# Patient Record
Sex: FEMALE | Race: WHITE | NOT HISPANIC OR LATINO
[De-identification: names, ages, dates, MRNs, and addresses within clinical notes are randomized per-mention and may not be internally consistent; named-entity substitution may affect disease eponyms.]

---

## 2021-01-01 ENCOUNTER — APPOINTMENT (OUTPATIENT)
Dept: GERIATRICS | Facility: CLINIC | Age: 86
End: 2021-01-01

## 2021-01-01 ENCOUNTER — APPOINTMENT (OUTPATIENT)
Dept: CARDIOLOGY | Facility: CLINIC | Age: 86
End: 2021-01-01
Payer: MEDICARE

## 2021-01-01 ENCOUNTER — NON-APPOINTMENT (OUTPATIENT)
Age: 86
End: 2021-01-01

## 2021-01-01 ENCOUNTER — LABORATORY RESULT (OUTPATIENT)
Age: 86
End: 2021-01-01

## 2021-01-01 ENCOUNTER — APPOINTMENT (OUTPATIENT)
Dept: INTERNAL MEDICINE | Facility: CLINIC | Age: 86
End: 2021-01-01
Payer: MEDICARE

## 2021-01-01 ENCOUNTER — RESULT REVIEW (OUTPATIENT)
Age: 86
End: 2021-01-01

## 2021-01-01 ENCOUNTER — APPOINTMENT (OUTPATIENT)
Dept: CARDIOTHORACIC SURGERY | Facility: HOSPITAL | Age: 86
End: 2021-01-01

## 2021-01-01 ENCOUNTER — APPOINTMENT (OUTPATIENT)
Dept: ORTHOPEDIC SURGERY | Facility: CLINIC | Age: 86
End: 2021-01-01

## 2021-01-01 ENCOUNTER — APPOINTMENT (OUTPATIENT)
Dept: CARDIOLOGY | Facility: CLINIC | Age: 86
End: 2021-01-01

## 2021-01-01 ENCOUNTER — APPOINTMENT (OUTPATIENT)
Dept: ULTRASOUND IMAGING | Facility: HOSPITAL | Age: 86
End: 2021-01-01

## 2021-01-01 ENCOUNTER — APPOINTMENT (OUTPATIENT)
Dept: GERIATRICS | Facility: CLINIC | Age: 86
End: 2021-01-01
Payer: MEDICARE

## 2021-01-01 ENCOUNTER — OUTPATIENT (OUTPATIENT)
Dept: OUTPATIENT SERVICES | Facility: HOSPITAL | Age: 86
LOS: 1 days | End: 2021-01-01
Payer: MEDICARE

## 2021-01-01 ENCOUNTER — INPATIENT (INPATIENT)
Facility: HOSPITAL | Age: 86
LOS: 38 days | DRG: 266 | End: 2021-08-16
Attending: INTERNAL MEDICINE | Admitting: INTERNAL MEDICINE
Payer: MEDICARE

## 2021-01-01 ENCOUNTER — APPOINTMENT (OUTPATIENT)
Dept: CARDIOTHORACIC SURGERY | Facility: CLINIC | Age: 86
End: 2021-01-01
Payer: MEDICARE

## 2021-01-01 ENCOUNTER — FORM ENCOUNTER (OUTPATIENT)
Age: 86
End: 2021-01-01

## 2021-01-01 ENCOUNTER — TRANSCRIPTION ENCOUNTER (OUTPATIENT)
Age: 86
End: 2021-01-01

## 2021-01-01 VITALS
OXYGEN SATURATION: 97 % | HEART RATE: 65 BPM | WEIGHT: 160.4 LBS | RESPIRATION RATE: 15 BRPM | HEIGHT: 62 IN | DIASTOLIC BLOOD PRESSURE: 57 MMHG | TEMPERATURE: 98.5 F | SYSTOLIC BLOOD PRESSURE: 124 MMHG | BODY MASS INDEX: 29.52 KG/M2

## 2021-01-01 VITALS
SYSTOLIC BLOOD PRESSURE: 116 MMHG | HEIGHT: 62 IN | WEIGHT: 174.6 LBS | TEMPERATURE: 97.7 F | HEART RATE: 72 BPM | OXYGEN SATURATION: 96 % | DIASTOLIC BLOOD PRESSURE: 58 MMHG | BODY MASS INDEX: 32.13 KG/M2 | RESPIRATION RATE: 14 BRPM

## 2021-01-01 VITALS
BODY MASS INDEX: 31.65 KG/M2 | DIASTOLIC BLOOD PRESSURE: 54 MMHG | SYSTOLIC BLOOD PRESSURE: 99 MMHG | HEIGHT: 62 IN | RESPIRATION RATE: 26 BRPM | HEART RATE: 66 BPM | WEIGHT: 172 LBS

## 2021-01-01 VITALS
TEMPERATURE: 98 F | RESPIRATION RATE: 20 BRPM | WEIGHT: 164.02 LBS | HEIGHT: 60 IN | DIASTOLIC BLOOD PRESSURE: 67 MMHG | SYSTOLIC BLOOD PRESSURE: 112 MMHG | HEART RATE: 83 BPM

## 2021-01-01 VITALS
OXYGEN SATURATION: 99 % | HEART RATE: 87 BPM | TEMPERATURE: 98 F | SYSTOLIC BLOOD PRESSURE: 97 MMHG | DIASTOLIC BLOOD PRESSURE: 43 MMHG | RESPIRATION RATE: 20 BRPM

## 2021-01-01 VITALS
HEIGHT: 60 IN | HEART RATE: 75 BPM | BODY MASS INDEX: 36.42 KG/M2 | OXYGEN SATURATION: 94 % | DIASTOLIC BLOOD PRESSURE: 70 MMHG | SYSTOLIC BLOOD PRESSURE: 130 MMHG | WEIGHT: 185.5 LBS | TEMPERATURE: 94.8 F | RESPIRATION RATE: 14 BRPM

## 2021-01-01 VITALS
TEMPERATURE: 97 F | HEIGHT: 60 IN | DIASTOLIC BLOOD PRESSURE: 70 MMHG | SYSTOLIC BLOOD PRESSURE: 110 MMHG | OXYGEN SATURATION: 96 % | BODY MASS INDEX: 36.32 KG/M2 | WEIGHT: 185 LBS | HEART RATE: 66 BPM

## 2021-01-01 VITALS
SYSTOLIC BLOOD PRESSURE: 106 MMHG | WEIGHT: 164 LBS | HEART RATE: 68 BPM | TEMPERATURE: 97.8 F | OXYGEN SATURATION: 90 % | HEIGHT: 62 IN | DIASTOLIC BLOOD PRESSURE: 64 MMHG | BODY MASS INDEX: 30.18 KG/M2

## 2021-01-01 VITALS
OXYGEN SATURATION: 96 % | HEART RATE: 57 BPM | HEIGHT: 60 IN | BODY MASS INDEX: 36.52 KG/M2 | SYSTOLIC BLOOD PRESSURE: 123 MMHG | TEMPERATURE: 97.6 F | WEIGHT: 186 LBS | DIASTOLIC BLOOD PRESSURE: 66 MMHG | RESPIRATION RATE: 14 BRPM

## 2021-01-01 DIAGNOSIS — E87.2 ACIDOSIS: ICD-10-CM

## 2021-01-01 DIAGNOSIS — Z71.89 OTHER SPECIFIED COUNSELING: ICD-10-CM

## 2021-01-01 DIAGNOSIS — E55.9 VITAMIN D DEFICIENCY, UNSPECIFIED: ICD-10-CM

## 2021-01-01 DIAGNOSIS — G93.41 METABOLIC ENCEPHALOPATHY: ICD-10-CM

## 2021-01-01 DIAGNOSIS — Z82.49 FAMILY HISTORY OF ISCHEMIC HEART DISEASE AND OTHER DISEASES OF THE CIRCULATORY SYSTEM: ICD-10-CM

## 2021-01-01 DIAGNOSIS — J96.01 ACUTE RESPIRATORY FAILURE WITH HYPOXIA: ICD-10-CM

## 2021-01-01 DIAGNOSIS — E11.9 TYPE 2 DIABETES MELLITUS W/OUT COMPLICATIONS: ICD-10-CM

## 2021-01-01 DIAGNOSIS — E87.5 HYPERKALEMIA: ICD-10-CM

## 2021-01-01 DIAGNOSIS — E11.9 TYPE 2 DIABETES MELLITUS WITHOUT COMPLICATIONS: ICD-10-CM

## 2021-01-01 DIAGNOSIS — R06.00 DYSPNEA, UNSPECIFIED: ICD-10-CM

## 2021-01-01 DIAGNOSIS — I35.0 NONRHEUMATIC AORTIC (VALVE) STENOSIS: ICD-10-CM

## 2021-01-01 DIAGNOSIS — I35.9 NONRHEUMATIC AORTIC VALVE DISORDER, UNSPECIFIED: ICD-10-CM

## 2021-01-01 DIAGNOSIS — R91.8 OTHER NONSPECIFIC ABNORMAL FINDING OF LUNG FIELD: ICD-10-CM

## 2021-01-01 DIAGNOSIS — S81.809A UNSPECIFIED OPEN WOUND, UNSPECIFIED LOWER LEG, INITIAL ENCOUNTER: ICD-10-CM

## 2021-01-01 DIAGNOSIS — R60.0 LOCALIZED EDEMA: ICD-10-CM

## 2021-01-01 DIAGNOSIS — I25.10 ATHEROSCLEROTIC HEART DISEASE OF NATIVE CORONARY ARTERY W/OUT ANGINA PECTORIS: ICD-10-CM

## 2021-01-01 DIAGNOSIS — R52 PAIN, UNSPECIFIED: ICD-10-CM

## 2021-01-01 DIAGNOSIS — R53.2 FUNCTIONAL QUADRIPLEGIA: ICD-10-CM

## 2021-01-01 DIAGNOSIS — E87.3 ALKALOSIS: ICD-10-CM

## 2021-01-01 DIAGNOSIS — D64.9 ANEMIA, UNSPECIFIED: ICD-10-CM

## 2021-01-01 DIAGNOSIS — G47.00 INSOMNIA, UNSPECIFIED: ICD-10-CM

## 2021-01-01 DIAGNOSIS — N17.9 ACUTE KIDNEY FAILURE, UNSPECIFIED: ICD-10-CM

## 2021-01-01 DIAGNOSIS — E87.79 OTHER FLUID OVERLOAD: ICD-10-CM

## 2021-01-01 DIAGNOSIS — R93.1 ABNORMAL FINDINGS ON DIAGNOSTIC IMAGING OF HEART AND CORONARY CIRCULATION: ICD-10-CM

## 2021-01-01 DIAGNOSIS — Z13.29 ENCOUNTER FOR SCREENING FOR OTHER SUSPECTED ENDOCRINE DISORDER: ICD-10-CM

## 2021-01-01 DIAGNOSIS — E03.9 HYPOTHYROIDISM, UNSPECIFIED: ICD-10-CM

## 2021-01-01 DIAGNOSIS — I50.33 ACUTE ON CHRONIC DIASTOLIC (CONGESTIVE) HEART FAILURE: ICD-10-CM

## 2021-01-01 DIAGNOSIS — Z85.038 PERSONAL HISTORY OF OTHER MALIGNANT NEOPLASM OF LARGE INTESTINE: ICD-10-CM

## 2021-01-01 DIAGNOSIS — E87.70 FLUID OVERLOAD, UNSPECIFIED: ICD-10-CM

## 2021-01-01 DIAGNOSIS — Z51.5 ENCOUNTER FOR PALLIATIVE CARE: ICD-10-CM

## 2021-01-01 DIAGNOSIS — Z23 ENCOUNTER FOR IMMUNIZATION: ICD-10-CM

## 2021-01-01 DIAGNOSIS — E87.6 HYPOKALEMIA: ICD-10-CM

## 2021-01-01 DIAGNOSIS — F41.9 ANXIETY DISORDER, UNSPECIFIED: ICD-10-CM

## 2021-01-01 DIAGNOSIS — I48.21 PERMANENT ATRIAL FIBRILLATION: ICD-10-CM

## 2021-01-01 DIAGNOSIS — R26.81 UNSTEADINESS ON FEET: ICD-10-CM

## 2021-01-01 DIAGNOSIS — D50.0 IRON DEFICIENCY ANEMIA SECONDARY TO BLOOD LOSS (CHRONIC): ICD-10-CM

## 2021-01-01 DIAGNOSIS — Z29.9 ENCOUNTER FOR PROPHYLACTIC MEASURES, UNSPECIFIED: ICD-10-CM

## 2021-01-01 DIAGNOSIS — Z13.820 ENCOUNTER FOR SCREENING FOR OSTEOPOROSIS: ICD-10-CM

## 2021-01-01 DIAGNOSIS — I10 ESSENTIAL (PRIMARY) HYPERTENSION: ICD-10-CM

## 2021-01-01 DIAGNOSIS — I48.91 UNSPECIFIED ATRIAL FIBRILLATION: ICD-10-CM

## 2021-01-01 DIAGNOSIS — E87.1 HYPO-OSMOLALITY AND HYPONATREMIA: ICD-10-CM

## 2021-01-01 DIAGNOSIS — E83.39 OTHER DISORDERS OF PHOSPHORUS METABOLISM: ICD-10-CM

## 2021-01-01 DIAGNOSIS — R89.9 UNSPECIFIED ABNORMAL FINDING IN SPECIMENS FROM OTHER ORGANS, SYSTEMS AND TISSUES: ICD-10-CM

## 2021-01-01 DIAGNOSIS — Z79.01 LONG TERM (CURRENT) USE OF ANTICOAGULANTS: ICD-10-CM

## 2021-01-01 DIAGNOSIS — Z00.00 ENCOUNTER FOR GENERAL ADULT MEDICAL EXAMINATION WITHOUT ABNORMAL FINDINGS: ICD-10-CM

## 2021-01-01 DIAGNOSIS — E66.9 OBESITY, UNSPECIFIED: ICD-10-CM

## 2021-01-01 DIAGNOSIS — R91.1 SOLITARY PULMONARY NODULE: ICD-10-CM

## 2021-01-01 DIAGNOSIS — J98.4 OTHER DISORDERS OF LUNG: ICD-10-CM

## 2021-01-01 DIAGNOSIS — Z51.81 ENCOUNTER FOR THERAPEUTIC DRUG LEVEL MONITORING: ICD-10-CM

## 2021-01-01 DIAGNOSIS — Z95.2 PRESENCE OF PROSTHETIC HEART VALVE: ICD-10-CM

## 2021-01-01 DIAGNOSIS — Z60.2 PROBLEMS RELATED TO LIVING ALONE: ICD-10-CM

## 2021-01-01 LAB
-  AMIKACIN: SIGNIFICANT CHANGE UP
-  AMOXICILLIN/CLAVULANIC ACID: SIGNIFICANT CHANGE UP
-  AMPICILLIN/SULBACTAM: SIGNIFICANT CHANGE UP
-  AMPICILLIN/SULBACTAM: SIGNIFICANT CHANGE UP
-  AMPICILLIN: SIGNIFICANT CHANGE UP
-  AMPICILLIN: SIGNIFICANT CHANGE UP
-  AZTREONAM: SIGNIFICANT CHANGE UP
-  CEFAZOLIN: SIGNIFICANT CHANGE UP
-  CEFAZOLIN: SIGNIFICANT CHANGE UP
-  CEFEPIME: SIGNIFICANT CHANGE UP
-  CEFOXITIN: SIGNIFICANT CHANGE UP
-  CEFTRIAXONE: SIGNIFICANT CHANGE UP
-  CIPROFLOXACIN: SIGNIFICANT CHANGE UP
-  CIPROFLOXACIN: SIGNIFICANT CHANGE UP
-  CLINDAMYCIN: SIGNIFICANT CHANGE UP
-  ERTAPENEM: SIGNIFICANT CHANGE UP
-  ERYTHROMYCIN: SIGNIFICANT CHANGE UP
-  GENTAMICIN: SIGNIFICANT CHANGE UP
-  GENTAMICIN: SIGNIFICANT CHANGE UP
-  IMIPENEM: SIGNIFICANT CHANGE UP
-  LEVOFLOXACIN: SIGNIFICANT CHANGE UP
-  LEVOFLOXACIN: SIGNIFICANT CHANGE UP
-  MEROPENEM: SIGNIFICANT CHANGE UP
-  NITROFURANTOIN: SIGNIFICANT CHANGE UP
-  NITROFURANTOIN: SIGNIFICANT CHANGE UP
-  OXACILLIN: SIGNIFICANT CHANGE UP
-  PENICILLIN: SIGNIFICANT CHANGE UP
-  PIPERACILLIN/TAZOBACTAM: SIGNIFICANT CHANGE UP
-  RIFAMPIN: SIGNIFICANT CHANGE UP
-  STAPHYLOCOCCUS EPIDERMIDIS, METHICILLIN RESISTANT: SIGNIFICANT CHANGE UP
-  STAPHYLOCOCCUS EPIDERMIDIS: SIGNIFICANT CHANGE UP
-  TETRACYCLINE: SIGNIFICANT CHANGE UP
-  TETRACYCLINE: SIGNIFICANT CHANGE UP
-  TIGECYCLINE: SIGNIFICANT CHANGE UP
-  TOBRAMYCIN: SIGNIFICANT CHANGE UP
-  TRIMETHOPRIM/SULFAMETHOXAZOLE: SIGNIFICANT CHANGE UP
-  TRIMETHOPRIM/SULFAMETHOXAZOLE: SIGNIFICANT CHANGE UP
-  VANCOMYCIN: SIGNIFICANT CHANGE UP
-  VANCOMYCIN: SIGNIFICANT CHANGE UP
25(OH)D3 SERPL-MCNC: 48.1 NG/ML
A1C WITH ESTIMATED AVERAGE GLUCOSE RESULT: 6.7 % — HIGH (ref 4–5.6)
ALBUMIN SERPL ELPH-MCNC: 3.1 G/DL — LOW (ref 3.3–5)
ALBUMIN SERPL ELPH-MCNC: 3.1 G/DL — LOW (ref 3.3–5)
ALBUMIN SERPL ELPH-MCNC: 3.2 G/DL — LOW (ref 3.3–5)
ALBUMIN SERPL ELPH-MCNC: 3.6 G/DL
ALBUMIN SERPL ELPH-MCNC: 3.8 G/DL
ALBUMIN SERPL ELPH-MCNC: 3.8 G/DL
ALBUMIN SERPL ELPH-MCNC: 3.9 G/DL — SIGNIFICANT CHANGE UP (ref 3.3–5)
ALBUMIN SERPL ELPH-MCNC: 4.2 G/DL
ALP BLD-CCNC: 187 U/L
ALP BLD-CCNC: 203 U/L
ALP BLD-CCNC: 210 U/L
ALP BLD-CCNC: 285 U/L
ALP SERPL-CCNC: 205 U/L — HIGH (ref 40–120)
ALP SERPL-CCNC: 226 U/L — HIGH (ref 40–120)
ALP SERPL-CCNC: 271 U/L — HIGH (ref 40–120)
ALP SERPL-CCNC: 280 U/L — HIGH (ref 40–120)
ALT FLD-CCNC: 10 U/L — SIGNIFICANT CHANGE UP (ref 10–45)
ALT FLD-CCNC: 11 U/L — SIGNIFICANT CHANGE UP (ref 10–45)
ALT FLD-CCNC: 17 U/L — SIGNIFICANT CHANGE UP (ref 10–45)
ALT FLD-CCNC: 8 U/L — LOW (ref 10–45)
ALT SERPL-CCNC: 11 U/L
ALT SERPL-CCNC: 13 U/L
ALT SERPL-CCNC: 13 U/L
ALT SERPL-CCNC: 18 U/L
ANION GAP SERPL CALC-SCNC: 10 MMOL/L — SIGNIFICANT CHANGE UP (ref 5–17)
ANION GAP SERPL CALC-SCNC: 11 MMOL/L — SIGNIFICANT CHANGE UP (ref 5–17)
ANION GAP SERPL CALC-SCNC: 12 MMOL/L — SIGNIFICANT CHANGE UP (ref 5–17)
ANION GAP SERPL CALC-SCNC: 13 MMOL/L
ANION GAP SERPL CALC-SCNC: 13 MMOL/L — SIGNIFICANT CHANGE UP (ref 5–17)
ANION GAP SERPL CALC-SCNC: 14 MMOL/L — SIGNIFICANT CHANGE UP (ref 5–17)
ANION GAP SERPL CALC-SCNC: 15 MMOL/L — SIGNIFICANT CHANGE UP (ref 5–17)
ANION GAP SERPL CALC-SCNC: 16 MMOL/L
ANION GAP SERPL CALC-SCNC: 16 MMOL/L — SIGNIFICANT CHANGE UP (ref 5–17)
ANION GAP SERPL CALC-SCNC: 17 MMOL/L
ANION GAP SERPL CALC-SCNC: 17 MMOL/L — SIGNIFICANT CHANGE UP (ref 5–17)
ANION GAP SERPL CALC-SCNC: 19 MMOL/L — HIGH (ref 5–17)
ANION GAP SERPL CALC-SCNC: 20 MMOL/L — HIGH (ref 5–17)
ANION GAP SERPL CALC-SCNC: 22 MMOL/L — HIGH (ref 5–17)
ANION GAP SERPL CALC-SCNC: 24 MMOL/L
ANION GAP SERPL CALC-SCNC: 29 MMOL/L — HIGH (ref 5–17)
ANION GAP SERPL CALC-SCNC: 30 MMOL/L — HIGH (ref 5–17)
ANISOCYTOSIS BLD QL: SLIGHT — SIGNIFICANT CHANGE UP
APPEARANCE UR: ABNORMAL
APPEARANCE UR: ABNORMAL
APPEARANCE UR: CLEAR — SIGNIFICANT CHANGE UP
APTT BLD: 101.5 SEC — HIGH (ref 27.5–35.5)
APTT BLD: 136.9 SEC — CRITICAL HIGH (ref 27.5–35.5)
APTT BLD: 184.7 SEC — CRITICAL HIGH (ref 27.5–35.5)
APTT BLD: 193.1 SEC — CRITICAL HIGH (ref 27.5–35.5)
APTT BLD: 33.4 SEC — SIGNIFICANT CHANGE UP (ref 27.5–35.5)
APTT BLD: 33.7 SEC — SIGNIFICANT CHANGE UP (ref 27.5–35.5)
APTT BLD: 35.8 SEC — HIGH (ref 27.5–35.5)
APTT BLD: 36.1 SEC — HIGH (ref 27.5–35.5)
APTT BLD: 40 SEC — HIGH (ref 27.5–35.5)
APTT BLD: 58.2 SEC — HIGH (ref 27.5–35.5)
APTT BLD: 72.1 SEC — HIGH (ref 27.5–35.5)
AST SERPL-CCNC: 19 U/L
AST SERPL-CCNC: 20 U/L — SIGNIFICANT CHANGE UP (ref 10–40)
AST SERPL-CCNC: 21 U/L
AST SERPL-CCNC: 21 U/L — SIGNIFICANT CHANGE UP (ref 10–40)
AST SERPL-CCNC: 21 U/L — SIGNIFICANT CHANGE UP (ref 10–40)
AST SERPL-CCNC: 24 U/L
AST SERPL-CCNC: 25 U/L — SIGNIFICANT CHANGE UP (ref 10–40)
AST SERPL-CCNC: 27 U/L
BACTERIA # UR AUTO: ABNORMAL
BACTERIA # UR AUTO: NEGATIVE — SIGNIFICANT CHANGE UP
BACTERIA # UR AUTO: NEGATIVE — SIGNIFICANT CHANGE UP
BASE EXCESS BLDMV CALC-SCNC: 19.1 MMOL/L — HIGH (ref -3–3)
BASE EXCESS BLDMV CALC-SCNC: 9.4 MMOL/L — HIGH (ref -3–3)
BASE EXCESS BLDV CALC-SCNC: -11.2 MMOL/L — LOW (ref -2–2)
BASE EXCESS BLDV CALC-SCNC: -12.2 MMOL/L — LOW (ref -2–2)
BASE EXCESS BLDV CALC-SCNC: -14.2 MMOL/L — LOW (ref -2–2)
BASE EXCESS BLDV CALC-SCNC: 4.1 MMOL/L — HIGH (ref -2–2)
BASOPHILS # BLD AUTO: 0 K/UL — SIGNIFICANT CHANGE UP (ref 0–0.2)
BASOPHILS # BLD AUTO: 0.04 K/UL — SIGNIFICANT CHANGE UP (ref 0–0.2)
BASOPHILS # BLD AUTO: 0.05 K/UL
BASOPHILS # BLD AUTO: 0.05 K/UL — SIGNIFICANT CHANGE UP (ref 0–0.2)
BASOPHILS # BLD AUTO: 0.07 K/UL
BASOPHILS # BLD AUTO: 0.08 K/UL
BASOPHILS # BLD AUTO: 0.09 K/UL
BASOPHILS NFR BLD AUTO: 0 % — SIGNIFICANT CHANGE UP (ref 0–2)
BASOPHILS NFR BLD AUTO: 0.1 % — SIGNIFICANT CHANGE UP (ref 0–2)
BASOPHILS NFR BLD AUTO: 0.6 %
BASOPHILS NFR BLD AUTO: 0.7 %
BASOPHILS NFR BLD AUTO: 0.7 % — SIGNIFICANT CHANGE UP (ref 0–2)
BASOPHILS NFR BLD AUTO: 0.9 %
BASOPHILS NFR BLD AUTO: 1 %
BILIRUB SERPL-MCNC: 0.4 MG/DL
BILIRUB SERPL-MCNC: 0.4 MG/DL — SIGNIFICANT CHANGE UP (ref 0.2–1.2)
BILIRUB SERPL-MCNC: 0.5 MG/DL
BILIRUB SERPL-MCNC: 0.9 MG/DL — SIGNIFICANT CHANGE UP (ref 0.2–1.2)
BILIRUB SERPL-MCNC: 1 MG/DL — SIGNIFICANT CHANGE UP (ref 0.2–1.2)
BILIRUB SERPL-MCNC: 1 MG/DL — SIGNIFICANT CHANGE UP (ref 0.2–1.2)
BILIRUB UR-MCNC: ABNORMAL
BILIRUB UR-MCNC: NEGATIVE — SIGNIFICANT CHANGE UP
BILIRUB UR-MCNC: NEGATIVE — SIGNIFICANT CHANGE UP
BLD GP AB SCN SERPL QL: NEGATIVE — SIGNIFICANT CHANGE UP
BLD GP AB SCN SERPL QL: NEGATIVE — SIGNIFICANT CHANGE UP
BUN SERPL-MCNC: 104 MG/DL — HIGH (ref 7–23)
BUN SERPL-MCNC: 109 MG/DL — HIGH (ref 7–23)
BUN SERPL-MCNC: 110 MG/DL — HIGH (ref 7–23)
BUN SERPL-MCNC: 118 MG/DL — HIGH (ref 7–23)
BUN SERPL-MCNC: 150 MG/DL — HIGH (ref 7–23)
BUN SERPL-MCNC: 19 MG/DL — SIGNIFICANT CHANGE UP (ref 7–23)
BUN SERPL-MCNC: 21 MG/DL — SIGNIFICANT CHANGE UP (ref 7–23)
BUN SERPL-MCNC: 22 MG/DL — SIGNIFICANT CHANGE UP (ref 7–23)
BUN SERPL-MCNC: 24 MG/DL — HIGH (ref 7–23)
BUN SERPL-MCNC: 26 MG/DL — HIGH (ref 7–23)
BUN SERPL-MCNC: 27 MG/DL — HIGH (ref 7–23)
BUN SERPL-MCNC: 28 MG/DL
BUN SERPL-MCNC: 28 MG/DL — HIGH (ref 7–23)
BUN SERPL-MCNC: 29 MG/DL — HIGH (ref 7–23)
BUN SERPL-MCNC: 30 MG/DL — HIGH (ref 7–23)
BUN SERPL-MCNC: 32 MG/DL — HIGH (ref 7–23)
BUN SERPL-MCNC: 32 MG/DL — HIGH (ref 7–23)
BUN SERPL-MCNC: 33 MG/DL — HIGH (ref 7–23)
BUN SERPL-MCNC: 34 MG/DL — HIGH (ref 7–23)
BUN SERPL-MCNC: 34 MG/DL — HIGH (ref 7–23)
BUN SERPL-MCNC: 35 MG/DL — HIGH (ref 7–23)
BUN SERPL-MCNC: 35 MG/DL — HIGH (ref 7–23)
BUN SERPL-MCNC: 37 MG/DL — HIGH (ref 7–23)
BUN SERPL-MCNC: 41 MG/DL
BUN SERPL-MCNC: 46 MG/DL — HIGH (ref 7–23)
BUN SERPL-MCNC: 48 MG/DL — HIGH (ref 7–23)
BUN SERPL-MCNC: 51 MG/DL — HIGH (ref 7–23)
BUN SERPL-MCNC: 52 MG/DL — HIGH (ref 7–23)
BUN SERPL-MCNC: 53 MG/DL — HIGH (ref 7–23)
BUN SERPL-MCNC: 54 MG/DL — HIGH (ref 7–23)
BUN SERPL-MCNC: 55 MG/DL — HIGH (ref 7–23)
BUN SERPL-MCNC: 55 MG/DL — HIGH (ref 7–23)
BUN SERPL-MCNC: 65 MG/DL — HIGH (ref 7–23)
BUN SERPL-MCNC: 65 MG/DL — HIGH (ref 7–23)
BUN SERPL-MCNC: 67 MG/DL
BUN SERPL-MCNC: 70 MG/DL
BUN SERPL-MCNC: 70 MG/DL — HIGH (ref 7–23)
BUN SERPL-MCNC: 78 MG/DL — HIGH (ref 7–23)
BUN SERPL-MCNC: 79 MG/DL — HIGH (ref 7–23)
BUN SERPL-MCNC: 80 MG/DL — HIGH (ref 7–23)
BUN SERPL-MCNC: 98 MG/DL — HIGH (ref 7–23)
BUN SERPL-MCNC: 98 MG/DL — HIGH (ref 7–23)
CA-I SERPL-SCNC: 1.03 MMOL/L — LOW (ref 1.12–1.3)
CA-I SERPL-SCNC: 1.15 MMOL/L — SIGNIFICANT CHANGE UP (ref 1.12–1.3)
CA-I SERPL-SCNC: 1.18 MMOL/L — SIGNIFICANT CHANGE UP (ref 1.12–1.3)
CA-I SERPL-SCNC: 1.2 MMOL/L — SIGNIFICANT CHANGE UP (ref 1.12–1.3)
CALCIUM SERPL-MCNC: 10 MG/DL — SIGNIFICANT CHANGE UP (ref 8.4–10.5)
CALCIUM SERPL-MCNC: 10.1 MG/DL — SIGNIFICANT CHANGE UP (ref 8.4–10.5)
CALCIUM SERPL-MCNC: 10.2 MG/DL — SIGNIFICANT CHANGE UP (ref 8.4–10.5)
CALCIUM SERPL-MCNC: 10.3 MG/DL — SIGNIFICANT CHANGE UP (ref 8.4–10.5)
CALCIUM SERPL-MCNC: 10.4 MG/DL
CALCIUM SERPL-MCNC: 10.4 MG/DL — SIGNIFICANT CHANGE UP (ref 8.4–10.5)
CALCIUM SERPL-MCNC: 10.6 MG/DL — HIGH (ref 8.4–10.5)
CALCIUM SERPL-MCNC: 10.6 MG/DL — HIGH (ref 8.4–10.5)
CALCIUM SERPL-MCNC: 10.7 MG/DL — HIGH (ref 8.4–10.5)
CALCIUM SERPL-MCNC: 10.8 MG/DL — HIGH (ref 8.4–10.5)
CALCIUM SERPL-MCNC: 10.9 MG/DL — HIGH (ref 8.4–10.5)
CALCIUM SERPL-MCNC: 8.2 MG/DL — LOW (ref 8.4–10.5)
CALCIUM SERPL-MCNC: 8.4 MG/DL — SIGNIFICANT CHANGE UP (ref 8.4–10.5)
CALCIUM SERPL-MCNC: 8.5 MG/DL — SIGNIFICANT CHANGE UP (ref 8.4–10.5)
CALCIUM SERPL-MCNC: 8.5 MG/DL — SIGNIFICANT CHANGE UP (ref 8.4–10.5)
CALCIUM SERPL-MCNC: 8.7 MG/DL — SIGNIFICANT CHANGE UP (ref 8.4–10.5)
CALCIUM SERPL-MCNC: 8.7 MG/DL — SIGNIFICANT CHANGE UP (ref 8.4–10.5)
CALCIUM SERPL-MCNC: 8.8 MG/DL — SIGNIFICANT CHANGE UP (ref 8.4–10.5)
CALCIUM SERPL-MCNC: 8.9 MG/DL — SIGNIFICANT CHANGE UP (ref 8.4–10.5)
CALCIUM SERPL-MCNC: 8.9 MG/DL — SIGNIFICANT CHANGE UP (ref 8.4–10.5)
CALCIUM SERPL-MCNC: 9 MG/DL — SIGNIFICANT CHANGE UP (ref 8.4–10.5)
CALCIUM SERPL-MCNC: 9.2 MG/DL — SIGNIFICANT CHANGE UP (ref 8.4–10.5)
CALCIUM SERPL-MCNC: 9.3 MG/DL
CALCIUM SERPL-MCNC: 9.3 MG/DL — SIGNIFICANT CHANGE UP (ref 8.4–10.5)
CALCIUM SERPL-MCNC: 9.3 MG/DL — SIGNIFICANT CHANGE UP (ref 8.4–10.5)
CALCIUM SERPL-MCNC: 9.4 MG/DL — SIGNIFICANT CHANGE UP (ref 8.4–10.5)
CALCIUM SERPL-MCNC: 9.5 MG/DL — SIGNIFICANT CHANGE UP (ref 8.4–10.5)
CALCIUM SERPL-MCNC: 9.5 MG/DL — SIGNIFICANT CHANGE UP (ref 8.4–10.5)
CALCIUM SERPL-MCNC: 9.6 MG/DL
CALCIUM SERPL-MCNC: 9.6 MG/DL
CALCIUM SERPL-MCNC: 9.6 MG/DL — SIGNIFICANT CHANGE UP (ref 8.4–10.5)
CALCIUM SERPL-MCNC: 9.6 MG/DL — SIGNIFICANT CHANGE UP (ref 8.4–10.5)
CALCIUM SERPL-MCNC: 9.7 MG/DL — SIGNIFICANT CHANGE UP (ref 8.4–10.5)
CALCIUM SERPL-MCNC: 9.8 MG/DL — SIGNIFICANT CHANGE UP (ref 8.4–10.5)
CALCIUM SERPL-MCNC: 9.8 MG/DL — SIGNIFICANT CHANGE UP (ref 8.4–10.5)
CALCIUM SERPL-MCNC: 9.9 MG/DL — SIGNIFICANT CHANGE UP (ref 8.4–10.5)
CHLORIDE BLDV-SCNC: 90 MMOL/L — LOW (ref 96–108)
CHLORIDE BLDV-SCNC: 92 MMOL/L — LOW (ref 96–108)
CHLORIDE BLDV-SCNC: 93 MMOL/L — LOW (ref 96–108)
CHLORIDE BLDV-SCNC: 99 MMOL/L — SIGNIFICANT CHANGE UP (ref 96–108)
CHLORIDE SERPL-SCNC: 100 MMOL/L — SIGNIFICANT CHANGE UP (ref 96–108)
CHLORIDE SERPL-SCNC: 100 MMOL/L — SIGNIFICANT CHANGE UP (ref 96–108)
CHLORIDE SERPL-SCNC: 102 MMOL/L — SIGNIFICANT CHANGE UP (ref 96–108)
CHLORIDE SERPL-SCNC: 103 MMOL/L
CHLORIDE SERPL-SCNC: 82 MMOL/L — LOW (ref 96–108)
CHLORIDE SERPL-SCNC: 83 MMOL/L — LOW (ref 96–108)
CHLORIDE SERPL-SCNC: 84 MMOL/L — LOW (ref 96–108)
CHLORIDE SERPL-SCNC: 85 MMOL/L — LOW (ref 96–108)
CHLORIDE SERPL-SCNC: 85 MMOL/L — LOW (ref 96–108)
CHLORIDE SERPL-SCNC: 86 MMOL/L — LOW (ref 96–108)
CHLORIDE SERPL-SCNC: 87 MMOL/L — LOW (ref 96–108)
CHLORIDE SERPL-SCNC: 88 MMOL/L
CHLORIDE SERPL-SCNC: 88 MMOL/L — LOW (ref 96–108)
CHLORIDE SERPL-SCNC: 89 MMOL/L — LOW (ref 96–108)
CHLORIDE SERPL-SCNC: 90 MMOL/L — LOW (ref 96–108)
CHLORIDE SERPL-SCNC: 91 MMOL/L — LOW (ref 96–108)
CHLORIDE SERPL-SCNC: 92 MMOL/L — LOW (ref 96–108)
CHLORIDE SERPL-SCNC: 92 MMOL/L — LOW (ref 96–108)
CHLORIDE SERPL-SCNC: 93 MMOL/L — LOW (ref 96–108)
CHLORIDE SERPL-SCNC: 94 MMOL/L — LOW (ref 96–108)
CHLORIDE SERPL-SCNC: 95 MMOL/L
CHLORIDE SERPL-SCNC: 95 MMOL/L — LOW (ref 96–108)
CHLORIDE SERPL-SCNC: 97 MMOL/L — SIGNIFICANT CHANGE UP (ref 96–108)
CHLORIDE SERPL-SCNC: 98 MMOL/L — SIGNIFICANT CHANGE UP (ref 96–108)
CHLORIDE SERPL-SCNC: 99 MMOL/L
CHLORIDE SERPL-SCNC: 99 MMOL/L — SIGNIFICANT CHANGE UP (ref 96–108)
CO2 BLDMV-SCNC: 37 MMOL/L — HIGH (ref 21–29)
CO2 BLDMV-SCNC: 48 MMOL/L — HIGH (ref 21–29)
CO2 BLDV-SCNC: 14 MMOL/L — LOW (ref 22–30)
CO2 BLDV-SCNC: 15 MMOL/L — LOW (ref 22–30)
CO2 BLDV-SCNC: 16 MMOL/L — LOW (ref 22–30)
CO2 BLDV-SCNC: 30 MMOL/L — SIGNIFICANT CHANGE UP (ref 22–30)
CO2 SERPL-SCNC: 11 MMOL/L — LOW (ref 22–31)
CO2 SERPL-SCNC: 12 MMOL/L — LOW (ref 22–31)
CO2 SERPL-SCNC: 14 MMOL/L
CO2 SERPL-SCNC: 18 MMOL/L — LOW (ref 22–31)
CO2 SERPL-SCNC: 20 MMOL/L
CO2 SERPL-SCNC: 20 MMOL/L — LOW (ref 22–31)
CO2 SERPL-SCNC: 22 MMOL/L
CO2 SERPL-SCNC: 23 MMOL/L — SIGNIFICANT CHANGE UP (ref 22–31)
CO2 SERPL-SCNC: 24 MMOL/L — SIGNIFICANT CHANGE UP (ref 22–31)
CO2 SERPL-SCNC: 25 MMOL/L
CO2 SERPL-SCNC: 25 MMOL/L — SIGNIFICANT CHANGE UP (ref 22–31)
CO2 SERPL-SCNC: 25 MMOL/L — SIGNIFICANT CHANGE UP (ref 22–31)
CO2 SERPL-SCNC: 26 MMOL/L — SIGNIFICANT CHANGE UP (ref 22–31)
CO2 SERPL-SCNC: 27 MMOL/L — SIGNIFICANT CHANGE UP (ref 22–31)
CO2 SERPL-SCNC: 28 MMOL/L — SIGNIFICANT CHANGE UP (ref 22–31)
CO2 SERPL-SCNC: 29 MMOL/L — SIGNIFICANT CHANGE UP (ref 22–31)
CO2 SERPL-SCNC: 30 MMOL/L — SIGNIFICANT CHANGE UP (ref 22–31)
CO2 SERPL-SCNC: 31 MMOL/L — SIGNIFICANT CHANGE UP (ref 22–31)
CO2 SERPL-SCNC: 31 MMOL/L — SIGNIFICANT CHANGE UP (ref 22–31)
CO2 SERPL-SCNC: 32 MMOL/L — HIGH (ref 22–31)
CO2 SERPL-SCNC: 34 MMOL/L — HIGH (ref 22–31)
CO2 SERPL-SCNC: 35 MMOL/L — HIGH (ref 22–31)
CO2 SERPL-SCNC: 37 MMOL/L — HIGH (ref 22–31)
CO2 SERPL-SCNC: 38 MMOL/L — HIGH (ref 22–31)
CO2 SERPL-SCNC: 38 MMOL/L — HIGH (ref 22–31)
CO2 SERPL-SCNC: 41 MMOL/L — HIGH (ref 22–31)
COLOR SPEC: ABNORMAL
COLOR SPEC: YELLOW — SIGNIFICANT CHANGE UP
COLOR SPEC: YELLOW — SIGNIFICANT CHANGE UP
CREAT ?TM UR-MCNC: 46 MG/DL — SIGNIFICANT CHANGE UP
CREAT ?TM UR-MCNC: 50 MG/DL — SIGNIFICANT CHANGE UP
CREAT SERPL-MCNC: 0.81 MG/DL
CREAT SERPL-MCNC: 0.86 MG/DL — SIGNIFICANT CHANGE UP (ref 0.5–1.3)
CREAT SERPL-MCNC: 0.91 MG/DL — SIGNIFICANT CHANGE UP (ref 0.5–1.3)
CREAT SERPL-MCNC: 0.95 MG/DL — SIGNIFICANT CHANGE UP (ref 0.5–1.3)
CREAT SERPL-MCNC: 0.98 MG/DL — SIGNIFICANT CHANGE UP (ref 0.5–1.3)
CREAT SERPL-MCNC: 1 MG/DL — SIGNIFICANT CHANGE UP (ref 0.5–1.3)
CREAT SERPL-MCNC: 1.03 MG/DL — SIGNIFICANT CHANGE UP (ref 0.5–1.3)
CREAT SERPL-MCNC: 1.04 MG/DL — SIGNIFICANT CHANGE UP (ref 0.5–1.3)
CREAT SERPL-MCNC: 1.04 MG/DL — SIGNIFICANT CHANGE UP (ref 0.5–1.3)
CREAT SERPL-MCNC: 1.05 MG/DL — SIGNIFICANT CHANGE UP (ref 0.5–1.3)
CREAT SERPL-MCNC: 1.08 MG/DL — SIGNIFICANT CHANGE UP (ref 0.5–1.3)
CREAT SERPL-MCNC: 1.08 MG/DL — SIGNIFICANT CHANGE UP (ref 0.5–1.3)
CREAT SERPL-MCNC: 1.09 MG/DL — SIGNIFICANT CHANGE UP (ref 0.5–1.3)
CREAT SERPL-MCNC: 1.11 MG/DL — SIGNIFICANT CHANGE UP (ref 0.5–1.3)
CREAT SERPL-MCNC: 1.11 MG/DL — SIGNIFICANT CHANGE UP (ref 0.5–1.3)
CREAT SERPL-MCNC: 1.12 MG/DL — SIGNIFICANT CHANGE UP (ref 0.5–1.3)
CREAT SERPL-MCNC: 1.17 MG/DL — SIGNIFICANT CHANGE UP (ref 0.5–1.3)
CREAT SERPL-MCNC: 1.18 MG/DL — SIGNIFICANT CHANGE UP (ref 0.5–1.3)
CREAT SERPL-MCNC: 1.2 MG/DL — SIGNIFICANT CHANGE UP (ref 0.5–1.3)
CREAT SERPL-MCNC: 1.23 MG/DL — SIGNIFICANT CHANGE UP (ref 0.5–1.3)
CREAT SERPL-MCNC: 1.24 MG/DL — SIGNIFICANT CHANGE UP (ref 0.5–1.3)
CREAT SERPL-MCNC: 1.26 MG/DL
CREAT SERPL-MCNC: 1.26 MG/DL — SIGNIFICANT CHANGE UP (ref 0.5–1.3)
CREAT SERPL-MCNC: 1.27 MG/DL — SIGNIFICANT CHANGE UP (ref 0.5–1.3)
CREAT SERPL-MCNC: 1.28 MG/DL — SIGNIFICANT CHANGE UP (ref 0.5–1.3)
CREAT SERPL-MCNC: 1.29 MG/DL — SIGNIFICANT CHANGE UP (ref 0.5–1.3)
CREAT SERPL-MCNC: 1.32 MG/DL — HIGH (ref 0.5–1.3)
CREAT SERPL-MCNC: 1.33 MG/DL — HIGH (ref 0.5–1.3)
CREAT SERPL-MCNC: 1.34 MG/DL — HIGH (ref 0.5–1.3)
CREAT SERPL-MCNC: 1.42 MG/DL — HIGH (ref 0.5–1.3)
CREAT SERPL-MCNC: 1.43 MG/DL
CREAT SERPL-MCNC: 1.44 MG/DL — HIGH (ref 0.5–1.3)
CREAT SERPL-MCNC: 1.51 MG/DL — HIGH (ref 0.5–1.3)
CREAT SERPL-MCNC: 1.65 MG/DL — HIGH (ref 0.5–1.3)
CREAT SERPL-MCNC: 1.7 MG/DL — HIGH (ref 0.5–1.3)
CREAT SERPL-MCNC: 1.93 MG/DL — HIGH (ref 0.5–1.3)
CREAT SERPL-MCNC: 2.09 MG/DL — HIGH (ref 0.5–1.3)
CREAT SERPL-MCNC: 2.28 MG/DL — HIGH (ref 0.5–1.3)
CREAT SERPL-MCNC: 2.3 MG/DL — HIGH (ref 0.5–1.3)
CREAT SERPL-MCNC: 2.41 MG/DL — HIGH (ref 0.5–1.3)
CREAT SERPL-MCNC: 2.53 MG/DL — HIGH (ref 0.5–1.3)
CREAT SERPL-MCNC: 3.34 MG/DL — HIGH (ref 0.5–1.3)
CREAT SERPL-MCNC: 3.64 MG/DL — HIGH (ref 0.5–1.3)
CREAT SERPL-MCNC: 5.87 MG/DL
CREAT SERPL-MCNC: 6.7 MG/DL — HIGH (ref 0.5–1.3)
CREAT SERPL-MCNC: 6.76 MG/DL — HIGH (ref 0.5–1.3)
CREAT SPEC-SCNC: 105 MG/DL
CULTURE RESULTS: SIGNIFICANT CHANGE UP
DACRYOCYTES BLD QL SMEAR: SLIGHT — SIGNIFICANT CHANGE UP
DIFF PNL FLD: ABNORMAL
DIFF PNL FLD: ABNORMAL
DIFF PNL FLD: NEGATIVE — SIGNIFICANT CHANGE UP
ELLIPTOCYTES BLD QL SMEAR: SIGNIFICANT CHANGE UP
EOSINOPHIL # BLD AUTO: 0.01 K/UL — SIGNIFICANT CHANGE UP (ref 0–0.5)
EOSINOPHIL # BLD AUTO: 0.04 K/UL
EOSINOPHIL # BLD AUTO: 0.06 K/UL — SIGNIFICANT CHANGE UP (ref 0–0.5)
EOSINOPHIL # BLD AUTO: 0.08 K/UL
EOSINOPHIL # BLD AUTO: 0.13 K/UL
EOSINOPHIL # BLD AUTO: 0.16 K/UL
EOSINOPHIL # BLD AUTO: 0.24 K/UL — SIGNIFICANT CHANGE UP (ref 0–0.5)
EOSINOPHIL NFR BLD AUTO: 0.1 % — SIGNIFICANT CHANGE UP (ref 0–6)
EOSINOPHIL NFR BLD AUTO: 0.2 % — SIGNIFICANT CHANGE UP (ref 0–6)
EOSINOPHIL NFR BLD AUTO: 0.5 %
EOSINOPHIL NFR BLD AUTO: 0.8 %
EOSINOPHIL NFR BLD AUTO: 1.5 %
EOSINOPHIL NFR BLD AUTO: 1.8 % — SIGNIFICANT CHANGE UP (ref 0–6)
EOSINOPHIL NFR BLD AUTO: 1.9 %
EPI CELLS # UR: 0 /HPF — SIGNIFICANT CHANGE UP
EPI CELLS # UR: 0 /HPF — SIGNIFICANT CHANGE UP
EPI CELLS # UR: 1 /HPF — SIGNIFICANT CHANGE UP
ESTIMATED AVERAGE GLUCOSE: 146 MG/DL — HIGH (ref 68–114)
ESTIMATED AVERAGE GLUCOSE: 194 MG/DL
FERRITIN SERPL-MCNC: 51 NG/ML
FIBRINOGEN PPP-MCNC: 649 MG/DL — HIGH (ref 290–520)
FOLATE SERPL-MCNC: 7.8 NG/ML
GAS PNL BLDA: SIGNIFICANT CHANGE UP
GAS PNL BLDMV: SIGNIFICANT CHANGE UP
GAS PNL BLDMV: SIGNIFICANT CHANGE UP
GAS PNL BLDV: 123 MMOL/L — LOW (ref 135–145)
GAS PNL BLDV: 125 MMOL/L — LOW (ref 135–145)
GAS PNL BLDV: 125 MMOL/L — LOW (ref 135–145)
GAS PNL BLDV: 128 MMOL/L — LOW (ref 135–145)
GAS PNL BLDV: SIGNIFICANT CHANGE UP
GLUCOSE BLDC GLUCOMTR-MCNC: 108 MG/DL — HIGH (ref 70–99)
GLUCOSE BLDC GLUCOMTR-MCNC: 111 MG/DL — HIGH (ref 70–99)
GLUCOSE BLDC GLUCOMTR-MCNC: 111 MG/DL — HIGH (ref 70–99)
GLUCOSE BLDC GLUCOMTR-MCNC: 112 MG/DL — HIGH (ref 70–99)
GLUCOSE BLDC GLUCOMTR-MCNC: 112 MG/DL — HIGH (ref 70–99)
GLUCOSE BLDC GLUCOMTR-MCNC: 121 MG/DL — HIGH (ref 70–99)
GLUCOSE BLDC GLUCOMTR-MCNC: 122 MG/DL — HIGH (ref 70–99)
GLUCOSE BLDC GLUCOMTR-MCNC: 125 MG/DL — HIGH (ref 70–99)
GLUCOSE BLDC GLUCOMTR-MCNC: 126 MG/DL — HIGH (ref 70–99)
GLUCOSE BLDC GLUCOMTR-MCNC: 130 MG/DL — HIGH (ref 70–99)
GLUCOSE BLDC GLUCOMTR-MCNC: 133 MG/DL — HIGH (ref 70–99)
GLUCOSE BLDC GLUCOMTR-MCNC: 133 MG/DL — HIGH (ref 70–99)
GLUCOSE BLDC GLUCOMTR-MCNC: 134 MG/DL — HIGH (ref 70–99)
GLUCOSE BLDC GLUCOMTR-MCNC: 135 MG/DL — HIGH (ref 70–99)
GLUCOSE BLDC GLUCOMTR-MCNC: 135 MG/DL — HIGH (ref 70–99)
GLUCOSE BLDC GLUCOMTR-MCNC: 138 MG/DL — HIGH (ref 70–99)
GLUCOSE BLDC GLUCOMTR-MCNC: 138 MG/DL — HIGH (ref 70–99)
GLUCOSE BLDC GLUCOMTR-MCNC: 141 MG/DL — HIGH (ref 70–99)
GLUCOSE BLDC GLUCOMTR-MCNC: 144 MG/DL — HIGH (ref 70–99)
GLUCOSE BLDC GLUCOMTR-MCNC: 146 MG/DL — HIGH (ref 70–99)
GLUCOSE BLDC GLUCOMTR-MCNC: 151 MG/DL — HIGH (ref 70–99)
GLUCOSE BLDC GLUCOMTR-MCNC: 151 MG/DL — HIGH (ref 70–99)
GLUCOSE BLDC GLUCOMTR-MCNC: 152 MG/DL — HIGH (ref 70–99)
GLUCOSE BLDC GLUCOMTR-MCNC: 152 MG/DL — HIGH (ref 70–99)
GLUCOSE BLDC GLUCOMTR-MCNC: 156 MG/DL — HIGH (ref 70–99)
GLUCOSE BLDC GLUCOMTR-MCNC: 157 MG/DL — HIGH (ref 70–99)
GLUCOSE BLDC GLUCOMTR-MCNC: 161 MG/DL — HIGH (ref 70–99)
GLUCOSE BLDC GLUCOMTR-MCNC: 161 MG/DL — HIGH (ref 70–99)
GLUCOSE BLDC GLUCOMTR-MCNC: 163 MG/DL — HIGH (ref 70–99)
GLUCOSE BLDC GLUCOMTR-MCNC: 164 MG/DL — HIGH (ref 70–99)
GLUCOSE BLDC GLUCOMTR-MCNC: 165 MG/DL — HIGH (ref 70–99)
GLUCOSE BLDC GLUCOMTR-MCNC: 165 MG/DL — HIGH (ref 70–99)
GLUCOSE BLDC GLUCOMTR-MCNC: 166 MG/DL — HIGH (ref 70–99)
GLUCOSE BLDC GLUCOMTR-MCNC: 167 MG/DL — HIGH (ref 70–99)
GLUCOSE BLDC GLUCOMTR-MCNC: 168 MG/DL — HIGH (ref 70–99)
GLUCOSE BLDC GLUCOMTR-MCNC: 169 MG/DL — HIGH (ref 70–99)
GLUCOSE BLDC GLUCOMTR-MCNC: 170 MG/DL — HIGH (ref 70–99)
GLUCOSE BLDC GLUCOMTR-MCNC: 172 MG/DL — HIGH (ref 70–99)
GLUCOSE BLDC GLUCOMTR-MCNC: 173 MG/DL — HIGH (ref 70–99)
GLUCOSE BLDC GLUCOMTR-MCNC: 173 MG/DL — HIGH (ref 70–99)
GLUCOSE BLDC GLUCOMTR-MCNC: 175 MG/DL — HIGH (ref 70–99)
GLUCOSE BLDC GLUCOMTR-MCNC: 176 MG/DL — HIGH (ref 70–99)
GLUCOSE BLDC GLUCOMTR-MCNC: 176 MG/DL — HIGH (ref 70–99)
GLUCOSE BLDC GLUCOMTR-MCNC: 178 MG/DL — HIGH (ref 70–99)
GLUCOSE BLDC GLUCOMTR-MCNC: 179 MG/DL — HIGH (ref 70–99)
GLUCOSE BLDC GLUCOMTR-MCNC: 182 MG/DL — HIGH (ref 70–99)
GLUCOSE BLDC GLUCOMTR-MCNC: 183 MG/DL — HIGH (ref 70–99)
GLUCOSE BLDC GLUCOMTR-MCNC: 184 MG/DL — HIGH (ref 70–99)
GLUCOSE BLDC GLUCOMTR-MCNC: 185 MG/DL — HIGH (ref 70–99)
GLUCOSE BLDC GLUCOMTR-MCNC: 187 MG/DL — HIGH (ref 70–99)
GLUCOSE BLDC GLUCOMTR-MCNC: 188 MG/DL — HIGH (ref 70–99)
GLUCOSE BLDC GLUCOMTR-MCNC: 190 MG/DL — HIGH (ref 70–99)
GLUCOSE BLDC GLUCOMTR-MCNC: 191 MG/DL — HIGH (ref 70–99)
GLUCOSE BLDC GLUCOMTR-MCNC: 191 MG/DL — HIGH (ref 70–99)
GLUCOSE BLDC GLUCOMTR-MCNC: 192 MG/DL — HIGH (ref 70–99)
GLUCOSE BLDC GLUCOMTR-MCNC: 193 MG/DL — HIGH (ref 70–99)
GLUCOSE BLDC GLUCOMTR-MCNC: 194 MG/DL — HIGH (ref 70–99)
GLUCOSE BLDC GLUCOMTR-MCNC: 194 MG/DL — HIGH (ref 70–99)
GLUCOSE BLDC GLUCOMTR-MCNC: 195 MG/DL — HIGH (ref 70–99)
GLUCOSE BLDC GLUCOMTR-MCNC: 199 MG/DL — HIGH (ref 70–99)
GLUCOSE BLDC GLUCOMTR-MCNC: 200 MG/DL — HIGH (ref 70–99)
GLUCOSE BLDC GLUCOMTR-MCNC: 201 MG/DL — HIGH (ref 70–99)
GLUCOSE BLDC GLUCOMTR-MCNC: 202 MG/DL — HIGH (ref 70–99)
GLUCOSE BLDC GLUCOMTR-MCNC: 202 MG/DL — HIGH (ref 70–99)
GLUCOSE BLDC GLUCOMTR-MCNC: 204 MG/DL — HIGH (ref 70–99)
GLUCOSE BLDC GLUCOMTR-MCNC: 205 MG/DL — HIGH (ref 70–99)
GLUCOSE BLDC GLUCOMTR-MCNC: 205 MG/DL — HIGH (ref 70–99)
GLUCOSE BLDC GLUCOMTR-MCNC: 207 MG/DL — HIGH (ref 70–99)
GLUCOSE BLDC GLUCOMTR-MCNC: 207 MG/DL — HIGH (ref 70–99)
GLUCOSE BLDC GLUCOMTR-MCNC: 208 MG/DL — HIGH (ref 70–99)
GLUCOSE BLDC GLUCOMTR-MCNC: 210 MG/DL — HIGH (ref 70–99)
GLUCOSE BLDC GLUCOMTR-MCNC: 210 MG/DL — HIGH (ref 70–99)
GLUCOSE BLDC GLUCOMTR-MCNC: 211 MG/DL — HIGH (ref 70–99)
GLUCOSE BLDC GLUCOMTR-MCNC: 213 MG/DL — HIGH (ref 70–99)
GLUCOSE BLDC GLUCOMTR-MCNC: 213 MG/DL — HIGH (ref 70–99)
GLUCOSE BLDC GLUCOMTR-MCNC: 215 MG/DL — HIGH (ref 70–99)
GLUCOSE BLDC GLUCOMTR-MCNC: 216 MG/DL — HIGH (ref 70–99)
GLUCOSE BLDC GLUCOMTR-MCNC: 217 MG/DL — HIGH (ref 70–99)
GLUCOSE BLDC GLUCOMTR-MCNC: 217 MG/DL — HIGH (ref 70–99)
GLUCOSE BLDC GLUCOMTR-MCNC: 221 MG/DL — HIGH (ref 70–99)
GLUCOSE BLDC GLUCOMTR-MCNC: 223 MG/DL — HIGH (ref 70–99)
GLUCOSE BLDC GLUCOMTR-MCNC: 224 MG/DL — HIGH (ref 70–99)
GLUCOSE BLDC GLUCOMTR-MCNC: 225 MG/DL — HIGH (ref 70–99)
GLUCOSE BLDC GLUCOMTR-MCNC: 225 MG/DL — HIGH (ref 70–99)
GLUCOSE BLDC GLUCOMTR-MCNC: 228 MG/DL — HIGH (ref 70–99)
GLUCOSE BLDC GLUCOMTR-MCNC: 228 MG/DL — HIGH (ref 70–99)
GLUCOSE BLDC GLUCOMTR-MCNC: 230 MG/DL — HIGH (ref 70–99)
GLUCOSE BLDC GLUCOMTR-MCNC: 231 MG/DL — HIGH (ref 70–99)
GLUCOSE BLDC GLUCOMTR-MCNC: 232 MG/DL — HIGH (ref 70–99)
GLUCOSE BLDC GLUCOMTR-MCNC: 237 MG/DL — HIGH (ref 70–99)
GLUCOSE BLDC GLUCOMTR-MCNC: 239 MG/DL — HIGH (ref 70–99)
GLUCOSE BLDC GLUCOMTR-MCNC: 239 MG/DL — HIGH (ref 70–99)
GLUCOSE BLDC GLUCOMTR-MCNC: 240 MG/DL — HIGH (ref 70–99)
GLUCOSE BLDC GLUCOMTR-MCNC: 241 MG/DL — HIGH (ref 70–99)
GLUCOSE BLDC GLUCOMTR-MCNC: 241 MG/DL — HIGH (ref 70–99)
GLUCOSE BLDC GLUCOMTR-MCNC: 242 MG/DL — HIGH (ref 70–99)
GLUCOSE BLDC GLUCOMTR-MCNC: 242 MG/DL — HIGH (ref 70–99)
GLUCOSE BLDC GLUCOMTR-MCNC: 245 MG/DL — HIGH (ref 70–99)
GLUCOSE BLDC GLUCOMTR-MCNC: 246 MG/DL — HIGH (ref 70–99)
GLUCOSE BLDC GLUCOMTR-MCNC: 246 MG/DL — HIGH (ref 70–99)
GLUCOSE BLDC GLUCOMTR-MCNC: 249 MG/DL — HIGH (ref 70–99)
GLUCOSE BLDC GLUCOMTR-MCNC: 249 MG/DL — HIGH (ref 70–99)
GLUCOSE BLDC GLUCOMTR-MCNC: 250 MG/DL — HIGH (ref 70–99)
GLUCOSE BLDC GLUCOMTR-MCNC: 250 MG/DL — HIGH (ref 70–99)
GLUCOSE BLDC GLUCOMTR-MCNC: 257 MG/DL — HIGH (ref 70–99)
GLUCOSE BLDC GLUCOMTR-MCNC: 258 MG/DL — HIGH (ref 70–99)
GLUCOSE BLDC GLUCOMTR-MCNC: 269 MG/DL — HIGH (ref 70–99)
GLUCOSE BLDC GLUCOMTR-MCNC: 270 MG/DL — HIGH (ref 70–99)
GLUCOSE BLDC GLUCOMTR-MCNC: 272 MG/DL — HIGH (ref 70–99)
GLUCOSE BLDC GLUCOMTR-MCNC: 280 MG/DL — HIGH (ref 70–99)
GLUCOSE BLDC GLUCOMTR-MCNC: 291 MG/DL — HIGH (ref 70–99)
GLUCOSE BLDC GLUCOMTR-MCNC: 293 MG/DL — HIGH (ref 70–99)
GLUCOSE BLDC GLUCOMTR-MCNC: 307 MG/DL — HIGH (ref 70–99)
GLUCOSE BLDC GLUCOMTR-MCNC: 309 MG/DL — HIGH (ref 70–99)
GLUCOSE BLDC GLUCOMTR-MCNC: 320 MG/DL — HIGH (ref 70–99)
GLUCOSE BLDC GLUCOMTR-MCNC: 324 MG/DL — HIGH (ref 70–99)
GLUCOSE BLDC GLUCOMTR-MCNC: 326 MG/DL — HIGH (ref 70–99)
GLUCOSE BLDC GLUCOMTR-MCNC: 327 MG/DL — HIGH (ref 70–99)
GLUCOSE BLDC GLUCOMTR-MCNC: 343 MG/DL — HIGH (ref 70–99)
GLUCOSE BLDC GLUCOMTR-MCNC: 396 MG/DL — HIGH (ref 70–99)
GLUCOSE BLDC GLUCOMTR-MCNC: 74 MG/DL — SIGNIFICANT CHANGE UP (ref 70–99)
GLUCOSE BLDC GLUCOMTR-MCNC: 81 MG/DL — SIGNIFICANT CHANGE UP (ref 70–99)
GLUCOSE BLDC GLUCOMTR-MCNC: 82 MG/DL — SIGNIFICANT CHANGE UP (ref 70–99)
GLUCOSE BLDC GLUCOMTR-MCNC: 84 MG/DL — SIGNIFICANT CHANGE UP (ref 70–99)
GLUCOSE BLDC GLUCOMTR-MCNC: 85 MG/DL — SIGNIFICANT CHANGE UP (ref 70–99)
GLUCOSE BLDC GLUCOMTR-MCNC: 92 MG/DL — SIGNIFICANT CHANGE UP (ref 70–99)
GLUCOSE BLDC GLUCOMTR-MCNC: 94 MG/DL — SIGNIFICANT CHANGE UP (ref 70–99)
GLUCOSE BLDV-MCNC: 116 MG/DL — HIGH (ref 70–99)
GLUCOSE BLDV-MCNC: 118 MG/DL — HIGH (ref 70–99)
GLUCOSE BLDV-MCNC: 202 MG/DL — HIGH (ref 70–99)
GLUCOSE BLDV-MCNC: 29 MG/DL — CRITICAL LOW (ref 70–99)
GLUCOSE SERPL-MCNC: 107 MG/DL — HIGH (ref 70–99)
GLUCOSE SERPL-MCNC: 115 MG/DL — HIGH (ref 70–99)
GLUCOSE SERPL-MCNC: 119 MG/DL — HIGH (ref 70–99)
GLUCOSE SERPL-MCNC: 120 MG/DL — HIGH (ref 70–99)
GLUCOSE SERPL-MCNC: 124 MG/DL — HIGH (ref 70–99)
GLUCOSE SERPL-MCNC: 124 MG/DL — HIGH (ref 70–99)
GLUCOSE SERPL-MCNC: 126 MG/DL — HIGH (ref 70–99)
GLUCOSE SERPL-MCNC: 126 MG/DL — HIGH (ref 70–99)
GLUCOSE SERPL-MCNC: 127 MG/DL — HIGH (ref 70–99)
GLUCOSE SERPL-MCNC: 128 MG/DL — HIGH (ref 70–99)
GLUCOSE SERPL-MCNC: 131 MG/DL — HIGH (ref 70–99)
GLUCOSE SERPL-MCNC: 140 MG/DL — HIGH (ref 70–99)
GLUCOSE SERPL-MCNC: 141 MG/DL
GLUCOSE SERPL-MCNC: 142 MG/DL — HIGH (ref 70–99)
GLUCOSE SERPL-MCNC: 144 MG/DL — HIGH (ref 70–99)
GLUCOSE SERPL-MCNC: 144 MG/DL — HIGH (ref 70–99)
GLUCOSE SERPL-MCNC: 148 MG/DL — HIGH (ref 70–99)
GLUCOSE SERPL-MCNC: 149 MG/DL — HIGH (ref 70–99)
GLUCOSE SERPL-MCNC: 149 MG/DL — HIGH (ref 70–99)
GLUCOSE SERPL-MCNC: 150 MG/DL — HIGH (ref 70–99)
GLUCOSE SERPL-MCNC: 151 MG/DL — HIGH (ref 70–99)
GLUCOSE SERPL-MCNC: 152 MG/DL — HIGH (ref 70–99)
GLUCOSE SERPL-MCNC: 153 MG/DL — HIGH (ref 70–99)
GLUCOSE SERPL-MCNC: 155 MG/DL — HIGH (ref 70–99)
GLUCOSE SERPL-MCNC: 158 MG/DL — HIGH (ref 70–99)
GLUCOSE SERPL-MCNC: 163 MG/DL — HIGH (ref 70–99)
GLUCOSE SERPL-MCNC: 165 MG/DL — HIGH (ref 70–99)
GLUCOSE SERPL-MCNC: 167 MG/DL
GLUCOSE SERPL-MCNC: 167 MG/DL — HIGH (ref 70–99)
GLUCOSE SERPL-MCNC: 168 MG/DL — HIGH (ref 70–99)
GLUCOSE SERPL-MCNC: 171 MG/DL — HIGH (ref 70–99)
GLUCOSE SERPL-MCNC: 176 MG/DL
GLUCOSE SERPL-MCNC: 182 MG/DL — HIGH (ref 70–99)
GLUCOSE SERPL-MCNC: 183 MG/DL — HIGH (ref 70–99)
GLUCOSE SERPL-MCNC: 187 MG/DL — HIGH (ref 70–99)
GLUCOSE SERPL-MCNC: 193 MG/DL — HIGH (ref 70–99)
GLUCOSE SERPL-MCNC: 194 MG/DL — HIGH (ref 70–99)
GLUCOSE SERPL-MCNC: 198 MG/DL — HIGH (ref 70–99)
GLUCOSE SERPL-MCNC: 200 MG/DL — HIGH (ref 70–99)
GLUCOSE SERPL-MCNC: 209 MG/DL — HIGH (ref 70–99)
GLUCOSE SERPL-MCNC: 258 MG/DL — HIGH (ref 70–99)
GLUCOSE SERPL-MCNC: 32 MG/DL — CRITICAL LOW (ref 70–99)
GLUCOSE SERPL-MCNC: 38 MG/DL — CRITICAL LOW (ref 70–99)
GLUCOSE SERPL-MCNC: 399 MG/DL — HIGH (ref 70–99)
GLUCOSE SERPL-MCNC: 63 MG/DL
GLUCOSE SERPL-MCNC: 74 MG/DL — SIGNIFICANT CHANGE UP (ref 70–99)
GLUCOSE UR QL: ABNORMAL
GLUCOSE UR QL: ABNORMAL
GLUCOSE UR QL: NEGATIVE — SIGNIFICANT CHANGE UP
GRAM STN FLD: SIGNIFICANT CHANGE UP
HBA1C MFR BLD HPLC: 8.4 %
HBV CORE AB SER-ACNC: SIGNIFICANT CHANGE UP
HBV SURFACE AB SER-ACNC: <3 MIU/ML — LOW
HBV SURFACE AG SER-ACNC: SIGNIFICANT CHANGE UP
HCO3 BLDMV-SCNC: 35 MMOL/L — HIGH (ref 20–28)
HCO3 BLDMV-SCNC: 46 MMOL/L — HIGH (ref 20–28)
HCO3 BLDV-SCNC: 12 MMOL/L — LOW (ref 21–29)
HCO3 BLDV-SCNC: 14 MMOL/L — LOW (ref 21–29)
HCO3 BLDV-SCNC: 15 MMOL/L — LOW (ref 21–29)
HCO3 BLDV-SCNC: 29 MMOL/L — SIGNIFICANT CHANGE UP (ref 21–29)
HCT VFR BLD CALC: 22.5 % — LOW (ref 34.5–45)
HCT VFR BLD CALC: 23.7 % — LOW (ref 34.5–45)
HCT VFR BLD CALC: 24 % — LOW (ref 34.5–45)
HCT VFR BLD CALC: 24 % — LOW (ref 34.5–45)
HCT VFR BLD CALC: 24.4 % — LOW (ref 34.5–45)
HCT VFR BLD CALC: 24.4 % — LOW (ref 34.5–45)
HCT VFR BLD CALC: 24.5 % — LOW (ref 34.5–45)
HCT VFR BLD CALC: 24.6 % — LOW (ref 34.5–45)
HCT VFR BLD CALC: 24.6 % — LOW (ref 34.5–45)
HCT VFR BLD CALC: 24.7 % — LOW (ref 34.5–45)
HCT VFR BLD CALC: 24.9 % — LOW (ref 34.5–45)
HCT VFR BLD CALC: 25.7 % — LOW (ref 34.5–45)
HCT VFR BLD CALC: 25.7 % — LOW (ref 34.5–45)
HCT VFR BLD CALC: 25.8 % — LOW (ref 34.5–45)
HCT VFR BLD CALC: 25.8 % — LOW (ref 34.5–45)
HCT VFR BLD CALC: 26 %
HCT VFR BLD CALC: 26 % — LOW (ref 34.5–45)
HCT VFR BLD CALC: 26 % — LOW (ref 34.5–45)
HCT VFR BLD CALC: 26.1 % — LOW (ref 34.5–45)
HCT VFR BLD CALC: 26.1 % — LOW (ref 34.5–45)
HCT VFR BLD CALC: 26.2 % — LOW (ref 34.5–45)
HCT VFR BLD CALC: 26.2 % — LOW (ref 34.5–45)
HCT VFR BLD CALC: 26.3 %
HCT VFR BLD CALC: 26.3 % — LOW (ref 34.5–45)
HCT VFR BLD CALC: 26.7 % — LOW (ref 34.5–45)
HCT VFR BLD CALC: 27 % — LOW (ref 34.5–45)
HCT VFR BLD CALC: 27.3 % — LOW (ref 34.5–45)
HCT VFR BLD CALC: 27.4 % — LOW (ref 34.5–45)
HCT VFR BLD CALC: 27.6 % — LOW (ref 34.5–45)
HCT VFR BLD CALC: 27.6 % — LOW (ref 34.5–45)
HCT VFR BLD CALC: 28 % — LOW (ref 34.5–45)
HCT VFR BLD CALC: 28.2 % — LOW (ref 34.5–45)
HCT VFR BLD CALC: 28.3 % — LOW (ref 34.5–45)
HCT VFR BLD CALC: 28.4 %
HCT VFR BLD CALC: 29 % — LOW (ref 34.5–45)
HCT VFR BLD CALC: 32.8 %
HCT VFR BLDA CALC: 23 % — LOW (ref 39–50)
HCT VFR BLDA CALC: 24 % — LOW (ref 39–50)
HCT VFR BLDA CALC: 26 % — LOW (ref 39–50)
HCT VFR BLDA CALC: 26 % — LOW (ref 39–50)
HCV AB S/CO SERPL IA: 0.14 S/CO — SIGNIFICANT CHANGE UP (ref 0–0.99)
HCV AB SERPL-IMP: SIGNIFICANT CHANGE UP
HEMOCCULT STL QL IA: POSITIVE
HGB BLD CALC-MCNC: 7.3 G/DL — LOW (ref 11.5–15.5)
HGB BLD CALC-MCNC: 7.8 G/DL — LOW (ref 11.5–15.5)
HGB BLD CALC-MCNC: 8.2 G/DL — LOW (ref 11.5–15.5)
HGB BLD CALC-MCNC: 8.2 G/DL — LOW (ref 11.5–15.5)
HGB BLD-MCNC: 10.1 G/DL
HGB BLD-MCNC: 7 G/DL — CRITICAL LOW (ref 11.5–15.5)
HGB BLD-MCNC: 7.2 G/DL — LOW (ref 11.5–15.5)
HGB BLD-MCNC: 7.2 G/DL — LOW (ref 11.5–15.5)
HGB BLD-MCNC: 7.3 G/DL — LOW (ref 11.5–15.5)
HGB BLD-MCNC: 7.4 G/DL — LOW (ref 11.5–15.5)
HGB BLD-MCNC: 7.5 G/DL — LOW (ref 11.5–15.5)
HGB BLD-MCNC: 7.6 G/DL — LOW (ref 11.5–15.5)
HGB BLD-MCNC: 7.7 G/DL
HGB BLD-MCNC: 7.7 G/DL — LOW (ref 11.5–15.5)
HGB BLD-MCNC: 7.8 G/DL — LOW (ref 11.5–15.5)
HGB BLD-MCNC: 7.9 G/DL
HGB BLD-MCNC: 7.9 G/DL — LOW (ref 11.5–15.5)
HGB BLD-MCNC: 7.9 G/DL — LOW (ref 11.5–15.5)
HGB BLD-MCNC: 8 G/DL — LOW (ref 11.5–15.5)
HGB BLD-MCNC: 8.2 G/DL — LOW (ref 11.5–15.5)
HGB BLD-MCNC: 8.3 G/DL — LOW (ref 11.5–15.5)
HGB BLD-MCNC: 8.4 G/DL — LOW (ref 11.5–15.5)
HGB BLD-MCNC: 8.5 G/DL — LOW (ref 11.5–15.5)
HGB BLD-MCNC: 8.6 G/DL
HGB BLD-MCNC: 8.7 G/DL — LOW (ref 11.5–15.5)
HOROWITZ INDEX BLDMV+IHG-RTO: 40 — SIGNIFICANT CHANGE UP
HOROWITZ INDEX BLDMV+IHG-RTO: 50 — SIGNIFICANT CHANGE UP
HYALINE CASTS # UR AUTO: 0 /LPF — SIGNIFICANT CHANGE UP (ref 0–2)
HYALINE CASTS # UR AUTO: 0 /LPF — SIGNIFICANT CHANGE UP (ref 0–2)
HYALINE CASTS # UR AUTO: 1 /LPF — SIGNIFICANT CHANGE UP (ref 0–2)
IMM GRANULOCYTES NFR BLD AUTO: 0.1 %
IMM GRANULOCYTES NFR BLD AUTO: 0.2 %
IMM GRANULOCYTES NFR BLD AUTO: 0.3 %
IMM GRANULOCYTES NFR BLD AUTO: 0.6 %
IMM GRANULOCYTES NFR BLD AUTO: 0.7 % — SIGNIFICANT CHANGE UP (ref 0–1.5)
IMM GRANULOCYTES NFR BLD AUTO: 1.8 % — HIGH (ref 0–1.5)
INR BLD: 1.09 RATIO — SIGNIFICANT CHANGE UP (ref 0.88–1.16)
INR BLD: 1.23 RATIO — HIGH (ref 0.88–1.16)
INR BLD: 1.25 RATIO — HIGH (ref 0.88–1.16)
INR BLD: 1.36 RATIO — HIGH (ref 0.88–1.16)
INR BLD: 1.39 RATIO — HIGH (ref 0.88–1.16)
INR BLD: 1.44 RATIO — HIGH (ref 0.88–1.16)
IRON SATN MFR SERPL: 9 %
IRON SERPL-MCNC: 35 UG/DL
KETONES UR-MCNC: NEGATIVE — SIGNIFICANT CHANGE UP
LACTATE BLDV-MCNC: 1.6 MMOL/L — SIGNIFICANT CHANGE UP (ref 0.7–2)
LACTATE BLDV-MCNC: 7 MMOL/L — CRITICAL HIGH (ref 0.7–2)
LACTATE BLDV-MCNC: 8.5 MMOL/L — CRITICAL HIGH (ref 0.7–2)
LACTATE BLDV-MCNC: 9.8 MMOL/L — CRITICAL HIGH (ref 0.7–2)
LACTATE SERPL-SCNC: 1.2 MMOL/L — SIGNIFICANT CHANGE UP (ref 0.7–2)
LDLC SERPL DIRECT ASSAY-MCNC: 50 MG/DL
LEUKOCYTE ESTERASE UR-ACNC: ABNORMAL
LEUKOCYTE ESTERASE UR-ACNC: ABNORMAL
LEUKOCYTE ESTERASE UR-ACNC: NEGATIVE — SIGNIFICANT CHANGE UP
LYMPHOCYTES # BLD AUTO: 0.93 K/UL — LOW (ref 1–3.3)
LYMPHOCYTES # BLD AUTO: 1.39 K/UL — SIGNIFICANT CHANGE UP (ref 1–3.3)
LYMPHOCYTES # BLD AUTO: 1.83 K/UL — SIGNIFICANT CHANGE UP (ref 1–3.3)
LYMPHOCYTES # BLD AUTO: 19 % — SIGNIFICANT CHANGE UP (ref 13–44)
LYMPHOCYTES # BLD AUTO: 2.65 K/UL
LYMPHOCYTES # BLD AUTO: 2.68 K/UL
LYMPHOCYTES # BLD AUTO: 2.77 K/UL
LYMPHOCYTES # BLD AUTO: 2.87 K/UL
LYMPHOCYTES # BLD AUTO: 6.6 % — LOW (ref 13–44)
LYMPHOCYTES # BLD AUTO: 7.1 % — LOW (ref 13–44)
LYMPHOCYTES NFR BLD AUTO: 26.3 %
LYMPHOCYTES NFR BLD AUTO: 29.8 %
LYMPHOCYTES NFR BLD AUTO: 32.1 %
LYMPHOCYTES NFR BLD AUTO: 32.8 %
MAGNESIUM SERPL-MCNC: 1.4 MG/DL — LOW (ref 1.6–2.6)
MAGNESIUM SERPL-MCNC: 1.5 MG/DL — LOW (ref 1.6–2.6)
MAGNESIUM SERPL-MCNC: 1.5 MG/DL — LOW (ref 1.6–2.6)
MAGNESIUM SERPL-MCNC: 1.6 MG/DL — SIGNIFICANT CHANGE UP (ref 1.6–2.6)
MAGNESIUM SERPL-MCNC: 1.6 MG/DL — SIGNIFICANT CHANGE UP (ref 1.6–2.6)
MAGNESIUM SERPL-MCNC: 1.7 MG/DL — SIGNIFICANT CHANGE UP (ref 1.6–2.6)
MAGNESIUM SERPL-MCNC: 1.8 MG/DL — SIGNIFICANT CHANGE UP (ref 1.6–2.6)
MAGNESIUM SERPL-MCNC: 1.9 MG/DL — SIGNIFICANT CHANGE UP (ref 1.6–2.6)
MAGNESIUM SERPL-MCNC: 2 MG/DL — SIGNIFICANT CHANGE UP (ref 1.6–2.6)
MAGNESIUM SERPL-MCNC: 2.1 MG/DL — SIGNIFICANT CHANGE UP (ref 1.6–2.6)
MAGNESIUM SERPL-MCNC: 2.1 MG/DL — SIGNIFICANT CHANGE UP (ref 1.6–2.6)
MAGNESIUM SERPL-MCNC: 2.2 MG/DL — SIGNIFICANT CHANGE UP (ref 1.6–2.6)
MAGNESIUM SERPL-MCNC: 2.4 MG/DL — SIGNIFICANT CHANGE UP (ref 1.6–2.6)
MAGNESIUM SERPL-MCNC: 2.7 MG/DL — HIGH (ref 1.6–2.6)
MAGNESIUM SERPL-MCNC: 2.8 MG/DL — HIGH (ref 1.6–2.6)
MAGNESIUM SERPL-MCNC: 3.2 MG/DL — HIGH (ref 1.6–2.6)
MAN DIFF?: NORMAL
MANUAL SMEAR VERIFICATION: SIGNIFICANT CHANGE UP
MCHC RBC-ENTMCNC: 24.6 PG — LOW (ref 27–34)
MCHC RBC-ENTMCNC: 24.6 PG — LOW (ref 27–34)
MCHC RBC-ENTMCNC: 24.8 PG — LOW (ref 27–34)
MCHC RBC-ENTMCNC: 24.9 PG — LOW (ref 27–34)
MCHC RBC-ENTMCNC: 25 PG — LOW (ref 27–34)
MCHC RBC-ENTMCNC: 25.2 PG
MCHC RBC-ENTMCNC: 25.2 PG — LOW (ref 27–34)
MCHC RBC-ENTMCNC: 25.3 PG — LOW (ref 27–34)
MCHC RBC-ENTMCNC: 25.4 PG — LOW (ref 27–34)
MCHC RBC-ENTMCNC: 25.5 PG — LOW (ref 27–34)
MCHC RBC-ENTMCNC: 25.5 PG — LOW (ref 27–34)
MCHC RBC-ENTMCNC: 25.6 PG — LOW (ref 27–34)
MCHC RBC-ENTMCNC: 25.7 PG
MCHC RBC-ENTMCNC: 25.7 PG — LOW (ref 27–34)
MCHC RBC-ENTMCNC: 25.7 PG — LOW (ref 27–34)
MCHC RBC-ENTMCNC: 25.8 PG — LOW (ref 27–34)
MCHC RBC-ENTMCNC: 25.9 PG — LOW (ref 27–34)
MCHC RBC-ENTMCNC: 26 PG — LOW (ref 27–34)
MCHC RBC-ENTMCNC: 26 PG — LOW (ref 27–34)
MCHC RBC-ENTMCNC: 26.1 PG — LOW (ref 27–34)
MCHC RBC-ENTMCNC: 26.2 PG — LOW (ref 27–34)
MCHC RBC-ENTMCNC: 26.3 PG — LOW (ref 27–34)
MCHC RBC-ENTMCNC: 26.3 PG — LOW (ref 27–34)
MCHC RBC-ENTMCNC: 26.5 PG
MCHC RBC-ENTMCNC: 26.5 PG — LOW (ref 27–34)
MCHC RBC-ENTMCNC: 26.7 PG — LOW (ref 27–34)
MCHC RBC-ENTMCNC: 28 PG
MCHC RBC-ENTMCNC: 28.9 GM/DL — LOW (ref 32–36)
MCHC RBC-ENTMCNC: 29.3 GM/DL
MCHC RBC-ENTMCNC: 29.3 GM/DL — LOW (ref 32–36)
MCHC RBC-ENTMCNC: 29.8 GM/DL — LOW (ref 32–36)
MCHC RBC-ENTMCNC: 29.9 GM/DL — LOW (ref 32–36)
MCHC RBC-ENTMCNC: 30 GM/DL — LOW (ref 32–36)
MCHC RBC-ENTMCNC: 30.1 GM/DL — LOW (ref 32–36)
MCHC RBC-ENTMCNC: 30.1 GM/DL — LOW (ref 32–36)
MCHC RBC-ENTMCNC: 30.2 GM/DL — LOW (ref 32–36)
MCHC RBC-ENTMCNC: 30.3 GM/DL
MCHC RBC-ENTMCNC: 30.4 GM/DL
MCHC RBC-ENTMCNC: 30.4 GM/DL — LOW (ref 32–36)
MCHC RBC-ENTMCNC: 30.5 GM/DL — LOW (ref 32–36)
MCHC RBC-ENTMCNC: 30.5 GM/DL — LOW (ref 32–36)
MCHC RBC-ENTMCNC: 30.7 GM/DL — LOW (ref 32–36)
MCHC RBC-ENTMCNC: 30.7 GM/DL — LOW (ref 32–36)
MCHC RBC-ENTMCNC: 30.8 GM/DL
MCHC RBC-ENTMCNC: 30.8 GM/DL — LOW (ref 32–36)
MCHC RBC-ENTMCNC: 30.9 GM/DL — LOW (ref 32–36)
MCHC RBC-ENTMCNC: 30.9 GM/DL — LOW (ref 32–36)
MCHC RBC-ENTMCNC: 31 GM/DL — LOW (ref 32–36)
MCHC RBC-ENTMCNC: 31 GM/DL — LOW (ref 32–36)
MCHC RBC-ENTMCNC: 31.1 GM/DL — LOW (ref 32–36)
MCHC RBC-ENTMCNC: 31.4 GM/DL — LOW (ref 32–36)
MCHC RBC-ENTMCNC: 31.5 GM/DL — LOW (ref 32–36)
MCHC RBC-ENTMCNC: 31.6 GM/DL — LOW (ref 32–36)
MCHC RBC-ENTMCNC: 31.7 GM/DL — LOW (ref 32–36)
MCHC RBC-ENTMCNC: 32 GM/DL — SIGNIFICANT CHANGE UP (ref 32–36)
MCV RBC AUTO: 79.7 FL — LOW (ref 80–100)
MCV RBC AUTO: 80.5 FL — SIGNIFICANT CHANGE UP (ref 80–100)
MCV RBC AUTO: 81.4 FL — SIGNIFICANT CHANGE UP (ref 80–100)
MCV RBC AUTO: 81.9 FL — SIGNIFICANT CHANGE UP (ref 80–100)
MCV RBC AUTO: 81.9 FL — SIGNIFICANT CHANGE UP (ref 80–100)
MCV RBC AUTO: 82 FL — SIGNIFICANT CHANGE UP (ref 80–100)
MCV RBC AUTO: 82 FL — SIGNIFICANT CHANGE UP (ref 80–100)
MCV RBC AUTO: 82.3 FL — SIGNIFICANT CHANGE UP (ref 80–100)
MCV RBC AUTO: 82.3 FL — SIGNIFICANT CHANGE UP (ref 80–100)
MCV RBC AUTO: 82.4 FL — SIGNIFICANT CHANGE UP (ref 80–100)
MCV RBC AUTO: 82.6 FL — SIGNIFICANT CHANGE UP (ref 80–100)
MCV RBC AUTO: 82.6 FL — SIGNIFICANT CHANGE UP (ref 80–100)
MCV RBC AUTO: 82.7 FL — SIGNIFICANT CHANGE UP (ref 80–100)
MCV RBC AUTO: 82.8 FL
MCV RBC AUTO: 83.1 FL — SIGNIFICANT CHANGE UP (ref 80–100)
MCV RBC AUTO: 83.2 FL — SIGNIFICANT CHANGE UP (ref 80–100)
MCV RBC AUTO: 83.3 FL — SIGNIFICANT CHANGE UP (ref 80–100)
MCV RBC AUTO: 83.6 FL — SIGNIFICANT CHANGE UP (ref 80–100)
MCV RBC AUTO: 83.6 FL — SIGNIFICANT CHANGE UP (ref 80–100)
MCV RBC AUTO: 83.9 FL — SIGNIFICANT CHANGE UP (ref 80–100)
MCV RBC AUTO: 83.9 FL — SIGNIFICANT CHANGE UP (ref 80–100)
MCV RBC AUTO: 84 FL — SIGNIFICANT CHANGE UP (ref 80–100)
MCV RBC AUTO: 84.3 FL — SIGNIFICANT CHANGE UP (ref 80–100)
MCV RBC AUTO: 84.5 FL — SIGNIFICANT CHANGE UP (ref 80–100)
MCV RBC AUTO: 84.7 FL — SIGNIFICANT CHANGE UP (ref 80–100)
MCV RBC AUTO: 84.7 FL — SIGNIFICANT CHANGE UP (ref 80–100)
MCV RBC AUTO: 84.8 FL
MCV RBC AUTO: 84.8 FL — SIGNIFICANT CHANGE UP (ref 80–100)
MCV RBC AUTO: 85 FL — SIGNIFICANT CHANGE UP (ref 80–100)
MCV RBC AUTO: 85.1 FL — SIGNIFICANT CHANGE UP (ref 80–100)
MCV RBC AUTO: 85.6 FL — SIGNIFICANT CHANGE UP (ref 80–100)
MCV RBC AUTO: 85.7 FL — SIGNIFICANT CHANGE UP (ref 80–100)
MCV RBC AUTO: 85.9 FL — SIGNIFICANT CHANGE UP (ref 80–100)
MCV RBC AUTO: 86.7 FL — SIGNIFICANT CHANGE UP (ref 80–100)
MCV RBC AUTO: 90.4 FL
MCV RBC AUTO: 90.9 FL
METHOD TYPE: SIGNIFICANT CHANGE UP
MICROALBUMIN 24H UR DL<=1MG/L-MCNC: 15.4 MG/DL
MICROALBUMIN/CREAT 24H UR-RTO: 146 MG/G
MICROCYTES BLD QL: SLIGHT — SIGNIFICANT CHANGE UP
MONOCYTES # BLD AUTO: 0.67 K/UL — SIGNIFICANT CHANGE UP (ref 0–0.9)
MONOCYTES # BLD AUTO: 1.05 K/UL
MONOCYTES # BLD AUTO: 1.11 K/UL
MONOCYTES # BLD AUTO: 1.13 K/UL
MONOCYTES # BLD AUTO: 1.24 K/UL
MONOCYTES # BLD AUTO: 1.52 K/UL — HIGH (ref 0–0.9)
MONOCYTES # BLD AUTO: 3.35 K/UL — HIGH (ref 0–0.9)
MONOCYTES NFR BLD AUTO: 11.1 %
MONOCYTES NFR BLD AUTO: 11.6 % — SIGNIFICANT CHANGE UP (ref 2–14)
MONOCYTES NFR BLD AUTO: 12 %
MONOCYTES NFR BLD AUTO: 12.1 % — SIGNIFICANT CHANGE UP (ref 2–14)
MONOCYTES NFR BLD AUTO: 12.9 %
MONOCYTES NFR BLD AUTO: 14 %
MONOCYTES NFR BLD AUTO: 9.2 % — SIGNIFICANT CHANGE UP (ref 2–14)
MRSA PCR RESULT.: SIGNIFICANT CHANGE UP
MRSA PCR RESULT.: SIGNIFICANT CHANGE UP
NEUTROPHILS # BLD AUTO: 10.41 K/UL — HIGH (ref 1.8–7.4)
NEUTROPHILS # BLD AUTO: 21.94 K/UL — HIGH (ref 1.8–7.4)
NEUTROPHILS # BLD AUTO: 4.44 K/UL
NEUTROPHILS # BLD AUTO: 4.73 K/UL
NEUTROPHILS # BLD AUTO: 4.76 K/UL
NEUTROPHILS # BLD AUTO: 5.14 K/UL — SIGNIFICANT CHANGE UP (ref 1.8–7.4)
NEUTROPHILS # BLD AUTO: 6.19 K/UL
NEUTROPHILS NFR BLD AUTO: 51.5 %
NEUTROPHILS NFR BLD AUTO: 53.6 %
NEUTROPHILS NFR BLD AUTO: 54 %
NEUTROPHILS NFR BLD AUTO: 60.8 %
NEUTROPHILS NFR BLD AUTO: 70.3 % — SIGNIFICANT CHANGE UP (ref 43–77)
NEUTROPHILS NFR BLD AUTO: 79.2 % — HIGH (ref 43–77)
NEUTROPHILS NFR BLD AUTO: 79.5 % — HIGH (ref 43–77)
NITRITE UR-MCNC: NEGATIVE — SIGNIFICANT CHANGE UP
NRBC # BLD: 0 /100 WBCS — SIGNIFICANT CHANGE UP (ref 0–0)
NT-PROBNP SERPL-SCNC: 2398 PG/ML — HIGH (ref 0–300)
O2 CT VFR BLD CALC: 39 MMHG — SIGNIFICANT CHANGE UP (ref 30–65)
O2 CT VFR BLD CALC: 41 MMHG — SIGNIFICANT CHANGE UP (ref 30–65)
ORGANISM # SPEC MICROSCOPIC CNT: SIGNIFICANT CHANGE UP
OSMOLALITY UR: 393 MOS/KG — SIGNIFICANT CHANGE UP (ref 300–900)
OTHER CELLS CSF MANUAL: 8 ML/DL — LOW (ref 18–22)
OTHER CELLS CSF MANUAL: 8 ML/DL — LOW (ref 18–22)
PCO2 BLDMV: 58 MMHG — SIGNIFICANT CHANGE UP (ref 30–65)
PCO2 BLDMV: 71 MMHG — HIGH (ref 30–65)
PCO2 BLDV: 34 MMHG — LOW (ref 35–50)
PCO2 BLDV: 34 MMHG — LOW (ref 35–50)
PCO2 BLDV: 39 MMHG — SIGNIFICANT CHANGE UP (ref 35–50)
PCO2 BLDV: 46 MMHG — SIGNIFICANT CHANGE UP (ref 35–50)
PH BLDMV: 7.4 — SIGNIFICANT CHANGE UP (ref 7.32–7.45)
PH BLDMV: 7.43 — SIGNIFICANT CHANGE UP (ref 7.32–7.45)
PH BLDV: 7.19 — CRITICAL LOW (ref 7.35–7.45)
PH BLDV: 7.21 — LOW (ref 7.35–7.45)
PH BLDV: 7.24 — LOW (ref 7.35–7.45)
PH BLDV: 7.41 — SIGNIFICANT CHANGE UP (ref 7.35–7.45)
PH UR: 5.5 — SIGNIFICANT CHANGE UP (ref 5–8)
PHOSPHATE SERPL-MCNC: 1.9 MG/DL — LOW (ref 2.5–4.5)
PHOSPHATE SERPL-MCNC: 2 MG/DL — LOW (ref 2.5–4.5)
PHOSPHATE SERPL-MCNC: 2 MG/DL — LOW (ref 2.5–4.5)
PHOSPHATE SERPL-MCNC: 2.3 MG/DL — LOW (ref 2.5–4.5)
PHOSPHATE SERPL-MCNC: 2.6 MG/DL — SIGNIFICANT CHANGE UP (ref 2.5–4.5)
PHOSPHATE SERPL-MCNC: 2.6 MG/DL — SIGNIFICANT CHANGE UP (ref 2.5–4.5)
PHOSPHATE SERPL-MCNC: 2.8 MG/DL — SIGNIFICANT CHANGE UP (ref 2.5–4.5)
PHOSPHATE SERPL-MCNC: 3.4 MG/DL — SIGNIFICANT CHANGE UP (ref 2.5–4.5)
PHOSPHATE SERPL-MCNC: 3.6 MG/DL — SIGNIFICANT CHANGE UP (ref 2.5–4.5)
PHOSPHATE SERPL-MCNC: 3.9 MG/DL — SIGNIFICANT CHANGE UP (ref 2.5–4.5)
PHOSPHATE SERPL-MCNC: 4.3 MG/DL — SIGNIFICANT CHANGE UP (ref 2.5–4.5)
PHOSPHATE SERPL-MCNC: 5 MG/DL — HIGH (ref 2.5–4.5)
PHOSPHATE SERPL-MCNC: 6.4 MG/DL — HIGH (ref 2.5–4.5)
PLAT MORPH BLD: NORMAL — SIGNIFICANT CHANGE UP
PLATELET # BLD AUTO: 160 K/UL — SIGNIFICANT CHANGE UP (ref 150–400)
PLATELET # BLD AUTO: 166 K/UL
PLATELET # BLD AUTO: 166 K/UL — SIGNIFICANT CHANGE UP (ref 150–400)
PLATELET # BLD AUTO: 169 K/UL
PLATELET # BLD AUTO: 169 K/UL — SIGNIFICANT CHANGE UP (ref 150–400)
PLATELET # BLD AUTO: 175 K/UL — SIGNIFICANT CHANGE UP (ref 150–400)
PLATELET # BLD AUTO: 188 K/UL — SIGNIFICANT CHANGE UP (ref 150–400)
PLATELET # BLD AUTO: 190 K/UL — SIGNIFICANT CHANGE UP (ref 150–400)
PLATELET # BLD AUTO: 196 K/UL — SIGNIFICANT CHANGE UP (ref 150–400)
PLATELET # BLD AUTO: 199 K/UL — SIGNIFICANT CHANGE UP (ref 150–400)
PLATELET # BLD AUTO: 208 K/UL — SIGNIFICANT CHANGE UP (ref 150–400)
PLATELET # BLD AUTO: 214 K/UL — SIGNIFICANT CHANGE UP (ref 150–400)
PLATELET # BLD AUTO: 234 K/UL — SIGNIFICANT CHANGE UP (ref 150–400)
PLATELET # BLD AUTO: 240 K/UL — SIGNIFICANT CHANGE UP (ref 150–400)
PLATELET # BLD AUTO: 242 K/UL — SIGNIFICANT CHANGE UP (ref 150–400)
PLATELET # BLD AUTO: 244 K/UL
PLATELET # BLD AUTO: 244 K/UL — SIGNIFICANT CHANGE UP (ref 150–400)
PLATELET # BLD AUTO: 246 K/UL
PLATELET # BLD AUTO: 246 K/UL — SIGNIFICANT CHANGE UP (ref 150–400)
PLATELET # BLD AUTO: 246 K/UL — SIGNIFICANT CHANGE UP (ref 150–400)
PLATELET # BLD AUTO: 251 K/UL — SIGNIFICANT CHANGE UP (ref 150–400)
PLATELET # BLD AUTO: 252 K/UL — SIGNIFICANT CHANGE UP (ref 150–400)
PLATELET # BLD AUTO: 259 K/UL — SIGNIFICANT CHANGE UP (ref 150–400)
PLATELET # BLD AUTO: 269 K/UL — SIGNIFICANT CHANGE UP (ref 150–400)
PLATELET # BLD AUTO: 283 K/UL — SIGNIFICANT CHANGE UP (ref 150–400)
PLATELET # BLD AUTO: 287 K/UL — SIGNIFICANT CHANGE UP (ref 150–400)
PLATELET # BLD AUTO: 289 K/UL — SIGNIFICANT CHANGE UP (ref 150–400)
PLATELET # BLD AUTO: 306 K/UL — SIGNIFICANT CHANGE UP (ref 150–400)
PLATELET # BLD AUTO: 321 K/UL — SIGNIFICANT CHANGE UP (ref 150–400)
PLATELET # BLD AUTO: 324 K/UL — SIGNIFICANT CHANGE UP (ref 150–400)
PLATELET # BLD AUTO: 328 K/UL — SIGNIFICANT CHANGE UP (ref 150–400)
PLATELET # BLD AUTO: 328 K/UL — SIGNIFICANT CHANGE UP (ref 150–400)
PLATELET # BLD AUTO: 331 K/UL — SIGNIFICANT CHANGE UP (ref 150–400)
PLATELET # BLD AUTO: 342 K/UL — SIGNIFICANT CHANGE UP (ref 150–400)
PLATELET # BLD AUTO: 370 K/UL — SIGNIFICANT CHANGE UP (ref 150–400)
PLATELET # BLD AUTO: 372 K/UL — SIGNIFICANT CHANGE UP (ref 150–400)
PLATELET # BLD AUTO: 400 K/UL — SIGNIFICANT CHANGE UP (ref 150–400)
PLATELET # BLD AUTO: 402 K/UL — HIGH (ref 150–400)
PLATELET # BLD AUTO: 414 K/UL — HIGH (ref 150–400)
PLATELET # BLD AUTO: 431 K/UL — HIGH (ref 150–400)
PO2 BLDV: 41 MMHG — SIGNIFICANT CHANGE UP (ref 25–45)
PO2 BLDV: 42 MMHG — SIGNIFICANT CHANGE UP (ref 25–45)
PO2 BLDV: 44 MMHG — SIGNIFICANT CHANGE UP (ref 25–45)
PO2 BLDV: 49 MMHG — HIGH (ref 25–45)
POIKILOCYTOSIS BLD QL AUTO: SLIGHT — SIGNIFICANT CHANGE UP
POLYCHROMASIA BLD QL SMEAR: SIGNIFICANT CHANGE UP
POTASSIUM BLDV-SCNC: 4 MMOL/L — SIGNIFICANT CHANGE UP (ref 3.5–5.3)
POTASSIUM BLDV-SCNC: 6.1 MMOL/L — HIGH (ref 3.5–5.3)
POTASSIUM BLDV-SCNC: 6.3 MMOL/L — CRITICAL HIGH (ref 3.5–5.3)
POTASSIUM BLDV-SCNC: 6.4 MMOL/L — CRITICAL HIGH (ref 3.5–5.3)
POTASSIUM SERPL-MCNC: 2.7 MMOL/L — CRITICAL LOW (ref 3.5–5.3)
POTASSIUM SERPL-MCNC: 2.9 MMOL/L — CRITICAL LOW (ref 3.5–5.3)
POTASSIUM SERPL-MCNC: 3 MMOL/L — LOW (ref 3.5–5.3)
POTASSIUM SERPL-MCNC: 3.1 MMOL/L — LOW (ref 3.5–5.3)
POTASSIUM SERPL-MCNC: 3.2 MMOL/L — LOW (ref 3.5–5.3)
POTASSIUM SERPL-MCNC: 3.2 MMOL/L — LOW (ref 3.5–5.3)
POTASSIUM SERPL-MCNC: 3.3 MMOL/L — LOW (ref 3.5–5.3)
POTASSIUM SERPL-MCNC: 3.4 MMOL/L — LOW (ref 3.5–5.3)
POTASSIUM SERPL-MCNC: 3.5 MMOL/L — SIGNIFICANT CHANGE UP (ref 3.5–5.3)
POTASSIUM SERPL-MCNC: 3.6 MMOL/L — SIGNIFICANT CHANGE UP (ref 3.5–5.3)
POTASSIUM SERPL-MCNC: 3.7 MMOL/L — SIGNIFICANT CHANGE UP (ref 3.5–5.3)
POTASSIUM SERPL-MCNC: 3.7 MMOL/L — SIGNIFICANT CHANGE UP (ref 3.5–5.3)
POTASSIUM SERPL-MCNC: 3.8 MMOL/L — SIGNIFICANT CHANGE UP (ref 3.5–5.3)
POTASSIUM SERPL-MCNC: 3.8 MMOL/L — SIGNIFICANT CHANGE UP (ref 3.5–5.3)
POTASSIUM SERPL-MCNC: 3.9 MMOL/L — SIGNIFICANT CHANGE UP (ref 3.5–5.3)
POTASSIUM SERPL-MCNC: 4 MMOL/L — SIGNIFICANT CHANGE UP (ref 3.5–5.3)
POTASSIUM SERPL-MCNC: 4 MMOL/L — SIGNIFICANT CHANGE UP (ref 3.5–5.3)
POTASSIUM SERPL-MCNC: 4.1 MMOL/L — SIGNIFICANT CHANGE UP (ref 3.5–5.3)
POTASSIUM SERPL-MCNC: 4.2 MMOL/L — SIGNIFICANT CHANGE UP (ref 3.5–5.3)
POTASSIUM SERPL-MCNC: 4.3 MMOL/L — SIGNIFICANT CHANGE UP (ref 3.5–5.3)
POTASSIUM SERPL-MCNC: 4.3 MMOL/L — SIGNIFICANT CHANGE UP (ref 3.5–5.3)
POTASSIUM SERPL-MCNC: 4.4 MMOL/L — SIGNIFICANT CHANGE UP (ref 3.5–5.3)
POTASSIUM SERPL-MCNC: 4.6 MMOL/L — SIGNIFICANT CHANGE UP (ref 3.5–5.3)
POTASSIUM SERPL-MCNC: 4.9 MMOL/L — SIGNIFICANT CHANGE UP (ref 3.5–5.3)
POTASSIUM SERPL-MCNC: 5.4 MMOL/L — HIGH (ref 3.5–5.3)
POTASSIUM SERPL-MCNC: 5.4 MMOL/L — HIGH (ref 3.5–5.3)
POTASSIUM SERPL-MCNC: 5.6 MMOL/L — HIGH (ref 3.5–5.3)
POTASSIUM SERPL-MCNC: 6.1 MMOL/L — HIGH (ref 3.5–5.3)
POTASSIUM SERPL-MCNC: 6.2 MMOL/L — CRITICAL HIGH (ref 3.5–5.3)
POTASSIUM SERPL-MCNC: 6.5 MMOL/L — CRITICAL HIGH (ref 3.5–5.3)
POTASSIUM SERPL-MCNC: 6.6 MMOL/L — CRITICAL HIGH (ref 3.5–5.3)
POTASSIUM SERPL-SCNC: 2.7 MMOL/L — CRITICAL LOW (ref 3.5–5.3)
POTASSIUM SERPL-SCNC: 2.9 MMOL/L — CRITICAL LOW (ref 3.5–5.3)
POTASSIUM SERPL-SCNC: 3 MMOL/L — LOW (ref 3.5–5.3)
POTASSIUM SERPL-SCNC: 3.1 MMOL/L — LOW (ref 3.5–5.3)
POTASSIUM SERPL-SCNC: 3.2 MMOL/L — LOW (ref 3.5–5.3)
POTASSIUM SERPL-SCNC: 3.2 MMOL/L — LOW (ref 3.5–5.3)
POTASSIUM SERPL-SCNC: 3.3 MMOL/L — LOW (ref 3.5–5.3)
POTASSIUM SERPL-SCNC: 3.4 MMOL/L — LOW (ref 3.5–5.3)
POTASSIUM SERPL-SCNC: 3.5 MMOL/L — SIGNIFICANT CHANGE UP (ref 3.5–5.3)
POTASSIUM SERPL-SCNC: 3.6 MMOL/L — SIGNIFICANT CHANGE UP (ref 3.5–5.3)
POTASSIUM SERPL-SCNC: 3.7 MMOL/L — SIGNIFICANT CHANGE UP (ref 3.5–5.3)
POTASSIUM SERPL-SCNC: 3.7 MMOL/L — SIGNIFICANT CHANGE UP (ref 3.5–5.3)
POTASSIUM SERPL-SCNC: 3.8 MMOL/L — SIGNIFICANT CHANGE UP (ref 3.5–5.3)
POTASSIUM SERPL-SCNC: 3.8 MMOL/L — SIGNIFICANT CHANGE UP (ref 3.5–5.3)
POTASSIUM SERPL-SCNC: 3.9 MMOL/L — SIGNIFICANT CHANGE UP (ref 3.5–5.3)
POTASSIUM SERPL-SCNC: 4 MMOL/L — SIGNIFICANT CHANGE UP (ref 3.5–5.3)
POTASSIUM SERPL-SCNC: 4 MMOL/L — SIGNIFICANT CHANGE UP (ref 3.5–5.3)
POTASSIUM SERPL-SCNC: 4.1 MMOL/L — SIGNIFICANT CHANGE UP (ref 3.5–5.3)
POTASSIUM SERPL-SCNC: 4.2 MMOL/L — SIGNIFICANT CHANGE UP (ref 3.5–5.3)
POTASSIUM SERPL-SCNC: 4.3 MMOL/L
POTASSIUM SERPL-SCNC: 4.3 MMOL/L — SIGNIFICANT CHANGE UP (ref 3.5–5.3)
POTASSIUM SERPL-SCNC: 4.3 MMOL/L — SIGNIFICANT CHANGE UP (ref 3.5–5.3)
POTASSIUM SERPL-SCNC: 4.4 MMOL/L — SIGNIFICANT CHANGE UP (ref 3.5–5.3)
POTASSIUM SERPL-SCNC: 4.6 MMOL/L
POTASSIUM SERPL-SCNC: 4.6 MMOL/L — SIGNIFICANT CHANGE UP (ref 3.5–5.3)
POTASSIUM SERPL-SCNC: 4.7 MMOL/L
POTASSIUM SERPL-SCNC: 4.9 MMOL/L — SIGNIFICANT CHANGE UP (ref 3.5–5.3)
POTASSIUM SERPL-SCNC: 5.4 MMOL/L — HIGH (ref 3.5–5.3)
POTASSIUM SERPL-SCNC: 5.4 MMOL/L — HIGH (ref 3.5–5.3)
POTASSIUM SERPL-SCNC: 5.6 MMOL/L — HIGH (ref 3.5–5.3)
POTASSIUM SERPL-SCNC: 6.1 MMOL/L — HIGH (ref 3.5–5.3)
POTASSIUM SERPL-SCNC: 6.2 MMOL/L — CRITICAL HIGH (ref 3.5–5.3)
POTASSIUM SERPL-SCNC: 6.5 MMOL/L — CRITICAL HIGH (ref 3.5–5.3)
POTASSIUM SERPL-SCNC: 6.6 MMOL/L — CRITICAL HIGH (ref 3.5–5.3)
POTASSIUM SERPL-SCNC: 6.8 MMOL/L
PROT ?TM UR-MCNC: 34 MG/DL — HIGH (ref 0–12)
PROT ?TM UR-MCNC: 4 MG/DL — SIGNIFICANT CHANGE UP (ref 0–12)
PROT SERPL-MCNC: 6.6 G/DL
PROT SERPL-MCNC: 6.9 G/DL
PROT SERPL-MCNC: 7 G/DL
PROT SERPL-MCNC: 7.1 G/DL — SIGNIFICANT CHANGE UP (ref 6–8.3)
PROT SERPL-MCNC: 7.2 G/DL — SIGNIFICANT CHANGE UP (ref 6–8.3)
PROT SERPL-MCNC: 7.4 G/DL
PROT SERPL-MCNC: 7.5 G/DL — SIGNIFICANT CHANGE UP (ref 6–8.3)
PROT SERPL-MCNC: 7.8 G/DL — SIGNIFICANT CHANGE UP (ref 6–8.3)
PROT UR-MCNC: 100 — SIGNIFICANT CHANGE UP
PROT UR-MCNC: ABNORMAL
PROT UR-MCNC: NEGATIVE — SIGNIFICANT CHANGE UP
PROT/CREAT UR-RTO: 0.7 RATIO — HIGH (ref 0–0.2)
PROTHROM AB SERPL-ACNC: 13 SEC — SIGNIFICANT CHANGE UP (ref 10.6–13.6)
PROTHROM AB SERPL-ACNC: 14.6 SEC — HIGH (ref 10.6–13.6)
PROTHROM AB SERPL-ACNC: 14.8 SEC — HIGH (ref 10.6–13.6)
PROTHROM AB SERPL-ACNC: 16.1 SEC — HIGH (ref 10.6–13.6)
PROTHROM AB SERPL-ACNC: 16.4 SEC — HIGH (ref 10.6–13.6)
PROTHROM AB SERPL-ACNC: 17 SEC — HIGH (ref 10.6–13.6)
RAPID RVP RESULT: SIGNIFICANT CHANGE UP
RAPID RVP RESULT: SIGNIFICANT CHANGE UP
RBC # BLD: 2.67 M/UL — LOW (ref 3.8–5.2)
RBC # BLD: 2.86 M/UL — LOW (ref 3.8–5.2)
RBC # BLD: 2.89 M/UL — LOW (ref 3.8–5.2)
RBC # BLD: 2.91 M/UL
RBC # BLD: 2.92 M/UL — LOW (ref 3.8–5.2)
RBC # BLD: 2.93 M/UL — LOW (ref 3.8–5.2)
RBC # BLD: 2.93 M/UL — LOW (ref 3.8–5.2)
RBC # BLD: 2.94 M/UL — LOW (ref 3.8–5.2)
RBC # BLD: 2.96 M/UL — LOW (ref 3.8–5.2)
RBC # BLD: 2.98 M/UL — LOW (ref 3.8–5.2)
RBC # BLD: 2.99 M/UL — LOW (ref 3.8–5.2)
RBC # BLD: 3.01 M/UL — LOW (ref 3.8–5.2)
RBC # BLD: 3.01 M/UL — LOW (ref 3.8–5.2)
RBC # BLD: 3.06 M/UL — LOW (ref 3.8–5.2)
RBC # BLD: 3.07 M/UL — LOW (ref 3.8–5.2)
RBC # BLD: 3.07 M/UL — LOW (ref 3.8–5.2)
RBC # BLD: 3.08 M/UL — LOW (ref 3.8–5.2)
RBC # BLD: 3.09 M/UL — LOW (ref 3.8–5.2)
RBC # BLD: 3.1 M/UL — LOW (ref 3.8–5.2)
RBC # BLD: 3.11 M/UL — LOW (ref 3.8–5.2)
RBC # BLD: 3.14 M/UL
RBC # BLD: 3.15 M/UL — LOW (ref 3.8–5.2)
RBC # BLD: 3.17 M/UL — LOW (ref 3.8–5.2)
RBC # BLD: 3.23 M/UL — LOW (ref 3.8–5.2)
RBC # BLD: 3.23 M/UL — LOW (ref 3.8–5.2)
RBC # BLD: 3.24 M/UL — LOW (ref 3.8–5.2)
RBC # BLD: 3.28 M/UL — LOW (ref 3.8–5.2)
RBC # BLD: 3.28 M/UL — LOW (ref 3.8–5.2)
RBC # BLD: 3.29 M/UL — LOW (ref 3.8–5.2)
RBC # BLD: 3.33 M/UL — LOW (ref 3.8–5.2)
RBC # BLD: 3.34 M/UL — LOW (ref 3.8–5.2)
RBC # BLD: 3.35 M/UL
RBC # BLD: 3.36 M/UL — LOW (ref 3.8–5.2)
RBC # BLD: 3.37 M/UL — LOW (ref 3.8–5.2)
RBC # BLD: 3.42 M/UL — LOW (ref 3.8–5.2)
RBC # BLD: 3.61 M/UL
RBC # FLD: 16.7 % — HIGH (ref 10.3–14.5)
RBC # FLD: 16.7 % — HIGH (ref 10.3–14.5)
RBC # FLD: 16.8 %
RBC # FLD: 16.8 % — HIGH (ref 10.3–14.5)
RBC # FLD: 17 % — HIGH (ref 10.3–14.5)
RBC # FLD: 17.1 %
RBC # FLD: 17.1 % — HIGH (ref 10.3–14.5)
RBC # FLD: 17.2 %
RBC # FLD: 17.6 %
RBC # FLD: 18.2 % — HIGH (ref 10.3–14.5)
RBC # FLD: 18.2 % — HIGH (ref 10.3–14.5)
RBC # FLD: 18.8 % — HIGH (ref 10.3–14.5)
RBC # FLD: 19.2 % — HIGH (ref 10.3–14.5)
RBC # FLD: 19.4 % — HIGH (ref 10.3–14.5)
RBC # FLD: 19.8 % — HIGH (ref 10.3–14.5)
RBC # FLD: 19.8 % — HIGH (ref 10.3–14.5)
RBC # FLD: 19.9 % — HIGH (ref 10.3–14.5)
RBC # FLD: 20 % — HIGH (ref 10.3–14.5)
RBC # FLD: 20.1 % — HIGH (ref 10.3–14.5)
RBC # FLD: 20.1 % — HIGH (ref 10.3–14.5)
RBC # FLD: 20.2 % — HIGH (ref 10.3–14.5)
RBC # FLD: 20.3 % — HIGH (ref 10.3–14.5)
RBC # FLD: 20.3 % — HIGH (ref 10.3–14.5)
RBC # FLD: 20.4 % — HIGH (ref 10.3–14.5)
RBC # FLD: 20.4 % — HIGH (ref 10.3–14.5)
RBC # FLD: 20.5 % — HIGH (ref 10.3–14.5)
RBC # FLD: 20.6 % — HIGH (ref 10.3–14.5)
RBC # FLD: 20.7 % — HIGH (ref 10.3–14.5)
RBC # FLD: 20.9 % — HIGH (ref 10.3–14.5)
RBC BLD AUTO: ABNORMAL
RBC CASTS # UR COMP ASSIST: 2 /HPF — SIGNIFICANT CHANGE UP (ref 0–4)
RBC CASTS # UR COMP ASSIST: 3 /HPF — SIGNIFICANT CHANGE UP (ref 0–4)
RBC CASTS # UR COMP ASSIST: 3 /HPF — SIGNIFICANT CHANGE UP (ref 0–4)
RH IG SCN BLD-IMP: POSITIVE — SIGNIFICANT CHANGE UP
RH IG SCN BLD-IMP: POSITIVE — SIGNIFICANT CHANGE UP
S AUREUS DNA NOSE QL NAA+PROBE: SIGNIFICANT CHANGE UP
S AUREUS DNA NOSE QL NAA+PROBE: SIGNIFICANT CHANGE UP
SAO2 % BLDMV: 66 % — SIGNIFICANT CHANGE UP (ref 60–90)
SAO2 % BLDMV: 72 % — SIGNIFICANT CHANGE UP (ref 60–90)
SAO2 % BLDV: 54 % — LOW (ref 67–88)
SAO2 % BLDV: 64 % — LOW (ref 67–88)
SAO2 % BLDV: 71 % — SIGNIFICANT CHANGE UP (ref 67–88)
SAO2 % BLDV: 77 % — SIGNIFICANT CHANGE UP (ref 67–88)
SARS-COV-2 RNA SPEC QL NAA+PROBE: SIGNIFICANT CHANGE UP
SODIUM SERPL-SCNC: 123 MMOL/L — LOW (ref 135–145)
SODIUM SERPL-SCNC: 123 MMOL/L — LOW (ref 135–145)
SODIUM SERPL-SCNC: 124 MMOL/L — LOW (ref 135–145)
SODIUM SERPL-SCNC: 124 MMOL/L — LOW (ref 135–145)
SODIUM SERPL-SCNC: 125 MMOL/L
SODIUM SERPL-SCNC: 125 MMOL/L — LOW (ref 135–145)
SODIUM SERPL-SCNC: 126 MMOL/L — LOW (ref 135–145)
SODIUM SERPL-SCNC: 126 MMOL/L — LOW (ref 135–145)
SODIUM SERPL-SCNC: 127 MMOL/L — LOW (ref 135–145)
SODIUM SERPL-SCNC: 127 MMOL/L — LOW (ref 135–145)
SODIUM SERPL-SCNC: 128 MMOL/L — LOW (ref 135–145)
SODIUM SERPL-SCNC: 129 MMOL/L — LOW (ref 135–145)
SODIUM SERPL-SCNC: 130 MMOL/L — LOW (ref 135–145)
SODIUM SERPL-SCNC: 131 MMOL/L — LOW (ref 135–145)
SODIUM SERPL-SCNC: 132 MMOL/L — LOW (ref 135–145)
SODIUM SERPL-SCNC: 132 MMOL/L — LOW (ref 135–145)
SODIUM SERPL-SCNC: 133 MMOL/L — LOW (ref 135–145)
SODIUM SERPL-SCNC: 134 MMOL/L
SODIUM SERPL-SCNC: 134 MMOL/L — LOW (ref 135–145)
SODIUM SERPL-SCNC: 134 MMOL/L — LOW (ref 135–145)
SODIUM SERPL-SCNC: 135 MMOL/L — SIGNIFICANT CHANGE UP (ref 135–145)
SODIUM SERPL-SCNC: 136 MMOL/L — SIGNIFICANT CHANGE UP (ref 135–145)
SODIUM SERPL-SCNC: 136 MMOL/L — SIGNIFICANT CHANGE UP (ref 135–145)
SODIUM SERPL-SCNC: 137 MMOL/L
SODIUM SERPL-SCNC: 137 MMOL/L — SIGNIFICANT CHANGE UP (ref 135–145)
SODIUM SERPL-SCNC: 138 MMOL/L — SIGNIFICANT CHANGE UP (ref 135–145)
SODIUM SERPL-SCNC: 138 MMOL/L — SIGNIFICANT CHANGE UP (ref 135–145)
SODIUM SERPL-SCNC: 139 MMOL/L — SIGNIFICANT CHANGE UP (ref 135–145)
SODIUM SERPL-SCNC: 140 MMOL/L
SODIUM SERPL-SCNC: 140 MMOL/L — SIGNIFICANT CHANGE UP (ref 135–145)
SODIUM SERPL-SCNC: 141 MMOL/L — SIGNIFICANT CHANGE UP (ref 135–145)
SODIUM SERPL-SCNC: 142 MMOL/L — SIGNIFICANT CHANGE UP (ref 135–145)
SODIUM UR-SCNC: 54 MMOL/L — SIGNIFICANT CHANGE UP
SODIUM UR-SCNC: 9 MMOL/L — SIGNIFICANT CHANGE UP
SP GR SPEC: 1.01 — SIGNIFICANT CHANGE UP (ref 1.01–1.02)
SP GR SPEC: 1.02 — SIGNIFICANT CHANGE UP (ref 1.01–1.02)
SP GR SPEC: 1.03 — HIGH (ref 1.01–1.02)
SPECIMEN SOURCE: SIGNIFICANT CHANGE UP
T3 SERPL-MCNC: 78 NG/DL — LOW (ref 80–200)
T4 AB SER-ACNC: 7 UG/DL — SIGNIFICANT CHANGE UP (ref 4.6–12)
T4 FREE SERPL-MCNC: 0.8 NG/DL — LOW (ref 0.9–1.8)
TARGETS BLD QL SMEAR: SLIGHT — SIGNIFICANT CHANGE UP
TIBC SERPL-MCNC: 375 UG/DL
TSH SERPL-ACNC: 1.82 UIU/ML
TSH SERPL-MCNC: 3.53 UIU/ML — SIGNIFICANT CHANGE UP (ref 0.27–4.2)
TSH SERPL-MCNC: 6.37 UIU/ML — HIGH (ref 0.27–4.2)
UIBC SERPL-MCNC: 341 UG/DL
URATE UR-MCNC: 28.4 MG/DL — SIGNIFICANT CHANGE UP
UROBILINOGEN FLD QL: ABNORMAL
UROBILINOGEN FLD QL: NEGATIVE — SIGNIFICANT CHANGE UP
UROBILINOGEN FLD QL: NEGATIVE — SIGNIFICANT CHANGE UP
UUN UR-MCNC: 297 MG/DL — SIGNIFICANT CHANGE UP
UUN UR-MCNC: 794 MG/DL — SIGNIFICANT CHANGE UP
VANCOMYCIN TROUGH SERPL-MCNC: <4 UG/ML — LOW (ref 10–20)
VIT B12 SERPL-MCNC: 767 PG/ML
WBC # BLD: 10.03 K/UL — SIGNIFICANT CHANGE UP (ref 3.8–10.5)
WBC # BLD: 10.95 K/UL — HIGH (ref 3.8–10.5)
WBC # BLD: 11.01 K/UL — HIGH (ref 3.8–10.5)
WBC # BLD: 11.53 K/UL — HIGH (ref 3.8–10.5)
WBC # BLD: 11.57 K/UL — HIGH (ref 3.8–10.5)
WBC # BLD: 11.89 K/UL — HIGH (ref 3.8–10.5)
WBC # BLD: 12.12 K/UL — HIGH (ref 3.8–10.5)
WBC # BLD: 12.24 K/UL — HIGH (ref 3.8–10.5)
WBC # BLD: 12.97 K/UL — HIGH (ref 3.8–10.5)
WBC # BLD: 13.1 K/UL — HIGH (ref 3.8–10.5)
WBC # BLD: 13.28 K/UL — HIGH (ref 3.8–10.5)
WBC # BLD: 13.34 K/UL — HIGH (ref 3.8–10.5)
WBC # BLD: 13.59 K/UL — HIGH (ref 3.8–10.5)
WBC # BLD: 13.86 K/UL — HIGH (ref 3.8–10.5)
WBC # BLD: 14.45 K/UL — HIGH (ref 3.8–10.5)
WBC # BLD: 16.84 K/UL — HIGH (ref 3.8–10.5)
WBC # BLD: 17.29 K/UL — HIGH (ref 3.8–10.5)
WBC # BLD: 25.48 K/UL — HIGH (ref 3.8–10.5)
WBC # BLD: 27.71 K/UL — HIGH (ref 3.8–10.5)
WBC # BLD: 7.02 K/UL — SIGNIFICANT CHANGE UP (ref 3.8–10.5)
WBC # BLD: 7.25 K/UL — SIGNIFICANT CHANGE UP (ref 3.8–10.5)
WBC # BLD: 7.31 K/UL — SIGNIFICANT CHANGE UP (ref 3.8–10.5)
WBC # BLD: 7.8 K/UL — SIGNIFICANT CHANGE UP (ref 3.8–10.5)
WBC # BLD: 7.99 K/UL — SIGNIFICANT CHANGE UP (ref 3.8–10.5)
WBC # BLD: 7.99 K/UL — SIGNIFICANT CHANGE UP (ref 3.8–10.5)
WBC # BLD: 8 K/UL — SIGNIFICANT CHANGE UP (ref 3.8–10.5)
WBC # BLD: 8.14 K/UL — SIGNIFICANT CHANGE UP (ref 3.8–10.5)
WBC # BLD: 8.22 K/UL — SIGNIFICANT CHANGE UP (ref 3.8–10.5)
WBC # BLD: 8.82 K/UL — SIGNIFICANT CHANGE UP (ref 3.8–10.5)
WBC # BLD: 9.15 K/UL — SIGNIFICANT CHANGE UP (ref 3.8–10.5)
WBC # BLD: 9.16 K/UL — SIGNIFICANT CHANGE UP (ref 3.8–10.5)
WBC # BLD: 9.25 K/UL — SIGNIFICANT CHANGE UP (ref 3.8–10.5)
WBC # BLD: 9.35 K/UL — SIGNIFICANT CHANGE UP (ref 3.8–10.5)
WBC # BLD: 9.59 K/UL — SIGNIFICANT CHANGE UP (ref 3.8–10.5)
WBC # BLD: 9.73 K/UL — SIGNIFICANT CHANGE UP (ref 3.8–10.5)
WBC # BLD: 9.77 K/UL — SIGNIFICANT CHANGE UP (ref 3.8–10.5)
WBC # FLD AUTO: 10.03 K/UL — SIGNIFICANT CHANGE UP (ref 3.8–10.5)
WBC # FLD AUTO: 10.18 K/UL
WBC # FLD AUTO: 10.95 K/UL — HIGH (ref 3.8–10.5)
WBC # FLD AUTO: 11.01 K/UL — HIGH (ref 3.8–10.5)
WBC # FLD AUTO: 11.53 K/UL — HIGH (ref 3.8–10.5)
WBC # FLD AUTO: 11.57 K/UL — HIGH (ref 3.8–10.5)
WBC # FLD AUTO: 11.89 K/UL — HIGH (ref 3.8–10.5)
WBC # FLD AUTO: 12.12 K/UL — HIGH (ref 3.8–10.5)
WBC # FLD AUTO: 12.24 K/UL — HIGH (ref 3.8–10.5)
WBC # FLD AUTO: 12.97 K/UL — HIGH (ref 3.8–10.5)
WBC # FLD AUTO: 13.1 K/UL — HIGH (ref 3.8–10.5)
WBC # FLD AUTO: 13.28 K/UL — HIGH (ref 3.8–10.5)
WBC # FLD AUTO: 13.34 K/UL — HIGH (ref 3.8–10.5)
WBC # FLD AUTO: 13.59 K/UL — HIGH (ref 3.8–10.5)
WBC # FLD AUTO: 13.86 K/UL — HIGH (ref 3.8–10.5)
WBC # FLD AUTO: 14.45 K/UL — HIGH (ref 3.8–10.5)
WBC # FLD AUTO: 16.84 K/UL — HIGH (ref 3.8–10.5)
WBC # FLD AUTO: 17.29 K/UL — HIGH (ref 3.8–10.5)
WBC # FLD AUTO: 25.48 K/UL — HIGH (ref 3.8–10.5)
WBC # FLD AUTO: 27.71 K/UL — HIGH (ref 3.8–10.5)
WBC # FLD AUTO: 7.02 K/UL — SIGNIFICANT CHANGE UP (ref 3.8–10.5)
WBC # FLD AUTO: 7.25 K/UL — SIGNIFICANT CHANGE UP (ref 3.8–10.5)
WBC # FLD AUTO: 7.31 K/UL — SIGNIFICANT CHANGE UP (ref 3.8–10.5)
WBC # FLD AUTO: 7.8 K/UL — SIGNIFICANT CHANGE UP (ref 3.8–10.5)
WBC # FLD AUTO: 7.99 K/UL — SIGNIFICANT CHANGE UP (ref 3.8–10.5)
WBC # FLD AUTO: 7.99 K/UL — SIGNIFICANT CHANGE UP (ref 3.8–10.5)
WBC # FLD AUTO: 8 K/UL — SIGNIFICANT CHANGE UP (ref 3.8–10.5)
WBC # FLD AUTO: 8.14 K/UL — SIGNIFICANT CHANGE UP (ref 3.8–10.5)
WBC # FLD AUTO: 8.22 K/UL — SIGNIFICANT CHANGE UP (ref 3.8–10.5)
WBC # FLD AUTO: 8.62 K/UL
WBC # FLD AUTO: 8.75 K/UL
WBC # FLD AUTO: 8.82 K/UL — SIGNIFICANT CHANGE UP (ref 3.8–10.5)
WBC # FLD AUTO: 8.88 K/UL
WBC # FLD AUTO: 9.15 K/UL — SIGNIFICANT CHANGE UP (ref 3.8–10.5)
WBC # FLD AUTO: 9.16 K/UL — SIGNIFICANT CHANGE UP (ref 3.8–10.5)
WBC # FLD AUTO: 9.25 K/UL — SIGNIFICANT CHANGE UP (ref 3.8–10.5)
WBC # FLD AUTO: 9.35 K/UL — SIGNIFICANT CHANGE UP (ref 3.8–10.5)
WBC # FLD AUTO: 9.59 K/UL — SIGNIFICANT CHANGE UP (ref 3.8–10.5)
WBC # FLD AUTO: 9.73 K/UL — SIGNIFICANT CHANGE UP (ref 3.8–10.5)
WBC # FLD AUTO: 9.77 K/UL — SIGNIFICANT CHANGE UP (ref 3.8–10.5)
WBC UR QL: 0 /HPF — SIGNIFICANT CHANGE UP (ref 0–5)
WBC UR QL: 19 /HPF — HIGH (ref 0–5)
WBC UR QL: 7 /HPF — HIGH (ref 0–5)

## 2021-01-01 PROCEDURE — 99233 SBSQ HOSP IP/OBS HIGH 50: CPT

## 2021-01-01 PROCEDURE — 80048 BASIC METABOLIC PNL TOTAL CA: CPT

## 2021-01-01 PROCEDURE — 99232 SBSQ HOSP IP/OBS MODERATE 35: CPT | Mod: GC

## 2021-01-01 PROCEDURE — 99232 SBSQ HOSP IP/OBS MODERATE 35: CPT

## 2021-01-01 PROCEDURE — 93355 ECHO TRANSESOPHAGEAL (TEE): CPT

## 2021-01-01 PROCEDURE — 84560 ASSAY OF URINE/URIC ACID: CPT

## 2021-01-01 PROCEDURE — 93010 ELECTROCARDIOGRAM REPORT: CPT

## 2021-01-01 PROCEDURE — 84439 ASSAY OF FREE THYROXINE: CPT

## 2021-01-01 PROCEDURE — 84300 ASSAY OF URINE SODIUM: CPT

## 2021-01-01 PROCEDURE — 80053 COMPREHEN METABOLIC PANEL: CPT

## 2021-01-01 PROCEDURE — 93308 TTE F-UP OR LMTD: CPT | Mod: 26

## 2021-01-01 PROCEDURE — 99291 CRITICAL CARE FIRST HOUR: CPT

## 2021-01-01 PROCEDURE — 87641 MR-STAPH DNA AMP PROBE: CPT

## 2021-01-01 PROCEDURE — 99223 1ST HOSP IP/OBS HIGH 75: CPT | Mod: GC

## 2021-01-01 PROCEDURE — 76705 ECHO EXAM OF ABDOMEN: CPT | Mod: 26

## 2021-01-01 PROCEDURE — 99233 SBSQ HOSP IP/OBS HIGH 50: CPT | Mod: GC

## 2021-01-01 PROCEDURE — 99231 SBSQ HOSP IP/OBS SF/LOW 25: CPT

## 2021-01-01 PROCEDURE — 82435 ASSAY OF BLOOD CHLORIDE: CPT

## 2021-01-01 PROCEDURE — 71045 X-RAY EXAM CHEST 1 VIEW: CPT | Mod: 26

## 2021-01-01 PROCEDURE — 85730 THROMBOPLASTIN TIME PARTIAL: CPT

## 2021-01-01 PROCEDURE — 84480 ASSAY TRIIODOTHYRONINE (T3): CPT

## 2021-01-01 PROCEDURE — 93456 R HRT CORONARY ARTERY ANGIO: CPT

## 2021-01-01 PROCEDURE — 76604 US EXAM CHEST: CPT | Mod: 26

## 2021-01-01 PROCEDURE — 93306 TTE W/DOPPLER COMPLETE: CPT | Mod: 26

## 2021-01-01 PROCEDURE — 87186 SC STD MICRODIL/AGAR DIL: CPT

## 2021-01-01 PROCEDURE — 82330 ASSAY OF CALCIUM: CPT

## 2021-01-01 PROCEDURE — 87086 URINE CULTURE/COLONY COUNT: CPT

## 2021-01-01 PROCEDURE — 94010 BREATHING CAPACITY TEST: CPT | Mod: 26

## 2021-01-01 PROCEDURE — 99215 OFFICE O/P EST HI 40 MIN: CPT

## 2021-01-01 PROCEDURE — 85610 PROTHROMBIN TIME: CPT

## 2021-01-01 PROCEDURE — 93321 DOPPLER ECHO F-UP/LMTD STD: CPT | Mod: 26

## 2021-01-01 PROCEDURE — 93000 ELECTROCARDIOGRAM COMPLETE: CPT

## 2021-01-01 PROCEDURE — 71045 X-RAY EXAM CHEST 1 VIEW: CPT | Mod: 26,76

## 2021-01-01 PROCEDURE — 75572 CT HRT W/3D IMAGE: CPT | Mod: 26,MF

## 2021-01-01 PROCEDURE — 82962 GLUCOSE BLOOD TEST: CPT

## 2021-01-01 PROCEDURE — 93306 TTE W/DOPPLER COMPLETE: CPT

## 2021-01-01 PROCEDURE — 99152 MOD SED SAME PHYS/QHP 5/>YRS: CPT

## 2021-01-01 PROCEDURE — 86803 HEPATITIS C AB TEST: CPT

## 2021-01-01 PROCEDURE — 85027 COMPLETE CBC AUTOMATED: CPT

## 2021-01-01 PROCEDURE — 83605 ASSAY OF LACTIC ACID: CPT

## 2021-01-01 PROCEDURE — 74176 CT ABD & PELVIS W/O CONTRAST: CPT | Mod: 26,MA

## 2021-01-01 PROCEDURE — 84443 ASSAY THYROID STIM HORMONE: CPT

## 2021-01-01 PROCEDURE — 93005 ELECTROCARDIOGRAM TRACING: CPT

## 2021-01-01 PROCEDURE — 33361 REPLACE AORTIC VALVE PERQ: CPT | Mod: 62,Q0

## 2021-01-01 PROCEDURE — 81001 URINALYSIS AUTO W/SCOPE: CPT

## 2021-01-01 PROCEDURE — 99261: CPT

## 2021-01-01 PROCEDURE — 86901 BLOOD TYPING SEROLOGIC RH(D): CPT

## 2021-01-01 PROCEDURE — 80202 ASSAY OF VANCOMYCIN: CPT

## 2021-01-01 PROCEDURE — 87040 BLOOD CULTURE FOR BACTERIA: CPT

## 2021-01-01 PROCEDURE — 99291 CRITICAL CARE FIRST HOUR: CPT | Mod: 25

## 2021-01-01 PROCEDURE — 99223 1ST HOSP IP/OBS HIGH 75: CPT

## 2021-01-01 PROCEDURE — 86706 HEP B SURFACE ANTIBODY: CPT

## 2021-01-01 PROCEDURE — 94640 AIRWAY INHALATION TREATMENT: CPT

## 2021-01-01 PROCEDURE — 0225U NFCT DS DNA&RNA 21 SARSCOV2: CPT

## 2021-01-01 PROCEDURE — 76775 US EXAM ABDO BACK WALL LIM: CPT | Mod: 26

## 2021-01-01 PROCEDURE — 36620 INSERTION CATHETER ARTERY: CPT

## 2021-01-01 PROCEDURE — P9016: CPT

## 2021-01-01 PROCEDURE — 99451 NTRPROF PH1/NTRNET/EHR 5/>: CPT

## 2021-01-01 PROCEDURE — 97530 THERAPEUTIC ACTIVITIES: CPT

## 2021-01-01 PROCEDURE — 74176 CT ABD & PELVIS W/O CONTRAST: CPT

## 2021-01-01 PROCEDURE — 94010 BREATHING CAPACITY TEST: CPT

## 2021-01-01 PROCEDURE — 93880 EXTRACRANIAL BILAT STUDY: CPT

## 2021-01-01 PROCEDURE — 86900 BLOOD TYPING SEROLOGIC ABO: CPT

## 2021-01-01 PROCEDURE — 87640 STAPH A DNA AMP PROBE: CPT

## 2021-01-01 PROCEDURE — 87340 HEPATITIS B SURFACE AG IA: CPT

## 2021-01-01 PROCEDURE — 87150 DNA/RNA AMPLIFIED PROBE: CPT

## 2021-01-01 PROCEDURE — 84156 ASSAY OF PROTEIN URINE: CPT

## 2021-01-01 PROCEDURE — 99233 SBSQ HOSP IP/OBS HIGH 50: CPT | Mod: 25

## 2021-01-01 PROCEDURE — 84540 ASSAY OF URINE/UREA-N: CPT

## 2021-01-01 PROCEDURE — 96376 TX/PRO/DX INJ SAME DRUG ADON: CPT

## 2021-01-01 PROCEDURE — 82570 ASSAY OF URINE CREATININE: CPT

## 2021-01-01 PROCEDURE — C1887: CPT

## 2021-01-01 PROCEDURE — 96374 THER/PROPH/DIAG INJ IV PUSH: CPT

## 2021-01-01 PROCEDURE — 85014 HEMATOCRIT: CPT

## 2021-01-01 PROCEDURE — 36415 COLL VENOUS BLD VENIPUNCTURE: CPT

## 2021-01-01 PROCEDURE — 93456 R HRT CORONARY ARTERY ANGIO: CPT | Mod: 26

## 2021-01-01 PROCEDURE — 84436 ASSAY OF TOTAL THYROXINE: CPT

## 2021-01-01 PROCEDURE — 36430 TRANSFUSION BLD/BLD COMPNT: CPT

## 2021-01-01 PROCEDURE — 99204 OFFICE O/P NEW MOD 45 MIN: CPT

## 2021-01-01 PROCEDURE — 93970 EXTREMITY STUDY: CPT | Mod: 26

## 2021-01-01 PROCEDURE — 82565 ASSAY OF CREATININE: CPT

## 2021-01-01 PROCEDURE — 99205 OFFICE O/P NEW HI 60 MIN: CPT

## 2021-01-01 PROCEDURE — 99292 CRITICAL CARE ADDL 30 MIN: CPT

## 2021-01-01 PROCEDURE — 85384 FIBRINOGEN ACTIVITY: CPT

## 2021-01-01 PROCEDURE — C1889: CPT

## 2021-01-01 PROCEDURE — 84100 ASSAY OF PHOSPHORUS: CPT

## 2021-01-01 PROCEDURE — 83880 ASSAY OF NATRIURETIC PEPTIDE: CPT

## 2021-01-01 PROCEDURE — U0005: CPT

## 2021-01-01 PROCEDURE — 86850 RBC ANTIBODY SCREEN: CPT

## 2021-01-01 PROCEDURE — 36800 INSERTION OF CANNULA: CPT | Mod: GC

## 2021-01-01 PROCEDURE — 93321 DOPPLER ECHO F-UP/LMTD STD: CPT

## 2021-01-01 PROCEDURE — 83690 ASSAY OF LIPASE: CPT

## 2021-01-01 PROCEDURE — 83036 HEMOGLOBIN GLYCOSYLATED A1C: CPT

## 2021-01-01 PROCEDURE — G1004: CPT

## 2021-01-01 PROCEDURE — 71045 X-RAY EXAM CHEST 1 VIEW: CPT

## 2021-01-01 PROCEDURE — C1894: CPT

## 2021-01-01 PROCEDURE — 93451 RIGHT HEART CATH: CPT

## 2021-01-01 PROCEDURE — C1769: CPT

## 2021-01-01 PROCEDURE — 84295 ASSAY OF SERUM SODIUM: CPT

## 2021-01-01 PROCEDURE — 85025 COMPLETE CBC W/AUTO DIFF WBC: CPT

## 2021-01-01 PROCEDURE — 83735 ASSAY OF MAGNESIUM: CPT

## 2021-01-01 PROCEDURE — 86923 COMPATIBILITY TEST ELECTRIC: CPT

## 2021-01-01 PROCEDURE — 86704 HEP B CORE ANTIBODY TOTAL: CPT

## 2021-01-01 PROCEDURE — U0003: CPT

## 2021-01-01 PROCEDURE — 99221 1ST HOSP IP/OBS SF/LOW 40: CPT

## 2021-01-01 PROCEDURE — 36800 INSERTION OF CANNULA: CPT

## 2021-01-01 PROCEDURE — 76000 FLUOROSCOPY <1 HR PHYS/QHP: CPT

## 2021-01-01 PROCEDURE — 83935 ASSAY OF URINE OSMOLALITY: CPT

## 2021-01-01 PROCEDURE — 85018 HEMOGLOBIN: CPT

## 2021-01-01 PROCEDURE — 99292 CRITICAL CARE ADDL 30 MIN: CPT | Mod: 25

## 2021-01-01 PROCEDURE — 97110 THERAPEUTIC EXERCISES: CPT

## 2021-01-01 PROCEDURE — 76937 US GUIDE VASCULAR ACCESS: CPT

## 2021-01-01 PROCEDURE — 93880 EXTRACRANIAL BILAT STUDY: CPT | Mod: 26

## 2021-01-01 PROCEDURE — 93451 RIGHT HEART CATH: CPT | Mod: 26

## 2021-01-01 PROCEDURE — 76937 US GUIDE VASCULAR ACCESS: CPT | Mod: 26

## 2021-01-01 PROCEDURE — 87077 CULTURE AEROBIC IDENTIFY: CPT

## 2021-01-01 PROCEDURE — 84132 ASSAY OF SERUM POTASSIUM: CPT

## 2021-01-01 PROCEDURE — 93970 EXTREMITY STUDY: CPT

## 2021-01-01 PROCEDURE — 99291 CRITICAL CARE FIRST HOUR: CPT | Mod: GC

## 2021-01-01 PROCEDURE — 93308 TTE F-UP OR LMTD: CPT

## 2021-01-01 PROCEDURE — 82803 BLOOD GASES ANY COMBINATION: CPT

## 2021-01-01 PROCEDURE — 96375 TX/PRO/DX INJ NEW DRUG ADDON: CPT

## 2021-01-01 PROCEDURE — 82947 ASSAY GLUCOSE BLOOD QUANT: CPT

## 2021-01-01 PROCEDURE — 97161 PT EVAL LOW COMPLEX 20 MIN: CPT

## 2021-01-01 PROCEDURE — 71045 X-RAY EXAM CHEST 1 VIEW: CPT | Mod: 26,77

## 2021-01-01 PROCEDURE — 76775 US EXAM ABDO BACK WALL LIM: CPT

## 2021-01-01 PROCEDURE — 94660 CPAP INITIATION&MGMT: CPT

## 2021-01-01 PROCEDURE — 99204 OFFICE O/P NEW MOD 45 MIN: CPT | Mod: 25

## 2021-01-01 PROCEDURE — C1760: CPT

## 2021-01-01 PROCEDURE — 97116 GAIT TRAINING THERAPY: CPT

## 2021-01-01 PROCEDURE — 75572 CT HRT W/3D IMAGE: CPT

## 2021-01-01 PROCEDURE — 99214 OFFICE O/P EST MOD 30 MIN: CPT

## 2021-01-01 RX ORDER — VANCOMYCIN HCL 1 G
1000 VIAL (EA) INTRAVENOUS ONCE
Refills: 0 | Status: COMPLETED | OUTPATIENT
Start: 2021-01-01 | End: 2021-01-01

## 2021-01-01 RX ORDER — HEPARIN SODIUM 5000 [USP'U]/ML
5000 INJECTION INTRAVENOUS; SUBCUTANEOUS EVERY 8 HOURS
Refills: 0 | Status: DISCONTINUED | OUTPATIENT
Start: 2021-01-01 | End: 2021-01-01

## 2021-01-01 RX ORDER — POTASSIUM CHLORIDE 20 MEQ
20 PACKET (EA) ORAL ONCE
Refills: 0 | Status: COMPLETED | OUTPATIENT
Start: 2021-01-01 | End: 2021-01-01

## 2021-01-01 RX ORDER — MAGNESIUM SULFATE 500 MG/ML
2 VIAL (ML) INJECTION ONCE
Refills: 0 | Status: COMPLETED | OUTPATIENT
Start: 2021-01-01 | End: 2021-01-01

## 2021-01-01 RX ORDER — BUDESONIDE AND FORMOTEROL FUMARATE DIHYDRATE 160; 4.5 UG/1; UG/1
2 AEROSOL RESPIRATORY (INHALATION)
Refills: 0 | Status: DISCONTINUED | OUTPATIENT
Start: 2021-01-01 | End: 2021-01-01

## 2021-01-01 RX ORDER — ZOLPIDEM TARTRATE 10 MG/1
5 TABLET ORAL AT BEDTIME
Refills: 0 | Status: DISCONTINUED | OUTPATIENT
Start: 2021-01-01 | End: 2021-01-01

## 2021-01-01 RX ORDER — DEXTROSE 50 % IN WATER 50 %
50 SYRINGE (ML) INTRAVENOUS ONCE
Refills: 0 | Status: COMPLETED | OUTPATIENT
Start: 2021-01-01 | End: 2021-01-01

## 2021-01-01 RX ORDER — INSULIN GLARGINE 100 [IU]/ML
6 INJECTION, SOLUTION SUBCUTANEOUS AT BEDTIME
Refills: 0 | Status: DISCONTINUED | OUTPATIENT
Start: 2021-01-01 | End: 2021-01-01

## 2021-01-01 RX ORDER — BUMETANIDE 0.25 MG/ML
0.5 INJECTION INTRAMUSCULAR; INTRAVENOUS
Qty: 20 | Refills: 0 | Status: DISCONTINUED | OUTPATIENT
Start: 2021-01-01 | End: 2021-01-01

## 2021-01-01 RX ORDER — BUMETANIDE 0.25 MG/ML
2 INJECTION INTRAMUSCULAR; INTRAVENOUS EVERY 12 HOURS
Refills: 0 | Status: DISCONTINUED | OUTPATIENT
Start: 2021-01-01 | End: 2021-01-01

## 2021-01-01 RX ORDER — POTASSIUM CHLORIDE 20 MEQ
40 PACKET (EA) ORAL ONCE
Refills: 0 | Status: COMPLETED | OUTPATIENT
Start: 2021-01-01 | End: 2021-01-01

## 2021-01-01 RX ORDER — DEXTROSE 50 % IN WATER 50 %
25 SYRINGE (ML) INTRAVENOUS ONCE
Refills: 0 | Status: DISCONTINUED | OUTPATIENT
Start: 2021-01-01 | End: 2021-01-01

## 2021-01-01 RX ORDER — INSULIN LISPRO 100/ML
VIAL (ML) SUBCUTANEOUS
Refills: 0 | Status: DISCONTINUED | OUTPATIENT
Start: 2021-01-01 | End: 2021-01-01

## 2021-01-01 RX ORDER — UREA 15 G
30 POWDER IN PACKET (EA) ORAL EVERY 12 HOURS
Refills: 0 | Status: DISCONTINUED | OUTPATIENT
Start: 2021-01-01 | End: 2021-01-01

## 2021-01-01 RX ORDER — BUMETANIDE 0.25 MG/ML
4 INJECTION INTRAMUSCULAR; INTRAVENOUS ONCE
Refills: 0 | Status: COMPLETED | OUTPATIENT
Start: 2021-01-01 | End: 2021-01-01

## 2021-01-01 RX ORDER — INSULIN LISPRO 100/ML
6 VIAL (ML) SUBCUTANEOUS
Refills: 0 | Status: DISCONTINUED | OUTPATIENT
Start: 2021-01-01 | End: 2021-01-01

## 2021-01-01 RX ORDER — METFORMIN HYDROCHLORIDE 1000 MG/1
1000 TABLET, COATED ORAL
Qty: 180 | Refills: 2 | Status: ACTIVE | COMMUNITY
Start: 2021-01-01 | End: 1900-01-01

## 2021-01-01 RX ORDER — INSULIN LISPRO 100/ML
VIAL (ML) SUBCUTANEOUS EVERY 6 HOURS
Refills: 0 | Status: DISCONTINUED | OUTPATIENT
Start: 2021-01-01 | End: 2021-01-01

## 2021-01-01 RX ORDER — SODIUM ZIRCONIUM CYCLOSILICATE 10 G/10G
10 POWDER, FOR SUSPENSION ORAL ONCE
Refills: 0 | Status: COMPLETED | OUTPATIENT
Start: 2021-01-01 | End: 2021-01-01

## 2021-01-01 RX ORDER — POTASSIUM CHLORIDE 20 MEQ
20 PACKET (EA) ORAL ONCE
Refills: 0 | Status: DISCONTINUED | OUTPATIENT
Start: 2021-01-01 | End: 2021-01-01

## 2021-01-01 RX ORDER — POTASSIUM BICARBONATE 978 MG/1
25 TABLET, EFFERVESCENT ORAL
Refills: 0 | Status: COMPLETED | OUTPATIENT
Start: 2021-01-01 | End: 2021-01-01

## 2021-01-01 RX ORDER — POTASSIUM CHLORIDE 20 MEQ
20 PACKET (EA) ORAL
Refills: 0 | Status: COMPLETED | OUTPATIENT
Start: 2021-01-01 | End: 2021-01-01

## 2021-01-01 RX ORDER — METOPROLOL TARTRATE 50 MG
25 TABLET ORAL
Refills: 0 | Status: DISCONTINUED | OUTPATIENT
Start: 2021-01-01 | End: 2021-01-01

## 2021-01-01 RX ORDER — ASCORBIC ACID 60 MG
1 TABLET,CHEWABLE ORAL
Qty: 0 | Refills: 0 | DISCHARGE

## 2021-01-01 RX ORDER — INSULIN LISPRO 100/ML
8 VIAL (ML) SUBCUTANEOUS
Refills: 0 | Status: DISCONTINUED | OUTPATIENT
Start: 2021-01-01 | End: 2021-01-01

## 2021-01-01 RX ORDER — FUROSEMIDE 40 MG
80 TABLET ORAL DAILY
Refills: 0 | Status: DISCONTINUED | OUTPATIENT
Start: 2021-01-01 | End: 2021-01-01

## 2021-01-01 RX ORDER — FUROSEMIDE 80 MG/1
80 TABLET ORAL DAILY
Qty: 90 | Refills: 1 | Status: ACTIVE | COMMUNITY
Start: 2021-01-01 | End: 1900-01-01

## 2021-01-01 RX ORDER — INSULIN LISPRO 100/ML
7 VIAL (ML) SUBCUTANEOUS ONCE
Refills: 0 | Status: COMPLETED | OUTPATIENT
Start: 2021-01-01 | End: 2021-01-01

## 2021-01-01 RX ORDER — INSULIN GLARGINE 100 [IU]/ML
24 INJECTION, SOLUTION SUBCUTANEOUS AT BEDTIME
Refills: 0 | Status: DISCONTINUED | OUTPATIENT
Start: 2021-01-01 | End: 2021-01-01

## 2021-01-01 RX ORDER — CHLORHEXIDINE GLUCONATE 213 G/1000ML
1 SOLUTION TOPICAL ONCE
Refills: 0 | Status: COMPLETED | OUTPATIENT
Start: 2021-01-01 | End: 2021-01-01

## 2021-01-01 RX ORDER — SODIUM CHLORIDE 9 MG/ML
1000 INJECTION, SOLUTION INTRAVENOUS
Refills: 0 | Status: DISCONTINUED | OUTPATIENT
Start: 2021-01-01 | End: 2021-01-01

## 2021-01-01 RX ORDER — SENNA PLUS 8.6 MG/1
2 TABLET ORAL AT BEDTIME
Refills: 0 | Status: COMPLETED | OUTPATIENT
Start: 2021-01-01 | End: 2021-01-01

## 2021-01-01 RX ORDER — POTASSIUM CHLORIDE 20 MEQ
40 PACKET (EA) ORAL EVERY 4 HOURS
Refills: 0 | Status: COMPLETED | OUTPATIENT
Start: 2021-01-01 | End: 2021-01-01

## 2021-01-01 RX ORDER — GLUCAGON INJECTION, SOLUTION 0.5 MG/.1ML
1 INJECTION, SOLUTION SUBCUTANEOUS ONCE
Refills: 0 | Status: DISCONTINUED | OUTPATIENT
Start: 2021-01-01 | End: 2021-01-01

## 2021-01-01 RX ORDER — INSULIN LISPRO 100/ML
3 VIAL (ML) SUBCUTANEOUS
Refills: 0 | Status: DISCONTINUED | OUTPATIENT
Start: 2021-01-01 | End: 2021-01-01

## 2021-01-01 RX ORDER — VANCOMYCIN HCL 1 G
VIAL (EA) INTRAVENOUS
Refills: 0 | Status: DISCONTINUED | OUTPATIENT
Start: 2021-01-01 | End: 2021-01-01

## 2021-01-01 RX ORDER — POLYETHYLENE GLYCOL 3350 17 G/17G
17 POWDER, FOR SOLUTION ORAL DAILY
Refills: 0 | Status: DISCONTINUED | OUTPATIENT
Start: 2021-01-01 | End: 2021-01-01

## 2021-01-01 RX ORDER — INSULIN GLARGINE 100 [IU]/ML
20 INJECTION, SOLUTION SUBCUTANEOUS AT BEDTIME
Refills: 0 | Status: DISCONTINUED | OUTPATIENT
Start: 2021-01-01 | End: 2021-01-01

## 2021-01-01 RX ORDER — HEPARIN SODIUM 5000 [USP'U]/ML
1100 INJECTION INTRAVENOUS; SUBCUTANEOUS
Qty: 25000 | Refills: 0 | Status: DISCONTINUED | OUTPATIENT
Start: 2021-01-01 | End: 2021-01-01

## 2021-01-01 RX ORDER — INSULIN GLARGINE 100 [IU]/ML
15 INJECTION, SOLUTION SUBCUTANEOUS AT BEDTIME
Refills: 0 | Status: DISCONTINUED | OUTPATIENT
Start: 2021-01-01 | End: 2021-01-01

## 2021-01-01 RX ORDER — POTASSIUM CHLORIDE 20 MEQ
10 PACKET (EA) ORAL ONCE
Refills: 0 | Status: COMPLETED | OUTPATIENT
Start: 2021-01-01 | End: 2021-01-01

## 2021-01-01 RX ORDER — BUMETANIDE 0.25 MG/ML
4 INJECTION INTRAMUSCULAR; INTRAVENOUS DAILY
Refills: 0 | Status: DISCONTINUED | OUTPATIENT
Start: 2021-01-01 | End: 2021-01-01

## 2021-01-01 RX ORDER — ACETAZOLAMIDE 250 MG/1
250 TABLET ORAL ONCE
Refills: 0 | Status: COMPLETED | OUTPATIENT
Start: 2021-01-01 | End: 2021-01-01

## 2021-01-01 RX ORDER — FUROSEMIDE 40 MG
40 TABLET ORAL ONCE
Refills: 0 | Status: COMPLETED | OUTPATIENT
Start: 2021-01-01 | End: 2021-01-01

## 2021-01-01 RX ORDER — POTASSIUM PHOSPHATE, MONOBASIC POTASSIUM PHOSPHATE, DIBASIC 236; 224 MG/ML; MG/ML
15 INJECTION, SOLUTION INTRAVENOUS ONCE
Refills: 0 | Status: COMPLETED | OUTPATIENT
Start: 2021-01-01 | End: 2021-01-01

## 2021-01-01 RX ORDER — INSULIN LISPRO 100/ML
2 VIAL (ML) SUBCUTANEOUS ONCE
Refills: 0 | Status: COMPLETED | OUTPATIENT
Start: 2021-01-01 | End: 2021-01-01

## 2021-01-01 RX ORDER — CHOLECALCIFEROL (VITAMIN D3) 50 MCG
50 MCG TABLET ORAL
Qty: 90 | Refills: 1 | Status: ACTIVE | COMMUNITY
Start: 2021-01-01

## 2021-01-01 RX ORDER — BUMETANIDE 0.25 MG/ML
2 INJECTION INTRAMUSCULAR; INTRAVENOUS EVERY 8 HOURS
Refills: 0 | Status: DISCONTINUED | OUTPATIENT
Start: 2021-01-01 | End: 2021-01-01

## 2021-01-01 RX ORDER — ATORVASTATIN CALCIUM 80 MG/1
1 TABLET, FILM COATED ORAL
Qty: 0 | Refills: 0 | DISCHARGE

## 2021-01-01 RX ORDER — FERROUS SULFATE 325(65) MG
325 TABLET ORAL DAILY
Refills: 0 | Status: DISCONTINUED | OUTPATIENT
Start: 2021-01-01 | End: 2021-01-01

## 2021-01-01 RX ORDER — ATORVASTATIN CALCIUM 40 MG/1
40 TABLET, FILM COATED ORAL
Qty: 90 | Refills: 3 | Status: ACTIVE | COMMUNITY
Start: 2021-01-01 | End: 1900-01-01

## 2021-01-01 RX ORDER — CHLORHEXIDINE GLUCONATE 213 G/1000ML
1 SOLUTION TOPICAL
Refills: 0 | Status: DISCONTINUED | OUTPATIENT
Start: 2021-01-01 | End: 2021-01-01

## 2021-01-01 RX ORDER — FUROSEMIDE 40 MG/1
40 TABLET ORAL
Qty: 30 | Refills: 3 | Status: DISCONTINUED | COMMUNITY
Start: 2021-01-01 | End: 2021-01-01

## 2021-01-01 RX ORDER — ACETAZOLAMIDE 250 MG/1
250 TABLET ORAL ONCE
Refills: 0 | Status: DISCONTINUED | OUTPATIENT
Start: 2021-01-01 | End: 2021-01-01

## 2021-01-01 RX ORDER — METOPROLOL SUCCINATE 25 MG/1
25 TABLET, EXTENDED RELEASE ORAL DAILY
Qty: 90 | Refills: 1 | Status: ACTIVE | COMMUNITY
Start: 2021-01-01 | End: 1900-01-01

## 2021-01-01 RX ORDER — HEPARIN SODIUM 5000 [USP'U]/ML
2500 INJECTION INTRAVENOUS; SUBCUTANEOUS ONCE
Refills: 0 | Status: COMPLETED | OUTPATIENT
Start: 2021-01-01 | End: 2021-01-01

## 2021-01-01 RX ORDER — SODIUM BICARBONATE 1 MEQ/ML
50 SYRINGE (ML) INTRAVENOUS ONCE
Refills: 0 | Status: COMPLETED | OUTPATIENT
Start: 2021-01-01 | End: 2021-01-01

## 2021-01-01 RX ORDER — INSULIN GLARGINE 100 [IU]/ML
12 INJECTION, SOLUTION SUBCUTANEOUS AT BEDTIME
Refills: 0 | Status: DISCONTINUED | OUTPATIENT
Start: 2021-01-01 | End: 2021-01-01

## 2021-01-01 RX ORDER — METOPROLOL TARTRATE 50 MG
1 TABLET ORAL
Qty: 0 | Refills: 0 | DISCHARGE

## 2021-01-01 RX ORDER — UREA 15 G
15 POWDER IN PACKET (EA) ORAL EVERY 12 HOURS
Refills: 0 | Status: DISCONTINUED | OUTPATIENT
Start: 2021-01-01 | End: 2021-01-01

## 2021-01-01 RX ORDER — CEFUROXIME AXETIL 250 MG
1500 TABLET ORAL ONCE
Refills: 0 | Status: DISCONTINUED | OUTPATIENT
Start: 2021-01-01 | End: 2021-01-01

## 2021-01-01 RX ORDER — SPIRONOLACTONE 25 MG/1
25 TABLET, FILM COATED ORAL
Refills: 0 | Status: DISCONTINUED | OUTPATIENT
Start: 2021-01-01 | End: 2021-01-01

## 2021-01-01 RX ORDER — ZOLPIDEM TARTRATE 10 MG/1
5 TABLET ORAL ONCE
Refills: 0 | Status: DISCONTINUED | OUTPATIENT
Start: 2021-01-01 | End: 2021-01-01

## 2021-01-01 RX ORDER — NOREPINEPHRINE BITARTRATE/D5W 8 MG/250ML
0.05 PLASTIC BAG, INJECTION (ML) INTRAVENOUS
Qty: 8 | Refills: 0 | Status: DISCONTINUED | OUTPATIENT
Start: 2021-01-01 | End: 2021-01-01

## 2021-01-01 RX ORDER — METFORMIN HYDROCHLORIDE 850 MG/1
1 TABLET ORAL
Qty: 0 | Refills: 0 | DISCHARGE

## 2021-01-01 RX ORDER — TIOTROPIUM BROMIDE 18 UG/1
1 CAPSULE ORAL; RESPIRATORY (INHALATION) DAILY
Refills: 0 | Status: DISCONTINUED | OUTPATIENT
Start: 2021-01-01 | End: 2021-01-01

## 2021-01-01 RX ORDER — DEXTROSE 50 % IN WATER 50 %
15 SYRINGE (ML) INTRAVENOUS ONCE
Refills: 0 | Status: DISCONTINUED | OUTPATIENT
Start: 2021-01-01 | End: 2021-01-01

## 2021-01-01 RX ORDER — CIPROFLOXACIN LACTATE 400MG/40ML
250 VIAL (ML) INTRAVENOUS EVERY 12 HOURS
Refills: 0 | Status: DISCONTINUED | OUTPATIENT
Start: 2021-01-01 | End: 2021-01-01

## 2021-01-01 RX ORDER — GLIPIZIDE 10 MG/1
10 TABLET, EXTENDED RELEASE ORAL DAILY
Qty: 90 | Refills: 2 | Status: ACTIVE | COMMUNITY
Start: 2021-01-01 | End: 1900-01-01

## 2021-01-01 RX ORDER — ASPIRIN/CALCIUM CARB/MAGNESIUM 324 MG
81 TABLET ORAL DAILY
Refills: 0 | Status: DISCONTINUED | OUTPATIENT
Start: 2021-01-01 | End: 2021-01-01

## 2021-01-01 RX ORDER — SITAGLIPTIN 100 MG/1
100 TABLET, FILM COATED ORAL DAILY
Qty: 90 | Refills: 2 | Status: ACTIVE | COMMUNITY
Start: 2021-01-01 | End: 1900-01-01

## 2021-01-01 RX ORDER — ACETAZOLAMIDE 250 MG/1
500 TABLET ORAL
Refills: 0 | Status: DISCONTINUED | OUTPATIENT
Start: 2021-01-01 | End: 2021-01-01

## 2021-01-01 RX ORDER — ERGOCALCIFEROL 1.25 MG/1
0 CAPSULE ORAL
Qty: 0 | Refills: 0 | DISCHARGE

## 2021-01-01 RX ORDER — POTASSIUM CHLORIDE 20 MEQ
10 PACKET (EA) ORAL
Refills: 0 | Status: COMPLETED | OUTPATIENT
Start: 2021-01-01 | End: 2021-01-01

## 2021-01-01 RX ORDER — BUMETANIDE 0.25 MG/ML
4 INJECTION INTRAMUSCULAR; INTRAVENOUS
Qty: 20 | Refills: 0 | Status: DISCONTINUED | OUTPATIENT
Start: 2021-01-01 | End: 2021-01-01

## 2021-01-01 RX ORDER — ROBINUL 0.2 MG/ML
0.4 INJECTION INTRAMUSCULAR; INTRAVENOUS EVERY 4 HOURS
Refills: 0 | Status: DISCONTINUED | OUTPATIENT
Start: 2021-01-01 | End: 2021-01-01

## 2021-01-01 RX ORDER — CHLORHEXIDINE GLUCONATE 213 G/1000ML
30 SOLUTION TOPICAL ONCE
Refills: 0 | Status: COMPLETED | OUTPATIENT
Start: 2021-01-01 | End: 2021-01-01

## 2021-01-01 RX ORDER — CHLORHEXIDINE GLUCONATE 213 G/1000ML
1 SOLUTION TOPICAL DAILY
Refills: 0 | Status: DISCONTINUED | OUTPATIENT
Start: 2021-01-01 | End: 2021-01-01

## 2021-01-01 RX ORDER — ONDANSETRON 8 MG/1
4 TABLET, FILM COATED ORAL ONCE
Refills: 0 | Status: COMPLETED | OUTPATIENT
Start: 2021-01-01 | End: 2021-01-01

## 2021-01-01 RX ORDER — HYDROMORPHONE HYDROCHLORIDE 2 MG/ML
0.2 INJECTION INTRAMUSCULAR; INTRAVENOUS; SUBCUTANEOUS
Refills: 0 | Status: DISCONTINUED | OUTPATIENT
Start: 2021-01-01 | End: 2021-01-01

## 2021-01-01 RX ORDER — INSULIN LISPRO 100/ML
VIAL (ML) SUBCUTANEOUS AT BEDTIME
Refills: 0 | Status: DISCONTINUED | OUTPATIENT
Start: 2021-01-01 | End: 2021-01-01

## 2021-01-01 RX ORDER — FERROUS SULFATE 137(45) MG
142 (45 FE) TABLET, EXTENDED RELEASE ORAL
Qty: 90 | Refills: 0 | Status: ACTIVE | COMMUNITY
Start: 2021-01-01

## 2021-01-01 RX ORDER — ATORVASTATIN CALCIUM 80 MG/1
40 TABLET, FILM COATED ORAL AT BEDTIME
Refills: 0 | Status: DISCONTINUED | OUTPATIENT
Start: 2021-01-01 | End: 2021-01-01

## 2021-01-01 RX ORDER — VANCOMYCIN HCL 1 G
1000 VIAL (EA) INTRAVENOUS ONCE
Refills: 0 | Status: DISCONTINUED | OUTPATIENT
Start: 2021-01-01 | End: 2021-01-01

## 2021-01-01 RX ORDER — INSULIN LISPRO 100/ML
7 VIAL (ML) SUBCUTANEOUS
Refills: 0 | Status: DISCONTINUED | OUTPATIENT
Start: 2021-01-01 | End: 2021-01-01

## 2021-01-01 RX ORDER — MAGNESIUM OXIDE 400 MG ORAL TABLET 241.3 MG
800 TABLET ORAL ONCE
Refills: 0 | Status: COMPLETED | OUTPATIENT
Start: 2021-01-01 | End: 2021-01-01

## 2021-01-01 RX ORDER — ZOLPIDEM TARTRATE 5 MG/1
5 TABLET ORAL
Qty: 15 | Refills: 0 | Status: ACTIVE | COMMUNITY
Start: 2021-01-01

## 2021-01-01 RX ORDER — METOPROLOL TARTRATE 50 MG
25 TABLET ORAL DAILY
Refills: 0 | Status: DISCONTINUED | OUTPATIENT
Start: 2021-01-01 | End: 2021-01-01

## 2021-01-01 RX ORDER — MIDODRINE HYDROCHLORIDE 2.5 MG/1
10 TABLET ORAL EVERY 8 HOURS
Refills: 0 | Status: DISCONTINUED | OUTPATIENT
Start: 2021-01-01 | End: 2021-01-01

## 2021-01-01 RX ORDER — HEPARIN SODIUM 5000 [USP'U]/ML
1000 INJECTION INTRAVENOUS; SUBCUTANEOUS
Qty: 25000 | Refills: 0 | Status: DISCONTINUED | OUTPATIENT
Start: 2021-01-01 | End: 2021-01-01

## 2021-01-01 RX ORDER — SODIUM,POTASSIUM PHOSPHATES 278-250MG
1 POWDER IN PACKET (EA) ORAL ONCE
Refills: 0 | Status: COMPLETED | OUTPATIENT
Start: 2021-01-01 | End: 2021-01-01

## 2021-01-01 RX ORDER — SPIRONOLACTONE 25 MG/1
25 TABLET, FILM COATED ORAL DAILY
Refills: 0 | Status: DISCONTINUED | OUTPATIENT
Start: 2021-01-01 | End: 2021-01-01

## 2021-01-01 RX ORDER — FUROSEMIDE 40 MG
1 TABLET ORAL
Qty: 0 | Refills: 0 | DISCHARGE

## 2021-01-01 RX ORDER — ALBUTEROL 90 UG/1
2.5 AEROSOL, METERED ORAL
Refills: 0 | Status: COMPLETED | OUTPATIENT
Start: 2021-01-01 | End: 2021-01-01

## 2021-01-01 RX ORDER — DEXTROSE 50 % IN WATER 50 %
12.5 SYRINGE (ML) INTRAVENOUS ONCE
Refills: 0 | Status: DISCONTINUED | OUTPATIENT
Start: 2021-01-01 | End: 2021-01-01

## 2021-01-01 RX ORDER — IRON,CARBONYL 45 MG
45 TABLET ORAL
Qty: 0 | Refills: 0 | DISCHARGE

## 2021-01-01 RX ORDER — CALCIUM GLUCONATE 100 MG/ML
2 VIAL (ML) INTRAVENOUS ONCE
Refills: 0 | Status: COMPLETED | OUTPATIENT
Start: 2021-01-01 | End: 2021-01-01

## 2021-01-01 RX ORDER — TOLVAPTAN 15 MG/1
15 TABLET ORAL ONCE
Refills: 0 | Status: COMPLETED | OUTPATIENT
Start: 2021-01-01 | End: 2021-01-01

## 2021-01-01 RX ORDER — SACUBITRIL AND VALSARTAN 24; 26 MG/1; MG/1
24-26 TABLET, FILM COATED ORAL TWICE DAILY
Qty: 180 | Refills: 2 | Status: ACTIVE | COMMUNITY
Start: 2021-01-01 | End: 1900-01-01

## 2021-01-01 RX ORDER — BUMETANIDE 0.25 MG/ML
2 INJECTION INTRAMUSCULAR; INTRAVENOUS ONCE
Refills: 0 | Status: COMPLETED | OUTPATIENT
Start: 2021-01-01 | End: 2021-01-01

## 2021-01-01 RX ORDER — BUMETANIDE 0.25 MG/ML
4 INJECTION INTRAMUSCULAR; INTRAVENOUS ONCE
Refills: 0 | Status: DISCONTINUED | OUTPATIENT
Start: 2021-01-01 | End: 2021-01-01

## 2021-01-01 RX ORDER — BUMETANIDE 0.25 MG/ML
4 INJECTION INTRAMUSCULAR; INTRAVENOUS
Refills: 0 | Status: DISCONTINUED | OUTPATIENT
Start: 2021-01-01 | End: 2021-01-01

## 2021-01-01 RX ORDER — CEFEPIME 1 G/1
1000 INJECTION, POWDER, FOR SOLUTION INTRAMUSCULAR; INTRAVENOUS EVERY 12 HOURS
Refills: 0 | Status: DISCONTINUED | OUTPATIENT
Start: 2021-01-01 | End: 2021-01-01

## 2021-01-01 RX ORDER — SITAGLIPTIN 50 MG/1
1 TABLET, FILM COATED ORAL
Qty: 0 | Refills: 0 | DISCHARGE

## 2021-01-01 RX ORDER — ZOLPIDEM TARTRATE 10 MG/1
1 TABLET ORAL
Qty: 0 | Refills: 0 | DISCHARGE

## 2021-01-01 RX ORDER — SODIUM CHLORIDE 9 MG/ML
250 INJECTION INTRAMUSCULAR; INTRAVENOUS; SUBCUTANEOUS ONCE
Refills: 0 | Status: COMPLETED | OUTPATIENT
Start: 2021-01-01 | End: 2021-01-01

## 2021-01-01 RX ORDER — METOLAZONE 2.5 MG/1
2.5 TABLET ORAL DAILY
Qty: 90 | Refills: 1 | Status: ACTIVE | COMMUNITY
Start: 2021-01-01 | End: 1900-01-01

## 2021-01-01 RX ORDER — METOPROLOL TARTRATE 50 MG
12.5 TABLET ORAL DAILY
Refills: 0 | Status: DISCONTINUED | OUTPATIENT
Start: 2021-01-01 | End: 2021-01-01

## 2021-01-01 RX ORDER — VANCOMYCIN HCL 1 G
1000 VIAL (EA) INTRAVENOUS EVERY 24 HOURS
Refills: 0 | Status: DISCONTINUED | OUTPATIENT
Start: 2021-01-01 | End: 2021-01-01

## 2021-01-01 RX ORDER — LANOLIN ALCOHOL/MO/W.PET/CERES
2 CREAM (GRAM) TOPICAL
Qty: 0 | Refills: 0 | DISCHARGE

## 2021-01-01 RX ORDER — ACETAMINOPHEN 500 MG
650 TABLET ORAL EVERY 6 HOURS
Refills: 0 | Status: DISCONTINUED | OUTPATIENT
Start: 2021-01-01 | End: 2021-01-01

## 2021-01-01 RX ORDER — SPIRONOLACTONE 25 MG/1
12.5 TABLET, FILM COATED ORAL DAILY
Refills: 0 | Status: DISCONTINUED | OUTPATIENT
Start: 2021-01-01 | End: 2021-01-01

## 2021-01-01 RX ORDER — CIPROFLOXACIN LACTATE 400MG/40ML
250 VIAL (ML) INTRAVENOUS DAILY
Refills: 0 | Status: DISCONTINUED | OUTPATIENT
Start: 2021-01-01 | End: 2021-01-01

## 2021-01-01 RX ORDER — SENNA PLUS 8.6 MG/1
2 TABLET ORAL AT BEDTIME
Refills: 0 | Status: DISCONTINUED | OUTPATIENT
Start: 2021-01-01 | End: 2021-01-01

## 2021-01-01 RX ORDER — UREA 15 G
15 POWDER IN PACKET (EA) ORAL
Refills: 0 | Status: DISCONTINUED | OUTPATIENT
Start: 2021-01-01 | End: 2021-01-01

## 2021-01-01 RX ORDER — SACUBITRIL AND VALSARTAN 24; 26 MG/1; MG/1
1 TABLET, FILM COATED ORAL
Qty: 0 | Refills: 0 | DISCHARGE

## 2021-01-01 RX ORDER — RAMIPRIL 5 MG/1
5 CAPSULE ORAL
Qty: 90 | Refills: 3 | Status: DISCONTINUED | COMMUNITY
Start: 2021-01-01 | End: 2021-01-01

## 2021-01-01 RX ORDER — CHLORHEXIDINE GLUCONATE 4 %
500 LIQUID (ML) TOPICAL DAILY
Refills: 0 | Status: ACTIVE | COMMUNITY
Start: 2021-01-01

## 2021-01-01 RX ORDER — DABIGATRAN ETEXILATE MESYLATE 150 MG/1
150 CAPSULE ORAL TWICE DAILY
Refills: 0 | Status: DISCONTINUED | COMMUNITY
Start: 2021-01-01 | End: 2021-01-01

## 2021-01-01 RX ORDER — MAGNESIUM SULFATE 500 MG/ML
1 VIAL (ML) INJECTION ONCE
Refills: 0 | Status: COMPLETED | OUTPATIENT
Start: 2021-01-01 | End: 2021-01-01

## 2021-01-01 RX ORDER — CEFUROXIME AXETIL 250 MG
1500 TABLET ORAL EVERY 8 HOURS
Refills: 0 | Status: COMPLETED | OUTPATIENT
Start: 2021-01-01 | End: 2021-01-01

## 2021-01-01 RX ADMIN — HEPARIN SODIUM 5000 UNIT(S): 5000 INJECTION INTRAVENOUS; SUBCUTANEOUS at 13:05

## 2021-01-01 RX ADMIN — POLYETHYLENE GLYCOL 3350 17 GRAM(S): 17 POWDER, FOR SOLUTION ORAL at 12:20

## 2021-01-01 RX ADMIN — Medication 3 UNIT(S): at 18:02

## 2021-01-01 RX ADMIN — HEPARIN SODIUM 5000 UNIT(S): 5000 INJECTION INTRAVENOUS; SUBCUTANEOUS at 14:25

## 2021-01-01 RX ADMIN — Medication 6.93 MICROGRAM(S)/KG/MIN: at 16:45

## 2021-01-01 RX ADMIN — HEPARIN SODIUM 5000 UNIT(S): 5000 INJECTION INTRAVENOUS; SUBCUTANEOUS at 06:27

## 2021-01-01 RX ADMIN — ATORVASTATIN CALCIUM 40 MILLIGRAM(S): 80 TABLET, FILM COATED ORAL at 21:59

## 2021-01-01 RX ADMIN — Medication 5 MILLIGRAM(S): at 21:54

## 2021-01-01 RX ADMIN — ZOLPIDEM TARTRATE 5 MILLIGRAM(S): 10 TABLET ORAL at 22:12

## 2021-01-01 RX ADMIN — TOLVAPTAN 15 MILLIGRAM(S): 15 TABLET ORAL at 12:21

## 2021-01-01 RX ADMIN — BUMETANIDE 2 MILLIGRAM(S): 0.25 INJECTION INTRAMUSCULAR; INTRAVENOUS at 17:18

## 2021-01-01 RX ADMIN — SENNA PLUS 2 TABLET(S): 8.6 TABLET ORAL at 22:33

## 2021-01-01 RX ADMIN — Medication 250 MILLIGRAM(S): at 11:58

## 2021-01-01 RX ADMIN — HEPARIN SODIUM 5000 UNIT(S): 5000 INJECTION INTRAVENOUS; SUBCUTANEOUS at 13:07

## 2021-01-01 RX ADMIN — Medication 325 MILLIGRAM(S): at 11:49

## 2021-01-01 RX ADMIN — Medication 1: at 17:15

## 2021-01-01 RX ADMIN — BUMETANIDE 4 MILLIGRAM(S): 0.25 INJECTION INTRAMUSCULAR; INTRAVENOUS at 06:15

## 2021-01-01 RX ADMIN — BUMETANIDE 2 MILLIGRAM(S): 0.25 INJECTION INTRAMUSCULAR; INTRAVENOUS at 06:08

## 2021-01-01 RX ADMIN — BUMETANIDE 4 MILLIGRAM(S): 0.25 INJECTION INTRAMUSCULAR; INTRAVENOUS at 00:02

## 2021-01-01 RX ADMIN — Medication 6 UNIT(S): at 16:37

## 2021-01-01 RX ADMIN — Medication 325 MILLIGRAM(S): at 12:01

## 2021-01-01 RX ADMIN — Medication 1: at 08:16

## 2021-01-01 RX ADMIN — BUMETANIDE 2 MILLIGRAM(S): 0.25 INJECTION INTRAMUSCULAR; INTRAVENOUS at 22:32

## 2021-01-01 RX ADMIN — HEPARIN SODIUM 5000 UNIT(S): 5000 INJECTION INTRAVENOUS; SUBCUTANEOUS at 05:07

## 2021-01-01 RX ADMIN — Medication 1: at 08:58

## 2021-01-01 RX ADMIN — Medication 4: at 17:00

## 2021-01-01 RX ADMIN — Medication 20 MILLIEQUIVALENT(S): at 18:30

## 2021-01-01 RX ADMIN — SPIRONOLACTONE 25 MILLIGRAM(S): 25 TABLET, FILM COATED ORAL at 17:47

## 2021-01-01 RX ADMIN — HEPARIN SODIUM 5000 UNIT(S): 5000 INJECTION INTRAVENOUS; SUBCUTANEOUS at 05:35

## 2021-01-01 RX ADMIN — Medication 3 UNIT(S): at 11:25

## 2021-01-01 RX ADMIN — Medication 12.5 MILLIGRAM(S): at 05:19

## 2021-01-01 RX ADMIN — ZOLPIDEM TARTRATE 5 MILLIGRAM(S): 10 TABLET ORAL at 21:35

## 2021-01-01 RX ADMIN — ACETAZOLAMIDE 250 MILLIGRAM(S): 250 TABLET ORAL at 16:44

## 2021-01-01 RX ADMIN — Medication 250 MILLIGRAM(S): at 17:13

## 2021-01-01 RX ADMIN — Medication 20 MILLIGRAM(S): at 05:15

## 2021-01-01 RX ADMIN — TIOTROPIUM BROMIDE 1 CAPSULE(S): 18 CAPSULE ORAL; RESPIRATORY (INHALATION) at 11:59

## 2021-01-01 RX ADMIN — Medication 3 UNIT(S): at 11:57

## 2021-01-01 RX ADMIN — POTASSIUM BICARBONATE 25 MILLIEQUIVALENT(S): 978 TABLET, EFFERVESCENT ORAL at 16:48

## 2021-01-01 RX ADMIN — Medication 20 MILLIEQUIVALENT(S): at 14:59

## 2021-01-01 RX ADMIN — HEPARIN SODIUM 5000 UNIT(S): 5000 INJECTION INTRAVENOUS; SUBCUTANEOUS at 06:16

## 2021-01-01 RX ADMIN — Medication 12.5 MILLIGRAM(S): at 06:15

## 2021-01-01 RX ADMIN — BUMETANIDE 132 MILLIGRAM(S): 0.25 INJECTION INTRAMUSCULAR; INTRAVENOUS at 16:23

## 2021-01-01 RX ADMIN — Medication 1 DROP(S): at 22:14

## 2021-01-01 RX ADMIN — BUDESONIDE AND FORMOTEROL FUMARATE DIHYDRATE 2 PUFF(S): 160; 4.5 AEROSOL RESPIRATORY (INHALATION) at 17:31

## 2021-01-01 RX ADMIN — Medication 30 GRAM(S): at 05:08

## 2021-01-01 RX ADMIN — POLYETHYLENE GLYCOL 3350 17 GRAM(S): 17 POWDER, FOR SOLUTION ORAL at 11:24

## 2021-01-01 RX ADMIN — BUMETANIDE 2 MILLIGRAM(S): 0.25 INJECTION INTRAMUSCULAR; INTRAVENOUS at 20:34

## 2021-01-01 RX ADMIN — HEPARIN SODIUM 5000 UNIT(S): 5000 INJECTION INTRAVENOUS; SUBCUTANEOUS at 13:22

## 2021-01-01 RX ADMIN — Medication 62.5 MILLIMOLE(S): at 12:58

## 2021-01-01 RX ADMIN — Medication 81 MILLIGRAM(S): at 11:59

## 2021-01-01 RX ADMIN — INSULIN GLARGINE 6 UNIT(S): 100 INJECTION, SOLUTION SUBCUTANEOUS at 21:41

## 2021-01-01 RX ADMIN — BUMETANIDE 2 MILLIGRAM(S): 0.25 INJECTION INTRAMUSCULAR; INTRAVENOUS at 22:00

## 2021-01-01 RX ADMIN — Medication 325 MILLIGRAM(S): at 12:23

## 2021-01-01 RX ADMIN — INSULIN GLARGINE 6 UNIT(S): 100 INJECTION, SOLUTION SUBCUTANEOUS at 22:34

## 2021-01-01 RX ADMIN — Medication 2: at 12:40

## 2021-01-01 RX ADMIN — Medication 1: at 13:31

## 2021-01-01 RX ADMIN — HEPARIN SODIUM 5000 UNIT(S): 5000 INJECTION INTRAVENOUS; SUBCUTANEOUS at 06:07

## 2021-01-01 RX ADMIN — Medication 3 UNIT(S): at 12:10

## 2021-01-01 RX ADMIN — Medication 3 UNIT(S): at 12:19

## 2021-01-01 RX ADMIN — Medication 3 UNIT(S): at 07:56

## 2021-01-01 RX ADMIN — Medication 81 MILLIGRAM(S): at 20:11

## 2021-01-01 RX ADMIN — Medication 12.5 MILLIGRAM(S): at 05:55

## 2021-01-01 RX ADMIN — BUMETANIDE 2 MILLIGRAM(S): 0.25 INJECTION INTRAMUSCULAR; INTRAVENOUS at 05:52

## 2021-01-01 RX ADMIN — Medication 2: at 17:03

## 2021-01-01 RX ADMIN — ACETAZOLAMIDE 500 MILLIGRAM(S): 250 TABLET ORAL at 05:15

## 2021-01-01 RX ADMIN — HEPARIN SODIUM 5000 UNIT(S): 5000 INJECTION INTRAVENOUS; SUBCUTANEOUS at 23:30

## 2021-01-01 RX ADMIN — Medication 250 MILLIGRAM(S): at 17:23

## 2021-01-01 RX ADMIN — BUMETANIDE 10 MG/HR: 0.25 INJECTION INTRAMUSCULAR; INTRAVENOUS at 23:40

## 2021-01-01 RX ADMIN — Medication 2: at 17:30

## 2021-01-01 RX ADMIN — BUDESONIDE AND FORMOTEROL FUMARATE DIHYDRATE 2 PUFF(S): 160; 4.5 AEROSOL RESPIRATORY (INHALATION) at 05:39

## 2021-01-01 RX ADMIN — Medication 325 MILLIGRAM(S): at 11:03

## 2021-01-01 RX ADMIN — Medication 12.5 MILLIGRAM(S): at 05:08

## 2021-01-01 RX ADMIN — INSULIN GLARGINE 12 UNIT(S): 100 INJECTION, SOLUTION SUBCUTANEOUS at 21:52

## 2021-01-01 RX ADMIN — Medication 325 MILLIGRAM(S): at 12:59

## 2021-01-01 RX ADMIN — Medication 1: at 17:23

## 2021-01-01 RX ADMIN — Medication 12.5 MILLIGRAM(S): at 05:50

## 2021-01-01 RX ADMIN — Medication 40 MILLIGRAM(S): at 12:55

## 2021-01-01 RX ADMIN — HEPARIN SODIUM 5000 UNIT(S): 5000 INJECTION INTRAVENOUS; SUBCUTANEOUS at 05:58

## 2021-01-01 RX ADMIN — HEPARIN SODIUM 5000 UNIT(S): 5000 INJECTION INTRAVENOUS; SUBCUTANEOUS at 14:29

## 2021-01-01 RX ADMIN — Medication 250 MILLIGRAM(S): at 13:53

## 2021-01-01 RX ADMIN — SENNA PLUS 2 TABLET(S): 8.6 TABLET ORAL at 22:41

## 2021-01-01 RX ADMIN — Medication 20 MILLIEQUIVALENT(S): at 15:01

## 2021-01-01 RX ADMIN — Medication 81 MILLIGRAM(S): at 11:02

## 2021-01-01 RX ADMIN — ZOLPIDEM TARTRATE 5 MILLIGRAM(S): 10 TABLET ORAL at 00:37

## 2021-01-01 RX ADMIN — HEPARIN SODIUM 5000 UNIT(S): 5000 INJECTION INTRAVENOUS; SUBCUTANEOUS at 15:27

## 2021-01-01 RX ADMIN — ATORVASTATIN CALCIUM 40 MILLIGRAM(S): 80 TABLET, FILM COATED ORAL at 22:33

## 2021-01-01 RX ADMIN — Medication 325 MILLIGRAM(S): at 11:58

## 2021-01-01 RX ADMIN — Medication 1: at 09:07

## 2021-01-01 RX ADMIN — HEPARIN SODIUM 5000 UNIT(S): 5000 INJECTION INTRAVENOUS; SUBCUTANEOUS at 22:03

## 2021-01-01 RX ADMIN — SENNA PLUS 2 TABLET(S): 8.6 TABLET ORAL at 14:31

## 2021-01-01 RX ADMIN — HEPARIN SODIUM 10 UNIT(S)/HR: 5000 INJECTION INTRAVENOUS; SUBCUTANEOUS at 14:06

## 2021-01-01 RX ADMIN — Medication 3 UNIT(S): at 17:21

## 2021-01-01 RX ADMIN — HEPARIN SODIUM 5000 UNIT(S): 5000 INJECTION INTRAVENOUS; SUBCUTANEOUS at 14:17

## 2021-01-01 RX ADMIN — Medication 20 MILLIEQUIVALENT(S): at 08:00

## 2021-01-01 RX ADMIN — Medication 20 MILLIEQUIVALENT(S): at 12:08

## 2021-01-01 RX ADMIN — INSULIN GLARGINE 24 UNIT(S): 100 INJECTION, SOLUTION SUBCUTANEOUS at 21:28

## 2021-01-01 RX ADMIN — Medication 1 DROP(S): at 22:54

## 2021-01-01 RX ADMIN — Medication 25 MILLIGRAM(S): at 17:31

## 2021-01-01 RX ADMIN — Medication 30 GRAM(S): at 20:01

## 2021-01-01 RX ADMIN — Medication 250 MILLIGRAM(S): at 05:50

## 2021-01-01 RX ADMIN — Medication 1: at 17:27

## 2021-01-01 RX ADMIN — Medication 1: at 18:01

## 2021-01-01 RX ADMIN — Medication 4: at 13:07

## 2021-01-01 RX ADMIN — POLYETHYLENE GLYCOL 3350 17 GRAM(S): 17 POWDER, FOR SOLUTION ORAL at 12:01

## 2021-01-01 RX ADMIN — BUMETANIDE 2 MILLIGRAM(S): 0.25 INJECTION INTRAMUSCULAR; INTRAVENOUS at 15:26

## 2021-01-01 RX ADMIN — BUDESONIDE AND FORMOTEROL FUMARATE DIHYDRATE 2 PUFF(S): 160; 4.5 AEROSOL RESPIRATORY (INHALATION) at 05:14

## 2021-01-01 RX ADMIN — ZOLPIDEM TARTRATE 5 MILLIGRAM(S): 10 TABLET ORAL at 22:41

## 2021-01-01 RX ADMIN — HYDROMORPHONE HYDROCHLORIDE 0.2 MILLIGRAM(S): 2 INJECTION INTRAMUSCULAR; INTRAVENOUS; SUBCUTANEOUS at 14:01

## 2021-01-01 RX ADMIN — Medication 1: at 07:52

## 2021-01-01 RX ADMIN — INSULIN GLARGINE 6 UNIT(S): 100 INJECTION, SOLUTION SUBCUTANEOUS at 22:11

## 2021-01-01 RX ADMIN — HEPARIN SODIUM 5000 UNIT(S): 5000 INJECTION INTRAVENOUS; SUBCUTANEOUS at 21:29

## 2021-01-01 RX ADMIN — Medication 1 DROP(S): at 21:37

## 2021-01-01 RX ADMIN — Medication 3: at 16:48

## 2021-01-01 RX ADMIN — Medication 20 MILLIEQUIVALENT(S): at 05:47

## 2021-01-01 RX ADMIN — BUMETANIDE 2 MILLIGRAM(S): 0.25 INJECTION INTRAMUSCULAR; INTRAVENOUS at 20:41

## 2021-01-01 RX ADMIN — HEPARIN SODIUM 5000 UNIT(S): 5000 INJECTION INTRAVENOUS; SUBCUTANEOUS at 23:02

## 2021-01-01 RX ADMIN — Medication 5 MILLIGRAM(S): at 22:09

## 2021-01-01 RX ADMIN — ATORVASTATIN CALCIUM 40 MILLIGRAM(S): 80 TABLET, FILM COATED ORAL at 21:55

## 2021-01-01 RX ADMIN — Medication 6 UNIT(S): at 11:59

## 2021-01-01 RX ADMIN — HYDROMORPHONE HYDROCHLORIDE 0.2 MILLIGRAM(S): 2 INJECTION INTRAMUSCULAR; INTRAVENOUS; SUBCUTANEOUS at 14:45

## 2021-01-01 RX ADMIN — HEPARIN SODIUM 5000 UNIT(S): 5000 INJECTION INTRAVENOUS; SUBCUTANEOUS at 05:12

## 2021-01-01 RX ADMIN — Medication 81 MILLIGRAM(S): at 12:00

## 2021-01-01 RX ADMIN — CEFEPIME 100 MILLIGRAM(S): 1 INJECTION, POWDER, FOR SOLUTION INTRAMUSCULAR; INTRAVENOUS at 05:01

## 2021-01-01 RX ADMIN — HEPARIN SODIUM 11 UNIT(S)/HR: 5000 INJECTION INTRAVENOUS; SUBCUTANEOUS at 15:48

## 2021-01-01 RX ADMIN — HEPARIN SODIUM 5000 UNIT(S): 5000 INJECTION INTRAVENOUS; SUBCUTANEOUS at 15:19

## 2021-01-01 RX ADMIN — CHLORHEXIDINE GLUCONATE 1 APPLICATION(S): 213 SOLUTION TOPICAL at 08:45

## 2021-01-01 RX ADMIN — HEPARIN SODIUM 5000 UNIT(S): 5000 INJECTION INTRAVENOUS; SUBCUTANEOUS at 06:26

## 2021-01-01 RX ADMIN — INSULIN GLARGINE 6 UNIT(S): 100 INJECTION, SOLUTION SUBCUTANEOUS at 22:00

## 2021-01-01 RX ADMIN — Medication 250 MILLIGRAM(S): at 06:15

## 2021-01-01 RX ADMIN — HEPARIN SODIUM 5000 UNIT(S): 5000 INJECTION INTRAVENOUS; SUBCUTANEOUS at 05:21

## 2021-01-01 RX ADMIN — Medication 250 MILLIGRAM(S): at 12:20

## 2021-01-01 RX ADMIN — Medication 325 MILLIGRAM(S): at 12:25

## 2021-01-01 RX ADMIN — BUMETANIDE 2 MILLIGRAM(S): 0.25 INJECTION INTRAMUSCULAR; INTRAVENOUS at 21:41

## 2021-01-01 RX ADMIN — CHLORHEXIDINE GLUCONATE 1 APPLICATION(S): 213 SOLUTION TOPICAL at 14:04

## 2021-01-01 RX ADMIN — Medication 40 MILLIEQUIVALENT(S): at 14:32

## 2021-01-01 RX ADMIN — Medication 25 MILLIGRAM(S): at 20:41

## 2021-01-01 RX ADMIN — HEPARIN SODIUM 7 UNIT(S)/HR: 5000 INJECTION INTRAVENOUS; SUBCUTANEOUS at 14:01

## 2021-01-01 RX ADMIN — Medication 81 MILLIGRAM(S): at 11:58

## 2021-01-01 RX ADMIN — ZOLPIDEM TARTRATE 5 MILLIGRAM(S): 10 TABLET ORAL at 21:17

## 2021-01-01 RX ADMIN — Medication 1: at 18:56

## 2021-01-01 RX ADMIN — ATORVASTATIN CALCIUM 40 MILLIGRAM(S): 80 TABLET, FILM COATED ORAL at 22:08

## 2021-01-01 RX ADMIN — BUMETANIDE 2 MILLIGRAM(S): 0.25 INJECTION INTRAMUSCULAR; INTRAVENOUS at 05:28

## 2021-01-01 RX ADMIN — Medication 1: at 08:21

## 2021-01-01 RX ADMIN — Medication 50 GRAM(S): at 10:45

## 2021-01-01 RX ADMIN — Medication 25 MILLIGRAM(S): at 18:10

## 2021-01-01 RX ADMIN — ZOLPIDEM TARTRATE 5 MILLIGRAM(S): 10 TABLET ORAL at 22:14

## 2021-01-01 RX ADMIN — Medication 3 UNIT(S): at 07:43

## 2021-01-01 RX ADMIN — Medication 325 MILLIGRAM(S): at 12:33

## 2021-01-01 RX ADMIN — Medication 3 UNIT(S): at 12:08

## 2021-01-01 RX ADMIN — ZOLPIDEM TARTRATE 5 MILLIGRAM(S): 10 TABLET ORAL at 00:43

## 2021-01-01 RX ADMIN — INSULIN GLARGINE 6 UNIT(S): 100 INJECTION, SOLUTION SUBCUTANEOUS at 22:03

## 2021-01-01 RX ADMIN — HYDROMORPHONE HYDROCHLORIDE 0.2 MILLIGRAM(S): 2 INJECTION INTRAMUSCULAR; INTRAVENOUS; SUBCUTANEOUS at 01:53

## 2021-01-01 RX ADMIN — HEPARIN SODIUM 5000 UNIT(S): 5000 INJECTION INTRAVENOUS; SUBCUTANEOUS at 14:37

## 2021-01-01 RX ADMIN — CHLORHEXIDINE GLUCONATE 1 APPLICATION(S): 213 SOLUTION TOPICAL at 07:13

## 2021-01-01 RX ADMIN — Medication 2: at 11:48

## 2021-01-01 RX ADMIN — POLYETHYLENE GLYCOL 3350 17 GRAM(S): 17 POWDER, FOR SOLUTION ORAL at 11:49

## 2021-01-01 RX ADMIN — HYDROMORPHONE HYDROCHLORIDE 0.2 MILLIGRAM(S): 2 INJECTION INTRAMUSCULAR; INTRAVENOUS; SUBCUTANEOUS at 11:44

## 2021-01-01 RX ADMIN — HEPARIN SODIUM 5000 UNIT(S): 5000 INJECTION INTRAVENOUS; SUBCUTANEOUS at 15:47

## 2021-01-01 RX ADMIN — Medication 2: at 18:56

## 2021-01-01 RX ADMIN — CHLORHEXIDINE GLUCONATE 1 APPLICATION(S): 213 SOLUTION TOPICAL at 08:32

## 2021-01-01 RX ADMIN — TIOTROPIUM BROMIDE 1 CAPSULE(S): 18 CAPSULE ORAL; RESPIRATORY (INHALATION) at 12:21

## 2021-01-01 RX ADMIN — Medication 325 MILLIGRAM(S): at 11:24

## 2021-01-01 RX ADMIN — CHLORHEXIDINE GLUCONATE 1 APPLICATION(S): 213 SOLUTION TOPICAL at 14:37

## 2021-01-01 RX ADMIN — ATORVASTATIN CALCIUM 40 MILLIGRAM(S): 80 TABLET, FILM COATED ORAL at 23:00

## 2021-01-01 RX ADMIN — Medication 3 UNIT(S): at 08:04

## 2021-01-01 RX ADMIN — Medication 1: at 22:12

## 2021-01-01 RX ADMIN — BUMETANIDE 4 MILLIGRAM(S): 0.25 INJECTION INTRAMUSCULAR; INTRAVENOUS at 05:57

## 2021-01-01 RX ADMIN — Medication 100 MILLIEQUIVALENT(S): at 07:02

## 2021-01-01 RX ADMIN — HEPARIN SODIUM 5000 UNIT(S): 5000 INJECTION INTRAVENOUS; SUBCUTANEOUS at 14:11

## 2021-01-01 RX ADMIN — POLYETHYLENE GLYCOL 3350 17 GRAM(S): 17 POWDER, FOR SOLUTION ORAL at 13:16

## 2021-01-01 RX ADMIN — Medication 40 MILLIEQUIVALENT(S): at 11:01

## 2021-01-01 RX ADMIN — BUDESONIDE AND FORMOTEROL FUMARATE DIHYDRATE 2 PUFF(S): 160; 4.5 AEROSOL RESPIRATORY (INHALATION) at 18:10

## 2021-01-01 RX ADMIN — Medication 3 UNIT(S): at 08:02

## 2021-01-01 RX ADMIN — SPIRONOLACTONE 12.5 MILLIGRAM(S): 25 TABLET, FILM COATED ORAL at 11:56

## 2021-01-01 RX ADMIN — HEPARIN SODIUM 5000 UNIT(S): 5000 INJECTION INTRAVENOUS; SUBCUTANEOUS at 22:25

## 2021-01-01 RX ADMIN — ZOLPIDEM TARTRATE 5 MILLIGRAM(S): 10 TABLET ORAL at 22:28

## 2021-01-01 RX ADMIN — SODIUM CHLORIDE 250 MILLILITER(S): 9 INJECTION INTRAMUSCULAR; INTRAVENOUS; SUBCUTANEOUS at 09:00

## 2021-01-01 RX ADMIN — Medication 1: at 09:03

## 2021-01-01 RX ADMIN — ATORVASTATIN CALCIUM 40 MILLIGRAM(S): 80 TABLET, FILM COATED ORAL at 21:51

## 2021-01-01 RX ADMIN — HEPARIN SODIUM 5000 UNIT(S): 5000 INJECTION INTRAVENOUS; SUBCUTANEOUS at 22:32

## 2021-01-01 RX ADMIN — Medication 3: at 12:44

## 2021-01-01 RX ADMIN — HEPARIN SODIUM 5000 UNIT(S): 5000 INJECTION INTRAVENOUS; SUBCUTANEOUS at 22:34

## 2021-01-01 RX ADMIN — HEPARIN SODIUM 5000 UNIT(S): 5000 INJECTION INTRAVENOUS; SUBCUTANEOUS at 05:56

## 2021-01-01 RX ADMIN — Medication 200 GRAM(S): at 10:02

## 2021-01-01 RX ADMIN — ONDANSETRON 4 MILLIGRAM(S): 8 TABLET, FILM COATED ORAL at 10:15

## 2021-01-01 RX ADMIN — Medication 1: at 12:19

## 2021-01-01 RX ADMIN — BUMETANIDE 10 MG/HR: 0.25 INJECTION INTRAMUSCULAR; INTRAVENOUS at 12:20

## 2021-01-01 RX ADMIN — Medication 1: at 07:42

## 2021-01-01 RX ADMIN — Medication 325 MILLIGRAM(S): at 12:09

## 2021-01-01 RX ADMIN — BUMETANIDE 4 MILLIGRAM(S): 0.25 INJECTION INTRAMUSCULAR; INTRAVENOUS at 08:12

## 2021-01-01 RX ADMIN — Medication 20 MILLIEQUIVALENT(S): at 15:47

## 2021-01-01 RX ADMIN — Medication 1: at 16:49

## 2021-01-01 RX ADMIN — Medication 12.5 MILLIGRAM(S): at 05:44

## 2021-01-01 RX ADMIN — Medication 3 UNIT(S): at 12:00

## 2021-01-01 RX ADMIN — Medication 1: at 18:08

## 2021-01-01 RX ADMIN — Medication 3 UNIT(S): at 08:13

## 2021-01-01 RX ADMIN — Medication 1 PACKET(S): at 17:40

## 2021-01-01 RX ADMIN — HEPARIN SODIUM 5000 UNIT(S): 5000 INJECTION INTRAVENOUS; SUBCUTANEOUS at 17:30

## 2021-01-01 RX ADMIN — Medication 250 MILLIGRAM(S): at 05:14

## 2021-01-01 RX ADMIN — Medication 250 MILLIGRAM(S): at 05:55

## 2021-01-01 RX ADMIN — Medication 325 MILLIGRAM(S): at 12:38

## 2021-01-01 RX ADMIN — POTASSIUM PHOSPHATE, MONOBASIC POTASSIUM PHOSPHATE, DIBASIC 62.5 MILLIMOLE(S): 236; 224 INJECTION, SOLUTION INTRAVENOUS at 01:29

## 2021-01-01 RX ADMIN — BUMETANIDE 2 MILLIGRAM(S): 0.25 INJECTION INTRAMUSCULAR; INTRAVENOUS at 14:15

## 2021-01-01 RX ADMIN — Medication 2: at 22:09

## 2021-01-01 RX ADMIN — Medication 325 MILLIGRAM(S): at 13:18

## 2021-01-01 RX ADMIN — ZOLPIDEM TARTRATE 5 MILLIGRAM(S): 10 TABLET ORAL at 22:38

## 2021-01-01 RX ADMIN — SENNA PLUS 2 TABLET(S): 8.6 TABLET ORAL at 20:34

## 2021-01-01 RX ADMIN — Medication 20 MILLIEQUIVALENT(S): at 12:29

## 2021-01-01 RX ADMIN — SPIRONOLACTONE 12.5 MILLIGRAM(S): 25 TABLET, FILM COATED ORAL at 05:50

## 2021-01-01 RX ADMIN — Medication 3 UNIT(S): at 08:06

## 2021-01-01 RX ADMIN — Medication 40 MILLIEQUIVALENT(S): at 12:01

## 2021-01-01 RX ADMIN — ATORVASTATIN CALCIUM 40 MILLIGRAM(S): 80 TABLET, FILM COATED ORAL at 21:32

## 2021-01-01 RX ADMIN — Medication 250 MILLIGRAM(S): at 12:09

## 2021-01-01 RX ADMIN — POTASSIUM BICARBONATE 25 MILLIEQUIVALENT(S): 978 TABLET, EFFERVESCENT ORAL at 11:22

## 2021-01-01 RX ADMIN — HEPARIN SODIUM 5000 UNIT(S): 5000 INJECTION INTRAVENOUS; SUBCUTANEOUS at 21:32

## 2021-01-01 RX ADMIN — BUMETANIDE 2 MILLIGRAM(S): 0.25 INJECTION INTRAMUSCULAR; INTRAVENOUS at 14:24

## 2021-01-01 RX ADMIN — HEPARIN SODIUM 5000 UNIT(S): 5000 INJECTION INTRAVENOUS; SUBCUTANEOUS at 22:12

## 2021-01-01 RX ADMIN — INSULIN GLARGINE 6 UNIT(S): 100 INJECTION, SOLUTION SUBCUTANEOUS at 21:50

## 2021-01-01 RX ADMIN — Medication 20 MILLIEQUIVALENT(S): at 20:20

## 2021-01-01 RX ADMIN — CHLORHEXIDINE GLUCONATE 1 APPLICATION(S): 213 SOLUTION TOPICAL at 06:23

## 2021-01-01 RX ADMIN — Medication 3 UNIT(S): at 08:12

## 2021-01-01 RX ADMIN — HEPARIN SODIUM 5000 UNIT(S): 5000 INJECTION INTRAVENOUS; SUBCUTANEOUS at 22:45

## 2021-01-01 RX ADMIN — ALBUTEROL 2.5 MILLIGRAM(S): 90 AEROSOL, METERED ORAL at 10:02

## 2021-01-01 RX ADMIN — Medication 1: at 08:09

## 2021-01-01 RX ADMIN — BUDESONIDE AND FORMOTEROL FUMARATE DIHYDRATE 2 PUFF(S): 160; 4.5 AEROSOL RESPIRATORY (INHALATION) at 18:11

## 2021-01-01 RX ADMIN — POLYETHYLENE GLYCOL 3350 17 GRAM(S): 17 POWDER, FOR SOLUTION ORAL at 11:58

## 2021-01-01 RX ADMIN — BUMETANIDE 2 MILLIGRAM(S): 0.25 INJECTION INTRAMUSCULAR; INTRAVENOUS at 06:01

## 2021-01-01 RX ADMIN — BUMETANIDE 10 MG/HR: 0.25 INJECTION INTRAMUSCULAR; INTRAVENOUS at 12:01

## 2021-01-01 RX ADMIN — Medication 81 MILLIGRAM(S): at 14:32

## 2021-01-01 RX ADMIN — Medication 325 MILLIGRAM(S): at 12:00

## 2021-01-01 RX ADMIN — Medication 4: at 12:18

## 2021-01-01 RX ADMIN — HEPARIN SODIUM 5000 UNIT(S): 5000 INJECTION INTRAVENOUS; SUBCUTANEOUS at 06:12

## 2021-01-01 RX ADMIN — HEPARIN SODIUM 5000 UNIT(S): 5000 INJECTION INTRAVENOUS; SUBCUTANEOUS at 13:54

## 2021-01-01 RX ADMIN — POTASSIUM BICARBONATE 25 MILLIEQUIVALENT(S): 978 TABLET, EFFERVESCENT ORAL at 08:09

## 2021-01-01 RX ADMIN — ATORVASTATIN CALCIUM 40 MILLIGRAM(S): 80 TABLET, FILM COATED ORAL at 22:00

## 2021-01-01 RX ADMIN — HEPARIN SODIUM 5000 UNIT(S): 5000 INJECTION INTRAVENOUS; SUBCUTANEOUS at 05:08

## 2021-01-01 RX ADMIN — Medication 1 MILLIGRAM(S): at 01:58

## 2021-01-01 RX ADMIN — Medication 15 GRAM(S): at 05:55

## 2021-01-01 RX ADMIN — Medication 2: at 08:16

## 2021-01-01 RX ADMIN — HYDROMORPHONE HYDROCHLORIDE 0.2 MILLIGRAM(S): 2 INJECTION INTRAMUSCULAR; INTRAVENOUS; SUBCUTANEOUS at 14:33

## 2021-01-01 RX ADMIN — SPIRONOLACTONE 25 MILLIGRAM(S): 25 TABLET, FILM COATED ORAL at 05:16

## 2021-01-01 RX ADMIN — HEPARIN SODIUM 5000 UNIT(S): 5000 INJECTION INTRAVENOUS; SUBCUTANEOUS at 21:52

## 2021-01-01 RX ADMIN — ATORVASTATIN CALCIUM 40 MILLIGRAM(S): 80 TABLET, FILM COATED ORAL at 21:23

## 2021-01-01 RX ADMIN — Medication 2: at 12:10

## 2021-01-01 RX ADMIN — Medication 81 MILLIGRAM(S): at 12:08

## 2021-01-01 RX ADMIN — HYDROMORPHONE HYDROCHLORIDE 0.2 MILLIGRAM(S): 2 INJECTION INTRAMUSCULAR; INTRAVENOUS; SUBCUTANEOUS at 20:13

## 2021-01-01 RX ADMIN — Medication 1: at 20:40

## 2021-01-01 RX ADMIN — Medication 3 UNIT(S): at 16:52

## 2021-01-01 RX ADMIN — ZOLPIDEM TARTRATE 5 MILLIGRAM(S): 10 TABLET ORAL at 22:02

## 2021-01-01 RX ADMIN — Medication 325 MILLIGRAM(S): at 17:52

## 2021-01-01 RX ADMIN — CHLORHEXIDINE GLUCONATE 1 APPLICATION(S): 213 SOLUTION TOPICAL at 05:01

## 2021-01-01 RX ADMIN — Medication 1: at 11:56

## 2021-01-01 RX ADMIN — Medication 100 GRAM(S): at 21:57

## 2021-01-01 RX ADMIN — ZOLPIDEM TARTRATE 5 MILLIGRAM(S): 10 TABLET ORAL at 23:19

## 2021-01-01 RX ADMIN — Medication 325 MILLIGRAM(S): at 12:18

## 2021-01-01 RX ADMIN — HYDROMORPHONE HYDROCHLORIDE 0.2 MILLIGRAM(S): 2 INJECTION INTRAMUSCULAR; INTRAVENOUS; SUBCUTANEOUS at 11:56

## 2021-01-01 RX ADMIN — HEPARIN SODIUM 5000 UNIT(S): 5000 INJECTION INTRAVENOUS; SUBCUTANEOUS at 06:02

## 2021-01-01 RX ADMIN — Medication 81 MILLIGRAM(S): at 12:09

## 2021-01-01 RX ADMIN — HEPARIN SODIUM 5000 UNIT(S): 5000 INJECTION INTRAVENOUS; SUBCUTANEOUS at 14:57

## 2021-01-01 RX ADMIN — BUDESONIDE AND FORMOTEROL FUMARATE DIHYDRATE 2 PUFF(S): 160; 4.5 AEROSOL RESPIRATORY (INHALATION) at 06:12

## 2021-01-01 RX ADMIN — Medication 2: at 07:55

## 2021-01-01 RX ADMIN — HEPARIN SODIUM 5000 UNIT(S): 5000 INJECTION INTRAVENOUS; SUBCUTANEOUS at 06:23

## 2021-01-01 RX ADMIN — HEPARIN SODIUM 5000 UNIT(S): 5000 INJECTION INTRAVENOUS; SUBCUTANEOUS at 05:16

## 2021-01-01 RX ADMIN — HEPARIN SODIUM 5000 UNIT(S): 5000 INJECTION INTRAVENOUS; SUBCUTANEOUS at 13:49

## 2021-01-01 RX ADMIN — POTASSIUM BICARBONATE 25 MILLIEQUIVALENT(S): 978 TABLET, EFFERVESCENT ORAL at 07:18

## 2021-01-01 RX ADMIN — Medication 0.5 MILLIGRAM(S): at 09:05

## 2021-01-01 RX ADMIN — HEPARIN SODIUM 5000 UNIT(S): 5000 INJECTION INTRAVENOUS; SUBCUTANEOUS at 05:42

## 2021-01-01 RX ADMIN — BUDESONIDE AND FORMOTEROL FUMARATE DIHYDRATE 2 PUFF(S): 160; 4.5 AEROSOL RESPIRATORY (INHALATION) at 17:34

## 2021-01-01 RX ADMIN — Medication 2: at 16:39

## 2021-01-01 RX ADMIN — BUDESONIDE AND FORMOTEROL FUMARATE DIHYDRATE 2 PUFF(S): 160; 4.5 AEROSOL RESPIRATORY (INHALATION) at 05:09

## 2021-01-01 RX ADMIN — CHLORHEXIDINE GLUCONATE 1 APPLICATION(S): 213 SOLUTION TOPICAL at 05:58

## 2021-01-01 RX ADMIN — Medication 15 GRAM(S): at 23:30

## 2021-01-01 RX ADMIN — HEPARIN SODIUM 5000 UNIT(S): 5000 INJECTION INTRAVENOUS; SUBCUTANEOUS at 14:41

## 2021-01-01 RX ADMIN — Medication 20 MILLIEQUIVALENT(S): at 12:36

## 2021-01-01 RX ADMIN — Medication 20 MILLIEQUIVALENT(S): at 07:01

## 2021-01-01 RX ADMIN — HEPARIN SODIUM 5000 UNIT(S): 5000 INJECTION INTRAVENOUS; SUBCUTANEOUS at 21:35

## 2021-01-01 RX ADMIN — ATORVASTATIN CALCIUM 40 MILLIGRAM(S): 80 TABLET, FILM COATED ORAL at 22:03

## 2021-01-01 RX ADMIN — ACETAZOLAMIDE 500 MILLIGRAM(S): 250 TABLET ORAL at 13:25

## 2021-01-01 RX ADMIN — Medication 1: at 11:25

## 2021-01-01 RX ADMIN — Medication 1: at 21:35

## 2021-01-01 RX ADMIN — BUDESONIDE AND FORMOTEROL FUMARATE DIHYDRATE 2 PUFF(S): 160; 4.5 AEROSOL RESPIRATORY (INHALATION) at 05:56

## 2021-01-01 RX ADMIN — Medication 8 UNIT(S): at 17:38

## 2021-01-01 RX ADMIN — Medication 1: at 12:42

## 2021-01-01 RX ADMIN — Medication 1: at 08:00

## 2021-01-01 RX ADMIN — Medication 50 MILLIEQUIVALENT(S): at 10:01

## 2021-01-01 RX ADMIN — Medication 6.93 MICROGRAM(S)/KG/MIN: at 23:32

## 2021-01-01 RX ADMIN — Medication 10 MILLIGRAM(S): at 11:17

## 2021-01-01 RX ADMIN — BUMETANIDE 4 MILLIGRAM(S): 0.25 INJECTION INTRAMUSCULAR; INTRAVENOUS at 05:54

## 2021-01-01 RX ADMIN — Medication 1: at 18:10

## 2021-01-01 RX ADMIN — Medication 30 GRAM(S): at 05:57

## 2021-01-01 RX ADMIN — Medication 100 MILLIEQUIVALENT(S): at 11:08

## 2021-01-01 RX ADMIN — BUMETANIDE 2 MILLIGRAM(S): 0.25 INJECTION INTRAMUSCULAR; INTRAVENOUS at 22:12

## 2021-01-01 RX ADMIN — POTASSIUM BICARBONATE 25 MILLIEQUIVALENT(S): 978 TABLET, EFFERVESCENT ORAL at 13:08

## 2021-01-01 RX ADMIN — INSULIN GLARGINE 6 UNIT(S): 100 INJECTION, SOLUTION SUBCUTANEOUS at 21:31

## 2021-01-01 RX ADMIN — Medication 81 MILLIGRAM(S): at 11:57

## 2021-01-01 RX ADMIN — SPIRONOLACTONE 12.5 MILLIGRAM(S): 25 TABLET, FILM COATED ORAL at 06:52

## 2021-01-01 RX ADMIN — Medication 12.5 MILLIGRAM(S): at 05:13

## 2021-01-01 RX ADMIN — HEPARIN SODIUM 5000 UNIT(S): 5000 INJECTION INTRAVENOUS; SUBCUTANEOUS at 14:18

## 2021-01-01 RX ADMIN — HEPARIN SODIUM 5000 UNIT(S): 5000 INJECTION INTRAVENOUS; SUBCUTANEOUS at 06:36

## 2021-01-01 RX ADMIN — Medication 325 MILLIGRAM(S): at 12:20

## 2021-01-01 RX ADMIN — Medication 1: at 16:36

## 2021-01-01 RX ADMIN — Medication 81 MILLIGRAM(S): at 13:18

## 2021-01-01 RX ADMIN — Medication 20 MILLIEQUIVALENT(S): at 11:04

## 2021-01-01 RX ADMIN — Medication 81 MILLIGRAM(S): at 11:24

## 2021-01-01 RX ADMIN — Medication 3 UNIT(S): at 08:14

## 2021-01-01 RX ADMIN — CHLORHEXIDINE GLUCONATE 1 APPLICATION(S): 213 SOLUTION TOPICAL at 12:52

## 2021-01-01 RX ADMIN — HEPARIN SODIUM 5000 UNIT(S): 5000 INJECTION INTRAVENOUS; SUBCUTANEOUS at 06:09

## 2021-01-01 RX ADMIN — Medication 250 MILLIGRAM(S): at 18:04

## 2021-01-01 RX ADMIN — HEPARIN SODIUM 5000 UNIT(S): 5000 INJECTION INTRAVENOUS; SUBCUTANEOUS at 13:44

## 2021-01-01 RX ADMIN — Medication 2: at 18:08

## 2021-01-01 RX ADMIN — ZOLPIDEM TARTRATE 5 MILLIGRAM(S): 10 TABLET ORAL at 23:28

## 2021-01-01 RX ADMIN — Medication 1: at 08:11

## 2021-01-01 RX ADMIN — Medication 325 MILLIGRAM(S): at 11:04

## 2021-01-01 RX ADMIN — Medication 3 UNIT(S): at 17:23

## 2021-01-01 RX ADMIN — Medication 1: at 17:31

## 2021-01-01 RX ADMIN — Medication 5: at 17:38

## 2021-01-01 RX ADMIN — BUMETANIDE 2 MILLIGRAM(S): 0.25 INJECTION INTRAMUSCULAR; INTRAVENOUS at 13:47

## 2021-01-01 RX ADMIN — Medication 250 MILLIGRAM(S): at 12:31

## 2021-01-01 RX ADMIN — CHLORHEXIDINE GLUCONATE 1 APPLICATION(S): 213 SOLUTION TOPICAL at 12:27

## 2021-01-01 RX ADMIN — SPIRONOLACTONE 12.5 MILLIGRAM(S): 25 TABLET, FILM COATED ORAL at 05:55

## 2021-01-01 RX ADMIN — Medication 20 MILLIEQUIVALENT(S): at 10:45

## 2021-01-01 RX ADMIN — Medication 7 UNIT(S): at 17:02

## 2021-01-01 RX ADMIN — TIOTROPIUM BROMIDE 1 CAPSULE(S): 18 CAPSULE ORAL; RESPIRATORY (INHALATION) at 12:00

## 2021-01-01 RX ADMIN — BUMETANIDE 2 MILLIGRAM(S): 0.25 INJECTION INTRAMUSCULAR; INTRAVENOUS at 06:23

## 2021-01-01 RX ADMIN — BUMETANIDE 2 MILLIGRAM(S): 0.25 INJECTION INTRAMUSCULAR; INTRAVENOUS at 22:45

## 2021-01-01 RX ADMIN — HEPARIN SODIUM 5000 UNIT(S): 5000 INJECTION INTRAVENOUS; SUBCUTANEOUS at 15:58

## 2021-01-01 RX ADMIN — CHLORHEXIDINE GLUCONATE 1 APPLICATION(S): 213 SOLUTION TOPICAL at 13:00

## 2021-01-01 RX ADMIN — BUMETANIDE 2 MILLIGRAM(S): 0.25 INJECTION INTRAMUSCULAR; INTRAVENOUS at 14:28

## 2021-01-01 RX ADMIN — ATORVASTATIN CALCIUM 40 MILLIGRAM(S): 80 TABLET, FILM COATED ORAL at 21:19

## 2021-01-01 RX ADMIN — Medication 2: at 08:14

## 2021-01-01 RX ADMIN — Medication 3 UNIT(S): at 17:32

## 2021-01-01 RX ADMIN — ZOLPIDEM TARTRATE 5 MILLIGRAM(S): 10 TABLET ORAL at 00:02

## 2021-01-01 RX ADMIN — HYDROMORPHONE HYDROCHLORIDE 0.2 MILLIGRAM(S): 2 INJECTION INTRAMUSCULAR; INTRAVENOUS; SUBCUTANEOUS at 00:59

## 2021-01-01 RX ADMIN — HEPARIN SODIUM 5000 UNIT(S): 5000 INJECTION INTRAVENOUS; SUBCUTANEOUS at 22:11

## 2021-01-01 RX ADMIN — Medication 12.5 MILLIGRAM(S): at 06:13

## 2021-01-01 RX ADMIN — Medication 81 MILLIGRAM(S): at 12:31

## 2021-01-01 RX ADMIN — Medication 1: at 09:08

## 2021-01-01 RX ADMIN — POLYETHYLENE GLYCOL 3350 17 GRAM(S): 17 POWDER, FOR SOLUTION ORAL at 13:18

## 2021-01-01 RX ADMIN — Medication 2: at 20:00

## 2021-01-01 RX ADMIN — Medication 4: at 11:59

## 2021-01-01 RX ADMIN — BUMETANIDE 2.5 MG/HR: 0.25 INJECTION INTRAMUSCULAR; INTRAVENOUS at 14:06

## 2021-01-01 RX ADMIN — Medication 1 MILLIGRAM(S): at 22:01

## 2021-01-01 RX ADMIN — ATORVASTATIN CALCIUM 40 MILLIGRAM(S): 80 TABLET, FILM COATED ORAL at 23:30

## 2021-01-01 RX ADMIN — BUMETANIDE 4 MILLIGRAM(S): 0.25 INJECTION INTRAMUSCULAR; INTRAVENOUS at 16:15

## 2021-01-01 RX ADMIN — Medication 2: at 11:58

## 2021-01-01 RX ADMIN — Medication 2: at 12:33

## 2021-01-01 RX ADMIN — Medication 81 MILLIGRAM(S): at 12:18

## 2021-01-01 RX ADMIN — Medication 1: at 08:17

## 2021-01-01 RX ADMIN — HEPARIN SODIUM 9 UNIT(S)/HR: 5000 INJECTION INTRAVENOUS; SUBCUTANEOUS at 06:42

## 2021-01-01 RX ADMIN — Medication 15 GRAM(S): at 17:37

## 2021-01-01 RX ADMIN — Medication 250 MILLIGRAM(S): at 05:21

## 2021-01-01 RX ADMIN — BUMETANIDE 20 MG/HR: 0.25 INJECTION INTRAMUSCULAR; INTRAVENOUS at 08:58

## 2021-01-01 RX ADMIN — ZOLPIDEM TARTRATE 5 MILLIGRAM(S): 10 TABLET ORAL at 22:13

## 2021-01-01 RX ADMIN — HYDROMORPHONE HYDROCHLORIDE 0.2 MILLIGRAM(S): 2 INJECTION INTRAMUSCULAR; INTRAVENOUS; SUBCUTANEOUS at 06:14

## 2021-01-01 RX ADMIN — Medication 1: at 17:30

## 2021-01-01 RX ADMIN — Medication 1: at 11:58

## 2021-01-01 RX ADMIN — CHLORHEXIDINE GLUCONATE 1 APPLICATION(S): 213 SOLUTION TOPICAL at 12:37

## 2021-01-01 RX ADMIN — INSULIN GLARGINE 6 UNIT(S): 100 INJECTION, SOLUTION SUBCUTANEOUS at 22:12

## 2021-01-01 RX ADMIN — Medication 50 MILLILITER(S): at 09:22

## 2021-01-01 RX ADMIN — Medication 50 GRAM(S): at 09:26

## 2021-01-01 RX ADMIN — Medication 25 MILLIGRAM(S): at 11:47

## 2021-01-01 RX ADMIN — Medication 1: at 07:56

## 2021-01-01 RX ADMIN — Medication 325 MILLIGRAM(S): at 14:32

## 2021-01-01 RX ADMIN — Medication 2: at 08:10

## 2021-01-01 RX ADMIN — CHLORHEXIDINE GLUCONATE 1 APPLICATION(S): 213 SOLUTION TOPICAL at 12:03

## 2021-01-01 RX ADMIN — BUMETANIDE 2 MILLIGRAM(S): 0.25 INJECTION INTRAMUSCULAR; INTRAVENOUS at 14:29

## 2021-01-01 RX ADMIN — ATORVASTATIN CALCIUM 40 MILLIGRAM(S): 80 TABLET, FILM COATED ORAL at 22:35

## 2021-01-01 RX ADMIN — ZOLPIDEM TARTRATE 5 MILLIGRAM(S): 10 TABLET ORAL at 01:51

## 2021-01-01 RX ADMIN — INSULIN GLARGINE 6 UNIT(S): 100 INJECTION, SOLUTION SUBCUTANEOUS at 22:02

## 2021-01-01 RX ADMIN — POLYETHYLENE GLYCOL 3350 17 GRAM(S): 17 POWDER, FOR SOLUTION ORAL at 12:37

## 2021-01-01 RX ADMIN — Medication 1: at 17:21

## 2021-01-01 RX ADMIN — BUMETANIDE 2 MILLIGRAM(S): 0.25 INJECTION INTRAMUSCULAR; INTRAVENOUS at 22:11

## 2021-01-01 RX ADMIN — Medication 3 UNIT(S): at 11:20

## 2021-01-01 RX ADMIN — Medication 0.5 MILLIGRAM(S): at 14:01

## 2021-01-01 RX ADMIN — SPIRONOLACTONE 25 MILLIGRAM(S): 25 TABLET, FILM COATED ORAL at 05:08

## 2021-01-01 RX ADMIN — TIOTROPIUM BROMIDE 1 CAPSULE(S): 18 CAPSULE ORAL; RESPIRATORY (INHALATION) at 12:51

## 2021-01-01 RX ADMIN — Medication 2: at 18:09

## 2021-01-01 RX ADMIN — Medication 81 MILLIGRAM(S): at 12:20

## 2021-01-01 RX ADMIN — BUMETANIDE 2 MILLIGRAM(S): 0.25 INJECTION INTRAMUSCULAR; INTRAVENOUS at 06:03

## 2021-01-01 RX ADMIN — Medication 8 UNIT(S): at 12:08

## 2021-01-01 RX ADMIN — INSULIN GLARGINE 6 UNIT(S): 100 INJECTION, SOLUTION SUBCUTANEOUS at 23:35

## 2021-01-01 RX ADMIN — Medication 80 MILLIGRAM(S): at 05:41

## 2021-01-01 RX ADMIN — BUMETANIDE 10 MG/HR: 0.25 INJECTION INTRAMUSCULAR; INTRAVENOUS at 04:00

## 2021-01-01 RX ADMIN — BUDESONIDE AND FORMOTEROL FUMARATE DIHYDRATE 2 PUFF(S): 160; 4.5 AEROSOL RESPIRATORY (INHALATION) at 18:06

## 2021-01-01 RX ADMIN — HEPARIN SODIUM 5000 UNIT(S): 5000 INJECTION INTRAVENOUS; SUBCUTANEOUS at 22:08

## 2021-01-01 RX ADMIN — Medication 1: at 08:12

## 2021-01-01 RX ADMIN — Medication 40 MILLIEQUIVALENT(S): at 09:05

## 2021-01-01 RX ADMIN — BUMETANIDE 4 MILLIGRAM(S): 0.25 INJECTION INTRAMUSCULAR; INTRAVENOUS at 05:08

## 2021-01-01 RX ADMIN — BUDESONIDE AND FORMOTEROL FUMARATE DIHYDRATE 2 PUFF(S): 160; 4.5 AEROSOL RESPIRATORY (INHALATION) at 20:01

## 2021-01-01 RX ADMIN — HEPARIN SODIUM 5000 UNIT(S): 5000 INJECTION INTRAVENOUS; SUBCUTANEOUS at 13:32

## 2021-01-01 RX ADMIN — TIOTROPIUM BROMIDE 1 CAPSULE(S): 18 CAPSULE ORAL; RESPIRATORY (INHALATION) at 12:30

## 2021-01-01 RX ADMIN — Medication 250 MILLIGRAM(S): at 05:45

## 2021-01-01 RX ADMIN — HEPARIN SODIUM 5000 UNIT(S): 5000 INJECTION INTRAVENOUS; SUBCUTANEOUS at 05:49

## 2021-01-01 RX ADMIN — HEPARIN SODIUM 5000 UNIT(S): 5000 INJECTION INTRAVENOUS; SUBCUTANEOUS at 21:50

## 2021-01-01 RX ADMIN — Medication 3 UNIT(S): at 07:52

## 2021-01-01 RX ADMIN — BUMETANIDE 2 MILLIGRAM(S): 0.25 INJECTION INTRAMUSCULAR; INTRAVENOUS at 06:56

## 2021-01-01 RX ADMIN — Medication 40 MILLIEQUIVALENT(S): at 15:44

## 2021-01-01 RX ADMIN — SPIRONOLACTONE 25 MILLIGRAM(S): 25 TABLET, FILM COATED ORAL at 11:58

## 2021-01-01 RX ADMIN — Medication 1: at 12:37

## 2021-01-01 RX ADMIN — ZOLPIDEM TARTRATE 5 MILLIGRAM(S): 10 TABLET ORAL at 22:18

## 2021-01-01 RX ADMIN — HEPARIN SODIUM 5000 UNIT(S): 5000 INJECTION INTRAVENOUS; SUBCUTANEOUS at 06:56

## 2021-01-01 RX ADMIN — Medication 0.5 MILLIGRAM(S): at 00:06

## 2021-01-01 RX ADMIN — HEPARIN SODIUM 5000 UNIT(S): 5000 INJECTION INTRAVENOUS; SUBCUTANEOUS at 13:18

## 2021-01-01 RX ADMIN — ATORVASTATIN CALCIUM 40 MILLIGRAM(S): 80 TABLET, FILM COATED ORAL at 22:51

## 2021-01-01 RX ADMIN — Medication 81 MILLIGRAM(S): at 12:19

## 2021-01-01 RX ADMIN — Medication 250 MILLIGRAM(S): at 17:36

## 2021-01-01 RX ADMIN — Medication 3 UNIT(S): at 17:48

## 2021-01-01 RX ADMIN — Medication 1 DROP(S): at 20:49

## 2021-01-01 RX ADMIN — ACETAZOLAMIDE 500 MILLIGRAM(S): 250 TABLET ORAL at 06:12

## 2021-01-01 RX ADMIN — CHLORHEXIDINE GLUCONATE 1 APPLICATION(S): 213 SOLUTION TOPICAL at 16:26

## 2021-01-01 RX ADMIN — Medication 2: at 08:43

## 2021-01-01 RX ADMIN — BUMETANIDE 2.5 MG/HR: 0.25 INJECTION INTRAMUSCULAR; INTRAVENOUS at 15:59

## 2021-01-01 RX ADMIN — CHLORHEXIDINE GLUCONATE 30 MILLILITER(S): 213 SOLUTION TOPICAL at 10:49

## 2021-01-01 RX ADMIN — INSULIN GLARGINE 20 UNIT(S): 100 INJECTION, SOLUTION SUBCUTANEOUS at 23:12

## 2021-01-01 RX ADMIN — Medication 81 MILLIGRAM(S): at 11:48

## 2021-01-01 RX ADMIN — Medication 250 MILLIGRAM(S): at 12:01

## 2021-01-01 RX ADMIN — BUMETANIDE 10 MG/HR: 0.25 INJECTION INTRAMUSCULAR; INTRAVENOUS at 21:05

## 2021-01-01 RX ADMIN — Medication 3 UNIT(S): at 20:00

## 2021-01-01 RX ADMIN — Medication 3 UNIT(S): at 16:54

## 2021-01-01 RX ADMIN — HEPARIN SODIUM 2500 UNIT(S): 5000 INJECTION INTRAVENOUS; SUBCUTANEOUS at 13:20

## 2021-01-01 RX ADMIN — Medication 5.84 MICROGRAM(S)/KG/MIN: at 06:26

## 2021-01-01 RX ADMIN — Medication 20 MILLIEQUIVALENT(S): at 12:21

## 2021-01-01 RX ADMIN — ATORVASTATIN CALCIUM 40 MILLIGRAM(S): 80 TABLET, FILM COATED ORAL at 21:41

## 2021-01-01 RX ADMIN — Medication 40 MILLIEQUIVALENT(S): at 12:21

## 2021-01-01 RX ADMIN — Medication 325 MILLIGRAM(S): at 12:52

## 2021-01-01 RX ADMIN — Medication 25 MILLIGRAM(S): at 18:12

## 2021-01-01 RX ADMIN — Medication 2: at 12:08

## 2021-01-01 RX ADMIN — BUMETANIDE 2 MILLIGRAM(S): 0.25 INJECTION INTRAMUSCULAR; INTRAVENOUS at 06:26

## 2021-01-01 RX ADMIN — Medication 100 MILLIGRAM(S): at 03:45

## 2021-01-01 RX ADMIN — INSULIN GLARGINE 24 UNIT(S): 100 INJECTION, SOLUTION SUBCUTANEOUS at 21:29

## 2021-01-01 RX ADMIN — HYDROMORPHONE HYDROCHLORIDE 0.2 MILLIGRAM(S): 2 INJECTION INTRAMUSCULAR; INTRAVENOUS; SUBCUTANEOUS at 03:46

## 2021-01-01 RX ADMIN — Medication 325 MILLIGRAM(S): at 11:05

## 2021-01-01 RX ADMIN — CHLORHEXIDINE GLUCONATE 1 APPLICATION(S): 213 SOLUTION TOPICAL at 04:36

## 2021-01-01 RX ADMIN — Medication 1 DROP(S): at 22:00

## 2021-01-01 RX ADMIN — Medication 1 MILLIGRAM(S): at 00:59

## 2021-01-01 RX ADMIN — POTASSIUM BICARBONATE 25 MILLIEQUIVALENT(S): 978 TABLET, EFFERVESCENT ORAL at 12:21

## 2021-01-01 RX ADMIN — BUMETANIDE 2 MILLIGRAM(S): 0.25 INJECTION INTRAMUSCULAR; INTRAVENOUS at 22:10

## 2021-01-01 RX ADMIN — Medication 325 MILLIGRAM(S): at 11:57

## 2021-01-01 RX ADMIN — Medication 1: at 17:20

## 2021-01-01 RX ADMIN — Medication 2 UNIT(S): at 22:04

## 2021-01-01 RX ADMIN — Medication 20 MILLIEQUIVALENT(S): at 17:26

## 2021-01-01 RX ADMIN — Medication 81 MILLIGRAM(S): at 11:49

## 2021-01-01 RX ADMIN — HEPARIN SODIUM 10 UNIT(S)/HR: 5000 INJECTION INTRAVENOUS; SUBCUTANEOUS at 23:43

## 2021-01-01 RX ADMIN — Medication 15 GRAM(S): at 05:49

## 2021-01-01 RX ADMIN — Medication 3 UNIT(S): at 11:58

## 2021-01-01 RX ADMIN — HEPARIN SODIUM 5000 UNIT(S): 5000 INJECTION INTRAVENOUS; SUBCUTANEOUS at 23:00

## 2021-01-01 RX ADMIN — CHLORHEXIDINE GLUCONATE 1 APPLICATION(S): 213 SOLUTION TOPICAL at 07:10

## 2021-01-01 RX ADMIN — Medication 25 MILLIGRAM(S): at 11:22

## 2021-01-01 RX ADMIN — Medication 6.93 MICROGRAM(S)/KG/MIN: at 06:17

## 2021-01-01 RX ADMIN — HEPARIN SODIUM 5000 UNIT(S): 5000 INJECTION INTRAVENOUS; SUBCUTANEOUS at 21:23

## 2021-01-01 RX ADMIN — Medication 0.5 MILLIGRAM(S): at 15:45

## 2021-01-01 RX ADMIN — HEPARIN SODIUM 5000 UNIT(S): 5000 INJECTION INTRAVENOUS; SUBCUTANEOUS at 05:44

## 2021-01-01 RX ADMIN — BUMETANIDE 2 MILLIGRAM(S): 0.25 INJECTION INTRAMUSCULAR; INTRAVENOUS at 17:39

## 2021-01-01 RX ADMIN — Medication 6 UNIT(S): at 17:31

## 2021-01-01 RX ADMIN — SENNA PLUS 2 TABLET(S): 8.6 TABLET ORAL at 22:09

## 2021-01-01 RX ADMIN — BUMETANIDE 2 MILLIGRAM(S): 0.25 INJECTION INTRAMUSCULAR; INTRAVENOUS at 17:05

## 2021-01-01 RX ADMIN — Medication 325 MILLIGRAM(S): at 11:13

## 2021-01-01 RX ADMIN — HYDROMORPHONE HYDROCHLORIDE 0.2 MILLIGRAM(S): 2 INJECTION INTRAMUSCULAR; INTRAVENOUS; SUBCUTANEOUS at 03:31

## 2021-01-01 RX ADMIN — Medication 30 GRAM(S): at 18:03

## 2021-01-01 RX ADMIN — Medication 50 MILLILITER(S): at 01:04

## 2021-01-01 RX ADMIN — Medication 20 MILLIEQUIVALENT(S): at 01:57

## 2021-01-01 RX ADMIN — INSULIN GLARGINE 6 UNIT(S): 100 INJECTION, SOLUTION SUBCUTANEOUS at 22:08

## 2021-01-01 RX ADMIN — BUMETANIDE 20 MG/HR: 0.25 INJECTION INTRAMUSCULAR; INTRAVENOUS at 07:02

## 2021-01-01 RX ADMIN — Medication 3: at 11:20

## 2021-01-01 RX ADMIN — BUDESONIDE AND FORMOTEROL FUMARATE DIHYDRATE 2 PUFF(S): 160; 4.5 AEROSOL RESPIRATORY (INHALATION) at 17:37

## 2021-01-01 RX ADMIN — POLYETHYLENE GLYCOL 3350 17 GRAM(S): 17 POWDER, FOR SOLUTION ORAL at 11:22

## 2021-01-01 RX ADMIN — HEPARIN SODIUM 5000 UNIT(S): 5000 INJECTION INTRAVENOUS; SUBCUTANEOUS at 22:51

## 2021-01-01 RX ADMIN — HEPARIN SODIUM 5000 UNIT(S): 5000 INJECTION INTRAVENOUS; SUBCUTANEOUS at 05:45

## 2021-01-01 RX ADMIN — BUMETANIDE 2 MILLIGRAM(S): 0.25 INJECTION INTRAMUSCULAR; INTRAVENOUS at 14:37

## 2021-01-01 RX ADMIN — Medication 15 GRAM(S): at 17:32

## 2021-01-01 RX ADMIN — Medication 3 UNIT(S): at 08:16

## 2021-01-01 RX ADMIN — BUMETANIDE 2 MILLIGRAM(S): 0.25 INJECTION INTRAMUSCULAR; INTRAVENOUS at 06:36

## 2021-01-01 RX ADMIN — ZOLPIDEM TARTRATE 5 MILLIGRAM(S): 10 TABLET ORAL at 21:53

## 2021-01-01 RX ADMIN — Medication 2: at 11:59

## 2021-01-01 RX ADMIN — Medication 5 MILLIGRAM(S): at 22:33

## 2021-01-01 RX ADMIN — Medication 3: at 12:18

## 2021-01-01 RX ADMIN — Medication 325 MILLIGRAM(S): at 11:47

## 2021-01-01 RX ADMIN — Medication 1 DROP(S): at 20:01

## 2021-01-01 RX ADMIN — ATORVASTATIN CALCIUM 40 MILLIGRAM(S): 80 TABLET, FILM COATED ORAL at 21:53

## 2021-01-01 RX ADMIN — ACETAZOLAMIDE 500 MILLIGRAM(S): 250 TABLET ORAL at 17:47

## 2021-01-01 RX ADMIN — ALBUTEROL 2.5 MILLIGRAM(S): 90 AEROSOL, METERED ORAL at 11:40

## 2021-01-01 RX ADMIN — BUMETANIDE 2 MILLIGRAM(S): 0.25 INJECTION INTRAMUSCULAR; INTRAVENOUS at 05:44

## 2021-01-01 RX ADMIN — TIOTROPIUM BROMIDE 1 CAPSULE(S): 18 CAPSULE ORAL; RESPIRATORY (INHALATION) at 11:10

## 2021-01-01 RX ADMIN — INSULIN GLARGINE 6 UNIT(S): 100 INJECTION, SOLUTION SUBCUTANEOUS at 22:32

## 2021-01-01 RX ADMIN — Medication 1: at 08:06

## 2021-01-01 RX ADMIN — ATORVASTATIN CALCIUM 40 MILLIGRAM(S): 80 TABLET, FILM COATED ORAL at 21:36

## 2021-01-01 RX ADMIN — INSULIN GLARGINE 6 UNIT(S): 100 INJECTION, SOLUTION SUBCUTANEOUS at 21:42

## 2021-01-01 RX ADMIN — BUMETANIDE 10 MG/HR: 0.25 INJECTION INTRAMUSCULAR; INTRAVENOUS at 17:35

## 2021-01-01 RX ADMIN — Medication 325 MILLIGRAM(S): at 12:06

## 2021-01-01 RX ADMIN — Medication 5 MILLIGRAM(S): at 21:55

## 2021-01-01 RX ADMIN — HEPARIN SODIUM 5000 UNIT(S): 5000 INJECTION INTRAVENOUS; SUBCUTANEOUS at 14:12

## 2021-01-01 RX ADMIN — Medication 250 MILLIGRAM(S): at 11:24

## 2021-01-01 RX ADMIN — ACETAZOLAMIDE 500 MILLIGRAM(S): 250 TABLET ORAL at 21:51

## 2021-01-01 RX ADMIN — Medication 3 UNIT(S): at 08:25

## 2021-01-01 RX ADMIN — HEPARIN SODIUM 5000 UNIT(S): 5000 INJECTION INTRAVENOUS; SUBCUTANEOUS at 15:01

## 2021-01-01 RX ADMIN — BUDESONIDE AND FORMOTEROL FUMARATE DIHYDRATE 2 PUFF(S): 160; 4.5 AEROSOL RESPIRATORY (INHALATION) at 18:00

## 2021-01-01 RX ADMIN — Medication 3 UNIT(S): at 11:48

## 2021-01-01 RX ADMIN — Medication 1: at 08:01

## 2021-01-01 RX ADMIN — POLYETHYLENE GLYCOL 3350 17 GRAM(S): 17 POWDER, FOR SOLUTION ORAL at 12:29

## 2021-01-01 RX ADMIN — ZOLPIDEM TARTRATE 5 MILLIGRAM(S): 10 TABLET ORAL at 00:00

## 2021-01-01 RX ADMIN — POTASSIUM BICARBONATE 25 MILLIEQUIVALENT(S): 978 TABLET, EFFERVESCENT ORAL at 06:21

## 2021-01-01 RX ADMIN — Medication 40 MILLIEQUIVALENT(S): at 10:09

## 2021-01-01 RX ADMIN — SENNA PLUS 2 TABLET(S): 8.6 TABLET ORAL at 21:54

## 2021-01-01 RX ADMIN — Medication 7 UNIT(S): at 08:11

## 2021-01-01 RX ADMIN — HEPARIN SODIUM 5000 UNIT(S): 5000 INJECTION INTRAVENOUS; SUBCUTANEOUS at 05:53

## 2021-01-01 RX ADMIN — ACETAZOLAMIDE 500 MILLIGRAM(S): 250 TABLET ORAL at 18:11

## 2021-01-01 RX ADMIN — ATORVASTATIN CALCIUM 40 MILLIGRAM(S): 80 TABLET, FILM COATED ORAL at 22:25

## 2021-01-01 RX ADMIN — Medication 81 MILLIGRAM(S): at 12:01

## 2021-01-01 RX ADMIN — HEPARIN SODIUM 5000 UNIT(S): 5000 INJECTION INTRAVENOUS; SUBCUTANEOUS at 21:51

## 2021-01-01 RX ADMIN — Medication 325 MILLIGRAM(S): at 12:30

## 2021-01-01 RX ADMIN — BUMETANIDE 10 MG/HR: 0.25 INJECTION INTRAMUSCULAR; INTRAVENOUS at 14:03

## 2021-01-01 RX ADMIN — HEPARIN SODIUM 5000 UNIT(S): 5000 INJECTION INTRAVENOUS; SUBCUTANEOUS at 06:22

## 2021-01-01 RX ADMIN — Medication 2: at 13:56

## 2021-01-01 RX ADMIN — HEPARIN SODIUM 5000 UNIT(S): 5000 INJECTION INTRAVENOUS; SUBCUTANEOUS at 21:40

## 2021-01-01 RX ADMIN — Medication 5 MILLIGRAM(S): at 22:38

## 2021-01-01 RX ADMIN — Medication 3 UNIT(S): at 08:15

## 2021-01-01 RX ADMIN — Medication 1: at 16:53

## 2021-01-01 RX ADMIN — Medication 1: at 16:52

## 2021-01-01 RX ADMIN — Medication 2: at 11:40

## 2021-01-01 RX ADMIN — POTASSIUM BICARBONATE 25 MILLIEQUIVALENT(S): 978 TABLET, EFFERVESCENT ORAL at 16:23

## 2021-01-01 RX ADMIN — Medication 2: at 07:48

## 2021-01-01 RX ADMIN — Medication 20 MILLIEQUIVALENT(S): at 07:44

## 2021-01-01 RX ADMIN — POTASSIUM BICARBONATE 25 MILLIEQUIVALENT(S): 978 TABLET, EFFERVESCENT ORAL at 10:31

## 2021-01-01 RX ADMIN — Medication 25 MILLIGRAM(S): at 12:01

## 2021-01-01 RX ADMIN — INSULIN GLARGINE 15 UNIT(S): 100 INJECTION, SOLUTION SUBCUTANEOUS at 21:50

## 2021-01-01 RX ADMIN — ZOLPIDEM TARTRATE 5 MILLIGRAM(S): 10 TABLET ORAL at 22:32

## 2021-01-01 RX ADMIN — ZOLPIDEM TARTRATE 5 MILLIGRAM(S): 10 TABLET ORAL at 22:00

## 2021-01-01 RX ADMIN — Medication 3 UNIT(S): at 16:49

## 2021-01-01 RX ADMIN — HEPARIN SODIUM 5000 UNIT(S): 5000 INJECTION INTRAVENOUS; SUBCUTANEOUS at 05:48

## 2021-01-01 RX ADMIN — BUMETANIDE 2 MILLIGRAM(S): 0.25 INJECTION INTRAMUSCULAR; INTRAVENOUS at 06:06

## 2021-01-01 RX ADMIN — Medication 81 MILLIGRAM(S): at 11:22

## 2021-01-01 RX ADMIN — Medication 250 MILLIGRAM(S): at 17:00

## 2021-01-01 RX ADMIN — Medication 7 UNIT(S): at 12:19

## 2021-01-01 RX ADMIN — TIOTROPIUM BROMIDE 1 CAPSULE(S): 18 CAPSULE ORAL; RESPIRATORY (INHALATION) at 12:20

## 2021-01-01 RX ADMIN — POLYETHYLENE GLYCOL 3350 17 GRAM(S): 17 POWDER, FOR SOLUTION ORAL at 12:33

## 2021-01-01 RX ADMIN — HEPARIN SODIUM 5000 UNIT(S): 5000 INJECTION INTRAVENOUS; SUBCUTANEOUS at 14:04

## 2021-01-01 RX ADMIN — HEPARIN SODIUM 5000 UNIT(S): 5000 INJECTION INTRAVENOUS; SUBCUTANEOUS at 14:59

## 2021-01-01 RX ADMIN — INSULIN GLARGINE 6 UNIT(S): 100 INJECTION, SOLUTION SUBCUTANEOUS at 22:18

## 2021-01-01 RX ADMIN — Medication 100 MILLIGRAM(S): at 20:11

## 2021-01-01 RX ADMIN — Medication 1: at 08:25

## 2021-01-01 RX ADMIN — Medication 81 MILLIGRAM(S): at 11:03

## 2021-01-01 RX ADMIN — HEPARIN SODIUM 5000 UNIT(S): 5000 INJECTION INTRAVENOUS; SUBCUTANEOUS at 05:29

## 2021-01-01 RX ADMIN — Medication 3 UNIT(S): at 08:09

## 2021-01-01 RX ADMIN — SPIRONOLACTONE 25 MILLIGRAM(S): 25 TABLET, FILM COATED ORAL at 06:13

## 2021-01-01 RX ADMIN — Medication 5.84 MICROGRAM(S)/KG/MIN: at 04:59

## 2021-01-01 RX ADMIN — BUMETANIDE 2 MILLIGRAM(S): 0.25 INJECTION INTRAMUSCULAR; INTRAVENOUS at 06:28

## 2021-01-01 RX ADMIN — POLYETHYLENE GLYCOL 3350 17 GRAM(S): 17 POWDER, FOR SOLUTION ORAL at 12:17

## 2021-01-01 RX ADMIN — POLYETHYLENE GLYCOL 3350 17 GRAM(S): 17 POWDER, FOR SOLUTION ORAL at 11:05

## 2021-01-01 RX ADMIN — Medication 50 GRAM(S): at 09:14

## 2021-01-01 RX ADMIN — Medication 3 UNIT(S): at 17:22

## 2021-01-01 RX ADMIN — ZOLPIDEM TARTRATE 5 MILLIGRAM(S): 10 TABLET ORAL at 22:40

## 2021-01-01 RX ADMIN — Medication 3 UNIT(S): at 12:18

## 2021-01-01 RX ADMIN — Medication 20 MILLIGRAM(S): at 18:29

## 2021-01-01 RX ADMIN — Medication 81 MILLIGRAM(S): at 11:04

## 2021-01-01 RX ADMIN — BUMETANIDE 2 MILLIGRAM(S): 0.25 INJECTION INTRAMUSCULAR; INTRAVENOUS at 05:16

## 2021-01-01 RX ADMIN — POLYETHYLENE GLYCOL 3350 17 GRAM(S): 17 POWDER, FOR SOLUTION ORAL at 11:04

## 2021-01-01 RX ADMIN — CHLORHEXIDINE GLUCONATE 1 APPLICATION(S): 213 SOLUTION TOPICAL at 14:24

## 2021-01-01 RX ADMIN — Medication 1 DROP(S): at 08:33

## 2021-01-01 RX ADMIN — HEPARIN SODIUM 5000 UNIT(S): 5000 INJECTION INTRAVENOUS; SUBCUTANEOUS at 05:15

## 2021-01-01 RX ADMIN — ATORVASTATIN CALCIUM 40 MILLIGRAM(S): 80 TABLET, FILM COATED ORAL at 22:11

## 2021-01-01 RX ADMIN — CEFEPIME 100 MILLIGRAM(S): 1 INJECTION, POWDER, FOR SOLUTION INTRAMUSCULAR; INTRAVENOUS at 17:06

## 2021-01-01 RX ADMIN — BUMETANIDE 4 MILLIGRAM(S): 0.25 INJECTION INTRAMUSCULAR; INTRAVENOUS at 05:44

## 2021-01-01 RX ADMIN — HEPARIN SODIUM 5000 UNIT(S): 5000 INJECTION INTRAVENOUS; SUBCUTANEOUS at 14:31

## 2021-01-01 RX ADMIN — Medication 1: at 18:34

## 2021-01-01 RX ADMIN — Medication 15 GRAM(S): at 06:09

## 2021-01-01 RX ADMIN — Medication 20 MILLIEQUIVALENT(S): at 16:53

## 2021-01-01 RX ADMIN — Medication 3 UNIT(S): at 11:40

## 2021-01-01 RX ADMIN — Medication 20 MILLIEQUIVALENT(S): at 09:50

## 2021-01-01 RX ADMIN — Medication 100 MILLIEQUIVALENT(S): at 12:56

## 2021-01-01 RX ADMIN — ZOLPIDEM TARTRATE 5 MILLIGRAM(S): 10 TABLET ORAL at 22:51

## 2021-01-01 RX ADMIN — POTASSIUM BICARBONATE 25 MILLIEQUIVALENT(S): 978 TABLET, EFFERVESCENT ORAL at 09:09

## 2021-01-01 RX ADMIN — ZOLPIDEM TARTRATE 5 MILLIGRAM(S): 10 TABLET ORAL at 22:58

## 2021-01-01 RX ADMIN — SPIRONOLACTONE 12.5 MILLIGRAM(S): 25 TABLET, FILM COATED ORAL at 05:44

## 2021-01-01 RX ADMIN — ATORVASTATIN CALCIUM 40 MILLIGRAM(S): 80 TABLET, FILM COATED ORAL at 23:02

## 2021-01-01 RX ADMIN — BUMETANIDE 2 MILLIGRAM(S): 0.25 INJECTION INTRAMUSCULAR; INTRAVENOUS at 21:35

## 2021-01-01 RX ADMIN — INSULIN GLARGINE 6 UNIT(S): 100 INJECTION, SOLUTION SUBCUTANEOUS at 21:59

## 2021-01-01 RX ADMIN — Medication 1: at 16:54

## 2021-01-01 RX ADMIN — HEPARIN SODIUM 5000 UNIT(S): 5000 INJECTION INTRAVENOUS; SUBCUTANEOUS at 05:54

## 2021-01-01 RX ADMIN — CHLORHEXIDINE GLUCONATE 1 APPLICATION(S): 213 SOLUTION TOPICAL at 22:23

## 2021-01-01 RX ADMIN — POTASSIUM BICARBONATE 25 MILLIEQUIVALENT(S): 978 TABLET, EFFERVESCENT ORAL at 08:11

## 2021-01-01 RX ADMIN — Medication 1: at 22:33

## 2021-01-01 RX ADMIN — Medication 1: at 08:57

## 2021-01-01 RX ADMIN — BUMETANIDE 4 MILLIGRAM(S): 0.25 INJECTION INTRAMUSCULAR; INTRAVENOUS at 16:53

## 2021-01-01 RX ADMIN — Medication 50 GRAM(S): at 13:18

## 2021-01-01 RX ADMIN — INSULIN GLARGINE 6 UNIT(S): 100 INJECTION, SOLUTION SUBCUTANEOUS at 22:51

## 2021-01-01 RX ADMIN — Medication 325 MILLIGRAM(S): at 14:24

## 2021-01-01 RX ADMIN — HEPARIN SODIUM 5000 UNIT(S): 5000 INJECTION INTRAVENOUS; SUBCUTANEOUS at 22:39

## 2021-01-01 RX ADMIN — Medication 1: at 13:23

## 2021-01-01 RX ADMIN — MAGNESIUM OXIDE 400 MG ORAL TABLET 800 MILLIGRAM(S): 241.3 TABLET ORAL at 08:00

## 2021-01-01 RX ADMIN — HEPARIN SODIUM 5000 UNIT(S): 5000 INJECTION INTRAVENOUS; SUBCUTANEOUS at 05:23

## 2021-01-01 RX ADMIN — INSULIN GLARGINE 6 UNIT(S): 100 INJECTION, SOLUTION SUBCUTANEOUS at 21:06

## 2021-01-01 RX ADMIN — HEPARIN SODIUM 5000 UNIT(S): 5000 INJECTION INTRAVENOUS; SUBCUTANEOUS at 20:35

## 2021-01-01 RX ADMIN — Medication 4: at 11:48

## 2021-01-01 RX ADMIN — BUDESONIDE AND FORMOTEROL FUMARATE DIHYDRATE 2 PUFF(S): 160; 4.5 AEROSOL RESPIRATORY (INHALATION) at 05:57

## 2021-01-01 RX ADMIN — Medication 2: at 13:17

## 2021-01-01 RX ADMIN — Medication 3 UNIT(S): at 13:55

## 2021-01-01 RX ADMIN — Medication 1: at 08:03

## 2021-01-01 RX ADMIN — POLYETHYLENE GLYCOL 3350 17 GRAM(S): 17 POWDER, FOR SOLUTION ORAL at 12:25

## 2021-01-01 RX ADMIN — Medication 3 UNIT(S): at 11:49

## 2021-01-01 RX ADMIN — ATORVASTATIN CALCIUM 40 MILLIGRAM(S): 80 TABLET, FILM COATED ORAL at 22:41

## 2021-01-01 RX ADMIN — ATORVASTATIN CALCIUM 40 MILLIGRAM(S): 80 TABLET, FILM COATED ORAL at 22:13

## 2021-01-01 RX ADMIN — HEPARIN SODIUM 5000 UNIT(S): 5000 INJECTION INTRAVENOUS; SUBCUTANEOUS at 06:17

## 2021-01-01 RX ADMIN — ATORVASTATIN CALCIUM 40 MILLIGRAM(S): 80 TABLET, FILM COATED ORAL at 21:03

## 2021-01-01 RX ADMIN — ATORVASTATIN CALCIUM 40 MILLIGRAM(S): 80 TABLET, FILM COATED ORAL at 20:34

## 2021-01-01 RX ADMIN — Medication 325 MILLIGRAM(S): at 12:08

## 2021-01-01 RX ADMIN — BUMETANIDE 2 MILLIGRAM(S): 0.25 INJECTION INTRAMUSCULAR; INTRAVENOUS at 13:08

## 2021-01-01 RX ADMIN — ZOLPIDEM TARTRATE 5 MILLIGRAM(S): 10 TABLET ORAL at 23:30

## 2021-01-01 RX ADMIN — SODIUM ZIRCONIUM CYCLOSILICATE 10 GRAM(S): 10 POWDER, FOR SUSPENSION ORAL at 10:02

## 2021-01-01 RX ADMIN — Medication 6 UNIT(S): at 08:21

## 2021-01-01 RX ADMIN — BUDESONIDE AND FORMOTEROL FUMARATE DIHYDRATE 2 PUFF(S): 160; 4.5 AEROSOL RESPIRATORY (INHALATION) at 07:06

## 2021-01-01 RX ADMIN — HEPARIN SODIUM 5000 UNIT(S): 5000 INJECTION INTRAVENOUS; SUBCUTANEOUS at 21:55

## 2021-01-01 RX ADMIN — ATORVASTATIN CALCIUM 40 MILLIGRAM(S): 80 TABLET, FILM COATED ORAL at 22:39

## 2021-01-01 RX ADMIN — Medication 325 MILLIGRAM(S): at 11:22

## 2021-01-01 RX ADMIN — BUDESONIDE AND FORMOTEROL FUMARATE DIHYDRATE 2 PUFF(S): 160; 4.5 AEROSOL RESPIRATORY (INHALATION) at 05:16

## 2021-01-01 RX ADMIN — Medication 50 GRAM(S): at 21:50

## 2021-01-01 RX ADMIN — HEPARIN SODIUM 5000 UNIT(S): 5000 INJECTION INTRAVENOUS; SUBCUTANEOUS at 13:48

## 2021-01-01 RX ADMIN — POLYETHYLENE GLYCOL 3350 17 GRAM(S): 17 POWDER, FOR SOLUTION ORAL at 11:13

## 2021-01-01 RX ADMIN — Medication 3 UNIT(S): at 17:03

## 2021-01-01 RX ADMIN — HEPARIN SODIUM 5000 UNIT(S): 5000 INJECTION INTRAVENOUS; SUBCUTANEOUS at 22:00

## 2021-01-01 RX ADMIN — CHLORHEXIDINE GLUCONATE 1 APPLICATION(S): 213 SOLUTION TOPICAL at 11:13

## 2021-01-01 RX ADMIN — Medication 325 MILLIGRAM(S): at 12:46

## 2021-01-01 RX ADMIN — HEPARIN SODIUM 5000 UNIT(S): 5000 INJECTION INTRAVENOUS; SUBCUTANEOUS at 11:08

## 2021-01-01 RX ADMIN — HEPARIN SODIUM 5000 UNIT(S): 5000 INJECTION INTRAVENOUS; SUBCUTANEOUS at 05:01

## 2021-01-01 RX ADMIN — Medication 3 UNIT(S): at 07:49

## 2021-01-01 RX ADMIN — INSULIN GLARGINE 6 UNIT(S): 100 INJECTION, SOLUTION SUBCUTANEOUS at 22:05

## 2021-01-01 RX ADMIN — BUMETANIDE 4 MILLIGRAM(S): 0.25 INJECTION INTRAMUSCULAR; INTRAVENOUS at 05:50

## 2021-01-01 RX ADMIN — ZOLPIDEM TARTRATE 5 MILLIGRAM(S): 10 TABLET ORAL at 23:38

## 2021-01-01 RX ADMIN — Medication 1 DROP(S): at 22:13

## 2021-01-01 RX ADMIN — ALBUTEROL 2.5 MILLIGRAM(S): 90 AEROSOL, METERED ORAL at 10:30

## 2021-01-01 RX ADMIN — BUMETANIDE 2 MILLIGRAM(S): 0.25 INJECTION INTRAMUSCULAR; INTRAVENOUS at 06:22

## 2021-01-01 RX ADMIN — BUMETANIDE 10 MG/HR: 0.25 INJECTION INTRAMUSCULAR; INTRAVENOUS at 08:20

## 2021-01-01 RX ADMIN — BUMETANIDE 2 MILLIGRAM(S): 0.25 INJECTION INTRAMUSCULAR; INTRAVENOUS at 13:55

## 2021-01-01 RX ADMIN — TIOTROPIUM BROMIDE 1 CAPSULE(S): 18 CAPSULE ORAL; RESPIRATORY (INHALATION) at 11:24

## 2021-01-01 RX ADMIN — Medication 12.5 MILLIGRAM(S): at 05:57

## 2021-01-01 RX ADMIN — Medication 2: at 12:25

## 2021-01-01 RX ADMIN — POLYETHYLENE GLYCOL 3350 17 GRAM(S): 17 POWDER, FOR SOLUTION ORAL at 12:52

## 2021-01-01 RX ADMIN — ATORVASTATIN CALCIUM 40 MILLIGRAM(S): 80 TABLET, FILM COATED ORAL at 21:46

## 2021-01-01 RX ADMIN — Medication 1 MILLIGRAM(S): at 18:07

## 2021-01-01 RX ADMIN — HYDROMORPHONE HYDROCHLORIDE 0.2 MILLIGRAM(S): 2 INJECTION INTRAMUSCULAR; INTRAVENOUS; SUBCUTANEOUS at 20:59

## 2021-01-01 SDOH — SOCIAL STABILITY - SOCIAL INSECURITY: PROBLEMS RELATED TO LIVING ALONE: Z60.2

## 2021-04-14 PROBLEM — Z00.00 ENCOUNTER FOR PREVENTIVE HEALTH EXAMINATION: Status: ACTIVE | Noted: 2021-01-01

## 2021-04-21 PROBLEM — Z60.2 LIVING ALONE: Status: ACTIVE | Noted: 2021-01-01

## 2021-04-21 PROBLEM — E55.9 VITAMIN D DEFICIENCY: Status: ACTIVE | Noted: 2021-01-01

## 2021-04-21 PROBLEM — Z82.49 FAMILY HISTORY OF MYOCARDIAL INFARCTION: Status: ACTIVE | Noted: 2021-01-01

## 2021-04-21 PROBLEM — S81.809A OPEN LEG WOUND: Status: ACTIVE | Noted: 2021-01-01

## 2021-04-21 PROBLEM — D64.9 ANEMIA: Status: ACTIVE | Noted: 2021-01-01

## 2021-04-21 PROBLEM — R26.81 UNSTEADY GAIT: Status: ACTIVE | Noted: 2021-01-01

## 2021-04-21 PROBLEM — G47.00 INSOMNIA: Status: ACTIVE | Noted: 2021-01-01

## 2021-04-23 NOTE — REASON FOR VISIT
[Initial Evaluation] : an initial evaluation [FreeTextEntry1] : establish care, lower ext edema wounds

## 2021-04-23 NOTE — HISTORY OF PRESENT ILLNESS
[One fall no injury in past year] : Patient reported one fall in the past year without injury [Fully functional (bathing, dressing, toileting, transferring, walking, feeding)] : Fully functional (bathing, dressing, toileting, transferring, walking, feeding) [Independent] : managing finances [Full assistance needed] : using transportation [Walker] : walker [Smoke Detector] : smoke detector [Carbon Monoxide Detector] : carbon monoxide detector [Grab Bars] : grab bars [0] : 2) Feeling down, depressed, or hopeless: Not at all [Designated Healthcare Proxy] : Designated healthcare proxy [FreeTextEntry1] : 87yoF with pmhx of CAD, afib, Dm2, insomnia, presents with son Reji for initial visit.  has not seen physician in over 1 year due to pandemic.\par \par \par unsteady gait:\par uses walker\par raised seat toliet\par fell at home recently without injury\par \par hx of 2007 broken wrist \par hx of 1999 broken ankle \par no recent dexa\par \par \par DM2:\par Dr. Remberto Traylor [phone]\par takes glipizide 10mg \par januvia 100mg\par metformin 500mg twice daily\par checks sugars daily, log reviewed controlled\par \par \par occasional  b/l knee pain\par \par edema:\par legs swelling with leaking of lower legs. she is sedentary in front of computer at desk for most of day.\par taking lasix 40mg \par more short of breath\par denies chest pain, palpitations \par \par \par insomnia;\par takes ambien 5mg for long time\par states she isn't sleeping well even with the medication\par -further discuss at follow up\par \par CAD/AFib\par hx of Cardiac cath 4/2019 at [facility] check\par 2006 s/p PCI x 3\par Cardiologist Dr. Tai Chan [phone]\par takes Pradaxa without bleeding problems\par reports? cardioablation in past\par Echo 3/27/2019:\par severe LAD, nomral LV EF, MAUREEN\par severe Aortic stenosis\par mild mod mitral regurg\par mod to severe tricuspid regurg.\par mod pulm HTN\par \par hx of colon cancer in 1996\par s/p partial colectomy and chemo via port \par states has had hx of follow up coloscopies that were okay per patient, she cannot remember when her last colonoscopy was.\par Dr. Choe was the GI at [facility], but has since left.\par \par hx of normal mammogram\par hx of b/l cataract surgery 2016\par no hearing aides or complaints\par lost some teeth in 2020, hasn't been back to dentist\par \par continence:\par denies urinary or fecal incontinence\par \par memory:\par denies memory loss or memory complaints, son agreees\par \par \par CT chest 10/30/19: \par pulmonary nodule right middle lob 2.4x 1.6x 2.9\par platelike opacity in the left lower lobe, areas of masslike nodularity.- recommended PET/CT to assess for associated metabolic activity

## 2021-04-23 NOTE — PHYSICAL EXAM
[General Appearance - Alert] : alert [General Appearance - In No Acute Distress] : in no acute distress [PERRL With Normal Accommodation] : pupils were equal in size, round, and reactive to light [Extraocular Movements] : extraocular movements were intact [No Oral Pallor] : no oral pallor [Outer Ear] : the ears and nose were normal in appearance [Both Tympanic Membranes Were Examined] : both tympanic membranes were normal [Hearing Threshold Finger Rub Not [place]] : hearing was normal [Oropharynx] : The oropharynx was normal [Neck Appearance] : the appearance of the neck was normal [Neck Cervical Mass (___cm)] : no neck mass was observed [Jugular Venous Distention Increased] : there was no jugular-venous distention [Thyroid Diffuse Enlargement] : the thyroid was not enlarged [Thyroid Nodule] : there were no palpable thyroid nodules [] : no respiratory distress [Respiration, Rhythm And Depth] : normal respiratory rhythm and effort [Exaggerated Use Of Accessory Muscles For Inspiration] : no accessory muscle use [Auscultation Breath Sounds / Voice Sounds] : lungs were clear to auscultation bilaterally [Heart Rate And Rhythm] : heart rate was normal and rhythm regular [Heart Sounds] : normal S1 and S2 [Bowel Sounds] : normal bowel sounds [Abdomen Soft] : soft [Abdomen Tenderness] : non-tender [Abdomen Mass (___ Cm)] : no abdominal mass palpated [Cervical Lymph Nodes Enlarged Posterior Bilaterally] : posterior cervical [Cervical Lymph Nodes Enlarged Anterior Bilaterally] : anterior cervical [Supraclavicular Lymph Nodes Enlarged Bilaterally] : supraclavicular [No Spinal Tenderness] : no spinal tenderness [Nail Clubbing] : no clubbing  or cyanosis of the fingernails [Involuntary Movements] : no involuntary movements were seen [No Focal Deficits] : no focal deficits [Impaired Insight] : insight and judgment were intact [Affect] : the affect was normal [Mood] : the mood was normal [FreeTextEntry1] : 3 wounds on right lower ext approx 5mm to 1cm diameter leaking serous fluid, left leg with multiple small wound approx 1cm to 1.5cm in diameter leaking clear fluid.  some macerated skin of the periwound, some slough on the wound bed

## 2021-04-23 NOTE — REVIEW OF SYSTEMS
[Lower Ext Edema] : lower extremity edema [Shortness Of Breath] : shortness of breath [Joint Pain] : joint pain [As Noted in HPI] : as noted in HPI [Sleep Disturbances] : sleep disturbances [Negative] : Heme/Lymph [Fever] : no fever [Eyesight Problems] : no eyesight problems [Loss Of Hearing] : no hearing loss [Chest Pain] : no chest pain [Palpitations] : no palpitations [Wheezing] : no wheezing [Cough] : no cough

## 2021-04-23 NOTE — ASSESSMENT
[FreeTextEntry1] : life alert recommended\par recommend f/u with dentist\par home care referral \par PT referral\par podiatry referral\par compression stockings\par cardiology referral\par bloodwork\par dexa\par f/u in 3 months\par \par -next visit f/u CT chest from 2019 : what further work up performed

## 2021-05-10 NOTE — HISTORY OF PRESENT ILLNESS
[FreeTextEntry1] : Patient is an 87 year-old woman with known past medical history of hypertension, dyslipidemia, noninsulin dependent type II diabetes, coronary artery disease status post multiple PCI, and persistent atrial fibrillation on anticoagulation (Pradaxa), who presents today with worsening lower extremity edema and dyspnea on exertion for the past 6 months. \par She started to notice these symptoms during the pandemic when she was more isolated and less active. \par Now, wound nurse is helping with ace warps of the legs. \par \par Previously taken care of by Joe Chan MD.\par Known CAD with multiple PCI in 2006, multiple caths since, but no further PCI.\par Atrial fibrillation with DCCV x1 years ago.\par \par Uses a cane to ambulate in the home, but uses a rolling walker outside the home. Had to pause with walker three times while walking from parking lot to our office.\par \par Sleeps on her stomach, but has frequent nocturia.\par \par

## 2021-05-10 NOTE — REASON FOR VISIT
[Cardiac Failure] : cardiac failure [Other: _____] : [unfilled] [FreeTextEntry3] : Noelle Cochran MD  [FreeTextEntry1] : Patient recently established care with a new primary care physician, and she was noted to have dyspnea and lower extremity edema. Dr. Cochran increased her furosemide from 40 mg daily to 60 mg daily, but there was no noted change in her diuresis.

## 2021-05-10 NOTE — PHYSICAL EXAM
[No Acute Distress] : no acute distress [III] : a grade 3 [___ +] : bilateral [unfilled]U+ pitting edema to the knees [Normal] : clear lung fields, good air entry, no respiratory distress [No Clubbing] : no clubbing [Moves all extremities] : moves all extremities [Alert and Oriented] : alert and oriented [Normal memory] : normal memory [de-identified] : ambulates with rolling walker

## 2021-05-10 NOTE — DISCUSSION/SUMMARY
[FreeTextEntry1] : Patient is an 87 year-old woman with cardiovascular risk factors as above, chronic lower extremity edema, atrial fibrillation on anticoagulation, now presents with dyspnea on exertion.\par \par Echocardiogram to evaluate for structural heart disease, cardiomyopathy, and assess etiology of systolic murmur heard at base, at apex, and posteriorly.\par Pharmacologic nuclear stress test to assess for large area of ischemia.\par \par Increase furosemide from 40 mg daily to 80 mg BID (first dose early AM and second dose early afternoon). Call Friday to reassess diuresis and symptoms.\par Consider replacing ramipril with Entresto. \par Consider SGLT-2 inhibitors.

## 2021-05-26 PROBLEM — E11.9 DM2 (DIABETES MELLITUS, TYPE 2): Status: ACTIVE | Noted: 2021-01-01

## 2021-05-26 NOTE — PHYSICAL EXAM
[No Acute Distress] : no acute distress [III] : a grade 3 [___ +] : bilateral [unfilled]U+ pitting edema to the knees [Normal] : clear lung fields, good air entry, no respiratory distress [No Clubbing] : no clubbing [Moves all extremities] : moves all extremities [Alert and Oriented] : alert and oriented [Normal memory] : normal memory [de-identified] : ambulates with rolling walker

## 2021-05-26 NOTE — REASON FOR VISIT
[Cardiac Failure] : cardiac failure [Other: _____] : [unfilled] [FreeTextEntry3] : Noelle Cochran MD  [FreeTextEntry1] : Patient recently established care with a new primary care physician, and she was noted to have dyspnea and lower extremity edema. Dr. Cochran increased her furosemide from 40 mg daily to 60 mg daily, but there was no noted change in her diuresis. \par She tried to take furosemide 80 mg daily, but she found that she could not make it to the bathroom in time, so she has not been taking her diuretics.

## 2021-05-26 NOTE — DISCUSSION/SUMMARY
[FreeTextEntry1] : Patient is an 87 year-old woman with cardiovascular risk factors as above, chronic lower extremity edema, atrial fibrillation on anticoagulation, now presents with dyspnea on exertion.\par \par Echocardiogram to evaluate for structural heart disease, cardiomyopathy, and assess etiology of systolic murmur heard at base, at apex, and posteriorly.\par Pharmacologic nuclear stress test to assess for large area of ischemia.\par \par Increase furosemide from 40 mg daily to 80 mg BID (first dose early AM and second dose early afternoon). Call later this week to reassess diuresis and symptoms.\par Consider replacing ramipril with Entresto. \par Consider SGLT-2 inhibitors.

## 2021-06-07 PROBLEM — I48.91 ATRIAL FIBRILLATION: Status: ACTIVE | Noted: 2021-01-01

## 2021-06-07 PROBLEM — R60.0 BILATERAL LOWER EXTREMITY EDEMA: Status: ACTIVE | Noted: 2021-01-01

## 2021-06-07 PROBLEM — R06.00 DYSPNEA ON EXERTION: Status: ACTIVE | Noted: 2021-01-01

## 2021-06-08 NOTE — DISCUSSION/SUMMARY
[FreeTextEntry1] : Patient is an 87 year-old woman with cardiovascular risk factors as above, chronic lower extremity edema, atrial fibrillation on anticoagulation, now presents with dyspnea on exertion.\par \par Echocardiogram to evaluate for structural heart disease, cardiomyopathy, and assess etiology of systolic murmur heard at base, at apex, and posteriorly.\par Pharmacologic nuclear stress test to assess for large area of ischemia.\par \par Continue furosemide 80 mg QAM, but eliminate PM dose. Instead add metolazone 2.5 mg daily with AM dose of furosemide. \par Replace ramipril with Entresto. Hold ramipril tomorrow and start Entersto 24-26 mg BID Wednesday, 6/9/2021.\par Consider SGLT-2 inhibitors.

## 2021-06-08 NOTE — PHYSICAL EXAM
[No Acute Distress] : no acute distress [III] : a grade 3 [___ +] : bilateral [unfilled]U+ pitting edema to the knees [Normal] : clear lung fields, good air entry, no respiratory distress [No Clubbing] : no clubbing [Moves all extremities] : moves all extremities [Alert and Oriented] : alert and oriented [Normal memory] : normal memory [de-identified] : ambulates with rolling walker

## 2021-06-08 NOTE — HISTORY OF PRESENT ILLNESS
[FreeTextEntry1] : Patient is an 87 year-old woman with known past medical history of hypertension, dyslipidemia, noninsulin dependent type II diabetes, coronary artery disease status post multiple PCI, and persistent atrial fibrillation on anticoagulation (Pradaxa), who presents today with worsening lower extremity edema and dyspnea on exertion for the past 6 months. \par She started to notice these symptoms during the pandemic when she was more isolated and less active. \par Now, wound nurse is helping with ace warps of the legs. \par \par Previously taken care of by Joe Chan MD.\par Known CAD with multiple PCI in 2006, multiple caths since, but no further PCI.\par Atrial fibrillation with DCCV x1 years ago.\par \par Uses a cane to ambulate in the home, but uses a rolling walker outside the home. Had to pause with walker three times while walking from parking lot to our office.\par \par Sleeps on her stomach, but has frequent nocturia.\par \par Patient recently established care with a new primary care physician, and she was noted to have dyspnea and lower extremity edema. Dr. Cochran increased her furosemide from 40 mg daily to 60 mg daily, but there was no noted change in her diuresis. \par She tried to take furosemide 80 mg daily, but she found that she could not make it to the bathroom in time, so she has not been taking her diuretics.

## 2021-06-08 NOTE — REASON FOR VISIT
[Cardiac Failure] : cardiac failure [Other: _____] : [unfilled] [FreeTextEntry3] : Noelle Cochran MD  [FreeTextEntry1] : June 2021 - Patient returns today for follow-up. Her dyspnea has worsened. She is more dyspneic despite 12 lbs of weight loss (diuresis). This has been accomplished on furosemide 80 mg BID. Despite that second dose being early afternoon, she is still urinating into the night.

## 2021-07-01 PROBLEM — D50.0 ANEMIA DUE TO CHRONIC BLOOD LOSS: Status: ACTIVE | Noted: 2021-01-01

## 2021-07-01 PROBLEM — I35.9 AORTIC VALVE DISEASE: Status: ACTIVE | Noted: 2021-01-01

## 2021-07-01 NOTE — HISTORY OF PRESENT ILLNESS
[FreeTextEntry1] : 87 year old\par history of colon cancer 15 years ago\par Last colon 5- 10 year ago by Dr Cale tellez\par Claims previous history of anemia\par Critical Aortic stenosis with preserved LV function\par Several week decompensation with weakness, and shortness of breath and leg swelling\par Patient also had leg ulceration treated by wound care and diuresis\par In april hgb 10\par in june hgb 7.8 and 7.4\par stol sierra positive and on iron three times a week\par Schedule for cardiac tests tomorrow\par extremely difficult for venipuncture\par Leg edema much improved with metazoalone 2.5 and entresto\par Pradaxa had been used for afib but stopper recently\par

## 2021-07-01 NOTE — REVIEW OF SYSTEMS
[Fever] : no fever [Chest Pain] : no chest pain [Lower Ext Edema] : lower extremity edema [Shortness Of Breath] : shortness of breath [SOB on Exertion] : shortness of breath during exertion [Orthopnea] : orthopnea [Abdominal Pain] : no abdominal pain [Vomiting] : no vomiting [Negative] : ENT

## 2021-07-01 NOTE — PHYSICAL EXAM
[General Appearance - Alert] : alert [General Appearance - In No Acute Distress] : in no acute distress [Outer Ear] : the ears and nose were normal in appearance [Oropharynx] : the oropharynx was normal [Neck Appearance] : the appearance of the neck was normal [Neck Cervical Mass (___cm)] : no neck mass was observed [Jugular Venous Distention Increased] : there was no jugular-venous distention [Thyroid Diffuse Enlargement] : the thyroid was not enlarged [Thyroid Nodule] : there were no palpable thyroid nodules [] : no respiratory distress [Auscultation Breath Sounds / Voice Sounds] : lungs were clear to auscultation bilaterally [FreeTextEntry1] : 2 pluse edema [Bowel Sounds] : normal bowel sounds [Abdomen Soft] : soft

## 2021-07-01 NOTE — ASSESSMENT
[FreeTextEntry1] : Severe anemia in face of critical aortic stenosis\par Stat cbc if hgb less than 8 need urgent admission\par \par worried about electrolytes with use of metazalone and entresto\par stat lyte\par \par If requires admission will need transfusion very slowly with diuretic and close observation\par \par eventual colon and possible egd\par History of colon ca\par Also AVM of colon very common in face of critical aortic stenosis

## 2021-07-02 PROBLEM — I35.0 AORTIC STENOSIS, SEVERE: Status: ACTIVE | Noted: 2021-01-01

## 2021-07-02 PROBLEM — Z13.820 SCREENING FOR OSTEOPOROSIS: Status: RESOLVED | Noted: 2021-01-01 | Resolved: 2021-01-01

## 2021-07-02 PROBLEM — Z23 ENCOUNTER FOR IMMUNIZATION: Status: RESOLVED | Noted: 2021-01-01 | Resolved: 2021-01-01

## 2021-07-02 PROBLEM — Z85.038 HISTORY OF COLON CANCER: Status: RESOLVED | Noted: 2021-01-01 | Resolved: 2021-01-01

## 2021-07-02 PROBLEM — Z13.29 SCREENING FOR THYROID DISORDER: Status: RESOLVED | Noted: 2021-01-01 | Resolved: 2021-01-01

## 2021-07-02 NOTE — HISTORY OF PRESENT ILLNESS
[FreeTextEntry1] : The patient is an 88 y/o female, with a PMHx of AFib, CAD with PCI, Type 2 DM, Colon Ca, Open pedal wound and Aortic Stenosis. She was referred By Dr. Hodges for evaluation of her Aortic Stenosis, and possible TAVR procedure.  She presents with her son and reports worsening SOB and GARCIA. She will need to rest for about 5 minutes prior to feeling any relief when she exerts herself. She states this has rapidly gotten worse over the past few weeks. She denies any chest discomfort, or feeling dizzy (except when she notes she isn't drinking). She does have pedal edema, which she states has gotten better over the past 2 weeks. She states she had weeping edema of the lower extremities, which has gotten better since diuresing. She has no issues with waking up at night because of breathing, but she does get up often to urinate. She sleeps with one pillow on her stomach. \par \par COVID vaccination completed in March,.2021 \par  \par

## 2021-07-02 NOTE — REVIEW OF SYSTEMS
[Feeling Tired] : feeling tired [Lower Ext Edema] : lower extremity edema [SOB on Exertion] : shortness of breath during exertion [Joint Pain] : joint pain [Joint Swelling] : joint swelling [Negative] : Endocrine [Chest Pain] : no chest pain [Palpitations] : no palpitations [Dizziness] : no dizziness

## 2021-07-02 NOTE — ASSESSMENT
[FreeTextEntry1] : The patient is an 88 y/o female, with a PMHx of AFib, CAD with PCI, Type 2 DM, Colon Ca, Open pedal wound and Aortic Stenosis. She was referred By Dr. Hodges for evaluation of her Aortic Stenosis, and possible TAVR procedure.  She presents with her son and reports worsening SOB and GARCIA. She will need to rest for about 5 minutes prior to feeling any relief when she exerts herself. She states this has rapidly gotten worse over the past few weeks. She denies any chest discomfort, or feeling dizzy (except when she notes she isn't drinking). She does have pedal edema, which she states has gotten better over the past 2 weeks. She states she had weeping edema of the lower extremities, which has gotten better since diuresing. She has no issues with waking up at night because of breathing, but she does get up often to urinate. She sleeps with one pillow on her stomach. \par \par COVID vaccination completed in March 2021. \par \par I had the pleasure of evaluating your patient,Ms. ANITHA JHAVERI who is an 87 year F with heart failure symptoms of dyspnea on exertion, fatigue and pedal edema (NYHA class II).\par \par  Ms. ANITHA JHAVERI has severe AS with pG 74 mmHg and mG 49 mmHg, AoV 4.3 m/sec and an ALEXA of 0.5 cm²)   The risks and benefits of both SAVR and MARIAH have been explained and the patient would like to proceed with further workup and consideration for MARIAH by the structural heart team.  I explained the risks of vascular complication, pacemaker requirement and possible paravalvular leakage. She will require a catheterization before finalizing plans for the procedure. The patient was also made aware of the possibility of using conscious sedation or general anesthesia during the procedure.  Once completed, all imaging will be reviewed by the Heart Team and an optimal treatment strategy will be recommended.\par \par Plan:\par 1) Lab work scheduled for today CBC, CMP and Pro BNP\par 2) Cardiac Catherization to evaluate coronary anatomy \par \par  \par

## 2021-07-02 NOTE — PHYSICAL EXAM
[General Appearance - In No Acute Distress] : in no acute distress [General Appearance - Alert] : alert [Sclera] : the sclera and conjunctiva were normal [PERRL With Normal Accommodation] : pupils were equal in size, round, and reactive to light [Extraocular Movements] : extraocular movements were intact [Outer Ear] : the ears and nose were normal in appearance [Oropharynx] : the oropharynx was normal [Jugular Venous Distention Increased] : there was no jugular-venous distention [II] : a grade 2 [Examination Of The Chest] : the chest was normal in appearance [Bowel Sounds] : normal bowel sounds [Abdomen Soft] : soft [Skin Color & Pigmentation] : normal skin color and pigmentation [Oriented To Time, Place, And Person] : oriented to person, place, and time [] : no respiratory distress [No CVA Tenderness] : no ~M costovertebral angle tenderness [Right Carotid Bruit] : no bruit heard over the right carotid [FreeTextEntry1] : deferred

## 2021-07-07 PROBLEM — N17.9 AKI (ACUTE KIDNEY INJURY): Status: ACTIVE | Noted: 2021-01-01

## 2021-07-08 NOTE — CONSULT NOTE ADULT - ASSESSMENT
87F PMH CAD w/stents, DM2, Afib (not on AC), Colon Ca (about 25y ago) AS, HF on 80mg lasix qd, recently started on Entresto, presents to the ED with abnormal labs done on 7/7 showing JACQUE with hyperk     Nephrology consulted for JACQUE / hyperkalemia and acidosis.

## 2021-07-08 NOTE — ED ADULT NURSE REASSESSMENT NOTE - NS ED NURSE REASSESS COMMENT FT1
Pt repositioned on bed, family member at bedside. Pt resting conformably not complaints at this time.

## 2021-07-08 NOTE — ED CLERICAL - NS ED CLERK NOTE PRE-ARRIVAL INFORMATION; ADDITIONAL PRE-ARRIVAL INFORMATION
CC/Reason For referral: Acute kidney injury, Hyponatremia, Hupoglycemia  Preferred Consultant(if applicable):  Who admits for you (if needed):  Do you have documents you would like to fax over? no  Would you still like to speak to an ED attending? yes

## 2021-07-08 NOTE — H&P ADULT - ASSESSMENT
87F PMH CAD w/stents, DM2, Afib (not on AC), Colon Ca (about 25y ago) AS, HF on 80mg lasix qd, recently started on Entresto, presents to the ED with JACQUE, oliguria x2 days and hyperkalemia, admitted to MICU for urgent HD.     #Neuro  -A&Ox3   -No pain at this time    #Respiratory  -Satting well on RA  -CXR with lung neoplasm    #CV  -Hx CAD s/p stents, A-fib not on AC, hx CHF  -Cont home meds     #GI/Nutrition  -No active issues, CTAP unable to visualize pancreas well  -Hx colon cancer 20 years ago s/p chemo    #/Renal/Fluids  -JACQUE, hyperkalemia, Cr 6  -S/p bicarb, calcium, albuterol for hyperkalemia  -MICU for urgent HD, f/u nephro recs  -BMP q4  -HAGMA - GAP: 29, bicarb: 11 pH 7.2 - lactate 7 likely 2/2 metformin and JACQUE  -F/u UA, urine lytes, spot urine TP/CR  -Bladder scan to r/o obstruction  -May need morris cath if retaining urine  -IVF hydration for now  -Monitor labs and urine output. Avoid NSAIDs, ACEI/ARBS, RCA and nephrotoxins.   -Dose medications as per eGFR.    #ID  -Afebrile, WBC    #Endocrine  -Hypoglycemia 2/2 CYN  -Tox consulted, f/u recs  -Q1 fingerstick  -Octreotide if persistent hypoglycemia    #Heme  - DVT ppx: SQH    #Dispo: ICU 87F PMH CAD w/stents, DM2, Afib (not on AC), Colon Ca (about 25y ago) AS, HF on 80mg lasix qd, recently started on Entresto, presents to the ED with JACQUE, oliguria x2 days and hyperkalemia, admitted to MICU for urgent HD.     #Neuro  -A&Ox3   -No pain at this time    #Respiratory  -Satting well on RA  -CXR with lung neoplasm    #CV  -Hx CAD s/p stents, A-fib not on AC, hx CHF  -Pt on home metoprolol and entresto, held for now     #GI/Nutrition  -No active issues, CTAP unable to visualize pancreas well  -Hx colon cancer 20 years ago s/p chemo    #/Renal/Fluids  -JACQUE, hyperkalemia, Cr 6  -S/p bicarb, calcium, albuterol for hyperkalemia  -MICU for urgent HD, f/u nephro recs  -BMP q4  -HAGMA - GAP: 29, bicarb: 11 pH 7.2 - lactate 7 likely 2/2 metformin and JACQUE  -F/u UA, urine lytes, spot urine TP/CR  -Bladder scan to r/o obstruction  -May need morris cath if retaining urine  -IVF hydration for now  -Monitor labs and urine output. Avoid NSAIDs, ACEI/ARBS, RCA and nephrotoxins.   -Dose medications as per eGFR.    #ID  -Afebrile, WBC    #Endocrine  -Hypoglycemia 2/2 CYN  -Tox consulted, f/u recs  -Q1 fingerstick  -Octreotide if persistent hypoglycemia    #Heme  - DVT ppx: SQH    #Dispo: ICU

## 2021-07-08 NOTE — CONSULT NOTE ADULT - SUBJECTIVE AND OBJECTIVE BOX
[facility] DIVISION OF KIDNEY DISEASES AND HYPERTENSION -- [phone]  -- INITIAL CONSULT NOTE  --------------------------------------------------------------------------------  If any questions, please feel free to contact me  [facility] pager: [phone], [facility]: [phone]  Vicente Cortes M.D.  Nephrology Fellow    (After 5 pm or on weekends please page the on-call fellow)  --------------------------------------------------------------------------------      HPI:  87F PMH CAD w/stents, DM2, Afib (not on AC's, dc'd pradaxa 1mo ago as pt w/u for anemia), Colon Ca (about 25y ago),  AS, HF on 80mg lasix qd, recently started on Entresto, presents to the ED with abnormal labs done on 7/7 showing JACQUE with hyperk to >6 mod hemolyzed. Pt states that she hasn't urinated in 2 days, also states she's had nonbloody loose stools over the past week. Otherwise she states SOB is at baseline. Denies any fevers/chills, cough, chest pain, palpitations, lightheadedness, abd pain, n/v, leg swelling worse than baseline.    Home medications: Metformin, Lasix, Metolazone, Glipizide, Statin, Entresto, Metoprolol and Januvia     On presentation patient with BUN/Cr 79/6.7 mg/dl. K: 6.6 (non hemolyzed). Previous Cr was at 1.26 6/7/21. Also noted with bicarb: 11 pH 7.2 - lactate 7. patient on Metformin at home and reports taking all of her medications.     Nephrology consulted for JACQUE / hyperkalemia and acidosis.     PAST HISTORY  --------------------------------------------------------------------------------  PAST MEDICAL & SURGICAL HISTORY:    FAMILY HISTORY:    PAST SOCIAL HISTORY:    ALLERGIES & MEDICATIONS  --------------------------------------------------------------------------------  Allergies    No Known Allergies    Intolerances      Standing Inpatient Medications  ALBUTerol    0.083%.. 2.5 milliGRAM(s) Nebulizer every 20 minutes    PRN Inpatient Medications      REVIEW OF SYSTEMS  --------------------------------------------------------------------------------  Gen: +fatigue + weakness.  No fevers/chills  Skin: No rashes  Head/Eyes/Ears: Normal hearing,   Respiratory: No dyspnea, cough  CV: No chest pain  GI: No abdominal pain, diarrhea  : No dysuria, hematuria  MSK: No  edema    All other systems were reviewed and are negative, except as noted.    VITALS/PHYSICAL EXAM  --------------------------------------------------------------------------------  T(C): 36.4 (07-08-21 @ 07:36), Max: 36.4 (07-08-21 @ 07:36)  HR: 79 (07-08-21 @ 10:30) (71 - 83)  BP: 128/64 (07-08-21 @ 10:30) (112/67 - 128/64)  RR: 18 (07-08-21 @ 10:30) (18 - 20)  SpO2: 100% (07-08-21 @ 10:30) (95% - 100%)  Wt(kg): --  Height (cm): 152.4 (07-08-21 @ 07:36)  Weight (kg): 74.4 (07-08-21 @ 07:36)  BMI (kg/m2): 32 (07-08-21 @ 07:36)  BSA (m2): 1.72 (07-08-21 @ 07:36)      Physical Exam:  	Gen: NAD  	HEENT: MMM  	Pulm: CTA B/L  	CV: S1S2  	Abd: Soft, +BS   	Ext: No LE edema B/L  	Neuro: Awake  	Skin: Warm and dry             : no tenderness to palpation       	Vascular access:    LABS/STUDIES  --------------------------------------------------------------------------------              7.8    7.31  >-----------<  196      [07-08-21 @ 08:47]              25.8     124  |  84  |  79  ----------------------------<  32      [07-08-21 @ 08:48]  6.6   |  11  |  6.76        Ca     9.6     [07-08-21 @ 08:48]      Mg     2.0     [07-08-21 @ 08:48]      Phos  6.4     [07-08-21 @ 08:48]    TPro  7.2  /  Alb  3.9  /  TBili  0.4  /  DBili  x   /  AST  25  /  ALT  17  /  AlkPhos  205  [07-08-21 @ 08:48]          Creatinine Trend:  SCr 6.76 [07-08 @ 08:48]

## 2021-07-08 NOTE — CONSULT NOTE ADULT - ASSESSMENT
86yo F multiple medical problems including diabetes on metformin and sulfonylurea, in ED with evidence of volume overload ?CHF, JACQUE with hyperkalemia, hypoglycemia, and lactic acidosis.    Lactate of 7 in a patient on metformin is concerning for WARNER, in patients who are not profoundly acidotic the mainstay of treatment is correcting the underlying cause/inciting event (usually an JACQUE) and volume resuscitation, however this patient has evidence of CHF and fluid in lungs making volume resuscitation difficult. This, in the setting of concomitant hyperkalemia makes hemodialysis the preferential treatment.    Hypoglycemia in the setting of JACQUE in patient on a CYN warrants q1 hour finger sticks, if glucose drops below 70 more than once patient should be started on octreotide 50mcg subcutaneous every 6 hours for 4 doses, q1 finger sticks should continue for 24 hours after the last dose of octreotide to monitor for rebound hypoglycemia. PRN dextrose for hypoglycemia is preferred over dextrose infusion as this may exacerbate the mechanism of hypoglycemia in a patient on a CYN.      Continue supportive care.    Rest of work up per primary team.    Please page our service with any questions/concerns/updates [phone]     Thank you 86yo F multiple medical problems including diabetes on metformin and sulfonylurea, in ED with evidence of volume overload ?CHF, JACQUE with hyperkalemia, hypoglycemia, and lactic acidosis.    Lactate of 7 in a patient on metformin is concerning for WARNER, in patients who are not profoundly acidotic the mainstay of treatment is correcting the underlying cause/inciting event (usually an JACQUE) and volume resuscitation, however this patient has evidence of CHF and fluid in lungs making volume resuscitation difficult. This, in the setting of concomitant hyperkalemia makes hemodialysis the preferential treatment.    Please closely monitor acid/base status and lactate level, recommend serial VBGs w/ lactate.    Hypoglycemia in the setting of JACQUE in patient on a CYN warrants q1 hour finger sticks, if glucose drops below 70 more than once patient should be started on octreotide 50mcg subcutaneous every 6 hours for 4 doses, q1 finger sticks should continue for 24 hours after the last dose of octreotide to monitor for rebound hypoglycemia. PRN dextrose for hypoglycemia is preferred over dextrose infusion as this may exacerbate the mechanism of hypoglycemia in a patient on a CYN.      Continue supportive care.    Rest of work up per primary team.    Please page our service with any questions/concerns/updates [phone]     Thank you 88yo F multiple medical problems including diabetes on metformin and sulfonylurea, in ED with evidence of volume overload ?CHF, JACQUE with hyperkalemia, hypoglycemia, and lactic acidosis.    Lactate of 7 in a patient on metformin is concerning for WARNER, in patients who are not profoundly acidotic the mainstay of treatment is correcting the underlying cause/inciting event (usually an JACQUE) and volume resuscitation, however this patient has evidence of CHF and fluid in lungs making volume resuscitation difficult. This, in the setting of concomitant hyperkalemia makes hemodialysis the preferential treatment.    Please closely monitor acid/base status and lactate level, recommend serial VBGs w/ lactate.    Hypoglycemia in the setting of JACQUE in patient on a CYN warrants q1 hour finger sticks, if glucose drops below 70 more than once patient should be started on octreotide 50mcg subcutaneous every 6 hours for 4 doses, unless patient becomes profoundly hyperglycemia (>300) at which point octreotide can be discontinued. q1 hr finger sticks should continue for 24 hours after the last dose of octreotide to monitor for rebound hypoglycemia. PRN amps of dextrose for hypoglycemia is preferred over dextrose infusion as this may exacerbate the mechanism of hypoglycemia in a patient on a CYN.      Continue supportive care.    Rest of work up per primary team.    Please page our service with any questions/concerns/updates [phone]     Thank you

## 2021-07-08 NOTE — ED PROVIDER NOTE - PHYSICAL EXAMINATION
GENERAL: no acute distress, non-toxic appearing  HEENT: normal conjunctiva, oral mucosa moist  CARDIAC: regular rate in 80s in afib  PULM: some mild low lung crackles, 97% on RA, no incr wob  GI: abdomen nondistended, soft, nontender  : no suprapubic tenderness  NEURO: alert and oriented x 3, normal speech, moving all extremities without lateralization  MSK: no visible deformities, mild bilat lower ext swelling  SKIN: no visible rashes  PSYCH: appropriate mood and affect

## 2021-07-08 NOTE — H&P ADULT - HISTORY OF PRESENT ILLNESS
MICU H&P    CHIEF COMPLAINT: urgent HD, hyperkalemia    HPI / INTERVAL HISTORY:  87F PMH CAD w/stents, DM2, Afib (not on AC's, dc'd pradaxa 1mo ago as pt w/u for anemia), Colon Ca (about 25y ago),  AS, HF on 80mg lasix qd, recently started on Entresto, presents to the ED with abnormal labs done on 7/7 showing JACQUE with hyperk to >6 mod hemolyzed. Pt states that she hasn't urinated in 2 days, also states she's had nonbloody loose stools over the past week. Otherwise she states SOB is at baseline. Denies any fevers/chills, cough, chest pain, palpitations, lightheadedness, abd pain, n/v, leg swelling worse than baseline.    Home medications: Metformin, Lasix, Metolazone, Glipizide, Statin, Entresto, Metoprolol and Januvia     On presentation patient with BUN/Cr 79/6.7 mg/dl. K: 6.6 (non hemolyzed). Previous Cr was at 1.26 6/7/21. Also noted with bicarb: 11 pH 7.2 - lactate 7. patient on Metformin at home and reports taking all of her medications.     Nephrology consulted for JACQUE / hyperkalemia and acidosis. Temporized in ED with Bicarb and calcium. Per tox will need Q1 FSG for sulfonylurea and JACQUE. Will be admitted to MICU for HD.     PAST MEDICAL & SURGICAL HISTORY:    FAMILY HISTORY:  Denies pertinent fmhx     SOCIAL HISTORY:  Denies smoking drinking or illicit drug use    Allergies    No Known Allergies    Intolerances    REVIEW OF SYSTEMS:  +Fatigue, weakness, oliguria  Negative except per HPI    OBJECTIVE:  ICU Vital Signs Last 24 Hrs  T(C): 36.4 (08 Jul 2021 07:36), Max: 36.4 (08 Jul 2021 07:36)  T(F): 97.5 (08 Jul 2021 07:36), Max: 97.5 (08 Jul 2021 07:36)  HR: 79 (08 Jul 2021 10:30) (71 - 83)  BP: 128/64 (08 Jul 2021 10:30) (112/67 - 128/64)  BP(mean): 71 (08 Jul 2021 10:30) (71 - 77)    RR: 18 (08 Jul 2021 10:30) (18 - 20)  SpO2: 100% (08 Jul 2021 10:30) (95% - 100%)  CAPILLARY BLOOD GLUCOSE    POCT Blood Glucose.: 148 mg/dL (08 Jul 2021 11:19)    PHYSICAL EXAM:  General:   HEENT:   Neck:   Chest/Lungs:  Heart:  Abdomen:   Extremities:   Skin:   Neuro:   Psych:     LINES:     HOSPITAL MEDICATIONS:  MEDICATIONS  (STANDING):    MEDICATIONS  (PRN):      LABS:                        7.8    7.31  )-----------( 196      ( 08 Jul 2021 08:47 )             25.8     Hgb Trend: 7.8<--  07-08    124<L>  |  84<L>  |  79<H>  ----------------------------<  32<LL>  6.6<HH>   |  11<L>  |  6.76<H>    Ca    9.6      08 Jul 2021 08:48  Phos  6.4     07-08  Mg     2.0     07-08    TPro  7.2  /  Alb  3.9  /  TBili  0.4  /  DBili  x   /  AST  25  /  ALT  17  /  AlkPhos  205<H>  07-08    Creatinine Trend: 6.76<--    Venous Blood Gas:  07-08 @ 08:47  7.24/34/42/14/64  VBG Lactate: 7.0      MICROBIOLOGY:     RADIOLOGY & ADDITIONAL TESTS: MICU H&P    CHIEF COMPLAINT: urgent HD, hyperkalemia    HPI / INTERVAL HISTORY:  87F PMH CAD w/stents, DM2, Afib (not on AC's, dc'd pradaxa 1mo ago as pt w/u for anemia), Colon Ca (about 25y ago),  AS, HF on 80mg lasix qd, recently started on Entresto, presents to the ED with abnormal labs done on 7/7 showing JACQUE with hyperk to >6 mod hemolyzed. Pt states that she hasn't urinated in 2 days, also states she's had nonbloody loose stools over the past week. Otherwise she states SOB is at baseline. Denies any fevers/chills, cough, chest pain, palpitations, lightheadedness, abd pain, n/v, leg swelling worse than baseline.    Home medications: Metformin, Lasix, Metolazone, Glipizide, Statin, Entresto, Metoprolol and Januvia     On presentation patient with BUN/Cr 79/6.7 mg/dl. K: 6.6 (non hemolyzed). Previous Cr was at 1.26 6/7/21. Also noted with bicarb: 11 pH 7.2 - lactate 7. patient on Metformin at home and reports taking all of her medications.     Nephrology consulted for JACQUE / hyperkalemia and acidosis. Temporized in ED with Bicarb and calcium. Per tox will need Q1 FSG for sulfonylurea and JACQUE. Will be admitted to MICU for HD.     PAST MEDICAL & SURGICAL HISTORY:    FAMILY HISTORY:  Denies pertinent fmhx     SOCIAL HISTORY:  Denies smoking drinking or illicit drug use    Allergies    No Known Allergies    Intolerances    REVIEW OF SYSTEMS:  +Fatigue, weakness, oliguria  Negative except per HPI    OBJECTIVE:  ICU Vital Signs Last 24 Hrs  T(C): 36.4 (08 Jul 2021 07:36), Max: 36.4 (08 Jul 2021 07:36)  T(F): 97.5 (08 Jul 2021 07:36), Max: 97.5 (08 Jul 2021 07:36)  HR: 79 (08 Jul 2021 10:30) (71 - 83)  BP: 128/64 (08 Jul 2021 10:30) (112/67 - 128/64)  BP(mean): 71 (08 Jul 2021 10:30) (71 - 77)    RR: 18 (08 Jul 2021 10:30) (18 - 20)  SpO2: 100% (08 Jul 2021 10:30) (95% - 100%)  CAPILLARY BLOOD GLUCOSE    POCT Blood Glucose.: 148 mg/dL (08 Jul 2021 11:19)    PHYSICAL EXAM:  Gen: NAD  	HEENT: MMM  	Pulm: CTA B/L  	CV: S1S2  	Abd: Soft, +BS   	Ext: No LE edema B/L  	Neuro: Awake  	Skin: Warm and dry             : no tenderness to palpation     MEDICATIONS  (PRN):    LABS:                        7.8    7.31  )-----------( 196      ( 08 Jul 2021 08:47 )             25.8     Hgb Trend: 7.8<--  07-08    124<L>  |  84<L>  |  79<H>  ----------------------------<  32<LL>  6.6<HH>   |  11<L>  |  6.76<H>    Ca    9.6      08 Jul 2021 08:48  Phos  6.4     07-08  Mg     2.0     07-08    TPro  7.2  /  Alb  3.9  /  TBili  0.4  /  DBili  x   /  AST  25  /  ALT  17  /  AlkPhos  205<H>  07-08    Creatinine Trend: 6.76<--    Venous Blood Gas:  07-08 @ 08:47  7.24/34/42/14/64  VBG Lactate: 7.0 MICU H&P    CHIEF COMPLAINT: urgent HD, hyperkalemia    HPI / INTERVAL HISTORY:  87F PMH CAD w/stents, DM2, Afib (not on AC's, dc'd pradaxa 1mo ago as pt w/u for anemia), Colon Ca (about 25y ago),  AS, HF on 80mg lasix qd, recently started on Entresto, presents to the ED with abnormal labs done on 7/7 showing JACQUE with hyperk to >6 mod hemolyzed. Pt states that she hasn't urinated in 2 days, also states she's had nonbloody loose stools over the past week. Otherwise she states SOB is at baseline. Denies any fevers/chills, cough, chest pain, palpitations, lightheadedness, abd pain, n/v, leg swelling worse than baseline.    Home medications: Metformin, Lasix 80qd, Metolazone 25 mg qd, Glipizide, atorvastatin 40 mg qhs, Zolpidem 5 mg qhs, Entresto 24 mg, Metoprolol 25 mg and Januvia     On presentation patient with BUN/Cr 79/6.7 mg/dl. K: 6.6 (non hemolyzed). Previous Cr was at 1.26 6/7/21. Also noted with bicarb: 11 pH 7.2 - lactate 7. patient on Metformin at home and reports taking all of her medications.     Nephrology consulted for JACQUE / hyperkalemia and acidosis. Temporized in ED with Bicarb and calcium. Per tox will need Q1 FSG for sulfonylurea and JACQUE. Will be admitted to MICU for HD.     PAST MEDICAL & SURGICAL HISTORY:    FAMILY HISTORY:  Denies pertinent fmhx     SOCIAL HISTORY:  Denies smoking drinking or illicit drug use    Allergies    No Known Allergies    Intolerances    REVIEW OF SYSTEMS:  +Fatigue, weakness, oliguria  Negative except per HPI    OBJECTIVE:  ICU Vital Signs Last 24 Hrs  T(C): 36.4 (08 Jul 2021 07:36), Max: 36.4 (08 Jul 2021 07:36)  T(F): 97.5 (08 Jul 2021 07:36), Max: 97.5 (08 Jul 2021 07:36)  HR: 79 (08 Jul 2021 10:30) (71 - 83)  BP: 128/64 (08 Jul 2021 10:30) (112/67 - 128/64)  BP(mean): 71 (08 Jul 2021 10:30) (71 - 77)    RR: 18 (08 Jul 2021 10:30) (18 - 20)  SpO2: 100% (08 Jul 2021 10:30) (95% - 100%)  CAPILLARY BLOOD GLUCOSE    POCT Blood Glucose.: 148 mg/dL (08 Jul 2021 11:19)    PHYSICAL EXAM:  Gen: NAD  	HEENT: MMM  	Pulm: CTA B/L  	CV: S1S2  	Abd: Soft, +BS   	Ext: No LE edema B/L  	Neuro: Awake  	Skin: Warm and dry             : no tenderness to palpation     MEDICATIONS  (PRN):    LABS:                        7.8    7.31  )-----------( 196      ( 08 Jul 2021 08:47 )             25.8     Hgb Trend: 7.8<--  07-08    124<L>  |  84<L>  |  79<H>  ----------------------------<  32<LL>  6.6<HH>   |  11<L>  |  6.76<H>    Ca    9.6      08 Jul 2021 08:48  Phos  6.4     07-08  Mg     2.0     07-08    TPro  7.2  /  Alb  3.9  /  TBili  0.4  /  DBili  x   /  AST  25  /  ALT  17  /  AlkPhos  205<H>  07-08    Creatinine Trend: 6.76<--    Venous Blood Gas:  07-08 @ 08:47  7.24/34/42/14/64  VBG Lactate: 7.0 MICU H&P    CHIEF COMPLAINT: urgent HD, hyperkalemia    HPI / INTERVAL HISTORY:  87F PMH CAD w/stents, DM2, Afib (not on AC's, dc'd pradaxa 1mo ago as pt w/u for anemia), Colon Ca (about 25y ago),  AS, HF on 80mg lasix qd, recently started on Entresto, presents to the ED with abnormal labs done on 7/7 showing JACQUE with hyperk to >6 mod hemolyzed. Pt states that she hasn't urinated in 2 days, also states she's had nonbloody loose stools over the past week. Otherwise she states SOB is at baseline. Denies any fevers/chills, cough, chest pain, palpitations, lightheadedness, abd pain, n/v, leg swelling worse than baseline.    Home medications: Metformin, Lasix 80qd, Metolazone 25 mg qd, Glipizide, atorvastatin 40 mg qhs, Zolpidem 5 mg qhs, Entresto 24 mg, Metoprolol 25 mg and Januvia     On presentation patient with BUN/Cr 79/6.7 mg/dl. K: 6.6 (non hemolyzed). Previous Cr was at 1.26 6/7/21. Also noted with bicarb: 11 pH 7.2 - lactate 7. patient on Metformin at home and reports taking all of her medications.     Nephrology consulted for JACQUE / hyperkalemia and acidosis. Temporized in ED with Bicarb and calcium. Per tox will need Q1 FSG for sulfonylurea and JACQUE. Will be admitted to MICU for HD.     PAST MEDICAL & SURGICAL HISTORY:    FAMILY HISTORY:  No sig FH in first degree relatives     SOCIAL HISTORY:  Denies smoking drinking or illicit drug use    Allergies    No Known Allergies    Intolerances    REVIEW OF SYSTEMS:  +Fatigue, weakness, oliguria  Negative except per HPI    OBJECTIVE:  ICU Vital Signs Last 24 Hrs  T(C): 36.4 (08 Jul 2021 07:36), Max: 36.4 (08 Jul 2021 07:36)  T(F): 97.5 (08 Jul 2021 07:36), Max: 97.5 (08 Jul 2021 07:36)  HR: 79 (08 Jul 2021 10:30) (71 - 83)  BP: 128/64 (08 Jul 2021 10:30) (112/67 - 128/64)  BP(mean): 71 (08 Jul 2021 10:30) (71 - 77)    RR: 18 (08 Jul 2021 10:30) (18 - 20)  SpO2: 100% (08 Jul 2021 10:30) (95% - 100%)  CAPILLARY BLOOD GLUCOSE    POCT Blood Glucose.: 148 mg/dL (08 Jul 2021 11:19)    PHYSICAL EXAM:  Gen: NAD  	HEENT: MMM  	Pulm: CTA B/L  	CV: S1S2  	Abd: Soft, +BS   	Ext: No LE edema B/L  	Neuro: Awake  	Skin: Warm and dry             : no tenderness to palpation     MEDICATIONS  (PRN):    LABS:                        7.8    7.31  )-----------( 196      ( 08 Jul 2021 08:47 )             25.8     Hgb Trend: 7.8<--  07-08    124<L>  |  84<L>  |  79<H>  ----------------------------<  32<LL>  6.6<HH>   |  11<L>  |  6.76<H>    Ca    9.6      08 Jul 2021 08:48  Phos  6.4     07-08  Mg     2.0     07-08    TPro  7.2  /  Alb  3.9  /  TBili  0.4  /  DBili  x   /  AST  25  /  ALT  17  /  AlkPhos  205<H>  07-08    Creatinine Trend: 6.76<--    Venous Blood Gas:  07-08 @ 08:47  7.24/34/42/14/64  VBG Lactate: 7.0

## 2021-07-08 NOTE — ED PROVIDER NOTE - CARE PLAN
Principal Discharge DX:	JACQUE (acute kidney injury)  Secondary Diagnosis:	Hyperkalemia  Secondary Diagnosis:	Lactic acidosis  Secondary Diagnosis:	Metabolic acidosis  Secondary Diagnosis:	Hypoglycemia  Secondary Diagnosis:	Oliguria

## 2021-07-08 NOTE — CONSULT NOTE ADULT - PROBLEM SELECTOR RECOMMENDATION 9
Pt with JACQUE in the setting of severe diarrhea/decreased po intake +/- ?obstruction. Exact duration of JACQUE however unknown. Upon review of [facility] HIE/[employer] last sCr was 1.26 on (6/7/21). On admission  BUN/Cr 79/6.7 mg/dl. K: 6.6 (non hemolyzed). As per patient unable to urinate for the past 2 days.      At this time please send UA, urine electrolytes, spot urine TP/CR.   perform bladder scan to r/o Urinary obstruction  IF retaining urine would place Steward cath.   Agree with IVF hydration for now.    Will arrange for Urgent HD, Discussed with family and due to severe electrolyte derangement and severe acidosis.  patient and family at bedside (son) agrees with plan.  would consult ICU for urgent HD   Monitor labs and urine output. Avoid NSAIDs, ACEI/ARBS, RCA and nephrotoxins.   Dose medications as per eGFR. Pt with JACQUE in the setting of severe diarrhea/decreased po intake +/- ?obstruction. Exact duration of JACQUE however unknown. Upon review of [facility] HIE/[employer] last sCr was 1.26 on (6/7/21). On admission  BUN/Cr 79/6.7 mg/dl. K: 6.6 (non hemolyzed). As per patient unable to urinate for the past 2 days.      At this time please send UA, urine electrolytes, spot urine TP/CR.   perform bladder scan to r/o Urinary obstruction  IF retaining urine would place Steward cath.   Will arrange for Urgent HD, Discussed with family and due to severe electrolyte derangement and severe acidosis.  patient and family at bedside (son) agrees with plan.  would consult ICU for urgent HD   Monitor labs and urine output. Avoid NSAIDs, ACEI/ARBS, RCA and nephrotoxins.   Dose medications as per eGFR.

## 2021-07-08 NOTE — ED ADULT TRIAGE NOTE - BP NONINVASIVE SYSTOLIC (MM HG)
Referred by: Leona Cuellar MD; Medical Diagnosis (from order):    Diagnosis Information      Diagnosis    724.2, 338.19 (ICD-9-CM) - M54.5 (ICD-10-CM) - Acute bilateral low back pain without sciatica                Physical Therapy -  Daily Treatment Note    Visit:  2     SUBJECTIVE                                                                                                             No pain currently.  Right hip yesterday 5/10.    Pain / Symptoms:  Pain rating (out of 10): Current: 0     OBJECTIVE                                                                                                                     Range of Motion (ROM)   (degrees unless noted; active unless noted; norms in ( ); negative=lacking to 0, positive=beyond 0)   Lumbar:    - Extension (25):  75%       TREATMENT                                                                                                                  Therapeutic Exercise:  Prone press ups x 10  bilat hams, ktc, piriformis stretch, manual 15 sec each  bilat hams curls in hooklying with SB and PPT, 10 reps, 5 sec   Alt KTC with PPT, 20 reps  bilat clams with green tband, 15 reps, 5 sec  Pelvic tilts sitting at EOM x 15  bilat lat pulls blue tband, 10 reps, 5 sec  Retro arm pull back with blue tband and trunk rotation, 10 reps bilat   Alt lunges with SB reach, 20 reps  Squats with  Green tband, 10 reps, 5 sec      Manual Therapy:  Grade II, central PA, lumbar    Home Exercise Program:        ASSESSMENT                                                                                                             Progressing towards goals.   Decreased strength bilat glut med.  Notices that dressing is not a limited.  Will benefit from further PT.  Pain/symptoms after session: 3        PLAN                                                                                                                           Suggestions for next session as indicated: Progress per plan  of care          Procedures and total treatment time documented Time Entry flowsheet.   112

## 2021-07-08 NOTE — H&P ADULT - NSHPLABSRESULTS_GEN_ALL_CORE
LABS:  cret                        7.8    7.31  )-----------( 196      ( 08 Jul 2021 08:47 )             25.8     07-08    124<L>  |  84<L>  |  79<H>  ----------------------------<  32<LL>  6.6<HH>   |  11<L>  |  6.76<H>    Ca    9.6      08 Jul 2021 08:48  Phos  6.4     07-08  Mg     2.0     07-08    TPro  7.2  /  Alb  3.9  /  TBili  0.4  /  DBili  x   /  AST  25  /  ALT  17  /  AlkPhos  205<H>  07-08

## 2021-07-08 NOTE — ED PROVIDER NOTE - ATTENDING CONTRIBUTION TO CARE
Attending Statement (ARIE Larry MD):    HPI: 88y/o F with h/o CAD w/ stents, DM2 (on metformin and a sulfonylurea), Afib, AS, anemia (currently being worked up, unclear etiology), CHF presenting with abnormal labs as outpatient; patient reports feeling more tired than usual lately, and reports no urine output for past 2-3 days; + diarrhea (chronic?) and was to undergo colonoscopy for evaluation of anemia (evaluating for GI bleed) when she was found to freddy hypoglycemic, hyperkalemic and with new renal failure.  No fever, no vomiting, no abdominal pain, no flank pain. Decreased po intake lately (no clear cause identified by patient).    Review of Systems:  -General: no fever or chills  -ENT: no congestion, no difficulty swallowing  -Pulmonary: no cough, no shortness of breath  -Cardiac: no chest pain, no palpitations  -Gastrointestinal: no abdominal pain, no nausea, no vomiting, and no diarrhea.  -Genitourinary: no blood or pain with urination  -Musculoskeletal: no back or neck pain  -Skin: no rashes  -Endocrine: +h/o diabetes  -Neurologic: No focal weakness or numbness; + generalized weakness    All else negative unless otherwise specified elsewhere in this note.    PSH/PMH as noted above    On Physical Exam:  General: awake/alert, in NAD, speaking clearly in full sentences and without difficulty; cooperative with exam  HEENT: PERRL, MMM, airway patent  Neck: no neck tenderness, no nuchal rigidity  Cardiac: normal s1, s2; RRR; no MGR  Lungs: CTABL  Abdomen: soft nontender/nondistended  : no bladder tenderness or distension  Skin: intact, no rash  Extremities: 1+ pitting peripheral edema b/l, no gross deformities  Neuro: no gross neurologic deficits    MDM: 88y/o F with h/o CAD w/ stents, DM2 (on metformin and a sulfonylurea), Afib, AS, anemia (currently being worked up, unclear etiology), CHF presenting with abnormal labs as outpatient; poor po intake; oliguric by history; POCUS shows underdistended bladder and no hydronephrosis b/l; concern for oliguric renal failure; to obtain ECG, labs, consider CT AP to eval further; morris catheter for urine output monitoring.    Please see above progress notes above for updates to medical decision making and the patient's clinical course.

## 2021-07-08 NOTE — CHART NOTE - NSCHARTNOTEFT_GEN_A_CORE
Dialysis Consent   Thoroughly reviewed risks and benefits of RRT/HD with patient and Son who agree to dialytic therapy if needed. All questions answered.  (obtained by Dr. Cortes)    PAPER FORM CONSENT obtained and placed in patient's chart.

## 2021-07-08 NOTE — CONSULT NOTE ADULT - PROBLEM SELECTOR RECOMMENDATION 3
HAGMA - GAP: 29, bicarb: 11 pH 7.2 - lactate 7.   most likely secondary to metformin and kidney failure   will arrange for urgent HD.   avoid metformin.

## 2021-07-08 NOTE — H&P ADULT - ATTENDING COMMENTS
Pt seen and examined. 87 F with medical hx as noted above, now p/w JACQUE likely secondary to pre-renal ATN in the setting of diarrhea and poor PO intake, hyperkalemia, hyponatremia, severe high anion gap metabolic acidosis d/t lactic acidosis and hypoglycemia in the setting of ongoing metformin and sulfonylurea use. Received temporizing measures for hyperkalemia and metabolic derangements. Plan for emergent HD once vascular access placed. Hold off on volume resuscitation as she has b/l scattered B-lines with small pleural effusions and a proBNP of > 39 K. Initially also severely hypoglycemic, FS glucose now stable above 100. Cont Q1H FS monitoring, may require Octreotide if FS < 70. Stable hemodynamics at present, keep MAP >65. No leukocytosis or obvious evidence of infection, check Cxs and hold off on ABx coverage for now. CT showing b/l lung masses and nodules which are suspicious for malignancy which can be addressed once acute life threatening metabolic derangements resolve. Overall prognosis extremely guarded.

## 2021-07-08 NOTE — ED PROVIDER NOTE - OBJECTIVE STATEMENT
87F PMH DM2, Afib (not on AC's, dc'd pradaxa 1mo ago as pt w/u for anemia), AS, HF on 80mg lasix qd/rec started on entresto, presents to the ED with abnormal labs done on 7/7 showing JACQUE with hyperk to >6 mod hemolyzed. Pt states that she hasn't urinated in 2 days, also states she's had nonbloody loose stools over the past week. Otherwise she states he sob is at baseline. Denies any fevers/chills, uri sxs, cough, chest pain, palpitations, lightheadedness, abd pain, n/v, leg swelling worse than baseline. 87F PMH DM2, Afib (not on AC's, dc'd pradaxa 1mo ago as pt w/u for anemia), AS, HF on 80mg lasix qd/rec started on entresto, presents to the ED with abnormal labs done on 7/7 showing JACQUE with hyperk to >6 mod hemolyzed. Pt states that she hasn't urinated in 2 days, also states she's had nonbloody loose stools over the past week. Otherwise she states he sob is at baseline. Denies any fevers/chills, uri sxs, cough, chest pain, palpitations, lightheadedness, abd pain, n/v, leg swelling worse than baseline.    Meds: Metformin, furosemide, metolazone, glipizide, statin, metoprolol, entresto, januvia 87F PMH CAD w/stents, DM2, Afib (not on AC's, dc'd pradaxa 1mo ago as pt w/u for anemia), AS, HF on 80mg lasix qd/rec started on entresto, presents to the ED with abnormal labs done on 7/7 showing JACQUE with hyperk to >6 mod hemolyzed. Pt states that she hasn't urinated in 2 days, also states she's had nonbloody loose stools over the past week. Otherwise she states he sob is at baseline. Denies any fevers/chills, uri sxs, cough, chest pain, palpitations, lightheadedness, abd pain, n/v, leg swelling worse than baseline.    Meds: Metformin, furosemide, metolazone, glipizide, statin, metoprolol, entresto, januvia

## 2021-07-08 NOTE — ED ADULT NURSE NOTE - OBJECTIVE STATEMENT
88 y/o female  arrives to the ER c/o abnormal lab results. PMH of CAD, colon cancer( 2017). Pt is A&Ox3, speaking coherently. Pt reports feeling generalized weakness, decreased PO intake,  SOB, having loose stool, having not urinating for a few days, lack of sleep por a few days. Pt reports having blood drawn yesterday, MD called last night informing abnormal labs results, referring her to come to the ER.   Pt denies SOB, chest pain, dizziness, N/V/D, urinary symptoms, fevers, chills.    On assessment airway is patent, breathing spontaneously and unlabored. Skin is dry, warm and color appropriate to race.  Abdomen is soft, no distended, no tender. Full ROM in all extremities. Comfort measures provided. call bell within reach, bed locked in the lowest position. will continue to reassess . 88 y/o female  arrives to the ER c/o abnormal lab results. PMH of CAD,DM2, HF colon cancer (in remission ). Pt is A&Ox3, speaking coherently. Pt reports feeling generalized weakness, decreased PO intake,  SOB, having loose stool, having not urinating for a few days, lack of sleep por a few days. Pt reports having blood drawn yesterday, MD called last night informing abnormal labs results, referring her to come to the ER.   Pt denies SOB, chest pain, dizziness, N/V/D, urinary symptoms, fevers, chills.    On assessment airway is patent, breathing spontaneously and unlabored. Skin is dry, warm and color appropriate to race.  Abdomen is soft, no distended, no tender. Full ROM in all extremities. Comfort measures provided. call bell within reach, bed locked in the lowest position. will continue to reassess . 86 y/o female  arrives to the ER c/o abnormal lab results. PMH of CAD,DM2, HF colon cancer (in remission ). Pt is A&Ox3, speaking coherently. Pt reports feeling generalized weakness, decreased PO intake,  SOB, having loose stool, having not urinating for a few days, lack of sleep por a few days. Pt reports having blood drawn yesterday, MD called last night informing abnormal labs results, referring her to come to the ER. Pt denies chest pain, dizziness, N/V/D, fevers, chills. On assessment airway is patent, breathing spontaneously and unlabored. Skin is dry, warm and color appropriate to race.  Abdomen is soft, no distended, no tender. Full ROM in all extremities. Comfort measures provided. call bell within reach, bed locked in the lowest position. will continue to reassess .

## 2021-07-08 NOTE — CONSULT NOTE ADULT - SUBJECTIVE AND OBJECTIVE BOX
MEDICAL TOXICOLOGY CONSULT    HPI: 87F PMH CAD w/stents, DM2, Afib (not on AC's, dc'd pradaxa 1mo ago as pt w/u for anemia), AS, HF on 80mg lasix qd/rec started on entresto, presents to the ED with abnormal labs done on  showing JACQUE with hyperk to >6 mod hemolyzed. Pt states that she hasn't urinated in 2 days, also states she's had nonbloody loose stools over the past week. Otherwise she states he sob is at baseline. Denies any fevers/chills, uri sxs, cough, chest pain, palpitations, lightheadedness, abd pain, n/v, leg swelling worse than baseline.    Toxicology consulted for lab derangements including JACQUE with hypoglycemia on CYN, lactic acidosis on metformin.        MEDICATION HISTORY:  ALBUTerol    0.083%.. 2.5 milliGRAM(s) Nebulizer every 20 minutes      RECREATIONAL / ETHANOL / SUPPLEMENT USE:      REVIEW OF SYSTEMS:  +weakness.    PHYSICAL EXAM  Vital Signs Last 24 Hrs  T(C): 36.4 (2021 07:36), Max: 36.4 (2021 07:36)  T(F): 97.5 (2021 07:36), Max: 97.5 (2021 07:36)  HR: 79 (2021 10:30) (71 - 83)  BP: 128/64 (2021 10:30) (112/67 - 128/64)  BP(mean): 71 (2021 10:30) (71 - 77)  RR: 18 (2021 10:30) (18 - 20)  SpO2: 100% (2021 10:30) (95% - 100%)  General:    Head:  normocephalic & atraumatic  Eyes:  extra-occular movement is intact  Pupils:  3mm, symmetric, reactive to light  Neck:  supple  Respiratory: Diffuse crackles  Cardiac:  S1S2, RRR  Abdomen:  Soft, nondistended, nontender, +bowel sounds, no organomegaly, liver is _____  :  deferred  Skin:  Warm, dry, intact  Neurologic: AOx2, no focal deficits.    SIGNIFICANT LABORATORY STUDIES:                        7.8    7.31  )-----------( 196      ( 2021 08:47 )             25.8       07-08    124<L>  |  84<L>  |  79<H>  ----------------------------<  32<LL>  6.6<HH>   |  11<L>  |  6.76<H>    Ca    9.6      2021 08:48  Phos  6.4       Mg     2.0         TPro  7.2  /  Alb  3.9  /  TBili  0.4  /  DBili  x   /  AST  25  /  ALT  17  /  AlkPhos  205<H>                Anion Gap: 29<H>  @ 08:48  CK: --  @ 08:48  Troponin:  --   @ 08:48  Pro-BNP:  91251<H>   @ 08:48  VBG:  --   @ 08:48  Carboxyhemoglobin %:  --   @ 08:48  Methemoglobin %:  --   @ 08:48  Osmolality Serum:  --   @ 08:48  Aspirin Level: --   @ 08:48  Acetaminophen Level:  --   @ 08:48  Ethanol Level:  --   @ 08:48  Digoxin Level:  --   @ 08:48  Phenytoin Level:  --   @ 08:48  Carbamazepine level:  --   @ 08:48  Lamotrigine level:  --   @ 08:48  Anion Gap: --  @ 08:47  CK: --  @ 08:47  Troponin:  --   @ 08:47  Pro-BNP:  --   @ 08:47  VB<L>   08:47  Carboxyhemoglobin %:  --   @ 08:47  Methemoglobin %:  --   @ 08:47  Osmolality Serum:  --   @ 08:47  Aspirin Level: --   @ 08:47  Acetaminophen Level:  --   @ 08:47  Ethanol Level:  --   @ 08:47  Digoxin Level:  --   @ 08:47  Phenytoin Level:  --   @ 08:47  Carbamazepine level:  --   @ 08:47  Lamotrigine level:  --   @ 08:47

## 2021-07-08 NOTE — ED ADULT TRIAGE NOTE - NSTRIAGECARE_GEN_A_ER
Received call from Dr. Jacob regarding MRI results. Pt has torn meniscus and patella arthritis. Dr. Donald left VM for pt. Dr Jacob would like pt to come back and see her on Tuesday 1/15. Pt should be on crutches and should get vit D lab level. If able to get crutches and lab level before appt Tuesday that would be great otherwise should offload on that knee    Radha Baer RN     Face Mask

## 2021-07-08 NOTE — ED PROVIDER NOTE - PROGRESS NOTE DETAILS
Juan Jose, PGY3: pt with oliguric kidney failure, bladder collapsed on POCUS, lactic acidosis on metformin concerning for WARNER, hypoglycemia on sulfonylurea likely unable to clear insulin  Will give d50, CT abd/pelvis, f/u cmp for potassium level, nephro consult and likely MICU cs Juan Jose, PGY3: pt with oliguric kidney failure, bladder collapsed on POCUS, lactic acidosis on metformin concerning for WARNER, hypoglycemia on sulfonylurea likely unable to clear insulin  Will give d50, encourage PO, CT abd/pelvis, f/u cmp for potassium level, nephro consult and likely MICU cs Juan Jose, PGY3: pt hyperk to 6.6 not hemolyzed, EKG without hyperk changes, will hold off on insulin, check q1hr FS, 2 amps bicarb, calcium, albuterol, lokelma  Spoke with MICU as pt needs emergent dialysis, they will come see patient  Spoke with toxicology, recs for q1hr fs, hold Dextrose gtt, if drop glucose again will give octreotide   Nephro paged Juan Jose, PGY3: spoke with nephro for emergent hd, they will see pt  MICU bedside

## 2021-07-08 NOTE — ED PROCEDURE NOTE - ATTENDING CONTRIBUTION TO CARE
I have participated in and supervised all key portions of the above procedures and agree with the above documentation. ORLANDO Larry MD

## 2021-07-08 NOTE — ED PROVIDER NOTE - CLINICAL SUMMARY MEDICAL DECISION MAKING FREE TEXT BOX
Oliguric Kidney failure, poss hyperkalemia on outpt labs, vitals reassuring afib rate controlled, on RA, not fluid overloaded, will do ekg, bnp, labs, ultrasound kid/blad, consider CT for post renal causes. TBA

## 2021-07-08 NOTE — CHART NOTE - NSCHARTNOTEFT_GEN_A_CORE
: Ken Smith MD    INDICATION: SOB, lactic acidosis    PROCEDURE:  [x ] LIMITED ECHO  [x ] LIMITED CHEST  [ ] LIMITED RETROPERITONEAL  [x ] LIMITED ABDOMINAL  [ ] LIMITED DVT  [ ] NEEDLE GUIDANCE VASCULAR  [ ] NEEDLE GUIDANCE THORACENTESIS  [ ] NEEDLE GUIDANCE PARACENTESIS  [ ] NEEDLE GUIDANCE PERICARDIOCENTESIS  [ ] OTHER    FINDINGS:  B/L scattered B-lines anteriorly and in lateral lung fields, small b/l pleural effusions   Mild to moderate reduction in LV systolic function, no pericardial effusion, IVC indeterminate   No hydronephrosis     INTERPRETATION:  Lung ultrasound likely consistent with volume overload   Mild to moderate reduction in LV function  No hydronephrosis       Images uploaded on [employer] [facility]

## 2021-07-09 NOTE — PROGRESS NOTE ADULT - ASSESSMENT
87F PMH CAD w/stents, DM2, Afib (not on AC's, dc'd pradaxa 1mo ago as pt w/u for anemia), Colon Ca (about 25y ago),  AS, HF on 80mg lasix qd, recently started on Entresto, presents to the ED with abnormal labs done on 7/7 showing JACQUE with hyperk to >6 mod hemolyzed. Pt states that she hasn't urinated in 2 days, also states she's had nonbloody loose stools over the past week. Otherwise she states SOB is at baseline. Denies any fevers/chills, cough, chest pain, palpitations, lightheadedness, abd pain, n/v, leg swelling worse than baseline.    Home medications: Metformin, Lasix 80qd, Metolazone 25 mg qd, Glipizide, atorvastatin 40 mg qhs, Zolpidem 5 mg qhs, Entresto 24 mg, Metoprolol 25 mg and Januvia     On presentation patient with BUN/Cr 79/6.7 mg/dl. K: 6.6 (non hemolyzed). Previous Cr was at 1.26 6/7/21. Also noted with bicarb: 11 pH 7.2 - lactate 7. patient on Metformin at home and reports taking all of her medications.     Nephrology consulted for JACQUE / hyperkalemia and acidosis. Temporized in ED with Bicarb and calcium. Per tox will need Q1 FSG for sulfonylurea and JACQUE. Will be admitted to MICU for HD.     87F PMH CAD w/stents, DM2, Afib (not on AC), Colon Ca (about 25y ago) AS, HF on 80mg lasix qd, recently started on Entresto, presents to the ED with JACQUE, oliguria x2 days and hyperkalemia, admitted to MICU for urgent HD.     #Neuro  -A&Ox3   -No pain at this time    #Respiratory  -Satting well on RA  -CXR with lung neoplasm  - POCUS 7/8 B-lines concerning for volume overload    #CV  Shock  - requiring levo  - wean as tolerated  - Not on fluids i/s/o fluid overload- POCUS today    -Hx CAD s/p stents, A-fib not on AC, hx CHF  -Pt on home metoprolol and entresto, held for now     #GI/Nutrition  -No active issues, CTAP unable to visualize pancreas well  -Hx colon cancer 20 years ago s/p chemo  - NPO- advance diet tolerated?####    #/Renal/Fluids  JACQUE  - likely prerenal->ATN i/s/o decreased PO intake and diarrhea  - Cr improved s/p HD 6.7->3.34  - BMP q4, q6?  - Has been anuric- unable to send urine electrolytes    hyperkalemia i/s/o JACQUE  -S/p bicarb, calcium, albuterol for hyperkalemia  - Hyperkalemia resolved s/p HD now 4.2  -BMP q4 q6?    HAGMA  - likely 2/2 metformin use i/s/o JACQUE  - Lactic acidosis resolving Lactate 9.8->3.6, AG 30->20  -F/u UA, urine lytes, spot urine TP/CR ###  -Bladder scan to r/o obstruction, straight cath if retaining  -Monitor labs and urine output. Avoid NSAIDs, ACEI/ARBS, RCA and nephrotoxins.   -Dose medications as per eGFR.    #ID  HUBER    - F/u cultures to rule out infectious etiology to AGMA    #Endocrine  -Hypoglycemia 2/2 CYN  -Tox consulted, f/u recs  -Q1 fingerstick  -Octreotide if persistent hypoglycemia    #Heme  - DVT ppx: SQH    #Yanira Rosenthal    #Ethics  - full code    #Dispo: ICU     87F PMH CAD w/stents, DM2, Afib (not on AC's, dc'd pradaxa 1mo ago as pt w/u for anemia), Colon Ca (about 25y ago),  AS, HF on 80mg lasix qd, recently started on Entresto, p/w SOB found to have Lactic acidosis secondary to metformin use i/s/o of JACQUE.    #Neuro  -A&Ox3   -No pain at this time    #Respiratory  -Satting well on RA  -CXR with lung neoplasm  - POCUS 7/8 B-lines concerning for volume overload    #CV  Shock  - requiring levo, currently on 0.6  - wean as tolerated,    -Hx CAD s/p stents, A-fib not on AC, hx CHF  -Pt on home metoprolol and entresto, held for now     #GI/Nutrition  -No active issues  -Hx colon cancer 20 years ago s/p chemo  - Advance diet carb consistent, nephro    #/Renal/Fluids  JACQUE  - likely prerenal->ATN i/s/o decreased PO intake and diarrhea  - Cr improved s/p HD 6.7->3.34  - trend BMP  - Place morris today    hyperkalemia i/s/o JACQUE  -S/p bicarb, calcium, albuterol for hyperkalemia  - Hyperkalemia resolved s/p HD now 4.2  - Trend BMP    HAGMA  - likely 2/2 metformin use i/s/o JACQUE  - Lactic acidosis resolving Lactate 9.8->3.6, AG 30->20  -F/u UA, urine lytes, spot urine TP/CR, not collected due to anuria, will collect after morris placement  -Bladder scan to r/o obstruction, straight cath if retaining  -Monitor labs and urine output. Avoid NSAIDs, ACEI/ARBS, RCA and nephrotoxins.   -Dose medications as per eGFR.    #ID  - Low suspicion for infection  - No empiric Abx  - f/u cultures    - F/u cultures to rule out infectious etiology to AGMA    #Endocrine  -Hypoglycemia 2/2 CYN  -Tox consulted, f/u recs  -Q1 fingerstick  -Octreotide if persistent hypoglycemia <70    #Heme/Onc  - DVT ppx: SQH    - Pulmonology provider aware of lung mass    #Yanira Rosenthal    #Ethics  - full code    #Dispo: ICU     87F PMH CAD w/stents, DM2, Afib (not on AC's, dc'd pradaxa 1mo ago as pt w/u for anemia), Colon Ca (about 25y ago),  AS, HF on 80mg lasix qd, recently started on Entresto, p/w SOB found to have Lactic acidosis secondary to metformin use i/s/o of JACQUE.    #Neuro  -A&Ox3   -No pain at this time    #Respiratory  -Satting well on RA  -CXR with lung neoplasm  - POCUS 7/8 B-lines concerning for volume overload    #CV  Shock  - requiring levo, currently on 0.6  - MAP>65  - wean as tolerated,    -Hx CAD s/p stents, A-fib not on AC, hx CHF  -Pt on home metoprolol and entresto, held for now     #GI/Nutrition  -No active issues  -Hx colon cancer 20 years ago s/p chemo  - Advance diet carb consistent, nephro    #/Renal/Fluids  JACQUE  - likely prerenal->ATN i/s/o decreased PO intake and diarrhea  - Cr improved s/p HD 6.7->3.34  - trend BMP  - Place morris today    hyperkalemia i/s/o JACQUE  -S/p bicarb, calcium, albuterol for hyperkalemia  - Hyperkalemia resolved s/p HD now 4.2  - Trend BMP    HAGMA  - likely 2/2 metformin use i/s/o JACQUE  - Lactic acidosis resolving Lactate 9.8->3.6, AG 30->20  -F/u UA, urine lytes, spot urine TP/CR, not collected due to anuria, will collect after morris placement  -Bladder scan to r/o obstruction, straight cath if retaining  -Monitor labs and urine output. Avoid NSAIDs, ACEI/ARBS, RCA and nephrotoxins.   -Dose medications as per eGFR.    #ID  - Low suspicion for infection  - No empiric Abx  - f/u cultures    - F/u cultures to rule out infectious etiology to AGMA    #Endocrine  -Hypoglycemia 2/2 CYN  -Tox consulted, f/u recs  -Q1 fingerstick  -Octreotide if persistent hypoglycemia <70    #Heme/Onc  - DVT ppx: SQH    - Pulmonology provider aware of lung mass    #Yanira Rosenthal    #Ethics  - full code    #Dispo: ICU     87F PMH CAD w/stents, DM2, Afib (not on AC's, dc'd pradaxa 1mo ago as pt w/u for anemia), Colon Ca (about 25y ago),  AS, HF on 80mg lasix qd, recently started on Entresto, p/w SOB found to have Lactic acidosis secondary to metformin use i/s/o of JACQUE.    #Neuro  -A&Ox3   -No pain at this time    #Respiratory  -Satting well on RA  -CXR with lung neoplasm  - POCUS 7/8 B-lines concerning for volume overload    #CV  Shock  - Clinically unable to determine type of shock  - requiring levo, currently on 0.6  - MAP>65  - wean as tolerated,    -Hx CAD s/p stents, A-fib not on AC, hx CHF  -Pt on home metoprolol and entresto, held for now     #GI/Nutrition  -No active issues  -Hx colon cancer 20 years ago s/p chemo  - Advance diet carb consistent, nephro    #/Renal/Fluids  JACQUE  - likely prerenal->ATN i/s/o decreased PO intake and diarrhea  - Cr improved s/p HD 6.7->3.34  - trend BMP  - Place morris today    hyperkalemia i/s/o JACQUE  -S/p bicarb, calcium, albuterol for hyperkalemia  - Hyperkalemia resolved s/p HD now 4.2  - Trend BMP    HAGMA  - likely 2/2 metformin use i/s/o JACQUE  - Lactic acidosis resolving Lactate 9.8->3.6, AG 30->20  -F/u UA, urine lytes, spot urine TP/CR, not collected due to anuria, will collect after morris placement  -Bladder scan to r/o obstruction, straight cath if retaining  -Monitor labs and urine output. Avoid NSAIDs, ACEI/ARBS, RCA and nephrotoxins.   -Dose medications as per eGFR.    #ID  - Low suspicion for infection  - No empiric Abx  - f/u cultures    - F/u cultures to rule out infectious etiology to AGMA    #Endocrine  -Hypoglycemia 2/2 CYN  -Tox consulted, f/u recs  -Q1 fingerstick  -Octreotide if persistent hypoglycemia <70    #Heme/Onc  - DVT ppx: SQH    - Pulmonology provider aware of lung mass    #Yanira Rosenthal    #Ethics  - full code    #Dispo: ICU

## 2021-07-09 NOTE — CONSULT NOTE ADULT - ASSESSMENT
87 Female with Severe Aortic Stenosis, Acute Kidney Injury/Metabolic Acidosis, Chronic Diastolic Heart Failure

## 2021-07-09 NOTE — PROGRESS NOTE ADULT - PROBLEM SELECTOR PLAN 1
Pt with JACQUE in the setting of severe diarrhea/decreased po intake. Upon review of [facility] HIE/[employer] last sCr was 1.26 on (6/7/21). On admission  BUN/Cr 79/6.7 mg/dl. K: 6.6 (non hemolyzed). s/p Urgent HD on 7/8/21    At this time please send UA, urine electrolytes, spot urine TP/CR.   perform bladder scan to r/o Urinary obstruction  IF retaining urine would place Steward cath.   Will arrange for Urgent HD, Discussed with family and due to severe electrolyte derangement and severe acidosis.  patient and family at bedside (son) agrees with plan.  would consult ICU for urgent HD   Monitor labs and urine output. Avoid NSAIDs, ACEI/ARBS, RCA and nephrotoxins.   Dose medications as per eGFR. Pt with JACQUE in the setting of severe diarrhea/decreased po intake. Upon review of [facility] HIE/[employer] last sCr was 1.26 on (6/7/21). On admission  BUN/Cr 79/6.7 mg/dl. K: 6.6 (non hemolyzed). s/p Urgent HD on 7/8/21    Patient with improved lactic acidosis and now K wnl  please send UA, urine electrolytes, spot urine TP/CR.   perform serial bladder scan to r/o Urinary obstruction  Will schedule for another HD treatment today  Monitor labs and urine output. Avoid NSAIDs, ACEI/ARBS, RCA and nephrotoxins.   Dose medications as per HD Pt with JACQUE in the setting of severe diarrhea/decreased po intake. Upon review of [facility] HIE/[employer] last sCr was 1.26 on (6/7/21). On admission  BUN/Cr 79/6.7 mg/dl. K: 6.6 (non hemolyzed). s/p Urgent HD on 7/8/21    Patient with improved lactic acidosis and now K after dialysis   remains olig-anuric   please send UA, urine electrolytes, spot urine TP/CR.   perform serial bladder scan to r/o Urinary obstruction  Will schedule for another HD treatment today  Monitor labs and urine output. Avoid NSAIDs, ACEI/ARBS, RCA and nephrotoxins.   Dose medications as per HD

## 2021-07-09 NOTE — PROGRESS NOTE ADULT - PROBLEM SELECTOR PLAN 3
HAGMA - most likely secondary to metformin and kidney failure   GAP: 29, bicarb: 11 pH 7.2 - lactate 7.   s/p urgent HD- Now resolving   Avoid metformin.

## 2021-07-09 NOTE — CONSULT NOTE ADULT - PROBLEM SELECTOR RECOMMENDATION 9
The patient is undergoing workup for possible TAVR. She has critical Aortic Stenosis, and recently underwent her cardiac CT. She would still need a cardiac angiogram, which we will hold off on for now with her Renal issues as they currently are. We will continue to follow her over the weekend. We can evaluate the timing of Catheterization next week. Another potential option would be to perform her Cardiac Cath and dialyze immediately afterwards next week. If she hemodynamically decompensates, we may consider a BAV, however this is also with significant risk.

## 2021-07-09 NOTE — PROGRESS NOTE ADULT - ATTENDING COMMENTS
JACQUE, Hyperkalemia, severe high anion gap metabolic acidosis sec to lactic acidosis (metformin use+ jacque) in the background of diarrhea.   Hemodialysis #2 today. will assess for dialysis need tomorrow     josselyn fleming  nephrology attending   Cell# [phone]   Office- [phone]

## 2021-07-09 NOTE — PROGRESS NOTE ADULT - SUBJECTIVE AND OBJECTIVE BOX
[facility] DIVISION OF KIDNEY DISEASES AND HYPERTENSION -- FOLLOW UP NOTE  --------------------------------------------------------------------------------  If any questions, please feel free to contact me  NS pager: [phone], [facility]: [phone]  Vicente Cortes M.D.  Nephrology Fellow    (After 5 pm or on weekends please page the on-call fellow)  --------------------------------------------------------------------------------    Chief Complaint:  Patient is a 87y old  Female who presents with a chief complaint of urgent HD (09 Jul 2021 06:38)    24 hour events/subjective:  Patient seen and examined at bedside, in NAD, s/p urgent HD yesterday. K now improved and lactic acid trending down. Vitals/labs/imaging reviewed       PAST HISTORY  --------------------------------------------------------------------------------  No significant changes to PMH, PSH, FHx, SHx, unless otherwise noted    ALLERGIES & MEDICATIONS  --------------------------------------------------------------------------------  Allergies    No Known Allergies    Intolerances      Standing Inpatient Medications  atorvastatin 40 milliGRAM(s) Oral at bedtime  chlorhexidine 4% Liquid 1 Application(s) Topical <User Schedule>  dextrose 40% Gel 15 Gram(s) Oral once  dextrose 50% Injectable 25 Gram(s) IV Push once  dextrose 50% Injectable 12.5 Gram(s) IV Push once  dextrose 50% Injectable 25 Gram(s) IV Push once  ferrous    sulfate 325 milliGRAM(s) Oral daily  glucagon  Injectable 1 milliGRAM(s) IntraMuscular once  heparin   Injectable 5000 Unit(s) SubCutaneous every 8 hours  norepinephrine Infusion 0.05 MICROgram(s)/kG/Min IV Continuous <Continuous>    PRN Inpatient Medications  zolpidem 5 milliGRAM(s) Oral at bedtime PRN      REVIEW OF SYSTEMS  --------------------------------------------------------------------------------  Gen: No fevers/chills  Skin: No rashes  Head/Eyes/Ears: Normal hearing,   Respiratory: No dyspnea, cough  CV: No chest pain  GI: No abdominal pain, diarrhea  : No dysuria, hematuria  MSK: No  edema  All other systems were reviewed and are negative, except as noted.    VITALS/PHYSICAL EXAM  --------------------------------------------------------------------------------  T(C): 37.6 (07-09-21 @ 04:00), Max: 37.6 (07-09-21 @ 04:00)  HR: 89 (07-09-21 @ 09:30) (78 - 115)  BP: 96/54 (07-09-21 @ 09:30) (74/35 - 124/60)  RR: 18 (07-09-21 @ 09:30) (0 - 98)  SpO2: 95% (07-09-21 @ 09:30) (87% - 100%)  Wt(kg): --  Height (cm): 152.4 (07-08-21 @ 07:36)  Weight (kg): 73.9 (07-08-21 @ 13:37)  BMI (kg/m2): 31.8 (07-08-21 @ 13:37)  BSA (m2): 1.71 (07-08-21 @ 13:37)      07-08-21 @ 07:01  -  07-09-21 @ 07:00  --------------------------------------------------------  IN: 1174.4 mL / OUT: 800 mL / NET: 374.4 mL    07-09-21 @ 07:01  -  07-09-21 @ 11:05  --------------------------------------------------------  IN: 136.6 mL / OUT: 0 mL / NET: 136.6 mL        Physical Exam:  	Gen: NAD  	Pulm: CTA B/L  	CV: S1S2  	Abd: Soft, +BS   	Ext: No LE edema B/L  	Neuro: Awake  	Skin: Warm and dry  	Vascular access: right non tunneled cath.       LABS/STUDIES  --------------------------------------------------------------------------------              7.2    7.99  >-----------<  188      [07-09-21 @ 06:33]              24.0     132  |  92  |  34  ----------------------------<  74      [07-09-21 @ 06:34]  4.6   |  18  |  3.64        Ca     8.9     [07-09-21 @ 06:34]      Mg     1.9     [07-09-21 @ 06:34]      Phos  4.3     [07-09-21 @ 06:34]    TPro  7.2  /  Alb  3.9  /  TBili  0.4  /  DBili  x   /  AST  25  /  ALT  17  /  AlkPhos  205  [07-08-21 @ 08:48]    PT/INR: PT 14.8 , INR 1.25       [07-08-21 @ 13:31]  PTT: 40.0       [07-08-21 @ 13:31]      Creatinine Trend:  SCr 3.64 [07-09 @ 06:34]  SCr 3.34 [07-09 @ 00:17]  SCr 6.70 [07-08 @ 11:46]  SCr 6.76 [07-08 @ 08:48]

## 2021-07-09 NOTE — CONSULT NOTE ADULT - SUBJECTIVE AND OBJECTIVE BOX
Patient seen and evaluated @ 6:30pm in MICU  Chief Complaint: dyspnea on minimal exertion    HPI:  MICU H&P    CHIEF COMPLAINT: urgent HD, hyperkalemia    HPI / INTERVAL HISTORY:  87F PMH CAD w/stents, DM2, Afib (not on AC's, dc'd pradaxa 1mo ago as pt w/u for anemia), Colon Ca (about 25y ago),  AS, HF on 80mg lasix qd, recently started on Entresto, presents to the ED with abnormal labs done on  showing JACQUE with hyperk to >6 mod hemolyzed. Pt states that she hasn't urinated in 2 days, also states she's had nonbloody loose stools over the past week. Otherwise she states SOB is at baseline. Denies any fevers/chills, cough, chest pain, palpitations, lightheadedness, abd pain, n/v, leg swelling worse than baseline.    Home medications: Metformin, Lasix 80qd, Metolazone 25 mg qd, Glipizide, atorvastatin 40 mg qhs, Zolpidem 5 mg qhs, Entresto 24 mg, Metoprolol 25 mg and Januvia     On presentation patient with BUN/Cr 79/6.7 mg/dl. K: 6.6 (non hemolyzed). Previous Cr was at 1.26 21. Also noted with bicarb: 11 pH 7.2 - lactate 7. patient on Metformin at home and reports taking all of her medications.     Nephrology consulted for JACQUE / hyperkalemia and acidosis. Temporized in ED with Bicarb and calcium. Per tox will need Q1 FSG for sulfonylurea and JACQUE. Will be admitted to MICU for HD.     PAST MEDICAL & SURGICAL HISTORY:    FAMILY HISTORY:  No sig FH in first degree relatives     SOCIAL HISTORY:  Denies smoking drinking or illicit drug use    Allergies    No Known Allergies    Intolerances    REVIEW OF SYSTEMS:  +Fatigue, weakness, oliguria  Negative except per HPI    OBJECTIVE:  ICU Vital Signs Last 24 Hrs  T(C): 36.4 (2021 07:36), Max: 36.4 (2021 07:36)  T(F): 97.5 (2021 07:36), Max: 97.5 (2021 07:36)  HR: 79 (2021 10:30) (71 - 83)  BP: 128/64 (2021 10:30) (112/67 - 128/64)  BP(mean): 71 (2021 10:30) (71 - 77)    RR: 18 (2021 10:30) (18 - 20)  SpO2: 100% (2021 10:30) (95% - 100%)  CAPILLARY BLOOD GLUCOSE    POCT Blood Glucose.: 148 mg/dL (2021 11:19)    PHYSICAL EXAM:  Gen: NAD  	HEENT: MMM  	Pulm: CTA B/L  	CV: S1S2  	Abd: Soft, +BS   	Ext: No LE edema B/L  	Neuro: Awake  	Skin: Warm and dry             : no tenderness to palpation     MEDICATIONS  (PRN):    LABS:                        7.8    7.31  )-----------( 196      ( 2021 08:47 )             25.8     Hgb Trend: 7.8<--  07-08    124<L>  |  84<L>  |  79<H>  ----------------------------<  32<LL>  6.6<HH>   |  11<L>  |  6.76<H>    Ca    9.6      2021 08:48  Phos  6.4     07-08  Mg     2.0         TPro  7.2  /  Alb  3.9  /  TBili  0.4  /  DBili  x   /  AST  25  /  ALT  17  /  AlkPhos  205<H>  07-08    Creatinine Trend: 6.76<--    Venous Blood Gas:   @ 08:47  7.24/34/42/14/64  VBG Lactate: 7.0 (2021 11:43)    PMH:     PSH:     Medications:   atorvastatin 40 milliGRAM(s) Oral at bedtime  chlorhexidine 4% Liquid 1 Application(s) Topical <User Schedule>  dextrose 40% Gel 15 Gram(s) Oral once  dextrose 50% Injectable 25 Gram(s) IV Push once  dextrose 50% Injectable 12.5 Gram(s) IV Push once  dextrose 50% Injectable 25 Gram(s) IV Push once  ferrous    sulfate 325 milliGRAM(s) Oral daily  glucagon  Injectable 1 milliGRAM(s) IntraMuscular once  heparin   Injectable 5000 Unit(s) SubCutaneous every 8 hours  norepinephrine Infusion 0.05 MICROgram(s)/kG/Min IV Continuous <Continuous>  zolpidem 5 milliGRAM(s) Oral at bedtime PRN    Allergies:  No Known Allergies    FAMILY HISTORY:    Review of Systems:    Constitutional: No fever, chills, fatigue, or changes in weight  HEENT: No blurry vision, eye pain, headache, runny nose, or sore throat  Respiratory: Dyspnea on minimal exertion  Cardiovascular: No chest pain or palpitations  Gastrointestinal: No nausea, vomiting, diarrhea, or abdominal pain  Genitourinary: No dysuria or incontinence  Extremities: No lower extremity swelling  Neurologic: No focal findings  Lymphatic: No lymphadenopathy   Skin: No rash  Psychiatry: No anxiety or depression  10 point review of systems is otherwise negative except as mentioned above            Physical Exam:  T(C): 36.8 (21 @ 17:58), Max: 37.6 (21 @ 04:00)  HR: 76 (21 @ 18:30) (76 - 115)  BP: 93/46 (21 @ 18:30) (74/35 - 117/58)  RR: 19 (21 @ 18:30) (0 - 98)  SpO2: 96% (21 @ 18:30) (87% - 100%)  Wt(kg): --     @ 07:01  -   @ 07:00  --------------------------------------------------------  IN: 1174.4 mL / OUT: 800 mL / NET: 374.4 mL     @ 07:01  -   @ 18:48  --------------------------------------------------------  IN: 1154.7 mL / OUT: 370 mL / NET: 784.7 mL      Daily     Daily Weight in k.4 (2021 17:58)    Appearance: Normal, well groomed, NAD  Eyes: PERRLA, EOMI, pink conjunctiva, no scleral icterus   HENT: Normal oral mucosa  Cardiovascular: RRR, S1, S2, III/VI systolic murmur at base  Procedural Access Site: Clean, dry, intact, without hematoma  Respiratory: Clear to auscultation bilaterally  Gastrointestinal: Soft, non-tender, non-distended, BS+  Musculoskeletal: No clubbing or joint deformity   Neurologic: No focal weakness  Lymphatic: No lymphadenopathy  Psychiatry: AAOx3 with appropriate mood and affect  Skin: No rashes, ecchymoses, or cyanosis    Cardiovascular Diagnostic Testing:  ECG: rate controlled AFib    Echo: critical AS    Cath: PENDING    Imaging: < from: CT Heart without Coronaries w/ IV Cont (21 @ 08:51) >  FINDINGS:    Non-Coronary:    6 mm hypodense nodule within the right lobe of the thyroid gland. No enlarged axillary, mediastinal lymph nodes. No pleural effusions.    Evaluation of the lungs demonstrate left lower lobe masslike opacity on the part of which measures about 5 x 2.3 cm extending to the left lower lobe hilum with associated left lower lobe volume loss related to some superimposed atelectasis. Multiple left lung pulmonary nodule along the pleural surface measuring up to about 7 mm. Right middle lobe 3.2 x 2.6 cm mass associated with the minor fissure which contains a few small nodules measuring up to 7 mm. The right middle lobe mass has a cystic component along its inferior aspect.    Subcentimeter hypodensity within the liver is too small to characterize. The gallbladder, spleen, adrenal glands are unremarkable. Few pancreatic hypodensities are noted with the largest measuring about 1.5 cm on image 84 of series 19. Bilateral renal cysts.    Urinary bladder is normal. The uterus and adnexa are within normal limits. Status post rectal surgery with postoperative changes. No bowel obstruction or medial air. Appendix is normal.    Degenerative changes of the spine. Moderate to severe loss of height of the T5 and severe loss of height of the T6 vertebral bodies are of indeterminate age.    The heart is enlarged.  The left and right atria are severely enlarged.  There is mitral annular calcification and calcification of the mitral valve leaflets.   There is reduced contrast opacification of the left atrial appendage that resolves on delay imaging suggestive of mixing artifact.    The ascending aorta is mildly calcified. The aortic arch and the descending aorta are severely calcified.   The main pulmonary artery measures approximately 26.4 mm at the level of the ascending aorta.   The right and left pulmonary arteries appear enlarged and measure approximately 29.4 mm and 25.4 mm, respectively.    The aortic valve is calcified. The calcium score of the aortic valve is 1870.    Coronary:  Coronary arterial calcifications are noted; however, this studywas not optimized for evaluation of the coronary arteries.  Please refer to cardiac catheterization performed as part of pre-TAVR work-up.    Aortic Root Measurements    Major aortic annulus diameter (systole, mm): 25.2  Perpendicular minor aortic annulus diameter (systole, mm): 19.1  Aortic annulus perimeter (systole, mm): 76.6  Aortic annulus area (systole, mm2): 438  LVOT minimum diameter (systole, mm): 19.6  LVOT 90 cross (systole, mm): 26.4  LVOT calcification:  Severe  Distance from Aortic annulus to Left Main coronary artery (diastole, mm): 13.2  Distance from Aortic annulus to Right Coronary artery (diastole, mm): 16.7  Sinus of Valsalva diameter, Right coronary (diastole, mm): 29.4  Sinus of Valsalva diameter, Left coronary (diastole, mm): 31.0  Sinus of Valsalva diameter, Non coronary (diastole, mm): 28.3  Diameter at the sinotubular junction (diastole, mm): 26.5 x 26.5  Maximum ascending aorta diameter at 40 mm above annulus (diastole, mm): 32.3  Aortic root angulation (diastole, degrees): 48.4  Aortic root calcification: Moderate-to-severe    Abdominal Aorta:        Minimum lumen diameter (MLD) (mm): 12.8        Diameter perpendicular to MLD (mm): 13.2  Left Femoral:        Minimum Lumen Diameter (mm): 7.2        Diameter perpendicular to MLD (mm): 8.1        Tortuosity: Mild        Calcification: Mild  Right Femoral:        Minimum Lumen Diameter (mm): 6.5        Diameter perpendicular to MLD (mm): 7.1        Tortuosity: Mild        Calcification: Mild  Left External Iliac:        Minimum Lumen Diameter (mm): 6.9        Diameter perpendicular to MLD (mm): 7.5        Tortuosity: Moderate        Calcification: Mild  Left Common Iliac:         Minimum Lumen Diameter (mm): 4.5        Diameter perpendicular to MLD (mm): 8.0        Tortuosity: Severe        Calcification: Severe  Right External Iliac:        Minimum Lumen Diameter (mm): 5.0        Diameter perpendicular to MLD (mm): 7.4        Tortuosity: Moderate        Calcification: Mild  Right Common Iliac:    Minimum Lumen Diameter (mm): 7.8        Diameter perpendicular to MLD (mm): 8.4        Tortuosity: Moderate        Calcification: Moderate  L Subclavian/Axillary: Not well visualized due to motion artifact        Minimum Lumen Diameter (mm): 5.6      Diameter perpendicular to MLD (mm): 6.2        Tortuosity: Mild        Calcification: Mild  R Subclavian/Axillary:        Minimum Lumen Diameter (mm): 4.3        Diameter perpendicular to MLD (mm): 5.1        Tortuosity: Mild        Calcification: Mild    IMPRESSION:  1. Calcified aortic valve. The calcium score of the aortic valve is 1870.  2. Please see the body of the report for aortic and peripheral access vessel measurements.  3. Dominant large left lower lobe mass associated with multiple left lung pleural nodules worrisome for neoplasm with metastatic disease.  4. 3.2 x 2.6 cm right middle lobe mass as described above also is worrisome for neoplasm.  5. A few pancreatic hypodense nodules. Dedicated contrast enhanced pancreatic MRI is recommended for complete evaluation.  6. Compression fracture deformities of the T5 and T6 vertebral bodies as described above are of indeterminate age.      KHLOE HAINES MD; Attending Cardiologist  This document has been electronically signed.  MONICA HANEY MD; Attending Radiologist  This document has been electronically signed. 2021  9:12AM    < end of copied text >      Labs:                        7.2    7.99  )-----------( 188      ( 2021 06:33 )             24.0     -    132<L>  |  92<L>  |  34<H>  ----------------------------<  74  4.6   |  18<L>  |  3.64<H>    Ca    8.9      2021 06:34  Phos  4.3     -  Mg     1.9     -    TPro  7.2  /  Alb  3.9  /  TBili  0.4  /  DBili  x   /  AST  25  /  ALT  17  /  AlkPhos  205<H>  07-08    PT/INR - ( 2021 13:31 )   PT: 14.8 sec;   INR: 1.25 ratio         PTT - ( 2021 13:31 )  PTT:40.0 sec      Serum Pro-Brain Natriuretic Peptide: 64548 pg/mL ( @ 08:48)

## 2021-07-09 NOTE — PROGRESS NOTE ADULT - SUBJECTIVE AND OBJECTIVE BOX
Prakash Garcia MD  PGY-1, Department of Internal Medicine  Pager: [phone] ([facility])   Pager: [phone] ([facility])    Patient is a 87y old  Female who presents with a chief complaint of urgent HD (08 Jul 2021 11:43)      SUBJECTIVE / OVERNIGHT EVENTS: Pt seen and examined. No acute overnight events. Denies fevers, chills, CP, SOB, Abdominal pain, N/V, Constipation, Diarrhea        MEDICATIONS  (STANDING):  atorvastatin 40 milliGRAM(s) Oral at bedtime  chlorhexidine 4% Liquid 1 Application(s) Topical <User Schedule>  dextrose 40% Gel 15 Gram(s) Oral once  dextrose 50% Injectable 25 Gram(s) IV Push once  dextrose 50% Injectable 12.5 Gram(s) IV Push once  dextrose 50% Injectable 25 Gram(s) IV Push once  ferrous    sulfate 325 milliGRAM(s) Oral daily  glucagon  Injectable 1 milliGRAM(s) IntraMuscular once  heparin   Injectable 5000 Unit(s) SubCutaneous every 8 hours  norepinephrine Infusion 0.05 MICROgram(s)/kG/Min (6.93 mL/Hr) IV Continuous <Continuous>    MEDICATIONS  (PRN):  zolpidem 5 milliGRAM(s) Oral at bedtime PRN Insomnia      I&O's Summary    08 Jul 2021 07:01  -  09 Jul 2021 06:39  --------------------------------------------------------  IN: 1160.5 mL / OUT: 800 mL / NET: 360.5 mL        Vital Signs Last 24 Hrs  T(C): 37.6 (09 Jul 2021 04:00), Max: 37.6 (09 Jul 2021 04:00)  T(F): 99.6 (09 Jul 2021 04:00), Max: 99.6 (09 Jul 2021 04:00)  HR: 100 (09 Jul 2021 06:30) (71 - 115)  BP: 104/53 (09 Jul 2021 06:30) (74/35 - 128/64)  BP(mean): 75 (09 Jul 2021 06:30) (48 - 86)  RR: 17 (09 Jul 2021 06:30) (0 - 98)  SpO2: 96% (09 Jul 2021 06:30) (87% - 100%)    CAPILLARY BLOOD GLUCOSE      POCT Blood Glucose.: 85 mg/dL (09 Jul 2021 06:06)  POCT Blood Glucose.: 94 mg/dL (09 Jul 2021 05:06)  POCT Blood Glucose.: 111 mg/dL (09 Jul 2021 02:54)  POCT Blood Glucose.: 161 mg/dL (09 Jul 2021 01:18)  POCT Blood Glucose.: 138 mg/dL (08 Jul 2021 17:41)  POCT Blood Glucose.: 135 mg/dL (08 Jul 2021 14:16)  POCT Blood Glucose.: 134 mg/dL (08 Jul 2021 13:14)  POCT Blood Glucose.: 144 mg/dL (08 Jul 2021 12:13)  POCT Blood Glucose.: 148 mg/dL (08 Jul 2021 11:19)  POCT Blood Glucose.: 160 mg/dL (08 Jul 2021 10:26)  POCT Blood Glucose.: 47 mg/dL (08 Jul 2021 09:10)      PHYSICAL EXAM:  GENERAL: NAD,   HEAD:  Atraumatic, Normocephalic  EYES: EOMI, PERRL, conjunctiva and sclera clear  NECK: No JVD  CHEST/LUNG: Clear to auscultation bilaterally; No wheeze  HEART: Regular rate and rhythm; No murmurs, rubs, or gallops  ABDOMEN: Soft, Nontender, Nondistended; Bowel sounds present  EXTREMITIES:  2+ Peripheral Pulses, No clubbing, cyanosis, or edema  PSYCH: AAOx3  NEUROLOGY: non-focal  SKIN: No rashes or lesions       LABS:                        7.2    8.22  )-----------( 175      ( 09 Jul 2021 00:17 )             24.0     Auto Eosinophil # x     / Auto Eosinophil % x     / Auto Neutrophil # x     / Auto Neutrophil % x     / BANDS % x                            7.8    7.31  )-----------( 196      ( 08 Jul 2021 08:47 )             25.8     Auto Eosinophil # 0.01  / Auto Eosinophil % 0.1   / Auto Neutrophil # 5.14  / Auto Neutrophil % 70.3  / BANDS % x        07-09    133<L>  |  93<L>  |  33<H>  ----------------------------<  38<LL>  4.2   |  20<L>  |  3.34<H>  07-08    126<L>  |  84<L>  |  78<H>  ----------------------------<  120<H>  6.5<HH>   |  12<L>  |  6.70<H>  07-08    124<L>  |  84<L>  |  79<H>  ----------------------------<  32<LL>  6.6<HH>   |  11<L>  |  6.76<H>    Ca    8.9      09 Jul 2021 00:17  Mg     1.9     07-09  Phos  3.9     07-09  TPro  7.2  /  Alb  3.9  /  TBili  0.4  /  DBili  x   /  AST  25  /  ALT  17  /  AlkPhos  205<H>  07-08    PT/INR - ( 08 Jul 2021 13:31 )   PT: 14.8 sec;   INR: 1.25 ratio         PTT - ( 08 Jul 2021 13:31 )  PTT:40.0 sec              RADIOLOGY & ADDITIONAL TESTS:    Imaging Personally Reviewed:    Consultant(s) Notes Reviewed:      Care Discussed with Consultants/Other Providers:   Prakash Garcia MD  PGY-1, Department of Internal Medicine  Pager: [phone] ([facility])   Pager: [phone] ([facility])    Patient is a 87y old  Female who presents with a chief complaint of urgent HD (08 Jul 2021 11:43)      SUBJECTIVE / OVERNIGHT EVENTS: Pt seen and examined. Patient tolerated dialysis yesterday. Systolic blood pressure dipped to 76 after dialysis, patient was started on levo. Patient had a hypoglycemic event overnight was giving 1 amp of D50. Patient denies CP, N/V diarrhea, constipation.         MEDICATIONS  (STANDING):  atorvastatin 40 milliGRAM(s) Oral at bedtime  chlorhexidine 4% Liquid 1 Application(s) Topical <User Schedule>  dextrose 40% Gel 15 Gram(s) Oral once  dextrose 50% Injectable 25 Gram(s) IV Push once  dextrose 50% Injectable 12.5 Gram(s) IV Push once  dextrose 50% Injectable 25 Gram(s) IV Push once  ferrous    sulfate 325 milliGRAM(s) Oral daily  glucagon  Injectable 1 milliGRAM(s) IntraMuscular once  heparin   Injectable 5000 Unit(s) SubCutaneous every 8 hours  norepinephrine Infusion 0.05 MICROgram(s)/kG/Min (6.93 mL/Hr) IV Continuous <Continuous>    MEDICATIONS  (PRN):  zolpidem 5 milliGRAM(s) Oral at bedtime PRN Insomnia      I&O's Summary    08 Jul 2021 07:01  -  09 Jul 2021 06:39  --------------------------------------------------------  IN: 1160.5 mL / OUT: 800 mL / NET: 360.5 mL        Vital Signs Last 24 Hrs  T(C): 37.6 (09 Jul 2021 04:00), Max: 37.6 (09 Jul 2021 04:00)  T(F): 99.6 (09 Jul 2021 04:00), Max: 99.6 (09 Jul 2021 04:00)  HR: 100 (09 Jul 2021 06:30) (71 - 115)  BP: 104/53 (09 Jul 2021 06:30) (74/35 - 128/64)  BP(mean): 75 (09 Jul 2021 06:30) (48 - 86)  RR: 17 (09 Jul 2021 06:30) (0 - 98)  SpO2: 96% (09 Jul 2021 06:30) (87% - 100%)    CAPILLARY BLOOD GLUCOSE      POCT Blood Glucose.: 85 mg/dL (09 Jul 2021 06:06)  POCT Blood Glucose.: 94 mg/dL (09 Jul 2021 05:06)  POCT Blood Glucose.: 111 mg/dL (09 Jul 2021 02:54)  POCT Blood Glucose.: 161 mg/dL (09 Jul 2021 01:18)  POCT Blood Glucose.: 138 mg/dL (08 Jul 2021 17:41)  POCT Blood Glucose.: 135 mg/dL (08 Jul 2021 14:16)  POCT Blood Glucose.: 134 mg/dL (08 Jul 2021 13:14)  POCT Blood Glucose.: 144 mg/dL (08 Jul 2021 12:13)  POCT Blood Glucose.: 148 mg/dL (08 Jul 2021 11:19)  POCT Blood Glucose.: 160 mg/dL (08 Jul 2021 10:26)  POCT Blood Glucose.: 47 mg/dL (08 Jul 2021 09:10)      PHYSICAL EXAM:  GENERAL: NAD,   HEAD:  Atraumatic, Normocephalic  EYES: EOMI, PERRL, conjunctiva and sclera clear  NECK: No JVD  CHEST/LUNG: Clear to auscultation bilaterally; No wheeze  HEART: Regular rate and rhythm; No murmurs, rubs, or gallops  ABDOMEN: Soft, Nontender, Nondistended; Bowel sounds present  EXTREMITIES:  2+ Peripheral Pulses, No clubbing, cyanosis  PSYCH: AAOx3  NEUROLOGY: non-focal  SKIN: No rashes or lesions       LABS:                        7.2    8.22  )-----------( 175      ( 09 Jul 2021 00:17 )             24.0     Auto Eosinophil # x     / Auto Eosinophil % x     / Auto Neutrophil # x     / Auto Neutrophil % x     / BANDS % x                            7.8    7.31  )-----------( 196      ( 08 Jul 2021 08:47 )             25.8     Auto Eosinophil # 0.01  / Auto Eosinophil % 0.1   / Auto Neutrophil # 5.14  / Auto Neutrophil % 70.3  / BANDS % x        07-09    133<L>  |  93<L>  |  33<H>  ----------------------------<  38<LL>  4.2   |  20<L>  |  3.34<H>  07-08    126<L>  |  84<L>  |  78<H>  ----------------------------<  120<H>  6.5<HH>   |  12<L>  |  6.70<H>  07-08    124<L>  |  84<L>  |  79<H>  ----------------------------<  32<LL>  6.6<HH>   |  11<L>  |  6.76<H>    Ca    8.9      09 Jul 2021 00:17  Mg     1.9     07-09  Phos  3.9     07-09  TPro  7.2  /  Alb  3.9  /  TBili  0.4  /  DBili  x   /  AST  25  /  ALT  17  /  AlkPhos  205<H>  07-08    PT/INR - ( 08 Jul 2021 13:31 )   PT: 14.8 sec;   INR: 1.25 ratio         PTT - ( 08 Jul 2021 13:31 )  PTT:40.0 sec              RADIOLOGY & ADDITIONAL TESTS:    Imaging Personally Reviewed:    Consultant(s) Notes Reviewed:      Care Discussed with Consultants/Other Providers:

## 2021-07-09 NOTE — PROGRESS NOTE ADULT - ASSESSMENT
87F PMH CAD w/stents, DM2, Afib (not on AC), Colon Ca (about 25y ago) AS, HF on 80mg lasix qd, recently started on Entresto, presents to the ED with abnormal labs done on 7/7 showing JACQUE with hyperk. Nephrology consulted for JACQUE / hyperkalemia and acidosis.

## 2021-07-09 NOTE — PROGRESS NOTE ADULT - ATTENDING COMMENTS
Pt seen and examined. 87 F with medical hx as noted above, now p/w JACQUE likely secondary to a combination of pre-renal ATN in the setting of diarrhea and poor PO intake and contrast nephropathy from recent Ct coronaries, hyperkalemia, hyponatremia, severe lactic acidosis and hypoglycemia in the setting of ongoing metformin and sulfonylurea use. Received emergent hemodialysis overnight and lactate is down from 9.8 to 3.6. Still remains severely oliguric. Plan for additional HD today. Hypoglycemic to 38 overnight, FS currently stable in the 80-90 range. Cont Q1H FS monitoring and advance diet as tolerated. May require Octreotide if FS < 70. Titrate pressor support to keep MAP >65. No leukocytosis or obvious evidence of infection, FUP Cxs and hold off on ABx coverage for now. CT showing b/l lung masses and nodules which have been followed with serial CTs by her outpt pulmonologist for years. Overall prognosis extremely guarded.

## 2021-07-09 NOTE — CONSULT NOTE ADULT - PROBLEM SELECTOR RECOMMENDATION 3
Patient on Cardiac CT found to have Pulmonary Nodules B/L, suggestive of malignancy. She does have a remote history of Colon Ca. I spoke with her outpatient pulmonologist, Dr. Long, who has been following. She is aware of these findings, and stated that it has only minimally grown since the last CT she had, which was back in 2019. These are not likely the cause of her symptoms, and stated it would be ok to Proceed with TAVR if needed.

## 2021-07-09 NOTE — CONSULT NOTE ADULT - ASSESSMENT
87 year-old woman with known history of atrial fibrillation, previously on AC, but now off due to anemia of unknown etiology, severe aortic stenosis, being evaluated for TAVR, status post CT with contrast, now with JACQUE, hyperkalemia, admitted for urgent HD.    HD per nephrology. Currently requiring norepinephrine to maintain perfusion pressure, MAP > 65 mmHg. Wean as tolerated.  Avoid nephrotoxic agents, including Entresto. Hold diuretics while on HD.    Plan for LHC during this admission - possible HD afterwards.    Transfuse PRN - goal hemoglobin should be > 8 g/dL for patient with severe AS.  Plan for TAVR post left heart cath per Structural Heart Team. Discussed with Alejandro Johns MD.  GI eval for GI bleeding - colon ca vs. AVMs.  Pulmonary eval for lung lesions seen on CT scan. Reportedly stable per discussion with Dr. Johns.    I will be covered by Torin Stephens MD this weekend.

## 2021-07-09 NOTE — CONSULT NOTE ADULT - SUBJECTIVE AND OBJECTIVE BOX
Structural Heart Team    HPI:  87F PMH CAD w/stents, DM2, Afib (not on AC's, dc'd pradaxa 1mo ago as pt w/u for anemia), Colon Ca (about 25y ago),  AS, HF on 80mg lasix qd, recently started on Entresto, presents to the ED with abnormal labs done on 7/7 showing JACQUE with hyperk to >6 mod hemolyzed. Pt states that she hasn't urinated in 2 days, also states she's had nonbloody loose stools over the past week. Otherwise she states SOB is at baseline. Denies any fevers/chills, cough, chest pain, palpitations, lightheadedness, abd pain, n/v, leg swelling worse than baseline.    Home medications: Metformin, Lasix 80qd, Metolazone 25 mg qd, Glipizide, atorvastatin 40 mg qhs, Zolpidem 5 mg qhs, Entresto 24 mg, Metoprolol 25 mg and Januvia     On presentation patient with BUN/Cr 79/6.7 mg/dl. K: 6.6 (non hemolyzed). Previous Cr was at 1.26 6/7/21. Also noted with bicarb: 11 pH 7.2 - lactate 7. patient on Metformin at home and reports taking all of her medications.     Nephrology consulted for JACQUE / hyperkalemia and acidosis. Temporized in ED with Bicarb and calcium. Per tox will need Q1 FSG for sulfonylurea and JACQUE. Will be admitted to MICU for HD.       Today she was evaluated after getting a run of emergency HD, stating she feels a little better. She was sitting in a chair, still with some SOB. We had seen her as an out patient for workup of her Aortic Stenosis, and underwent a Cardiac CT on 7/2. She now presents with anuria, and Acute Renal Failure, likely from GWENDOLYN.  She is very symptomatic from her Aortic Stenosis.      07-08 @ 07:01  -  07-09 @ 07:00  --------------------------------------------------------  IN: 1174.4 mL / OUT: 800 mL / NET: 374.4 mL    07-09 @ 07:01  -  07-09 @ 10:57  --------------------------------------------------------  IN: 136.6 mL / OUT: 0 mL / NET: 136.6 mL        PAST MEDICAL & SURGICAL HISTORY:      FAMILY HISTORY:            No Known Allergies    atorvastatin 40 milliGRAM(s) Oral at bedtime  ferrous    sulfate 325 milliGRAM(s) Oral daily  glucagon  Injectable 1 milliGRAM(s) IntraMuscular once  heparin   Injectable 5000 Unit(s) SubCutaneous every 8 hours  norepinephrine Infusion 0.05 MICROgram(s)/kG/Min IV Continuous <Continuous>      T(C): 37.6 (07-09-21 @ 04:00), Max: 37.6 (07-09-21 @ 04:00)  HR: 89 (07-09-21 @ 09:30) (78 - 115)  BP: 96/54 (07-09-21 @ 09:30) (74/35 - 124/60)  RR: 18 (07-09-21 @ 09:30) (0 - 98)  SpO2: 95% (07-09-21 @ 09:30) (87% - 100%)  Wt(kg): --      Review of Symptoms:  General: Alert, Follows commands  Respiratory:  + SOB, + GARCIA, + Cough  Cardiac: Denies CP, Denies Palpitations  Gastrointestinal: Denies Pain, Denies N/V  Extremities: Denies Pedal Edema, No Joint pain  Vascular: Negative  Neurological: Negative  Endocrine: negative      Physical Exam:  Gen: A/Ox3, NAD  HEENT: No JVD, Neck Supple, Trachea midline, No masses  Pulmonary: Diminished at bases with some fine crackles, upper lung fields CTAB.  No accessory muscle use for respiration  Cardiac: S1S2, RRR, III/VI ZAYNAB,   No Gallops/Rubs  ECG: SR  Gastrointestinal: Soft, NT/ND, + Bowel Sounds  Extremities: 2+ Edema B/L,   No joint pain or swelling +PMSx4  Vascular: 1+ pulses B/L, No Bruits, Right IJ HD Catheter  Neurological: Non Focal, = motor and sensory      Laboratory:                        7.2    7.99  )-----------( 188      ( 09 Jul 2021 06:33 )             24.0    07-09    132<L>  |  92<L>  |  34<H>  ----------------------------<  74  4.6   |  18<L>  |  3.64<H>    Ca    8.9      09 Jul 2021 06:34  Phos  4.3     07-09  Mg     1.9     07-09    TPro  7.2  /  Alb  3.9  /  TBili  0.4  /  DBili  x   /  AST  25  /  ALT  17  /  AlkPhos  205<H>  07-08   PT/INR - ( 08 Jul 2021 13:31 )   PT: 14.8 sec;   INR: 1.25 ratio         PTT - ( 08 Jul 2021 13:31 )  PTT:40.0 sec    Pro-BNP:  Serum Pro-Brain Natriuretic Peptide (07.08.21 @ 08:48)    Serum Pro-Brain Natriuretic Peptide: 45997 pg/mL    Transthoracic Echo: Outpatient:    EF 55%  ALEXA 0.5   mGr 49 mmHg, Peak Gradient 73 mmHg, AoV 4.3 M/S, Minimal AI, Mild MR

## 2021-07-10 NOTE — PROGRESS NOTE ADULT - PROBLEM SELECTOR PLAN 2
patient with K 6.6 non  hemolyzed, s/ps/p urgent HD treatment on 7/8 & 7/9.  K now improved to 3.5. Hold off on HD today.

## 2021-07-10 NOTE — PROGRESS NOTE ADULT - ASSESSMENT
87F PMH CAD w/stents, DM2, Afib (not on AC), Colon Ca (about 25y ago) AS, HF on 80mg lasix qd, recently started on Entresto, presents to the ED with JACQUE, Metformin-associated lactic acidosis, hyperkalemia, admitted to MICU for urgent HD.     #Neuro  -A&Ox3   -No pain at this time    #Respiratory  -Satting well on RA  -CT with stable lung neoplasm- Dr. Johns aware  - POCUS 7/8 B-lines concerning for volume overload    #CV  Shock  - requiring levo  - wean as tolerated  - Not on fluids i/s/o fluid overload- POCUS today    -Hx CAD s/p stents, A-fib not on AC, hx CHF  - Atorvastatin 40mg qhs  -Pt on home metoprolol and entresto, held for now     #GI/Nutrition  -No active issues, CTAP unable to visualize pancreas well  -Hx colon cancer 20 years ago s/p chemo    - Diet- consistent carb, renal     #/Renal/Fluids  JACQUE  - likely prerenal->ATN i/s/o decreased PO intake and diarrhea  - Cr improved s/p HD 6.7->3.34  - BMP q4, q6?  - Has been anuric- unable to send urine electrolytes    hyperkalemia i/s/o JACQUE (resolved)  -S/p bicarb, calcium, albuterol for hyperkalemia  - K+ 3.5 7/10    Hypophosphatemia   - 4.3->1.9 s/p 2nd round of dialysis  - Replete today    HAGMA (resolved)  - likely 2/2 metformin use i/s/o JACQUE  - Lactate 1.6 7/10  -Monitor labs and urine output. Avoid NSAIDs, ACEI/ARBS, RCA and nephrotoxins.   -Dose medications as per eGFR.  - Follow nephro recommendations regarding HD    #ID  Cultures grew staph epidermidis, likely contaminant  - Afebrile, no leukocytosis    #Endocrine  -Hypoglycemia 2/2 CYN  -Q4 fingerstick??###########################  -Octreotide if persistent hypoglycemia    #Heme  - DVT ppx: SQH  - Transfuse <8.0 beceause of severe AS per cardio?? ################    #Lines  DANIELLA Rosenthal *** Pt likely will need dialysis after valvular procedure, do not remove ***    #Ethics  - full code    #Dispo: ICU   87F PMH CAD w/stents, DM2, Afib (not on AC), Colon Ca (about 25y ago) AS, HF on 80mg lasix qd, recently started on Entresto, presents to the ED with JACQUE, Metformin-associated lactic acidosis, hyperkalemia, admitted to MICU for urgent HD.     #Neuro  -A&Ox3   -No pain at this time    #Respiratory  -Satting well on RA  -CT with stable lung neoplasm- Dr. Johns aware  - POCUS 7/8 B-lines concerning for volume overload    #CV  Shock  - requiring levo  - wean as tolerated  - Not on fluids i/s/o fluid overload- POCUS today    -Hx CAD s/p stents, A-fib not on AC, hx CHF  - Atorvastatin 40mg qhs  -Pt on home metoprolol and entresto, held for now     #GI/Nutrition  -No active issues, CTAP unable to visualize pancreas well  -Hx colon cancer 20 years ago s/p chemo    - Diet- consistent carb, renal     #/Renal/Fluids  JACQUE  - likely prerenal->ATN i/s/o decreased PO intake and diarrhea  - Cr improved s/p HD 6.7->3.34->2.28  - BMP q12   - Steward in place  - Follow Is/Os    hyperkalemia i/s/o JACQUE (resolved)  -S/p bicarb, calcium, albuterol for hyperkalemia  - K+ 3.5 7/10    Hypophosphatemia   - 4.3->1.9 s/p 2nd round of dialysis  - Replete today    HAGMA (resolved)  - likely 2/2 metformin use i/s/o JACQUE  - Lactate 1.6 7/10  -Monitor labs and urine output. Avoid NSAIDs, ACEI/ARBS, RCA and nephrotoxins.   -Dose medications as per eGFR.  - Follow nephro recommendations regarding HD    #ID  Cultures grew staph epidermidis, likely contaminant  - Afebrile, no leukocytosis  - f/u repeat cultures    #Endocrine  -Hypoglycemia 2/2 CYN - resolving with PO intake  -SSI FSBG qac qhs  -Octreotide if persistent hypoglycemia    #Heme  - DVT ppx: SQH  - Transfuse <8.0 beceause of severe AS per cardio  - order 1 pRBC today, transfuse as necessary    #Yanira Rosenthal     #Ethics  - full code    #Dispo: ICU   87F PMH CAD w/stents, DM2, Afib (not on AC), Colon Ca (about 25y ago) AS, HF on 80mg lasix qd, recently started on Entresto, presents to the ED with JACQUE, Metformin-associated lactic acidosis, hyperkalemia, admitted to MICU for urgent HD.     #Neuro  -A&Ox3   -No pain at this time    #Respiratory  -Satting well on RA  -CT with stable lung neoplasm- Dr. Johns aware  - POCUS 7/8 B-lines concerning for volume overload    #CV  Shock   - Clinically unable to determine type of shock  - requiring levo  - wean as tolerated  - Not on fluids i/s/o fluid overload- POCUS today    -Hx CAD s/p stents, A-fib not on AC, hx CHF  - Atorvastatin 40mg qhs  -Pt on home metoprolol and entresto, held for now     #GI/Nutrition  -No active issues, CTAP unable to visualize pancreas well  -Hx colon cancer 20 years ago s/p chemo    - Diet- consistent carb, renal     #/Renal/Fluids  JACQUE  - likely prerenal->ATN i/s/o decreased PO intake and diarrhea  - Cr improved s/p HD 6.7->3.34->2.28  - BMP q12   - Steward in place  - Follow Is/Os    hyperkalemia i/s/o JACQUE (resolved)  -S/p bicarb, calcium, albuterol for hyperkalemia  - K+ 3.5 7/10    Hypophosphatemia   - 4.3->1.9 s/p 2nd round of dialysis  - Replete today    HAGMA (resolved)  - likely 2/2 metformin use i/s/o JACQUE  - Lactate 1.6 7/10  -Monitor labs and urine output. Avoid NSAIDs, ACEI/ARBS, RCA and nephrotoxins.   -Dose medications as per eGFR.  - Follow nephro recommendations regarding HD    #ID  Cultures grew staph epidermidis, likely contaminant  - Afebrile, no leukocytosis  - f/u repeat cultures    #Endocrine  -Hypoglycemia 2/2 CYN - resolving with PO intake  -SSI FSBG qac qhs  -Octreotide if persistent hypoglycemia    #Heme  - DVT ppx: SQH  - Transfuse <8.0 beceause of severe AS per cardio  - order 1 pRBC today, transfuse as necessary    #Yanira Rosenthal     #Ethics  - full code    #Dispo: ICU

## 2021-07-10 NOTE — PROGRESS NOTE ADULT - PROBLEM SELECTOR PLAN 5
Replaced phos  f/u BMP    If you have any questions, please feel free to contact me  Calista Chandra  Nephrology Fellow  Pager [place]: [phone]/ [facility]: [phone]    (After 5 pm or on weekends please page the on-call fellow, can check [email or web address] for schedule. Login is bismark garcia, schedule under [facility] medicine, psych, derm)

## 2021-07-10 NOTE — PROGRESS NOTE ADULT - ATTENDING COMMENTS
Pt seen and examined on 7/10/21. 87 F with medical hx as noted above, now p/w JACQUE likely secondary to a combination of pre-renal ATN in the setting of diarrhea and poor PO intake and contrast nephropathy from recent Ct coronaries, hyperkalemia, hyponatremia, severe lactic acidosis and hypoglycemia in the setting of ongoing metformin and sulfonylurea use. Received hemodialysis x 2 over the past 2 days. Lactic acidosis resolved, serum bicarb normalized. Pt is now making urine. Hold off on further Hd today and monitor electrolytes and UO. overnight and lactate is down from 9.8 to 3.6. Still remains severely oliguric. Plan for additional HD today. Hypoglycemia resolved, cont to advance diet as tolerated and monitor FS. Titrate pressor support to keep MAP >65. No leukocytosis or obvious evidence of infection, FUP Cxs and hold off on ABx coverage for now. CT showing b/l lung masses and nodules which have been followed with serial CTs by her outpt pulmonologist for years. Overall prognosis extremely guarded. Pt seen and examined on 7/10/21. 87 F with medical hx as noted above, now p/w JACQUE likely secondary to a combination of pre-renal ATN in the setting of diarrhea and poor PO intake and contrast nephropathy from recent Ct coronaries, hyperkalemia, hyponatremia, severe lactic acidosis and hypoglycemia in the setting of ongoing metformin and sulfonylurea use. Received hemodialysis x 2 over the past 2 days. Lactic acidosis resolved, serum bicarb normalized. Pt is now making urine. Hold off on further HD today and monitor electrolytes and UO. Hypoglycemia resolved, cont to advance diet as tolerated and monitor FS. Titrate pressor support to keep MAP >65, check cortisol level. No leukocytosis or obvious evidence of infection, FUP Cxs and hold off on ABx coverage for now. CT showing b/l lung masses and nodules which have been followed with serial CTs by her outpt pulmonologist for years. Overall prognosis extremely guarded.

## 2021-07-10 NOTE — PROGRESS NOTE ADULT - SUBJECTIVE AND OBJECTIVE BOX
Patient hemodynamically stable but still requiring small amounts of Levophed.  Patient is uncomfortable because ehs likes sleeping on her stomach and she can't because of the "tubes".  Otherwise no complaints.  Patient was dialyzed yesterday.  Putting out urine today and creatinine is improving (?from dialysis +/- spontaneously).    Vital Signs Last 24 Hrs  T(C): 37.2 (10 Jul 2021 04:00), Max: 37.6 (2021 12:00)  T(F): 98.9 (10 Jul 2021 04:00), Max: 99.7 (2021 12:00)  HR: 84 (10 Jul 2021 06:30) (76 - 96)  BP: 113/56 (10 Jul 2021 06:30) (76/38 - 132/61)  BP(mean): 80 (10 Jul 2021 06:30) (54 - 88)  RR: 36 (10 Jul 2021 06:30) (10 - 51)  SpO2: 98% (10 Jul 2021 06:30) (90% - 100%)  Daily     Daily Weight in k.4 (2021 17:58)  I&O's Summary    2021 07:01  -  10 Jul 2021 07:00  --------------------------------------------------------  IN: 2069.5 mL / OUT: 1515 mL / NET: 554.5 mL        Neck: no bruits, JVD, adenopathy  Chest: clear  Cor: [id number], RR, normal S1S2 with III/VI late peaking AS murmur no rght  Abd: soft, nontender, BS+ and no HSM  Ext: w/o CC trace edema  Pulses:2+->dp bilaterally    MEDICATIONS  (STANDING):  atorvastatin 40 milliGRAM(s) Oral at bedtime  chlorhexidine 4% Liquid 1 Application(s) Topical <User Schedule>  dextrose 40% Gel 15 Gram(s) Oral once  dextrose 50% Injectable 25 Gram(s) IV Push once  dextrose 50% Injectable 12.5 Gram(s) IV Push once  dextrose 50% Injectable 25 Gram(s) IV Push once  ferrous    sulfate 325 milliGRAM(s) Oral daily  glucagon  Injectable 1 milliGRAM(s) IntraMuscular once  heparin   Injectable 5000 Unit(s) SubCutaneous every 8 hours  norepinephrine Infusion 0.05 MICROgram(s)/kG/Min (6.93 mL/Hr) IV Continuous <Continuous>    MEDICATIONS  (PRN):      07-10    131<L>  |  94<L>  |  19  ----------------------------<  152<H>  3.5   |  25  |  2.28<H>    Ca    8.8      10 Jul 2021 00:38  Phos  1.9     07-10  Mg     1.7     07-10    TPro  7.2  /  Alb  3.9  /  TBili  0.4  /  DBili  x   /  AST  25  /  ALT  17  /  AlkPhos  205<H>                            7.3    9.59  )-----------( 166      ( 10 Jul 2021 00:38 )             23.7     PT/INR - ( 2021 13:31 )   PT: 14.8 sec;   INR: 1.25 ratio         PTT - ( 2021 13:31 )  PTT:40.0 sec  Urinalysis Basic - ( 2021 12:30 )    Color: Yellow / Appearance: Slightly Turbid / S.017 / pH: x  Gluc: x / Ketone: Negative  / Bili: Negative / Urobili: Negative   Blood: x / Protein: 100 / Nitrite: Negative   Leuk Esterase: Negative / RBC: 2 /hpf / WBC 7 /HPF   Sq Epi: x / Non Sq Epi: 1 /hpf / Bacteria: Negative      HEALTH ISSUES - PROBLEM Dx:  JACQUE (acute kidney injury)-  seems to be improving.  Patient putting out urine.  Plan today as per renal (-consider holding off on HD and monitor for continued improvement)  Hyperkalemia-resolved  Metabolic acidosis-resolved  Severe aortic stenosis-hemodynamically stable; still on Levo 0.06; try to taper off if possible

## 2021-07-10 NOTE — PROGRESS NOTE ADULT - SUBJECTIVE AND OBJECTIVE BOX
Prakash Garcia MD  PGY-1, Department of Internal Medicine  Pager: [phone] ([facility])   Pager: [phone] (J)    Patient is a 87y old  Female who presents with a chief complaint of urgent HD (2021 18:47)      SUBJECTIVE / OVERNIGHT EVENTS: Pt seen and examined. No acute overnight events. Denies fevers, chills, CP, SOB, Abdominal pain, N/V, Constipation, Diarrhea        MEDICATIONS  (STANDING):  atorvastatin 40 milliGRAM(s) Oral at bedtime  chlorhexidine 4% Liquid 1 Application(s) Topical <User Schedule>  dextrose 40% Gel 15 Gram(s) Oral once  dextrose 50% Injectable 25 Gram(s) IV Push once  dextrose 50% Injectable 12.5 Gram(s) IV Push once  dextrose 50% Injectable 25 Gram(s) IV Push once  ferrous    sulfate 325 milliGRAM(s) Oral daily  glucagon  Injectable 1 milliGRAM(s) IntraMuscular once  heparin   Injectable 5000 Unit(s) SubCutaneous every 8 hours  norepinephrine Infusion 0.05 MICROgram(s)/kG/Min (6.93 mL/Hr) IV Continuous <Continuous>    MEDICATIONS  (PRN):  zolpidem 5 milliGRAM(s) Oral at bedtime PRN Insomnia      I&O's Summary    2021 07:01  -  2021 07:00  --------------------------------------------------------  IN: 1174.4 mL / OUT: 800 mL / NET: 374.4 mL    2021 07:01  -  10 Jul 2021 06:38  --------------------------------------------------------  IN: 2069.5 mL / OUT: 1515 mL / NET: 554.5 mL        Vital Signs Last 24 Hrs  T(C): 37.2 (10 Jul 2021 04:00), Max: 37.6 (2021 12:00)  T(F): 98.9 (10 Jul 2021 04:00), Max: 99.7 (2021 12:00)  HR: 85 (10 Jul 2021 06:15) (76 - 97)  BP: 132/61 (10 Jul 2021 06:15) (76/38 - 132/61)  BP(mean): 88 (10 Jul 2021 06:15) (54 - 88)  RR: 25 (10 Jul 2021 06:15) (10 - 51)  SpO2: 100% (10 Jul 2021 06:15) (90% - 100%)    CAPILLARY BLOOD GLUCOSE      POCT Blood Glucose.: 224 mg/dL (2021 15:27)  POCT Blood Glucose.: 112 mg/dL (2021 11:39)  POCT Blood Glucose.: 74 mg/dL (2021 10:06)  POCT Blood Glucose.: 81 mg/dL (2021 09:24)  POCT Blood Glucose.: 84 mg/dL (2021 08:01)  POCT Blood Glucose.: 82 mg/dL (2021 06:59)      PHYSICAL EXAM:  GENERAL: NAD,   HEAD:  Atraumatic, Normocephalic  EYES: EOMI, PERRL, conjunctiva and sclera clear  NECK: No JVD  CHEST/LUNG: Clear to auscultation bilaterally; No wheeze  HEART: Regular rate and rhythm; No murmurs, rubs, or gallops  ABDOMEN: Soft, Nontender, Nondistended; Bowel sounds present  EXTREMITIES:  2+ Peripheral Pulses, No clubbing, cyanosis, or edema  PSYCH: AAOx3  NEUROLOGY: non-focal  SKIN: No rashes or lesions       LABS:                        7.3    9.59  )-----------( 166      ( 10 Jul 2021 00:38 )             23.7     Auto Eosinophil # x     / Auto Eosinophil % x     / Auto Neutrophil # x     / Auto Neutrophil % x     / BANDS % x                            7.2    7.99  )-----------( 188      ( 2021 06:33 )             24.0     Auto Eosinophil # x     / Auto Eosinophil % x     / Auto Neutrophil # x     / Auto Neutrophil % x     / BANDS % x                            7.2    8.22  )-----------( 175      ( 2021 00:17 )             24.0     Auto Eosinophil # x     / Auto Eosinophil % x     / Auto Neutrophil # x     / Auto Neutrophil % x     / BANDS % x        07-10    131<L>  |  94<L>  |  19  ----------------------------<  152<H>  3.5   |  25  |  2.28<H>      132<L>  |  92<L>  |  34<H>  ----------------------------<  74  4.6   |  18<L>  |  3.64<H>  07    133<L>  |  93<L>  |  33<H>  ----------------------------<  38<LL>  4.2   |  20<L>  |  3.34<H>    Ca    8.8      10 Jul 2021 00:38  Mg     1.7     07-10  Phos  1.9     07-10  TPro  7.2  /  Alb  3.9  /  TBili  0.4  /  DBili  x   /  AST  25  /  ALT  17  /  AlkPhos  205<H>  0708    PT/INR - ( 2021 13:31 )   PT: 14.8 sec;   INR: 1.25 ratio         PTT - ( 2021 13:31 )  PTT:40.0 sec      Urinalysis Basic - ( 2021 12:30 )    Color: Yellow / Appearance: Slightly Turbid / S.017 / pH: x  Gluc: x / Ketone: Negative  / Bili: Negative / Urobili: Negative   Blood: x / Protein: 100 / Nitrite: Negative   Leuk Esterase: Negative / RBC: 2 /hpf / WBC 7 /HPF   Sq Epi: x / Non Sq Epi: 1 /hpf / Bacteria: Negative            RADIOLOGY & ADDITIONAL TESTS:    Imaging Personally Reviewed:    Consultant(s) Notes Reviewed:      Care Discussed with Consultants/Other Providers:   Prakash Garcia MD  PGY-1, Department of Internal Medicine  Pager: [phone] ([facility])   Pager: [phone] (J)    Patient is a 87y old  Female who presents with a chief complaint of urgent HD (2021 18:47)      SUBJECTIVE / OVERNIGHT EVENTS: Pt seen and examined. No acute overnight events. Denies fevers, chills, CP, SOB, Abdominal pain, N/V, Constipation, Diarrhea        MEDICATIONS  (STANDING):  atorvastatin 40 milliGRAM(s) Oral at bedtime  chlorhexidine 4% Liquid 1 Application(s) Topical <User Schedule>  dextrose 40% Gel 15 Gram(s) Oral once  dextrose 50% Injectable 25 Gram(s) IV Push once  dextrose 50% Injectable 12.5 Gram(s) IV Push once  dextrose 50% Injectable 25 Gram(s) IV Push once  ferrous    sulfate 325 milliGRAM(s) Oral daily  glucagon  Injectable 1 milliGRAM(s) IntraMuscular once  heparin   Injectable 5000 Unit(s) SubCutaneous every 8 hours  norepinephrine Infusion 0.05 MICROgram(s)/kG/Min (6.93 mL/Hr) IV Continuous <Continuous>    MEDICATIONS  (PRN):  zolpidem 5 milliGRAM(s) Oral at bedtime PRN Insomnia      I&O's Summary    2021 07:01  -  2021 07:00  --------------------------------------------------------  IN: 1174.4 mL / OUT: 800 mL / NET: 374.4 mL    2021 07:01  -  10 Jul 2021 06:38  --------------------------------------------------------  IN: 2069.5 mL / OUT: 1515 mL / NET: 554.5 mL        Vital Signs Last 24 Hrs  T(C): 37.2 (10 Jul 2021 04:00), Max: 37.6 (2021 12:00)  T(F): 98.9 (10 Jul 2021 04:00), Max: 99.7 (2021 12:00)  HR: 85 (10 Jul 2021 06:15) (76 - 97)  BP: 132/61 (10 Jul 2021 06:15) (76/38 - 132/61)  BP(mean): 88 (10 Jul 2021 06:15) (54 - 88)  RR: 25 (10 Jul 2021 06:15) (10 - 51)  SpO2: 100% (10 Jul 2021 06:15) (90% - 100%)    CAPILLARY BLOOD GLUCOSE      POCT Blood Glucose.: 224 mg/dL (2021 15:27)  POCT Blood Glucose.: 112 mg/dL (2021 11:39)  POCT Blood Glucose.: 74 mg/dL (2021 10:06)  POCT Blood Glucose.: 81 mg/dL (2021 09:24)  POCT Blood Glucose.: 84 mg/dL (2021 08:01)  POCT Blood Glucose.: 82 mg/dL (2021 06:59)      PHYSICAL EXAM:  GENERAL: NAD, 2LNC  HEAD:  Atraumatic, Normocephalic  EYES: EOMI, PERRL, conjunctiva and sclera clear  NECK: No JVD  CHEST/LUNG: Bibasilar crackles  HEART: Regular rate and rhythm; No murmurs, rubs, or gallops  ABDOMEN: Soft, Nontender, Nondistended; Bowel sounds present  EXTREMITIES:  2+ Peripheral Pulses, No clubbing, cyanosis, or edema  PSYCH: AAOx3  NEUROLOGY: non-focal  SKIN: No rashes or lesions       LABS:                        7.3    9.59  )-----------( 166      ( 10 Jul 2021 00:38 )             23.7     Auto Eosinophil # x     / Auto Eosinophil % x     / Auto Neutrophil # x     / Auto Neutrophil % x     / BANDS % x                            7.2    7.99  )-----------( 188      ( 2021 06:33 )             24.0     Auto Eosinophil # x     / Auto Eosinophil % x     / Auto Neutrophil # x     / Auto Neutrophil % x     / BANDS % x                            7.2    8.22  )-----------( 175      ( 2021 00:17 )             24.0     Auto Eosinophil # x     / Auto Eosinophil % x     / Auto Neutrophil # x     / Auto Neutrophil % x     / BANDS % x        07-10    131<L>  |  94<L>  |  19  ----------------------------<  152<H>  3.5   |  25  |  2.28<H>      132<L>  |  92<L>  |  34<H>  ----------------------------<  74  4.6   |  18<L>  |  3.64<H>      133<L>  |  93<L>  |  33<H>  ----------------------------<  38<LL>  4.2   |  20<L>  |  3.34<H>    Ca    8.8      10 Jul 2021 00:38  Mg     1.7     07-10  Phos  1.9     07-10  TPro  7.2  /  Alb  3.9  /  TBili  0.4  /  DBili  x   /  AST  25  /  ALT  17  /  AlkPhos  205<H>      PT/INR - ( 2021 13:31 )   PT: 14.8 sec;   INR: 1.25 ratio         PTT - ( 2021 13:31 )  PTT:40.0 sec      Urinalysis Basic - ( 2021 12:30 )    Color: Yellow / Appearance: Slightly Turbid / S.017 / pH: x  Gluc: x / Ketone: Negative  / Bili: Negative / Urobili: Negative   Blood: x / Protein: 100 / Nitrite: Negative   Leuk Esterase: Negative / RBC: 2 /hpf / WBC 7 /HPF   Sq Epi: x / Non Sq Epi: 1 /hpf / Bacteria: Negative            RADIOLOGY & ADDITIONAL TESTS:    Imaging Personally Reviewed:    Consultant(s) Notes Reviewed:      Care Discussed with Consultants/Other Providers:   Prakash Garcia MD  PGY-1, Department of Internal Medicine  Pager: [phone] ([facility])   Pager: [phone] ([facility])    Patient is a 87y old  Female who presents with a chief complaint of urgent HD (2021 18:47)      SUBJECTIVE / OVERNIGHT EVENTS: Pt seen and examined. No acute overnight events. Denies fevers, chills, CP, Abdominal pain, N/V, Constipation, Diarrhea        MEDICATIONS  (STANDING):  atorvastatin 40 milliGRAM(s) Oral at bedtime  chlorhexidine 4% Liquid 1 Application(s) Topical <User Schedule>  dextrose 40% Gel 15 Gram(s) Oral once  dextrose 50% Injectable 25 Gram(s) IV Push once  dextrose 50% Injectable 12.5 Gram(s) IV Push once  dextrose 50% Injectable 25 Gram(s) IV Push once  ferrous    sulfate 325 milliGRAM(s) Oral daily  glucagon  Injectable 1 milliGRAM(s) IntraMuscular once  heparin   Injectable 5000 Unit(s) SubCutaneous every 8 hours  norepinephrine Infusion 0.05 MICROgram(s)/kG/Min (6.93 mL/Hr) IV Continuous <Continuous>    MEDICATIONS  (PRN):  zolpidem 5 milliGRAM(s) Oral at bedtime PRN Insomnia      I&O's Summary    2021 07:01  -  2021 07:00  --------------------------------------------------------  IN: 1174.4 mL / OUT: 800 mL / NET: 374.4 mL    2021 07:01  -  10 Jul 2021 06:38  --------------------------------------------------------  IN: 2069.5 mL / OUT: 1515 mL / NET: 554.5 mL        Vital Signs Last 24 Hrs  T(C): 37.2 (10 Jul 2021 04:00), Max: 37.6 (2021 12:00)  T(F): 98.9 (10 Jul 2021 04:00), Max: 99.7 (2021 12:00)  HR: 85 (10 Jul 2021 06:15) (76 - 97)  BP: 132/61 (10 Jul 2021 06:15) (76/38 - 132/61)  BP(mean): 88 (10 Jul 2021 06:15) (54 - 88)  RR: 25 (10 Jul 2021 06:15) (10 - 51)  SpO2: 100% (10 Jul 2021 06:15) (90% - 100%)    CAPILLARY BLOOD GLUCOSE      POCT Blood Glucose.: 224 mg/dL (2021 15:27)  POCT Blood Glucose.: 112 mg/dL (2021 11:39)  POCT Blood Glucose.: 74 mg/dL (2021 10:06)  POCT Blood Glucose.: 81 mg/dL (2021 09:24)  POCT Blood Glucose.: 84 mg/dL (2021 08:01)  POCT Blood Glucose.: 82 mg/dL (2021 06:59)      PHYSICAL EXAM:  GENERAL: NAD, 2LNC  HEAD:  Atraumatic, Normocephalic  EYES: EOMI, PERRL, conjunctiva and sclera clear  NECK: No JVD  CHEST/LUNG: Bibasilar crackles  HEART: Regular rate and rhythm; 3/6 ZAYNAB heard best over 2nd intercostal space  ABDOMEN: Soft, Nontender, Nondistended; Bowel sounds present  EXTREMITIES:  2+ Peripheral Pulses, No clubbing, cyanosis, or edema  PSYCH: AAOx3  NEUROLOGY: non-focal  SKIN: No rashes or lesions       LABS:                        7.3    9.59  )-----------( 166      ( 10 Jul 2021 00:38 )             23.7     Auto Eosinophil # x     / Auto Eosinophil % x     / Auto Neutrophil # x     / Auto Neutrophil % x     / BANDS % x                            7.2    7.99  )-----------( 188      ( 2021 06:33 )             24.0     Auto Eosinophil # x     / Auto Eosinophil % x     / Auto Neutrophil # x     / Auto Neutrophil % x     / BANDS % x                            7.2    8.22  )-----------( 175      ( 2021 00:17 )             24.0     Auto Eosinophil # x     / Auto Eosinophil % x     / Auto Neutrophil # x     / Auto Neutrophil % x     / BANDS % x        07-10    131<L>  |  94<L>  |  19  ----------------------------<  152<H>  3.5   |  25  |  2.28<H>      132<L>  |  92<L>  |  34<H>  ----------------------------<  74  4.6   |  18<L>  |  3.64<H>      133<L>  |  93<L>  |  33<H>  ----------------------------<  38<LL>  4.2   |  20<L>  |  3.34<H>    Ca    8.8      10 Jul 2021 00:38  Mg     1.7     07-10  Phos  1.9     07-10  TPro  7.2  /  Alb  3.9  /  TBili  0.4  /  DBili  x   /  AST  25  /  ALT  17  /  AlkPhos  205<H>      PT/INR - ( 2021 13:31 )   PT: 14.8 sec;   INR: 1.25 ratio         PTT - ( 2021 13:31 )  PTT:40.0 sec      Urinalysis Basic - ( 2021 12:30 )    Color: Yellow / Appearance: Slightly Turbid / S.017 / pH: x  Gluc: x / Ketone: Negative  / Bili: Negative / Urobili: Negative   Blood: x / Protein: 100 / Nitrite: Negative   Leuk Esterase: Negative / RBC: 2 /hpf / WBC 7 /HPF   Sq Epi: x / Non Sq Epi: 1 /hpf / Bacteria: Negative            RADIOLOGY & ADDITIONAL TESTS:    Imaging Personally Reviewed:    Consultant(s) Notes Reviewed:      Care Discussed with Consultants/Other Providers:

## 2021-07-10 NOTE — PROGRESS NOTE ADULT - SUBJECTIVE AND OBJECTIVE BOX
[facility] Division of Kidney Diseases & Hypertension  FOLLOW UP NOTE  [phone]--------------------------------------------------------------------------------  Chief Complaint:Acute renal failure        24 hour events/subjective: Pt seen and examined. No acute overnight events. Denies fevers, chills, CP, Abdominal pain, N/V, Constipation, Diarrhea. Had HD on 7/8 & 7/9. Remains on levophed             PAST HISTORY  --------------------------------------------------------------------------------  No significant changes to PMH, PSH, FHx, SHx, unless otherwise noted    ALLERGIES & MEDICATIONS  --------------------------------------------------------------------------------  Allergies    No Known Allergies    Intolerances      Standing Inpatient Medications  atorvastatin 40 milliGRAM(s) Oral at bedtime  chlorhexidine 4% Liquid 1 Application(s) Topical <User Schedule>  dextrose 40% Gel 15 Gram(s) Oral once  dextrose 50% Injectable 25 Gram(s) IV Push once  dextrose 50% Injectable 12.5 Gram(s) IV Push once  dextrose 50% Injectable 25 Gram(s) IV Push once  ferrous    sulfate 325 milliGRAM(s) Oral daily  glucagon  Injectable 1 milliGRAM(s) IntraMuscular once  heparin   Injectable 5000 Unit(s) SubCutaneous every 8 hours  norepinephrine Infusion 0.05 MICROgram(s)/kG/Min IV Continuous <Continuous>    PRN Inpatient Medications      REVIEW OF SYSTEMS  --------------------------------------------------------------------------------  Gen: No  fevers/chills  Respiratory: No dyspnea, cough  CV: No chest pain  GI: No abdominal pain, diarrhea,  nausea, vomiting  : No increased frequency, dysuria, hematuria  MSK:  no edema  Neuro: No dizziness/lightheadedness      All other systems were reviewed and are negative, except as noted.    VITALS/PHYSICAL EXAM  --------------------------------------------------------------------------------  T(C): 36.8 (07-10-21 @ 08:00), Max: 37.6 (07-09-21 @ 12:00)  HR: 81 (07-10-21 @ 08:30) (76 - 96)  BP: 107/54 (07-10-21 @ 08:15) (76/38 - 132/61)  RR: 15 (07-10-21 @ 08:30) (10 - 51)  SpO2: 96% (07-10-21 @ 08:30) (90% - 100%)  Wt(kg): --    Weight (kg): 73.9 (07-08-21 @ 13:37)      07-09-21 @ 07:01  -  07-10-21 @ 07:00  --------------------------------------------------------  IN: 2077.8 mL / OUT: 1515 mL / NET: 562.8 mL    07-10-21 @ 07:01  -  07-10-21 @ 09:33  --------------------------------------------------------  IN: 16.6 mL / OUT: 100 mL / NET: -83.4 mL      Physical Exam:  	Gen: NAD, well-appearing, +2L NC  	HEENT: PERRL, supple neck  	Pulm: bibasilar crackles  	CV: RRR, S1S2, +ZAYNAB   	Back: No spinal or CVA tenderness  	Abd: +BS, soft, nontender/nondistended  	: No suprapubic tenderness          Extremities: no bilateral LE edema, no asterixis          Neuro: Awake, alert, No focal deficits  	Skin: Warm, without rashes  	Vascular access: RIJ non tunneled cath     LABS/STUDIES  --------------------------------------------------------------------------------              7.3    9.59  >-----------<  166      [07-10-21 @ 00:38]              23.7     131  |  94  |  19  ----------------------------<  152      [07-10-21 @ 00:38]  3.5   |  25  |  2.28        Ca     8.8     [07-10-21 @ 00:38]      Mg     1.7     [07-10-21 @ 00:38]      Phos  1.9     [07-10-21 @ 00:38]      PT/INR: PT 14.8 , INR 1.25       [07-08-21 @ 13:31]  PTT: 40.0       [07-08-21 @ 13:31]      Creatinine Trend:  SCr 2.28 [07-10 @ 00:38]  SCr 3.64 [07-09 @ 06:34]  SCr 3.34 [07-09 @ 00:17]  SCr 6.70 [07-08 @ 11:46]  SCr 6.76 [07-08 @ 08:48]    Urinalysis - [07-09-21 @ 12:30]      Color Yellow / Appearance Slightly Turbid / SG 1.017 / pH 5.5      Gluc Negative / Ketone Negative  / Bili Negative / Urobili Negative       Blood Small / Protein 100 / Leuk Est Negative / Nitrite Negative      RBC 2 / WBC 7 / Hyaline 1 / Gran  / Sq Epi  / Non Sq Epi 1 / Bacteria Negative        HBsAb <3.0      [07-09-21 @ 03:47]  HBsAg Nonreact      [07-09-21 @ 03:47]  HBcAb Nonreact      [07-09-21 @ 03:47]  HCV 0.14, Nonreact      [07-09-21 @ 03:47]

## 2021-07-10 NOTE — PROGRESS NOTE ADULT - PROBLEM SELECTOR PLAN 1
Pt with JACQUE in the setting of severe diarrhea/decreased po intake. [facility] 1.26 on (6/7/21). On admission  BUN/Cr 79/6.7 mg/dl. K: 6.6 (non hemolyzed). s/p Urgent HD on 7/8/21    Patient with improved lactic acidosis and now K after dialysis. Lactate & K wnl  Remains oliguric. UOP 700cc x 24 hrs   mg/dl protein, pyuria. Please send urine electrolytes, spot urine TP/CR.   Bladder scan with 194cc. Has a [facility]  s/p urgent HD treatment on 7/8 & 7/9. Now with resolution of lactic acidosis & hyperkalemia. Pt with bibasilar crackles on 2L NC. SBP in 90's, remains on levophed. Making a decent UOP. Will monitor off HD today.   Monitor labs and urine output. Avoid NSAIDs, ACEI/ARBS, RCA and nephrotoxins.   Dose medications as per eGFR

## 2021-07-10 NOTE — PROGRESS NOTE ADULT - ATTENDING COMMENTS
JACQUE with acidosis:    S/p HD last 2 days ( 7/8 and 7/9)  K is up but hemolyzed  Bicarb /Ph stable. Lactate pending  On low dose pressors  Urine output improving  Await repeat K and lactate, if stable, will hold HD today   Rest Per Dr Prateek Tovar MD  O:   C: [phone]

## 2021-07-11 NOTE — PROGRESS NOTE ADULT - PROBLEM SELECTOR PLAN 1
suspected prerenal/ ATN i/s/o decreased PO intake and diarrhea: s/p HD x 2 sessions , currenlty improving w/o HD , morris recently removed , making good urine output   - renal dose medications  - monitor ins and outs  - monitor creatinine   - avoid nephrotoxins  - renal consult appreciated- further recommendations to be f/u by day team   - HD if needed per renal , currently has DANIELLA mistry  - serial bladder scan   - monitor k and phos, replete as needed   - lasix IV if SOB

## 2021-07-11 NOTE — PROGRESS NOTE ADULT - ASSESSMENT
ASSESSMENT AND PLAN:  Mr./Mrs./Ms. is a ___    #Neuro    #Cardiovascular    #Respiratory    #GI/Nutrition    #/Renal    #Skin    #ID    #Endocrine    #Hematologic/DVT ppx    #Ethics      Anuj Morales MD PGY1  Medical ICU  Dept of Internal Medicine ASSESSMENT & PLAN:     ANITHA JHAVERI is an 87F with PMHx of CAD w/stents, DM2, Afib (not on AC), Colon Ca (about 25y ago) AS, HF on 80mg lasix qd, recently started on Entresto, admitted for JACQUE and Metformin-associated lactic acidosis, admitted to MICU for urgent HD, and now appropriate for management on the floor wards.     #Neuro  -A&Ox3   -No pain at this time    #Respiratory  -Saturating well on RA  -CT with stable lung neoplasm- Dr. Johns aware    #CV  Shock  - off pressors  - HD stable     Hx CAD s/p stents, A-fib not on AC, hx CHF  -Atorvastatin 40mg qhs  -Previously on home metoprolol and entresto, held for now     #GI/Nutrition  -No active issues, CTAP unable to visualize pancreas well  -Hx colon cancer 20 years ago s/p chemo  -Diet: consistent carb, renal     #/Renal/Fluids  JACQUE  - likely prerenal/ ATN i/s/o decreased PO intake and diarrhea  - Cr improving   - Follow Is/Os  - Nephrology follows. Recommends diuresis at this time (vs dialysis)    #ID  Cultures grew staph epidermidis, likely contaminant  - Afebrile, no leukocytosis  - f/u repeat cultures    #Endocrine  - Hypoglycemia 2/2 CYN - resolving with PO intake  - SSI FSBG qac qhs  - Octreotide if persistent hypoglycemia  - Endo eval     #Heme  - DVT ppx: SQH  - Transfuse <8.0 because of severe AS per cardio    #Lines  - DANIELLA Rosenthal     #Ethics  - Full code    #Dispo  -Transfer to floor, Dr. Kee accepting. MAR notified.        Anuj Morales MD PGY1  Medical ICU  Dept of Internal Medicine ASSESSMENT & PLAN:     ANITHA JHAVERI is an 87F with PMHx of CAD w/stents, DM2, Afib (not on AC), Colon Ca (about 25y ago) AS, HF on 80mg lasix qd, recently started on Entresto, admitted for JACQUE and Metformin-associated lactic acidosis, admitted to MICU for urgent HD, and now appropriate for management on the floor wards.     #Neuro  -A&Ox3   -No pain at this time    #Respiratory  -Saturating well on RA  -CT with stable lung neoplasm- Dr. Johns aware    #CV  Shock  - off pressors  - HD stable     Hx CAD s/p stents, A-fib not on AC, hx CHF  -Atorvastatin 40mg qhs  -Previously on home metoprolol and entresto, held for now     #GI/Nutrition  -No active issues, CTAP unable to visualize pancreas well  -Hx colon cancer 20 years ago s/p chemo  -Diet: consistent carb, renal     #/Renal/Fluids  JACQUE  - likely prerenal/ ATN i/s/o decreased PO intake and diarrhea  - Cr improving   - Follow Is/Os  - Nephrology follows. Recommends diuresis at this time (vs dialysis)    #ID  Cultures grew staph epidermidis, likely contaminant  - Afebrile, no leukocytosis  - f/u repeat cultures    #Endocrine  - Hypoglycemia 2/2 CYN - resolving with PO intake  - SSI FSBG qac qhs  - Octreotide if persistent hypoglycemia  - Endo eval     #Heme  - DVT ppx: SQH  - Transfuse if <8.0 because of severe AS per cardio    #Lines  - DANIELLA Rosenthal     #Ethics  - Full code    #Dispo  -Transfer to floor, Dr. Kee accepting. MAR notified.        Anuj Morales MD PGY1  Medical ICU  Dept of Internal Medicine

## 2021-07-11 NOTE — PROGRESS NOTE ADULT - ATTENDING COMMENTS
JACQUE with acidosis:    S/p HD on 7/8 and 7/9   Acidosis has resolved   Her serum creatinine is a little up, which is expected   Low K and phos noted: needs repletion   Bin guajardo earlier today. Would watch for retention and re insert if no U/O  Na trending down: likely hypervolemic. Would check a cxr   Can give lasix IV if SOB persists and e/o volume overload on imaging     Rest Per Dr Prateek Tovar MD  O: [phone]  C: [phone]

## 2021-07-11 NOTE — PROGRESS NOTE ADULT - ASSESSMENT
87F PMH CAD w/stents, DM2, Afib (not on AC), Colon Ca (about 25y ago) AS, HF , recently started on Entresto, admitted for JACQUE and Metformin-associated lactic acidosis, admitted to MICU for urgent HD , now t/f to medical floor

## 2021-07-11 NOTE — CHART NOTE - NSCHARTNOTEFT_GEN_A_CORE
: [facility]    INDICATION: Respiratory Failure    PROCEDURE:  [ ] LIMITED ECHO  [x ] LIMITED CHEST  [ ] LIMITED RETROPERITONEAL  [ ] LIMITED ABDOMINAL  [ ] LIMITED DVT  [ ] NEEDLE GUIDANCE VASCULAR  [ ] NEEDLE GUIDANCE THORACENTESIS  [ ] NEEDLE GUIDANCE PARACENTESIS  [ ] NEEDLE GUIDANCE PERICARDIOCENTESIS  [ ] OTHER    FINDINGS:  Bilateral A lines with lung sliding  No pleural effusion or consolidation on right side  Small/trace pleural effusion with small consolidation on left side    INTERPRETATION:  No evidence of volume overload  Left consolidation likely atelectasis > PNA

## 2021-07-11 NOTE — PROGRESS NOTE ADULT - SUBJECTIVE AND OBJECTIVE BOX
Patient is a 87y old  Female who presents with a chief complaint of urgent HD (2021 07:05)Patinet feeling better today but still hypoxic (88% intermittently) on room air (99% on 2L N/C).  Labs today still abnomral (no HD yesterday).      Vital Signs Last 24 Hrs  T(C): 36.8 (2021 08:00), Max: 37.5 (2021 00:00)  T(F): 98.3 (2021 08:00), Max: 99.5 (2021 00:00)  HR: 87 (2021 07:00) (69 - 92)  BP: 116/58 (2021 07:00) (75/42 - 121/56)  BP(mean): 83 (2021 07:00) (54 - 83)  RR: 27 (2021 07:00) (10 - 36)  SpO2: 97% (2021 07:00) (88% - 100%)  Daily     Daily   I&O's Summary    10 Jul 2021 07:01  -  2021 07:00  --------------------------------------------------------  IN: 1764 mL / OUT: 915 mL / NET: 849 mL        Neck: no bruits, JVD, adenopathy  Chest: clear  Cor: [id number], RR, normal S1S2 with III/VI late peaking AS murmur but no  rght  Abd: soft, nontender, BS+ and no HSM  Ext: w/o CCE  Pulses:2+->dp bilaterally    MEDICATIONS  (STANDING):  atorvastatin 40 milliGRAM(s) Oral at bedtime  chlorhexidine 4% Liquid 1 Application(s) Topical <User Schedule>  dextrose 40% Gel 15 Gram(s) Oral once  dextrose 40% Gel 15 Gram(s) Oral once  dextrose 5%. 1000 milliLiter(s) (50 mL/Hr) IV Continuous <Continuous>  dextrose 5%. 1000 milliLiter(s) (100 mL/Hr) IV Continuous <Continuous>  dextrose 50% Injectable 25 Gram(s) IV Push once  dextrose 50% Injectable 12.5 Gram(s) IV Push once  dextrose 50% Injectable 25 Gram(s) IV Push once  dextrose 50% Injectable 25 Gram(s) IV Push once  dextrose 50% Injectable 12.5 Gram(s) IV Push once  dextrose 50% Injectable 25 Gram(s) IV Push once  ferrous    sulfate 325 milliGRAM(s) Oral daily  glucagon  Injectable 1 milliGRAM(s) IntraMuscular once  glucagon  Injectable 1 milliGRAM(s) IntraMuscular once  heparin   Injectable 5000 Unit(s) SubCutaneous every 8 hours  insulin lispro (ADMELOG) corrective regimen sliding scale   SubCutaneous three times a day before meals  insulin lispro (ADMELOG) corrective regimen sliding scale   SubCutaneous at bedtime  sodium phosphate IVPB 15 milliMole(s) IV Intermittent once    MEDICATIONS  (PRN):  artificial  tears Solution 1 Drop(s) Both EYES every 12 hours PRN Dry Eyes          128<L>  |  94<L>  |  24<H>  ----------------------------<  153<H>  3.2<L>   |  23  |  2.53<H>    Ca    8.2<L>      2021 00:21  Phos  2.0     07-11  Mg     1.6                                 8.3    9.16  )-----------( 169      ( 2021 00:21 )             26.7       Urinalysis Basic - ( 2021 12:30 )    Color: Yellow / Appearance: Slightly Turbid / S.017 / pH: x  Gluc: x / Ketone: Negative  / Bili: Negative / Urobili: Negative   Blood: x / Protein: 100 / Nitrite: Negative   Leuk Esterase: Negative / RBC: 2 /hpf / WBC 7 /HPF   Sq Epi: x / Non Sq Epi: 1 /hpf / Bacteria: Negative      HEALTH ISSUES - PROBLEM Dx:  JACQUE (acute kidney injury)-  with persistnet eletrolyte abnormalities and still persistent hypoxia, would consider HD today (-renal consulting)  Severe aortic stenosis-no change

## 2021-07-11 NOTE — PROGRESS NOTE ADULT - SUBJECTIVE AND OBJECTIVE BOX
OVERNIGHT / INTERVAL HISTORY:    SUBJETIVE:    PAST MEDICAL & SURGICAL HISTORY:      FAMILY HISTORY:      SOCIAL HISTORY:  Smoking:   Substance Use:   EtOH Use:   Marital Status:   Sexual History:   Occupation:  Recent Travel:  Country of Birth:   Advance Directives:     HOME MEDICATIONS:      Allergies    No Known Allergies    Intolerances          REVIEW OF SYSTEMS:  Constitutional: No fevers, chills, weight loss, weight gain  HEENT: No vision problems, eye pain, nasal congestion, rhinorrhea, sore throat, dysphagia  CV: No chest pain, orthopnea, palpitations  Resp: No cough, dyspnea, wheezing, hemoptysis  GI: No nausea, vomiting, diarrhea, constipation, abdominal pain  : [ ] dysuria [ ] nocturia [ ] hematuria [ ] increased urinary frequency  Musculoskeletal: [ ] back pain [ ] myalgias [ ] arthralgias [ ] fracture  Skin: [ ] rash [ ] itch  Neurological: [ ] headache [ ] dizziness [ ] syncope [ ] weakness [ ] numbness  Psychiatric: [ ] anxiety [ ] depression  Endocrine: [ ] diabetes [ ] thyroid problem  Hematologic/Lymphatic: [ ] anemia [ ] bleeding problem  Allergic/Immunologic: [ ] itchy eyes [ ] nasal discharge [ ] hives [ ] angioedema  [ ] All other systems negative  [ ] Unable to assess ROS because ________    OBJECTIVE:  ICU Vital Signs Last 24 Hrs  T(C): 37.4 (2021 05:00), Max: 37.5 (2021 00:00)  T(F): 99.4 (2021 05:00), Max: 99.5 (2021 00:00)  HR: 88 (2021 06:00) (69 - 92)  BP: 121/56 (2021 06:00) (75/42 - 121/56)  BP(mean): 81 (2021 06:00) (54 - 81)  ABP: --  ABP(mean): --  RR: 14 (2021 06:00) (10 - 36)  SpO2: 97% (2021 06:00) (88% - 100%)        0710 @ 07:01  -  -11 @ 07:00  --------------------------------------------------------  IN: 1764 mL / OUT: 915 mL / NET: 849 mL      CAPILLARY BLOOD GLUCOSE      POCT Blood Glucose.: 152 mg/dL (2021 05:45)      PHYSICAL EXAM:  General:   HEENT:   Neck:   Chest/Lungs:  Heart:  Abdomen:   Extremities:   Skin:   Neuro:   Psych:     LINES:     HOSPITAL MEDICATIONS:  MEDICATIONS  (STANDING):  atorvastatin 40 milliGRAM(s) Oral at bedtime  chlorhexidine 4% Liquid 1 Application(s) Topical <User Schedule>  dextrose 40% Gel 15 Gram(s) Oral once  dextrose 40% Gel 15 Gram(s) Oral once  dextrose 5%. 1000 milliLiter(s) (50 mL/Hr) IV Continuous <Continuous>  dextrose 5%. 1000 milliLiter(s) (100 mL/Hr) IV Continuous <Continuous>  dextrose 50% Injectable 25 Gram(s) IV Push once  dextrose 50% Injectable 12.5 Gram(s) IV Push once  dextrose 50% Injectable 25 Gram(s) IV Push once  dextrose 50% Injectable 25 Gram(s) IV Push once  dextrose 50% Injectable 12.5 Gram(s) IV Push once  dextrose 50% Injectable 25 Gram(s) IV Push once  ferrous    sulfate 325 milliGRAM(s) Oral daily  glucagon  Injectable 1 milliGRAM(s) IntraMuscular once  glucagon  Injectable 1 milliGRAM(s) IntraMuscular once  heparin   Injectable 5000 Unit(s) SubCutaneous every 8 hours  insulin lispro (ADMELOG) corrective regimen sliding scale   SubCutaneous three times a day before meals  insulin lispro (ADMELOG) corrective regimen sliding scale   SubCutaneous at bedtime  potassium chloride    Tablet ER 20 milliEquivalent(s) Oral every 2 hours    MEDICATIONS  (PRN):  artificial  tears Solution 1 Drop(s) Both EYES every 12 hours PRN Dry Eyes      LABS:                        8.3    9.16  )-----------( 169      ( 2021 00:21 )             26.7     Hgb Trend: 8.3<--, 7.3<--, 7.2<--, 7.2<--, 7.8<--  07-11    128<L>  |  94<L>  |  24<H>  ----------------------------<  153<H>  3.2<L>   |  23  |  2.53<H>    Ca    8.2<L>      2021 00:21  Phos  2.0       Mg     1.6           Creatinine Trend: 2.53<--, 2.41<--, 2.30<--, 2.28<--, 3.64<--, 3.34<--    Urinalysis Basic - ( 2021 12:30 )    Color: Yellow / Appearance: Slightly Turbid / S.017 / pH: x  Gluc: x / Ketone: Negative  / Bili: Negative / Urobili: Negative   Blood: x / Protein: 100 / Nitrite: Negative   Leuk Esterase: Negative / RBC: 2 /hpf / WBC 7 /HPF   Sq Epi: x / Non Sq Epi: 1 /hpf / Bacteria: Negative        Venous Blood Gas:  07-10 @ 10:54  7.41/46/44/29/77  VBG Lactate: 1.6  Venous Blood Gas:  07-10 @ 00:29  7.44/45/49/30/83  VBG Lactate: 1.4      MICROBIOLOGY:     RADIOLOGY & ADDITIONAL TESTS:       OVERNIGHT / INTERVAL HISTORY:  Levo weaned off at 2200 on 7/10/2021. Steward with good UOP and D/Ivan overnight. Blood cultures repeat cultures clear (previously grew Staph epi, considered contaminant).     SUBJECTIVE:  She notes sleeping well in ICU. "I was dreaming!" Today, she has no chest pain, shortness of breath on RA, or any pain. She prefers to rest in her chair over her bed.     OBJECTIVE:  ICU Vital Signs Last 24 Hrs  T(C): 37.4 (2021 05:00), Max: 37.5 (2021 00:00)  T(F): 99.4 (2021 05:00), Max: 99.5 (2021 00:00)  HR: 88 (2021 06:00) (69 - 92)  BP: 121/56 (2021 06:00) (75/42 - 121/56)  BP(mean): 81 (2021 06:00) (54 - 81)  ABP: --  ABP(mean): --  RR: 14 (2021 06:00) (10 - 36)  SpO2: 97% (2021 06:00) (88% - 100%)        07-10 @ 07:01  -  07-11 @ 07:00  --------------------------------------------------------  IN: 1764 mL / OUT: 915 mL / NET: 849 mL    CAPILLARY BLOOD GLUCOSE  POCT Blood Glucose.: 152 mg/dL (2021 05:45)      PHYSICAL EXAM:  General: elderly woman sitting up in bed, pleasantly conversational  Neck: no JVD   Chest/Lungs: fine crackles bilaterally, R>L   Heart: harsh holosystolic murmur loudest upon RUSB, normal rate, regular rhythm  Abdomen: nontender, nondistended, bowel sounds intact  Extremities: mild edema to bilateral shins  Skin: no rashes or lesions noted.   Neuro/Psych: alert & oriented x3, appropriate interactions, good insight  LINES: ALTAGRACIA Landis       LABS:                        8.3    9.16  )-----------( 169      ( 2021 00:21 )             26.7     Hgb Trend: 8.3<--, 7.3<--, 7.2<--, 7.2<--, 7.8<--  0711    128<L>  |  94<L>  |  24<H>  ----------------------------<  153<H>  3.2<L>   |  23  |  2.53<H>    Ca    8.2<L>      2021 00:21  Phos  2.0       Mg     1.6         Creatinine Trend: 2.53<--, 2.41<--, 2.30<--, 2.28<--, 3.64<--, 3.34<--    Urinalysis Basic - ( 2021 12:30 )    Color: Yellow / Appearance: Slightly Turbid / S.017 / pH: x  Gluc: x / Ketone: Negative  / Bili: Negative / Urobili: Negative   Blood: x / Protein: 100 / Nitrite: Negative   Leuk Esterase: Negative / RBC: 2 /hpf / WBC 7 /HPF   Sq Epi: x / Non Sq Epi: 1 /hpf / Bacteria: Negative      Venous Blood Gas:  07-10 @ 10:54  7.41/46/44/29/77  VBG Lactate: 1.6  Venous Blood Gas:  07-10 @ 00:29  7.44/45/49/30/83  VBG Lactate: 1.4      MICROBIOLOGY:     RADIOLOGY & ADDITIONAL TESTS:       OVERNIGHT / INTERVAL HISTORY:  Levo weaned off at 2200 on 7/10/2021. Steward with good UOP and D/Ivan overnight. Repeat blood cultures clear (previously grew Staph epi, considered contaminant).     SUBJECTIVE:  She notes sleeping well in ICU ("I was dreaming!") Today, she has no chest pain, abdominal pain, shortness of breath on RA, or any pain.     OBJECTIVE:  ICU Vital Signs Last 24 Hrs  T(C): 37.4 (2021 05:00), Max: 37.5 (2021 00:00)  T(F): 99.4 (2021 05:00), Max: 99.5 (2021 00:00)  HR: 88 (2021 06:00) (69 - 92)  BP: 121/56 (2021 06:00) (75/42 - 121/56)  BP(mean): 81 (2021 06:00) (54 - 81)  ABP: --  ABP(mean): --  RR: 14 (2021 06:00) (10 - 36)  SpO2: 97% (2021 06:00) (88% - 100%)        07-10 @ 07:01  -  07-11 @ 07:00  --------------------------------------------------------  IN: 1764 mL / OUT: 915 mL / NET: 849 mL    CAPILLARY BLOOD GLUCOSE  POCT Blood Glucose.: 152 mg/dL (2021 05:45)      PHYSICAL EXAM:  General: elderly woman sitting up in bed, pleasantly conversational  Neck: no JVD   Chest/Lungs: fine crackles bilaterally, R>L   Heart: harsh holosystolic murmur loudest upon RUSB, normal rate, regular rhythm  Abdomen: nontender, nondistended, bowel sounds intact  Extremities: mild edema to bilateral shins  Skin: no rashes or lesions noted.   Neuro/Psych: alert & oriented x3, appropriate interactions, good insight  LINES: ALTAGRACIA Landis       LABS:                        8.3    9.16  )-----------( 169      ( 2021 00:21 )             26.7     Hgb Trend: 8.3<--, 7.3<--, 7.2<--, 7.2<--, 7.8<--  0711    128<L>  |  94<L>  |  24<H>  ----------------------------<  153<H>  3.2<L>   |  23  |  2.53<H>    Ca    8.2<L>      2021 00:21  Phos  2.0       Mg     1.6         Creatinine Trend: 2.53<--, 2.41<--, 2.30<--, 2.28<--, 3.64<--, 3.34<--    Urinalysis Basic - ( 2021 12:30 )    Color: Yellow / Appearance: Slightly Turbid / S.017 / pH: x  Gluc: x / Ketone: Negative  / Bili: Negative / Urobili: Negative   Blood: x / Protein: 100 / Nitrite: Negative   Leuk Esterase: Negative / RBC: 2 /hpf / WBC 7 /HPF   Sq Epi: x / Non Sq Epi: 1 /hpf / Bacteria: Negative      Venous Blood Gas:  07-10 @ 10:54  7.41/46/44/29/77  VBG Lactate: 1.6  Venous Blood Gas:  07-10 @ 00:29  7.44/45/49/30/83  VBG Lactate: 1.4      MICROBIOLOGY:     RADIOLOGY & ADDITIONAL TESTS:

## 2021-07-11 NOTE — PROGRESS NOTE ADULT - PROBLEM SELECTOR PLAN 2
Newly diagnosed , not previously worked-up , family leaning towards nonaggressive work-up and care, however needs to discuss amongst selves prior to making decision   - Pulmonary consult -to be arranged by day team   - CT also w/ pancreatic lesion for which A nonemergent abdominal MRI may be considered for further evaluation if family wishes to pursue diagnosis

## 2021-07-11 NOTE — PROGRESS NOTE ADULT - PROBLEM SELECTOR PLAN 1
Pt with JACQUE in the setting of severe diarrhea/decreased po intake. Upon review of [facility] HIE/[employer] last sCr was 1.26 on (6/7/21). On admission  BUN/Cr 79/6.7 mg/dl. K: 6.6 (non hemolyzed). s/p Urgent HD on 7/8/21    Patient with improved lactic acidosis and now K after dialysis. Lactate & K wnl.  mg/dl protein, pyuria. Please send urine electrolytes, spot urine TP/CR. s/p urgent HD treatment on 7/8 & 7/9. Now with resolution of lactic acidosis & hyperkalemia. Has been off HD for 2 days. SCr plateaued at 2.53 with improving urine output from 700 to 900 cc since yesterday. Steward d/kvng. Maintain strict I & O's. Will monitor off HD today. Monitor for renal recovery. Monitor labs and urine output. Avoid NSAIDs, ACEI/ARBS, RCA and nephrotoxins. Dose medications as per eGFR Pt with JACQUE in the setting of severe diarrhea/decreased po intake. [facility] 1.26 on (6/7/21). On admission  BUN/Cr 79/6.7 mg/dl. K: 6.6 (non hemolyzed). s/p Urgent HD on 7/8/21    Patient with improved lactic acidosis and now K after dialysis. Lactate & K wnl.  mg/dl protein, pyuria. Please send urine electrolytes, spot urine TP/CR. s/p urgent HD treatment on 7/8 & 7/9. Now with resolution of lactic acidosis & hyperkalemia. Has been off HD for 2 days. SCr has plateaued at 2.53 with improving urine output from 700 to 900 cc since yesterday. Desaturates to 87% on RA with B lines on POCUS. Can challenge pt with diuretics. If fails to respond to diuretics then will consider HD today. Steward d/kvng. Maintain strict I & O's.  Monitor for renal recovery. Monitor labs and urine output. Avoid NSAIDs, ACEI/ARBS, RCA and nephrotoxins. Dose medications as per eGFR

## 2021-07-11 NOTE — PROGRESS NOTE ADULT - PROBLEM SELECTOR PLAN 2
patient with K 6.6 non  hemolyzed, s/ps/p urgent HD treatment on 7/8 & 7/9.  K now hypokalemia. Please replace K. No HD today.

## 2021-07-11 NOTE — PROGRESS NOTE ADULT - SUBJECTIVE AND OBJECTIVE BOX
[facility] Division of Kidney Diseases & Hypertension  FOLLOW UP NOTE  [phone]--------------------------------------------------------------------------------  Chief Complaint:Acute renal failure        24 hour events/subjective: Patient seen & examined. Labs & vitals reviewed. Now with resolution of lactic acidosis & hyperkalemia. Remains off pressors and HD stable for transfer to floors. Complains of decreased appetite. Desaturates to 87% on RA. Steward d/kvng today. Denies chest pain, dysuria, hematuria, pus in urine, frothy urine, SOB, leg edema, N/V, pruritis. UO in the last 24 hours 915cc        PAST HISTORY  --------------------------------------------------------------------------------  No significant changes to PMH, PSH, FHx, SHx, unless otherwise noted    ALLERGIES & MEDICATIONS  --------------------------------------------------------------------------------  Allergies    No Known Allergies    Intolerances      Standing Inpatient Medications  atorvastatin 40 milliGRAM(s) Oral at bedtime  chlorhexidine 4% Liquid 1 Application(s) Topical <User Schedule>  dextrose 40% Gel 15 Gram(s) Oral once  dextrose 40% Gel 15 Gram(s) Oral once  dextrose 5%. 1000 milliLiter(s) IV Continuous <Continuous>  dextrose 5%. 1000 milliLiter(s) IV Continuous <Continuous>  dextrose 50% Injectable 25 Gram(s) IV Push once  dextrose 50% Injectable 12.5 Gram(s) IV Push once  dextrose 50% Injectable 25 Gram(s) IV Push once  dextrose 50% Injectable 25 Gram(s) IV Push once  dextrose 50% Injectable 12.5 Gram(s) IV Push once  dextrose 50% Injectable 25 Gram(s) IV Push once  ferrous    sulfate 325 milliGRAM(s) Oral daily  glucagon  Injectable 1 milliGRAM(s) IntraMuscular once  glucagon  Injectable 1 milliGRAM(s) IntraMuscular once  heparin   Injectable 5000 Unit(s) SubCutaneous every 8 hours  insulin lispro (ADMELOG) corrective regimen sliding scale   SubCutaneous at bedtime  insulin lispro (ADMELOG) corrective regimen sliding scale   SubCutaneous three times a day before meals  sodium phosphate IVPB 15 milliMole(s) IV Intermittent once    PRN Inpatient Medications  artificial  tears Solution 1 Drop(s) Both EYES every 12 hours PRN      REVIEW OF SYSTEMS  --------------------------------------------------------------------------------  Gen: No  fevers/chills  Respiratory: No dyspnea, cough  CV: No chest pain  GI: No abdominal pain, diarrhea,  nausea, vomiting, +anorexia  : No increased frequency, dysuria, hematuria  MSK:  no edema  Neuro: No dizziness/lightheadedness      All other systems were reviewed and are negative, except as noted.    VITALS/PHYSICAL EXAM  --------------------------------------------------------------------------------  T(C): 36.8 (07-11-21 @ 08:00), Max: 37.5 (07-11-21 @ 00:00)  HR: 93 (07-11-21 @ 10:00) (69 - 96)  BP: 125/58 (07-11-21 @ 10:00) (75/42 - 134/57)  RR: 23 (07-11-21 @ 10:00) (10 - 37)  SpO2: 97% (07-11-21 @ 10:00) (88% - 100%)  Wt(kg): --        07-10-21 @ 07:01  -  07-11-21 @ 07:00  --------------------------------------------------------  IN: 1764 mL / OUT: 915 mL / NET: 849 mL    07-11-21 @ 07:01  -  07-11-21 @ 10:33  --------------------------------------------------------  IN: 360 mL / OUT: 100 mL / NET: 260 mL      Physical Exam:  	Gen: NAD, well-appearing, +2L NC  	HEENT: PERRL, supple neck  	Pulm: bibasilar crackles  	CV: RRR, S1S2, +ZAYNAB   	Back: No spinal or CVA tenderness  	Abd: +BS, soft, nontender/nondistended  	: No suprapubic tenderness          Extremities: no bilateral LE edema, no asterixis          Neuro: Awake, alert, No focal deficits  	Skin: Warm, without rashes  	Vascular access: [facility] non tunneled cath       LABS/STUDIES  --------------------------------------------------------------------------------              8.3    9.16  >-----------<  169      [07-11-21 @ 00:21]              26.7     128  |  94  |  24  ----------------------------<  153      [07-11-21 @ 00:21]  3.2   |  23  |  2.53        Ca     8.2     [07-11-21 @ 00:21]      Mg     1.6     [07-11-21 @ 00:21]      Phos  2.0     [07-11-21 @ 00:21]            Creatinine Trend:  SCr 2.53 [07-11 @ 00:21]  SCr 2.41 [07-10 @ 12:32]  SCr 2.30 [07-10 @ 10:58]  SCr 2.28 [07-10 @ 00:38]  SCr 3.64 [07-09 @ 06:34]    Urinalysis - [07-09-21 @ 12:30]      Color Yellow / Appearance Slightly Turbid / SG 1.017 / pH 5.5      Gluc Negative / Ketone Negative  / Bili Negative / Urobili Negative       Blood Small / Protein 100 / Leuk Est Negative / Nitrite Negative      RBC 2 / WBC 7 / Hyaline 1 / Gran  / Sq Epi  / Non Sq Epi 1 / Bacteria Negative        HBsAb <3.0      [07-09-21 @ 03:47]  HBsAg Nonreact      [07-09-21 @ 03:47]  HBcAb Nonreact      [07-09-21 @ 03:47]  HCV 0.14, Nonreact      [07-09-21 @ 03:47]

## 2021-07-11 NOTE — CHART NOTE - NSCHARTNOTEFT_GEN_A_CORE
MAR Accept Note  Transfer to:  MED  Accepting Attending Physician:  Dr. Jamal Kee  Assigned Room:  611W    Patient seen and examined.   Labs and data reviewed.   No findings precluding transfer of service.       HPI/MICU COURSE:   Please refer to MICU transfer note for full details. Briefly, this is a 87F PMH CAD w/stents, DM2, Afib (not on AC's, dc'd pradaxa 1mo ago as pt w/u for anemia), Colon Ca (about 25y ago),  AS, HF (on Lasix), recently started on Entresto, sent in to the ED for outpt labs showing JACQUE c/b hyperkalemia to >6, admitted to MICU for urgent HD. JACQUE thought to be likely secondary to a combination of pre-renal ATN in the setting of diarrhea and poor PO intake, contrast nephropathy from recent CT coronaries, and severe lactic acidosis iso metformin and sulfonylurea use. Received hemodialysis 7/8 and 7/9.  Now with resolution of lactic acidosis & hyperkalemia. Briefly on pressors in the MICU for low pressure iso dialysis + decreased PO intake. BCx grew staph epidermidis in single set of BCx, believed to be contaminant. Repeat BCx negative. Pt remains slightly fluid overloaded. Now off pressors and HD stable for transfer to floors for further optimization.      FOR FOLLOW-UP:  [ ] monitor FS and f/u Endo eval for hypoglycemia (suspect 2/2 sulfonylurea)  [ ] morris dc'ed earlier today, monitor for urinary retention  [ ] continue to assess fluid status, and give lasix PRN (last received Lasix 40mg IV x1 earlier today)  [ ] c/w HD via [facility], scheduled per Renal  [ ] f/u Cardiology and Nephrology recs    Jesus Davis, PGY-3  MAR [id number]

## 2021-07-11 NOTE — CHART NOTE - NSCHARTNOTEFT_GEN_A_CORE
MICU Transfer Note    Transfer from: MICU    Transfer to: (  ) Medicine    ( * ) Telemetry     (   ) RCU        (    ) Palliative         (   ) Stroke Unit          (   ) __________________    Accepting physician:      MICU COURSE:    . 87 F with medical hx as noted above, now p/w JACQUE likely secondary to a combination of pre-renal ATN in the setting of diarrhea and poor PO intake and contrast nephropathy from recent Ct coronaries, hyperkalemia, hyponatremia, severe lactic acidosis and hypoglycemia in the setting of ongoing metformin and sulfonylurea use. Received emergent hemodialysis overnight and lactate is down from 9.8 to 3.6. Still remains severely oliguric. Plan for additional HD today. Hypoglycemic to 38 overnight, FS currently stable in the 80-90 range. Cont Q1H FS monitoring and advance diet as tolerated. May require Octreotide if FS < 70. Titrate pressor support to keep MAP >65. No leukocytosis or obvious evidence of infection, FUP Cxs and hold off on ABx coverage for now. CT showing b/l lung masses and nodules which have been followed with serial CTs by her outpt pulmonologist for years            Vital Signs Last 24 Hrs  T(C): 36.8 (11 Jul 2021 08:00), Max: 37.5 (11 Jul 2021 00:00)  T(F): 98.3 (11 Jul 2021 08:00), Max: 99.5 (11 Jul 2021 00:00)  HR: 96 (11 Jul 2021 08:00) (69 - 96)  BP: 134/57 (11 Jul 2021 08:00) (75/42 - 134/57)  BP(mean): 82 (11 Jul 2021 08:00) (54 - 83)  RR: 37 (11 Jul 2021 08:00) (10 - 37)  SpO2: 93% (11 Jul 2021 08:00) (88% - 100%)  I&O's Summary    10 Jul 2021 07:01  -  11 Jul 2021 07:00  --------------------------------------------------------  IN: 1764 mL / OUT: 915 mL / NET: 849 mL        MEDICATIONS  (STANDING):  atorvastatin 40 milliGRAM(s) Oral at bedtime  chlorhexidine 4% Liquid 1 Application(s) Topical <User Schedule>  dextrose 40% Gel 15 Gram(s) Oral once  dextrose 40% Gel 15 Gram(s) Oral once  dextrose 5%. 1000 milliLiter(s) (50 mL/Hr) IV Continuous <Continuous>  dextrose 5%. 1000 milliLiter(s) (100 mL/Hr) IV Continuous <Continuous>  dextrose 50% Injectable 25 Gram(s) IV Push once  dextrose 50% Injectable 12.5 Gram(s) IV Push once  dextrose 50% Injectable 25 Gram(s) IV Push once  dextrose 50% Injectable 25 Gram(s) IV Push once  dextrose 50% Injectable 12.5 Gram(s) IV Push once  dextrose 50% Injectable 25 Gram(s) IV Push once  ferrous    sulfate 325 milliGRAM(s) Oral daily  glucagon  Injectable 1 milliGRAM(s) IntraMuscular once  glucagon  Injectable 1 milliGRAM(s) IntraMuscular once  heparin   Injectable 5000 Unit(s) SubCutaneous every 8 hours  insulin lispro (ADMELOG) corrective regimen sliding scale   SubCutaneous at bedtime  insulin lispro (ADMELOG) corrective regimen sliding scale   SubCutaneous three times a day before meals  sodium phosphate IVPB 15 milliMole(s) IV Intermittent once    MEDICATIONS  (PRN):  artificial  tears Solution 1 Drop(s) Both EYES every 12 hours PRN Dry Eyes        LABS                                            8.3                   Neurophils% (auto):   x      (07-11 @ 00:21):    9.16 )-----------(169          Lymphocytes% (auto):  x                                             26.7                   Eosinphils% (auto):   x        Manual%: Neutrophils x    ; Lymphocytes x    ; Eosinophils x    ; Bands%: x    ; Blasts x                                    128    |  94     |  24                  Calcium: 8.2   / iCa: x      (07-11 @ 00:21)    ----------------------------<  153       Magnesium: 1.6                              3.2     |  23     |  2.53             Phosphorous: 2.0              ASSESSMENT & PLAN:             For Followup: MICU Transfer Note    Transfer from: MICU    Transfer to: (  ) Medicine    ( * ) Telemetry     (   ) RCU        (    ) Palliative         (   ) Stroke Unit          (   ) __________________    Accepting physician:      MICU COURSE:    . 87 F 87F PMH CAD w/stents, DM2, Afib (not on AC's, dc'd pradaxa 1mo ago as pt w/u for anemia), Colon Ca (about 25y ago),  AS, HF on 80mg lasix qd, recently started on Entresto, presents to the ED with abnormal labs done on 7/7 showing JACQUE with hyperk to >6 with medical hx as noted above, now p/w JACQUE likely secondary to a combination of pre-renal ATN in the setting of diarrhea and poor PO intake and contrast nephropathy from recent Ct coronaries, hyperkalemia, hyponatremia, severe lactic acidosis and hypoglycemia in the setting of ongoing metformin and sulfonylurea use. Received emergent hemodialysis overnight and lactate is down from 9.8 to 3.6. Still remains severely oliguric. Plan for additional HD today. Hypoglycemic to 38 overnight, FS currently stable in the 80-90 range. Cont Q1H FS monitoring and advance diet as tolerated. May require Octreotide if FS < 70. Titrate pressor support to keep MAP >65. No leukocytosis or obvious evidence of infection, FUP Cxs and hold off on ABx coverage for now. CT showing b/l lung masses and nodules which have been followed with serial CTs by her outpt pulmonologist for years      s/p urgent HD treatment on 7/8 & 7/9. Now with resolution of lactic acidosis & hyperkalemia.      Vital Signs Last 24 Hrs  T(C): 36.8 (11 Jul 2021 08:00), Max: 37.5 (11 Jul 2021 00:00)  T(F): 98.3 (11 Jul 2021 08:00), Max: 99.5 (11 Jul 2021 00:00)  HR: 96 (11 Jul 2021 08:00) (69 - 96)  BP: 134/57 (11 Jul 2021 08:00) (75/42 - 134/57)  BP(mean): 82 (11 Jul 2021 08:00) (54 - 83)  RR: 37 (11 Jul 2021 08:00) (10 - 37)  SpO2: 93% (11 Jul 2021 08:00) (88% - 100%)  I&O's Summary    10 Jul 2021 07:01  -  11 Jul 2021 07:00  --------------------------------------------------------  IN: 1764 mL / OUT: 915 mL / NET: 849 mL        MEDICATIONS  (STANDING):  atorvastatin 40 milliGRAM(s) Oral at bedtime  chlorhexidine 4% Liquid 1 Application(s) Topical <User Schedule>  dextrose 40% Gel 15 Gram(s) Oral once  dextrose 40% Gel 15 Gram(s) Oral once  dextrose 5%. 1000 milliLiter(s) (50 mL/Hr) IV Continuous <Continuous>  dextrose 5%. 1000 milliLiter(s) (100 mL/Hr) IV Continuous <Continuous>  dextrose 50% Injectable 25 Gram(s) IV Push once  dextrose 50% Injectable 12.5 Gram(s) IV Push once  dextrose 50% Injectable 25 Gram(s) IV Push once  dextrose 50% Injectable 25 Gram(s) IV Push once  dextrose 50% Injectable 12.5 Gram(s) IV Push once  dextrose 50% Injectable 25 Gram(s) IV Push once  ferrous    sulfate 325 milliGRAM(s) Oral daily  glucagon  Injectable 1 milliGRAM(s) IntraMuscular once  glucagon  Injectable 1 milliGRAM(s) IntraMuscular once  heparin   Injectable 5000 Unit(s) SubCutaneous every 8 hours  insulin lispro (ADMELOG) corrective regimen sliding scale   SubCutaneous at bedtime  insulin lispro (ADMELOG) corrective regimen sliding scale   SubCutaneous three times a day before meals  sodium phosphate IVPB 15 milliMole(s) IV Intermittent once    MEDICATIONS  (PRN):  artificial  tears Solution 1 Drop(s) Both EYES every 12 hours PRN Dry Eyes        LABS                                            8.3                   Neurophils% (auto):   x      (07-11 @ 00:21):    9.16 )-----------(169          Lymphocytes% (auto):  x                                             26.7                   Eosinphils% (auto):   x        Manual%: Neutrophils x    ; Lymphocytes x    ; Eosinophils x    ; Bands%: x    ; Blasts x                                    128    |  94     |  24                  Calcium: 8.2   / iCa: x      (07-11 @ 00:21)    ----------------------------<  153       Magnesium: 1.6                              3.2     |  23     |  2.53             Phosphorous: 2.0              ASSESSMENT & PLAN:             For Followup: MICU Transfer Note    Transfer from: MICU    Transfer to: (  ) Medicine    ( * ) Telemetry     (   ) RCU        (    ) Palliative         (   ) Stroke Unit          (   ) __________________    Accepting physician:      MICU COURSE:    . 87 F 87F PMH CAD w/stents, DM2, Afib (not on AC's, dc'd pradaxa 1mo ago as pt w/u for anemia), Colon Ca (about 25y ago),  AS, HF on 80mg lasix qd, recently started on Entresto, sent in to the ED for abnormal labs done on 7/7 showing JACQUE with hyperk to >6 ,  JACQUE thought to be likely secondary to a combination of pre-renal ATN in the setting of diarrhea and poor PO intake, contrast nephropathy from recent Ct coronaries, and severe lactic acidosis iso metformin and sulfonylurea use. Received  hemodialysis 7/8 and 7/9.  Now with resolution of lactic acidosis & hyperkalemia. Briefly on pressors in the MICU for low pressure iso dialysis + decreased PO intake. Now off pressors and HD stable for transfer to floors.         s/p urgent HD treatment on 7/8 & 7/9.     Vital Signs Last 24 Hrs  T(C): 36.8 (11 Jul 2021 08:00), Max: 37.5 (11 Jul 2021 00:00)  T(F): 98.3 (11 Jul 2021 08:00), Max: 99.5 (11 Jul 2021 00:00)  HR: 96 (11 Jul 2021 08:00) (69 - 96)  BP: 134/57 (11 Jul 2021 08:00) (75/42 - 134/57)  BP(mean): 82 (11 Jul 2021 08:00) (54 - 83)  RR: 37 (11 Jul 2021 08:00) (10 - 37)  SpO2: 93% (11 Jul 2021 08:00) (88% - 100%)  I&O's Summary    10 Jul 2021 07:01  -  11 Jul 2021 07:00  --------------------------------------------------------  IN: 1764 mL / OUT: 915 mL / NET: 849 mL        MEDICATIONS  (STANDING):  atorvastatin 40 milliGRAM(s) Oral at bedtime  chlorhexidine 4% Liquid 1 Application(s) Topical <User Schedule>  dextrose 40% Gel 15 Gram(s) Oral once  dextrose 40% Gel 15 Gram(s) Oral once  dextrose 5%. 1000 milliLiter(s) (50 mL/Hr) IV Continuous <Continuous>  dextrose 5%. 1000 milliLiter(s) (100 mL/Hr) IV Continuous <Continuous>  dextrose 50% Injectable 25 Gram(s) IV Push once  dextrose 50% Injectable 12.5 Gram(s) IV Push once  dextrose 50% Injectable 25 Gram(s) IV Push once  dextrose 50% Injectable 25 Gram(s) IV Push once  dextrose 50% Injectable 12.5 Gram(s) IV Push once  dextrose 50% Injectable 25 Gram(s) IV Push once  ferrous    sulfate 325 milliGRAM(s) Oral daily  glucagon  Injectable 1 milliGRAM(s) IntraMuscular once  glucagon  Injectable 1 milliGRAM(s) IntraMuscular once  heparin   Injectable 5000 Unit(s) SubCutaneous every 8 hours  insulin lispro (ADMELOG) corrective regimen sliding scale   SubCutaneous at bedtime  insulin lispro (ADMELOG) corrective regimen sliding scale   SubCutaneous three times a day before meals  sodium phosphate IVPB 15 milliMole(s) IV Intermittent once    MEDICATIONS  (PRN):  artificial  tears Solution 1 Drop(s) Both EYES every 12 hours PRN Dry Eyes        LABS                                            8.3                   Neurophils% (auto):   x      (07-11 @ 00:21):    9.16 )-----------(169          Lymphocytes% (auto):  x                                             26.7                   Eosinphils% (auto):   x        Manual%: Neutrophils x    ; Lymphocytes x    ; Eosinophils x    ; Bands%: x    ; Blasts x                                    128    |  94     |  24                  Calcium: 8.2   / iCa: x      (07-11 @ 00:21)    ----------------------------<  153       Magnesium: 1.6                              3.2     |  23     |  2.53             Phosphorous: 2.0              ASSESSMENT & PLAN:       87F PMH CAD w/stents, DM2, Afib (not on AC), Colon Ca (about 25y ago) AS, HF on 80mg lasix qd, recently started on Entresto, admitted for JACQUE and Metformin-associated lactic acidosis, admitted to MICU for urgent HD.     #Neuro  -A&Ox3   -No pain at this time    #Respiratory  -Satting well on RA  -CT with stable lung neoplasm- Dr. Johns aware      #CV  Shock  - off pressors  - HD stable     -Hx CAD s/p stents, A-fib not on AC, hx CHF  - Atorvastatin 40mg qhs  -Pt on home metoprolol and entresto, held for now     #GI/Nutrition  -No active issues, CTAP unable to visualize pancreas well  -Hx colon cancer 20 years ago s/p chemo    - Diet- consistent carb, renal     #/Renal/Fluids  JACQUE  - likely prerenal/ ATN i/s/o decreased PO intake and diarrhea  - Cr improving   - Follow Is/Os        #ID  Cultures grew staph epidermidis, likely contaminant  - Afebrile, no leukocytosis  - f/u repeat cultures    #Endocrine  -Hypoglycemia 2/2 CYN - resolving with PO intake  -SSI FSBG qac qhs  -Octreotide if persistent hypoglycemia  -Endo eval     #Heme  - DVT ppx: SQH  - Transfuse <8.0 because of severe AS per cardio      #Lines  DANIELLA Rosenthal     #Ethics  - full code    #Dispo: ICU            For Followup:  -Monitor fingersticks  -F/u renal recs  -Endo eval MICU Transfer Note    Transfer from: MICU    Transfer to: (  ) Medicine    ( * ) Telemetry     (   ) RCU        (    ) Palliative         (   ) Stroke Unit          (   ) __________________    Accepting physician:      MICU COURSE:    . 87 F 87F PMH CAD w/stents, DM2, Afib (not on AC's, dc'd pradaxa 1mo ago as pt w/u for anemia), Colon Ca (about 25y ago),  AS, HF on 80mg lasix qd, recently started on Entresto, sent in to the ED for abnormal labs done on 7/7 showing JACQUE with hyperk to >6 ,  JACQUE thought to be likely secondary to a combination of pre-renal ATN in the setting of diarrhea and poor PO intake, contrast nephropathy from recent Ct coronaries, and severe lactic acidosis iso metformin and sulfonylurea use. Received  hemodialysis 7/8 and 7/9.  Now with resolution of lactic acidosis & hyperkalemia. Briefly on pressors in the MICU for low pressure iso dialysis + decreased PO intake. Now off pressors and HD stable for transfer to floors.         Vital Signs Last 24 Hrs  T(C): 36.8 (11 Jul 2021 08:00), Max: 37.5 (11 Jul 2021 00:00)  T(F): 98.3 (11 Jul 2021 08:00), Max: 99.5 (11 Jul 2021 00:00)  HR: 96 (11 Jul 2021 08:00) (69 - 96)  BP: 134/57 (11 Jul 2021 08:00) (75/42 - 134/57)  BP(mean): 82 (11 Jul 2021 08:00) (54 - 83)  RR: 37 (11 Jul 2021 08:00) (10 - 37)  SpO2: 93% (11 Jul 2021 08:00) (88% - 100%)  I&O's Summary    10 Jul 2021 07:01  -  11 Jul 2021 07:00  --------------------------------------------------------  IN: 1764 mL / OUT: 915 mL / NET: 849 mL        MEDICATIONS  (STANDING):  atorvastatin 40 milliGRAM(s) Oral at bedtime  chlorhexidine 4% Liquid 1 Application(s) Topical <User Schedule>  dextrose 40% Gel 15 Gram(s) Oral once  dextrose 40% Gel 15 Gram(s) Oral once  dextrose 5%. 1000 milliLiter(s) (50 mL/Hr) IV Continuous <Continuous>  dextrose 5%. 1000 milliLiter(s) (100 mL/Hr) IV Continuous <Continuous>  dextrose 50% Injectable 25 Gram(s) IV Push once  dextrose 50% Injectable 12.5 Gram(s) IV Push once  dextrose 50% Injectable 25 Gram(s) IV Push once  dextrose 50% Injectable 25 Gram(s) IV Push once  dextrose 50% Injectable 12.5 Gram(s) IV Push once  dextrose 50% Injectable 25 Gram(s) IV Push once  ferrous    sulfate 325 milliGRAM(s) Oral daily  glucagon  Injectable 1 milliGRAM(s) IntraMuscular once  glucagon  Injectable 1 milliGRAM(s) IntraMuscular once  heparin   Injectable 5000 Unit(s) SubCutaneous every 8 hours  insulin lispro (ADMELOG) corrective regimen sliding scale   SubCutaneous at bedtime  insulin lispro (ADMELOG) corrective regimen sliding scale   SubCutaneous three times a day before meals  sodium phosphate IVPB 15 milliMole(s) IV Intermittent once    MEDICATIONS  (PRN):  artificial  tears Solution 1 Drop(s) Both EYES every 12 hours PRN Dry Eyes        LABS                                            8.3                   Neurophils% (auto):   x      (07-11 @ 00:21):    9.16 )-----------(169          Lymphocytes% (auto):  x                                             26.7                   Eosinphils% (auto):   x        Manual%: Neutrophils x    ; Lymphocytes x    ; Eosinophils x    ; Bands%: x    ; Blasts x                                    128    |  94     |  24                  Calcium: 8.2   / iCa: x      (07-11 @ 00:21)    ----------------------------<  153       Magnesium: 1.6                              3.2     |  23     |  2.53             Phosphorous: 2.0              ASSESSMENT & PLAN:       87F PMH CAD w/stents, DM2, Afib (not on AC), Colon Ca (about 25y ago) AS, HF on 80mg lasix qd, recently started on Entresto, admitted for JACQUE and Metformin-associated lactic acidosis, admitted to MICU for urgent HD.     #Neuro  -A&Ox3   -No pain at this time    #Respiratory  -Satting well on RA  -CT with stable lung neoplasm- Dr. Johns aware      #CV  Shock  - off pressors  - HD stable     -Hx CAD s/p stents, A-fib not on AC, hx CHF  - Atorvastatin 40mg qhs  -Pt on home metoprolol and entresto, held for now     #GI/Nutrition  -No active issues, CTAP unable to visualize pancreas well  -Hx colon cancer 20 years ago s/p chemo    - Diet- consistent carb, renal     #/Renal/Fluids  JACQUE  - likely prerenal/ ATN i/s/o decreased PO intake and diarrhea  - Cr improving   - Follow Is/Os        #ID  Cultures grew staph epidermidis, likely contaminant  - Afebrile, no leukocytosis  - f/u repeat cultures    #Endocrine  -Hypoglycemia 2/2 CYN - resolving with PO intake  -SSI FSBG qac qhs  -Octreotide if persistent hypoglycemia  -Endo eval     #Heme  - DVT ppx: SQH  - Transfuse <8.0 because of severe AS per cardio      #Yanira Rosenthal     #Ethics  - full code    #Dispo: ICU            For Followup:  -Monitor fingersticks  -F/u renal recs  -Endo eval MICU Transfer Note    Transfer from: MICU    Transfer to: (*  ) Medicine    (  ) Telemetry     (   ) RCU        (    ) Palliative         (   ) Stroke Unit          (   ) __________________    Accepting physician:      MICU COURSE:    . 87 F 87F PMH CAD w/stents, DM2, Afib (not on AC's, dc'd pradaxa 1mo ago as pt w/u for anemia), Colon Ca (about 25y ago),  AS, HF on 80mg lasix qd, recently started on Entresto, sent in to the ED for abnormal labs done on 7/7 showing JACQUE with hyperk to >6 ,  JACQUE thought to be likely secondary to a combination of pre-renal ATN in the setting of diarrhea and poor PO intake, contrast nephropathy from recent Ct coronaries, and severe lactic acidosis iso metformin and sulfonylurea use. Received  hemodialysis 7/8 and 7/9.  Now with resolution of lactic acidosis & hyperkalemia. Briefly on pressors in the MICU for low pressure iso dialysis + decreased PO intake. Now off pressors and HD stable for transfer to floors.         Vital Signs Last 24 Hrs  T(C): 36.8 (11 Jul 2021 08:00), Max: 37.5 (11 Jul 2021 00:00)  T(F): 98.3 (11 Jul 2021 08:00), Max: 99.5 (11 Jul 2021 00:00)  HR: 96 (11 Jul 2021 08:00) (69 - 96)  BP: 134/57 (11 Jul 2021 08:00) (75/42 - 134/57)  BP(mean): 82 (11 Jul 2021 08:00) (54 - 83)  RR: 37 (11 Jul 2021 08:00) (10 - 37)  SpO2: 93% (11 Jul 2021 08:00) (88% - 100%)  I&O's Summary    10 Jul 2021 07:01  -  11 Jul 2021 07:00  --------------------------------------------------------  IN: 1764 mL / OUT: 915 mL / NET: 849 mL        MEDICATIONS  (STANDING):  atorvastatin 40 milliGRAM(s) Oral at bedtime  chlorhexidine 4% Liquid 1 Application(s) Topical <User Schedule>  dextrose 40% Gel 15 Gram(s) Oral once  dextrose 40% Gel 15 Gram(s) Oral once  dextrose 5%. 1000 milliLiter(s) (50 mL/Hr) IV Continuous <Continuous>  dextrose 5%. 1000 milliLiter(s) (100 mL/Hr) IV Continuous <Continuous>  dextrose 50% Injectable 25 Gram(s) IV Push once  dextrose 50% Injectable 12.5 Gram(s) IV Push once  dextrose 50% Injectable 25 Gram(s) IV Push once  dextrose 50% Injectable 25 Gram(s) IV Push once  dextrose 50% Injectable 12.5 Gram(s) IV Push once  dextrose 50% Injectable 25 Gram(s) IV Push once  ferrous    sulfate 325 milliGRAM(s) Oral daily  glucagon  Injectable 1 milliGRAM(s) IntraMuscular once  glucagon  Injectable 1 milliGRAM(s) IntraMuscular once  heparin   Injectable 5000 Unit(s) SubCutaneous every 8 hours  insulin lispro (ADMELOG) corrective regimen sliding scale   SubCutaneous at bedtime  insulin lispro (ADMELOG) corrective regimen sliding scale   SubCutaneous three times a day before meals  sodium phosphate IVPB 15 milliMole(s) IV Intermittent once    MEDICATIONS  (PRN):  artificial  tears Solution 1 Drop(s) Both EYES every 12 hours PRN Dry Eyes        LABS                                            8.3                   Neurophils% (auto):   x      (07-11 @ 00:21):    9.16 )-----------(169          Lymphocytes% (auto):  x                                             26.7                   Eosinphils% (auto):   x        Manual%: Neutrophils x    ; Lymphocytes x    ; Eosinophils x    ; Bands%: x    ; Blasts x                                    128    |  94     |  24                  Calcium: 8.2   / iCa: x      (07-11 @ 00:21)    ----------------------------<  153       Magnesium: 1.6                              3.2     |  23     |  2.53             Phosphorous: 2.0              ASSESSMENT & PLAN:       87F PMH CAD w/stents, DM2, Afib (not on AC), Colon Ca (about 25y ago) AS, HF on 80mg lasix qd, recently started on Entresto, admitted for JACQUE and Metformin-associated lactic acidosis, admitted to MICU for urgent HD.     #Neuro  -A&Ox3   -No pain at this time    #Respiratory  -Satting well on RA  -CT with stable lung neoplasm- Dr. Johns aware      #CV  Shock  - off pressors  - HD stable     -Hx CAD s/p stents, A-fib not on AC, hx CHF  - Atorvastatin 40mg qhs  -Pt on home metoprolol and entresto, held for now     #GI/Nutrition  -No active issues, CTAP unable to visualize pancreas well  -Hx colon cancer 20 years ago s/p chemo    - Diet- consistent carb, renal     #/Renal/Fluids  JACQUE  - likely prerenal/ ATN i/s/o decreased PO intake and diarrhea  - Cr improving   - Follow Is/Os        #ID  Cultures grew staph epidermidis, likely contaminant  - Afebrile, no leukocytosis  - f/u repeat cultures    #Endocrine  -Hypoglycemia 2/2 CYN - resolving with PO intake  -SSI FSBG qac qhs  -Octreotide if persistent hypoglycemia  -Endo eval     #Heme  - DVT ppx: SQH  - Transfuse <8.0 because of severe AS per cardio      #Yanira Rosenthal     #Ethics  - full code    #Dispo: ICU            For Followup:  -Monitor fingersticks  -F/u renal recs  -Endo eval

## 2021-07-11 NOTE — PROGRESS NOTE ADULT - PROBLEM SELECTOR PLAN 5
Replace phos  f/u BMP    If you have any questions, please feel free to contact me  Calista Chandra  Nephrology Fellow  Pager [place]: [phone]/ [facility]: [phone]    (After 5 pm or on weekends please page the on-call fellow, can check [email or web address] for schedule. Login is bismark garcia, schedule under [facility] medicine, psych, derm)

## 2021-07-11 NOTE — PROGRESS NOTE ADULT - SUBJECTIVE AND OBJECTIVE BOX
Medicine transfer Accept note     Patient is an 87 F 87F PMH CAD w/stents, DM2, Afib (not on AC's, dc'd pradaxa 1mo ago as pt w/u for anemia), Colon Ca (about 25y ago),  AS, HF on 80mg lasix qd, recently started on Entresto, sent in to the ED for abnormal labs done on 7/7 showing JACQUE with hyperk to >6 ,  JACQUE thought to be likely secondary to a combination of pre-renal ATN in the setting of diarrhea and poor PO intake, contrast nephropathy from recent Ct coronaries, and severe lactic acidosis iso metformin and sulfonylurea use. Received  hemodialysis 7/8 and 7/9.  Now with resolution of lactic acidosis & hyperkalemia. Briefly on pressors in the MICU for low pressure iso dialysis + decreased PO intake. Now off pressors and HD stable for transfer to floors  Patient currently reports       No Known Allergies  Intolerances    MEDICATIONS  (STANDING):  atorvastatin 40 milliGRAM(s) Oral at bedtime  bisacodyl 5 milliGRAM(s) Oral at bedtime  dextrose 40% Gel 15 Gram(s) Oral once  dextrose 40% Gel 15 Gram(s) Oral once  dextrose 5%. 1000 milliLiter(s) (50 mL/Hr) IV Continuous <Continuous>  dextrose 5%. 1000 milliLiter(s) (100 mL/Hr) IV Continuous <Continuous>  dextrose 50% Injectable 25 Gram(s) IV Push once  dextrose 50% Injectable 12.5 Gram(s) IV Push once  dextrose 50% Injectable 25 Gram(s) IV Push once  dextrose 50% Injectable 25 Gram(s) IV Push once  dextrose 50% Injectable 12.5 Gram(s) IV Push once  dextrose 50% Injectable 25 Gram(s) IV Push once  ferrous    sulfate 325 milliGRAM(s) Oral daily  glucagon  Injectable 1 milliGRAM(s) IntraMuscular once  glucagon  Injectable 1 milliGRAM(s) IntraMuscular once  heparin   Injectable 5000 Unit(s) SubCutaneous every 8 hours  insulin lispro (ADMELOG) corrective regimen sliding scale   SubCutaneous three times a day before meals  insulin lispro (ADMELOG) corrective regimen sliding scale   SubCutaneous at bedtime  polyethylene glycol 3350 17 Gram(s) Oral daily        MEDICATIONS  (PRN):  artificial  tears Solution 1 Drop(s) Both EYES every 12 hours PRN Dry Eyes    Home Medications:  atorvastatin 40 mg oral tablet: 1 tab(s) orally once a day at bedtime (08 Jul 2021 17:00)  Elemental Iron: 45 milligram(s) orally (08 Jul 2021 17:00)  Entresto 24 mg-26 mg oral tablet: 1 tab(s) orally 2 times a day (08 Jul 2021 17:00)  furosemide 80 mg oral tablet: 1 tab(s) orally once a day (08 Jul 2021 17:00)  glipiZIDE 10 mg oral tablet: 1 tab(s) orally once a day (08 Jul 2021 17:00)  Januvia 100 mg oral tablet: 1 tab(s) orally once a day (08 Jul 2021 17:00)  lysine 500 mg oral tablet: 2 tab(s) orally once a day (08 Jul 2021 17:00)  metFORMIN 1000 mg oral tablet: 1 tab(s) orally 2 times a day (08 Jul 2021 17:00)  metOLazone: 25 milligram(s) orally once a day (08 Jul 2021 17:00)  metoprolol succinate 25 mg oral tablet, extended release: 1 tab(s) orally once a day   Monday, Wednesday, Friday with dinner (08 Jul 2021 17:00)  Vitamin C 500 mg oral tablet: 1 tab(s) orally once a day (08 Jul 2021 17:00)  Vitamin D2 2000 intl units (50 mcg) oral capsule:  (08 Jul 2021 17:00)  zolpidem 5 mg oral tablet: 1 tab(s) orally once a day (at bedtime) (08 Jul 2021 17:00)    PAST MEDICAL & SURGICAL HISTORY:  Social history:       EXAM:  Vital Signs Last 24 Hrs  T(C): 36.7 (11 Jul 2021 12:00), Max: 37.5 (11 Jul 2021 00:00)  T(F): 98.1 (11 Jul 2021 12:00), Max: 99.5 (11 Jul 2021 00:00)  HR: 87 (11 Jul 2021 15:00) (73 - 96)  BP: 113/55 (11 Jul 2021 15:00) (75/42 - 134/57)  BP(mean): 79 (11 Jul 2021 15:00) (54 - 83)  RR: 26 (11 Jul 2021 15:00) (13 - 37)  SpO2: 98% (11 Jul 2021 15:00) (93% - 100%)      Labs personally reviewed:                          8.3    9.16  )-----------( 169      ( 11 Jul 2021 00:21 )             26.7     07-11    130<L>  |  92<L>  |  26<H>  ----------------------------<  183<H>  3.6   |  24  |  2.09<H>    Ca    8.8      11 Jul 2021 12:10  Phos  2.8     07-11  Mg     1.4     07-11                CAPILLARY BLOOD GLUCOSE      POCT Blood Glucose.: 170 mg/dL (11 Jul 2021 18:05)  POCT Blood Glucose.: 199 mg/dL (11 Jul 2021 12:09)  POCT Blood Glucose.: 152 mg/dL (11 Jul 2021 05:45)  POCT Blood Glucose.: 210 mg/dL (10 Jul 2021 22:28)      Imaging:      EKG personally reviewed: afib at 74 bpm   CT abdomen:   Bilateral lung masses and pulmonary nodules as described above, suspicious for neoplasm, unchanged since 7/2/2021 exam.  Persistent nephrograms bilaterally, suspicious for ATN.  Pancreatic body and tail cystic structures, indeterminate. A nonemergent abdominal MRI may be considered for further evaluation.    US renal 7/8: No hydronephrosis.  Bilateral renal parenchymal disease.  Distended bladder with volume of 176 cc, suggest correlation with postvoid residual if feasible to exclude bladder outlet obstruction.               Medicine transfer Accept note     Patient is an 87 F 87F PMH CAD w/stents, DM2, Afib (not on AC's, dc'd pradaxa 1mo ago as pt w/u for anemia), Colon Ca (about 25y ago),  AS, HF on 80mg lasix qd, recently started on Entresto, sent in to the ED for abnormal labs done on 7/7 showing JACQUE with hyperk to >6 ,  JACQUE thought to be likely secondary to a combination of pre-renal ATN in the setting of diarrhea and poor PO intake, contrast nephropathy from recent Ct coronaries, and severe lactic acidosis iso metformin and sulfonylurea use. Received  hemodialysis 7/8 and 7/9.  Now with resolution of lactic acidosis & hyperkalemia. Briefly on pressors in the MICU for low pressure iso dialysis + decreased PO intake. Now off pressors and HD stable for transfer to floors  Patient currently reports       No Known Allergies  Intolerances    MEDICATIONS  (STANDING):  atorvastatin 40 milliGRAM(s) Oral at bedtime  bisacodyl 5 milliGRAM(s) Oral at bedtime  dextrose 40% Gel 15 Gram(s) Oral once  dextrose 40% Gel 15 Gram(s) Oral once  dextrose 5%. 1000 milliLiter(s) (50 mL/Hr) IV Continuous <Continuous>  dextrose 5%. 1000 milliLiter(s) (100 mL/Hr) IV Continuous <Continuous>  dextrose 50% Injectable 25 Gram(s) IV Push once  dextrose 50% Injectable 12.5 Gram(s) IV Push once  dextrose 50% Injectable 25 Gram(s) IV Push once  dextrose 50% Injectable 25 Gram(s) IV Push once  dextrose 50% Injectable 12.5 Gram(s) IV Push once  dextrose 50% Injectable 25 Gram(s) IV Push once  ferrous    sulfate 325 milliGRAM(s) Oral daily  glucagon  Injectable 1 milliGRAM(s) IntraMuscular once  glucagon  Injectable 1 milliGRAM(s) IntraMuscular once  heparin   Injectable 5000 Unit(s) SubCutaneous every 8 hours  insulin lispro (ADMELOG) corrective regimen sliding scale   SubCutaneous three times a day before meals  insulin lispro (ADMELOG) corrective regimen sliding scale   SubCutaneous at bedtime  polyethylene glycol 3350 17 Gram(s) Oral daily        MEDICATIONS  (PRN):  artificial  tears Solution 1 Drop(s) Both EYES every 12 hours PRN Dry Eyes    Home Medications:  atorvastatin 40 mg oral tablet: 1 tab(s) orally once a day at bedtime (08 Jul 2021 17:00)  Elemental Iron: 45 milligram(s) orally (08 Jul 2021 17:00)  Entresto 24 mg-26 mg oral tablet: 1 tab(s) orally 2 times a day (08 Jul 2021 17:00)  furosemide 80 mg oral tablet: 1 tab(s) orally once a day (08 Jul 2021 17:00)  glipiZIDE 10 mg oral tablet: 1 tab(s) orally once a day (08 Jul 2021 17:00)  Januvia 100 mg oral tablet: 1 tab(s) orally once a day (08 Jul 2021 17:00)  lysine 500 mg oral tablet: 2 tab(s) orally once a day (08 Jul 2021 17:00)  metFORMIN 1000 mg oral tablet: 1 tab(s) orally 2 times a day (08 Jul 2021 17:00)  metOLazone: 25 milligram(s) orally once a day (08 Jul 2021 17:00)  metoprolol succinate 25 mg oral tablet, extended release: 1 tab(s) orally once a day   Monday, Wednesday, Friday with dinner (08 Jul 2021 17:00)  Vitamin C 500 mg oral tablet: 1 tab(s) orally once a day (08 Jul 2021 17:00)  Vitamin D2 2000 intl units (50 mcg) oral capsule:  (08 Jul 2021 17:00)  zolpidem 5 mg oral tablet: 1 tab(s) orally once a day (at bedtime) (08 Jul 2021 17:00)    Past Medical History  History of colon cancer (V10.05) (Z85.038)  History of Screening for osteoporosis (V82.81) (Z13.820)  History of Screening for thyroid disorder (V77.0) (Z13.29)  AFIB   CAD  HF   AS     Surgical History  History of Cataract Surgery  History of Treatment Of Ankle Fracture  Partial colectomy     Family History  Family history of myocardial infarction (V17.3) (Z82.49) : Father    Social History  Living alone (V60.3) (Z60.2)  Never a smoker  No alcohol use       EXAM:  Vital Signs Last 24 Hrs  T(C): 36.7 (11 Jul 2021 12:00), Max: 37.5 (11 Jul 2021 00:00)  T(F): 98.1 (11 Jul 2021 12:00), Max: 99.5 (11 Jul 2021 00:00)  HR: 87 (11 Jul 2021 15:00) (73 - 96)  BP: 113/55 (11 Jul 2021 15:00) (75/42 - 134/57)  BP(mean): 79 (11 Jul 2021 15:00) (54 - 83)  RR: 26 (11 Jul 2021 15:00) (13 - 37)  SpO2: 98% (11 Jul 2021 15:00) (93% - 100%)      Labs personally reviewed:                          8.3    9.16  )-----------( 169      ( 11 Jul 2021 00:21 )             26.7     07-11    130<L>  |  92<L>  |  26<H>  ----------------------------<  183<H>  3.6   |  24  |  2.09<H>    Ca    8.8      11 Jul 2021 12:10  Phos  2.8     07-11  Mg     1.4     07-11                CAPILLARY BLOOD GLUCOSE      POCT Blood Glucose.: 170 mg/dL (11 Jul 2021 18:05)  POCT Blood Glucose.: 199 mg/dL (11 Jul 2021 12:09)  POCT Blood Glucose.: 152 mg/dL (11 Jul 2021 05:45)  POCT Blood Glucose.: 210 mg/dL (10 Jul 2021 22:28)      Imaging:      EKG personally reviewed: afib at 74 bpm   CT abdomen:   Bilateral lung masses and pulmonary nodules as described above, suspicious for neoplasm, unchanged since 7/2/2021 exam.  Persistent nephrograms bilaterally, suspicious for ATN.  Pancreatic body and tail cystic structures, indeterminate. A nonemergent abdominal MRI may be considered for further evaluation.    US renal 7/8: No hydronephrosis.  Bilateral renal parenchymal disease.  Distended bladder with volume of 176 cc, suggest correlation with postvoid residual if feasible to exclude bladder outlet obstruction.               Medicine transfer Accept note     Patient is an 87 F 87F PMH CAD w/stents, DM2, Afib (not on AC's, dc'd pradaxa 1mo ago as pt w/u for anemia), Colon Ca (about 25y ago),  AS, HF on 80mg lasix qd, recently started on Entresto, sent in to the ED for abnormal labs done on 7/7 showing JACQUE with hyperk to >6 ,  JACQUE thought to be likely secondary to a combination of pre-renal ATN in the setting of diarrhea and poor PO intake, contrast nephropathy from recent Ct coronaries, and severe lactic acidosis iso metformin and sulfonylurea use. Received  hemodialysis 7/8 and 7/9.  Now with resolution of lactic acidosis & hyperkalemia. Briefly on pressors in the MICU for low pressure iso dialysis + decreased PO intake. Now off pressors and HD stable for transfer to floors  Patient currently reports no shortness of breath , she has persistent lower extremity edema , she reports having good urine output , but has not had a bowel movement in days , she has no abdominal pain no nausea or vomiting .     CONSTITUTIONAL: No weakness, fevers or chills  EYES/ENT: No visual changes;  No dysphagia  NECK: No pain or stiffness  RESPIRATORY: No cough, wheezing, hemoptysis; No shortness of breath  CARDIOVASCULAR: No chest pain or palpitations; + lower extremity edema  EXTREMITIES: no le edema, cyanosis, clubbing  GASTROINTESTINAL: No abdominal or epigastric pain. No nausea, vomiting, or hematemesis; No diarrhea + constipation. No melena or hematochezia.  BACK: No back pain  GENITOURINARY: No dysuria, frequency or hematuria  NEUROLOGICAL: No numbness or weakness  MSK: no joint swelling or pain  SKIN: No itching, burning, rashes, or lesions   PSYCH: no agitation  All other review of systems is negative unless indicated above.      No Known Allergies  Intolerances    MEDICATIONS  (STANDING):  atorvastatin 40 milliGRAM(s) Oral at bedtime  bisacodyl 5 milliGRAM(s) Oral at bedtime  dextrose 40% Gel 15 Gram(s) Oral once  dextrose 40% Gel 15 Gram(s) Oral once  dextrose 5%. 1000 milliLiter(s) (50 mL/Hr) IV Continuous <Continuous>  dextrose 5%. 1000 milliLiter(s) (100 mL/Hr) IV Continuous <Continuous>  dextrose 50% Injectable 25 Gram(s) IV Push once  dextrose 50% Injectable 12.5 Gram(s) IV Push once  dextrose 50% Injectable 25 Gram(s) IV Push once  dextrose 50% Injectable 25 Gram(s) IV Push once  dextrose 50% Injectable 12.5 Gram(s) IV Push once  dextrose 50% Injectable 25 Gram(s) IV Push once  ferrous    sulfate 325 milliGRAM(s) Oral daily  glucagon  Injectable 1 milliGRAM(s) IntraMuscular once  glucagon  Injectable 1 milliGRAM(s) IntraMuscular once  heparin   Injectable 5000 Unit(s) SubCutaneous every 8 hours  insulin lispro (ADMELOG) corrective regimen sliding scale   SubCutaneous three times a day before meals  insulin lispro (ADMELOG) corrective regimen sliding scale   SubCutaneous at bedtime  polyethylene glycol 3350 17 Gram(s) Oral daily        MEDICATIONS  (PRN):  artificial  tears Solution 1 Drop(s) Both EYES every 12 hours PRN Dry Eyes    Home Medications:  atorvastatin 40 mg oral tablet: 1 tab(s) orally once a day at bedtime (08 Jul 2021 17:00)  Elemental Iron: 45 milligram(s) orally (08 Jul 2021 17:00)  Entresto 24 mg-26 mg oral tablet: 1 tab(s) orally 2 times a day (08 Jul 2021 17:00)  furosemide 80 mg oral tablet: 1 tab(s) orally once a day (08 Jul 2021 17:00)  glipiZIDE 10 mg oral tablet: 1 tab(s) orally once a day (08 Jul 2021 17:00)  Januvia 100 mg oral tablet: 1 tab(s) orally once a day (08 Jul 2021 17:00)  lysine 500 mg oral tablet: 2 tab(s) orally once a day (08 Jul 2021 17:00)  metFORMIN 1000 mg oral tablet: 1 tab(s) orally 2 times a day (08 Jul 2021 17:00)  metOLazone: 25 milligram(s) orally once a day (08 Jul 2021 17:00)  metoprolol succinate 25 mg oral tablet, extended release: 1 tab(s) orally once a day   Monday, Wednesday, Friday with dinner (08 Jul 2021 17:00)  Vitamin C 500 mg oral tablet: 1 tab(s) orally once a day (08 Jul 2021 17:00)  Vitamin D2 2000 intl units (50 mcg) oral capsule:  (08 Jul 2021 17:00)  zolpidem 5 mg oral tablet: 1 tab(s) orally once a day (at bedtime) (08 Jul 2021 17:00)    Past Medical History  History of colon cancer (V10.05) (Z85.038)  History of Screening for osteoporosis (V82.81) (Z13.820)  History of Screening for thyroid disorder (V77.0) (Z13.29)  AFIB   CAD  HF   AS     Surgical History  History of Cataract Surgery  History of Treatment Of Ankle Fracture  Partial colectomy     Family History  Family history of myocardial infarction (V17.3) (Z82.49) : Father    Social History  Living alone (V60.3) (Z60.2)  Never a smoker  No alcohol use       EXAM:  Vital Signs Last 24 Hrs  T(C): 36.7 (11 Jul 2021 12:00), Max: 37.5 (11 Jul 2021 00:00)  T(F): 98.1 (11 Jul 2021 12:00), Max: 99.5 (11 Jul 2021 00:00)  HR: 87 (11 Jul 2021 15:00) (73 - 96)  BP: 113/55 (11 Jul 2021 15:00) (75/42 - 134/57)  BP(mean): 79 (11 Jul 2021 15:00) (54 - 83)  RR: 26 (11 Jul 2021 15:00) (13 - 37)  SpO2: 98% (11 Jul 2021 15:00) (93% - 100%)      GENERAL:mild  respiratory distress, breathing regular non labored on nasal canula , short of breath when speaking full sentences,  well-developed obese  HEAD:  Atraumatic, Normocephalic  ENT: EOMI, PERRLA, conjunctiva and sclera clear,  moist mucosa no pharyngeal erythema or exudates   NECK: supple , no JVD   CHEST/LUNG: Clear to auscultation bilaterally mild basilar crackles b/l ; No wheeze, equal breath sounds bilaterally   BACK: No spinal tenderness,  No CVA tenderness   HEART: irregular rate and rhythm; No murmurs, rubs, or gallops  ABDOMEN: Soft, Nontender, Nondistended; Bowel sounds present  EXTREMITIES:  No clubbing, cyanosis, 2+ pitting edema b/l LE   MSK: No joint swelling or effusions, ROM intact   PSYCH: Normal behavior/affect  NEUROLOGY: AAOx3, non-focal, cranial nerves intact  SKIN: scattered ecchymotic lesions on extensor surfaces , otherwise Normal color, No rashes or lesions    Labs personally reviewed:                          8.3    9.16  )-----------( 169      ( 11 Jul 2021 00:21 )             26.7     07-11    130<L>  |  92<L>  |  26<H>  ----------------------------<  183<H>  3.6   |  24  |  2.09<H>    Ca    8.8      11 Jul 2021 12:10  Phos  2.8     07-11  Mg     1.4     07-11                CAPILLARY BLOOD GLUCOSE      POCT Blood Glucose.: 170 mg/dL (11 Jul 2021 18:05)  POCT Blood Glucose.: 199 mg/dL (11 Jul 2021 12:09)  POCT Blood Glucose.: 152 mg/dL (11 Jul 2021 05:45)  POCT Blood Glucose.: 210 mg/dL (10 Jul 2021 22:28)      Imaging:      EKG personally reviewed: afib at 74 bpm   CT abdomen:   Bilateral lung masses and pulmonary nodules as described above, suspicious for neoplasm, unchanged since 7/2/2021 exam.  Persistent nephrograms bilaterally, suspicious for ATN.  Pancreatic body and tail cystic structures, indeterminate. A nonemergent abdominal MRI may be considered for further evaluation.    US renal 7/8: No hydronephrosis.  Bilateral renal parenchymal disease.  Distended bladder with volume of 176 cc, suggest correlation with postvoid residual if feasible to exclude bladder outlet obstruction.

## 2021-07-11 NOTE — PROGRESS NOTE ADULT - PROBLEM SELECTOR PLAN 3
HAGMA - most likely secondary to metformin and kidney failure   s/p urgent HD- Now resolving   Avoid metformin.

## 2021-07-11 NOTE — PROGRESS NOTE ADULT - ATTENDING COMMENTS
Pt seen and examined on 7/10/21. 87 F with medical hx as noted above, now p/w JACQUE likely secondary to a combination of pre-renal ATN in the setting of diarrhea and poor PO intake and contrast nephropathy from recent Ct coronaries, hyperkalemia, hyponatremia, severe lactic acidosis and hypoglycemia in the setting of ongoing metformin and sulfonylurea use. Received hemodialysis x 2 with resolution of lactic acidosis, now being monitored off HD. Steward removed overnight, check bladder scan Q6h and follow UO. Hyponatremia is likely 2/2 hypervolemia. POCUS showing scattered B-lines b/l lung fields. May require diuretics. Now hemodynamically stable off pressors, keep MAP >65. Cxs remain NGTD, cont to observe off ABx. FS glucose now stable, cont to monitor FS with insulin S/s coverage. CT showing b/l lung masses and nodules which have been followed with serial CTs by her outpt pulmonologist for years. Overall prognosis extremely guarded. Pt seen and examined on 7/10/21. 87 F with medical hx as noted above, now p/w JACQUE likely secondary to a combination of pre-renal ATN in the setting of diarrhea and poor PO intake and contrast nephropathy from recent Ct coronaries, hyperkalemia, hyponatremia, severe lactic acidosis and hypoglycemia in the setting of ongoing metformin and sulfonylurea use. Received hemodialysis x 2 with resolution of lactic acidosis, now being monitored off HD. Steward removed overnight, check bladder scan Q6h and follow UO. Hyponatremia is likely 2/2 hypervolemia. POCUS showing scattered B-lines b/l lung fields. May require diuretics. Will discuss the need for HD catheter with nephrology team. Now hemodynamically stable off pressors, keep MAP >65. Cxs remain NGTD, cont to observe off ABx. FS glucose now stable, cont to monitor FS with insulin S/s coverage. CT showing b/l lung masses and nodules which have been followed with serial CTs by her outpt pulmonologist for years. Overall prognosis extremely guarded.

## 2021-07-12 NOTE — PROGRESS NOTE ADULT - SUBJECTIVE AND OBJECTIVE BOX
Structural Heart Team    denies chest pain/pressure, sob and dizziness       PAST MEDICAL & SURGICAL HISTORY:    FAMILY HISTORY:  No sig FH in first degree relatives     SOCIAL HISTORY:  Denies smoking drinking or illicit drug use    Allergies    No Known Allergies    Intolerances    REVIEW OF SYSTEMS:  +Fatigue, weakness, oliguria  Negative except per HPI    OBJECTIVE:  ICU Vital Signs Last 24 Hrs  T(C): 36.4 (08 Jul 2021 07:36), Max: 36.4 (08 Jul 2021 07:36)  T(F): 97.5 (08 Jul 2021 07:36), Max: 97.5 (08 Jul 2021 07:36)  HR: 79 (08 Jul 2021 10:30) (71 - 83)  BP: 128/64 (08 Jul 2021 10:30) (112/67 - 128/64)  BP(mean): 71 (08 Jul 2021 10:30) (71 - 77)    RR: 18 (08 Jul 2021 10:30) (18 - 20)  SpO2: 100% (08 Jul 2021 10:30) (95% - 100%)  CAPILLARY BLOOD GLUCOSE    POCT Blood Glucose.: 148 mg/dL (08 Jul 2021 11:19)    PHYSICAL EXAM:  Gen: NAD  	HEENT: MMM  	Pulm: CTA B/L  	CV: S1S2  	Abd: Soft, +BS   	Ext: No LE edema B/L  	Neuro: Awake  	Skin: Warm and dry             : no tenderness to palpation     MEDICATIONS  (PRN):    LABS:                        7.8    7.31  )-----------( 196      ( 08 Jul 2021 08:47 )             25.8     Hgb Trend: 7.8<--  07-08    124<L>  |  84<L>  |  79<H>  ----------------------------<  32<LL>  6.6<HH>   |  11<L>  |  6.76<H>    Ca    9.6      08 Jul 2021 08:48  Phos  6.4     07-08  Mg     2.0     07-08    TPro  7.2  /  Alb  3.9  /  TBili  0.4  /  DBili  x   /  AST  25  /  ALT  17  /  AlkPhos  205<H>  07-08    Creatinine Trend: 6.76<--    Venous Blood Gas:  07-08 @ 08:47  7.24/34/42/14/64  VBG Lactate: 7.0 (08 Jul 2021 11:43)      REVIEW OF SYSTEMS:    CONSTITUTIONAL: No weakness, fevers or chills  EYES/ENT: No visual changes;  No vertigo or throat pain   NECK: No pain or stiffness  RESPIRATORY: No cough, wheezing, hemoptysis; No shortness of breath  CARDIOVASCULAR: No chest pain or palpitations  GASTROINTESTINAL: No abdominal or epigastric pain. No nausea, vomiting, or hematemesis; No diarrhea or constipation. No melena or hematochezia.  GENITOURINARY: No dysuria, frequency or hematuria  NEUROLOGICAL: No numbness or weakness  SKIN: No itching, rashes      Allergies    No Known Allergies    Intolerances      Vital Signs Last 24 Hrs  T(C): 36.7 (12 Jul 2021 12:51), Max: 36.8 (11 Jul 2021 21:04)  T(F): 98.1 (12 Jul 2021 12:51), Max: 98.3 (11 Jul 2021 21:04)  HR: 87 (12 Jul 2021 12:51) (70 - 87)  BP: 109/66 (12 Jul 2021 12:51) (103/62 - 113/55)  BP(mean): 79 (11 Jul 2021 15:00) (79 - 79)  RR: 18 (12 Jul 2021 12:51) (18 - 26)  SpO2: 97% (12 Jul 2021 12:51) (97% - 99%)    MEDICATIONS  (STANDING):  atorvastatin 40 milliGRAM(s) Oral at bedtime  bisacodyl 5 milliGRAM(s) Oral at bedtime  buMETAnide Injectable 2 milliGRAM(s) IV Push every 12 hours  chlorhexidine 2% Cloths 1 Application(s) Topical daily  dextrose 40% Gel 15 Gram(s) Oral once  dextrose 40% Gel 15 Gram(s) Oral once  dextrose 5%. 1000 milliLiter(s) (50 mL/Hr) IV Continuous <Continuous>  dextrose 5%. 1000 milliLiter(s) (100 mL/Hr) IV Continuous <Continuous>  dextrose 50% Injectable 25 Gram(s) IV Push once  dextrose 50% Injectable 12.5 Gram(s) IV Push once  dextrose 50% Injectable 25 Gram(s) IV Push once  dextrose 50% Injectable 25 Gram(s) IV Push once  dextrose 50% Injectable 12.5 Gram(s) IV Push once  dextrose 50% Injectable 25 Gram(s) IV Push once  ferrous    sulfate 325 milliGRAM(s) Oral daily  glucagon  Injectable 1 milliGRAM(s) IntraMuscular once  glucagon  Injectable 1 milliGRAM(s) IntraMuscular once  heparin   Injectable 5000 Unit(s) SubCutaneous every 8 hours  insulin lispro (ADMELOG) corrective regimen sliding scale   SubCutaneous three times a day before meals  insulin lispro (ADMELOG) corrective regimen sliding scale   SubCutaneous at bedtime  metoprolol succinate ER 25 milliGRAM(s) Oral <User Schedule>  polyethylene glycol 3350 17 Gram(s) Oral daily  potassium chloride    Tablet ER 40 milliEquivalent(s) Oral every 4 hours      Exam-  General: NAD, WDWN, appropriate affect  Cor: s1s2, RR,   EKG/Tele:   Pulm: Clear, no wheezes, rales, or rhonchi, no use of accessory muscles  Gastrointestinal: soft, nontender, nondistended, +bowel sounds  Extremities:   Neuro: A&Ox3, nonfocal                          7.8    7.99  )-----------( 190      ( 12 Jul 2021 06:19 )             24.4   07-12    129<L>  |  91<L>  |  27<H>  ----------------------------<  115<H>  3.0<L>   |  24  |  1.93<H>    Ca    8.5      12 Jul 2021 06:19  Phos  3.6     07-12  Mg     1.8     07-12      I&O's Summary    11 Jul 2021 07:01  -  12 Jul 2021 07:00  --------------------------------------------------------  IN: 1037.5 mL / OUT: 1900 mL / NET: -862.5 mL    12 Jul 2021 07:01  -  12 Jul 2021 14:28  --------------------------------------------------------  IN: 480 mL / OUT: 600 mL / NET: -120 mL              Assessment/Plan:        QUITA Alexander  [phone]     Structural Heart Team    Ms Knight was seen earlier this morning.  She was sitting on the edge of the bed, eating breakfast, comfortable on 2L O2 via NC.  She had no active complaints and there were no acute events overnight.       PAST MEDICAL & SURGICAL HISTORY:    FAMILY HISTORY:  No sig FH in first degree relatives     SOCIAL HISTORY:  Denies smoking drinking or illicit drug use    Allergies    No Known Allergies    Intolerances    REVIEW OF SYSTEMS:  +Fatigue, weakness, oliguria  Negative except per HPI    OBJECTIVE:  ICU Vital Signs Last 24 Hrs  T(C): 36.4 (08 Jul 2021 07:36), Max: 36.4 (08 Jul 2021 07:36)  T(F): 97.5 (08 Jul 2021 07:36), Max: 97.5 (08 Jul 2021 07:36)  HR: 79 (08 Jul 2021 10:30) (71 - 83)  BP: 128/64 (08 Jul 2021 10:30) (112/67 - 128/64)  BP(mean): 71 (08 Jul 2021 10:30) (71 - 77)    RR: 18 (08 Jul 2021 10:30) (18 - 20)  SpO2: 100% (08 Jul 2021 10:30) (95% - 100%)  CAPILLARY BLOOD GLUCOSE    POCT Blood Glucose.: 148 mg/dL (08 Jul 2021 11:19)    PHYSICAL EXAM:  Gen: NAD  	HEENT: MMM  	Pulm: CTA B/L  	CV: S1S2  	Abd: Soft, +BS   	Ext: No LE edema B/L  	Neuro: Awake  	Skin: Warm and dry             : no tenderness to palpation     MEDICATIONS  (PRN):    LABS:                        7.8    7.31  )-----------( 196      ( 08 Jul 2021 08:47 )             25.8     Hgb Trend: 7.8<--  07-08    124<L>  |  84<L>  |  79<H>  ----------------------------<  32<LL>  6.6<HH>   |  11<L>  |  6.76<H>    Ca    9.6      08 Jul 2021 08:48  Phos  6.4     07-08  Mg     2.0     07-08    TPro  7.2  /  Alb  3.9  /  TBili  0.4  /  DBili  x   /  AST  25  /  ALT  17  /  AlkPhos  205<H>  07-08    Creatinine Trend: 6.76<--    Venous Blood Gas:  07-08 @ 08:47  7.24/34/42/14/64  VBG Lactate: 7.0 (08 Jul 2021 11:43)      REVIEW OF SYSTEMS:    CONSTITUTIONAL: No weakness, fevers or chills  EYES/ENT: No visual changes;  No vertigo or throat pain   NECK: No pain or stiffness  RESPIRATORY: No cough, wheezing, hemoptysis; No shortness of breath  CARDIOVASCULAR: No chest pain or palpitations  GASTROINTESTINAL: No abdominal or epigastric pain. No nausea, vomiting, or hematemesis; No diarrhea or constipation. No melena or hematochezia.  GENITOURINARY: No dysuria, frequency or hematuria  NEUROLOGICAL: No numbness or weakness  SKIN: No itching, rashes      Allergies    No Known Allergies    Intolerances      Vital Signs Last 24 Hrs  T(C): 36.7 (12 Jul 2021 12:51), Max: 36.8 (11 Jul 2021 21:04)  T(F): 98.1 (12 Jul 2021 12:51), Max: 98.3 (11 Jul 2021 21:04)  HR: 87 (12 Jul 2021 12:51) (70 - 87)  BP: 109/66 (12 Jul 2021 12:51) (103/62 - 113/55)  BP(mean): 79 (11 Jul 2021 15:00) (79 - 79)  RR: 18 (12 Jul 2021 12:51) (18 - 26)  SpO2: 97% (12 Jul 2021 12:51) (97% - 99%)    MEDICATIONS  (STANDING):  atorvastatin 40 milliGRAM(s) Oral at bedtime  bisacodyl 5 milliGRAM(s) Oral at bedtime  buMETAnide Injectable 2 milliGRAM(s) IV Push every 12 hours  chlorhexidine 2% Cloths 1 Application(s) Topical daily  dextrose 40% Gel 15 Gram(s) Oral once  dextrose 40% Gel 15 Gram(s) Oral once  dextrose 5%. 1000 milliLiter(s) (50 mL/Hr) IV Continuous <Continuous>  dextrose 5%. 1000 milliLiter(s) (100 mL/Hr) IV Continuous <Continuous>  dextrose 50% Injectable 25 Gram(s) IV Push once  dextrose 50% Injectable 12.5 Gram(s) IV Push once  dextrose 50% Injectable 25 Gram(s) IV Push once  dextrose 50% Injectable 25 Gram(s) IV Push once  dextrose 50% Injectable 12.5 Gram(s) IV Push once  dextrose 50% Injectable 25 Gram(s) IV Push once  ferrous    sulfate 325 milliGRAM(s) Oral daily  glucagon  Injectable 1 milliGRAM(s) IntraMuscular once  glucagon  Injectable 1 milliGRAM(s) IntraMuscular once  heparin   Injectable 5000 Unit(s) SubCutaneous every 8 hours  insulin lispro (ADMELOG) corrective regimen sliding scale   SubCutaneous three times a day before meals  insulin lispro (ADMELOG) corrective regimen sliding scale   SubCutaneous at bedtime  metoprolol succinate ER 25 milliGRAM(s) Oral <User Schedule>  polyethylene glycol 3350 17 Gram(s) Oral daily  potassium chloride    Tablet ER 40 milliEquivalent(s) Oral every 4 hours      Exam-  General: NAD, WDWN, appropriate affect  Cardiac: S1S2, RRR, III/VI ZAYNAB,   No Gallops/Rubs   Pulm: Clear, no wheezes, rales, or rhonchi, no use of accessory muscles  Gastrointestinal: soft, nontender, nondistended, +bowel sounds  Extremities: 2+ Edema B/L,   No joint pain or swelling +PMSx4  Vascular: 1+ pulses B/L, No Bruits, Right IJ HD Catheter  Neuro: A&Ox3, nonfocal                            7.8    7.99  )-----------( 190      ( 12 Jul 2021 06:19 )             24.4   07-12    129<L>  |  91<L>  |  27<H>  ----------------------------<  115<H>  3.0<L>   |  24  |  1.93<H>    Ca    8.5      12 Jul 2021 06:19  Phos  3.6     07-12  Mg     1.8     07-12      I&O's Summary    11 Jul 2021 07:01  -  12 Jul 2021 07:00  --------------------------------------------------------  IN: 1037.5 mL / OUT: 1900 mL / NET: -862.5 mL    12 Jul 2021 07:01  -  12 Jul 2021 14:28  --------------------------------------------------------  IN: 480 mL / OUT: 600 mL / NET: -120 mL              Assessment/Plan:  Ms Thomson is an 86y/o female with Severe Aortic Stenosis, Acute Kidney Injury/Metabolic Acidosis, Chronic Diastolic Heart Failure  - when creatinine improves, she should have a LHC  -- may be best to keep RIJ catheter until then so she can receive dialysis if necessary afterward  - we will re-evaluate after LHC as to timing of TAVR    QUITA Alexander  [phone]

## 2021-07-12 NOTE — PROGRESS NOTE ADULT - PROBLEM SELECTOR PLAN 4
- Hold oral hypoglycemics, DC sulfonylureas and metformin  - insulin correction scale  - check fsg qac/qhs  - check a1c   - consistent carb diet

## 2021-07-12 NOTE — PROGRESS NOTE ADULT - SUBJECTIVE AND OBJECTIVE BOX
HPI:  Patient seen and examined at bedside on 6 Shahram.  No events overnight.  Renal function improving, although patient remains hyponatremic and hypokalemic.    Review Of Systems:           Respiratory: No shortness of breath, cough, or wheezing  Cardiovascular: No chest pain or palpitations  10 point review of systems is otherwise negative except as mentioned above        Medications:  artificial  tears Solution 1 Drop(s) Both EYES every 12 hours PRN  atorvastatin 40 milliGRAM(s) Oral at bedtime  bisacodyl 5 milliGRAM(s) Oral at bedtime  buMETAnide Injectable 2 milliGRAM(s) IV Push every 12 hours  chlorhexidine 2% Cloths 1 Application(s) Topical daily  dextrose 40% Gel 15 Gram(s) Oral once  dextrose 40% Gel 15 Gram(s) Oral once  dextrose 5%. 1000 milliLiter(s) IV Continuous <Continuous>  dextrose 5%. 1000 milliLiter(s) IV Continuous <Continuous>  dextrose 50% Injectable 25 Gram(s) IV Push once  dextrose 50% Injectable 12.5 Gram(s) IV Push once  dextrose 50% Injectable 25 Gram(s) IV Push once  dextrose 50% Injectable 25 Gram(s) IV Push once  dextrose 50% Injectable 12.5 Gram(s) IV Push once  dextrose 50% Injectable 25 Gram(s) IV Push once  ferrous    sulfate 325 milliGRAM(s) Oral daily  glucagon  Injectable 1 milliGRAM(s) IntraMuscular once  glucagon  Injectable 1 milliGRAM(s) IntraMuscular once  heparin   Injectable 5000 Unit(s) SubCutaneous every 8 hours  insulin lispro (ADMELOG) corrective regimen sliding scale   SubCutaneous three times a day before meals  insulin lispro (ADMELOG) corrective regimen sliding scale   SubCutaneous at bedtime  metoprolol succinate ER 25 milliGRAM(s) Oral <User Schedule>  polyethylene glycol 3350 17 Gram(s) Oral daily  zolpidem 5 milliGRAM(s) Oral at bedtime PRN    PAST MEDICAL & SURGICAL HISTORY:    Vitals:  T(C): 36.8 (21 @ 20:58), Max: 36.9 (21 @ 17:00)  HR: 88 (21 @ 20:58) (70 - 88)  BP: 102/64 (21 @ 20:58) (102/59 - 109/66)  BP(mean): --  RR: 18 (21 @ 20:58) (18 - 18)  SpO2: 98% (21 @ 20:58) (97% - 98%)  Wt(kg): --  Daily     Daily Weight in k.9 (2021 05:47)  I&O's Summary    2021 07:01  -  2021 07:00  --------------------------------------------------------  IN: 1037.5 mL / OUT: 1900 mL / NET: -862.5 mL    2021 07:01  -  2021 21:34  --------------------------------------------------------  IN: 900 mL / OUT: 2400 mL / NET: -1500 mL        Physical Exam:  Appearance: Normal, well groomed, NAD  Eyes: PERRLA, EOMI, pink conjunctiva, no scleral icterus   HENT: Normal oral mucosa  Cardiovascular: RRR, S1, S2, III/VI systolic murmur at base  Procedural Access Site: Clean, dry, intact, without hematoma  Respiratory: Clear to auscultation bilaterally  Gastrointestinal: Soft, non-tender, non-distended, BS+  Musculoskeletal: No clubbing or joint deformity   Neurologic: No focal weakness  Lymphatic: No lymphadenopathy  Psychiatry: AAOx3 with appropriate mood and affect  Skin: No rashes, ecchymoses, or cyanosis                          7.8    7.99  )-----------( 190      ( 2021 06:19 )             24.4     07-12    129<L>  |  91<L>  |  27<H>  ----------------------------<  115<H>  3.0<L>   |  24  |  1.93<H>    Ca    8.5      2021 06:19  Phos  3.6     07-12  Mg     1.8     07-12            Serum Pro-Brain Natriuretic Peptide: 32318 pg/mL ( @ 08:48)        Cardiovascular Diagnostic Testing:  ECG: rate controlled AFib    Echo: critical AS    Cath: PENDING    Imaging: < from: CT Heart without Coronaries w/ IV Cont (21 @ 08:51) >  FINDINGS:    Non-Coronary:    6 mm hypodense nodule within the right lobe of the thyroid gland. No enlarged axillary, mediastinal lymph nodes. No pleural effusions.    Evaluation of the lungs demonstrate left lower lobe masslike opacity on the part of which measures about 5 x 2.3 cm extending to the left lower lobe hilum with associated left lower lobe volume loss related to some superimposed atelectasis. Multiple left lung pulmonary nodule along the pleural surface measuring up to about 7 mm. Right middle lobe 3.2 x 2.6 cm mass associated with the minor fissure which contains a few small nodules measuring up to 7 mm. The right middle lobe mass has a cystic component along its inferior aspect.    Subcentimeter hypodensity within the liver is too small to characterize. The gallbladder, spleen, adrenal glands are unremarkable. Few pancreatic hypodensities are noted with the largest measuring about 1.5 cm on image 84 of series 19. Bilateral renal cysts.    Urinary bladder is normal. The uterus and adnexa are within normal limits. Status post rectal surgery with postoperative changes. No bowel obstruction or medial air. Appendix is normal.    Degenerative changes of the spine. Moderate to severe loss of height of the T5 and severe loss of height of the T6 vertebral bodies are of indeterminate age.    The heart is enlarged.  The left and right atria are severely enlarged.  There is mitral annular calcification and calcification of the mitral valve leaflets.   There is reduced contrast opacification of the left atrial appendage that resolves on delay imaging suggestive of mixing artifact.    The ascending aorta is mildly calcified. The aortic arch and the descending aorta are severely calcified.   The main pulmonary artery measures approximately 26.4 mm at the level of the ascending aorta.   The right and left pulmonary arteries appear enlarged and measure approximately 29.4 mm and 25.4 mm, respectively.    The aortic valve is calcified. The calcium score of the aortic valve is 1870.    Coronary:  Coronary arterial calcifications are noted; however, this studywas not optimized for evaluation of the coronary arteries.  Please refer to cardiac catheterization performed as part of pre-TAVR work-up.    Aortic Root Measurements    Major aortic annulus diameter (systole, mm): 25.2  Perpendicular minor aortic annulus diameter (systole, mm): 19.1  Aortic annulus perimeter (systole, mm): 76.6  Aortic annulus area (systole, mm2): 438  LVOT minimum diameter (systole, mm): 19.6  LVOT 90 cross (systole, mm): 26.4  LVOT calcification:  Severe  Distance from Aortic annulus to Left Main coronary artery (diastole, mm): 13.2  Distance from Aortic annulus to Right Coronary artery (diastole, mm): 16.7  Sinus of Valsalva diameter, Right coronary (diastole, mm): 29.4  Sinus of Valsalva diameter, Left coronary (diastole, mm): 31.0  Sinus of Valsalva diameter, Non coronary (diastole, mm): 28.3  Diameter at the sinotubular junction (diastole, mm): 26.5 x 26.5  Maximum ascending aorta diameter at 40 mm above annulus (diastole, mm): 32.3  Aortic root angulation (diastole, degrees): 48.4  Aortic root calcification: Moderate-to-severe    Abdominal Aorta:        Minimum lumen diameter (MLD) (mm): 12.8        Diameter perpendicular to MLD (mm): 13.2  Left Femoral:        Minimum Lumen Diameter (mm): 7.2        Diameter perpendicular to MLD (mm): 8.1        Tortuosity: Mild        Calcification: Mild  Right Femoral:        Minimum Lumen Diameter (mm): 6.5        Diameter perpendicular to MLD (mm): 7.1        Tortuosity: Mild        Calcification: Mild  Left External Iliac:        Minimum Lumen Diameter (mm): 6.9        Diameter perpendicular to MLD (mm): 7.5        Tortuosity: Moderate        Calcification: Mild  Left Common Iliac:         Minimum Lumen Diameter (mm): 4.5        Diameter perpendicular to MLD (mm): 8.0        Tortuosity: Severe        Calcification: Severe  Right External Iliac:        Minimum Lumen Diameter (mm): 5.0        Diameter perpendicular to MLD (mm): 7.4        Tortuosity: Moderate        Calcification: Mild  Right Common Iliac:    Minimum Lumen Diameter (mm): 7.8        Diameter perpendicular to MLD (mm): 8.4        Tortuosity: Moderate        Calcification: Moderate  L Subclavian/Axillary: Not well visualized due to motion artifact        Minimum Lumen Diameter (mm): 5.6      Diameter perpendicular to MLD (mm): 6.2        Tortuosity: Mild        Calcification: Mild  R Subclavian/Axillary:        Minimum Lumen Diameter (mm): 4.3        Diameter perpendicular to MLD (mm): 5.1        Tortuosity: Mild        Calcification: Mild    IMPRESSION:  1. Calcified aortic valve. The calcium score of the aortic valve is 1870.  2. Please see the body of the report for aortic and peripheral access vessel measurements.  3. Dominant large left lower lobe mass associated with multiple left lung pleural nodules worrisome for neoplasm with metastatic disease.  4. 3.2 x 2.6 cm right middle lobe mass as described above also is worrisome for neoplasm.  5. A few pancreatic hypodense nodules. Dedicated contrast enhanced pancreatic MRI is recommended for complete evaluation.  6. Compression fracture deformities of the T5 and T6 vertebral bodies as described above are of indeterminate age.      KHLOE HAINES MD; Attending Cardiologist  This document has been electronically signed.  MONICA HANEY MD; Attending Radiologist  This document has been electronically signed. 2021  9:12AM    < end of copied text >

## 2021-07-12 NOTE — PROGRESS NOTE ADULT - SUBJECTIVE AND OBJECTIVE BOX
[facility] DIVISION OF KIDNEY DISEASES AND HYPERTENSION -- FOLLOW UP NOTE  --------------------------------------------------------------------------------  If any questions, please feel free to contact me  NS pager: [phone], [facility]: [phone]  Vicente Cortes M.D.  Nephrology Fellow    (After 5 pm or on weekends please page the on-call fellow)  --------------------------------------------------------------------------------    Chief Complaint:  Patient is a 87y old  Female who presents with a chief complaint of urgent HD (11 Jul 2021 19:51)    24 hour events/subjective:  Patient seen and examined at bedside, in NAD, no acute events overnight. sCr seems stable. Now with resolution of lactic acidosis & hyperkalemia. Noted with persistent LE edema. Vitals/labs/imaging reviewed       PAST HISTORY  --------------------------------------------------------------------------------  No significant changes to PMH, PSH, FHx, SHx, unless otherwise noted    ALLERGIES & MEDICATIONS  --------------------------------------------------------------------------------  Allergies    No Known Allergies    Intolerances      Standing Inpatient Medications  atorvastatin 40 milliGRAM(s) Oral at bedtime  bisacodyl 5 milliGRAM(s) Oral at bedtime  buMETAnide Injectable 2 milliGRAM(s) IV Push every 12 hours  chlorhexidine 2% Cloths 1 Application(s) Topical daily  dextrose 40% Gel 15 Gram(s) Oral once  dextrose 40% Gel 15 Gram(s) Oral once  dextrose 5%. 1000 milliLiter(s) IV Continuous <Continuous>  dextrose 5%. 1000 milliLiter(s) IV Continuous <Continuous>  dextrose 50% Injectable 25 Gram(s) IV Push once  dextrose 50% Injectable 12.5 Gram(s) IV Push once  dextrose 50% Injectable 25 Gram(s) IV Push once  dextrose 50% Injectable 25 Gram(s) IV Push once  dextrose 50% Injectable 12.5 Gram(s) IV Push once  dextrose 50% Injectable 25 Gram(s) IV Push once  ferrous    sulfate 325 milliGRAM(s) Oral daily  glucagon  Injectable 1 milliGRAM(s) IntraMuscular once  glucagon  Injectable 1 milliGRAM(s) IntraMuscular once  heparin   Injectable 5000 Unit(s) SubCutaneous every 8 hours  insulin lispro (ADMELOG) corrective regimen sliding scale   SubCutaneous three times a day before meals  insulin lispro (ADMELOG) corrective regimen sliding scale   SubCutaneous at bedtime  metoprolol succinate ER 25 milliGRAM(s) Oral <User Schedule>  polyethylene glycol 3350 17 Gram(s) Oral daily  potassium chloride    Tablet ER 40 milliEquivalent(s) Oral every 4 hours    PRN Inpatient Medications  artificial  tears Solution 1 Drop(s) Both EYES every 12 hours PRN      REVIEW OF SYSTEMS  --------------------------------------------------------------------------------  Gen: No fevers/chills  Skin: No rashes  Head/Eyes/Ears: Normal hearing,   Respiratory: No dyspnea, cough  CV: No chest pain  GI: No abdominal pain, diarrhea  : No dysuria, hematuria  MSK: + edema    All other systems were reviewed and are negative, except as noted.    VITALS/PHYSICAL EXAM  --------------------------------------------------------------------------------  T(C): 36.8 (07-12-21 @ 05:00), Max: 36.8 (07-11-21 @ 21:04)  HR: 70 (07-12-21 @ 05:00) (70 - 90)  BP: 104/55 (07-12-21 @ 05:00) (103/51 - 116/59)  RR: 18 (07-12-21 @ 05:00) (16 - 26)  SpO2: 97% (07-12-21 @ 05:00) (94% - 99%)  Wt(kg): --        07-11-21 @ 07:01  -  07-12-21 @ 07:00  --------------------------------------------------------  IN: 1037.5 mL / OUT: 1900 mL / NET: -862.5 mL    07-12-21 @ 07:01  -  07-12-21 @ 11:12  --------------------------------------------------------  IN: 240 mL / OUT: 600 mL / NET: -360 mL        Physical Exam:  	Gen: NAD  	Pulm: CTA B/L  	CV: S1S2  	Abd: Soft, +BS   	Ext: No LE edema B/L  	Neuro: Awake  	Skin: Warm and dry  	Vascular access: right non tunneled cath.     LABS/STUDIES  --------------------------------------------------------------------------------              7.8    7.99  >-----------<  190      [07-12-21 @ 06:19]              24.4     129  |  91  |  27  ----------------------------<  115      [07-12-21 @ 06:19]  3.0   |  24  |  1.93        Ca     8.5     [07-12-21 @ 06:19]      Mg     1.8     [07-12-21 @ 06:19]      Phos  3.6     [07-12-21 @ 06:19]            Creatinine Trend:  SCr 1.93 [07-12 @ 06:19]  SCr 2.09 [07-11 @ 12:10]  SCr 2.53 [07-11 @ 00:21]  SCr 2.41 [07-10 @ 12:32]  SCr 2.30 [07-10 @ 10:58]    Urinalysis - [07-09-21 @ 12:30]      Color Yellow / Appearance Slightly Turbid / SG 1.017 / pH 5.5      Gluc Negative / Ketone Negative  / Bili Negative / Urobili Negative       Blood Small / Protein 100 / Leuk Est Negative / Nitrite Negative      RBC 2 / WBC 7 / Hyaline 1 / Gran  / Sq Epi  / Non Sq Epi 1 / Bacteria Negative    Urine Creatinine 50      [07-11-21 @ 15:56]  Urine Protein 34      [07-11-21 @ 15:56]  Urine Sodium 54      [07-11-21 @ 15:56]  Urine Urea Nitrogen 297      [07-11-21 @ 20:13]      HBsAb <3.0      [07-09-21 @ 03:47]  HBsAg Nonreact      [07-09-21 @ 03:47]  HBcAb Nonreact      [07-09-21 @ 03:47]  HCV 0.14, Nonreact      [07-09-21 @ 03:47]

## 2021-07-12 NOTE — PROGRESS NOTE ADULT - PROBLEM SELECTOR PLAN 1
Pt with JACQUE in the setting of severe diarrhea/decreased po intake. Upon review of [facility] HIE/[employer] last sCr was 1.26 on (6/7/21). On admission  BUN/Cr 79/6.7 mg/dl. K: 6.6 (non hemolyzed). s/p Urgent HD on 7/8/21    Patient with improved lactic acidosis and K after dialysis.   SCr improving now down to 1.9mg/dl.   has remained non oliguric to 1.9L in the past 24hrs.   started on diuresis over the weekend, but still seems overload.   please discontinue lasix and start Bumex 2mg IV bid   no urgent indication for HD today.   Monitor labs and urine output.   Avoid NSAIDs, ACEI/ARBS, RCA and nephrotoxins.   Dose medications as per eGFR

## 2021-07-12 NOTE — PROGRESS NOTE ADULT - NSPROGADDITIONALINFOA_GEN_ALL_CORE
Plan discussed with LAURO Meadows.    Heather Lebron DO  Pager #: [phone]  Available on Teams crystal

## 2021-07-12 NOTE — PROGRESS NOTE ADULT - ATTENDING COMMENTS
acute kidney injury- ATN likely from prolonged pre-renal state from diarrhea/ hypotension/ critical AS/ Increasing Entresto  was dialyzed 7/7 and 7/8   recovering. no indication to dialyze today   looks volume overloaded- start Bumex 2 mg iv BID.     Hyponatremia- hypervolemic. as above     Hypokalemia- KCl 40 meq Q2hrs times 3 doses. while on diuretic. please keep on standing KCl 40 meq BID     If no dialysis needed by 7/14, can remove vascath.     josselyn fleming  nephrology attending   Cell# [phone]   Office- [phone]

## 2021-07-12 NOTE — PROGRESS NOTE ADULT - PROBLEM SELECTOR PLAN 2
Newly diagnosed , not previously worked-up , family leaning towards nonaggressive work-up and care, however needs to discuss amongst selves prior to making decision   - CT also w/ pancreatic lesion for which a nonemergent abdominal MRI may be considered for further evaluation as outpatient if family wishes to pursue diagnosis Newly diagnosed , not previously worked-up , family leaning towards nonaggressive work-up and care, however needs to discuss amongst selves prior to making decision   - CT also w/ pancreatic lesion for which a nonemergent contrast enhanced abdominal MRI may be considered for further evaluation as outpatient if family wishes to pursue diagnosis

## 2021-07-12 NOTE — PROGRESS NOTE ADULT - ATTENDING COMMENTS
No acute events reported overnight.  The patient is currently sitting up in her hospital bed eating breakfast/hard-boiled egg while placing salt on it.  She reports that she is clinically doing well.  Denies any chest pain/tightness/discomfort, shortness of breath at rest, light sensation, dizziness or palpitations.  Does feel dyspneic when ambulating short distances.  Lactic acidosis/hyperkalemia resolved following dialysis sessions.    The patient's baseline creatinine is between 1.2-1.5.  She has remained nonoliguric with creatinine today of 1.9.  On examination she is volume overloaded.  Would recommend that the patient be started on Bumex twice daily with goals ins and outs -1 L daily.  Dialysis as per nephrology team.  At this time it is recommended that all nephrotoxic agents including ACE/ARB/Entresto/NSAIDs be held.    Once the patient is clinically doing better and able to lay flat and kidney function has returned to baseline it is recommended that she undergo right/left heart cardiac catheterization.  Indications and details for the procedure reviewed with the patient.  The patient is very concerned that further contrast administration will lead to further complications/renal insufficiency.  Would recommend that Metformin be held.  Aim for potassium to greater than 4 magnesium to be greater than 2.    Patient has known bronchoalveolar carcinoma.  Followed as an outpatient by pulmonary who felt that she has greater than 1 year prognosis from her underlying lung malignancy.  It has been recommended that she proceed with TAVR for her critical aortic valve stenosis.    Continue pharmacologic DVT prophylaxis.    All questions and concerns of the patient were addressed.    Findings and plan were discussed with cardiology/Dr. Hodges. No acute events reported overnight.  The patient is currently sitting up in her hospital bed eating breakfast/hard-boiled egg while placing salt on it.  She reports that she is clinically doing well.  Denies any chest pain/tightness/discomfort, shortness of breath at rest, light sensation, dizziness or palpitations.  Does feel dyspneic when ambulating short distances.  Lactic acidosis/hyperkalemia resolved following dialysis sessions.    The patient's baseline creatinine is between 1.2-1.5.  She has remained nonoliguric with creatinine today of 1.9.  On examination she is volume overloaded.  Would recommend that the patient be started on Bumex twice daily with goals ins and outs -1 L daily.  Dialysis as per nephrology team.  At this time it is recommended that all nephrotoxic agents including ACE/ARB/Entresto/NSAIDs be held.    Once the patient is clinically doing better and able to lay flat and kidney function has returned to baseline it is recommended that she undergo right/left heart cardiac catheterization.  Indications and details for the procedure reviewed with the patient.  The patient is very concerned that further contrast administration will lead to further complications/renal insufficiency.  Would recommend that Metformin be held.  Aim for potassium to greater than 4 magnesium to be greater than 2.    Patient has known bronchoalveolar carcinoma.  Followed as an outpatient by pulmonary who felt that she has greater than 1 year prognosis from her underlying lung malignancy.  It has been recommended that she proceed with TAVR for her critical aortic valve stenosis.    Timing of TAVR based upon how patient does during her hospitalization.    Continue pharmacologic DVT prophylaxis.    All questions and concerns of the patient were addressed.    Findings and plan were discussed with cardiology/Dr. Hodges.

## 2021-07-12 NOTE — PROGRESS NOTE ADULT - ASSESSMENT
87 year-old woman with known history of atrial fibrillation, previously on AC, but now off due to anemia of unknown etiology, severe aortic stenosis, being evaluated for TAVR, status post CT with contrast, now with JACQUE, hyperkalemia, admitted for urgent HD.    HD per nephrology. Avoid nephrotoxic agents, including Entresto. Hold diuretics while on HD.  Plan for LHC during this admission - possible HD afterwards.    Transfuse PRN - goal hemoglobin should be > 8 g/dL for patient with severe AS.    Plan for TAVR post left heart cath per Structural Heart Team. Discussed with Alejandro Johns MD.  GI eval for GI bleeding - colon ca vs. AVMs.  Pulmonary eval for lung lesions seen on CT scan. Reportedly stable per discussion with Dr. Johns.

## 2021-07-12 NOTE — PROGRESS NOTE ADULT - SUBJECTIVE AND OBJECTIVE BOX
[facility] Division of Hospital Medicine  Heather LebronDO  Pager (M-F, 2Z-6S): [phone]  Other Times:  [phone]    Patient is a 87y old  Female who presents with a chief complaint of urgent HD (12 Jul 2021 11:12)      SUBJECTIVE / OVERNIGHT EVENTS: transferred out of MICU. Patient has no complaints, has been making phone calls to her family members. Has been making urine without issue.  ADDITIONAL REVIEW OF SYSTEMS: negative    MEDICATIONS  (STANDING):  atorvastatin 40 milliGRAM(s) Oral at bedtime  bisacodyl 5 milliGRAM(s) Oral at bedtime  buMETAnide Injectable 2 milliGRAM(s) IV Push every 12 hours  chlorhexidine 2% Cloths 1 Application(s) Topical daily  dextrose 40% Gel 15 Gram(s) Oral once  dextrose 40% Gel 15 Gram(s) Oral once  dextrose 5%. 1000 milliLiter(s) (50 mL/Hr) IV Continuous <Continuous>  dextrose 5%. 1000 milliLiter(s) (100 mL/Hr) IV Continuous <Continuous>  dextrose 50% Injectable 25 Gram(s) IV Push once  dextrose 50% Injectable 12.5 Gram(s) IV Push once  dextrose 50% Injectable 25 Gram(s) IV Push once  dextrose 50% Injectable 25 Gram(s) IV Push once  dextrose 50% Injectable 12.5 Gram(s) IV Push once  dextrose 50% Injectable 25 Gram(s) IV Push once  ferrous    sulfate 325 milliGRAM(s) Oral daily  glucagon  Injectable 1 milliGRAM(s) IntraMuscular once  glucagon  Injectable 1 milliGRAM(s) IntraMuscular once  heparin   Injectable 5000 Unit(s) SubCutaneous every 8 hours  insulin lispro (ADMELOG) corrective regimen sliding scale   SubCutaneous three times a day before meals  insulin lispro (ADMELOG) corrective regimen sliding scale   SubCutaneous at bedtime  metoprolol succinate ER 25 milliGRAM(s) Oral <User Schedule>  polyethylene glycol 3350 17 Gram(s) Oral daily  potassium chloride    Tablet ER 40 milliEquivalent(s) Oral every 4 hours    MEDICATIONS  (PRN):  artificial  tears Solution 1 Drop(s) Both EYES every 12 hours PRN Dry Eyes      CAPILLARY BLOOD GLUCOSE      POCT Blood Glucose.: 133 mg/dL (12 Jul 2021 08:21)  POCT Blood Glucose.: 184 mg/dL (11 Jul 2021 21:31)  POCT Blood Glucose.: 170 mg/dL (11 Jul 2021 18:05)    I&O's Summary    11 Jul 2021 07:01  -  12 Jul 2021 07:00  --------------------------------------------------------  IN: 1037.5 mL / OUT: 1900 mL / NET: -862.5 mL    12 Jul 2021 07:01  -  12 Jul 2021 12:18  --------------------------------------------------------  IN: 240 mL / OUT: 600 mL / NET: -360 mL        PHYSICAL EXAM:  Vital Signs Last 24 Hrs  T(C): 36.8 (12 Jul 2021 05:00), Max: 36.8 (11 Jul 2021 21:04)  T(F): 98.2 (12 Jul 2021 05:00), Max: 98.3 (11 Jul 2021 21:04)  HR: 70 (12 Jul 2021 05:00) (70 - 90)  BP: 104/55 (12 Jul 2021 05:00) (103/51 - 115/56)  BP(mean): 79 (11 Jul 2021 15:00) (73 - 81)  RR: 18 (12 Jul 2021 05:00) (18 - 26)  SpO2: 97% (12 Jul 2021 05:00) (97% - 99%)    CONSTITUTIONAL: NAD, obese  EYES: PERRLA; conjunctiva and sclera clear  ENMT: Moist oral mucosa, no pharyngeal injection or exudates  NECK: Supple, no palpable masses; no thyromegaly  RESPIRATORY: Normal respiratory effort; lungs are clear to auscultation bilaterally anteriorly  CARDIOVASCULAR: Regular rate and rhythm, normal S1 and S2, no murmur/rub/gallop; 3+ lower extremity edema; Peripheral pulses are 2+ bilaterally  ABDOMEN: Nontender to palpation, normoactive bowel sounds, no rebound/guarding; No hepatosplenomegaly  MUSCULOSKELETAL: no clubbing or cyanosis of digits; no joint swelling or tenderness to palpation  PSYCH: A+O to person, place, and time; affect appropriate  NEUROLOGY: CN 2-12 are intact and symmetric; no gross sensory deficits   SKIN: No rashes; no palpable lesions    LABS:                        7.8    7.99  )-----------( 190      ( 12 Jul 2021 06:19 )             24.4     07-12    129<L>  |  91<L>  |  27<H>  ----------------------------<  115<H>  3.0<L>   |  24  |  1.93<H>    Ca    8.5      12 Jul 2021 06:19  Phos  3.6     07-12  Mg     1.8     07-12                Culture - Blood (collected 09 Jul 2021 22:15)  Source: .Blood Blood-Venous  Preliminary Report (10 Jul 2021 23:01):    No growth to date.    Culture - Blood (collected 09 Jul 2021 22:15)  Source: .Blood Blood-Peripheral  Preliminary Report (10 Jul 2021 23:01):    No growth to date.     [facility] Division of Hospital Medicine  Heather LebronDO  Pager (M-F, 7R-9H): [phone]  Other Times:  [phone]    Patient is a 87y old  Female who presents with a chief complaint of urgent HD (12 Jul 2021 11:12)      SUBJECTIVE / OVERNIGHT EVENTS: transferred out of MICU. Patient has no complaints, has been making phone calls to her family members. Has been making urine without issue.  ADDITIONAL REVIEW OF SYSTEMS: negative    MEDICATIONS  (STANDING):  atorvastatin 40 milliGRAM(s) Oral at bedtime  bisacodyl 5 milliGRAM(s) Oral at bedtime  buMETAnide Injectable 2 milliGRAM(s) IV Push every 12 hours  chlorhexidine 2% Cloths 1 Application(s) Topical daily  dextrose 40% Gel 15 Gram(s) Oral once  dextrose 40% Gel 15 Gram(s) Oral once  dextrose 5%. 1000 milliLiter(s) (50 mL/Hr) IV Continuous <Continuous>  dextrose 5%. 1000 milliLiter(s) (100 mL/Hr) IV Continuous <Continuous>  dextrose 50% Injectable 25 Gram(s) IV Push once  dextrose 50% Injectable 12.5 Gram(s) IV Push once  dextrose 50% Injectable 25 Gram(s) IV Push once  dextrose 50% Injectable 25 Gram(s) IV Push once  dextrose 50% Injectable 12.5 Gram(s) IV Push once  dextrose 50% Injectable 25 Gram(s) IV Push once  ferrous    sulfate 325 milliGRAM(s) Oral daily  glucagon  Injectable 1 milliGRAM(s) IntraMuscular once  glucagon  Injectable 1 milliGRAM(s) IntraMuscular once  heparin   Injectable 5000 Unit(s) SubCutaneous every 8 hours  insulin lispro (ADMELOG) corrective regimen sliding scale   SubCutaneous three times a day before meals  insulin lispro (ADMELOG) corrective regimen sliding scale   SubCutaneous at bedtime  metoprolol succinate ER 25 milliGRAM(s) Oral <User Schedule>  polyethylene glycol 3350 17 Gram(s) Oral daily  potassium chloride    Tablet ER 40 milliEquivalent(s) Oral every 4 hours    MEDICATIONS  (PRN):  artificial  tears Solution 1 Drop(s) Both EYES every 12 hours PRN Dry Eyes      CAPILLARY BLOOD GLUCOSE      POCT Blood Glucose.: 133 mg/dL (12 Jul 2021 08:21)  POCT Blood Glucose.: 184 mg/dL (11 Jul 2021 21:31)  POCT Blood Glucose.: 170 mg/dL (11 Jul 2021 18:05)    I&O's Summary    11 Jul 2021 07:01  -  12 Jul 2021 07:00  --------------------------------------------------------  IN: 1037.5 mL / OUT: 1900 mL / NET: -862.5 mL    12 Jul 2021 07:01  -  12 Jul 2021 12:18  --------------------------------------------------------  IN: 240 mL / OUT: 600 mL / NET: -360 mL        PHYSICAL EXAM:  Vital Signs Last 24 Hrs  T(C): 36.8 (12 Jul 2021 05:00), Max: 36.8 (11 Jul 2021 21:04)  T(F): 98.2 (12 Jul 2021 05:00), Max: 98.3 (11 Jul 2021 21:04)  HR: 70 (12 Jul 2021 05:00) (70 - 90)  BP: 104/55 (12 Jul 2021 05:00) (103/51 - 115/56)  BP(mean): 79 (11 Jul 2021 15:00) (73 - 81)  RR: 18 (12 Jul 2021 05:00) (18 - 26)  SpO2: 97% (12 Jul 2021 05:00) (97% - 99%)    CONSTITUTIONAL: NAD, obese  EYES: PERRLA; conjunctiva and sclera clear  ENMT: Moist oral mucosa, no pharyngeal injection or exudates  NECK: Supple, no palpable masses; no thyromegaly  RESPIRATORY: Normal respiratory effort; lungs are clear to auscultation bilaterally anteriorly  CARDIOVASCULAR: Regular rate and rhythm, normal S1 and S2, systolic murmur heard throughout; 3+ lower extremity edema; Peripheral pulses are 2+ bilaterally  ABDOMEN: Nontender to palpation, normoactive bowel sounds, no rebound/guarding; No hepatosplenomegaly  MUSCULOSKELETAL: no clubbing or cyanosis of digits; no joint swelling or tenderness to palpation  PSYCH: A+O to person, place, and time; affect appropriate  NEUROLOGY: CN 2-12 are intact and symmetric; no gross sensory deficits   SKIN: No rashes; no palpable lesions    LABS:                        7.8    7.99  )-----------( 190      ( 12 Jul 2021 06:19 )             24.4     07-12    129<L>  |  91<L>  |  27<H>  ----------------------------<  115<H>  3.0<L>   |  24  |  1.93<H>    Ca    8.5      12 Jul 2021 06:19  Phos  3.6     07-12  Mg     1.8     07-12                Culture - Blood (collected 09 Jul 2021 22:15)  Source: .Blood Blood-Venous  Preliminary Report (10 Jul 2021 23:01):    No growth to date.    Culture - Blood (collected 09 Jul 2021 22:15)  Source: .Blood Blood-Peripheral  Preliminary Report (10 Jul 2021 23:01):    No growth to date.

## 2021-07-12 NOTE — PROGRESS NOTE ADULT - PROBLEM SELECTOR PLAN 1
suspected prerenal/ATN i/s/o decreased PO intake and diarrhea: s/p HD x 2 sessions , currently improving w/o HD, morris recently removed, having good urine output   - renal dose medications  - monitor ins and outs  - monitor creatinine   - avoid nephrotoxins  - renal consult appreciated  - HD if needed per renal, currently has DANIELLA mistry - can removed if not needing HD by 7/14  - monitor k and phos, replete as needed   - per nephro - start bumex 2mg IV bid

## 2021-07-13 NOTE — PROGRESS NOTE ADULT - ATTENDING COMMENTS
atn- improving. d/c vascular catheter   hypokalemia- replete K with 40 meq q 2hrs times 2   continue bumex 2 mg iv bid     josselyn fleming  nephrology attending   Cell# [phone]   [facility] [phone]

## 2021-07-13 NOTE — PHYSICAL THERAPY INITIAL EVALUATION ADULT - ADDITIONAL COMMENTS
Pt reports living in alone in an apartment. There are no steps to enter, access to an elevator once inside. PTA, pt was mostly independent with ADL's and functional mobility, utilizes a rolling walker for ambulation.

## 2021-07-13 NOTE — PHYSICAL THERAPY INITIAL EVALUATION ADULT - PHYSICAL ASSIST/NONPHYSICAL ASSIST: GAIT, REHAB EVAL
TRIAGE NOTE: \"I have a bulging disc on the lower right side that is aggravated. I also have a fractured vertebrae in my lower lumbar area but that it healing. \" Patient hasn't taken anything for pain.  Symptoms worsened yesterday verbal cues/nonverbal cues (demo/gestures)/1 person assist

## 2021-07-13 NOTE — PROGRESS NOTE ADULT - PROBLEM SELECTOR PLAN 3
- structural heart team following, rec LHC/RHC for TAVR eval during this hospitalization when renal function is back to baseline and volume status optimized

## 2021-07-13 NOTE — PHYSICAL THERAPY INITIAL EVALUATION ADULT - PERTINENT HX OF CURRENT PROBLEM, REHAB EVAL
Pt is an 86 y/o F with PMH of CAD w/ stents, DM2, Afib, Colon CA (about 25y ago), AS, & HF who presented to the ED with abnormal labs done on 7/7 showing JACQUE with hyperkalemia to >6 mod hemolyzed. Pt stated she hasn't urinated in 2 days, also stated she's had nonbloody loose stools over the past week.

## 2021-07-13 NOTE — PROGRESS NOTE ADULT - ATTENDING COMMENTS
No acute events reported overnight.  The patient is currently resting comfortably on nasal cannula sitting at a 30 degree angle.  Denies any chest pain/tightness, no fevers, chills, sweats, his sensation, dizzy or palpitation.  The patient kidney function is gradually getting better.  At this time plan is to hold off on cardiac catheterization until her kidney function normalizes to her baseline.  This was reviewed with the patient.  Based upon her cardiac catheterization clinical status will determine timing of the TAVR procedure.    Findings and plan were discussed with cardiology/Dr. Hodges.

## 2021-07-13 NOTE — PROGRESS NOTE ADULT - SUBJECTIVE AND OBJECTIVE BOX
[facility] DIVISION OF KIDNEY DISEASES AND HYPERTENSION -- FOLLOW UP NOTE  --------------------------------------------------------------------------------  If any questions, please feel free to contact me  NS pager: [phone], [facility]: [phone]  Vicente Cortes M.D.  Nephrology Fellow    (After 5 pm or on weekends please page the on-call fellow)  --------------------------------------------------------------------------------    Chief Complaint:  Patient is a 87y old  Female who presents with a chief complaint of urgent HD (13 Jul 2021 12:50)    24 hour events/subjective:  Patient seen and examined at bedside, in NAD, reports feeling better. No acute events overnight. sCr at 1.9mg/dl today. UOP: 2.9L in the past 24hrs.  Vitals/labs/imaging reviewed       PAST HISTORY  --------------------------------------------------------------------------------  No significant changes to PMH, PSH, FHx, SHx, unless otherwise noted    ALLERGIES & MEDICATIONS  --------------------------------------------------------------------------------  Allergies    No Known Allergies    Intolerances      Standing Inpatient Medications  atorvastatin 40 milliGRAM(s) Oral at bedtime  bisacodyl 5 milliGRAM(s) Oral at bedtime  buMETAnide Injectable 2 milliGRAM(s) IV Push every 12 hours  chlorhexidine 2% Cloths 1 Application(s) Topical daily  dextrose 40% Gel 15 Gram(s) Oral once  dextrose 40% Gel 15 Gram(s) Oral once  dextrose 5%. 1000 milliLiter(s) IV Continuous <Continuous>  dextrose 5%. 1000 milliLiter(s) IV Continuous <Continuous>  dextrose 50% Injectable 25 Gram(s) IV Push once  dextrose 50% Injectable 12.5 Gram(s) IV Push once  dextrose 50% Injectable 25 Gram(s) IV Push once  dextrose 50% Injectable 25 Gram(s) IV Push once  dextrose 50% Injectable 12.5 Gram(s) IV Push once  dextrose 50% Injectable 25 Gram(s) IV Push once  ferrous    sulfate 325 milliGRAM(s) Oral daily  glucagon  Injectable 1 milliGRAM(s) IntraMuscular once  glucagon  Injectable 1 milliGRAM(s) IntraMuscular once  heparin   Injectable 5000 Unit(s) SubCutaneous every 8 hours  insulin lispro (ADMELOG) corrective regimen sliding scale   SubCutaneous at bedtime  insulin lispro (ADMELOG) corrective regimen sliding scale   SubCutaneous three times a day before meals  metoprolol succinate ER 25 milliGRAM(s) Oral <User Schedule>  polyethylene glycol 3350 17 Gram(s) Oral daily  potassium chloride    Tablet ER 40 milliEquivalent(s) Oral every 4 hours  senna 2 Tablet(s) Oral at bedtime    PRN Inpatient Medications  artificial  tears Solution 1 Drop(s) Both EYES every 12 hours PRN  zolpidem 5 milliGRAM(s) Oral at bedtime PRN      REVIEW OF SYSTEMS  --------------------------------------------------------------------------------  Gen: No fevers/chills  Skin: No rashes  Head/Eyes/Ears: Normal hearing,   Respiratory: No dyspnea, cough  CV: No chest pain  GI: No abdominal pain, diarrhea  : No dysuria, hematuria  MSK: +  edema    All other systems were reviewed and are negative, except as noted.    VITALS/PHYSICAL EXAM  --------------------------------------------------------------------------------  T(C): 36.3 (07-13-21 @ 05:56), Max: 36.9 (07-12-21 @ 17:00)  HR: 89 (07-13-21 @ 10:10) (77 - 89)  BP: 111/63 (07-13-21 @ 10:10) (100/59 - 111/63)  RR: 18 (07-13-21 @ 05:56) (18 - 18)  SpO2: 97% (07-13-21 @ 10:10) (97% - 98%)  Wt(kg): --        07-12-21 @ 07:01  -  07-13-21 @ 07:00  --------------------------------------------------------  IN: 900 mL / OUT: 2900 mL / NET: -2000 mL    07-13-21 @ 07:01  -  07-13-21 @ 13:41  --------------------------------------------------------  IN: 0 mL / OUT: 850 mL / NET: -850 mL        Physical Exam:  	Gen: NAD  	Pulm: CTA B/L  	CV: S1S2  	Abd: Soft, +BS   	Ext: No LE edema B/L  	Neuro: Awake  	Skin: Warm and dry  	Vascular access: right non tunneled cath.     LABS/STUDIES  --------------------------------------------------------------------------------              7.8    7.80  >-----------<  214      [07-13-21 @ 06:47]              24.7     131  |  91  |  29  ----------------------------<  119      [07-13-21 @ 06:44]  3.3   |  26  |  1.70        Ca     8.4     [07-13-21 @ 06:44]      Mg     1.5     [07-13-21 @ 06:44]      Phos  3.4     [07-13-21 @ 06:44]            Creatinine Trend:  SCr 1.70 [07-13 @ 06:44]  SCr 1.93 [07-12 @ 06:19]  SCr 2.09 [07-11 @ 12:10]  SCr 2.53 [07-11 @ 00:21]  SCr 2.41 [07-10 @ 12:32]    Urinalysis - [07-09-21 @ 12:30]      Color Yellow / Appearance Slightly Turbid / SG 1.017 / pH 5.5      Gluc Negative / Ketone Negative  / Bili Negative / Urobili Negative       Blood Small / Protein 100 / Leuk Est Negative / Nitrite Negative      RBC 2 / WBC 7 / Hyaline 1 / Gran  / Sq Epi  / Non Sq Epi 1 / Bacteria Negative    Urine Creatinine 50      [07-11-21 @ 15:56]  Urine Protein 34      [07-11-21 @ 15:56]  Urine Sodium 54      [07-11-21 @ 15:56]  Urine Urea Nitrogen 297      [07-11-21 @ 20:13]      HBsAb <3.0      [07-09-21 @ 03:47]  HBsAg Nonreact      [07-09-21 @ 03:47]  HBcAb Nonreact      [07-09-21 @ 03:47]  HCV 0.14, Nonreact      [07-09-21 @ 03:47]

## 2021-07-13 NOTE — PROGRESS NOTE ADULT - PROBLEM SELECTOR PLAN 5
-currently off pradaxa for anemia. However, no overt signs of bleeding and hgb has been stable. Can restart when renal function returns to baseline  -restart metoprolol      Insomnia   - resume ambien prn (home med) -currently off pradaxa for anemia. Was told to stop taking it prior to hospitalization for possible GI bleed.  -restart metoprolol      Insomnia   - resume ambien prn (home med)

## 2021-07-13 NOTE — PROGRESS NOTE ADULT - ASSESSMENT
87 year-old woman with known history of atrial fibrillation, previously on AC, but now off due to anemia of unknown etiology, severe aortic stenosis, being evaluated for TAVR, status post CT with contrast, now with JACQUE, hyperkalemia, admitted for urgent HD.    HD per nephrology (none needed now). Avoid nephrotoxic agents, including Entresto.  Plan for LHC during this admission - possible HD afterwards.    Transfuse PRN - goal hemoglobin should be > 8 g/dL for patient with severe AS.    Plan for TAVR post left heart cath per Structural Heart Team. Discussed with Alejandro Johns MD.  GI eval for GI bleeding - colon ca vs. AVMs.  Pulmonary eval for lung lesions seen on CT scan. Reportedly stable per discussion with Dr. Johns.

## 2021-07-13 NOTE — PROGRESS NOTE ADULT - PROBLEM SELECTOR PLAN 1
suspected prerenal/ATN i/s/o decreased PO intake and diarrhea: s/p HD x 2 sessions, currently improving w/o HD, morris recently removed, having good urine output   - renal dose medications  - monitor ins and outs  - renal consult appreciated  - HD if needed per renal, currently has DANIELLA mistry - can removed if not needing HD by 7/14  - monitor k and phos, replete as needed   - per nephro - start bumex 2mg IV bid

## 2021-07-13 NOTE — PROGRESS NOTE ADULT - SUBJECTIVE AND OBJECTIVE BOX
[facility] Division of Hospital Medicine  Heather LebronDO  Pager (M-F, 7X-5C): [phone]  Other Times:  [phone]    Patient is a 87y old  Female who presents with a chief complaint of urgent HD (12 Jul 2021 14:27)      SUBJECTIVE / OVERNIGHT EVENTS: none. Patient feels constipated, hasn't had BM in a few days. In good spirits.  ADDITIONAL REVIEW OF SYSTEMS: negative    MEDICATIONS  (STANDING):  atorvastatin 40 milliGRAM(s) Oral at bedtime  bisacodyl 5 milliGRAM(s) Oral at bedtime  buMETAnide Injectable 2 milliGRAM(s) IV Push every 12 hours  chlorhexidine 2% Cloths 1 Application(s) Topical daily  dextrose 40% Gel 15 Gram(s) Oral once  dextrose 40% Gel 15 Gram(s) Oral once  dextrose 5%. 1000 milliLiter(s) (100 mL/Hr) IV Continuous <Continuous>  dextrose 5%. 1000 milliLiter(s) (50 mL/Hr) IV Continuous <Continuous>  dextrose 50% Injectable 25 Gram(s) IV Push once  dextrose 50% Injectable 12.5 Gram(s) IV Push once  dextrose 50% Injectable 25 Gram(s) IV Push once  dextrose 50% Injectable 25 Gram(s) IV Push once  dextrose 50% Injectable 12.5 Gram(s) IV Push once  dextrose 50% Injectable 25 Gram(s) IV Push once  ferrous    sulfate 325 milliGRAM(s) Oral daily  glucagon  Injectable 1 milliGRAM(s) IntraMuscular once  glucagon  Injectable 1 milliGRAM(s) IntraMuscular once  heparin   Injectable 5000 Unit(s) SubCutaneous every 8 hours  insulin lispro (ADMELOG) corrective regimen sliding scale   SubCutaneous at bedtime  insulin lispro (ADMELOG) corrective regimen sliding scale   SubCutaneous three times a day before meals  metoprolol succinate ER 25 milliGRAM(s) Oral <User Schedule>  polyethylene glycol 3350 17 Gram(s) Oral daily  potassium chloride    Tablet ER 40 milliEquivalent(s) Oral every 4 hours  senna 2 Tablet(s) Oral at bedtime    MEDICATIONS  (PRN):  artificial  tears Solution 1 Drop(s) Both EYES every 12 hours PRN Dry Eyes  zolpidem 5 milliGRAM(s) Oral at bedtime PRN Insomnia      CAPILLARY BLOOD GLUCOSE      POCT Blood Glucose.: 192 mg/dL (13 Jul 2021 12:29)  POCT Blood Glucose.: 164 mg/dL (13 Jul 2021 08:38)  POCT Blood Glucose.: 173 mg/dL (12 Jul 2021 21:21)  POCT Blood Glucose.: 92 mg/dL (12 Jul 2021 17:28)    I&O's Summary    12 Jul 2021 07:01  -  13 Jul 2021 07:00  --------------------------------------------------------  IN: 900 mL / OUT: 2900 mL / NET: -2000 mL    13 Jul 2021 07:01  -  13 Jul 2021 12:53  --------------------------------------------------------  IN: 0 mL / OUT: 850 mL / NET: -850 mL        PHYSICAL EXAM:  Vital Signs Last 24 Hrs  T(C): 36.3 (13 Jul 2021 05:56), Max: 36.9 (12 Jul 2021 17:00)  T(F): 97.4 (13 Jul 2021 05:56), Max: 98.4 (12 Jul 2021 17:00)  HR: 89 (13 Jul 2021 10:10) (77 - 89)  BP: 111/63 (13 Jul 2021 10:10) (100/59 - 111/63)  BP(mean): --  RR: 18 (13 Jul 2021 05:56) (18 - 18)  SpO2: 97% (13 Jul 2021 10:10) (97% - 98%)    CONSTITUTIONAL: NAD, well-developed, well-groomed, obese  EYES: PERRLA; conjunctiva and sclera clear  ENMT: Moist oral mucosa, no pharyngeal injection or exudates  NECK: Supple, no palpable masses; no thyromegaly  RESPIRATORY: Normal respiratory effort; lungs are clear to auscultation bilaterally  CARDIOVASCULAR: Regular rate and rhythm, normal S1 and S2, +systolic murmur throughout; 1+ lower extremity edema; Peripheral pulses are 2+ bilaterally  ABDOMEN: Nontender to palpation, normoactive bowel sounds, no rebound/guarding; No hepatosplenomegaly  MUSCULOSKELETAL: no clubbing or cyanosis of digits; no joint swelling or tenderness to palpation  PSYCH: A+O to person, place, and time; affect appropriate  NEUROLOGY: CN 2-12 are intact and symmetric; no gross sensory deficits   SKIN: No rashes; no palpable lesions    LABS:                        7.8    7.80  )-----------( 214      ( 13 Jul 2021 06:47 )             24.7     07-13    131<L>  |  91<L>  |  29<H>  ----------------------------<  119<H>  3.3<L>   |  26  |  1.70<H>    Ca    8.4      13 Jul 2021 06:44  Phos  3.4     07-13  Mg     1.5     07-13

## 2021-07-13 NOTE — PROGRESS NOTE ADULT - SUBJECTIVE AND OBJECTIVE BOX
HPI:  Patient seen and examined at bedside on 6 Shahram.  Creatinine continues to improve.    Review Of Systems:           Respiratory: No shortness of breath, cough, or wheezing  Cardiovascular: No chest pain or palpitations  10 point review of systems is otherwise negative except as mentioned above        Medications:  artificial  tears Solution 1 Drop(s) Both EYES every 12 hours PRN  atorvastatin 40 milliGRAM(s) Oral at bedtime  bisacodyl 5 milliGRAM(s) Oral at bedtime  buMETAnide Injectable 2 milliGRAM(s) IV Push every 12 hours  chlorhexidine 2% Cloths 1 Application(s) Topical daily  dextrose 40% Gel 15 Gram(s) Oral once  dextrose 40% Gel 15 Gram(s) Oral once  dextrose 5%. 1000 milliLiter(s) IV Continuous <Continuous>  dextrose 5%. 1000 milliLiter(s) IV Continuous <Continuous>  dextrose 50% Injectable 25 Gram(s) IV Push once  dextrose 50% Injectable 12.5 Gram(s) IV Push once  dextrose 50% Injectable 25 Gram(s) IV Push once  dextrose 50% Injectable 25 Gram(s) IV Push once  dextrose 50% Injectable 12.5 Gram(s) IV Push once  dextrose 50% Injectable 25 Gram(s) IV Push once  ferrous    sulfate 325 milliGRAM(s) Oral daily  glucagon  Injectable 1 milliGRAM(s) IntraMuscular once  glucagon  Injectable 1 milliGRAM(s) IntraMuscular once  heparin   Injectable 5000 Unit(s) SubCutaneous every 8 hours  insulin lispro (ADMELOG) corrective regimen sliding scale   SubCutaneous three times a day before meals  insulin lispro (ADMELOG) corrective regimen sliding scale   SubCutaneous at bedtime  metoprolol succinate ER 25 milliGRAM(s) Oral <User Schedule>  polyethylene glycol 3350 17 Gram(s) Oral daily  senna 2 Tablet(s) Oral at bedtime  zolpidem 5 milliGRAM(s) Oral at bedtime PRN    PAST MEDICAL & SURGICAL HISTORY:    Vitals:  T(C): 36.6 (21 @ 14:00), Max: 36.6 (21 @ 13:00)  HR: 82 (21 @ 14:00) (80 - 85)  BP: 116/74 (21 @ 14:00) (116/74 - 136/70)  BP(mean): --  RR: 18 (21 @ 13:00) (18 - 18)  SpO2: 97% (-21 @ 13:00) (91% - 97%)  Wt(kg): --  Daily     Daily Weight in k.7 (2021 07:13)  I&O's Summary    2021 07:01  -  2021 07:00  --------------------------------------------------------  IN: 0 mL / OUT: 1800 mL / NET: -1800 mL    2021 07:01  -  2021 17:13  --------------------------------------------------------  IN: 635 mL / OUT: 450 mL / NET: 185 mL        Physical Exam:  Appearance: Normal, well groomed, NAD  Eyes: PERRLA, EOMI, pink conjunctiva, no scleral icterus   HENT: Normal oral mucosa  Cardiovascular: RRR, S1, S2, III/VI systolic murmur at base  Procedural Access Site: Clean, dry, intact, without hematoma  Respiratory: Clear to auscultation bilaterally  Gastrointestinal: Soft, non-tender, non-distended, BS+  Musculoskeletal: No clubbing or joint deformity   Neurologic: No focal weakness  Lymphatic: No lymphadenopathy  Psychiatry: AAOx3 with appropriate mood and affect  Skin: No rashes, ecchymoses, or cyanosis                          8.0    9.35  )-----------( 242      ( 2021 06:13 )             26.0     07-14    134<L>  |  93<L>  |  27<H>  ----------------------------<  126<H>  3.7   |  27  |  1.32<H>    Ca    8.7      2021 06:13  Phos  3.4     07-13  Mg     1.7     07-14            Serum Pro-Brain Natriuretic Peptide: 47458 pg/mL (07-08 @ 08:48)        Cardiovascular Diagnostic Testing:  ECG: rate controlled AFib    Echo: critical AS    Cath: PENDING    Imaging: < from: CT Heart without Coronaries w/ IV Cont (21 @ 08:51) >  FINDINGS:    Non-Coronary:    6 mm hypodense nodule within the right lobe of the thyroid gland. No enlarged axillary, mediastinal lymph nodes. No pleural effusions.    Evaluation of the lungs demonstrate left lower lobe masslike opacity on the part of which measures about 5 x 2.3 cm extending to the left lower lobe hilum with associated left lower lobe volume loss related to some superimposed atelectasis. Multiple left lung pulmonary nodule along the pleural surface measuring up to about 7 mm. Right middle lobe 3.2 x 2.6 cm mass associated with the minor fissure which contains a few small nodules measuring up to 7 mm. The right middle lobe mass has a cystic component along its inferior aspect.    Subcentimeter hypodensity within the liver is too small to characterize. The gallbladder, spleen, adrenal glands are unremarkable. Few pancreatic hypodensities are noted with the largest measuring about 1.5 cm on image 84 of series 19. Bilateral renal cysts.    Urinary bladder is normal. The uterus and adnexa are within normal limits. Status post rectal surgery with postoperative changes. No bowel obstruction or medial air. Appendix is normal.    Degenerative changes of the spine. Moderate to severe loss of height of the T5 and severe loss of height of the T6 vertebral bodies are of indeterminate age.    The heart is enlarged.  The left and right atria are severely enlarged.  There is mitral annular calcification and calcification of the mitral valve leaflets.   There is reduced contrast opacification of the left atrial appendage that resolves on delay imaging suggestive of mixing artifact.    The ascending aorta is mildly calcified. The aortic arch and the descending aorta are severely calcified.   The main pulmonary artery measures approximately 26.4 mm at the level of the ascending aorta.   The right and left pulmonary arteries appear enlarged and measure approximately 29.4 mm and 25.4 mm, respectively.    The aortic valve is calcified. The calcium score of the aortic valve is 1870.    Coronary:  Coronary arterial calcifications are noted; however, this studywas not optimized for evaluation of the coronary arteries.  Please refer to cardiac catheterization performed as part of pre-TAVR work-up.    Aortic Root Measurements    Major aortic annulus diameter (systole, mm): 25.2  Perpendicular minor aortic annulus diameter (systole, mm): 19.1  Aortic annulus perimeter (systole, mm): 76.6  Aortic annulus area (systole, mm2): 438  LVOT minimum diameter (systole, mm): 19.6  LVOT 90 cross (systole, mm): 26.4  LVOT calcification:  Severe  Distance from Aortic annulus to Left Main coronary artery (diastole, mm): 13.2  Distance from Aortic annulus to Right Coronary artery (diastole, mm): 16.7  Sinus of Valsalva diameter, Right coronary (diastole, mm): 29.4  Sinus of Valsalva diameter, Left coronary (diastole, mm): 31.0  Sinus of Valsalva diameter, Non coronary (diastole, mm): 28.3  Diameter at the sinotubular junction (diastole, mm): 26.5 x 26.5  Maximum ascending aorta diameter at 40 mm above annulus (diastole, mm): 32.3  Aortic root angulation (diastole, degrees): 48.4  Aortic root calcification: Moderate-to-severe    Abdominal Aorta:        Minimum lumen diameter (MLD) (mm): 12.8        Diameter perpendicular to MLD (mm): 13.2  Left Femoral:        Minimum Lumen Diameter (mm): 7.2        Diameter perpendicular to MLD (mm): 8.1        Tortuosity: Mild        Calcification: Mild  Right Femoral:        Minimum Lumen Diameter (mm): 6.5        Diameter perpendicular to MLD (mm): 7.1        Tortuosity: Mild        Calcification: Mild  Left External Iliac:        Minimum Lumen Diameter (mm): 6.9        Diameter perpendicular to MLD (mm): 7.5        Tortuosity: Moderate        Calcification: Mild  Left Common Iliac:         Minimum Lumen Diameter (mm): 4.5        Diameter perpendicular to MLD (mm): 8.0        Tortuosity: Severe        Calcification: Severe  Right External Iliac:        Minimum Lumen Diameter (mm): 5.0        Diameter perpendicular to MLD (mm): 7.4        Tortuosity: Moderate        Calcification: Mild  Right Common Iliac:    Minimum Lumen Diameter (mm): 7.8        Diameter perpendicular to MLD (mm): 8.4        Tortuosity: Moderate        Calcification: Moderate  L Subclavian/Axillary: Not well visualized due to motion artifact        Minimum Lumen Diameter (mm): 5.6      Diameter perpendicular to MLD (mm): 6.2        Tortuosity: Mild        Calcification: Mild  R Subclavian/Axillary:        Minimum Lumen Diameter (mm): 4.3        Diameter perpendicular to MLD (mm): 5.1        Tortuosity: Mild        Calcification: Mild    IMPRESSION:  1. Calcified aortic valve. The calcium score of the aortic valve is 1870.  2. Please see the body of the report for aortic and peripheral access vessel measurements.  3. Dominant large left lower lobe mass associated with multiple left lung pleural nodules worrisome for neoplasm with metastatic disease.  4. 3.2 x 2.6 cm right middle lobe mass as described above also is worrisome for neoplasm.  5. A few pancreatic hypodense nodules. Dedicated contrast enhanced pancreatic MRI is recommended for complete evaluation.  6. Compression fracture deformities of the T5 and T6 vertebral bodies as described above are of indeterminate age.      KHLOE HAINES MD; Attending Cardiologist  This document has been electronically signed.  MONICA HANEY MD; Attending Radiologist  This document has been electronically signed. 2021  9:12AM    < end of copied text >

## 2021-07-13 NOTE — PROGRESS NOTE ADULT - PROBLEM SELECTOR PLAN 2
- Discussed incidental CT scan findings of pulmonary nodules and possibility of malignancy. His and patient's preference is to not undergo any sort of invasive work-up and to not seek further treatment. Will hold off on calling pulmonary for now.  - CT also w/ pancreatic lesion for which a nonemergent contrast enhanced abdominal MRI may be considered for further evaluation as outpatient if family wishes to pursue diagnosis - Discussed incidental CT scan findings of pulmonary nodules and possibility of malignancy. His and patient's preference is to not undergo any sort of invasive work-up and to not seek further treatment. Will hold off on calling pulmonary for now.  - per outpatient notes, had this:  CT chest 10/30/19:   pulmonary nodule right middle lob 2.4x 1.6x 2.9  platelike opacity in the left lower lobe, areas of masslike nodularity.- recommended PET/CT to assess for associated metabolic activity   - CT also w/ pancreatic lesion for which a nonemergent contrast enhanced abdominal MRI may be considered for further evaluation as outpatient if family wishes to pursue diagnosis

## 2021-07-13 NOTE — PROGRESS NOTE ADULT - SUBJECTIVE AND OBJECTIVE BOX
Structural Heart Team    Ms Knight was seen earlier this morning.  She was lying completely flat on the bed without a pillow and comfortable with O2 nasal cannula in place.  There were no acute events overnight and she has no complaints today.       PAST MEDICAL & SURGICAL HISTORY:    FAMILY HISTORY:  No sig FH in first degree relatives     SOCIAL HISTORY:  Denies smoking drinking or illicit drug use    Allergies    No Known Allergies    Intolerances    REVIEW OF SYSTEMS:  +Fatigue, weakness, oliguria  Negative except per HPI    OBJECTIVE:  ICU Vital Signs Last 24 Hrs  T(C): 36.4 (08 Jul 2021 07:36), Max: 36.4 (08 Jul 2021 07:36)  T(F): 97.5 (08 Jul 2021 07:36), Max: 97.5 (08 Jul 2021 07:36)  HR: 79 (08 Jul 2021 10:30) (71 - 83)  BP: 128/64 (08 Jul 2021 10:30) (112/67 - 128/64)  BP(mean): 71 (08 Jul 2021 10:30) (71 - 77)    RR: 18 (08 Jul 2021 10:30) (18 - 20)  SpO2: 100% (08 Jul 2021 10:30) (95% - 100%)  CAPILLARY BLOOD GLUCOSE    POCT Blood Glucose.: 148 mg/dL (08 Jul 2021 11:19)    PHYSICAL EXAM:  Gen: NAD  	HEENT: MMM  	Pulm: CTA B/L  	CV: S1S2  	Abd: Soft, +BS   	Ext: No LE edema B/L  	Neuro: Awake  	Skin: Warm and dry             : no tenderness to palpation     MEDICATIONS  (PRN):    LABS:                        7.8    7.31  )-----------( 196      ( 08 Jul 2021 08:47 )             25.8     Hgb Trend: 7.8<--  07-08    124<L>  |  84<L>  |  79<H>  ----------------------------<  32<LL>  6.6<HH>   |  11<L>  |  6.76<H>    Ca    9.6      08 Jul 2021 08:48  Phos  6.4     07-08  Mg     2.0     07-08    TPro  7.2  /  Alb  3.9  /  TBili  0.4  /  DBili  x   /  AST  25  /  ALT  17  /  AlkPhos  205<H>  07-08    Creatinine Trend: 6.76<--    Venous Blood Gas:  07-08 @ 08:47  7.24/34/42/14/64  VBG Lactate: 7.0 (08 Jul 2021 11:43)      REVIEW OF SYSTEMS:    CONSTITUTIONAL: No weakness, fevers or chills  EYES/ENT: No visual changes;  No vertigo or throat pain   NECK: No pain or stiffness  RESPIRATORY: No cough, wheezing, hemoptysis; No shortness of breath  CARDIOVASCULAR: No chest pain or palpitations  GASTROINTESTINAL: No abdominal or epigastric pain. No nausea, vomiting, or hematemesis; No diarrhea or constipation. No melena or hematochezia.  GENITOURINARY: No dysuria, frequency or hematuria  NEUROLOGICAL: No numbness or weakness  SKIN: No itching, rashes      Allergies    No Known Allergies    Intolerances      Vital Signs Last 24 Hrs  T(C): 36.3 (13 Jul 2021 05:56), Max: 36.9 (12 Jul 2021 17:00)  T(F): 97.4 (13 Jul 2021 05:56), Max: 98.4 (12 Jul 2021 17:00)  HR: 85 (13 Jul 2021 14:28) (77 - 89)  BP: 108/65 (13 Jul 2021 14:28) (100/59 - 111/63)  BP(mean): --  RR: 18 (13 Jul 2021 14:28) (18 - 18)  SpO2: 98% (13 Jul 2021 14:28) (97% - 98%)    MEDICATIONS  (STANDING):  atorvastatin 40 milliGRAM(s) Oral at bedtime  bisacodyl 5 milliGRAM(s) Oral at bedtime  buMETAnide Injectable 2 milliGRAM(s) IV Push every 12 hours  chlorhexidine 2% Cloths 1 Application(s) Topical daily  dextrose 40% Gel 15 Gram(s) Oral once  dextrose 40% Gel 15 Gram(s) Oral once  dextrose 5%. 1000 milliLiter(s) (100 mL/Hr) IV Continuous <Continuous>  dextrose 5%. 1000 milliLiter(s) (50 mL/Hr) IV Continuous <Continuous>  dextrose 50% Injectable 25 Gram(s) IV Push once  dextrose 50% Injectable 25 Gram(s) IV Push once  dextrose 50% Injectable 12.5 Gram(s) IV Push once  dextrose 50% Injectable 25 Gram(s) IV Push once  dextrose 50% Injectable 12.5 Gram(s) IV Push once  dextrose 50% Injectable 25 Gram(s) IV Push once  ferrous    sulfate 325 milliGRAM(s) Oral daily  glucagon  Injectable 1 milliGRAM(s) IntraMuscular once  glucagon  Injectable 1 milliGRAM(s) IntraMuscular once  heparin   Injectable 5000 Unit(s) SubCutaneous every 8 hours  insulin lispro (ADMELOG) corrective regimen sliding scale   SubCutaneous three times a day before meals  insulin lispro (ADMELOG) corrective regimen sliding scale   SubCutaneous at bedtime  metoprolol succinate ER 25 milliGRAM(s) Oral <User Schedule>  polyethylene glycol 3350 17 Gram(s) Oral daily      Exam-  General: NAD, WDWN, appropriate affect  Cardiac: S1S2, RRR, III/VI ZAYNAB,   No Gallops/Rubs   Pulm: Clear, no wheezes, rales, or rhonchi, no use of accessory muscles  Gastrointestinal: soft, nontender, nondistended, +bowel sounds  Extremities: 2+ Edema B/L,   No joint pain or swelling +PMSx4  Vascular: 1+ pulses B/L, No Bruits, Right IJ HD Catheter  Neuro: A&Ox3, nonfocal                          7.8    7.80  )-----------( 214      ( 13 Jul 2021 06:47 )             24.7   07-13    131<L>  |  91<L>  |  29<H>  ----------------------------<  119<H>  3.3<L>   |  26  |  1.70<H>    Ca    8.4      13 Jul 2021 06:44  Phos  3.4     07-13  Mg     1.5     07-13      I&O's Summary    12 Jul 2021 07:01  -  13 Jul 2021 07:00  --------------------------------------------------------  IN: 900 mL / OUT: 2900 mL / NET: -2000 mL    13 Jul 2021 07:01  -  13 Jul 2021 15:51  --------------------------------------------------------  IN: 0 mL / OUT: 850 mL / NET: -850 mL              Assessment/Plan:  Ms Thomson is an 88y/o female with Severe Aortic Stenosis, Acute Kidney Injury/Metabolic Acidosis, Chronic Diastolic Heart Failure  - creatinine improving  -- will discuss timing of LHC (possibly tomorrow)  - please leave the RIJ in for now pending contrast used in angiogram  - discussed plan with pt and her son Reji Ji Shira, PA  [phone]

## 2021-07-13 NOTE — PROGRESS NOTE ADULT - PROBLEM SELECTOR PLAN 1
Pt with JACQUE in the setting of severe diarrhea/decreased po intake. Upon review of [facility] HIE/Allscripts last sCr was 1.26 on (6/7/21). On admission  BUN/Cr 79/6.7 mg/dl. K: 6.6 (non hemolyzed). s/p Urgent HD on 7/8/21    Patient with improved lactic acidosis and K after dialysis.   SCr improving now down to 1.9mg/dl >> 1.7mg/dl   has remained non oliguric to 2.9L in the past 24hrs.   c/w  Bumex 2mg IV bid   No indication for further dialysis   please remove Non tunneled cath.   Monitor labs and urine output.   Avoid NSAIDs, ACEI/ARBS, RCA and nephrotoxins.   Dose medications as per eGFR

## 2021-07-13 NOTE — PROGRESS NOTE ADULT - NSPROGADDITIONALINFOA_GEN_ALL_CORE
Plan discussed with     Heather Lebron DO  Pager #: [phone]  Available on Teams crystal Plan discussed with NP Abdiel Lebron DO  Pager #: [phone]  Available on Teams crystal

## 2021-07-14 NOTE — PROGRESS NOTE ADULT - PROBLEM SELECTOR PLAN 1
suspected prerenal/ATN i/s/o decreased PO intake and diarrhea: s/p HD x 2 sessions, currently improving w/o HD, morris recently removed, having good urine output   - renal dose medications  - monitor ins and outs  - renal consult appreciated  - per renal, OK to [facility] mistry. Discussed with structural heart team. Will keep in temporarily until C is completed in case patient experiences contrast-induced JACQUE.   - monitor k and phos, replete as needed   - per nephro - start bumex 2mg IV bid

## 2021-07-14 NOTE — PROGRESS NOTE ADULT - ATTENDING COMMENTS
No acute events reported overnight.  The patient is clinically doing well.  Denies any chest pain/tightness discomfort, fevers, chills, sweats, his incision, dizzy or palpitations.  Physical examination agree with what is noted above.    Plan is for the patient to undergo a right/left heart cardiac catheterization later today.  Indications and details of procedure reviewed with the patient.  Benefits and risk were gone over.  Risk include but not limited to infection, bleeding, arrhythmia, TIA/stroke, hemodynamic instability, vascular injury, need for urgent surgery and death.    All questions and concerns of the patient were addressed.  She is agreeable to proceed.    Findings and plan were discussed with cardiology/Dr. Hodges.

## 2021-07-14 NOTE — PROGRESS NOTE ADULT - ASSESSMENT
Ms Thomson is an 86y/o female with Severe Aortic Stenosis, Acute Kidney Injury/Metabolic Acidosis, Chronic Diastolic Heart Failure  - creatinine improving  -- will discuss timing of LHC   - please leave the RIJ in for now pending contrast used in angiogram

## 2021-07-14 NOTE — PROGRESS NOTE ADULT - PROBLEM SELECTOR PLAN 5
-currently off pradaxa for anemia. Was told to stop taking it prior to hospitalization for possible GI bleed.  -restart metoprolol      Insomnia   - resume ambien prn (home med)

## 2021-07-14 NOTE — PROGRESS NOTE ADULT - SUBJECTIVE AND OBJECTIVE BOX
[facility] Division of Hospital Medicine  Heather DO Bernardino  Pager (M-F, 5V-3F): [phone]  Other Times:  [phone]    Patient is a 87y old  Female who presents with a chief complaint of urgent HD (13 Jul 2021 15:51)      SUBJECTIVE / OVERNIGHT EVENTS: none. Patient still feeling constipated. Will give dulcolax suppository.  ADDITIONAL REVIEW OF SYSTEMS: negative    MEDICATIONS  (STANDING):  atorvastatin 40 milliGRAM(s) Oral at bedtime  bisacodyl 5 milliGRAM(s) Oral at bedtime  buMETAnide Injectable 2 milliGRAM(s) IV Push every 12 hours  chlorhexidine 2% Cloths 1 Application(s) Topical daily  dextrose 40% Gel 15 Gram(s) Oral once  dextrose 40% Gel 15 Gram(s) Oral once  dextrose 5%. 1000 milliLiter(s) (50 mL/Hr) IV Continuous <Continuous>  dextrose 5%. 1000 milliLiter(s) (100 mL/Hr) IV Continuous <Continuous>  dextrose 50% Injectable 25 Gram(s) IV Push once  dextrose 50% Injectable 12.5 Gram(s) IV Push once  dextrose 50% Injectable 25 Gram(s) IV Push once  dextrose 50% Injectable 25 Gram(s) IV Push once  dextrose 50% Injectable 12.5 Gram(s) IV Push once  dextrose 50% Injectable 25 Gram(s) IV Push once  ferrous    sulfate 325 milliGRAM(s) Oral daily  glucagon  Injectable 1 milliGRAM(s) IntraMuscular once  glucagon  Injectable 1 milliGRAM(s) IntraMuscular once  heparin   Injectable 5000 Unit(s) SubCutaneous every 8 hours  insulin lispro (ADMELOG) corrective regimen sliding scale   SubCutaneous three times a day before meals  insulin lispro (ADMELOG) corrective regimen sliding scale   SubCutaneous at bedtime  metoprolol succinate ER 25 milliGRAM(s) Oral <User Schedule>  polyethylene glycol 3350 17 Gram(s) Oral daily  senna 2 Tablet(s) Oral at bedtime    MEDICATIONS  (PRN):  artificial  tears Solution 1 Drop(s) Both EYES every 12 hours PRN Dry Eyes  zolpidem 5 milliGRAM(s) Oral at bedtime PRN Insomnia      CAPILLARY BLOOD GLUCOSE      POCT Blood Glucose.: 167 mg/dL (14 Jul 2021 09:05)  POCT Blood Glucose.: 215 mg/dL (13 Jul 2021 22:39)  POCT Blood Glucose.: 183 mg/dL (13 Jul 2021 16:45)    I&O's Summary    13 Jul 2021 07:01  -  14 Jul 2021 07:00  --------------------------------------------------------  IN: 0 mL / OUT: 1800 mL / NET: -1800 mL    14 Jul 2021 07:01  -  14 Jul 2021 12:52  --------------------------------------------------------  IN: 240 mL / OUT: 450 mL / NET: -210 mL        PHYSICAL EXAM:  Vital Signs Last 24 Hrs  T(C): 36.6 (14 Jul 2021 13:00), Max: 36.6 (14 Jul 2021 13:00)  T(F): 97.9 (14 Jul 2021 13:00), Max: 97.9 (14 Jul 2021 13:00)  HR: 80 (14 Jul 2021 13:00) (79 - 85)  BP: 129/69 (14 Jul 2021 13:00) (103/65 - 136/70)  BP(mean): --  RR: 18 (14 Jul 2021 13:00) (18 - 18)  SpO2: 97% (14 Jul 2021 13:00) (91% - 98%)    CONSTITUTIONAL: NAD, well-developed, well-groomed, obese  EYES: PERRLA; conjunctiva and sclera clear  ENMT: Moist oral mucosa, no pharyngeal injection or exudates  NECK: Supple, no palpable masses; no thyromegaly  RESPIRATORY: Normal respiratory effort; lungs are clear to auscultation bilaterally  CARDIOVASCULAR: Regular rate and rhythm, normal S1 and S2, +systolic murmur throughout; 1+ lower extremity edema; Peripheral pulses are 2+ bilaterally  ABDOMEN: Nontender to palpation, normoactive bowel sounds, no rebound/guarding; No hepatosplenomegaly  MUSCULOSKELETAL: no clubbing or cyanosis of digits; no joint swelling or tenderness to palpation  PSYCH: A+O to person, place, and time; affect appropriate  NEUROLOGY: CN 2-12 are intact and symmetric; no gross sensory deficits   SKIN: No rashes; no palpable lesions    LABS:                        8.0    9.35  )-----------( 242      ( 14 Jul 2021 06:13 )             26.0     07-14    134<L>  |  93<L>  |  27<H>  ----------------------------<  126<H>  3.7   |  27  |  1.32<H>    Ca    8.7      14 Jul 2021 06:13  Phos  3.4     07-13  Mg     1.7     07-14

## 2021-07-14 NOTE — PROGRESS NOTE ADULT - ATTENDING COMMENTS
josselyn fleming  nephrology attending   Cell# [phone]   [facility] [phone]

## 2021-07-14 NOTE — PROGRESS NOTE ADULT - PROBLEM SELECTOR PLAN 1
Pt with JACQUE in the setting of severe diarrhea/decreased po intake. Upon review of [facility] HIE/Allscripts last sCr was 1.26 on (6/7/21). On admission  BUN/Cr 79/6.7 mg/dl. K: 6.6 (non hemolyzed). s/p Urgent HD on 7/8/21    SCr improved down to 1.9mg/dl >> 1.7mg/dl >> 1.3mg/dl today  has remained non oliguric to 1.8L in the past 24hrs.   c/w Bumex 2mg IV bid   No indication for further dialysis   please remove Non tunneled cath.   Monitor labs and urine output.   Avoid NSAIDs, ACEI/ARBS, RCA and nephrotoxins.   Dose medications as per eGFR

## 2021-07-14 NOTE — PROGRESS NOTE ADULT - ASSESSMENT
87 year-old woman with known history of atrial fibrillation, previously on AC, but now off due to anemia of unknown etiology, severe aortic stenosis, being evaluated for TAVR, status post CT with contrast, now with JACQUE, hyperkalemia, admitted for urgent HD.    HD per nephrology (none needed now). Avoid nephrotoxic agents, including Entresto.  Plan for LHC today - possible HD afterwards.    Transfuse PRN - goal hemoglobin should be > 8 g/dL for patient with severe AS.    Plan for TAVR post left heart cath per Structural Heart Team. Discussed with Alejandro Johns MD.  GI eval for GI bleeding - colon ca vs. AVMs.  Pulmonary eval for lung lesions seen on CT scan. Reportedly stable per discussion with Dr. Johns.

## 2021-07-14 NOTE — PROGRESS NOTE ADULT - PROBLEM SELECTOR PLAN 6
sub q heparin for dvt ppx    Constipation: dulcolax suppository today. If no BM by this afternoon will give enema.

## 2021-07-14 NOTE — PROGRESS NOTE ADULT - SUBJECTIVE AND OBJECTIVE BOX
HPI:  Patient seen and examined at bedside on 6 Shahram (in a new room).  No events overnight.  Creatinine continues to improve.  Plan for [facility] today.    Review Of Systems:           Respiratory: No shortness of breath, cough, or wheezing  Cardiovascular: No chest pain or palpitations  10 point review of systems is otherwise negative except as mentioned above        Medications:  artificial  tears Solution 1 Drop(s) Both EYES every 12 hours PRN  atorvastatin 40 milliGRAM(s) Oral at bedtime  bisacodyl 5 milliGRAM(s) Oral at bedtime  buMETAnide Injectable 2 milliGRAM(s) IV Push every 12 hours  chlorhexidine 2% Cloths 1 Application(s) Topical daily  dextrose 40% Gel 15 Gram(s) Oral once  dextrose 40% Gel 15 Gram(s) Oral once  dextrose 5%. 1000 milliLiter(s) IV Continuous <Continuous>  dextrose 5%. 1000 milliLiter(s) IV Continuous <Continuous>  dextrose 50% Injectable 25 Gram(s) IV Push once  dextrose 50% Injectable 12.5 Gram(s) IV Push once  dextrose 50% Injectable 25 Gram(s) IV Push once  dextrose 50% Injectable 25 Gram(s) IV Push once  dextrose 50% Injectable 12.5 Gram(s) IV Push once  dextrose 50% Injectable 25 Gram(s) IV Push once  ferrous    sulfate 325 milliGRAM(s) Oral daily  glucagon  Injectable 1 milliGRAM(s) IntraMuscular once  glucagon  Injectable 1 milliGRAM(s) IntraMuscular once  heparin   Injectable 5000 Unit(s) SubCutaneous every 8 hours  insulin lispro (ADMELOG) corrective regimen sliding scale   SubCutaneous three times a day before meals  insulin lispro (ADMELOG) corrective regimen sliding scale   SubCutaneous at bedtime  metoprolol succinate ER 25 milliGRAM(s) Oral <User Schedule>  polyethylene glycol 3350 17 Gram(s) Oral daily  senna 2 Tablet(s) Oral at bedtime  zolpidem 5 milliGRAM(s) Oral at bedtime PRN    PAST MEDICAL & SURGICAL HISTORY:    Vitals:  T(C): 36.6 (21 @ 14:00), Max: 36.6 (21 @ 13:00)  HR: 82 (21 @ 14:00) (80 - 85)  BP: 116/74 (21 @ 14:00) (116/74 - 136/70)  BP(mean): --  RR: 18 (21 @ 13:00) (18 - 18)  SpO2: 97% (21 @ 13:00) (91% - 97%)  Wt(kg): --  Daily     Daily Weight in k.7 (2021 07:13)  I&O's Summary    2021 07:01  -  2021 07:00  --------------------------------------------------------  IN: 0 mL / OUT: 1800 mL / NET: -1800 mL    2021 07:01  -  2021 17:17  --------------------------------------------------------  IN: 635 mL / OUT: 450 mL / NET: 185 mL        Physical Exam:  Appearance: Normal, well groomed, NAD  Eyes: PERRLA, EOMI, pink conjunctiva, no scleral icterus   HENT: Normal oral mucosa  Cardiovascular: RRR, S1, S2, III/VI systolic murmur at base  Procedural Access Site: Clean, dry, intact, without hematoma  Respiratory: Clear to auscultation bilaterally  Gastrointestinal: Soft, non-tender, non-distended, BS+  Musculoskeletal: No clubbing or joint deformity   Neurologic: No focal weakness  Lymphatic: No lymphadenopathy  Psychiatry: AAOx3 with appropriate mood and affect  Skin: No rashes, ecchymoses, or cyanosis                          8.0    9.35  )-----------( 242      ( 2021 06:13 )             26.0     07-14    134<L>  |  93<L>  |  27<H>  ----------------------------<  126<H>  3.7   |  27  |  1.32<H>    Ca    8.7      2021 06:13  Phos  3.4     07-13  Mg     1.7     07-14            Serum Pro-Brain Natriuretic Peptide: 28911 pg/mL ( @ 08:48)      Cardiovascular Diagnostic Testing:  ECG: rate controlled AFib    Echo: critical AS    Cath: PENDING    Imaging: < from: CT Heart without Coronaries w/ IV Cont (21 @ 08:51) >  FINDINGS:    Non-Coronary:    6 mm hypodense nodule within the right lobe of the thyroid gland. No enlarged axillary, mediastinal lymph nodes. No pleural effusions.    Evaluation of the lungs demonstrate left lower lobe masslike opacity on the part of which measures about 5 x 2.3 cm extending to the left lower lobe hilum with associated left lower lobe volume loss related to some superimposed atelectasis. Multiple left lung pulmonary nodule along the pleural surface measuring up to about 7 mm. Right middle lobe 3.2 x 2.6 cm mass associated with the minor fissure which contains a few small nodules measuring up to 7 mm. The right middle lobe mass has a cystic component along its inferior aspect.    Subcentimeter hypodensity within the liver is too small to characterize. The gallbladder, spleen, adrenal glands are unremarkable. Few pancreatic hypodensities are noted with the largest measuring about 1.5 cm on image 84 of series 19. Bilateral renal cysts.    Urinary bladder is normal. The uterus and adnexa are within normal limits. Status post rectal surgery with postoperative changes. No bowel obstruction or medial air. Appendix is normal.    Degenerative changes of the spine. Moderate to severe loss of height of the T5 and severe loss of height of the T6 vertebral bodies are of indeterminate age.    The heart is enlarged.  The left and right atria are severely enlarged.  There is mitral annular calcification and calcification of the mitral valve leaflets.   There is reduced contrast opacification of the left atrial appendage that resolves on delay imaging suggestive of mixing artifact.    The ascending aorta is mildly calcified. The aortic arch and the descending aorta are severely calcified.   The main pulmonary artery measures approximately 26.4 mm at the level of the ascending aorta.   The right and left pulmonary arteries appear enlarged and measure approximately 29.4 mm and 25.4 mm, respectively.    The aortic valve is calcified. The calcium score of the aortic valve is 1870.    Coronary:  Coronary arterial calcifications are noted; however, this studywas not optimized for evaluation of the coronary arteries.  Please refer to cardiac catheterization performed as part of pre-TAVR work-up.    Aortic Root Measurements    Major aortic annulus diameter (systole, mm): 25.2  Perpendicular minor aortic annulus diameter (systole, mm): 19.1  Aortic annulus perimeter (systole, mm): 76.6  Aortic annulus area (systole, mm2): 438  LVOT minimum diameter (systole, mm): 19.6  LVOT 90 cross (systole, mm): 26.4  LVOT calcification:  Severe  Distance from Aortic annulus to Left Main coronary artery (diastole, mm): 13.2  Distance from Aortic annulus to Right Coronary artery (diastole, mm): 16.7  Sinus of Valsalva diameter, Right coronary (diastole, mm): 29.4  Sinus of Valsalva diameter, Left coronary (diastole, mm): 31.0  Sinus of Valsalva diameter, Non coronary (diastole, mm): 28.3  Diameter at the sinotubular junction (diastole, mm): 26.5 x 26.5  Maximum ascending aorta diameter at 40 mm above annulus (diastole, mm): 32.3  Aortic root angulation (diastole, degrees): 48.4  Aortic root calcification: Moderate-to-severe    Abdominal Aorta:        Minimum lumen diameter (MLD) (mm): 12.8        Diameter perpendicular to MLD (mm): 13.2  Left Femoral:        Minimum Lumen Diameter (mm): 7.2        Diameter perpendicular to MLD (mm): 8.1        Tortuosity: Mild        Calcification: Mild  Right Femoral:        Minimum Lumen Diameter (mm): 6.5        Diameter perpendicular to MLD (mm): 7.1        Tortuosity: Mild        Calcification: Mild  Left External Iliac:        Minimum Lumen Diameter (mm): 6.9        Diameter perpendicular to MLD (mm): 7.5        Tortuosity: Moderate        Calcification: Mild  Left Common Iliac:         Minimum Lumen Diameter (mm): 4.5        Diameter perpendicular to MLD (mm): 8.0        Tortuosity: Severe        Calcification: Severe  Right External Iliac:        Minimum Lumen Diameter (mm): 5.0        Diameter perpendicular to MLD (mm): 7.4        Tortuosity: Moderate        Calcification: Mild  Right Common Iliac:    Minimum Lumen Diameter (mm): 7.8        Diameter perpendicular to MLD (mm): 8.4        Tortuosity: Moderate        Calcification: Moderate  L Subclavian/Axillary: Not well visualized due to motion artifact        Minimum Lumen Diameter (mm): 5.6      Diameter perpendicular to MLD (mm): 6.2        Tortuosity: Mild        Calcification: Mild  R Subclavian/Axillary:        Minimum Lumen Diameter (mm): 4.3        Diameter perpendicular to MLD (mm): 5.1        Tortuosity: Mild        Calcification: Mild    IMPRESSION:  1. Calcified aortic valve. The calcium score of the aortic valve is 1870.  2. Please see the body of the report for aortic and peripheral access vessel measurements.  3. Dominant large left lower lobe mass associated with multiple left lung pleural nodules worrisome for neoplasm with metastatic disease.  4. 3.2 x 2.6 cm right middle lobe mass as described above also is worrisome for neoplasm.  5. A few pancreatic hypodense nodules. Dedicated contrast enhanced pancreatic MRI is recommended for complete evaluation.  6. Compression fracture deformities of the T5 and T6 vertebral bodies as described above are of indeterminate age.      KHLOE HAINES MD; Attending Cardiologist  This document has been electronically signed.  MONICA HANEY MD; Attending Radiologist  This document has been electronically signed. 2021  9:12AM    < end of copied text >

## 2021-07-14 NOTE — PROGRESS NOTE ADULT - SUBJECTIVE AND OBJECTIVE BOX
[facility] DIVISION OF KIDNEY DISEASES AND HYPERTENSION -- FOLLOW UP NOTE  --------------------------------------------------------------------------------  If any questions, please feel free to contact me  NS pager: [phone], [facility]: [phone]  Vicente Cortes M.D.  Nephrology Fellow    (After 5 pm or on weekends please page the on-call fellow)  --------------------------------------------------------------------------------    Chief Complaint:  Patient is a 87y old  Female who presents with a chief complaint of urgent HD (14 Jul 2021 13:08)    24 hour events/subjective:  PAtient seen and examined at bedside earlier this am, she had no acute complaints. Cr has markedly improved. Vitals/labs/imaging reviewed       PAST HISTORY  --------------------------------------------------------------------------------  No significant changes to PMH, PSH, FHx, SHx, unless otherwise noted    ALLERGIES & MEDICATIONS  --------------------------------------------------------------------------------  Allergies    No Known Allergies    Intolerances      Standing Inpatient Medications  atorvastatin 40 milliGRAM(s) Oral at bedtime  bisacodyl 5 milliGRAM(s) Oral at bedtime  buMETAnide Injectable 2 milliGRAM(s) IV Push every 12 hours  chlorhexidine 2% Cloths 1 Application(s) Topical daily  dextrose 40% Gel 15 Gram(s) Oral once  dextrose 40% Gel 15 Gram(s) Oral once  dextrose 5%. 1000 milliLiter(s) IV Continuous <Continuous>  dextrose 5%. 1000 milliLiter(s) IV Continuous <Continuous>  dextrose 50% Injectable 25 Gram(s) IV Push once  dextrose 50% Injectable 12.5 Gram(s) IV Push once  dextrose 50% Injectable 25 Gram(s) IV Push once  dextrose 50% Injectable 25 Gram(s) IV Push once  dextrose 50% Injectable 12.5 Gram(s) IV Push once  dextrose 50% Injectable 25 Gram(s) IV Push once  ferrous    sulfate 325 milliGRAM(s) Oral daily  glucagon  Injectable 1 milliGRAM(s) IntraMuscular once  glucagon  Injectable 1 milliGRAM(s) IntraMuscular once  heparin   Injectable 5000 Unit(s) SubCutaneous every 8 hours  insulin lispro (ADMELOG) corrective regimen sliding scale   SubCutaneous three times a day before meals  insulin lispro (ADMELOG) corrective regimen sliding scale   SubCutaneous at bedtime  metoprolol succinate ER 25 milliGRAM(s) Oral <User Schedule>  polyethylene glycol 3350 17 Gram(s) Oral daily  senna 2 Tablet(s) Oral at bedtime    PRN Inpatient Medications  artificial  tears Solution 1 Drop(s) Both EYES every 12 hours PRN  zolpidem 5 milliGRAM(s) Oral at bedtime PRN      REVIEW OF SYSTEMS  --------------------------------------------------------------------------------  Gen: No fevers/chills  Skin: No rashes  Head/Eyes/Ears: Normal hearing,   Respiratory: No dyspnea, cough  CV: No chest pain  GI: No abdominal pain, diarrhea  : No dysuria, hematuria  MSK: No  edema  All other systems were reviewed and are negative, except as noted.    VITALS/PHYSICAL EXAM  --------------------------------------------------------------------------------  T(C): 36.6 (07-14-21 @ 13:00), Max: 36.6 (07-14-21 @ 13:00)  HR: 80 (07-14-21 @ 13:00) (79 - 85)  BP: 129/69 (07-14-21 @ 13:00) (103/65 - 136/70)  RR: 18 (07-14-21 @ 13:00) (18 - 18)  SpO2: 97% (07-14-21 @ 13:00) (91% - 97%)  Wt(kg): --        07-13-21 @ 07:01  -  07-14-21 @ 07:00  --------------------------------------------------------  IN: 0 mL / OUT: 1800 mL / NET: -1800 mL    07-14-21 @ 07:01  -  07-14-21 @ 15:12  --------------------------------------------------------  IN: 635 mL / OUT: 450 mL / NET: 185 mL        Physical Exam:  	Gen: NAD  	Pulm: CTA B/L  	CV: S1S2  	Abd: Soft, +BS   	Ext: No LE edema B/L  	Neuro: Awake  	Skin: Warm and dry  	Vascular access: right non tunneled cath.       LABS/STUDIES  --------------------------------------------------------------------------------              8.0    9.35  >-----------<  242      [07-14-21 @ 06:13]              26.0     134  |  93  |  27  ----------------------------<  126      [07-14-21 @ 06:13]  3.7   |  27  |  1.32        Ca     8.7     [07-14-21 @ 06:13]      Mg     1.7     [07-14-21 @ 06:13]      Phos  3.4     [07-13-21 @ 06:44]            Creatinine Trend:  SCr 1.32 [07-14 @ 06:13]  SCr 1.70 [07-13 @ 06:44]  SCr 1.93 [07-12 @ 06:19]  SCr 2.09 [07-11 @ 12:10]  SCr 2.53 [07-11 @ 00:21]    Urinalysis - [07-09-21 @ 12:30]      Color Yellow / Appearance Slightly Turbid / SG 1.017 / pH 5.5      Gluc Negative / Ketone Negative  / Bili Negative / Urobili Negative       Blood Small / Protein 100 / Leuk Est Negative / Nitrite Negative      RBC 2 / WBC 7 / Hyaline 1 / Gran  / Sq Epi  / Non Sq Epi 1 / Bacteria Negative    Urine Creatinine 50      [07-11-21 @ 15:56]  Urine Protein 34      [07-11-21 @ 15:56]  Urine Sodium 54      [07-11-21 @ 15:56]  Urine Urea Nitrogen 297      [07-11-21 @ 20:13]      HBsAb <3.0      [07-09-21 @ 03:47]  HBsAg Nonreact      [07-09-21 @ 03:47]  HBcAb Nonreact      [07-09-21 @ 03:47]  HCV 0.14, Nonreact      [07-09-21 @ 03:47]

## 2021-07-14 NOTE — PROGRESS NOTE ADULT - SUBJECTIVE AND OBJECTIVE BOX
87F with PMHx Severe Aortic Stenosis, Acute Kidney Injury/Metabolic Acidosis, Chronic Diastolic Heart Failure   was seen earlier this morning.  She was lying completely flat on the bed without a pillow and comfortable with O2 nasal cannula in place.  There were no acute events overnight and she has no complaints today.       T(C): 36.6 (07-14-21 @ 13:00), Max: 36.6 (07-14-21 @ 13:00)  HR: 80 (07-14-21 @ 13:00) (79 - 85)  BP: 129/69 (07-14-21 @ 13:00) (103/65 - 136/70)  RR: 18 (07-14-21 @ 13:00) (18 - 18)  SpO2: 97% (07-14-21 @ 13:00) (91% - 98%)    07-13-21 @ 07:01  -  07-14-21 @ 07:00  --------------------------------------------------------  IN: 0 mL / OUT: 1800 mL / NET: -1800 mL    07-14-21 @ 07:01  -  07-14-21 @ 13:08  --------------------------------------------------------  IN: 240 mL / OUT: 450 mL / NET: -210 mL        artificial  tears Solution 1 Drop(s) Both EYES every 12 hours PRN  atorvastatin 40 milliGRAM(s) Oral at bedtime  bisacodyl 5 milliGRAM(s) Oral at bedtime  buMETAnide Injectable 2 milliGRAM(s) IV Push every 12 hours  chlorhexidine 2% Cloths 1 Application(s) Topical daily  dextrose 40% Gel 15 Gram(s) Oral once  dextrose 40% Gel 15 Gram(s) Oral once  dextrose 5%. 1000 milliLiter(s) IV Continuous <Continuous>  dextrose 5%. 1000 milliLiter(s) IV Continuous <Continuous>  dextrose 50% Injectable 25 Gram(s) IV Push once  dextrose 50% Injectable 12.5 Gram(s) IV Push once  dextrose 50% Injectable 25 Gram(s) IV Push once  dextrose 50% Injectable 25 Gram(s) IV Push once  dextrose 50% Injectable 12.5 Gram(s) IV Push once  dextrose 50% Injectable 25 Gram(s) IV Push once  ferrous    sulfate 325 milliGRAM(s) Oral daily  glucagon  Injectable 1 milliGRAM(s) IntraMuscular once  glucagon  Injectable 1 milliGRAM(s) IntraMuscular once  heparin   Injectable 5000 Unit(s) SubCutaneous every 8 hours  insulin lispro (ADMELOG) corrective regimen sliding scale   SubCutaneous three times a day before meals  insulin lispro (ADMELOG) corrective regimen sliding scale   SubCutaneous at bedtime  metoprolol succinate ER 25 milliGRAM(s) Oral <User Schedule>  polyethylene glycol 3350 17 Gram(s) Oral daily  senna 2 Tablet(s) Oral at bedtime  zolpidem 5 milliGRAM(s) Oral at bedtime PRN          PHYSICAL EXAM:    General: NAD, WDWN, appropriate affect  Cardiac: S1S2, RRR, systolic murmur,   No Gallops/Rubs   Pulm: Clear, no wheezes, rales, or rhonchi, no use of accessory muscles  Gastrointestinal: soft, nontender, nondistended, +bowel sounds  Extremities: 2+ Edema B/L,   No joint pain or swelling +PMSx4  Vascular: 1+ pulses B/L, No Bruits, Right IJ HD Catheter  Neuro: A&Ox3, nonfocal

## 2021-07-14 NOTE — PROGRESS NOTE ADULT - PROBLEM SELECTOR PLAN 3
- structural heart team following, rec C/RHC for TAVR eval during this hospitalization when renal function is back to baseline and volume status optimized. TAVR to be done after acute hospitalization

## 2021-07-14 NOTE — PROGRESS NOTE ADULT - PROBLEM SELECTOR PLAN 2
- Discussed incidental CT scan findings of pulmonary nodules and possibility of malignancy with patient's son Reji. His and patient's preference is to not undergo any sort of invasive work-up and to not seek further treatment. Will hold off on calling pulmonary. She does not have a pulmonologist that she is active following with, nor does she remember the person she saw a few years ago.  - per outpatient notes, had this:  CT chest 10/30/19:   pulmonary nodule right middle lob 2.4x 1.6x 2.9  platelike opacity in the left lower lobe, areas of masslike nodularity.- recommended PET/CT to assess for associated metabolic activity   - CT also w/ pancreatic lesion for which a nonemergent contrast enhanced abdominal MRI may be considered for further evaluation as outpatient if family wishes to pursue diagnosis

## 2021-07-15 NOTE — PROGRESS NOTE ADULT - ASSESSMENT
Ms Thomson is an 86y/o female with Severe Aortic Stenosis, Acute Kidney Injury/Metabolic Acidosis, Chronic Diastolic Heart Failure  - creatinine improving  -cath performed 7/14/2021  -inpatient TAVR to be scheduled next week; timing TBD

## 2021-07-15 NOTE — PROGRESS NOTE ADULT - PROBLEM SELECTOR PLAN 2
- structural heart team following  - s/p diagnostic [facility] 7/14  - appreciate Dr. Johns for further planning on TAVR

## 2021-07-15 NOTE — PROGRESS NOTE ADULT - SUBJECTIVE AND OBJECTIVE BOX
87F with PMHx Severe Aortic Stenosis, Acute Kidney Injury/Metabolic Acidosis, Chronic Diastolic Heart Failurewas seen earlier this morning.  She was lying omfortably with O2 nasal cannula in place.  There were no acute events overnight and she has no complaints today.       T(C): 36.7 (07-15-21 @ 05:09), Max: 36.7 (07-15-21 @ 05:09)  HR: 83 (07-15-21 @ 09:58) (80 - 87)  BP: 116/64 (07-15-21 @ 09:58) (113/68 - 140/60)  RR: 18 (07-15-21 @ 05:09) (18 - 18)  SpO2: 97% (07-15-21 @ 09:58) (95% - 97%)    21 @ 07:01  -  07-15-21 @ 07:00  --------------------------------------------------------  IN: 1355 mL / OUT: 1350 mL / NET: 5 mL        artificial  tears Solution 1 Drop(s) Both EYES every 12 hours PRN  atorvastatin 40 milliGRAM(s) Oral at bedtime  bisacodyl 5 milliGRAM(s) Oral at bedtime  buMETAnide Injectable 2 milliGRAM(s) IV Push every 12 hours  chlorhexidine 2% Cloths 1 Application(s) Topical daily  dextrose 40% Gel 15 Gram(s) Oral once  dextrose 40% Gel 15 Gram(s) Oral once  dextrose 5%. 1000 milliLiter(s) IV Continuous <Continuous>  dextrose 5%. 1000 milliLiter(s) IV Continuous <Continuous>  dextrose 50% Injectable 25 Gram(s) IV Push once  dextrose 50% Injectable 12.5 Gram(s) IV Push once  dextrose 50% Injectable 25 Gram(s) IV Push once  dextrose 50% Injectable 25 Gram(s) IV Push once  dextrose 50% Injectable 12.5 Gram(s) IV Push once  dextrose 50% Injectable 25 Gram(s) IV Push once  ferrous    sulfate 325 milliGRAM(s) Oral daily  glucagon  Injectable 1 milliGRAM(s) IntraMuscular once  glucagon  Injectable 1 milliGRAM(s) IntraMuscular once  heparin   Injectable 5000 Unit(s) SubCutaneous every 8 hours  insulin lispro (ADMELOG) corrective regimen sliding scale   SubCutaneous three times a day before meals  insulin lispro (ADMELOG) corrective regimen sliding scale   SubCutaneous at bedtime  magnesium sulfate  IVPB 2 Gram(s) IV Intermittent once  metoprolol succinate ER 25 milliGRAM(s) Oral <User Schedule>  polyethylene glycol 3350 17 Gram(s) Oral daily  senna 2 Tablet(s) Oral at bedtime  zolpidem 5 milliGRAM(s) Oral at bedtime PRN          PHYSICAL EXAM:    General: NAD, WDWN, appropriate affect  Cardiac: S1S2, RRR, systolic murmur,   No Gallops/Rubs   Pulm: Clear, no wheezes, rales, or rhonchi, no use of accessory muscles  Gastrointestinal: soft, nontender, nondistended, +bowel sounds  Extremities: 2+ Edema B/L,   No joint pain or swelling +PMSx4  Vascular: 1+ pulses B/L, No Bruits, Right IJ HD Catheter  Neuro: A&Ox3, nonfocal        Cardiac Cath:  2021  PROCEDURE:  --  Right heart catheterization.  --  Left coronary angiography.  --  Right coronary angiography.  --  Sonosite - Diagnostic.  TECHNIQUE: The risks and alternatives of the procedures and conscious  sedation were explained to the patient and informed consent was obtained.  Cardiac catheterization performed urgently.  Local anesthetic given. Right femoral artery access. Right femoral vein  access. Right heart catheterization. The procedure was performed utilizing  a catheter. Left coronary artery angiography. The vessel was injected  utilizing a catheter. Right coronary artery angiography. The vessel was  injected utilizing a catheter. Sonosite - Diagnostic. RADIATION EXPOSURE:  4.8 min.  CONTRAST GIVEN: Omnipaque 48 ml. An IABP was not used.  CORONARY VESSELS: The coronary circulation is right dominant.  LM:   --  LM: Normal.  LAD:   --  Proximal LAD: There was a discrete 40 % stenosis.  --  Mid LAD: There was a tubular 25 % stenosis.  --  Distal LAD: Normal.  CX:   --  Proximal circumflex: There was a tubular 20 % stenosis.  --  Mid circumflex: There was a diffuse 50 % stenosis.  RCA:   --  Proximal RCA: There was a diffuse 30 % stenosis.  --  Mid RCA: There was a diffuse 20 % in-stent restenosis through the prior  stent.  --  Distal RCA: Normal.  --  RPDA: Normal.  --  RPLS: Normal.  COMPLICATIONS: There were no complications.  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  Normal left main coronary artery  Right dominant system  Elevated right atrial pressure (mRA 16mmHg with a V wave of 23mmHg)  Moderate post-capillary pulmonary hypertension (sPAP 58mmHg, dPAP 23mmHg,  mPAP 37mmHg)  PCWP = 28mmHg with a V wave of 48mmHg  PAsat = 67.1% // AOsat = 99.2% (room air)  [place] CO // CI = 6.07l/min // 3.56l/min/m2  DIAGNOSTIC RECOMMENDATIONS: Keep right leg straigh for 4 hours following  removal of sheaths  Continue aggressive medical management of coronary artery disease and  associated risk factors  Closely monitor the patients kidney function  Patient being evaluated by the [facility] valve team for TAVR  Findings discussed with Dr. 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  Normal left main coronary artery  Right dominant system  Elevated right atrial pressure (mRA 16mmHg with a V wave of 23mmHg)  Moderate post-capillary pulmonary hypertension (sPAP 58mmHg, dPAP 23mmHg,  mPAP 37mmHg)  PCWP = 28mmHg with a V wave of 48mmHg  PAsat = 67.1% // AOsat = 99.2% (room air)  [place] CO // CI = 6.07l/min // 3.56l/min/m2  INTERVENTIONAL RECOMMENDATIONS: Keep right leg straigh for 4 hours  following removal of sheaths  Continue aggressive medical management of coronary artery disease and  associated risk factors  Closely monitor the patients kidney function  Patient being evaluated by the [facility] valve team for TAVR  Findings discussed with Dr. Hodges  Prepared and signed by  Alejandro Johns MD  Signed 2021 18:37:05  HEMODYNAMIC TABLES  Pressures:  Baseline  Pressures:  - HR: 77  Pressures:  - Rhythm:  Pressures:  -- Aortic Pressure (S/D/M): 121/45/77  Pressures:  -- Pulmonary Artery (S/D/M): 58/23/37  Pressures:  -- Pulmonary Capillary Wedge: 40/50/28  Pressures:  -- Right Atrium (a/v/M): 18/23/16  Pressures:  -- Right Ventricle (s/edp): 58/14/--  O2 Sats:  Baseline  O2 Sats:  - HR: 77  O2 Sats:  - Rhythm:  O2 Sats:  -- AO: 8.6/99.2/11.6  O2 Sats:  -- PA: 8.6/67.1/7.85  Outputs:  Baseline  Outputs:  -- CALCULATIONS[age]  Outputs:  -- CALCULATIONS: Body Surface Area: 1.70  Outputs:  -- CALCULATIONS: Height in cm: 152.00  Outputs:  -- CALCULATIONS: Sex: Female  Outputs:  -- CALCULATIONS: Weight in k.50  Outputs:  -- OUTPUTS: Blood Oxygen Difference: 3.75  Outputs:  -- OUTPUTS: CO by [place]: 6.07  Outputs:  -- OUTPUTS: [place] cardiac index: 3.56  Outputs:  -- OUTPUTS: O2 consumption: 228.00  Outputs:  -- OUTPUTS: Vo2 Indexed: 133.83  Outputs:  -- RESISTANCES: Left ventricular stroke work: 57.81  Outputs:  -- RESISTANCES: Left Ventricular Stroke Work index: 33.94  Outputs:  -- RESISTANCES: Pulmonary vascular index (dsc): 201.93  Outputs:  -- RESISTANCES: Pulmonary vascular index (Wood Units): 2.52  Outputs:  -- RESISTANCES: Pulmonary vascular resistance (dsc): 118.53  Outputs:  -- RESISTANCES: Pulmonary vascular resistance (Wood Units): 1.48  Outputs:  -- RESISTANCES: PVR_SVR Ratio: 0.15  Outputs:  -- RESISTANCES: Right ventricular stroke work: 24.78  Outputs:  -- RESISTANCES: Right ventricular stroke work index: 14.54  Outputs:  -- RESISTANCES: Systemic vascular index (dsc): 1368.65  Outputs:  -- RESISTANCES: Systemic vascular index (Wood Units): 17.11  Outputs:  -- RESISTANCES: Systemic vascular resistance (dsc): 803.38  Outputs:  -- RESISTANCES: Systemic vascular resistance (Wood Units): 10.04  Outputs:  -- RESISTANCES: Total pulmonary index (dsc): 830.17  Outputs:  -- RESISTANCES: Total pulmonary index (Wood Units): 10.38  Outputs:  -- RESISTANCES: Total pulmonary resistance (dsc): 487.29  Outputs:  -- RESISTANCES: Total pulmonary resistance (Wood Units): 6.09  Outputs:  -- RESISTANCES: Total vascular index (Wood Units): 21.60  Outputs:  -- RESISTANCES: Total vascular resistance (dsc): 1014.10  Outputs:  -- RESISTANCES: Total vascular resistance (Wood Units): 12.68  Outputs:  -- RESISTANCES: Total vascular resistance index (dsc): 1727.65  Outputs:  -- RESISTANCES: TPR_TVR Ratio: 0.48  Outputs:  -- SHUNTS: Qs Indexed: 3.56  Outputs:  -- SHUNTS: Systemic flow: 6.07

## 2021-07-15 NOTE — PROGRESS NOTE ADULT - SUBJECTIVE AND OBJECTIVE BOX
[facility] DIVISION OF KIDNEY DISEASES AND HYPERTENSION -- FOLLOW UP NOTE  --------------------------------------------------------------------------------  If any questions, please feel free to contact me  NS pager: [phone], [facility]: [phone]  Vicente Cortes M.D.  Nephrology Fellow    (After 5 pm or on weekends please page the on-call fellow)  --------------------------------------------------------------------------------    Chief Complaint:  Patient is a 87y old  Female who presents with a chief complaint of urgent HD (15 Jul 2021 11:38)    24 hour events/subjective:  Patient seen and examined at bedside in NAD  now back to [facility], s/p Cath yesterday. Vitals/labs/imaging reviewed       PAST HISTORY  --------------------------------------------------------------------------------  No significant changes to PMH, PSH, FHx, SHx, unless otherwise noted    ALLERGIES & MEDICATIONS  --------------------------------------------------------------------------------  Allergies    No Known Allergies    Intolerances      Standing Inpatient Medications  atorvastatin 40 milliGRAM(s) Oral at bedtime  bisacodyl 5 milliGRAM(s) Oral at bedtime  buMETAnide Injectable 2 milliGRAM(s) IV Push every 12 hours  chlorhexidine 2% Cloths 1 Application(s) Topical daily  dextrose 40% Gel 15 Gram(s) Oral once  dextrose 40% Gel 15 Gram(s) Oral once  dextrose 5%. 1000 milliLiter(s) IV Continuous <Continuous>  dextrose 5%. 1000 milliLiter(s) IV Continuous <Continuous>  dextrose 50% Injectable 25 Gram(s) IV Push once  dextrose 50% Injectable 12.5 Gram(s) IV Push once  dextrose 50% Injectable 25 Gram(s) IV Push once  dextrose 50% Injectable 25 Gram(s) IV Push once  dextrose 50% Injectable 12.5 Gram(s) IV Push once  dextrose 50% Injectable 25 Gram(s) IV Push once  ferrous    sulfate 325 milliGRAM(s) Oral daily  glucagon  Injectable 1 milliGRAM(s) IntraMuscular once  glucagon  Injectable 1 milliGRAM(s) IntraMuscular once  heparin   Injectable 5000 Unit(s) SubCutaneous every 8 hours  insulin lispro (ADMELOG) corrective regimen sliding scale   SubCutaneous three times a day before meals  insulin lispro (ADMELOG) corrective regimen sliding scale   SubCutaneous at bedtime  metoprolol succinate ER 25 milliGRAM(s) Oral <User Schedule>  polyethylene glycol 3350 17 Gram(s) Oral daily  senna 2 Tablet(s) Oral at bedtime    PRN Inpatient Medications  artificial  tears Solution 1 Drop(s) Both EYES every 12 hours PRN  zolpidem 5 milliGRAM(s) Oral at bedtime PRN      REVIEW OF SYSTEMS  --------------------------------------------------------------------------------  Gen: No fevers/chills  Skin: No rashes  Head/Eyes/Ears: Normal hearing,   Respiratory: No dyspnea, cough  CV: No chest pain  GI: No abdominal pain, diarrhea  : No dysuria, hematuria  MSK: No  edema    All other systems were reviewed and are negative, except as noted.    VITALS/PHYSICAL EXAM  --------------------------------------------------------------------------------  T(C): 36.5 (07-15-21 @ 12:32), Max: 36.7 (07-15-21 @ 05:09)  HR: 76 (07-15-21 @ 12:32) (76 - 87)  BP: 112/57 (07-15-21 @ 12:32) (112/57 - 140/60)  RR: 18 (07-15-21 @ 12:32) (18 - 18)  SpO2: 98% (07-15-21 @ 12:32) (95% - 98%)  Wt(kg): --        07-14-21 @ 07:01  -  07-15-21 @ 07:00  --------------------------------------------------------  IN: 1355 mL / OUT: 1350 mL / NET: 5 mL    07-15-21 @ 07:01  -  07-15-21 @ 13:50  --------------------------------------------------------  IN: 0 mL / OUT: 300 mL / NET: -300 mL        Physical Exam:  	Gen: NAD  	Pulm: CTA B/L  	CV: S1S2  	Abd: Soft, +BS   	Ext: No LE edema B/L  	Neuro: Awake  	Skin: Warm and dry  	Vascular access: right non tunneled cath.   	      LABS/STUDIES  --------------------------------------------------------------------------------              8.0    9.35  >-----------<  242      [07-14-21 @ 06:13]              26.0     132  |  91  |  26  ----------------------------<  126      [07-15-21 @ 06:38]  4.0   |  27  |  1.29        Ca     9.5     [07-15-21 @ 06:38]      Mg     1.5     [07-15-21 @ 06:38]            Creatinine Trend:  SCr 1.29 [07-15 @ 06:38]  SCr 1.32 [07-14 @ 06:13]  SCr 1.70 [07-13 @ 06:44]  SCr 1.93 [07-12 @ 06:19]  SCr 2.09 [07-11 @ 12:10]    Urinalysis - [07-09-21 @ 12:30]      Color Yellow / Appearance Slightly Turbid / SG 1.017 / pH 5.5      Gluc Negative / Ketone Negative  / Bili Negative / Urobili Negative       Blood Small / Protein 100 / Leuk Est Negative / Nitrite Negative      RBC 2 / WBC 7 / Hyaline 1 / Gran  / Sq Epi  / Non Sq Epi 1 / Bacteria Negative    Urine Creatinine 50      [07-11-21 @ 15:56]  Urine Protein 34      [07-11-21 @ 15:56]  Urine Sodium 54      [07-11-21 @ 15:56]  Urine Urea Nitrogen 297      [07-11-21 @ 20:13]      HBsAb <3.0      [07-09-21 @ 03:47]  HBsAg Nonreact      [07-09-21 @ 03:47]  HBcAb Nonreact      [07-09-21 @ 03:47]  HCV 0.14, Nonreact      [07-09-21 @ 03:47]

## 2021-07-15 NOTE — PROGRESS NOTE ADULT - ATTENDING COMMENTS
The patient is clinically doing well.  She is sitting up in a chair with her son at her bedside.  Overall she is very happy with her progress since she is arrived to the hospital.  Continues to feel fatigued/weak.  Not having any chest pain/tightness or discomfort, fevers, chills, sweats, lightheaded sensation, dizzy or palpitation.  Physical examination as noted above.    Reviewed findings of the cardiac catheterization with the patient and her son.  The patient and her son would both like her to undergo the TAVR procedure as an inpatient.  Indications and details of the TAVR procedure reviewed.  Benefits and risk were gone over.  Risk include but not limited to infection, bleeding, arrhythmia, TIA/stroke, hemodynamic instability, vascular injury, need for urgent surgery and death.  The patient and her family aware that due to her multiple comorbidities she is at increased risk for complication.  Every step will be taken to try to minimize her risk.    They would like to proceed with the procedure in the near future.    Shiley can be removed at discretion of medicine/nephrology team.    Avoid all nephrotoxic agents.    All questions concerns were addressed.    Findings and plan were discussed with cardiology/Dr. Hodges and hospitalist.

## 2021-07-15 NOTE — CHART NOTE - NSCHARTNOTEFT_GEN_A_CORE
Patient no longer w/ indication for RIJ shiley. As per attending, okay to remove shiley. The procedure and process of removal was explained to patient, and patient agreed to proceed with the removal.  Patient was placed in supine position.  Shiley was removed without difficulties  or complications. Tip of catheter intact.  Firm pressure was held at site for 20 minutes with hemostasis achieved. Clean dressing applied, site remians clean, dry and intact.  No evidence of active bleeding or hematoma. RN notified to monitor site closely. Will continue to monitor pt closely.     Corrie Cid, ANATOLY-C  [id number]

## 2021-07-15 NOTE — PROGRESS NOTE ADULT - SUBJECTIVE AND OBJECTIVE BOX
Patient is a 87y old  Female who presents with a chief complaint of urgent HD (14 Jul 2021 15:12)      SUBJECTIVE / OVERNIGHT EVENTS:  no acute events overnight, vss, afebrile  pt feels well, wants to move her BM  pt is glad to hear that her creatinine is getting and no further need for HD    ROS:  14 point ROS negative in detail except stated as above    MEDICATIONS  (STANDING):  atorvastatin 40 milliGRAM(s) Oral at bedtime  bisacodyl 5 milliGRAM(s) Oral at bedtime  buMETAnide Injectable 2 milliGRAM(s) IV Push every 12 hours  chlorhexidine 2% Cloths 1 Application(s) Topical daily  dextrose 40% Gel 15 Gram(s) Oral once  dextrose 40% Gel 15 Gram(s) Oral once  dextrose 5%. 1000 milliLiter(s) (50 mL/Hr) IV Continuous <Continuous>  dextrose 5%. 1000 milliLiter(s) (100 mL/Hr) IV Continuous <Continuous>  dextrose 50% Injectable 25 Gram(s) IV Push once  dextrose 50% Injectable 12.5 Gram(s) IV Push once  dextrose 50% Injectable 25 Gram(s) IV Push once  dextrose 50% Injectable 25 Gram(s) IV Push once  dextrose 50% Injectable 12.5 Gram(s) IV Push once  dextrose 50% Injectable 25 Gram(s) IV Push once  ferrous    sulfate 325 milliGRAM(s) Oral daily  glucagon  Injectable 1 milliGRAM(s) IntraMuscular once  glucagon  Injectable 1 milliGRAM(s) IntraMuscular once  heparin   Injectable 5000 Unit(s) SubCutaneous every 8 hours  insulin lispro (ADMELOG) corrective regimen sliding scale   SubCutaneous three times a day before meals  insulin lispro (ADMELOG) corrective regimen sliding scale   SubCutaneous at bedtime  magnesium sulfate  IVPB 2 Gram(s) IV Intermittent once  metoprolol succinate ER 25 milliGRAM(s) Oral <User Schedule>  polyethylene glycol 3350 17 Gram(s) Oral daily  senna 2 Tablet(s) Oral at bedtime    MEDICATIONS  (PRN):  artificial  tears Solution 1 Drop(s) Both EYES every 12 hours PRN Dry Eyes  zolpidem 5 milliGRAM(s) Oral at bedtime PRN Insomnia      CAPILLARY BLOOD GLUCOSE      POCT Blood Glucose.: 165 mg/dL (15 Jul 2021 08:18)  POCT Blood Glucose.: 211 mg/dL (14 Jul 2021 21:35)  POCT Blood Glucose.: 175 mg/dL (14 Jul 2021 19:44)    I&O's Summary    14 Jul 2021 07:01  -  15 Jul 2021 07:00  --------------------------------------------------------  IN: 1355 mL / OUT: 1350 mL / NET: 5 mL        PHYSICAL EXAM:  Vital Signs Last 24 Hrs  T(C): 36.7 (15 Jul 2021 05:09), Max: 36.7 (15 Jul 2021 05:09)  T(F): 98 (15 Jul 2021 05:09), Max: 98 (15 Jul 2021 05:09)  HR: 83 (15 Jul 2021 09:58) (80 - 87)  BP: 116/64 (15 Jul 2021 09:58) (113/68 - 140/60)  BP(mean): --  RR: 18 (15 Jul 2021 05:09) (18 - 18)  SpO2: 97% (15 Jul 2021 09:58) (95% - 97%)    GENERAL: NAD, well-developed  HEAD:  Atraumatic, Normocephalic  EYES: EOMI, PERRLA, conjunctiva and sclera clear  NECK: (+) RIJ  CHEST/LUNG: Clear to auscultation bilaterally; No wheeze  HEART: Regular rate and rhythm; No murmurs, rubs, or gallops  ABDOMEN: Soft, Nontender, Nondistended; Bowel sounds present  EXTREMITIES:  2+ Peripheral Pulses, No clubbing, cyanosis, or edema  NEUROLOGY: AAOx3; non-focal  SKIN: No rashes or lesions    LABS:  personally reviewed                        8.0    9.35  )-----------( 242      ( 14 Jul 2021 06:13 )             26.0     07-15    132<L>  |  91<L>  |  26<H>  ----------------------------<  126<H>  4.0   |  27  |  1.29    Ca    9.5      15 Jul 2021 06:38  Mg     1.5     07-15                RADIOLOGY & ADDITIONAL TESTS:    Imaging Personally Reviewed:    Consultant(s) Notes Reviewed:  nephrology, cards, structural heart    Care Discussed with Consultants/Other Providers: Dr. Hodges (cards), Dr. Johns (Structural)

## 2021-07-15 NOTE — PROGRESS NOTE ADULT - SUBJECTIVE AND OBJECTIVE BOX
HPI:  Patient seen and examined at bedside on 6 [address].  Elevated filling pressures on RHC. Nonobstructive disease on LHC.  Creatinine remains ~1.3 mg/dL.    Review Of Systems:           Respiratory: No shortness of breath, cough, or wheezing  Cardiovascular: No chest pain or palpitations  10 point review of systems is otherwise negative except as mentioned above        Medications:  artificial  tears Solution 1 Drop(s) Both EYES every 12 hours PRN  atorvastatin 40 milliGRAM(s) Oral at bedtime  bisacodyl 5 milliGRAM(s) Oral at bedtime  buMETAnide Injectable 2 milliGRAM(s) IV Push every 8 hours  chlorhexidine 2% Cloths 1 Application(s) Topical daily  dextrose 40% Gel 15 Gram(s) Oral once  dextrose 40% Gel 15 Gram(s) Oral once  dextrose 5%. 1000 milliLiter(s) IV Continuous <Continuous>  dextrose 5%. 1000 milliLiter(s) IV Continuous <Continuous>  dextrose 50% Injectable 25 Gram(s) IV Push once  dextrose 50% Injectable 12.5 Gram(s) IV Push once  dextrose 50% Injectable 25 Gram(s) IV Push once  dextrose 50% Injectable 25 Gram(s) IV Push once  dextrose 50% Injectable 12.5 Gram(s) IV Push once  dextrose 50% Injectable 25 Gram(s) IV Push once  ferrous    sulfate 325 milliGRAM(s) Oral daily  glucagon  Injectable 1 milliGRAM(s) IntraMuscular once  glucagon  Injectable 1 milliGRAM(s) IntraMuscular once  heparin   Injectable 5000 Unit(s) SubCutaneous every 8 hours  insulin lispro (ADMELOG) corrective regimen sliding scale   SubCutaneous three times a day before meals  insulin lispro (ADMELOG) corrective regimen sliding scale   SubCutaneous at bedtime  metoprolol succinate ER 25 milliGRAM(s) Oral <User Schedule>  polyethylene glycol 3350 17 Gram(s) Oral daily  senna 2 Tablet(s) Oral at bedtime  zolpidem 5 milliGRAM(s) Oral at bedtime PRN    PAST MEDICAL & SURGICAL HISTORY:    Vitals:  T(C): 36.9 (07-15-21 @ 21:19), Max: 36.9 (07-15-21 @ 21:19)  HR: 78 (07-15-21 @ 21:19) (76 - 86)  BP: 115/64 (07-15-21 @ 21:19) (112/57 - 116/64)  BP(mean): --  RR: 18 (07-15-21 @ 21:19) (18 - 18)  SpO2: 98% (07-15-21 @ 21:19) (95% - 98%)  Wt(kg): --  Daily     Daily Weight in k.9 (15 Jul 2021 06:00)  I&O's Summary    2021 07:01  -  15 Jul 2021 07:00  --------------------------------------------------------  IN: 1355 mL / OUT: 1350 mL / NET: 5 mL    15 Jul 2021 07:01  -  15 Jul 2021 22:25  --------------------------------------------------------  IN: 530 mL / OUT: 300 mL / NET: 230 mL        Physical Exam:  Appearance: Normal, well groomed, NAD  Eyes: PERRLA, EOMI, pink conjunctiva, no scleral icterus   HENT: Normal oral mucosa  Cardiovascular: RRR, S1, S2, III/VI systolic murmur at base  Procedural Access Site: Clean, dry, intact, without hematoma  Respiratory: Clear to auscultation bilaterally  Gastrointestinal: Soft, non-tender, non-distended, BS+  Musculoskeletal: No clubbing or joint deformity   Neurologic: No focal weakness  Lymphatic: No lymphadenopathy  Psychiatry: AAOx3 with appropriate mood and affect  Skin: No rashes, ecchymoses, or cyanosis                          8.0    9.35  )-----------( 242      ( 2021 06:13 )             26.0     15    132<L>  |  91<L>  |  26<H>  ----------------------------<  126<H>  4.0   |  27  |  1.29    Ca    9.5      15 Jul 2021 06:38  Mg     1.5     07-15      Cardiovascular Diagnostic Testing:  ECG: rate controlled AFib    Echo: critical AS    Cath: PENDING    Imaging: < from: CT Heart without Coronaries w/ IV Cont (21 @ 08:51) >  FINDINGS:    Non-Coronary:    6 mm hypodense nodule within the right lobe of the thyroid gland. No enlarged axillary, mediastinal lymph nodes. No pleural effusions.    Evaluation of the lungs demonstrate left lower lobe masslike opacity on the part of which measures about 5 x 2.3 cm extending to the left lower lobe hilum with associated left lower lobe volume loss related to some superimposed atelectasis. Multiple left lung pulmonary nodule along the pleural surface measuring up to about 7 mm. Right middle lobe 3.2 x 2.6 cm mass associated with the minor fissure which contains a few small nodules measuring up to 7 mm. The right middle lobe mass has a cystic component along its inferior aspect.    Subcentimeter hypodensity within the liver is too small to characterize. The gallbladder, spleen, adrenal glands are unremarkable. Few pancreatic hypodensities are noted with the largest measuring about 1.5 cm on image 84 of series 19. Bilateral renal cysts.    Urinary bladder is normal. The uterus and adnexa are within normal limits. Status post rectal surgery with postoperative changes. No bowel obstruction or medial air. Appendix is normal.    Degenerative changes of the spine. Moderate to severe loss of height of the T5 and severe loss of height of the T6 vertebral bodies are of indeterminate age.    The heart is enlarged.  The left and right atria are severely enlarged.  There is mitral annular calcification and calcification of the mitral valve leaflets.   There is reduced contrast opacification of the left atrial appendage that resolves on delay imaging suggestive of mixing artifact.    The ascending aorta is mildly calcified. The aortic arch and the descending aorta are severely calcified.   The main pulmonary artery measures approximately 26.4 mm at the level of the ascending aorta.   The right and left pulmonary arteries appear enlarged and measure approximately 29.4 mm and 25.4 mm, respectively.    The aortic valve is calcified. The calcium score of the aortic valve is 1870.    Coronary:  Coronary arterial calcifications are noted; however, this studywas not optimized for evaluation of the coronary arteries.  Please refer to cardiac catheterization performed as part of pre-TAVR work-up.    Aortic Root Measurements    Major aortic annulus diameter (systole, mm): 25.2  Perpendicular minor aortic annulus diameter (systole, mm): 19.1  Aortic annulus perimeter (systole, mm): 76.6  Aortic annulus area (systole, mm2): 438  LVOT minimum diameter (systole, mm): 19.6  LVOT 90 cross (systole, mm): 26.4  LVOT calcification:  Severe  Distance from Aortic annulus to Left Main coronary artery (diastole, mm): 13.2  Distance from Aortic annulus to Right Coronary artery (diastole, mm): 16.7  Sinus of Valsalva diameter, Right coronary (diastole, mm): 29.4  Sinus of Valsalva diameter, Left coronary (diastole, mm): 31.0  Sinus of Valsalva diameter, Non coronary (diastole, mm): 28.3  Diameter at the sinotubular junction (diastole, mm): 26.5 x 26.5  Maximum ascending aorta diameter at 40 mm above annulus (diastole, mm): 32.3  Aortic root angulation (diastole, degrees): 48.4  Aortic root calcification: Moderate-to-severe    Abdominal Aorta:        Minimum lumen diameter (MLD) (mm): 12.8        Diameter perpendicular to MLD (mm): 13.2  Left Femoral:        Minimum Lumen Diameter (mm): 7.2        Diameter perpendicular to MLD (mm): 8.1        Tortuosity: Mild        Calcification: Mild  Right Femoral:        Minimum Lumen Diameter (mm): 6.5        Diameter perpendicular to MLD (mm): 7.1        Tortuosity: Mild        Calcification: Mild  Left External Iliac:        Minimum Lumen Diameter (mm): 6.9        Diameter perpendicular to MLD (mm): 7.5        Tortuosity: Moderate        Calcification: Mild  Left Common Iliac:         Minimum Lumen Diameter (mm): 4.5        Diameter perpendicular to MLD (mm): 8.0        Tortuosity: Severe        Calcification: Severe  Right External Iliac:        Minimum Lumen Diameter (mm): 5.0        Diameter perpendicular to MLD (mm): 7.4        Tortuosity: Moderate        Calcification: Mild  Right Common Iliac:    Minimum Lumen Diameter (mm): 7.8        Diameter perpendicular to MLD (mm): 8.4        Tortuosity: Moderate        Calcification: Moderate  L Subclavian/Axillary: Not well visualized due to motion artifact        Minimum Lumen Diameter (mm): 5.6      Diameter perpendicular to MLD (mm): 6.2        Tortuosity: Mild        Calcification: Mild  R Subclavian/Axillary:        Minimum Lumen Diameter (mm): 4.3        Diameter perpendicular to MLD (mm): 5.1        Tortuosity: Mild        Calcification: Mild    IMPRESSION:  1. Calcified aortic valve. The calcium score of the aortic valve is 1870.  2. Please see the body of the report for aortic and peripheral access vessel measurements.  3. Dominant large left lower lobe mass associated with multiple left lung pleural nodules worrisome for neoplasm with metastatic disease.  4. 3.2 x 2.6 cm right middle lobe mass as described above also is worrisome for neoplasm.  5. A few pancreatic hypodense nodules. Dedicated contrast enhanced pancreatic MRI is recommended for complete evaluation.  6. Compression fracture deformities of the T5 and T6 vertebral bodies as described above are of indeterminate age.      KHLOE HAINES MD; Attending Cardiologist  This document has been electronically signed.  MONICA HANEY MD; Attending Radiologist  This document has been electronically signed. 2021  9:12AM    < end of copied text >    CATH:  < from: Cardiac Cath Lab - Adult (21 @ 17:21) >  CORONARY VESSELS: The coronary circulation is right dominant.  LM:   --  LM: Normal.  LAD:   --  Proximal LAD: There was a discrete 40 % stenosis.  --  Mid LAD: There was a tubular 25 % stenosis.  --  Distal LAD: Normal.  CX:   --  Proximal circumflex: There was a tubular 20 % stenosis.  --  Mid circumflex: There was a diffuse 50 % stenosis.  RCA:   --  Proximal RCA: There was a diffuse 30 % stenosis.  --  Mid RCA: There was a diffuse 20 % in-stent restenosis through the prior  stent.  --  Distal RCA: Normal.  --  RPDA: Normal.  --  RPLS: Normal.  COMPLICATIONS: There were no complications.  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  Normal left main coronary artery  Right dominant system  Elevated right atrial pressure (mRA 16mmHg with a V wave of 23mmHg)  Moderate post-capillary pulmonary hypertension (sPAP 58mmHg, dPAP 23mmHg,  mPAP 37mmHg)  PCWP = 28mmHg with a V wave of 48mmHg  PAsat = 67.1% // AOsat = 99.2% (room air)  [place] // CI = 6.07l/min // 3.56l/min/m2  DIAGNOSTIC RECOMMENDATIONS: Keep right leg straigh for 4 hours following  removal of sheaths  Continue aggressive medical management of coronary artery disease and  associated risk factors  Closely monitor the patients kidney function  Patient being evaluated by the [facility] valve team for TAVR  Findings discussed with Dr. 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  Normal left main coronary artery  Right dominant system  Elevated right atrial pressure (mRA 16mmHg with a V wave of 23mmHg)  Moderate post-capillary pulmonary hypertension (sPAP 58mmHg, dPAP 23mmHg,  mPAP 37mmHg)  PCWP = 28mmHg with a V wave of 48mmHg  PAsat = 67.1% // AOsat = 99.2% (room air)  [place] // CI = 6.07l/min // 3.56l/min/m2  INTERVENTIONAL RECOMMENDATIONS: Keep right leg straigh for 4 hours  following removal of sheaths  Continue aggressive medical management of coronary artery disease and  associated risk factors  Closely monitor the patients kidney function  Patient being evaluated by the [facility] valve team for TAVR  Findings discussed with Dr. Hodges  Prepared and signed by  Alejandro Johns MD  Signed 2021 18:37:05    < end of copied text >

## 2021-07-15 NOTE — PROGRESS NOTE ADULT - PROBLEM SELECTOR PLAN 1
Pt with JACQUE in the setting of severe diarrhea/decreased po intake. Upon review of [facility] HIE/[employer] last sCr was 1.26 on (6/7/21). On admission  BUN/Cr 79/6.7 mg/dl. K: 6.6 (non hemolyzed). s/p Urgent HD on 7/8/21    SCr improved down to 1.7mg/dl >> 1.3mg/dl >> 1.29mg/dl today  has remained non oliguric to 1.3L in the past 24hrs.   Increase Bumex 2mg IV tid   No indication for further dialysis   please remove Non tunneled cath.   Monitor labs and urine output.   Avoid NSAIDs, ACEI/ARBS, RCA and nephrotoxins.   Dose medications as per eGFR

## 2021-07-15 NOTE — PROGRESS NOTE ADULT - ASSESSMENT
87F PMH CAD w/stents, DM2, Afib (not on AC), Colon Ca (about 25y ago), severe AS, HF , recently started on Entresto, admitted for JACQUE and Metformin-associated lactic acidosis, admitted to MICU s/p urgent HD session but now JACQUE resolving and now undergoing TAVR workup

## 2021-07-15 NOTE — PROGRESS NOTE ADULT - ASSESSMENT
87 year-old woman with known history of atrial fibrillation, previously on AC, but now off due to anemia of unknown etiology, severe aortic stenosis, being evaluated for TAVR, status post CT with contrast, now with JACQUE, hyperkalemia, admitted for urgent HD.    No need for further HD (discussed with nephrology). Okay to remove HD catheter.  Avoid nephrotoxic agents, including Entresto.    Plan for TAVR post left heart cath per Structural Heart Team. Discussed with Alejandro Johns MD.

## 2021-07-15 NOTE — PROGRESS NOTE ADULT - PROBLEM SELECTOR PLAN 1
suspected prerenal/ATN i/s/o decreased PO intake and diarrhea:   - s/p HD x 2 sessions, currently improving w/o HD, morris recently removed, having good urine output   - remove shiley as no further HD planned  - renal dose medications  - renal consult appreciated  - monitor k and phos, replete as needed   - continue bumex 2mg IV bid  - monitor ins and outs

## 2021-07-15 NOTE — PROGRESS NOTE ADULT - PROBLEM SELECTOR PLAN 6
DVT ppx: HSQ  Dispo: [facility]    Above plans discussed with NP Corrie Salas MD  Division of Hospital Medicine  Cell: [phone]  Office: [phone] DVT ppx: [id number]  Dispo: [facility]    Above plans discussed with LAURO Denton  updated son Reji over the phone    Faiza Salas MD  Division of Hospital Medicine  Cell: [phone]  Office: [phone]

## 2021-07-16 NOTE — PROGRESS NOTE ADULT - PROBLEM SELECTOR PLAN 2
- structural heart team following  - s/p diagnostic [facility] 7/14  - appreciate Dr. Johns for further planning on TAVR

## 2021-07-16 NOTE — DIETITIAN INITIAL EVALUATION ADULT. - PERTINENT LABORATORY DATA
7/16L: POCT Gluc: 192  7/16: Na: 131 L, BUN: 32 H, Cr: 1.33 H, Gluc: 168 H, eGFR: 36 L  7/13: HbA1c: 6.7%

## 2021-07-16 NOTE — PROGRESS NOTE ADULT - PROBLEM SELECTOR PLAN 1
suspected prerenal/ATN i/s/o decreased PO intake and diarrhea:   - s/p HD x 2 sessions, currently improving w/o HD, morris recently removed, having good urine output   - s/p shiley removed 7/15  - SCr mildly trending up  - output not great last 48 hrs  - increased bumx to 2mg TID; will do bladder scan and if not retaining and no output by this afternoon, will increase bumex to 3mg TID  - renal dose medications  - renal consult appreciated  - monitor k and phos, replete as needed   - monitor ins and outs

## 2021-07-16 NOTE — PROGRESS NOTE ADULT - SUBJECTIVE AND OBJECTIVE BOX
Patient is a 87y old  Female who presents with a chief complaint of urgent HD (15 Jul 2021 13:49)      SUBJECTIVE / OVERNIGHT EVENTS:  no acute events overnight, vss, afebrile  pt still complains of fullness, hasn't moved much bowel, asking for enema  she feels tired after this morning but okay    ROS:  14 point ROS negative in detail except stated as above    MEDICATIONS  (STANDING):  atorvastatin 40 milliGRAM(s) Oral at bedtime  bisacodyl 5 milliGRAM(s) Oral at bedtime  buMETAnide Injectable 2 milliGRAM(s) IV Push every 8 hours  chlorhexidine 2% Cloths 1 Application(s) Topical daily  dextrose 40% Gel 15 Gram(s) Oral once  dextrose 40% Gel 15 Gram(s) Oral once  dextrose 5%. 1000 milliLiter(s) (50 mL/Hr) IV Continuous <Continuous>  dextrose 5%. 1000 milliLiter(s) (100 mL/Hr) IV Continuous <Continuous>  dextrose 50% Injectable 25 Gram(s) IV Push once  dextrose 50% Injectable 12.5 Gram(s) IV Push once  dextrose 50% Injectable 25 Gram(s) IV Push once  dextrose 50% Injectable 12.5 Gram(s) IV Push once  dextrose 50% Injectable 25 Gram(s) IV Push once  dextrose 50% Injectable 25 Gram(s) IV Push once  ferrous    sulfate 325 milliGRAM(s) Oral daily  glucagon  Injectable 1 milliGRAM(s) IntraMuscular once  glucagon  Injectable 1 milliGRAM(s) IntraMuscular once  heparin   Injectable 5000 Unit(s) SubCutaneous every 8 hours  insulin lispro (ADMELOG) corrective regimen sliding scale   SubCutaneous three times a day before meals  insulin lispro (ADMELOG) corrective regimen sliding scale   SubCutaneous at bedtime  metoprolol succinate ER 25 milliGRAM(s) Oral <User Schedule>  polyethylene glycol 3350 17 Gram(s) Oral daily  senna 2 Tablet(s) Oral at bedtime    MEDICATIONS  (PRN):  artificial  tears Solution 1 Drop(s) Both EYES every 12 hours PRN Dry Eyes  zolpidem 5 milliGRAM(s) Oral at bedtime PRN Insomnia      CAPILLARY BLOOD GLUCOSE      POCT Blood Glucose.: 192 mg/dL (16 Jul 2021 08:06)  POCT Blood Glucose.: 230 mg/dL (15 Jul 2021 21:19)  POCT Blood Glucose.: 182 mg/dL (15 Jul 2021 18:02)  POCT Blood Glucose.: 257 mg/dL (15 Jul 2021 12:31)    I&O's Summary    15 Jul 2021 07:01  -  16 Jul 2021 07:00  --------------------------------------------------------  IN: 1010 mL / OUT: 700 mL / NET: 310 mL        PHYSICAL EXAM:  Vital Signs Last 24 Hrs  T(C): 36.7 (16 Jul 2021 09:10), Max: 36.9 (15 Jul 2021 21:19)  T(F): 98 (16 Jul 2021 09:10), Max: 98.5 (15 Jul 2021 21:19)  HR: 80 (16 Jul 2021 09:10) (68 - 80)  BP: 114/52 (16 Jul 2021 09:10) (105/56 - 115/64)  BP(mean): --  RR: 18 (16 Jul 2021 09:10) (18 - 18)  SpO2: 99% (16 Jul 2021 09:10) (94% - 99%)    GENERAL: NAD, well-developed  HEAD:  Atraumatic, Normocephalic  EYES: EOMI, PERRLA, conjunctiva and sclera clear  NECK: Supple, No JVD  CHEST/LUNG: Clear to auscultation bilaterally; No wheeze  HEART: Regular rate and rhythm; No murmurs, rubs, or gallops  ABDOMEN: Soft, Nontender, Nondistended; Bowel sounds present  EXTREMITIES:  2+ Peripheral Pulses, No clubbing, cyanosis, or edema  NEUROLOGY: AAOx3; non-focal  SKIN: No rashes or lesions    LABS:  personally reviewed                        8.2    8.00  )-----------( 252      ( 16 Jul 2021 06:43 )             26.7     07-16    131<L>  |  90<L>  |  32<H>  ----------------------------<  168<H>  3.6   |  28  |  1.33<H>    Ca    9.4      16 Jul 2021 06:42  Mg     1.9     07-16                RADIOLOGY & ADDITIONAL TESTS:    Imaging Personally Reviewed:    Consultant(s) Notes Reviewed:  cards, structural heart, nephro    Care Discussed with Consultants/Other Providers: Dr. Cortes (nephro)

## 2021-07-16 NOTE — PROGRESS NOTE ADULT - PROBLEM SELECTOR PLAN 5
-currently off pradaxa for anemia. Was told to stop taking it prior to hospitalization for possible GI bleed.  -restart metoprolol      Insomnia   - resume ambien prn (home med) -currently off pradaxa for anemia. Was told to stop taking it prior to hospitalization for possible GI bleed.  -restarted metoprolol

## 2021-07-16 NOTE — DIETITIAN INITIAL EVALUATION ADULT. - CHIEF COMPLAINT
"86 y/o female with PMH CAD w/stents, DM2, Afib (not on AC), Colon Ca (about 25y ago), severe AS, HF, recently started on Entresto, admitted for JACQUE and Metformin-associated lactic acidosis, admitted to MICU s/p urgent HD session but now JACQUE resolving and now undergoing TAVR workup" "88 y/o female with PMH CAD w/stents, DM2, Afib (not on AC), Colon Ca (about 25y ago), severe AS, HF, recently started on Entresto, admitted for JACQUE and Metformin-associated lactic acidosis, admitted to MICU s/p urgent HD session but now JACQUE resolving and now undergoing TAVR workup." Noted pt with pulmonary nodules found on CT scan and possibility of malignancy, pt and son preference to not do further invasive workup at this time.

## 2021-07-16 NOTE — DIETITIAN INITIAL EVALUATION ADULT. - SIGNS/SYMPTOMS
12% weight loss x 2 months (fluid vs. lean body mass?), previously meeting<75% EER>7 days 12% weight loss x 2 months(fluid vs. lean body mass?), previously meeting<75% EER>7 days, mild edema

## 2021-07-16 NOTE — DIETITIAN INITIAL EVALUATION ADULT. - ORAL INTAKE PTA/DIET HISTORY
Pt reports decreased appetite PTA. Pt does not report following a specific diet at home; reports living at home and preparing own meals or having meals delivered that are already pre-prepared ([employer]).  Confirms no known food allergies. Denies Hx of chewing or swallowing issues. Denies micronutrient or nutrient supplement use.

## 2021-07-16 NOTE — DIETITIAN INITIAL EVALUATION ADULT. - PERTINENT MEDS FT
MEDICATIONS  (STANDING):  atorvastatin 40 milliGRAM(s) Oral at bedtime  bisacodyl 5 milliGRAM(s) Oral at bedtime  buMETAnide Injectable 2 milliGRAM(s) IV Push every 8 hours  chlorhexidine 2% Cloths 1 Application(s) Topical daily  dextrose 40% Gel 15 Gram(s) Oral once  dextrose 40% Gel 15 Gram(s) Oral once  dextrose 5%. 1000 milliLiter(s) (50 mL/Hr) IV Continuous <Continuous>  dextrose 5%. 1000 milliLiter(s) (100 mL/Hr) IV Continuous <Continuous>  dextrose 50% Injectable 25 Gram(s) IV Push once  dextrose 50% Injectable 12.5 Gram(s) IV Push once  dextrose 50% Injectable 25 Gram(s) IV Push once  dextrose 50% Injectable 12.5 Gram(s) IV Push once  dextrose 50% Injectable 25 Gram(s) IV Push once  dextrose 50% Injectable 25 Gram(s) IV Push once  ferrous    sulfate 325 milliGRAM(s) Oral daily  glucagon  Injectable 1 milliGRAM(s) IntraMuscular once  glucagon  Injectable 1 milliGRAM(s) IntraMuscular once  heparin   Injectable 5000 Unit(s) SubCutaneous every 8 hours  insulin lispro (ADMELOG) corrective regimen sliding scale   SubCutaneous three times a day before meals  insulin lispro (ADMELOG) corrective regimen sliding scale   SubCutaneous at bedtime  metoprolol succinate ER 25 milliGRAM(s) Oral <User Schedule>  polyethylene glycol 3350 17 Gram(s) Oral daily  senna 2 Tablet(s) Oral at bedtime    MEDICATIONS  (PRN):  artificial  tears Solution 1 Drop(s) Both EYES every 12 hours PRN Dry Eyes  zolpidem 5 milliGRAM(s) Oral at bedtime PRN Insomnia

## 2021-07-16 NOTE — PROGRESS NOTE ADULT - PROBLEM SELECTOR PLAN 1
Pt with JACQUE in the setting of severe diarrhea/decreased po intake. Upon [facility] s[facility] was 1.26 on (6/7/21). On admission  BUN/Cr 79/6.7 mg/dl. K: 6.6 (non hemolyzed). s/p Urgent HD on 7/8/21    SCr improved down to 1.7mg/dl >> 1.3mg/dl >> 1.29>1.33   Permcath removed   continue Bumex 2 mg iv tid > titration per HF team   she was retaining urine today > place morris  non-oliguric.   Diuresis per HF team

## 2021-07-16 NOTE — PROGRESS NOTE ADULT - SUBJECTIVE AND OBJECTIVE BOX
Structural Heart Team    Ms Knight is lying flat in bed and comfortable.  She has no complaints other than constipation and denies chest pain/pressure, sob and dizziness.  There were no acute events overnight.           REVIEW OF SYSTEMS:    CONSTITUTIONAL: No weakness, fevers or chills  EYES/ENT: No visual changes;  No vertigo or throat pain   NECK: No pain or stiffness  RESPIRATORY: No cough, wheezing, hemoptysis; No shortness of breath  CARDIOVASCULAR: No chest pain or palpitations  GASTROINTESTINAL: No abdominal or epigastric pain. No nausea, vomiting, or hematemesis; No diarrhea or constipation. No melena or hematochezia.  GENITOURINARY: No dysuria, frequency or hematuria  NEUROLOGICAL: No numbness or weakness  SKIN: No itching, rashes      Allergies    No Known Allergies    Intolerances      Vital Signs Last 24 Hrs  T(C): 36.7 (16 Jul 2021 09:10), Max: 36.9 (15 Jul 2021 21:19)  T(F): 98 (16 Jul 2021 09:10), Max: 98.5 (15 Jul 2021 21:19)  HR: 80 (16 Jul 2021 09:10) (68 - 80)  BP: 114/52 (16 Jul 2021 09:10) (105/56 - 115/64)  BP(mean): --  RR: 18 (16 Jul 2021 09:10) (18 - 18)  SpO2: 99% (16 Jul 2021 09:10) (94% - 99%)    MEDICATIONS  (STANDING):  atorvastatin 40 milliGRAM(s) Oral at bedtime  bisacodyl 5 milliGRAM(s) Oral at bedtime  buMETAnide Injectable 2 milliGRAM(s) IV Push every 8 hours  chlorhexidine 2% Cloths 1 Application(s) Topical daily  dextrose 40% Gel 15 Gram(s) Oral once  dextrose 40% Gel 15 Gram(s) Oral once  dextrose 5%. 1000 milliLiter(s) (50 mL/Hr) IV Continuous <Continuous>  dextrose 5%. 1000 milliLiter(s) (100 mL/Hr) IV Continuous <Continuous>  dextrose 50% Injectable 25 Gram(s) IV Push once  dextrose 50% Injectable 12.5 Gram(s) IV Push once  dextrose 50% Injectable 25 Gram(s) IV Push once  dextrose 50% Injectable 12.5 Gram(s) IV Push once  dextrose 50% Injectable 25 Gram(s) IV Push once  dextrose 50% Injectable 25 Gram(s) IV Push once  ferrous    sulfate 325 milliGRAM(s) Oral daily  glucagon  Injectable 1 milliGRAM(s) IntraMuscular once  glucagon  Injectable 1 milliGRAM(s) IntraMuscular once  heparin   Injectable 5000 Unit(s) SubCutaneous every 8 hours  insulin lispro (ADMELOG) corrective regimen sliding scale   SubCutaneous three times a day before meals  insulin lispro (ADMELOG) corrective regimen sliding scale   SubCutaneous at bedtime  metoprolol succinate ER 25 milliGRAM(s) Oral <User Schedule>  polyethylene glycol 3350 17 Gram(s) Oral daily  senna 2 Tablet(s) Oral at bedtime      Exam-  General: NAD, WDWN, appropriate affect  Cardiac: S1S2, RRR, III/VI ZAYNAB,   No Gallops/Rubs   Pulm: Clear, no wheezes, rales, or rhonchi, no use of accessory muscles  Gastrointestinal: soft, nontender, nondistended, +bowel sounds  Extremities: 2+ Edema B/L,   No joint pain or swelling +PMSx4  Neuro: A&Ox3, nonfocal                        8.2    8.00  )-----------( 252      ( 16 Jul 2021 06:43 )             26.7   07-16    131<L>  |  90<L>  |  32<H>  ----------------------------<  168<H>  3.6   |  28  |  1.33<H>    Ca    9.4      16 Jul 2021 06:42  Mg     1.9     07-16      I&O's Summary    15 Jul 2021 07:01  -  16 Jul 2021 07:00  --------------------------------------------------------  IN: 1010 mL / OUT: 700 mL / NET: 310 mL    16 Jul 2021 07:01  -  16 Jul 2021 12:03  --------------------------------------------------------  IN: 0 mL / OUT: 450 mL / NET: -450 mL      < from: Cardiac Cath Lab - Adult (07.14.21 @ 17:21) >  CORONARY VESSELS: The coronary circulation is right dominant.  LM:   --  LM: Normal.  LAD:   --  Proximal LAD: There was a discrete 40 % stenosis.  --  Mid LAD: There was a tubular 25 % stenosis.  --  Distal LAD: Normal.  CX:   --  Proximal circumflex: There was a tubular 20 % stenosis.  --  Mid circumflex: There was a diffuse 50 % stenosis.  RCA:   --  Proximal RCA: There was a diffuse 30 % stenosis.  --  Mid RCA: There was a diffuse 20 % in-stent restenosis through the prior  stent.  --  Distal RCA: Normal.  --  RPDA: Normal.  --  RPLS: Normal.  COMPLICATIONS: There were no complications.  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  Normal left main coronary artery    < end of copied text >          Assessment/Plan:  Ms Thomson is an 88y/o female with Severe Aortic Stenosis, Acute Kidney Injury/Metabolic Acidosis, Chronic Diastolic Heart Failure  - TAVR testing completed  - monitor creatinine  - consider enema for constipation  - plan is for TAVR next week with Dr. Johns and Dr. Pruitt likely Thursday or Friday  - discussed plan with pt    QUITA Alexander  [phone]

## 2021-07-16 NOTE — PROGRESS NOTE ADULT - ASSESSMENT
87 year-old woman with known history of atrial fibrillation, previously on AC, but now off due to anemia of unknown etiology, severe aortic stenosis, being evaluated for TAVR, status post CT with contrast, now with JACQUE, hyperkalemia, admitted for urgent HD.    No need for further HD (discussed with nephrology).  Avoid nephrotoxic agents, including Entresto.    Plan for TAVR post left heart cath per Structural Heart Team.    I will be covered by Leslie Vail, DO this weekend. Please call her with any acute cardiac concerns.

## 2021-07-16 NOTE — PROGRESS NOTE ADULT - PROBLEM SELECTOR PLAN 4
- Hold oral hypoglycemics, DC sulfonylureas and metformin  - insulin correction scale  - check fsg qac/qhs  - consistent carb diet

## 2021-07-16 NOTE — PROGRESS NOTE ADULT - SUBJECTIVE AND OBJECTIVE BOX
[facility] Division of Kidney Diseases & Hypertension  FOLLOW UP NOTE  --------------------------------------------------------------------------------  Chief Complaint:    24 hour events/subjective:    post void bladder scan showed 550cc        PAST HISTORY  --------------------------------------------------------------------------------  No significant changes to PMH, PSH, FHx, SHx, unless otherwise noted    ALLERGIES & MEDICATIONS  --------------------------------------------------------------------------------  Allergies    No Known Allergies    Intolerances      Standing Inpatient Medications  atorvastatin 40 milliGRAM(s) Oral at bedtime  bisacodyl 5 milliGRAM(s) Oral at bedtime  buMETAnide Injectable 2 milliGRAM(s) IV Push every 8 hours  chlorhexidine 2% Cloths 1 Application(s) Topical daily  dextrose 40% Gel 15 Gram(s) Oral once  dextrose 40% Gel 15 Gram(s) Oral once  dextrose 5%. 1000 milliLiter(s) IV Continuous <Continuous>  dextrose 5%. 1000 milliLiter(s) IV Continuous <Continuous>  dextrose 50% Injectable 25 Gram(s) IV Push once  dextrose 50% Injectable 12.5 Gram(s) IV Push once  dextrose 50% Injectable 25 Gram(s) IV Push once  dextrose 50% Injectable 25 Gram(s) IV Push once  dextrose 50% Injectable 12.5 Gram(s) IV Push once  dextrose 50% Injectable 25 Gram(s) IV Push once  ferrous    sulfate 325 milliGRAM(s) Oral daily  glucagon  Injectable 1 milliGRAM(s) IntraMuscular once  glucagon  Injectable 1 milliGRAM(s) IntraMuscular once  heparin   Injectable 5000 Unit(s) SubCutaneous every 8 hours  insulin lispro (ADMELOG) corrective regimen sliding scale   SubCutaneous three times a day before meals  insulin lispro (ADMELOG) corrective regimen sliding scale   SubCutaneous at bedtime  metoprolol succinate ER 25 milliGRAM(s) Oral <User Schedule>  polyethylene glycol 3350 17 Gram(s) Oral daily  senna 2 Tablet(s) Oral at bedtime    PRN Inpatient Medications  artificial  tears Solution 1 Drop(s) Both EYES every 12 hours PRN  zolpidem 5 milliGRAM(s) Oral at bedtime PRN    VITALS/PHYSICAL EXAM  --------------------------------------------------------------------------------  T(C): 37.6 (07-16-21 @ 12:52), Max: 37.6 (07-16-21 @ 12:52)  HR: 69 (07-16-21 @ 12:52) (68 - 80)  BP: 108/62 (07-16-21 @ 12:52) (105/56 - 115/64)  RR: 20 (07-16-21 @ 12:52) (18 - 20)  SpO2: 99% (07-16-21 @ 12:52) (94% - 99%)  Wt(kg): --        07-15-21 @ 07:01  -  07-16-21 @ 07:00  --------------------------------------------------------  IN: 1010 mL / OUT: 700 mL / NET: 310 mL    07-16-21 @ 07:01  -  07-16-21 @ 14:12  --------------------------------------------------------  IN: 0 mL / OUT: 450 mL / NET: -450 mL      Physical Exam:  	Gen: NAD, well-appearing  	HEENT: supple neck, clear oropharynx  	Pulm: CTA B/L  	CV: RRR, S1S2; no rub  	Abd: +BS, soft, nontender/nondistended  	: No suprapubic tenderness  	LE: Warm, FROM, no clubbing, intact strength; 2+ edema  	Neuro: No focal deficits, intact gait  	  LABS/STUDIES  --------------------------------------------------------------------------------              8.2    8.00  >-----------<  252      [07-16-21 @ 06:43]              26.7     131  |  90  |  32  ----------------------------<  168      [07-16-21 @ 06:42]  3.6   |  28  |  1.33        Ca     9.4     [07-16-21 @ 06:42]      Mg     1.9     [07-16-21 @ 06:42]            Creatinine Trend:  SCr 1.33 [07-16 @ 06:42]  SCr 1.29 [07-15 @ 06:38]  SCr 1.32 [07-14 @ 06:13]  SCr 1.70 [07-13 @ 06:44]  SCr 1.93 [07-12 @ 06:19]      Urine Creatinine 50      [07-11-21 @ 15:56]  Urine Protein 34      [07-11-21 @ 15:56]  Urine Sodium 54      [07-11-21 @ 15:56]  Urine Urea Nitrogen 297      [07-11-21 @ 20:13]

## 2021-07-16 NOTE — PROGRESS NOTE ADULT - PROBLEM SELECTOR PLAN 6
DVT ppx: HSQ  Dispo: [facility]    Above plans discussed with QUITA Mcknight  updated son Reji over the phone    Faiza Salas MD  Division of Hospital Medicine  Cell: [phone]  Office: [phone]

## 2021-07-16 NOTE — DIETITIAN INITIAL EVALUATION ADULT. - FEEDING ASSISTANCE
1. Recommend nursing to continue to encourage adequate intake and provide feeding assistance at meals as needed

## 2021-07-16 NOTE — DIETITIAN INITIAL EVALUATION ADULT. - OTHER INFO
Upon RD visit, observed pt consumed 100% of breakfast meal today. Pt denies complaints of nausea, vomiting, constipation or diarrhea at this time. Last bowel movement 7/15 per flow sheets. Pt reports she has gotten a few meals here that she has really enjoyed.    Noted pt status post emergent HD x2 but not no longer need HD per Nephrology.    Pt does not statye Upon RD visit, observed pt consumed 100% of breakfast meal today. Pt denies complaints of nausea, vomiting, or diarrhea at this time. Pt endorses some constipation, noted pt ordered for bowel regimen. Last bowel movement 7/15 per flow sheets. Pt reports she has gotten a few meals here that she has really enjoyed.    Noted pt status post emergent HD x2 but not no longer need HD per Nephrology. Pt status post shiley removal on 7/15.    Pt does not state UBW or if she had changes in weight PTA. Noted dosing weight of 163 pounds (7/8/21). Per Hetal RICE, weights as follows: 177 pounds (4/4/19), 185 lbs (5/10/21), 174 pounds (6/7/21), 162 pounds (7/8/21)- dosing weight. Noted pt with a 12% weight loss x 2 months (clinically significant). Per flow sheets, weights range from 162-191 pounds from 7/12-7/15. Weight changes in-house likely secondary to fluid shifts from previous HD and pt still ordered for bumex.    Pt declines nutrition education at this time; pt picked up phone call during RD interview and end of interview was cut short.

## 2021-07-16 NOTE — PROGRESS NOTE ADULT - SUBJECTIVE AND OBJECTIVE BOX
HPI:  Patient seen and examined at bedside on 6 [address].  Creatinine is stable.    Review Of Systems:           Respiratory: No shortness of breath, cough, or wheezing  Cardiovascular: No chest pain or palpitations  10 point review of systems is otherwise negative except as mentioned above        Medications:  artificial  tears Solution 1 Drop(s) Both EYES every 12 hours PRN  atorvastatin 40 milliGRAM(s) Oral at bedtime  bisacodyl 5 milliGRAM(s) Oral at bedtime  buMETAnide Injectable 2 milliGRAM(s) IV Push every 8 hours  chlorhexidine 2% Cloths 1 Application(s) Topical daily  dextrose 40% Gel 15 Gram(s) Oral once  dextrose 40% Gel 15 Gram(s) Oral once  dextrose 5%. 1000 milliLiter(s) IV Continuous <Continuous>  dextrose 5%. 1000 milliLiter(s) IV Continuous <Continuous>  dextrose 50% Injectable 25 Gram(s) IV Push once  dextrose 50% Injectable 12.5 Gram(s) IV Push once  dextrose 50% Injectable 25 Gram(s) IV Push once  dextrose 50% Injectable 25 Gram(s) IV Push once  dextrose 50% Injectable 12.5 Gram(s) IV Push once  dextrose 50% Injectable 25 Gram(s) IV Push once  ferrous    sulfate 325 milliGRAM(s) Oral daily  glucagon  Injectable 1 milliGRAM(s) IntraMuscular once  glucagon  Injectable 1 milliGRAM(s) IntraMuscular once  heparin   Injectable 5000 Unit(s) SubCutaneous every 8 hours  insulin lispro (ADMELOG) corrective regimen sliding scale   SubCutaneous three times a day before meals  insulin lispro (ADMELOG) corrective regimen sliding scale   SubCutaneous at bedtime  metoprolol succinate ER 25 milliGRAM(s) Oral <User Schedule>  polyethylene glycol 3350 17 Gram(s) Oral daily  senna 2 Tablet(s) Oral at bedtime  zolpidem 5 milliGRAM(s) Oral at bedtime PRN    PAST MEDICAL & SURGICAL HISTORY:    Vitals:  T(C): 36.5 (07-16-21 @ 21:17), Max: 37.6 (07-16-21 @ 12:52)  HR: 80 (07-16-21 @ 21:17) (68 - 80)  BP: 111/56 (07-16-21 @ 21:17) (99/51 - 114/52)  BP(mean): --  RR: 20 (07-16-21 @ 21:17) (18 - 20)  SpO2: 97% (07-16-21 @ 21:17) (94% - 99%)  Wt(kg): --  Daily     Daily   I&O's Summary    15 Jul 2021 07:01  -  16 Jul 2021 07:00  --------------------------------------------------------  IN: 1010 mL / OUT: 700 mL / NET: 310 mL    16 Jul 2021 07:01  -  16 Jul 2021 22:49  --------------------------------------------------------  IN: 0 mL / OUT: 1270 mL / NET: -1270 mL        Physical Exam:  Appearance: Normal, well groomed, NAD  Eyes: PERRLA, EOMI, pink conjunctiva, no scleral icterus   HENT: Normal oral mucosa  Cardiovascular: RRR, S1, S2, III/VI systolic murmur at base  Procedural Access Site: Clean, dry, intact, without hematoma  Respiratory: Clear to auscultation bilaterally  Gastrointestinal: Soft, non-tender, non-distended, BS+  Musculoskeletal: No clubbing or joint deformity   Neurologic: No focal weakness  Lymphatic: No lymphadenopathy  Psychiatry: AAOx3 with appropriate mood and affect  Skin: No rashes, ecchymoses, or cyanosis                          8.2    8.00  )-----------( 252      ( 16 Jul 2021 06:43 )             26.7     07-16    131<L>  |  90<L>  |  32<H>  ----------------------------<  168<H>  3.6   |  28  |  1.33<H>    Ca    9.4      16 Jul 2021 06:42  Mg     1.9     07-16      Cardiovascular Diagnostic Testing:  ECG: rate controlled AFib    Echo: critical AS    Cath: PENDING    Imaging: < from: CT Heart without Coronaries w/ IV Cont (07.02.21 @ 08:51) >  FINDINGS:    Non-Coronary:    6 mm hypodense nodule within the right lobe of the thyroid gland. No enlarged axillary, mediastinal lymph nodes. No pleural effusions.    Evaluation of the lungs demonstrate left lower lobe masslike opacity on the part of which measures about 5 x 2.3 cm extending to the left lower lobe hilum with associated left lower lobe volume loss related to some superimposed atelectasis. Multiple left lung pulmonary nodule along the pleural surface measuring up to about 7 mm. Right middle lobe 3.2 x 2.6 cm mass associated with the minor fissure which contains a few small nodules measuring up to 7 mm. The right middle lobe mass has a cystic component along its inferior aspect.    Subcentimeter hypodensity within the liver is too small to characterize. The gallbladder, spleen, adrenal glands are unremarkable. Few pancreatic hypodensities are noted with the largest measuring about 1.5 cm on image 84 of series 19. Bilateral renal cysts.    Urinary bladder is normal. The uterus and adnexa are within normal limits. Status post rectal surgery with postoperative changes. No bowel obstruction or medial air. Appendix is normal.    Degenerative changes of the spine. Moderate to severe loss of height of the T5 and severe loss of height of the T6 vertebral bodies are of indeterminate age.    The heart is enlarged.  The left and right atria are severely enlarged.  There is mitral annular calcification and calcification of the mitral valve leaflets.   There is reduced contrast opacification of the left atrial appendage that resolves on delay imaging suggestive of mixing artifact.    The ascending aorta is mildly calcified. The aortic arch and the descending aorta are severely calcified.   The main pulmonary artery measures approximately 26.4 mm at the level of the ascending aorta.   The right and left pulmonary arteries appear enlarged and measure approximately 29.4 mm and 25.4 mm, respectively.    The aortic valve is calcified. The calcium score of the aortic valve is 1870.    Coronary:  Coronary arterial calcifications are noted; however, this studywas not optimized for evaluation of the coronary arteries.  Please refer to cardiac catheterization performed as part of pre-TAVR work-up.    Aortic Root Measurements    Major aortic annulus diameter (systole, mm): 25.2  Perpendicular minor aortic annulus diameter (systole, mm): 19.1  Aortic annulus perimeter (systole, mm): 76.6  Aortic annulus area (systole, mm2): 438  LVOT minimum diameter (systole, mm): 19.6  LVOT 90 cross (systole, mm): 26.4  LVOT calcification:  Severe  Distance from Aortic annulus to Left Main coronary artery (diastole, mm): 13.2  Distance from Aortic annulus to Right Coronary artery (diastole, mm): 16.7  Sinus of Valsalva diameter, Right coronary (diastole, mm): 29.4  Sinus of Valsalva diameter, Left coronary (diastole, mm): 31.0  Sinus of Valsalva diameter, Non coronary (diastole, mm): 28.3  Diameter at the sinotubular junction (diastole, mm): 26.5 x 26.5  Maximum ascending aorta diameter at 40 mm above annulus (diastole, mm): 32.3  Aortic root angulation (diastole, degrees): 48.4  Aortic root calcification: Moderate-to-severe    Abdominal Aorta:        Minimum lumen diameter (MLD) (mm): 12.8        Diameter perpendicular to MLD (mm): 13.2  Left Femoral:        Minimum Lumen Diameter (mm): 7.2        Diameter perpendicular to MLD (mm): 8.1        Tortuosity: Mild        Calcification: Mild  Right Femoral:        Minimum Lumen Diameter (mm): 6.5        Diameter perpendicular to MLD (mm): 7.1        Tortuosity: Mild        Calcification: Mild  Left External Iliac:        Minimum Lumen Diameter (mm): 6.9        Diameter perpendicular to MLD (mm): 7.5        Tortuosity: Moderate        Calcification: Mild  Left Common Iliac:         Minimum Lumen Diameter (mm): 4.5        Diameter perpendicular to MLD (mm): 8.0        Tortuosity: Severe        Calcification: Severe  Right External Iliac:        Minimum Lumen Diameter (mm): 5.0        Diameter perpendicular to MLD (mm): 7.4        Tortuosity: Moderate        Calcification: Mild  Right Common Iliac:    Minimum Lumen Diameter (mm): 7.8        Diameter perpendicular to MLD (mm): 8.4        Tortuosity: Moderate        Calcification: Moderate  L Subclavian/Axillary: Not well visualized due to motion artifact        Minimum Lumen Diameter (mm): 5.6      Diameter perpendicular to MLD (mm): 6.2        Tortuosity: Mild        Calcification: Mild  R Subclavian/Axillary:        Minimum Lumen Diameter (mm): 4.3        Diameter perpendicular to MLD (mm): 5.1        Tortuosity: Mild        Calcification: Mild    IMPRESSION:  1. Calcified aortic valve. The calcium score of the aortic valve is 1870.  2. Please see the body of the report for aortic and peripheral access vessel measurements.  3. Dominant large left lower lobe mass associated with multiple left lung pleural nodules worrisome for neoplasm with metastatic disease.  4. 3.2 x 2.6 cm right middle lobe mass as described above also is worrisome for neoplasm.  5. A few pancreatic hypodense nodules. Dedicated contrast enhanced pancreatic MRI is recommended for complete evaluation.  6. Compression fracture deformities of the T5 and T6 vertebral bodies as described above are of indeterminate age.      KHLOE HAINES MD; Attending Cardiologist  This document has been electronically signed.  MONICA HANEY MD; Attending Radiologist  This document has been electronically signed. Jul 6 2021  9:12AM    < end of copied text >    CATH:  < from: Cardiac Cath Lab - Adult (07.14.21 @ 17:21) >  CORONARY VESSELS: The coronary circulation is right dominant.  LM:   --  LM: Normal.  LAD:   --  Proximal LAD: There was a discrete 40 % stenosis.  --  Mid LAD: There was a tubular 25 % stenosis.  --  Distal LAD: Normal.  CX:   --  Proximal circumflex: There was a tubular 20 % stenosis.  --  Mid circumflex: There was a diffuse 50 % stenosis.  RCA:   --  Proximal RCA: There was a diffuse 30 % stenosis.  --  Mid RCA: There was a diffuse 20 % in-stent restenosis through the prior  stent.  --  Distal RCA: Normal.  --  RPDA: Normal.  --  RPLS: Normal.  COMPLICATIONS: There were no complications.  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  Normal left main coronary artery  Right dominant system  Elevated right atrial pressure (mRA 16mmHg with a V wave of 23mmHg)  Moderate post-capillary pulmonary hypertension (sPAP 58mmHg, dPAP 23mmHg,  mPAP 37mmHg)  PCWP = 28mmHg with a V wave of 48mmHg  PAsat = 67.1% // AOsat = 99.2% (room air)  [place] // CI = 6.07l/min // 3.56l/min/m2  DIAGNOSTIC RECOMMENDATIONS: Keep right leg straigh for 4 hours following  removal of sheaths  Continue aggressive medical management of coronary artery disease and  associated risk factors  Closely monitor the patients kidney function  Patient being evaluated by the [facility] valve team for TAVR  Findings discussed with Dr. 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  Normal left main coronary artery  Right dominant system  Elevated right atrial pressure (mRA 16mmHg with a V wave of 23mmHg)  Moderate post-capillary pulmonary hypertension (sPAP 58mmHg, dPAP 23mmHg,  mPAP 37mmHg)  PCWP = 28mmHg with a V wave of 48mmHg  PAsat = 67.1% // AOsat = 99.2% (room air)  [place] // CI = 6.07l/min // 3.56l/min/m2  INTERVENTIONAL RECOMMENDATIONS: Keep right leg straigh for 4 hours  following removal of sheaths  Continue aggressive medical management of coronary artery disease and  associated risk factors  Closely monitor the patients kidney function  Patient being evaluated by the [facility] valve team for TAVR  Findings discussed with Dr. Hodges  Prepared and signed by  Alejandro Johns MD  Signed 07/14/2021 18:37:05    < end of copied text >

## 2021-07-16 NOTE — DIETITIAN INITIAL EVALUATION ADULT. - ADD RECOMMEND
2. Monitor diet, PO intake, GI status, weight, labs, skin integrity 3. Continue current diet as tolerated 4. Honor pt food preferences to promote adequate oral intake while in-house 5. Re-assess need for oral nutrition supplement upon follow up 6. Malnutrition notification placed in chart

## 2021-07-17 NOTE — PROGRESS NOTE ADULT - PROBLEM SELECTOR PLAN 5
-currently off pradaxa for anemia. Was told to stop taking it prior to hospitalization for possible GI bleed.  -restarted metoprolol

## 2021-07-17 NOTE — PROGRESS NOTE ADULT - PROBLEM SELECTOR PLAN 2
- structural heart team following  - s/p diagnostic [facility] 7/14  - appreciate Dr. Johns for further planning on TAVR - structural heart team following  - s/p diagnostic [facility] 7/14  - appreciate Dr. Johns for further planning on TAVR next week

## 2021-07-17 NOTE — PROGRESS NOTE ADULT - PROBLEM SELECTOR PLAN 6
DVT ppx: HSQ  Dispo: [facility]    Above plans discussed with QUITA Mcknight  updated son Reji over the phone    Faiza Salas MD  Division of Hospital Medicine  Cell: [phone]  Office: [phone] DVT ppx: HSQ  Dispo: [facility]

## 2021-07-17 NOTE — PROGRESS NOTE ADULT - NUTRITIONAL ASSESSMENT
This patient has been assessed with a concern for Malnutrition and has been determined to have a diagnosis/diagnoses of Moderate protein-calorie malnutrition.    This patient is being managed with:   Diet Consistent Carbohydrate w/Evening Snack-  Entered: Jul 17 2021  1:17PM

## 2021-07-17 NOTE — PROGRESS NOTE ADULT - PROBLEM SELECTOR PLAN 1
suspected prerenal/ATN i/s/o decreased PO intake and diarrhea:   - s/p HD x 2 sessions, currently improving w/o HD, morris recently removed, having good urine output   - s/p shiley removed 7/15  - SCr mildly trending up  - output not great last 48 hrs  - increased bumx to 2mg TID; will do bladder scan and if not retaining and no output by this afternoon, will increase bumex to 3mg TID  - renal dose medications  - renal consult appreciated  - monitor k and phos, replete as needed   - monitor ins and outs suspected prerenal/ATN i/s/o decreased PO intake and diarrhea:   - resolviing  - s/p HD x 2 sessions, currently improving w/o HD, morris recently removed, having good urine output   - s/p fidelia removed 7/15  - SCr mildly trending up  - c/w bumex to 2mg TID  - renal dose medications  - renal consult appreciated  - monitor k and phos, replete as needed   - monitor ins and outs. -2390  -Creatinine Trend: 1.12<--, 1.33<--, 1.29<--, 1.32<--, 1.70<--, 1.93<--

## 2021-07-17 NOTE — PROGRESS NOTE ADULT - PROBLEM SELECTOR PLAN 4
- Hold oral hypoglycemics, DC sulfonylureas and metformin  - insulin correction scale  - check fsg qac/qhs  - consistent carb diet - Hold oral hypoglycemics, DC sulfonylureas and metformin  - insulin correction scale  - check fsg qac/qhs  - consistent carb diet  - FS above goal  - start lantus 6units QHS

## 2021-07-17 NOTE — PROGRESS NOTE ADULT - SUBJECTIVE AND OBJECTIVE BOX
Andre Reyes, M.D.  Pager: [phone]  Office: [phone]    Patient is a 87y old  Female who presents with a chief complaint of urgent HD (16 Jul 2021 14:11)          SUBJECTIVE / OVERNIGHT EVENTS:    No acute overnight events.    ROS: (  ) Fever, (  )Chills,  (  )Nausea/Vomiting, (  ) Cough, (  )Shortness of breath, (  )Chest Pain    MEDICATIONS  (STANDING):  atorvastatin 40 milliGRAM(s) Oral at bedtime  bisacodyl 5 milliGRAM(s) Oral at bedtime  buMETAnide Injectable 2 milliGRAM(s) IV Push every 8 hours  chlorhexidine 2% Cloths 1 Application(s) Topical daily  dextrose 40% Gel 15 Gram(s) Oral once  dextrose 40% Gel 15 Gram(s) Oral once  dextrose 5%. 1000 milliLiter(s) (50 mL/Hr) IV Continuous <Continuous>  dextrose 5%. 1000 milliLiter(s) (100 mL/Hr) IV Continuous <Continuous>  dextrose 50% Injectable 25 Gram(s) IV Push once  dextrose 50% Injectable 12.5 Gram(s) IV Push once  dextrose 50% Injectable 25 Gram(s) IV Push once  dextrose 50% Injectable 25 Gram(s) IV Push once  dextrose 50% Injectable 12.5 Gram(s) IV Push once  dextrose 50% Injectable 25 Gram(s) IV Push once  ferrous    sulfate 325 milliGRAM(s) Oral daily  glucagon  Injectable 1 milliGRAM(s) IntraMuscular once  glucagon  Injectable 1 milliGRAM(s) IntraMuscular once  heparin   Injectable 5000 Unit(s) SubCutaneous every 8 hours  insulin glargine Injectable (LANTUS) 6 Unit(s) SubCutaneous at bedtime  insulin lispro (ADMELOG) corrective regimen sliding scale   SubCutaneous three times a day before meals  insulin lispro (ADMELOG) corrective regimen sliding scale   SubCutaneous at bedtime  metoprolol succinate ER 25 milliGRAM(s) Oral <User Schedule>  polyethylene glycol 3350 17 Gram(s) Oral daily  senna 2 Tablet(s) Oral at bedtime    MEDICATIONS  (PRN):  artificial  tears Solution 1 Drop(s) Both EYES every 12 hours PRN Dry Eyes  zolpidem 5 milliGRAM(s) Oral at bedtime PRN Insomnia          T(C): 36.6 (07-17 @ 13:00), Max: 36.7 (07-17 @ 01:27)   HR: 69   BP: 126/64   RR: 18   SpO2: 97%    PHYSICAL EXAM:    CONSTITUTIONAL: NAD, well-developed, well-groomed  EYES: PERRLA; conjunctiva and sclera clear  ENMT: Moist oral mucosa, no pharyngeal injection or exudates; normal dentition  NECK: Supple, no palpable masses; no thyromegaly  RESPIRATORY: Normal respiratory effort; lungs are clear to auscultation bilaterally  CARDIOVASCULAR: Regular rate and rhythm, normal S1 and S2, no murmur/rub/gallop; No lower extremity edema; Peripheral pulses are 2+ bilaterally  ABDOMEN: Nontender to palpation, normoactive bowel sounds, no rebound/guarding; No hepatosplenomegaly  MUSCULOSKELETAL:  Normal gait; no clubbing or cyanosis of digits; no joint swelling or tenderness to palpation  PSYCH: A+O to person, place, and time; affect appropriate  NEUROLOGY: CN 2-12 are intact and symmetric; no gross sensory deficits   SKIN: No rashes; no palpable lesions      LABS:                        8.3    7.02  )-----------( 259      ( 17 Jul 2021 06:23 )             27.6      07-17    133<L>  |  90<L>  |  28<H>  ----------------------------<  158<H>  3.4<L>   |  29  |  1.12    Ca    9.2      17 Jul 2021 06:23  Mg     1.9     07-16         CAPILLARY BLOOD GLUCOSE      POCT Blood Glucose.: 190 mg/dL (17 Jul 2021 17:19)  POCT Blood Glucose.: 343 mg/dL (17 Jul 2021 12:49)  POCT Blood Glucose.: 200 mg/dL (17 Jul 2021 08:56)  POCT Blood Glucose.: 272 mg/dL (16 Jul 2021 21:14)      RADIOLOGY & ADDITIONAL TESTS:    Imaging Personally Reviewed:  Consultant(s) Notes Reviewed:    Care Discussed with Consultants/Other Providers:   Andre Reyes, M.D.  Pager: [phone]  Office: [phone]    Patient is a 87y old  Female who presents with a chief complaint of urgent HD (16 Jul 2021 14:11)          SUBJECTIVE / OVERNIGHT EVENTS:    No acute overnight events.  no new complaint  ROS: ( - ) Fever, (-  )Chills,  (  -)Nausea/Vomiting, ( - ) Cough, ( - )Shortness of breath, ( - )Chest Pain    MEDICATIONS  (STANDING):  atorvastatin 40 milliGRAM(s) Oral at bedtime  bisacodyl 5 milliGRAM(s) Oral at bedtime  buMETAnide Injectable 2 milliGRAM(s) IV Push every 8 hours  chlorhexidine 2% Cloths 1 Application(s) Topical daily  dextrose 40% Gel 15 Gram(s) Oral once  dextrose 40% Gel 15 Gram(s) Oral once  dextrose 5%. 1000 milliLiter(s) (50 mL/Hr) IV Continuous <Continuous>  dextrose 5%. 1000 milliLiter(s) (100 mL/Hr) IV Continuous <Continuous>  dextrose 50% Injectable 25 Gram(s) IV Push once  dextrose 50% Injectable 12.5 Gram(s) IV Push once  dextrose 50% Injectable 25 Gram(s) IV Push once  dextrose 50% Injectable 25 Gram(s) IV Push once  dextrose 50% Injectable 12.5 Gram(s) IV Push once  dextrose 50% Injectable 25 Gram(s) IV Push once  ferrous    sulfate 325 milliGRAM(s) Oral daily  glucagon  Injectable 1 milliGRAM(s) IntraMuscular once  glucagon  Injectable 1 milliGRAM(s) IntraMuscular once  heparin   Injectable 5000 Unit(s) SubCutaneous every 8 hours  insulin glargine Injectable (LANTUS) 6 Unit(s) SubCutaneous at bedtime  insulin lispro (ADMELOG) corrective regimen sliding scale   SubCutaneous three times a day before meals  insulin lispro (ADMELOG) corrective regimen sliding scale   SubCutaneous at bedtime  metoprolol succinate ER 25 milliGRAM(s) Oral <User Schedule>  polyethylene glycol 3350 17 Gram(s) Oral daily  senna 2 Tablet(s) Oral at bedtime    MEDICATIONS  (PRN):  artificial  tears Solution 1 Drop(s) Both EYES every 12 hours PRN Dry Eyes  zolpidem 5 milliGRAM(s) Oral at bedtime PRN Insomnia          T(C): 36.6 (07-17 @ 13:00), Max: 36.7 (07-17 @ 01:27)   HR: 69   BP: 126/64   RR: 18   SpO2: 97%    PHYSICAL EXAM:    CONSTITUTIONAL: NAD, well-developed, well-groomed  EYES: PERRLA; conjunctiva and sclera clear  ENMT: Moist oral mucosa, no pharyngeal injection or exudates; normal dentition  NECK: Supple, no palpable masses; no thyromegaly  RESPIRATORY: Normal respiratory effort; lungs are clear to auscultation bilaterally  CARDIOVASCULAR: Regular rate and rhythm, normal S1 and S2, + murmur; No lower extremity edema; Peripheral pulses are 2+ bilaterally  ABDOMEN: Nontender to palpation, normoactive bowel sounds, no rebound/guarding; No hepatosplenomegaly  MUSCULOSKELETAL:  Normal gait; no clubbing or cyanosis of digits; no joint swelling or tenderness to palpation  PSYCH: A+O to person, place, and time; affect appropriate  NEUROLOGY: CN 2-12 are intact and symmetric; no gross sensory deficits   SKIN: No rashes; no palpable lesions      LABS:                        8.3    7.02  )-----------( 259      ( 17 Jul 2021 06:23 )             27.6      07-17    133<L>  |  90<L>  |  28<H>  ----------------------------<  158<H>  3.4<L>   |  29  |  1.12    Ca    9.2      17 Jul 2021 06:23  Mg     1.9     07-16         CAPILLARY BLOOD GLUCOSE      POCT Blood Glucose.: 190 mg/dL (17 Jul 2021 17:19)  POCT Blood Glucose.: 343 mg/dL (17 Jul 2021 12:49)  POCT Blood Glucose.: 200 mg/dL (17 Jul 2021 08:56)  POCT Blood Glucose.: 272 mg/dL (16 Jul 2021 21:14)      RADIOLOGY & ADDITIONAL TESTS:    Imaging Personally Reviewed:  Consultant(s) Notes Reviewed:    Care Discussed with Consultants/Other Providers:

## 2021-07-18 NOTE — PROGRESS NOTE ADULT - PROBLEM SELECTOR PLAN 5
-currently off pradaxa for anemia. Was told to stop taking it prior to hospitalization for possible GI bleed.  -restarted metoprolol - Discussed incidental CT scan findings of pulmonary nodules and possibility of malignancy with patient's son Reji. His and patient's preference is to not undergo any sort of invasive work-up and to not seek further treatment. Will hold off on calling pulmonary. She does not have a pulmonologist that she is active following with, nor does she remember the person she saw a few years ago.  - per outpatient notes, had this:  CT chest 10/30/19:   pulmonary nodule right middle lob 2.4x 1.6x 2.9  platelike opacity in the left lower lobe, areas of masslike nodularity.- recommended PET/CT to assess for associated metabolic activity   - CT also w/ pancreatic lesion for which a nonemergent contrast enhanced abdominal MRI may be considered for further evaluation as outpatient if family wishes to pursue diagnosis

## 2021-07-18 NOTE — PROGRESS NOTE ADULT - PROBLEM SELECTOR PLAN 3
- Discussed incidental CT scan findings of pulmonary nodules and possibility of malignancy with patient's son Reji. His and patient's preference is to not undergo any sort of invasive work-up and to not seek further treatment. Will hold off on calling pulmonary. She does not have a pulmonologist that she is active following with, nor does she remember the person she saw a few years ago.  - per outpatient notes, had this:  CT chest 10/30/19:   pulmonary nodule right middle lob 2.4x 1.6x 2.9  platelike opacity in the left lower lobe, areas of masslike nodularity.- recommended PET/CT to assess for associated metabolic activity   - CT also w/ pancreatic lesion for which a nonemergent contrast enhanced abdominal MRI may be considered for further evaluation as outpatient if family wishes to pursue diagnosis - Hold oral hypoglycemics, DC sulfonylureas and metformin  - insulin correction scale  - check fsg qac/qhs  - consistent carb diet  - FS acceptable  - c/w lantus 6units QHS

## 2021-07-18 NOTE — PROGRESS NOTE ADULT - PROBLEM SELECTOR PLAN 1
suspected prerenal/ATN i/s/o decreased PO intake and diarrhea:   - resolviing  - s/p HD x 2 sessions, currently improving w/o HD, morris recently removed, having good urine output   - s/p fidelia removed 7/15  - SCr mildly trending up  - c/w bumex to 2mg TID  - renal dose medications  - renal consult appreciated  - monitor k and phos, replete as needed   - monitor ins and outs. -[id number]  -Creatinine Trend: 1.12<--, 1.33<--, 1.29<--, 1.32<--, 1.70<--, 1.93<-- - structural heart team following  - s/p diagnostic [facility] 7/14  - appreciate Dr. Johns for further planning on TAVR next week

## 2021-07-18 NOTE — PROGRESS NOTE ADULT - PROBLEM SELECTOR PLAN 4
- Hold oral hypoglycemics, DC sulfonylureas and metformin  - insulin correction scale  - check fsg qac/qhs  - consistent carb diet  - FS above goal  - start lantus 6units QHS -currently off pradaxa for anemia. Was told to stop taking it prior to hospitalization for possible GI bleed.  -restarted metoprolol

## 2021-07-18 NOTE — PROGRESS NOTE ADULT - SUBJECTIVE AND OBJECTIVE BOX
Andre Reyes, M.D.  Pager: [phone]  Office: [phone]    Patient is a 87y old  Female who presents with a chief complaint of urgent HD (17 Jul 2021 16:08)          SUBJECTIVE / OVERNIGHT EVENTS:    No acute overnight events.    ROS: (  ) Fever, (  )Chills,  (  )Nausea/Vomiting, (  ) Cough, (  )Shortness of breath, (  )Chest Pain    MEDICATIONS  (STANDING):  atorvastatin 40 milliGRAM(s) Oral at bedtime  bisacodyl 5 milliGRAM(s) Oral at bedtime  buMETAnide Injectable 2 milliGRAM(s) IV Push every 8 hours  chlorhexidine 2% Cloths 1 Application(s) Topical daily  dextrose 40% Gel 15 Gram(s) Oral once  dextrose 40% Gel 15 Gram(s) Oral once  dextrose 5%. 1000 milliLiter(s) (50 mL/Hr) IV Continuous <Continuous>  dextrose 5%. 1000 milliLiter(s) (100 mL/Hr) IV Continuous <Continuous>  dextrose 50% Injectable 25 Gram(s) IV Push once  dextrose 50% Injectable 25 Gram(s) IV Push once  dextrose 50% Injectable 12.5 Gram(s) IV Push once  dextrose 50% Injectable 25 Gram(s) IV Push once  dextrose 50% Injectable 12.5 Gram(s) IV Push once  dextrose 50% Injectable 25 Gram(s) IV Push once  ferrous    sulfate 325 milliGRAM(s) Oral daily  glucagon  Injectable 1 milliGRAM(s) IntraMuscular once  glucagon  Injectable 1 milliGRAM(s) IntraMuscular once  heparin   Injectable 5000 Unit(s) SubCutaneous every 8 hours  insulin glargine Injectable (LANTUS) 6 Unit(s) SubCutaneous at bedtime  insulin lispro (ADMELOG) corrective regimen sliding scale   SubCutaneous three times a day before meals  insulin lispro (ADMELOG) corrective regimen sliding scale   SubCutaneous at bedtime  metoprolol succinate ER 25 milliGRAM(s) Oral <User Schedule>  polyethylene glycol 3350 17 Gram(s) Oral daily  senna 2 Tablet(s) Oral at bedtime    MEDICATIONS  (PRN):  artificial  tears Solution 1 Drop(s) Both EYES every 12 hours PRN Dry Eyes  zolpidem 5 milliGRAM(s) Oral at bedtime PRN Insomnia          T(C): 36.2 (07-18 @ 12:41), Max: 36.6 (07-18 @ 05:00)   HR: 70   BP: 146/81   RR: 20   SpO2: 100%    PHYSICAL EXAM:    CONSTITUTIONAL: NAD, well-developed, well-groomed  EYES: PERRLA; conjunctiva and sclera clear  ENMT: Moist oral mucosa, no pharyngeal injection or exudates; normal dentition  NECK: Supple, no palpable masses; no thyromegaly  RESPIRATORY: Normal respiratory effort; lungs are clear to auscultation bilaterally  CARDIOVASCULAR: Regular rate and rhythm, normal S1 and S2, no murmur/rub/gallop; No lower extremity edema; Peripheral pulses are 2+ bilaterally  ABDOMEN: Nontender to palpation, normoactive bowel sounds, no rebound/guarding; No hepatosplenomegaly  MUSCULOSKELETAL:  Normal gait; no clubbing or cyanosis of digits; no joint swelling or tenderness to palpation  PSYCH: A+O to person, place, and time; affect appropriate  NEUROLOGY: CN 2-12 are intact and symmetric; no gross sensory deficits   SKIN: No rashes; no palpable lesions      LABS:                        8.3    7.02  )-----------( 259      ( 17 Jul 2021 06:23 )             27.6      07-18    131<L>  |  91<L>  |  26<H>  ----------------------------<  148<H>  4.1   |  29  |  0.91    Ca    9.9      18 Jul 2021 07:32  Phos  2.6     07-18  Mg     1.7     07-18         CAPILLARY BLOOD GLUCOSE      POCT Blood Glucose.: 242 mg/dL (18 Jul 2021 12:23)  POCT Blood Glucose.: 164 mg/dL (18 Jul 2021 08:55)  POCT Blood Glucose.: 326 mg/dL (17 Jul 2021 21:10)  POCT Blood Glucose.: 190 mg/dL (17 Jul 2021 17:19)      RADIOLOGY & ADDITIONAL TESTS:    Imaging Personally Reviewed:  Consultant(s) Notes Reviewed:    Care Discussed with Consultants/Other Providers:   Andre Reyes, M.D.  Pager: [phone]  Office: [phone]    Patient is a 87y old  Female who presents with a chief complaint of urgent HD (17 Jul 2021 16:08)          SUBJECTIVE / OVERNIGHT EVENTS:    No acute overnight events.  no new complaints  ROS: ( - ) Fever, ( - )Chills,  (  -)Nausea/Vomiting, ( - ) Cough, ( - )Shortness of breath, ( - )Chest Pain    MEDICATIONS  (STANDING):  atorvastatin 40 milliGRAM(s) Oral at bedtime  bisacodyl 5 milliGRAM(s) Oral at bedtime  buMETAnide Injectable 2 milliGRAM(s) IV Push every 8 hours  chlorhexidine 2% Cloths 1 Application(s) Topical daily  dextrose 40% Gel 15 Gram(s) Oral once  dextrose 40% Gel 15 Gram(s) Oral once  dextrose 5%. 1000 milliLiter(s) (50 mL/Hr) IV Continuous <Continuous>  dextrose 5%. 1000 milliLiter(s) (100 mL/Hr) IV Continuous <Continuous>  dextrose 50% Injectable 25 Gram(s) IV Push once  dextrose 50% Injectable 25 Gram(s) IV Push once  dextrose 50% Injectable 12.5 Gram(s) IV Push once  dextrose 50% Injectable 25 Gram(s) IV Push once  dextrose 50% Injectable 12.5 Gram(s) IV Push once  dextrose 50% Injectable 25 Gram(s) IV Push once  ferrous    sulfate 325 milliGRAM(s) Oral daily  glucagon  Injectable 1 milliGRAM(s) IntraMuscular once  glucagon  Injectable 1 milliGRAM(s) IntraMuscular once  heparin   Injectable 5000 Unit(s) SubCutaneous every 8 hours  insulin glargine Injectable (LANTUS) 6 Unit(s) SubCutaneous at bedtime  insulin lispro (ADMELOG) corrective regimen sliding scale   SubCutaneous three times a day before meals  insulin lispro (ADMELOG) corrective regimen sliding scale   SubCutaneous at bedtime  metoprolol succinate ER 25 milliGRAM(s) Oral <User Schedule>  polyethylene glycol 3350 17 Gram(s) Oral daily  senna 2 Tablet(s) Oral at bedtime    MEDICATIONS  (PRN):  artificial  tears Solution 1 Drop(s) Both EYES every 12 hours PRN Dry Eyes  zolpidem 5 milliGRAM(s) Oral at bedtime PRN Insomnia          T(C): 36.2 (07-18 @ 12:41), Max: 36.6 (07-18 @ 05:00)   HR: 70   BP: 146/81   RR: 20   SpO2: 100%    PHYSICAL EXAM:    CONSTITUTIONAL: NAD, well-developed, well-groomed  EYES: PERRLA; conjunctiva and sclera clear  ENMT: Moist oral mucosa, no pharyngeal injection or exudates; normal dentition  NECK: Supple, no palpable masses; no thyromegaly  RESPIRATORY: Normal respiratory effort; lungs are clear to auscultation bilaterally  CARDIOVASCULAR: Regular rate and rhythm, normal S1 and S2, + murmur; No lower extremity edema; Peripheral pulses are 2+ bilaterally  ABDOMEN: Nontender to palpation, normoactive bowel sounds, no rebound/guarding; No hepatosplenomegaly  MUSCULOSKELETAL:  Normal gait; no clubbing or cyanosis of digits; no joint swelling or tenderness to palpation  PSYCH: A+O to person, place, and time; affect appropriate  NEUROLOGY: CN 2-12 are intact and symmetric; no gross sensory deficits   SKIN: No rashes; no palpable lesions      LABS:                        8.3    7.02  )-----------( 259      ( 17 Jul 2021 06:23 )             27.6      07-18    131<L>  |  91<L>  |  26<H>  ----------------------------<  148<H>  4.1   |  29  |  0.91    Ca    9.9      18 Jul 2021 07:32  Phos  2.6     07-18  Mg     1.7     07-18         CAPILLARY BLOOD GLUCOSE      POCT Blood Glucose.: 242 mg/dL (18 Jul 2021 12:23)  POCT Blood Glucose.: 164 mg/dL (18 Jul 2021 08:55)  POCT Blood Glucose.: 326 mg/dL (17 Jul 2021 21:10)  POCT Blood Glucose.: 190 mg/dL (17 Jul 2021 17:19)      RADIOLOGY & ADDITIONAL TESTS:    Imaging Personally Reviewed:  Consultant(s) Notes Reviewed:    Care Discussed with Consultants/Other Providers:

## 2021-07-18 NOTE — PROGRESS NOTE ADULT - PROBLEM SELECTOR PLAN 2
- structural heart team following  - s/p diagnostic [facility] 7/14  - appreciate Dr. Johns for further planning on TAVR next week suspected prerenal/ATN i/s/o decreased PO intake and diarrhea:   - resolved  - s/p HD x 2 sessions, currently improving w/o HD, morris recently removed, having good urine output   - s/p fidelia removed 7/15  - c/w bumex to 2mg TID  - renal dose medications  - renal consult appreciated  - monitor k and phos, replete as needed   - monitor ins and outs. --1180  - Creatinine Trend: 0.91<--, 1.12<--, 1.33<--, 1.29<--, 1.32<--, 1.70<--

## 2021-07-19 NOTE — PROGRESS NOTE ADULT - ASSESSMENT
87 year-old woman with known history of atrial fibrillation, previously on AC, but now off due to anemia of unknown etiology, severe aortic stenosis, being evaluated for TAVR, status post CT with contrast, now with JACQUE, hyperkalemia, admitted for urgent HD.    No need for further HD (discussed with nephrology). Continue IV loop diuretics. Keep O > I, K > 4.0, Mag > 2.0.    Avoid nephrotoxic agents, including Entresto.    Plan for TAVR post left heart cath per Structural Heart Team.

## 2021-07-19 NOTE — PROGRESS NOTE ADULT - PROBLEM SELECTOR PLAN 1
Pt with JACQUE in the setting of severe diarrhea/decreased po intake. Upon review of [facility] HIE/[employer] last sCr was 1.26 on (6/7/21). On admission  BUN/Cr 79/6.7 mg/dl. K: 6.6 (non hemolyzed). s/p Urgent HD on 7/8/21.     SCr improved now at baseline - 0.9mg/dl  has remained non oliguric to 1.5L in the past 24hrs.   c/w bumex 2mg tid  No indication for further dialysis   Monitor labs and urine output.   Avoid NSAIDs, ACEI/ARBS, RCA and nephrotoxins.   Dose medications as per eGFR Pt with JACQUE in the setting of severe diarrhea/decreased po intake. Upon review of [facility] HIE/[employer] last sCr was 1.26 on (6/7/21). On admission  BUN/Cr 79/6.7 mg/dl. K: 6.6 (non hemolyzed). s/p Urgent HD on 7/8/21.     SCr improved now at baseline - 0.9mg/dl  has remained non oliguric to 1.5L in the past 24hrs.   c/w bumex 2mg tid  No indication for further dialysis   Monitor labs and urine output.   Avoid NSAIDs, ACEI/ARBS, RCA and nephrotoxins.   Dose medications as per eGFR    Hyponatremia: Likely hypervolemic  Fluid restriction   Diuresis

## 2021-07-19 NOTE — PROGRESS NOTE ADULT - PROBLEM SELECTOR PLAN 4
-currently off Pradaxa for anemia. Was told to stop taking it prior to hospitalization for possible GI bleed.  -Continue toprolXL

## 2021-07-19 NOTE — CHART NOTE - NSCHARTNOTEFT_GEN_A_CORE
Nutrition Follow Up Note  Patient seen for malnutrition follow up     As per chart: "Pt 87F PMH CAD w/stents, DM2, Afib (not on AC), Colon Ca (about 25y ago), severe AS, HF , recently started on Entresto, admitted for JACQUE and Metformin-associated lactic acidosis, admitted to MICU S/P urgent HD session but now JACQUE resolving, no indication for further dialysis, and now undergoing TAVR workup."    Source: [x] Patient       [x] EMR        [] RN        [] Family at bedside       [] Other:    -If unable to interview patient: [] Trach/Vent/BiPAP  [] Disoriented/confused/inappropriate to interview as per chart     Pt reports fair appetite and PO intake. Noted ~50% PO intake as per breakfast tray at bedside. Offered nutritional supplement - pt refused. Denies difficulty chewing/swallowing. Pt denies nausea, vomiting, diarrhea, or constipation, reports last BM today (). Provided recommendations to optimize PO and protein intake. Pt denies having further questions/concerns about diet and nutrition - made aware RD remains available.     Diet Order:   Diet, Consistent Carbohydrate w/Evening Snack (-)    Weights:   Daily Weight in k.4 (), Weight in k (), Weight in k.9 (07-15), Weight in k.7 (), Weight in k () -?accuracy of weight fluctuations as pt with edema.     Nutritionally Pertinent MEDICATIONS  (STANDING):  atorvastatin  bisacodyl  buMETAnide Injectable  dextrose 40% Gel  dextrose 40% Gel  dextrose 5%.  dextrose 5%.  dextrose 50% Injectable  dextrose 50% Injectable  dextrose 50% Injectable  dextrose 50% Injectable  dextrose 50% Injectable  dextrose 50% Injectable  ferrous    sulfate  glucagon  Injectable  glucagon  Injectable  insulin glargine Injectable (LANTUS)  insulin lispro (ADMELOG) corrective regimen sliding scale  insulin lispro (ADMELOG) corrective regimen sliding scale  metoprolol succinate ER  polyethylene glycol 3350  senna    Pertinent Labs: () Na 130<L>, BUN 30<H>, Cr 0.95, <H>, K+ 3.9    A1C with Estimated Average Glucose Result: 6.7 % ()    Finger Sticks:  POCT Blood Glucose.: 141 mg/dL ( @ 08:25)  POCT Blood Glucose.: 291 mg/dL ( @ 22:01)  POCT Blood Glucose.: 239 mg/dL ( @ 18:07)  POCT Blood Glucose.: 242 mg/dL ( @ 12:23)    Skin: no noted pressure injuries as per documentation.    Edema as per flow sheets: +1 generalized and +2 chino. foot, ankle, and leg (previously +1 dependent edema).     Estimated Needs:   [x] no change since previous assessment  [] recalculated:     Previous Nutrition Diagnosis: Malnutrition; moderate     Nutrition Diagnosis is: [x] ongoing - being addressed with PO intake encouragement.     New Nutrition Diagnosis: [x] Not applicable    Nutrition Care Plan:  [x] In Progress    Nutrition Interventions:     Education Provided:       [x] Yes  [] No    Recommendations:      1. Continue Consistent Carbohydrate with snack diet. Will continue to monitor as able and adjust as needed.   2. Encourage PO intake and provide food preferences (obtained at this time).   3. Consider Multivitamin if medically feasible to optimize nutrient intake.   4. Provided recommendations to optimize PO and protein intake.   5. Continue to obtain weights as able to identify changes if any.     Monitoring and Evaluation:   Continue to monitor nutritional intake, tolerance to diet prescription, weights, labs, skin integrity    RD remains available upon request and will follow up per protocol  Ro Steiner MS, RDN CDN #[phone]

## 2021-07-19 NOTE — PROGRESS NOTE ADULT - PROBLEM SELECTOR PLAN 1
- structural heart team following  - s/p diagnostic [facility] 7/14  - appreciate Dr. Johns for further planning on TAVR this week - F/U

## 2021-07-19 NOTE — PROGRESS NOTE ADULT - SUBJECTIVE AND OBJECTIVE BOX
HPI:  Patient seen and examined at bedside on 6 [address].  No events overnight.    Review Of Systems:           Respiratory: No shortness of breath, cough, or wheezing  Cardiovascular: No chest pain or palpitations  10 point review of systems is otherwise negative except as mentioned above        Medications:  artificial  tears Solution 1 Drop(s) Both EYES every 12 hours PRN  atorvastatin 40 milliGRAM(s) Oral at bedtime  bisacodyl 5 milliGRAM(s) Oral at bedtime  buMETAnide Injectable 2 milliGRAM(s) IV Push every 8 hours  chlorhexidine 2% Cloths 1 Application(s) Topical daily  dextrose 40% Gel 15 Gram(s) Oral once  dextrose 40% Gel 15 Gram(s) Oral once  dextrose 5%. 1000 milliLiter(s) IV Continuous <Continuous>  dextrose 5%. 1000 milliLiter(s) IV Continuous <Continuous>  dextrose 50% Injectable 25 Gram(s) IV Push once  dextrose 50% Injectable 12.5 Gram(s) IV Push once  dextrose 50% Injectable 25 Gram(s) IV Push once  dextrose 50% Injectable 25 Gram(s) IV Push once  dextrose 50% Injectable 12.5 Gram(s) IV Push once  dextrose 50% Injectable 25 Gram(s) IV Push once  ferrous    sulfate 325 milliGRAM(s) Oral daily  glucagon  Injectable 1 milliGRAM(s) IntraMuscular once  glucagon  Injectable 1 milliGRAM(s) IntraMuscular once  heparin   Injectable 5000 Unit(s) SubCutaneous every 8 hours  insulin glargine Injectable (LANTUS) 6 Unit(s) SubCutaneous at bedtime  insulin lispro (ADMELOG) corrective regimen sliding scale   SubCutaneous three times a day before meals  insulin lispro (ADMELOG) corrective regimen sliding scale   SubCutaneous at bedtime  metoprolol succinate ER 25 milliGRAM(s) Oral <User Schedule>  polyethylene glycol 3350 17 Gram(s) Oral daily  senna 2 Tablet(s) Oral at bedtime  zolpidem 5 milliGRAM(s) Oral at bedtime PRN    PAST MEDICAL & SURGICAL HISTORY:    Vitals:  T(C): 36.8 (21 @ 18:02), Max: 36.8 (21 @ 17:00)  HR: 86 (21 @ 18:02) (66 - 86)  BP: 119/66 (21 @ 18:02) (110/50 - 143/64)  BP(mean): --  RR: 18 (21 @ 18:02) (18 - 20)  SpO2: 93% (21 @ 18:02) (93% - 100%)  Wt(kg): --  Daily     Daily Weight in k.4 (2021 06:00)  I&O's Summary    2021 07:01  -  2021 07:00  --------------------------------------------------------  IN: 760 mL / OUT: 1500 mL / NET: -740 mL    2021 07:01  -  2021 18:29  --------------------------------------------------------  IN: 0 mL / OUT: 800 mL / NET: -800 mL        Physical Exam:  Appearance: Normal, well groomed, NAD  Eyes: PERRLA, EOMI, pink conjunctiva, no scleral icterus   HENT: Normal oral mucosa  Cardiovascular: RRR, S1, S2, III/VI systolic murmur at base  Procedural Access Site: Clean, dry, intact, without hematoma  Respiratory: Clear to auscultation bilaterally  Gastrointestinal: Soft, non-tender, non-distended, BS+  Musculoskeletal: No clubbing or joint deformity   Neurologic: No focal weakness  Lymphatic: No lymphadenopathy  Psychiatry: AAOx3 with appropriate mood and affect  Skin: No rashes, ecchymoses, or cyanosis                          8.7    9.77  )-----------( 287      ( 2021 07:18 )             28.0     07-19    130<L>  |  89<L>  |  30<H>  ----------------------------<  131<H>  3.9   |  28  |  0.95    Ca    10.2      2021 07:01  Phos  2.6     0718  Mg     1.7     07-18          Cardiovascular Diagnostic Testing:  ECG: rate controlled AFib    Echo: critical AS    Cath: PENDING    Imaging: < from: CT Heart without Coronaries w/ IV Cont (21 @ 08:51) >  FINDINGS:    Non-Coronary:    6 mm hypodense nodule within the right lobe of the thyroid gland. No enlarged axillary, mediastinal lymph nodes. No pleural effusions.    Evaluation of the lungs demonstrate left lower lobe masslike opacity on the part of which measures about 5 x 2.3 cm extending to the left lower lobe hilum with associated left lower lobe volume loss related to some superimposed atelectasis. Multiple left lung pulmonary nodule along the pleural surface measuring up to about 7 mm. Right middle lobe 3.2 x 2.6 cm mass associated with the minor fissure which contains a few small nodules measuring up to 7 mm. The right middle lobe mass has a cystic component along its inferior aspect.    Subcentimeter hypodensity within the liver is too small to characterize. The gallbladder, spleen, adrenal glands are unremarkable. Few pancreatic hypodensities are noted with the largest measuring about 1.5 cm on image 84 of series 19. Bilateral renal cysts.    Urinary bladder is normal. The uterus and adnexa are within normal limits. Status post rectal surgery with postoperative changes. No bowel obstruction or medial air. Appendix is normal.    Degenerative changes of the spine. Moderate to severe loss of height of the T5 and severe loss of height of the T6 vertebral bodies are of indeterminate age.    The heart is enlarged.  The left and right atria are severely enlarged.  There is mitral annular calcification and calcification of the mitral valve leaflets.   There is reduced contrast opacification of the left atrial appendage that resolves on delay imaging suggestive of mixing artifact.    The ascending aorta is mildly calcified. The aortic arch and the descending aorta are severely calcified.   The main pulmonary artery measures approximately 26.4 mm at the level of the ascending aorta.   The right and left pulmonary arteries appear enlarged and measure approximately 29.4 mm and 25.4 mm, respectively.    The aortic valve is calcified. The calcium score of the aortic valve is 1870.    Coronary:  Coronary arterial calcifications are noted; however, this studywas not optimized for evaluation of the coronary arteries.  Please refer to cardiac catheterization performed as part of pre-TAVR work-up.    Aortic Root Measurements    Major aortic annulus diameter (systole, mm): 25.2  Perpendicular minor aortic annulus diameter (systole, mm): 19.1  Aortic annulus perimeter (systole, mm): 76.6  Aortic annulus area (systole, mm2): 438  LVOT minimum diameter (systole, mm): 19.6  LVOT 90 cross (systole, mm): 26.4  LVOT calcification:  Severe  Distance from Aortic annulus to Left Main coronary artery (diastole, mm): 13.2  Distance from Aortic annulus to Right Coronary artery (diastole, mm): 16.7  Sinus of Valsalva diameter, Right coronary (diastole, mm): 29.4  Sinus of Valsalva diameter, Left coronary (diastole, mm): 31.0  Sinus of Valsalva diameter, Non coronary (diastole, mm): 28.3  Diameter at the sinotubular junction (diastole, mm): 26.5 x 26.5  Maximum ascending aorta diameter at 40 mm above annulus (diastole, mm): 32.3  Aortic root angulation (diastole, degrees): 48.4  Aortic root calcification: Moderate-to-severe    Abdominal Aorta:        Minimum lumen diameter (MLD) (mm): 12.8        Diameter perpendicular to MLD (mm): 13.2  Left Femoral:        Minimum Lumen Diameter (mm): 7.2        Diameter perpendicular to MLD (mm): 8.1        Tortuosity: Mild        Calcification: Mild  Right Femoral:        Minimum Lumen Diameter (mm): 6.5        Diameter perpendicular to MLD (mm): 7.1        Tortuosity: Mild        Calcification: Mild  Left External Iliac:        Minimum Lumen Diameter (mm): 6.9        Diameter perpendicular to MLD (mm): 7.5        Tortuosity: Moderate        Calcification: Mild  Left Common Iliac:         Minimum Lumen Diameter (mm): 4.5        Diameter perpendicular to MLD (mm): 8.0        Tortuosity: Severe        Calcification: Severe  Right External Iliac:        Minimum Lumen Diameter (mm): 5.0        Diameter perpendicular to MLD (mm): 7.4        Tortuosity: Moderate        Calcification: Mild  Right Common Iliac:    Minimum Lumen Diameter (mm): 7.8        Diameter perpendicular to MLD (mm): 8.4        Tortuosity: Moderate        Calcification: Moderate  L Subclavian/Axillary: Not well visualized due to motion artifact        Minimum Lumen Diameter (mm): 5.6      Diameter perpendicular to MLD (mm): 6.2        Tortuosity: Mild        Calcification: Mild  R Subclavian/Axillary:        Minimum Lumen Diameter (mm): 4.3        Diameter perpendicular to MLD (mm): 5.1        Tortuosity: Mild        Calcification: Mild    IMPRESSION:  1. Calcified aortic valve. The calcium score of the aortic valve is 1870.  2. Please see the body of the report for aortic and peripheral access vessel measurements.  3. Dominant large left lower lobe mass associated with multiple left lung pleural nodules worrisome for neoplasm with metastatic disease.  4. 3.2 x 2.6 cm right middle lobe mass as described above also is worrisome for neoplasm.  5. A few pancreatic hypodense nodules. Dedicated contrast enhanced pancreatic MRI is recommended for complete evaluation.  6. Compression fracture deformities of the T5 and T6 vertebral bodies as described above are of indeterminate age.      KHLOE HAINES MD; Attending Cardiologist  This document has been electronically signed.  MONICA HANEY MD; Attending Radiologist  This document has been electronically signed. 2021  9:12AM    < end of copied text >    CATH:  < from: Cardiac Cath Lab - Adult (21 @ 17:21) >  CORONARY VESSELS: The coronary circulation is right dominant.  LM:   --  LM: Normal.  LAD:   --  Proximal LAD: There was a discrete 40 % stenosis.  --  Mid LAD: There was a tubular 25 % stenosis.  --  Distal LAD: Normal.  CX:   --  Proximal circumflex: There was a tubular 20 % stenosis.  --  Mid circumflex: There was a diffuse 50 % stenosis.  RCA:   --  Proximal RCA: There was a diffuse 30 % stenosis.  --  Mid RCA: There was a diffuse 20 % in-stent restenosis through the prior  stent.  --  Distal RCA: Normal.  --  RPDA: Normal.  --  RPLS: Normal.  COMPLICATIONS: There were no complications.  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  Normal left main coronary artery  Right dominant system  Elevated right atrial pressure (mRA 16mmHg with a V wave of 23mmHg)  Moderate post-capillary pulmonary hypertension (sPAP 58mmHg, dPAP 23mmHg,  mPAP 37mmHg)  PCWP = 28mmHg with a V wave of 48mmHg  PAsat = 67.1% // AOsat = 99.2% (room air)  [place] // CI = 6.07l/min // 3.56l/min/m2  DIAGNOSTIC RECOMMENDATIONS: Keep right leg straigh for 4 hours following  removal of sheaths  Continue aggressive medical management of coronary artery disease and  associated risk factors  Closely monitor the patients kidney function  Patient being evaluated by the [facility] valve team for TAVR  Findings discussed with Dr. 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  Normal left main coronary artery  Right dominant system  Elevated right atrial pressure (mRA 16mmHg with a V wave of 23mmHg)  Moderate post-capillary pulmonary hypertension (sPAP 58mmHg, dPAP 23mmHg,  mPAP 37mmHg)  PCWP = 28mmHg with a V wave of 48mmHg  PAsat = 67.1% // AOsat = 99.2% (room air)  [place] // CI = 6.07l/min // 3.56l/min/m2  INTERVENTIONAL RECOMMENDATIONS: Keep right leg straigh for 4 hours  following removal of sheaths  Continue aggressive medical management of coronary artery disease and  associated risk factors  Closely monitor the patients kidney function  Patient being evaluated by the [facility] valve team for TAVR  Findings discussed with Dr. Hodges  Prepared and signed by  Alejandro Johns MD  Signed 2021 18:37:05    < end of copied text >

## 2021-07-19 NOTE — PROGRESS NOTE ADULT - PROBLEM SELECTOR PLAN 2
JACQUE - resolved . suspected prerenal/ATN i/s/o decreased PO intake and diarrhea  hypovolemic hyponatremia   - s/p HD x 2 sessions, currently improving w/o HD, morris removed, having good urine output   - s/p shiley removed 7/15  - continue bumex to 2mg TID  - renal dose medications  - renal consult appreciated  - monitor k and phos, replete as needed   - monitor Is/Os: 760/1500  - Creatinine Trend: 0.91<--, 1.12<--, 1.33<--, 1.29<--, 1.32<--, 1.70<-- JACQUE - resolved . suspected prerenal/ATN i/s/o decreased PO intake and diarrhea  hypovolemic hyponatremia   - s/p HD x 2 sessions, currently improving w/o HD, morris removed, having good urine output. monitor baldder scans  - s/p osmaniley removed 7/15  - continue bumex to 2mg TID  - renal dose medications  - renal consult appreciated  - monitor k and phos, replete as needed   - monitor Is/Os: 760/1500  - Creatinine Trend: 0.91<--, 1.12<--, 1.33<--, 1.29<--, 1.32<--, 1.70<--

## 2021-07-19 NOTE — PROGRESS NOTE ADULT - SUBJECTIVE AND OBJECTIVE BOX
[facility] Division of Hospital Medicine  Alba Craft MD  Pager (LLOYD-F, 1L-1J): [phone]  Other Times:  [phone]    Patient is a 87y old  Female who presents with a chief complaint of urgent HD (19 Jul 2021 10:38)      SUBJECTIVE / OVERNIGHT EVENTS:    Patient was examined this morning. Feels well. Has a mild cough. Unsure if leg edema has improved. ROS negative otherwise.      ADDITIONAL REVIEW OF SYSTEMS:    MEDICATIONS  (STANDING):  atorvastatin 40 milliGRAM(s) Oral at bedtime  bisacodyl 5 milliGRAM(s) Oral at bedtime  buMETAnide Injectable 2 milliGRAM(s) IV Push every 8 hours  chlorhexidine 2% Cloths 1 Application(s) Topical daily  dextrose 40% Gel 15 Gram(s) Oral once  dextrose 40% Gel 15 Gram(s) Oral once  dextrose 5%. 1000 milliLiter(s) (50 mL/Hr) IV Continuous <Continuous>  dextrose 5%. 1000 milliLiter(s) (100 mL/Hr) IV Continuous <Continuous>  dextrose 50% Injectable 25 Gram(s) IV Push once  dextrose 50% Injectable 12.5 Gram(s) IV Push once  dextrose 50% Injectable 25 Gram(s) IV Push once  dextrose 50% Injectable 25 Gram(s) IV Push once  dextrose 50% Injectable 12.5 Gram(s) IV Push once  dextrose 50% Injectable 25 Gram(s) IV Push once  ferrous    sulfate 325 milliGRAM(s) Oral daily  glucagon  Injectable 1 milliGRAM(s) IntraMuscular once  glucagon  Injectable 1 milliGRAM(s) IntraMuscular once  heparin   Injectable 5000 Unit(s) SubCutaneous every 8 hours  insulin glargine Injectable (LANTUS) 6 Unit(s) SubCutaneous at bedtime  insulin lispro (ADMELOG) corrective regimen sliding scale   SubCutaneous three times a day before meals  insulin lispro (ADMELOG) corrective regimen sliding scale   SubCutaneous at bedtime  metoprolol succinate ER 25 milliGRAM(s) Oral <User Schedule>  polyethylene glycol 3350 17 Gram(s) Oral daily  senna 2 Tablet(s) Oral at bedtime    MEDICATIONS  (PRN):  artificial  tears Solution 1 Drop(s) Both EYES every 12 hours PRN Dry Eyes  zolpidem 5 milliGRAM(s) Oral at bedtime PRN Insomnia      CAPILLARY BLOOD GLUCOSE      POCT Blood Glucose.: 240 mg/dL (19 Jul 2021 13:15)  POCT Blood Glucose.: 141 mg/dL (19 Jul 2021 08:25)  POCT Blood Glucose.: 291 mg/dL (18 Jul 2021 22:01)  POCT Blood Glucose.: 239 mg/dL (18 Jul 2021 18:07)    I&O's Summary    18 Jul 2021 07:01  -  19 Jul 2021 07:00  --------------------------------------------------------  IN: 760 mL / OUT: 1500 mL / NET: -740 mL    19 Jul 2021 07:01  -  19 Jul 2021 15:40  --------------------------------------------------------  IN: 0 mL / OUT: 800 mL / NET: -800 mL        PHYSICAL EXAM:  Vital Signs Last 24 Hrs  T(C): 36.6 (19 Jul 2021 14:36), Max: 36.6 (19 Jul 2021 06:00)  T(F): 97.9 (19 Jul 2021 14:36), Max: 97.9 (19 Jul 2021 06:00)  HR: 81 (19 Jul 2021 14:36) (66 - 81)  BP: 113/66 (19 Jul 2021 14:36) (110/50 - 143/64)  BP(mean): --  RR: 18 (19 Jul 2021 14:36) (18 - 20)  SpO2: 100% (19 Jul 2021 14:36) (96% - 100%)      CONSTITUTIONAL: NAD, well-developed, well-groomed  EYES: PERRL; conjunctiva and sclera clear  ENMT: Moist oral mucosa, no pharyngeal injection or exudates; normal dentition  NECK: Supple, no JVD  RESPIRATORY: Normal respiratory effort; lungs are clear to auscultation bilaterally  CARDIOVASCULAR: Regular rate and rhythm, normal S1 and S2, + systolic murmur; LE edema BL, Peripheral pulses are 2+ bilaterally  ABDOMEN: Nontender to palpation, normoactive bowel sounds, no rebound/guarding; No hepatosplenomegaly  MUSCULOSKELETAL: no clubbing or cyanosis of digits; no joint swelling or tenderness to palpation  SKIN: warm dry          LABS:                        8.7    9.77  )-----------( 287      ( 19 Jul 2021 07:18 )             28.0     07-19    130<L>  |  89<L>  |  30<H>  ----------------------------<  131<H>  3.9   |  28  |  0.95    Ca    10.2      19 Jul 2021 07:01  Phos  2.6     07-18  Mg     1.7     07-18                  RADIOLOGY & ADDITIONAL TESTS:  Results Reviewed:   Imaging Personally Reviewed:  Electrocardiogram Personally Reviewed:    COORDINATION OF CARE:  Care Discussed with Consultants/Other Providers [Y/N]:  Prior or Outpatient Records Reviewed [Y/N]:

## 2021-07-19 NOTE — PROGRESS NOTE ADULT - SUBJECTIVE AND OBJECTIVE BOX
[facility] DIVISION OF KIDNEY DISEASES AND HYPERTENSION -- FOLLOW UP NOTE  --------------------------------------------------------------------------------  If any questions, please feel free to contact me  NS pager: [phone], [facility]: [phone]  Vicente Cortes M.D.  Nephrology Fellow    (After 5 pm or on weekends please page the on-call fellow)  --------------------------------------------------------------------------------    Chief Complaint:  Patient is a 87y old  Female who presents with a chief complaint of urgent HD (18 Jul 2021 15:50)    24 hour events/subjective:  Patient seen and examined at bedside this am, currently no acute complaints. Cr now back to baseline. Vitals/labs/imaging reviewed    PAST HISTORY  --------------------------------------------------------------------------------  No significant changes to PMH, PSH, FHx, SHx, unless otherwise noted    ALLERGIES & MEDICATIONS  --------------------------------------------------------------------------------  Allergies    No Known Allergies    Intolerances      Standing Inpatient Medications  atorvastatin 40 milliGRAM(s) Oral at bedtime  bisacodyl 5 milliGRAM(s) Oral at bedtime  buMETAnide Injectable 2 milliGRAM(s) IV Push every 8 hours  chlorhexidine 2% Cloths 1 Application(s) Topical daily  dextrose 40% Gel 15 Gram(s) Oral once  dextrose 40% Gel 15 Gram(s) Oral once  dextrose 5%. 1000 milliLiter(s) IV Continuous <Continuous>  dextrose 5%. 1000 milliLiter(s) IV Continuous <Continuous>  dextrose 50% Injectable 25 Gram(s) IV Push once  dextrose 50% Injectable 12.5 Gram(s) IV Push once  dextrose 50% Injectable 25 Gram(s) IV Push once  dextrose 50% Injectable 25 Gram(s) IV Push once  dextrose 50% Injectable 12.5 Gram(s) IV Push once  dextrose 50% Injectable 25 Gram(s) IV Push once  ferrous    sulfate 325 milliGRAM(s) Oral daily  glucagon  Injectable 1 milliGRAM(s) IntraMuscular once  glucagon  Injectable 1 milliGRAM(s) IntraMuscular once  heparin   Injectable 5000 Unit(s) SubCutaneous every 8 hours  insulin glargine Injectable (LANTUS) 6 Unit(s) SubCutaneous at bedtime  insulin lispro (ADMELOG) corrective regimen sliding scale   SubCutaneous three times a day before meals  insulin lispro (ADMELOG) corrective regimen sliding scale   SubCutaneous at bedtime  metoprolol succinate ER 25 milliGRAM(s) Oral <User Schedule>  polyethylene glycol 3350 17 Gram(s) Oral daily  senna 2 Tablet(s) Oral at bedtime    PRN Inpatient Medications  artificial  tears Solution 1 Drop(s) Both EYES every 12 hours PRN  zolpidem 5 milliGRAM(s) Oral at bedtime PRN      REVIEW OF SYSTEMS  --------------------------------------------------------------------------------  Gen: No fevers/chills  Skin: No rashes  Head/Eyes/Ears: Normal hearing,   Respiratory: No dyspnea, cough  CV: No chest pain  GI: No abdominal pain, diarrhea  : No dysuria, hematuria  MSK: No  edema  All other systems were reviewed and are negative, except as noted.    VITALS/PHYSICAL EXAM  --------------------------------------------------------------------------------  T(C): 36.6 (07-19-21 @ 06:00), Max: 36.6 (07-19-21 @ 06:00)  HR: 75 (07-19-21 @ 06:00) (70 - 75)  BP: 110/50 (07-19-21 @ 06:00) (110/50 - 146/81)  RR: 20 (07-19-21 @ 06:00) (20 - 20)  SpO2: 96% (07-19-21 @ 06:00) (96% - 100%)  Wt(kg): --        07-18-21 @ 07:01  -  07-19-21 @ 07:00  --------------------------------------------------------  IN: 760 mL / OUT: 1500 mL / NET: -740 mL        Physical Exam:  	Gen: NAD  	HEENT: MMM  	Pulm: CTA B/L  	CV: S1S2  	Abd: Soft, +BS   	Ext: No LE edema B/L  	Neuro: Awake  	Skin: Warm and dry      LABS/STUDIES  --------------------------------------------------------------------------------              8.7    9.77  >-----------<  287      [07-19-21 @ 07:18]              28.0     130  |  89  |  30  ----------------------------<  131      [07-19-21 @ 07:01]  3.9   |  28  |  0.95        Ca     10.2     [07-19-21 @ 07:01]      Mg     1.7     [07-18-21 @ 07:32]      Phos  2.6     [07-18-21 @ 07:32]            Creatinine Trend:  SCr 0.95 [07-19 @ 07:01]  SCr 0.91 [07-18 @ 07:32]  SCr 1.12 [07-17 @ 06:23]  SCr 1.33 [07-16 @ 06:42]  SCr 1.29 [07-15 @ 06:38]    Urinalysis - [07-09-21 @ 12:30]      Color Yellow / Appearance Slightly Turbid / SG 1.017 / pH 5.5      Gluc Negative / Ketone Negative  / Bili Negative / Urobili Negative       Blood Small / Protein 100 / Leuk Est Negative / Nitrite Negative      RBC 2 / WBC 7 / Hyaline 1 / Gran  / Sq Epi  / Non Sq Epi 1 / Bacteria Negative        HBsAb <3.0      [07-09-21 @ 03:47]  HBsAg Nonreact      [07-09-21 @ 03:47]  HBcAb Nonreact      [07-09-21 @ 03:47]  HCV 0.14, Nonreact      [07-09-21 @ 03:47]     [facility] DIVISION OF KIDNEY DISEASES AND HYPERTENSION -- FOLLOW UP NOTE  --------------------------------------------------------------------------------  If any questions, please feel free to contact me  NS pager: [phone], [facility]: [phone]  Vicente Cortes M.D.  Nephrology Fellow    (After 5 pm or on weekends please page the on-call fellow)  --------------------------------------------------------------------------------    Chief Complaint:  Patient is a 87y old  Female who presents with a chief complaint of urgent HD (18 Jul 2021 15:50)    24 hour events/subjective:  Patient seen and examined at bedside this am, currently no acute complaints. Cr now back to baseline. Vitals/labs/imaging reviewed    PAST HISTORY  --------------------------------------------------------------------------------  No significant changes to PMH, PSH, FHx, SHx, unless otherwise noted    ALLERGIES & MEDICATIONS  --------------------------------------------------------------------------------  Allergies    No Known Allergies    Intolerances      Standing Inpatient Medications  atorvastatin 40 milliGRAM(s) Oral at bedtime  bisacodyl 5 milliGRAM(s) Oral at bedtime  buMETAnide Injectable 2 milliGRAM(s) IV Push every 8 hours  chlorhexidine 2% Cloths 1 Application(s) Topical daily  dextrose 40% Gel 15 Gram(s) Oral once  dextrose 40% Gel 15 Gram(s) Oral once  dextrose 5%. 1000 milliLiter(s) IV Continuous <Continuous>  dextrose 5%. 1000 milliLiter(s) IV Continuous <Continuous>  dextrose 50% Injectable 25 Gram(s) IV Push once  dextrose 50% Injectable 12.5 Gram(s) IV Push once  dextrose 50% Injectable 25 Gram(s) IV Push once  dextrose 50% Injectable 25 Gram(s) IV Push once  dextrose 50% Injectable 12.5 Gram(s) IV Push once  dextrose 50% Injectable 25 Gram(s) IV Push once  ferrous    sulfate 325 milliGRAM(s) Oral daily  glucagon  Injectable 1 milliGRAM(s) IntraMuscular once  glucagon  Injectable 1 milliGRAM(s) IntraMuscular once  heparin   Injectable 5000 Unit(s) SubCutaneous every 8 hours  insulin glargine Injectable (LANTUS) 6 Unit(s) SubCutaneous at bedtime  insulin lispro (ADMELOG) corrective regimen sliding scale   SubCutaneous three times a day before meals  insulin lispro (ADMELOG) corrective regimen sliding scale   SubCutaneous at bedtime  metoprolol succinate ER 25 milliGRAM(s) Oral <User Schedule>  polyethylene glycol 3350 17 Gram(s) Oral daily  senna 2 Tablet(s) Oral at bedtime    PRN Inpatient Medications  artificial  tears Solution 1 Drop(s) Both EYES every 12 hours PRN  zolpidem 5 milliGRAM(s) Oral at bedtime PRN      REVIEW OF SYSTEMS  --------------------------------------------------------------------------------  Gen: No fevers/chills  Skin: No rashes  Head/Eyes/Ears: Normal hearing,   Respiratory: No dyspnea, cough  CV: No chest pain  GI: No abdominal pain, diarrhea  : No dysuria, hematuria  MSK: No  edema  All other systems were reviewed and are negative, except as noted.    VITALS/PHYSICAL EXAM  --------------------------------------------------------------------------------  T(C): 36.6 (07-19-21 @ 06:00), Max: 36.6 (07-19-21 @ 06:00)  HR: 75 (07-19-21 @ 06:00) (70 - 75)  BP: 110/50 (07-19-21 @ 06:00) (110/50 - 146/81)  RR: 20 (07-19-21 @ 06:00) (20 - 20)  SpO2: 96% (07-19-21 @ 06:00) (96% - 100%)  Wt(kg): --        07-18-21 @ 07:01  -  07-19-21 @ 07:00  --------------------------------------------------------  IN: 760 mL / OUT: 1500 mL / NET: -740 mL        Physical Exam:  	Gen: NAD  	HEENT: MMM  	Pulm: CTA B/L  	CV: S1S2  	Abd: Soft, +BS   	Ext: 2+ LE edema B/L  	Neuro: Awake  	Skin: Warm and dry      LABS/STUDIES  --------------------------------------------------------------------------------              8.7    9.77  >-----------<  287      [07-19-21 @ 07:18]              28.0     130  |  89  |  30  ----------------------------<  131      [07-19-21 @ 07:01]  3.9   |  28  |  0.95        Ca     10.2     [07-19-21 @ 07:01]      Mg     1.7     [07-18-21 @ 07:32]      Phos  2.6     [07-18-21 @ 07:32]            Creatinine Trend:  SCr 0.95 [07-19 @ 07:01]  SCr 0.91 [07-18 @ 07:32]  SCr 1.12 [07-17 @ 06:23]  SCr 1.33 [07-16 @ 06:42]  SCr 1.29 [07-15 @ 06:38]    Urinalysis - [07-09-21 @ 12:30]      Color Yellow / Appearance Slightly Turbid / SG 1.017 / pH 5.5      Gluc Negative / Ketone Negative  / Bili Negative / Urobili Negative       Blood Small / Protein 100 / Leuk Est Negative / Nitrite Negative      RBC 2 / WBC 7 / Hyaline 1 / Gran  / Sq Epi  / Non Sq Epi 1 / Bacteria Negative        HBsAb <3.0      [07-09-21 @ 03:47]  HBsAg Nonreact      [07-09-21 @ 03:47]  HBcAb Nonreact      [07-09-21 @ 03:47]  HCV 0.14, Nonreact      [07-09-21 @ 03:47]

## 2021-07-19 NOTE — PROGRESS NOTE ADULT - SUBJECTIVE AND OBJECTIVE BOX
*****Structural Heart Team*****    Subjective:            T(C): 36.8 (07-19-21 @ 18:02), Max: 36.8 (07-19-21 @ 17:00)  HR: 86 (07-19-21 @ 18:02) (66 - 86)  BP: 119/66 (07-19-21 @ 18:02) (110/50 - 143/64)  RR: 18 (07-19-21 @ 18:02) (18 - 20)  SpO2: 93% (07-19-21 @ 18:02) (93% - 100%)  Wt(kg): --  07-18 @ 07:01  -  07-19 @ 07:00  --------------------------------------------------------  IN: 760 mL / OUT: 1500 mL / NET: -740 mL    07-19 @ 07:01  -  07-19 @ 18:27  --------------------------------------------------------  IN: 0 mL / OUT: 800 mL / NET: -800 mL        MEDICATIONS  (STANDING):  atorvastatin 40 milliGRAM(s) Oral at bedtime  bisacodyl 5 milliGRAM(s) Oral at bedtime  buMETAnide Injectable 2 milliGRAM(s) IV Push every 8 hours  chlorhexidine 2% Cloths 1 Application(s) Topical daily  dextrose 40% Gel 15 Gram(s) Oral once  dextrose 40% Gel 15 Gram(s) Oral once  dextrose 5%. 1000 milliLiter(s) (100 mL/Hr) IV Continuous <Continuous>  dextrose 5%. 1000 milliLiter(s) (50 mL/Hr) IV Continuous <Continuous>  dextrose 50% Injectable 25 Gram(s) IV Push once  dextrose 50% Injectable 12.5 Gram(s) IV Push once  dextrose 50% Injectable 25 Gram(s) IV Push once  dextrose 50% Injectable 25 Gram(s) IV Push once  dextrose 50% Injectable 12.5 Gram(s) IV Push once  dextrose 50% Injectable 25 Gram(s) IV Push once  ferrous    sulfate 325 milliGRAM(s) Oral daily  glucagon  Injectable 1 milliGRAM(s) IntraMuscular once  glucagon  Injectable 1 milliGRAM(s) IntraMuscular once  heparin   Injectable 5000 Unit(s) SubCutaneous every 8 hours  insulin glargine Injectable (LANTUS) 6 Unit(s) SubCutaneous at bedtime  insulin lispro (ADMELOG) corrective regimen sliding scale   SubCutaneous three times a day before meals  insulin lispro (ADMELOG) corrective regimen sliding scale   SubCutaneous at bedtime  metoprolol succinate ER 25 milliGRAM(s) Oral <User Schedule>  polyethylene glycol 3350 17 Gram(s) Oral daily  senna 2 Tablet(s) Oral at bedtime    MEDICATIONS  (PRN):  artificial  tears Solution 1 Drop(s) Both EYES every 12 hours PRN Dry Eyes  zolpidem 5 milliGRAM(s) Oral at bedtime PRN Insomnia      Home Medications:  atorvastatin 40 mg oral tablet: 1 tab(s) orally once a day at bedtime (08 Jul 2021 17:00)  Elemental Iron: 45 milligram(s) orally (08 Jul 2021 17:00)  Entresto 24 mg-26 mg oral tablet: 1 tab(s) orally 2 times a day (08 Jul 2021 17:00)  furosemide 80 mg oral tablet: 1 tab(s) orally once a day (08 Jul 2021 17:00)  glipiZIDE 10 mg oral tablet: 1 tab(s) orally once a day (08 Jul 2021 17:00)  Januvia 100 mg oral tablet: 1 tab(s) orally once a day (08 Jul 2021 17:00)  lysine 500 mg oral tablet: 2 tab(s) orally once a day (08 Jul 2021 17:00)  metFORMIN 1000 mg oral tablet: 1 tab(s) orally 2 times a day (08 Jul 2021 17:00)  metOLazone: 25 milligram(s) orally once a day (08 Jul 2021 17:00)  metoprolol succinate 25 mg oral tablet, extended release: 1 tab(s) orally once a day   Monday, Wednesday, Friday with dinner (08 Jul 2021 17:00)  Vitamin C 500 mg oral tablet: 1 tab(s) orally once a day (08 Jul 2021 17:00)  Vitamin D2 2000 intl units (50 mcg) oral capsule:  (08 Jul 2021 17:00)  zolpidem 5 mg oral tablet: 1 tab(s) orally once a day (at bedtime) (08 Jul 2021 17:00)                              8.7    9.77  )-----------( 287      ( 19 Jul 2021 07:18 )             28.0   07-19    130<L>  |  89<L>  |  30<H>  ----------------------------<  131<H>  3.9   |  28  |  0.95    Ca    10.2      19 Jul 2021 07:01  Phos  2.6     07-18  Mg     1.7     07-18           *****Structural Heart Team*****    Subjective  No acute events reported overnight.  The patient was seen with her son at her bedside.  The patient is in good spirits.  Denies any chest pain/tightness or discomfort, fevers, chills, sweats, sensation, dizziness or palpitations.    Review of systems  Review of systems is otherwise unremarkable except what described above in history of present illness    Physical examination no apparent distress, alert and oriented x3, appropriate affect  Pupils equal round reactive to light, extraocular was intact, moist mucous membranes, no erythema exudate tonsillar hypertrophy JVD not elevated, supple, no lymphadenopathy  Clear to auscultation bilaterally no wheezing rhonchi crackles  Regular rate and rhythm with 3 out of 6 crescendo decrescendo murmur heard less at the rub upper sternal border with no rub or gallop   Positive bowel sound, soft, nontender, nondistended, no masses guarding rebound  No clubbing cyanosis or edema  Moving all extremity spontaneously  2+ DP/PT pulses  No focal deficit    Assessment/plan  Severe/critical aortic valve stenosis with severe functional mitral regurgitation  --The patient is scheduled to undergo TAVR procedure later this week.  She and her son were explained that she has severe mixed valvular disease which is likely contributing to her symptoms.  Due to the patient age and comorbidities she is not felt to be an appropriate candidate to undergo open heart surgery and plan is for her to proceed with TAVR intervention later this week.  Indications and details for the procedure were reviewed.  --At this time the patient is being medically optimized.  She is on Bumex 2 mg IV every 8 hours.  Her kidney function is stable in nature.  Closely monitor the patient's kidney function.  --Continue metoprolol succinate ER 25 mg daily.  --Continue atorvastatin 40 mg daily.  --The patient has paroxysmal atrial fibrillation.  Timing of reinitiation of anticoagulation will be determined based upon her clinical status following the TAVR procedure and upon review of her primary cardiology team.    All questions and concerns of the patient and her family were addressed.    Findings and plan were discussed with cardiology, structural heart team, the patient's nurse and cardiac surgical team            T(C): 36.8 (07-19-21 @ 18:02), Max: 36.8 (07-19-21 @ 17:00)  HR: 86 (07-19-21 @ 18:02) (66 - 86)  BP: 119/66 (07-19-21 @ 18:02) (110/50 - 143/64)  RR: 18 (07-19-21 @ 18:02) (18 - 20)  SpO2: 93% (07-19-21 @ 18:02) (93% - 100%)  Wt(kg): --  07-18 @ 07:01  -  07-19 @ 07:00  --------------------------------------------------------  IN: 760 mL / OUT: 1500 mL / NET: -740 mL    07-19 @ 07:01  -  07-19 @ 18:27  --------------------------------------------------------  IN: 0 mL / OUT: 800 mL / NET: -800 mL        MEDICATIONS  (STANDING):  atorvastatin 40 milliGRAM(s) Oral at bedtime  bisacodyl 5 milliGRAM(s) Oral at bedtime  buMETAnide Injectable 2 milliGRAM(s) IV Push every 8 hours  chlorhexidine 2% Cloths 1 Application(s) Topical daily  dextrose 40% Gel 15 Gram(s) Oral once  dextrose 40% Gel 15 Gram(s) Oral once  dextrose 5%. 1000 milliLiter(s) (100 mL/Hr) IV Continuous <Continuous>  dextrose 5%. 1000 milliLiter(s) (50 mL/Hr) IV Continuous <Continuous>  dextrose 50% Injectable 25 Gram(s) IV Push once  dextrose 50% Injectable 12.5 Gram(s) IV Push once  dextrose 50% Injectable 25 Gram(s) IV Push once  dextrose 50% Injectable 25 Gram(s) IV Push once  dextrose 50% Injectable 12.5 Gram(s) IV Push once  dextrose 50% Injectable 25 Gram(s) IV Push once  ferrous    sulfate 325 milliGRAM(s) Oral daily  glucagon  Injectable 1 milliGRAM(s) IntraMuscular once  glucagon  Injectable 1 milliGRAM(s) IntraMuscular once  heparin   Injectable 5000 Unit(s) SubCutaneous every 8 hours  insulin glargine Injectable (LANTUS) 6 Unit(s) SubCutaneous at bedtime  insulin lispro (ADMELOG) corrective regimen sliding scale   SubCutaneous three times a day before meals  insulin lispro (ADMELOG) corrective regimen sliding scale   SubCutaneous at bedtime  metoprolol succinate ER 25 milliGRAM(s) Oral <User Schedule>  polyethylene glycol 3350 17 Gram(s) Oral daily  senna 2 Tablet(s) Oral at bedtime    MEDICATIONS  (PRN):  artificial  tears Solution 1 Drop(s) Both EYES every 12 hours PRN Dry Eyes  zolpidem 5 milliGRAM(s) Oral at bedtime PRN Insomnia      Home Medications:  atorvastatin 40 mg oral tablet: 1 tab(s) orally once a day at bedtime (08 Jul 2021 17:00)  Elemental Iron: 45 milligram(s) orally (08 Jul 2021 17:00)  Entresto 24 mg-26 mg oral tablet: 1 tab(s) orally 2 times a day (08 Jul 2021 17:00)  furosemide 80 mg oral tablet: 1 tab(s) orally once a day (08 Jul 2021 17:00)  glipiZIDE 10 mg oral tablet: 1 tab(s) orally once a day (08 Jul 2021 17:00)  Januvia 100 mg oral tablet: 1 tab(s) orally once a day (08 Jul 2021 17:00)  lysine 500 mg oral tablet: 2 tab(s) orally once a day (08 Jul 2021 17:00)  metFORMIN 1000 mg oral tablet: 1 tab(s) orally 2 times a day (08 Jul 2021 17:00)  metOLazone: 25 milligram(s) orally once a day (08 Jul 2021 17:00)  metoprolol succinate 25 mg oral tablet, extended release: 1 tab(s) orally once a day   Monday, Wednesday, Friday with dinner (08 Jul 2021 17:00)  Vitamin C 500 mg oral tablet: 1 tab(s) orally once a day (08 Jul 2021 17:00)  Vitamin D2 2000 intl units (50 mcg) oral capsule:  (08 Jul 2021 17:00)  zolpidem 5 mg oral tablet: 1 tab(s) orally once a day (at bedtime) (08 Jul 2021 17:00)                              8.7    9.77  )-----------( 287      ( 19 Jul 2021 07:18 )             28.0   07-19    130<L>  |  89<L>  |  30<H>  ----------------------------<  131<H>  3.9   |  28  |  0.95    Ca    10.2      19 Jul 2021 07:01  Phos  2.6     07-18  Mg     1.7     07-18

## 2021-07-19 NOTE — PROGRESS NOTE ADULT - PROBLEM SELECTOR PLAN 3
- Hold oral hypoglycemics, DC sulfonylureas and metformin  - insulin correction scale  - check fsg qac/qhs  - consistent carb diet  - FS acceptable  - c/w lantus 6units QHS

## 2021-07-20 NOTE — PROGRESS NOTE ADULT - PROBLEM SELECTOR PLAN 1
- structural heart team following  - s/p diagnostic [facility] 7/14  - appreciate Dr. Johns for further planning on TAVR this week - F/U

## 2021-07-20 NOTE — PROGRESS NOTE ADULT - SUBJECTIVE AND OBJECTIVE BOX
HPI:  Patient seen and examined at bedside on 6 Shahram.  No events overnight.    Review Of Systems:           Respiratory: No shortness of breath, cough, or wheezing  Cardiovascular: No chest pain or palpitations  10 point review of systems is otherwise negative except as mentioned above        Medications:  artificial  tears Solution 1 Drop(s) Both EYES every 12 hours PRN  atorvastatin 40 milliGRAM(s) Oral at bedtime  bisacodyl 5 milliGRAM(s) Oral at bedtime  buMETAnide Injectable 2 milliGRAM(s) IV Push every 8 hours  chlorhexidine 2% Cloths 1 Application(s) Topical daily  dextrose 40% Gel 15 Gram(s) Oral once  dextrose 40% Gel 15 Gram(s) Oral once  dextrose 5%. 1000 milliLiter(s) IV Continuous <Continuous>  dextrose 5%. 1000 milliLiter(s) IV Continuous <Continuous>  dextrose 50% Injectable 25 Gram(s) IV Push once  dextrose 50% Injectable 12.5 Gram(s) IV Push once  dextrose 50% Injectable 25 Gram(s) IV Push once  dextrose 50% Injectable 25 Gram(s) IV Push once  dextrose 50% Injectable 12.5 Gram(s) IV Push once  dextrose 50% Injectable 25 Gram(s) IV Push once  ferrous    sulfate 325 milliGRAM(s) Oral daily  glucagon  Injectable 1 milliGRAM(s) IntraMuscular once  glucagon  Injectable 1 milliGRAM(s) IntraMuscular once  heparin   Injectable 5000 Unit(s) SubCutaneous every 8 hours  insulin glargine Injectable (LANTUS) 6 Unit(s) SubCutaneous at bedtime  insulin lispro (ADMELOG) corrective regimen sliding scale   SubCutaneous three times a day before meals  insulin lispro (ADMELOG) corrective regimen sliding scale   SubCutaneous at bedtime  metoprolol succinate ER 25 milliGRAM(s) Oral <User Schedule>  polyethylene glycol 3350 17 Gram(s) Oral daily  senna 2 Tablet(s) Oral at bedtime  urea Oral Powder 15 Gram(s) Oral every 12 hours    PAST MEDICAL & SURGICAL HISTORY:    Vitals:  T(C): 36.8 (07-20-21 @ 21:18), Max: 36.9 (07-20-21 @ 09:00)  HR: 70 (07-20-21 @ 21:18) (66 - 74)  BP: 111/68 (07-20-21 @ 21:18) (100/65 - 136/67)  BP(mean): --  RR: 18 (07-20-21 @ 21:18) (18 - 18)  SpO2: 94% (07-20-21 @ 21:18) (92% - 95%)  Wt(kg): --  Daily     Daily   I&O's Summary    19 Jul 2021 07:01  -  20 Jul 2021 07:00  --------------------------------------------------------  IN: 600 mL / OUT: 1800 mL / NET: -1200 mL    20 Jul 2021 07:01  -  20 Jul 2021 22:28  --------------------------------------------------------  IN: 0 mL / OUT: 800 mL / NET: -800 mL        Physical Exam:  Appearance: Normal, well groomed, NAD  Eyes: PERRLA, EOMI, pink conjunctiva, no scleral icterus   HENT: Normal oral mucosa  Cardiovascular: RRR, S1, S2, III/VI systolic murmur at base  Procedural Access Site: Clean, dry, intact, without hematoma  Respiratory: Clear to auscultation bilaterally  Gastrointestinal: Soft, non-tender, non-distended, BS+  Musculoskeletal: No clubbing or joint deformity   Neurologic: No focal weakness  Lymphatic: No lymphadenopathy  Psychiatry: AAOx3 with appropriate mood and affect  Skin: No rashes, ecchymoses, or cyanosis                          8.5    9.25  )-----------( 269      ( 20 Jul 2021 06:43 )             27.6     07-20    130<L>  |  90<L>  |  30<H>  ----------------------------<  127<H>  3.5   |  30  |  1.11    Ca    10.3      20 Jul 2021 06:40        Cardiovascular Diagnostic Testing:  ECG: rate controlled AFib    Echo: critical AS    Cath: PENDING    Imaging: < from: CT Heart without Coronaries w/ IV Cont (07.02.21 @ 08:51) >  FINDINGS:    Non-Coronary:    6 mm hypodense nodule within the right lobe of the thyroid gland. No enlarged axillary, mediastinal lymph nodes. No pleural effusions.    Evaluation of the lungs demonstrate left lower lobe masslike opacity on the part of which measures about 5 x 2.3 cm extending to the left lower lobe hilum with associated left lower lobe volume loss related to some superimposed atelectasis. Multiple left lung pulmonary nodule along the pleural surface measuring up to about 7 mm. Right middle lobe 3.2 x 2.6 cm mass associated with the minor fissure which contains a few small nodules measuring up to 7 mm. The right middle lobe mass has a cystic component along its inferior aspect.    Subcentimeter hypodensity within the liver is too small to characterize. The gallbladder, spleen, adrenal glands are unremarkable. Few pancreatic hypodensities are noted with the largest measuring about 1.5 cm on image 84 of series 19. Bilateral renal cysts.    Urinary bladder is normal. The uterus and adnexa are within normal limits. Status post rectal surgery with postoperative changes. No bowel obstruction or medial air. Appendix is normal.    Degenerative changes of the spine. Moderate to severe loss of height of the T5 and severe loss of height of the T6 vertebral bodies are of indeterminate age.    The heart is enlarged.  The left and right atria are severely enlarged.  There is mitral annular calcification and calcification of the mitral valve leaflets.   There is reduced contrast opacification of the left atrial appendage that resolves on delay imaging suggestive of mixing artifact.    The ascending aorta is mildly calcified. The aortic arch and the descending aorta are severely calcified.   The main pulmonary artery measures approximately 26.4 mm at the level of the ascending aorta.   The right and left pulmonary arteries appear enlarged and measure approximately 29.4 mm and 25.4 mm, respectively.    The aortic valve is calcified. The calcium score of the aortic valve is 1870.    Coronary:  Coronary arterial calcifications are noted; however, this studywas not optimized for evaluation of the coronary arteries.  Please refer to cardiac catheterization performed as part of pre-TAVR work-up.    Aortic Root Measurements    Major aortic annulus diameter (systole, mm): 25.2  Perpendicular minor aortic annulus diameter (systole, mm): 19.1  Aortic annulus perimeter (systole, mm): 76.6  Aortic annulus area (systole, mm2): 438  LVOT minimum diameter (systole, mm): 19.6  LVOT 90 cross (systole, mm): 26.4  LVOT calcification:  Severe  Distance from Aortic annulus to Left Main coronary artery (diastole, mm): 13.2  Distance from Aortic annulus to Right Coronary artery (diastole, mm): 16.7  Sinus of Valsalva diameter, Right coronary (diastole, mm): 29.4  Sinus of Valsalva diameter, Left coronary (diastole, mm): 31.0  Sinus of Valsalva diameter, Non coronary (diastole, mm): 28.3  Diameter at the sinotubular junction (diastole, mm): 26.5 x 26.5  Maximum ascending aorta diameter at 40 mm above annulus (diastole, mm): 32.3  Aortic root angulation (diastole, degrees): 48.4  Aortic root calcification: Moderate-to-severe    Abdominal Aorta:        Minimum lumen diameter (MLD) (mm): 12.8        Diameter perpendicular to MLD (mm): 13.2  Left Femoral:        Minimum Lumen Diameter (mm): 7.2        Diameter perpendicular to MLD (mm): 8.1        Tortuosity: Mild        Calcification: Mild  Right Femoral:        Minimum Lumen Diameter (mm): 6.5        Diameter perpendicular to MLD (mm): 7.1        Tortuosity: Mild        Calcification: Mild  Left External Iliac:        Minimum Lumen Diameter (mm): 6.9        Diameter perpendicular to MLD (mm): 7.5        Tortuosity: Moderate        Calcification: Mild  Left Common Iliac:         Minimum Lumen Diameter (mm): 4.5        Diameter perpendicular to MLD (mm): 8.0        Tortuosity: Severe        Calcification: Severe  Right External Iliac:        Minimum Lumen Diameter (mm): 5.0        Diameter perpendicular to MLD (mm): 7.4        Tortuosity: Moderate        Calcification: Mild  Right Common Iliac:    Minimum Lumen Diameter (mm): 7.8        Diameter perpendicular to MLD (mm): 8.4        Tortuosity: Moderate        Calcification: Moderate  L Subclavian/Axillary: Not well visualized due to motion artifact        Minimum Lumen Diameter (mm): 5.6      Diameter perpendicular to MLD (mm): 6.2        Tortuosity: Mild        Calcification: Mild  R Subclavian/Axillary:        Minimum Lumen Diameter (mm): 4.3        Diameter perpendicular to MLD (mm): 5.1        Tortuosity: Mild        Calcification: Mild    IMPRESSION:  1. Calcified aortic valve. The calcium score of the aortic valve is 1870.  2. Please see the body of the report for aortic and peripheral access vessel measurements.  3. Dominant large left lower lobe mass associated with multiple left lung pleural nodules worrisome for neoplasm with metastatic disease.  4. 3.2 x 2.6 cm right middle lobe mass as described above also is worrisome for neoplasm.  5. A few pancreatic hypodense nodules. Dedicated contrast enhanced pancreatic MRI is recommended for complete evaluation.  6. Compression fracture deformities of the T5 and T6 vertebral bodies as described above are of indeterminate age.      KHLOE HAINES MD; Attending Cardiologist  This document has been electronically signed.  MONICA HANEY MD; Attending Radiologist  This document has been electronically signed. Jul 6 2021  9:12AM    < end of copied text >    CATH:  < from: Cardiac Cath Lab - Adult (07.14.21 @ 17:21) >  CORONARY VESSELS: The coronary circulation is right dominant.  LM:   --  LM: Normal.  LAD:   --  Proximal LAD: There was a discrete 40 % stenosis.  --  Mid LAD: There was a tubular 25 % stenosis.  --  Distal LAD: Normal.  CX:   --  Proximal circumflex: There was a tubular 20 % stenosis.  --  Mid circumflex: There was a diffuse 50 % stenosis.  RCA:   --  Proximal RCA: There was a diffuse 30 % stenosis.  --  Mid RCA: There was a diffuse 20 % in-stent restenosis through the prior  stent.  --  Distal RCA: Normal.  --  RPDA: Normal.  --  RPLS: Normal.  COMPLICATIONS: There were no complications.  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  Normal left main coronary artery  Right dominant system  Elevated right atrial pressure (mRA 16mmHg with a V wave of 23mmHg)  Moderate post-capillary pulmonary hypertension (sPAP 58mmHg, dPAP 23mmHg,  mPAP 37mmHg)  PCWP = 28mmHg with a V wave of 48mmHg  PAsat = 67.1% // AOsat = 99.2% (room air)  [place] // CI = 6.07l/min // 3.56l/min/m2  DIAGNOSTIC RECOMMENDATIONS: Keep right leg straigh for 4 hours following  removal of sheaths  Continue aggressive medical management of coronary artery disease and  associated risk factors  Closely monitor the patients kidney function  Patient being evaluated by the [facility] valve team for TAVR  Findings discussed with Dr. 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  Normal left main coronary artery  Right dominant system  Elevated right atrial pressure (mRA 16mmHg with a V wave of 23mmHg)  Moderate post-capillary pulmonary hypertension (sPAP 58mmHg, dPAP 23mmHg,  mPAP 37mmHg)  PCWP = 28mmHg with a V wave of 48mmHg  PAsat = 67.1% // AOsat = 99.2% (room air)  [place] // CI = 6.07l/min // 3.56l/min/m2  INTERVENTIONAL RECOMMENDATIONS: Keep right leg straigh for 4 hours  following removal of sheaths  Continue aggressive medical management of coronary artery disease and  associated risk factors  Closely monitor the patients kidney function  Patient being evaluated by the [facility] valve team for TAVR  Findings discussed with Dr. Hodges  Prepared and signed by  Alejandro Johns MD  Signed 07/14/2021 18:37:05    < end of copied text >

## 2021-07-20 NOTE — PROGRESS NOTE ADULT - PROBLEM SELECTOR PLAN 1
Pt with JACQUE in the setting of severe diarrhea/decreased po intake. Upon review of [facility] HIE/[employer] last sCr was 1.26 on (6/7/21). On admission  BUN/Cr 79/6.7 mg/dl. K: 6.6 (non hemolyzed). s/p Urgent HD on 7/8/21.     SCr improved now at baseline   She has remained non oliguric to 1.8L in the past 24hrs.   c/w bumex 2mg tid  Monitor labs and urine output.   Avoid NSAIDs, ACEI/ARBS, RCA and nephrotoxins.   Dose medications as per eGFR    Hyponatremia:   most Likely hypervolemic  Fluid restriction <1.5L  c/w Diuresis  please start Urena 15g bid

## 2021-07-20 NOTE — PROGRESS NOTE ADULT - ATTENDING COMMENTS
No acute events reported overnight.  The patient is currently sitting in a chair.  Her son is at her bedside.  She reports that she is clinically doing well.  Ambulated a short distance with mild dizziness and dyspnea upon exertion.  She is not having any chest pain/tightness/discomfort, fevers, chills, sweats or abdominal pain.  No change in clinical examination compared to the prior day.    Reviewed with patient plan for upcoming TAVR procedure for later this week.  The patient and her son both had questions concerning the procedure and postoperative care/management.  It is felt that the patient wass weakened/deconditioned prior to her hospitalization.  This has become worse in nature during her acute hospitalization.  Both the patient and her son would like her to go to a rehabilitation facility following the TAVR procedure.    All questions and concerns of the patient were addressed.

## 2021-07-20 NOTE — PROGRESS NOTE ADULT - SUBJECTIVE AND OBJECTIVE BOX
[facility] DIVISION OF KIDNEY DISEASES AND HYPERTENSION -- FOLLOW UP NOTE  --------------------------------------------------------------------------------  If any questions, please feel free to contact me  NS pager: [phone], [facility]: [id number]  Vicente Cortes M.D.  Nephrology Fellow    (After 5 pm or on weekends please page the on-call fellow)  --------------------------------------------------------------------------------    Chief Complaint:  Patient is a 87y old  Female who presents with a chief complaint of urgent HD (20 Jul 2021 13:20)    24 hour events/subjective:  Patient seen and examined at bedside this am, she is in NAD. Denies any acute complaints. Na today at 130. sCr has remained stable. Vitals/labs/imaging reviewed       PAST HISTORY  --------------------------------------------------------------------------------  No significant changes to PMH, PSH, FHx, SHx, unless otherwise noted    ALLERGIES & MEDICATIONS  --------------------------------------------------------------------------------  Allergies    No Known Allergies    Intolerances      Standing Inpatient Medications  atorvastatin 40 milliGRAM(s) Oral at bedtime  bisacodyl 5 milliGRAM(s) Oral at bedtime  buMETAnide Injectable 2 milliGRAM(s) IV Push every 8 hours  chlorhexidine 2% Cloths 1 Application(s) Topical daily  dextrose 40% Gel 15 Gram(s) Oral once  dextrose 40% Gel 15 Gram(s) Oral once  dextrose 5%. 1000 milliLiter(s) IV Continuous <Continuous>  dextrose 5%. 1000 milliLiter(s) IV Continuous <Continuous>  dextrose 50% Injectable 25 Gram(s) IV Push once  dextrose 50% Injectable 12.5 Gram(s) IV Push once  dextrose 50% Injectable 25 Gram(s) IV Push once  dextrose 50% Injectable 25 Gram(s) IV Push once  dextrose 50% Injectable 12.5 Gram(s) IV Push once  dextrose 50% Injectable 25 Gram(s) IV Push once  ferrous    sulfate 325 milliGRAM(s) Oral daily  glucagon  Injectable 1 milliGRAM(s) IntraMuscular once  glucagon  Injectable 1 milliGRAM(s) IntraMuscular once  heparin   Injectable 5000 Unit(s) SubCutaneous every 8 hours  insulin glargine Injectable (LANTUS) 6 Unit(s) SubCutaneous at bedtime  insulin lispro (ADMELOG) corrective regimen sliding scale   SubCutaneous three times a day before meals  insulin lispro (ADMELOG) corrective regimen sliding scale   SubCutaneous at bedtime  metoprolol succinate ER 25 milliGRAM(s) Oral <User Schedule>  polyethylene glycol 3350 17 Gram(s) Oral daily  senna 2 Tablet(s) Oral at bedtime    PRN Inpatient Medications  artificial  tears Solution 1 Drop(s) Both EYES every 12 hours PRN      REVIEW OF SYSTEMS  --------------------------------------------------------------------------------  Gen: No fevers/chills  Skin: No rashes  Head/Eyes/Ears: Normal hearing,   Respiratory: No dyspnea, cough  CV: No chest pain  GI: No abdominal pain, diarrhea  : No dysuria, hematuria  MSK:+ edema  All other systems were reviewed and are negative, except as noted.    VITALS/PHYSICAL EXAM  --------------------------------------------------------------------------------  T(C): 36.6 (07-20-21 @ 13:00), Max: 36.9 (07-20-21 @ 09:00)  HR: 66 (07-20-21 @ 13:00) (66 - 86)  BP: 100/65 (07-20-21 @ 13:00) (100/65 - 136/67)  RR: 18 (07-20-21 @ 13:00) (18 - 18)  SpO2: 95% (07-20-21 @ 13:00) (92% - 100%)  Wt(kg): --        07-19-21 @ 07:01  -  07-20-21 @ 07:00  --------------------------------------------------------  IN: 600 mL / OUT: 1800 mL / NET: -1200 mL        Physical Exam:  	Gen: NAD  	HEENT: MMM  	Pulm: CTA B/L  	CV: S1S2  	Abd: Soft, +BS   	Ext: 2+ LE edema B/L  	Neuro: Awake  	Skin: Warm and dry      LABS/STUDIES  --------------------------------------------------------------------------------              8.5    9.25  >-----------<  269      [07-20-21 @ 06:43]              27.6     130  |  90  |  30  ----------------------------<  127      [07-20-21 @ 06:40]  3.5   |  30  |  1.11        Ca     10.3     [07-20-21 @ 06:40]            Creatinine Trend:  SCr 1.11 [07-20 @ 06:40]  SCr 0.95 [07-19 @ 07:01]  SCr 0.91 [07-18 @ 07:32]  SCr 1.12 [07-17 @ 06:23]  SCr 1.33 [07-16 @ 06:42]    Urinalysis - [07-09-21 @ 12:30]      Color Yellow / Appearance Slightly Turbid / SG 1.017 / pH 5.5      Gluc Negative / Ketone Negative  / Bili Negative / Urobili Negative       Blood Small / Protein 100 / Leuk Est Negative / Nitrite Negative      RBC 2 / WBC 7 / Hyaline 1 / Gran  / Sq Epi  / Non Sq Epi 1 / Bacteria Negative        HBsAb <3.0      [07-09-21 @ 03:47]  HBsAg Nonreact      [07-09-21 @ 03:47]  HBcAb Nonreact      [07-09-21 @ 03:47]  HCV 0.14, Nonreact      [07-09-21 @ 03:47]

## 2021-07-20 NOTE — PROGRESS NOTE ADULT - PROBLEM SELECTOR PLAN 2
JACQUE - resolved . suspected prerenal/ATN i/s/o decreased PO intake and diarrhea  hypovolemic hyponatremia   - s/p HD x 2 sessions, currently improving w/o HD, morris removed, having good urine output. monitor bladder scans  - s/p shiley removed 7/15  - continue bumex to 2mg TID  - renally dose medications  - nephrology recs appreciate  - monitor k and phos, replete as needed   - monitor Is/Os: 600/1800 - unsure if accurate. patient reports drinking additional fluids while she is not even thirsty. patient advised to drink to thirst.    - Creatinine Trend: 1.11<0.91<1.12<1.33<1.29<1.32<1.70<-- JACQUE - resolved . suspected prerenal/ATN i/s/o decreased PO intake and diarrhea  hypovolemic hyponatremia   - s/p HD x 2 sessions, currently improving w/o HD, morris removed, having good urine output. monitor bladder scans  - s/p shiley removed 7/15  - continue bumex to 2mg TID  - renally dose medications  - nephrology recs appreciate  - monitor k and phos, replete as needed   - monitor Is/Os: 600/1800 - unsure if accurate. patient reports drinking additional fluids while she is not even thirsty. patient advised to drink to thirst.  1.5 L fluid restriction/day. start ure-na 15gm bid  - Creatinine Trend: 1.11<0.91<1.12<1.33<1.29<1.32<1.70<--

## 2021-07-20 NOTE — PROGRESS NOTE ADULT - SUBJECTIVE AND OBJECTIVE BOX
Structural Heart Team    Ms Knight is sittin on the edge of the bed eating lunch.  She denies chest pain/pressure, sob and dizziness and there were no acute events overnight.     REVIEW OF SYSTEMS:    CONSTITUTIONAL: No weakness, fevers or chills  EYES/ENT: No visual changes;  No vertigo or throat pain   NECK: No pain or stiffness  RESPIRATORY: No cough, wheezing, hemoptysis; No shortness of breath  CARDIOVASCULAR: No chest pain or palpitations  GASTROINTESTINAL: No abdominal or epigastric pain. No nausea, vomiting, or hematemesis; No diarrhea or constipation. No melena or hematochezia.  GENITOURINARY: No dysuria, frequency or hematuria  NEUROLOGICAL: No numbness or weakness  SKIN: No itching, rashes      Allergies    No Known Allergies    Intolerances      Vital Signs Last 24 Hrs  T(C): 36.6 (20 Jul 2021 13:00), Max: 36.9 (20 Jul 2021 09:00)  T(F): 97.8 (20 Jul 2021 13:00), Max: 98.4 (20 Jul 2021 09:00)  HR: 66 (20 Jul 2021 13:00) (66 - 86)  BP: 100/65 (20 Jul 2021 13:00) (100/65 - 136/67)  BP(mean): --  RR: 18 (20 Jul 2021 13:00) (18 - 18)  SpO2: 95% (20 Jul 2021 13:00) (92% - 100%)    MEDICATIONS  (STANDING):  atorvastatin 40 milliGRAM(s) Oral at bedtime  bisacodyl 5 milliGRAM(s) Oral at bedtime  buMETAnide Injectable 2 milliGRAM(s) IV Push every 8 hours  chlorhexidine 2% Cloths 1 Application(s) Topical daily  dextrose 40% Gel 15 Gram(s) Oral once  dextrose 40% Gel 15 Gram(s) Oral once  dextrose 5%. 1000 milliLiter(s) (100 mL/Hr) IV Continuous <Continuous>  dextrose 5%. 1000 milliLiter(s) (50 mL/Hr) IV Continuous <Continuous>  dextrose 50% Injectable 25 Gram(s) IV Push once  dextrose 50% Injectable 12.5 Gram(s) IV Push once  dextrose 50% Injectable 25 Gram(s) IV Push once  dextrose 50% Injectable 25 Gram(s) IV Push once  dextrose 50% Injectable 12.5 Gram(s) IV Push once  dextrose 50% Injectable 25 Gram(s) IV Push once  ferrous    sulfate 325 milliGRAM(s) Oral daily  glucagon  Injectable 1 milliGRAM(s) IntraMuscular once  glucagon  Injectable 1 milliGRAM(s) IntraMuscular once  heparin   Injectable 5000 Unit(s) SubCutaneous every 8 hours  insulin glargine Injectable (LANTUS) 6 Unit(s) SubCutaneous at bedtime  insulin lispro (ADMELOG) corrective regimen sliding scale   SubCutaneous three times a day before meals  insulin lispro (ADMELOG) corrective regimen sliding scale   SubCutaneous at bedtime  metoprolol succinate ER 25 milliGRAM(s) Oral <User Schedule>  polyethylene glycol 3350 17 Gram(s) Oral daily  senna 2 Tablet(s) Oral at bedtime      Exam-  General: NAD, WDWN, appropriate affect  Cardiac: S1S2, RRR, III/VI ZAYNAB,   No Gallops/Rubs   Pulm: Clear, no wheezes, rales, or rhonchi, no use of accessory muscles  Gastrointestinal: soft, nontender, nondistended, +bowel sounds  Extremities: + Edema B/L,   No joint pain or swelling +PMSx4  Neuro: A&Ox3, nonfocal                          8.5    9.25  )-----------( 269      ( 20 Jul 2021 06:43 )             27.6   07-20    130<L>  |  90<L>  |  30<H>  ----------------------------<  127<H>  3.5   |  30  |  1.11    Ca    10.3      20 Jul 2021 06:40      I&O's Summary    19 Jul 2021 07:01  -  20 Jul 2021 07:00  --------------------------------------------------------  IN: 600 mL / OUT: 1800 mL / NET: -1200 mL              Assessment/Plan:  Ms Thomson is an 88y/o female with Severe Aortic Stenosis, Acute Kidney Injury/Metabolic Acidosis, Chronic Diastolic Heart Failure  - monitor creatinine  - scheduled for TAVR Thursday   - discussed plan with pt and answered all her questions re: TAVR procedure and post op course       Garrison Drobinko, PA  [phone]

## 2021-07-20 NOTE — PROGRESS NOTE ADULT - SUBJECTIVE AND OBJECTIVE BOX
[facility] Division of Hospital Medicine  Alba Craft MD  Pager (M-F, 8A-5P): [phone]  Other Times:  [phone]    Patient is a 87y old  Female who presents with a chief complaint of urgent HD (19 Jul 2021 18:27)      SUBJECTIVE / OVERNIGHT EVENTS:    Patient was examined this morning. She was sleeping, easily arousable. Reports not having slept well as she is anxious about the TAVR procedure. Has some dizziness when she gets out of bed/sits up. ROS negative otherwise.      ADDITIONAL REVIEW OF SYSTEMS:    MEDICATIONS  (STANDING):  atorvastatin 40 milliGRAM(s) Oral at bedtime  bisacodyl 5 milliGRAM(s) Oral at bedtime  buMETAnide Injectable 2 milliGRAM(s) IV Push every 8 hours  chlorhexidine 2% Cloths 1 Application(s) Topical daily  dextrose 40% Gel 15 Gram(s) Oral once  dextrose 40% Gel 15 Gram(s) Oral once  dextrose 5%. 1000 milliLiter(s) (50 mL/Hr) IV Continuous <Continuous>  dextrose 5%. 1000 milliLiter(s) (100 mL/Hr) IV Continuous <Continuous>  dextrose 50% Injectable 25 Gram(s) IV Push once  dextrose 50% Injectable 12.5 Gram(s) IV Push once  dextrose 50% Injectable 25 Gram(s) IV Push once  dextrose 50% Injectable 25 Gram(s) IV Push once  dextrose 50% Injectable 12.5 Gram(s) IV Push once  dextrose 50% Injectable 25 Gram(s) IV Push once  ferrous    sulfate 325 milliGRAM(s) Oral daily  glucagon  Injectable 1 milliGRAM(s) IntraMuscular once  glucagon  Injectable 1 milliGRAM(s) IntraMuscular once  heparin   Injectable 5000 Unit(s) SubCutaneous every 8 hours  insulin glargine Injectable (LANTUS) 6 Unit(s) SubCutaneous at bedtime  insulin lispro (ADMELOG) corrective regimen sliding scale   SubCutaneous three times a day before meals  insulin lispro (ADMELOG) corrective regimen sliding scale   SubCutaneous at bedtime  metoprolol succinate ER 25 milliGRAM(s) Oral <User Schedule>  polyethylene glycol 3350 17 Gram(s) Oral daily  senna 2 Tablet(s) Oral at bedtime    MEDICATIONS  (PRN):  artificial  tears Solution 1 Drop(s) Both EYES every 12 hours PRN Dry Eyes      CAPILLARY BLOOD GLUCOSE      POCT Blood Glucose.: 193 mg/dL (20 Jul 2021 12:45)  POCT Blood Glucose.: 133 mg/dL (20 Jul 2021 07:55)  POCT Blood Glucose.: 270 mg/dL (19 Jul 2021 21:13)  POCT Blood Glucose.: 225 mg/dL (19 Jul 2021 17:17)    I&O's Summary    19 Jul 2021 07:01  -  20 Jul 2021 07:00  --------------------------------------------------------  IN: 600 mL / OUT: 1800 mL / NET: -1200 mL        PHYSICAL EXAM:  Vital Signs Last 24 Hrs  T(C): 36.6 (20 Jul 2021 13:00), Max: 36.9 (20 Jul 2021 09:00)  T(F): 97.8 (20 Jul 2021 13:00), Max: 98.4 (20 Jul 2021 09:00)  HR: 66 (20 Jul 2021 13:00) (66 - 86)  BP: 100/65 (20 Jul 2021 13:00) (100/65 - 136/67)  BP(mean): --  RR: 18 (20 Jul 2021 13:00) (18 - 18)  SpO2: 95% (20 Jul 2021 13:00) (92% - 100%)      CONSTITUTIONAL: NAD, well-developed, well-groomed  EYES: PERRL; conjunctiva and sclera clear  ENMT: Moist oral mucosa, no pharyngeal injection or exudates; normal dentition  NECK: Supple, no JVD  RESPIRATORY: Normal respiratory effort; lungs are clear to auscultation bilaterally  CARDIOVASCULAR: Regular rate and rhythm, normal S1 and S2, + systolic murmur; LE edema BL, Peripheral pulses are 2+ bilaterally  ABDOMEN: Nontender to palpation, normoactive bowel sounds, no rebound/guarding; No hepatosplenomegaly  MUSCULOSKELETAL: no clubbing or cyanosis of digits; no joint swelling or tenderness to palpation  SKIN: warm dry      LABS:                        8.5    9.25  )-----------( 269      ( 20 Jul 2021 06:43 )             27.6     07-20    130<L>  |  90<L>  |  30<H>  ----------------------------<  127<H>  3.5   |  30  |  1.11    Ca    10.3      20 Jul 2021 06:40                  RADIOLOGY & ADDITIONAL TESTS:  Results Reviewed:   Imaging Personally Reviewed:  Electrocardiogram Personally Reviewed:    COORDINATION OF CARE:  Care Discussed with Consultants/Other Providers [Y/N]:  Prior or Outpatient Records Reviewed [Y/N]:

## 2021-07-21 NOTE — PROGRESS NOTE ADULT - ASSESSMENT
87 year-old woman with known history of atrial fibrillation, previously on AC, but now off due to anemia of unknown etiology, severe aortic stenosis, being evaluated for TAVR, status post CT with contrast, now with JACQUE, hyperkalemia, admitted for urgent HD.    No need for further HD (discussed with nephrology). Continue IV loop diuretics. Keep O > I, K > 4.0, Mag > 2.0.    Avoid nephrotoxic agents, including Entresto.    Plan for TAVR tomorrow per Structural Heart Team.

## 2021-07-21 NOTE — PROGRESS NOTE ADULT - SUBJECTIVE AND OBJECTIVE BOX
Structural Heart Team    Ms Knight was seen sitting up in a chair.  She was comfortable and had no complaints.  There were no acute events overnight.       REVIEW OF SYSTEMS:    CONSTITUTIONAL: No weakness, fevers or chills  EYES/ENT: No visual changes;  No vertigo or throat pain   NECK: No pain or stiffness  RESPIRATORY: No cough, wheezing, hemoptysis; No shortness of breath  CARDIOVASCULAR: No chest pain or palpitations  GASTROINTESTINAL: No abdominal or epigastric pain. No nausea, vomiting, or hematemesis; No diarrhea or constipation. No melena or hematochezia.  GENITOURINARY: No dysuria, frequency or hematuria  NEUROLOGICAL: No numbness or weakness  SKIN: No itching, rashes      Allergies    No Known Allergies    Intolerances      Vital Signs Last 24 Hrs  T(C): 36.5 (21 Jul 2021 14:08), Max: 36.8 (20 Jul 2021 21:18)  T(F): 97.7 (21 Jul 2021 14:08), Max: 98.3 (20 Jul 2021 21:18)  HR: 75 (21 Jul 2021 14:08) (70 - 77)  BP: 116/65 (21 Jul 2021 14:08) (111/68 - 132/58)  BP(mean): --  RR: 18 (21 Jul 2021 14:08) (18 - 18)  SpO2: 96% (21 Jul 2021 14:08) (93% - 96%)    MEDICATIONS  (STANDING):  atorvastatin 40 milliGRAM(s) Oral at bedtime  bisacodyl 5 milliGRAM(s) Oral at bedtime  buMETAnide Injectable 2 milliGRAM(s) IV Push every 8 hours  cefuroxime  IVPB 1500 milliGRAM(s) IV Intermittent once  chlorhexidine 0.12% Liquid 30 milliLiter(s) Swish and Spit once  chlorhexidine 2% Cloths 1 Application(s) Topical daily  chlorhexidine 4% Liquid 1 Application(s) Topical once  dextrose 40% Gel 15 Gram(s) Oral once  dextrose 40% Gel 15 Gram(s) Oral once  dextrose 5%. 1000 milliLiter(s) (50 mL/Hr) IV Continuous <Continuous>  dextrose 5%. 1000 milliLiter(s) (100 mL/Hr) IV Continuous <Continuous>  dextrose 50% Injectable 25 Gram(s) IV Push once  dextrose 50% Injectable 12.5 Gram(s) IV Push once  dextrose 50% Injectable 25 Gram(s) IV Push once  dextrose 50% Injectable 25 Gram(s) IV Push once  dextrose 50% Injectable 12.5 Gram(s) IV Push once  dextrose 50% Injectable 25 Gram(s) IV Push once  ferrous    sulfate 325 milliGRAM(s) Oral daily  glucagon  Injectable 1 milliGRAM(s) IntraMuscular once  glucagon  Injectable 1 milliGRAM(s) IntraMuscular once  heparin   Injectable 5000 Unit(s) SubCutaneous every 8 hours  insulin glargine Injectable (LANTUS) 6 Unit(s) SubCutaneous at bedtime  insulin lispro (ADMELOG) corrective regimen sliding scale   SubCutaneous at bedtime  insulin lispro (ADMELOG) corrective regimen sliding scale   SubCutaneous three times a day before meals  metoprolol succinate ER 25 milliGRAM(s) Oral <User Schedule>  polyethylene glycol 3350 17 Gram(s) Oral daily  senna 2 Tablet(s) Oral at bedtime  urea Oral Powder 15 Gram(s) Oral every 12 hours      Exam-  General: NAD, WDWN, appropriate affect  Cardiac: S1S2, RRR, III/VI ZAYNAB,   No Gallops/Rubs   Pulm: Clear, no wheezes, rales, or rhonchi, no use of accessory muscles  Gastrointestinal: soft, nontender, nondistended, +bowel sounds  Extremities: + Edema B/L,   No joint pain or swelling +PMSx4  Neuro: A&Ox3, nonfocal                          8.0    7.25  )-----------( 246      ( 21 Jul 2021 07:03 )             25.7   07-21    133<L>  |  93<L>  |  34<H>  ----------------------------<  144<H>  3.3<L>   |  27  |  1.08    Ca    10.0      21 Jul 2021 07:01    PT/INR - ( 21 Jul 2021 11:48 )   PT: 13.0 sec;   INR: 1.09 ratio         PTT - ( 21 Jul 2021 11:48 )  PTT:33.4 sec  I&O's Summary    20 Jul 2021 07:01  -  21 Jul 2021 07:00  --------------------------------------------------------  IN: 0 mL / OUT: 800 mL / NET: -800 mL    21 Jul 2021 07:01  -  21 Jul 2021 17:12  --------------------------------------------------------  IN: 720 mL / OUT: 1500 mL / NET: -780 mL              Assessment/Plan:  Ms Thomson is an 86y/o female with Severe Aortic Stenosis, Acute Kidney Injury/Metabolic Acidosis, Chronic Diastolic Heart Failure  - creatinine stable  - scheduled for TAVR tomorrow  - discussed plan with pt and answered all her questions re: TAVR procedure and post op course   - NPO after midnight      QUITA Alexander  [phone]

## 2021-07-21 NOTE — PROGRESS NOTE ADULT - NUTRITIONAL ASSESSMENT
This patient has been assessed with a concern for Malnutrition and has been determined to have a diagnosis/diagnoses of Moderate protein-calorie malnutrition.    This patient is being managed with:   Diet NPO after Midnight-     NPO Start Date: 21-Jul-2021   NPO Start Time: 23:59  Except Medications  Entered: Jul 21 2021 10:42AM    Diet Consistent Carbohydrate w/Evening Snack-  1500mL Fluid Restriction ([id number])  Entered: Jul 20 2021  8:05PM

## 2021-07-21 NOTE — PROGRESS NOTE ADULT - PROBLEM SELECTOR PLAN 1
- structural heart team following  - s/p diagnostic [facility] 7/14  - appreciate Dr. Johns for further planning on TAVR scheduled for thursday - F/U

## 2021-07-21 NOTE — PROGRESS NOTE ADULT - SUBJECTIVE AND OBJECTIVE BOX
[facility] DIVISION OF KIDNEY DISEASES AND HYPERTENSION -- FOLLOW UP NOTE  --------------------------------------------------------------------------------  If any questions, please feel free to contact me  NS pager: [phone], [facility]: [phone]  Vicente Cortes M.D.  Nephrology Fellow    (After 5 pm or on weekends please page the on-call fellow)  --------------------------------------------------------------------------------    Chief Complaint:  Patient is a 87y old  Female who presents with a chief complaint of urgent HD (21 Jul 2021 11:33)    24 hour events/subjective:  Patient seen and examined at bedside in NAD, today Cr at 1.0mg/dl and Na improved to 133. UOP: 800cc in the past 24hrs. Vitals/labs/imaging reviewed       PAST HISTORY  --------------------------------------------------------------------------------  No significant changes to PMH, PSH, FHx, SHx, unless otherwise noted    ALLERGIES & MEDICATIONS  --------------------------------------------------------------------------------  Allergies    No Known Allergies    Intolerances      Standing Inpatient Medications  atorvastatin 40 milliGRAM(s) Oral at bedtime  bisacodyl 5 milliGRAM(s) Oral at bedtime  buMETAnide Injectable 2 milliGRAM(s) IV Push every 8 hours  cefuroxime  IVPB 1500 milliGRAM(s) IV Intermittent once  chlorhexidine 0.12% Liquid 30 milliLiter(s) Swish and Spit once  chlorhexidine 2% Cloths 1 Application(s) Topical daily  chlorhexidine 4% Liquid 1 Application(s) Topical once  dextrose 40% Gel 15 Gram(s) Oral once  dextrose 40% Gel 15 Gram(s) Oral once  dextrose 5%. 1000 milliLiter(s) IV Continuous <Continuous>  dextrose 5%. 1000 milliLiter(s) IV Continuous <Continuous>  dextrose 50% Injectable 25 Gram(s) IV Push once  dextrose 50% Injectable 12.5 Gram(s) IV Push once  dextrose 50% Injectable 25 Gram(s) IV Push once  dextrose 50% Injectable 25 Gram(s) IV Push once  dextrose 50% Injectable 12.5 Gram(s) IV Push once  dextrose 50% Injectable 25 Gram(s) IV Push once  ferrous    sulfate 325 milliGRAM(s) Oral daily  glucagon  Injectable 1 milliGRAM(s) IntraMuscular once  glucagon  Injectable 1 milliGRAM(s) IntraMuscular once  heparin   Injectable 5000 Unit(s) SubCutaneous every 8 hours  insulin glargine Injectable (LANTUS) 6 Unit(s) SubCutaneous at bedtime  insulin lispro (ADMELOG) corrective regimen sliding scale   SubCutaneous at bedtime  insulin lispro (ADMELOG) corrective regimen sliding scale   SubCutaneous three times a day before meals  metoprolol succinate ER 25 milliGRAM(s) Oral <User Schedule>  polyethylene glycol 3350 17 Gram(s) Oral daily  senna 2 Tablet(s) Oral at bedtime  urea Oral Powder 15 Gram(s) Oral every 12 hours    PRN Inpatient Medications  artificial  tears Solution 1 Drop(s) Both EYES every 12 hours PRN  zolpidem 5 milliGRAM(s) Oral at bedtime PRN      REVIEW OF SYSTEMS  --------------------------------------------------------------------------------  Gen: No fevers/chills  Skin: No rashes  Head/Eyes/Ears: Normal hearing,   Respiratory: No dyspnea, cough  CV: No chest pain  GI: No abdominal pain, diarrhea  : No dysuria, hematuria  MSK: +LE edema  All other systems were reviewed and are negative, except as noted.    VITALS/PHYSICAL EXAM  --------------------------------------------------------------------------------  T(C): 36.6 (07-21-21 @ 06:03), Max: 36.8 (07-20-21 @ 21:18)  HR: 72 (07-21-21 @ 06:03) (70 - 77)  BP: 123/76 (07-21-21 @ 06:03) (111/68 - 132/58)  RR: 18 (07-21-21 @ 06:03) (18 - 18)  SpO2: 93% (07-21-21 @ 06:03) (93% - 94%)  Wt(kg): --    Weight (kg): 72.4 (07-20-21 @ 09:00)      07-20-21 @ 07:01  -  07-21-21 @ 07:00  --------------------------------------------------------  IN: 0 mL / OUT: 800 mL / NET: -800 mL    07-21-21 @ 07:01  -  07-21-21 @ 13:00  --------------------------------------------------------  IN: 240 mL / OUT: 1500 mL / NET: -1260 mL        Physical Exam:  	Gen: NAD  	HEENT: MMM  	Pulm: CTA B/L  	CV: S1S2  	Abd: Soft, +BS   	Ext: 1+ LE edema B/L  	Neuro: Awake  	Skin: Warm and dry      LABS/STUDIES  --------------------------------------------------------------------------------              8.0    7.25  >-----------<  246      [07-21-21 @ 07:03]              25.7     133  |  93  |  34  ----------------------------<  144      [07-21-21 @ 07:01]  3.3   |  27  |  1.08        Ca     10.0     [07-21-21 @ 07:01]      PT/INR: PT 13.0 , INR 1.09       [07-21-21 @ 11:48]  PTT: 33.4       [07-21-21 @ 11:48]      Creatinine Trend:  SCr 1.08 [07-21 @ 07:01]  SCr 1.11 [07-20 @ 06:40]  SCr 0.95 [07-19 @ 07:01]  SCr 0.91 [07-18 @ 07:32]  SCr 1.12 [07-17 @ 06:23]    Urinalysis - [07-09-21 @ 12:30]      Color Yellow / Appearance Slightly Turbid / SG 1.017 / pH 5.5      Gluc Negative / Ketone Negative  / Bili Negative / Urobili Negative       Blood Small / Protein 100 / Leuk Est Negative / Nitrite Negative      RBC 2 / WBC 7 / Hyaline 1 / Gran  / Sq Epi  / Non Sq Epi 1 / Bacteria Negative        HBsAb <3.0      [07-09-21 @ 03:47]  HBsAg Nonreact      [07-09-21 @ 03:47]  HBcAb Nonreact      [07-09-21 @ 03:47]  HCV 0.14, Nonreact      [07-09-21 @ 03:47]

## 2021-07-21 NOTE — PROGRESS NOTE ADULT - ASSESSMENT
87F PMH CAD w/stents, DM2, Afib (not on AC), Colon Ca (about 25y ago), severe AS, HF , recently started on Entresto, admitted for JACQUE and Metformin-associated lactic acidosis, admitted to MICU s/p urgent HD session but now JACQUE resolving and now undergoing TAVR workup.

## 2021-07-21 NOTE — PROGRESS NOTE ADULT - SUBJECTIVE AND OBJECTIVE BOX
Cardiac Surgery Pre-op Note:     Surgeon: Dr. Pruitt    Procedure: 7/22/21 TAVR    HPI:  87F PMH CAD w/stents, DM2, Afib (not on AC's, dc'd pradaxa 1mo ago as pt w/u for anemia), Colon Ca (about 25y ago),  AS, HF on 80mg lasix qd, recently started on Entresto, presents to the ED with abnormal labs done on 7/7 showing JACQUE with hyperk to >6 mod hemolyzed. Pt states that she hasn't urinated in 2 days, also states she's had nonbloody loose stools over the past week. Otherwise she states SOB is at baseline. Denies any fevers/chills, cough, chest pain, palpitations, lightheadedness, abd pain, n/v, leg swelling worse than baseline.    Home medications: Metformin, Lasix 80qd, Metolazone 25 mg qd, Glipizide, atorvastatin 40 mg qhs, Zolpidem 5 mg qhs, Entresto 24 mg, Metoprolol 25 mg and Januvia     On presentation patient with BUN/Cr 79/6.7 mg/dl. K: 6.6 (non hemolyzed). Previous Cr was at 1.26 6/7/21. Also noted with bicarb: 11 pH 7.2 - lactate 7. patient on Metformin at home and reports taking all of her medications.     Nephrology consulted for JACQUE / hyperkalemia and acidosis. Temporized in ED with Bicarb and calcium. Per tox will need Q1 FSG for sulfonylurea and JACQUE. Will be admitted to MICU for HD.       MEDICATIONS  (STANDING):  atorvastatin 40 milliGRAM(s) Oral at bedtime  bisacodyl 5 milliGRAM(s) Oral at bedtime  buMETAnide Injectable 2 milliGRAM(s) IV Push every 8 hours  cefuroxime  IVPB 1500 milliGRAM(s) IV Intermittent once  chlorhexidine 0.12% Liquid 30 milliLiter(s) Swish and Spit once  chlorhexidine 2% Cloths 1 Application(s) Topical daily  chlorhexidine 4% Liquid 1 Application(s) Topical once  ferrous    sulfate 325 milliGRAM(s) Oral daily  heparin   Injectable 5000 Unit(s) SubCutaneous every 8 hours  insulin glargine Injectable (LANTUS) 6 Unit(s) SubCutaneous at bedtime  insulin lispro (ADMELOG) corrective regimen sliding scale   SubCutaneous three times a day before meals  insulin lispro (ADMELOG) corrective regimen sliding scale   SubCutaneous at bedtime  metoprolol succinate ER 25 milliGRAM(s) Oral <User Schedule>  polyethylene glycol 3350 17 Gram(s) Oral daily  senna 2 Tablet(s) Oral at bedtime  urea Oral Powder 15 Gram(s) Oral every 12 hours    MEDICATIONS  (PRN):  artificial  tears Solution 1 Drop(s) Both EYES every 12 hours PRN Dry Eyes  zolpidem 5 milliGRAM(s) Oral at bedtime PRN Insomnia      Vital Signs Last 24 Hrs  T(C): 36.6 (07-21-21 @ 06:03), Max: 36.8 (07-20-21 @ 21:18)  T(F): 97.8 (07-21-21 @ 06:03), Max: 98.3 (07-20-21 @ 21:18)  HR: 72 (07-21-21 @ 06:03) (66 - 77)  BP: 123/76 (07-21-21 @ 06:03) (100/65 - 132/58)  RR: 18 (07-21-21 @ 06:03) (18 - 18)  SpO2: 93% (07-21-21 @ 06:03) (93% - 95%)               Height (cm): 152.4    Weight (kg): 72.4   BMI (kg/m2): 31.2    (20 Jul 2021 09:00)      Labs:                        8.0    7.25  )-----------( 246      ( 21 Jul 2021 07:03 )             25.7     133<L>  |  93<L>  |  34<H>  ----------------------------<  144<H>  3.3<L>   |  27  |  1.08      Thyroid Panel: pending    MRSA: negative / MSSA: negative    < from: Xray Chest 1 View- PORTABLE-Routine (Xray Chest 1 View- PORTABLE-Routine in AM.) (07.12.21 @ 08:48) >  IMPRESSION:    The heart is enlarged. Pulmonary vascular congestion. No pleural effusion. No pneumothorax. A double-lumen catheter is seen on the right and the tip is either in the superior vena cava or the right atrium. Calcified aortic knob. No acute bony pathology could be identified.    < from: [facility] Duplex Carotid, Bilat (07.02.21 @ 10:09) >  IMPRESSION: Elevated velocities noted at the proximal right common carotid artery which may be related to inflow disease. There is no evidence of significant stenosis of either internal carotid artery.      PFT's: pending      < from: Cardiac Cath Lab - Adult (07.14.21 @ 17:21) >  CORONARY VESSELS: The coronary circulation is right dominant.  LM:   --  LM: Normal.  LAD:   --  Proximal LAD: There was a discrete 40 % stenosis.  --  Mid LAD: There was a tubular 25 % stenosis.  --  Distal LAD: Normal.  CX:   --  Proximal circumflex: There was a tubular 20 % stenosis.  --  Mid circumflex: There was a diffuse 50 % stenosis.  RCA:   --  Proximal RCA: There was a diffuse 30 % stenosis.  --  Mid RCA: There was a diffuse 20 % in-stent restenosis through the prior  stent.  --  Distal RCA: Normal.  --  RPDA: Normal.  --  RPLS: Normal.  COMPLICATIONS: There were no complications.  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  Normal left main coronary artery  Right dominant system  Elevated right atrial pressure (mRA 16mmHg with a V wave of 23mmHg)  Moderate post-capillary pulmonary hypertension (sPAP 58mmHg, dPAP 23mmHg,  mPAP 37mmHg)  PCWP = 28mmHg with a V wave of 48mmHg    < from: CT Heart without Coronaries w/ IV Cont (07.02.21 @ 08:51) >  IMPRESSION:  1. Calcified aortic valve. The calcium score of the aortic valve is 1870.  2. Please see the body of the report for aortic and peripheral access vessel measurements.  3. Dominant large left lower lobe mass associated with multiple left lung pleural nodules worrisome for neoplasm with metastatic disease.  4. 3.2 x 2.6 cm right middle lobe mass as described above also is worrisome for neoplasm.  5. A few pancreatic hypodense nodules. Dedicated contrast enhanced pancreatic MRI is recommended for complete evaluation.  6. Compression fracture deformities of the T5 and T6 vertebral bodies as described above are of indeterminate age.      PHYSICAL EXAM:  Neuro: A&Ox3, NAD  Pulm: B/L BS CTA  CV: RRR,+S1S2  Abd: Soft, NT/ND +BSX4Q  Ext: B/L LE no edema, +PP, no calf tenderness      Pt has AICD/PPM [ ] Yes  [x ] No               Pre-op Beta Blocker ordered within 24 hrs of surgery ([facility] ONLY)?  [x ] Yes  [ ] No  If not, Why?  Type & Cross  [X] Yes  [ ] No  NPO after Midnight [x ] Yes  [ ] No  Pre-op ABX ordered, to be taped on chart:  [ x] Yes  [ ] No     Hibiclens/Peridex ordered [x ] Yes  [ ] No  Intraop on Hold: PRBCs, CXR, RAGHU [x ]   Consent obtained  [x ] Yes  [ ] No Cardiac Surgery Pre-op Note:     Surgeon: Dr. Pruitt    Procedure: 7/22/21 TAVR    HPI:  87F PMH CAD w/stents, DM2, Afib (not on AC's, dc'd pradaxa 1mo ago as pt w/u for anemia), Colon Ca (about 25y ago),  AS, HF on 80mg lasix qd, recently started on Entresto, presents to the ED with abnormal labs done on 7/7 showing JACQUE with hyperk to >6 mod hemolyzed. Pt states that she hasn't urinated in 2 days, also states she's had nonbloody loose stools over the past week. Otherwise she states SOB is at baseline. Denies any fevers/chills, cough, chest pain, palpitations, lightheadedness, abd pain, n/v, leg swelling worse than baseline.    Home medications: Metformin, Lasix 80qd, Metolazone 25 mg qd, Glipizide, atorvastatin 40 mg qhs, Zolpidem 5 mg qhs, Entresto 24 mg, Metoprolol 25 mg and Januvia     On presentation patient with BUN/Cr 79/6.7 mg/dl. K: 6.6 (non hemolyzed). Previous Cr was at 1.26 6/7/21. Also noted with bicarb: 11 pH 7.2 - lactate 7. patient on Metformin at home and reports taking all of her medications.     Nephrology consulted for JACQUE / hyperkalemia and acidosis. Temporized in ED with Bicarb and calcium. Per tox will need Q1 FSG for sulfonylurea and JACQUE. Will be admitted to MICU for HD.       MEDICATIONS  (STANDING):  atorvastatin 40 milliGRAM(s) Oral at bedtime  bisacodyl 5 milliGRAM(s) Oral at bedtime  buMETAnide Injectable 2 milliGRAM(s) IV Push every 8 hours  cefuroxime  IVPB 1500 milliGRAM(s) IV Intermittent once  chlorhexidine 0.12% Liquid 30 milliLiter(s) Swish and Spit once  chlorhexidine 2% Cloths 1 Application(s) Topical daily  chlorhexidine 4% Liquid 1 Application(s) Topical once  ferrous    sulfate 325 milliGRAM(s) Oral daily  heparin   Injectable 5000 Unit(s) SubCutaneous every 8 hours  insulin glargine Injectable (LANTUS) 6 Unit(s) SubCutaneous at bedtime  insulin lispro (ADMELOG) corrective regimen sliding scale   SubCutaneous three times a day before meals  insulin lispro (ADMELOG) corrective regimen sliding scale   SubCutaneous at bedtime  metoprolol succinate ER 25 milliGRAM(s) Oral <User Schedule>  polyethylene glycol 3350 17 Gram(s) Oral daily  senna 2 Tablet(s) Oral at bedtime  urea Oral Powder 15 Gram(s) Oral every 12 hours    MEDICATIONS  (PRN):  artificial  tears Solution 1 Drop(s) Both EYES every 12 hours PRN Dry Eyes  zolpidem 5 milliGRAM(s) Oral at bedtime PRN Insomnia      Vital Signs Last 24 Hrs  T(C): 36.6 (07-21-21 @ 06:03), Max: 36.8 (07-20-21 @ 21:18)  T(F): 97.8 (07-21-21 @ 06:03), Max: 98.3 (07-20-21 @ 21:18)  HR: 72 (07-21-21 @ 06:03) (66 - 77)  BP: 123/76 (07-21-21 @ 06:03) (100/65 - 132/58)  RR: 18 (07-21-21 @ 06:03) (18 - 18)  SpO2: 93% (07-21-21 @ 06:03) (93% - 95%)               Height (cm): 152.4    Weight (kg): 72.4   BMI (kg/m2): 31.2    (20 Jul 2021 09:00)      Labs:                        8.0    7.25  )-----------( 246      ( 21 Jul 2021 07:03 )             25.7     133<L>  |  93<L>  |  34<H>  ----------------------------<  144<H>  3.3<L>   |  27  |  1.08      Thyroid Panel: pending    MRSA: negative / MSSA: negative    < from: Xray Chest 1 View- PORTABLE-Routine (Xray Chest 1 View- PORTABLE-Routine in AM.) (07.12.21 @ 08:48) >  IMPRESSION:    The heart is enlarged. Pulmonary vascular congestion. No pleural effusion. No pneumothorax. A double-lumen catheter is seen on the right and the tip is either in the superior vena cava or the right atrium. Calcified aortic knob. No acute bony pathology could be identified.    < from: [facility] Duplex Carotid, Bilat (07.02.21 @ 10:09) >  IMPRESSION: Elevated velocities noted at the proximal right common carotid artery which may be related to inflow disease. There is no evidence of significant stenosis of either internal carotid artery.      PFT's: pending      < from: Cardiac Cath Lab - Adult (07.14.21 @ 17:21) >  CORONARY VESSELS: The coronary circulation is right dominant.  LM:   --  LM: Normal.  LAD:   --  Proximal LAD: There was a discrete 40 % stenosis.  --  Mid LAD: There was a tubular 25 % stenosis.  --  Distal LAD: Normal.  CX:   --  Proximal circumflex: There was a tubular 20 % stenosis.  --  Mid circumflex: There was a diffuse 50 % stenosis.  RCA:   --  Proximal RCA: There was a diffuse 30 % stenosis.  --  Mid RCA: There was a diffuse 20 % in-stent restenosis through the prior  stent.  --  Distal RCA: Normal.  --  RPDA: Normal.  --  RPLS: Normal.  COMPLICATIONS: There were no complications.  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  Normal left main coronary artery  Right dominant system  Elevated right atrial pressure (mRA 16mmHg with a V wave of 23mmHg)  Moderate post-capillary pulmonary hypertension (sPAP 58mmHg, dPAP 23mmHg,  mPAP 37mmHg)  PCWP = 28mmHg with a V wave of 48mmHg    < from: CT Heart without Coronaries w/ IV Cont (07.02.21 @ 08:51) >  IMPRESSION:  1. Calcified aortic valve. The calcium score of the aortic valve is 1870.  2. Please see the body of the report for aortic and peripheral access vessel measurements.  3. Dominant large left lower lobe mass associated with multiple left lung pleural nodules worrisome for neoplasm with metastatic disease.  4. 3.2 x 2.6 cm right middle lobe mass as described above also is worrisome for neoplasm.  5. A few pancreatic hypodense nodules. Dedicated contrast enhanced pancreatic MRI is recommended for complete evaluation.  6. Compression fracture deformities of the T5 and T6 vertebral bodies as described above are of indeterminate age.      TTE 6/24/21:  EF 55-60%, Severe AS ALEXA 0.5sqcm, trace pericardial effusion.    PHYSICAL EXAM:  Neuro: A&Ox3, NAD  Pulm: B/L BS CTA  CV: RRR,+S1S2  Abd: Soft, NT/ND +BSX4Q  Ext: B/L LE no edema, +PP, no calf tenderness      Pt has AICD/PPM [ ] Yes  [x ] No               Pre-op Beta Blocker ordered within 24 hrs of surgery ([facility]G ONLY)?  [x ] Yes  [ ] No  If not, Why?  Type & Cross  [X] Yes  [ ] No  NPO after Midnight [x ] Yes  [ ] No  Pre-op ABX ordered, to be taped on chart:  [ x] Yes  [ ] No     Hibiclens/Peridex ordered [x ] Yes  [ ] No  Intraop on Hold: PRBCs, CXR, RAGHU [x ]   Consent obtained  [x ] Yes  [ ] No

## 2021-07-21 NOTE — PROGRESS NOTE ADULT - PROBLEM SELECTOR PLAN 1
Pt with JACQUE in the setting of severe diarrhea/decreased po intake. Upon review of [facility] HIE/[employer] last sCr was 1.26 on (6/7/21). On admission  BUN/Cr 79/6.7 mg/dl. K: 6.6 (non hemolyzed). s/p Urgent HD on 7/8/21.     SCr improved now at baseline   She has remained non oliguric to 800cc in the past 24hrs.   c/w bumex 2mg tid  Monitor labs and urine output.   Avoid NSAIDs, ACEI/ARBS, RCA and nephrotoxins.   Dose medications as per eGFR    Hyponatremia:   improving - most Likely hypervolemic  Fluid restriction <1.5L  c/w Diuresis  c/w  Urena 15g bid Pt with JACQUE in the setting of severe diarrhea/decreased po intake. Upon review of [facility] HIE/[employer] last sCr was 1.26 on (6/7/21). On admission  BUN/Cr 79/6.7 mg/dl. K: 6.6 (non hemolyzed). s/p Urgent HD on 7/8/21.     SCr improved now at baseline   She has remained non oliguric to 800cc in the past 24hrs.   c/w bumex 2mg tid  Monitor labs and urine output.   Avoid NSAIDs, ACEI/ARBS, RCA and nephrotoxins.   Dose medications as per eGFR  For TAVR in am     Hyponatremia:   improving - most Likely hypervolemic  Fluid restriction <1.5L  c/w Diuresis  c/w  Urena 15g bid

## 2021-07-21 NOTE — PROGRESS NOTE ADULT - SUBJECTIVE AND OBJECTIVE BOX
HPI:  Patient seen and examined at bedside on 6 [address].    Review Of Systems:           Respiratory: No shortness of breath, cough, or wheezing  Cardiovascular: No chest pain or palpitations  10 point review of systems is otherwise negative except as mentioned above        Medications:  artificial  tears Solution 1 Drop(s) Both EYES every 12 hours PRN  atorvastatin 40 milliGRAM(s) Oral at bedtime  bisacodyl 5 milliGRAM(s) Oral at bedtime  buMETAnide Injectable 2 milliGRAM(s) IV Push every 8 hours  cefuroxime  IVPB 1500 milliGRAM(s) IV Intermittent once  chlorhexidine 0.12% Liquid 30 milliLiter(s) Swish and Spit once  chlorhexidine 2% Cloths 1 Application(s) Topical daily  dextrose 40% Gel 15 Gram(s) Oral once  dextrose 40% Gel 15 Gram(s) Oral once  dextrose 5%. 1000 milliLiter(s) IV Continuous <Continuous>  dextrose 5%. 1000 milliLiter(s) IV Continuous <Continuous>  dextrose 50% Injectable 25 Gram(s) IV Push once  dextrose 50% Injectable 12.5 Gram(s) IV Push once  dextrose 50% Injectable 25 Gram(s) IV Push once  dextrose 50% Injectable 25 Gram(s) IV Push once  dextrose 50% Injectable 12.5 Gram(s) IV Push once  dextrose 50% Injectable 25 Gram(s) IV Push once  ferrous    sulfate 325 milliGRAM(s) Oral daily  glucagon  Injectable 1 milliGRAM(s) IntraMuscular once  glucagon  Injectable 1 milliGRAM(s) IntraMuscular once  heparin   Injectable 5000 Unit(s) SubCutaneous every 8 hours  insulin glargine Injectable (LANTUS) 6 Unit(s) SubCutaneous at bedtime  insulin lispro (ADMELOG) corrective regimen sliding scale   SubCutaneous at bedtime  insulin lispro (ADMELOG) corrective regimen sliding scale   SubCutaneous three times a day before meals  metoprolol succinate ER 25 milliGRAM(s) Oral <User Schedule>  polyethylene glycol 3350 17 Gram(s) Oral daily  senna 2 Tablet(s) Oral at bedtime  urea Oral Powder 15 Gram(s) Oral every 12 hours  zolpidem 5 milliGRAM(s) Oral at bedtime PRN    PAST MEDICAL & SURGICAL HISTORY:    Vitals:  T(C): 36.4 (07-21-21 @ 20:10), Max: 36.6 (07-21-21 @ 00:46)  HR: 79 (07-21-21 @ 20:10) (72 - 79)  BP: 115/66 (07-21-21 @ 20:10) (115/66 - 132/58)  BP(mean): --  RR: 18 (07-21-21 @ 20:10) (18 - 18)  SpO2: 96% (07-21-21 @ 20:10) (93% - 96%)  Wt(kg): --  Daily     Daily   I&O's Summary    20 Jul 2021 07:01  -  21 Jul 2021 07:00  --------------------------------------------------------  IN: 0 mL / OUT: 800 mL / NET: -800 mL    21 Jul 2021 07:01  -  21 Jul 2021 22:27  --------------------------------------------------------  IN: 960 mL / OUT: 1900 mL / NET: -940 mL        Physical Exam:  Appearance: Normal, well groomed, NAD  Eyes: PERRLA, EOMI, pink conjunctiva, no scleral icterus   HENT: Normal oral mucosa  Cardiovascular: RRR, S1, S2, III/VI systolic murmur at base  Procedural Access Site: Clean, dry, intact, without hematoma  Respiratory: Clear to auscultation bilaterally  Gastrointestinal: Soft, non-tender, non-distended, BS+  Musculoskeletal: No clubbing or joint deformity   Neurologic: No focal weakness  Lymphatic: No lymphadenopathy  Psychiatry: AAOx3 with appropriate mood and affect  Skin: No rashes, ecchymoses, or cyanosis                          8.0    7.25  )-----------( 246      ( 21 Jul 2021 07:03 )             25.7     07-21    133<L>  |  93<L>  |  34<H>  ----------------------------<  144<H>  3.3<L>   |  27  |  1.08    Ca    10.0      21 Jul 2021 07:01      PT/INR - ( 21 Jul 2021 11:48 )   PT: 13.0 sec;   INR: 1.09 ratio         PTT - ( 21 Jul 2021 11:48 )  PTT:33.4 sec        Cardiovascular Diagnostic Testing:  ECG: rate controlled AFib    Imaging: < from: CT Heart without Coronaries w/ IV Cont (07.02.21 @ 08:51) >  FINDINGS:    Non-Coronary:    6 mm hypodense nodule within the right lobe of the thyroid gland. No enlarged axillary, mediastinal lymph nodes. No pleural effusions.    Evaluation of the lungs demonstrate left lower lobe masslike opacity on the part of which measures about 5 x 2.3 cm extending to the left lower lobe hilum with associated left lower lobe volume loss related to some superimposed atelectasis. Multiple left lung pulmonary nodule along the pleural surface measuring up to about 7 mm. Right middle lobe 3.2 x 2.6 cm mass associated with the minor fissure which contains a few small nodules measuring up to 7 mm. The right middle lobe mass has a cystic component along its inferior aspect.    Subcentimeter hypodensity within the liver is too small to characterize. The gallbladder, spleen, adrenal glands are unremarkable. Few pancreatic hypodensities are noted with the largest measuring about 1.5 cm on image 84 of series 19. Bilateral renal cysts.    Urinary bladder is normal. The uterus and adnexa are within normal limits. Status post rectal surgery with postoperative changes. No bowel obstruction or medial air. Appendix is normal.    Degenerative changes of the spine. Moderate to severe loss of height of the T5 and severe loss of height of the T6 vertebral bodies are of indeterminate age.    The heart is enlarged.  The left and right atria are severely enlarged.  There is mitral annular calcification and calcification of the mitral valve leaflets.   There is reduced contrast opacification of the left atrial appendage that resolves on delay imaging suggestive of mixing artifact.    The ascending aorta is mildly calcified. The aortic arch and the descending aorta are severely calcified.   The main pulmonary artery measures approximately 26.4 mm at the level of the ascending aorta.   The right and left pulmonary arteries appear enlarged and measure approximately 29.4 mm and 25.4 mm, respectively.    The aortic valve is calcified. The calcium score of the aortic valve is 1870.    Coronary:  Coronary arterial calcifications are noted; however, this studywas not optimized for evaluation of the coronary arteries.  Please refer to cardiac catheterization performed as part of pre-TAVR work-up.    Aortic Root Measurements    Major aortic annulus diameter (systole, mm): 25.2  Perpendicular minor aortic annulus diameter (systole, mm): 19.1  Aortic annulus perimeter (systole, mm): 76.6  Aortic annulus area (systole, mm2): 438  LVOT minimum diameter (systole, mm): 19.6  LVOT 90 cross (systole, mm): 26.4  LVOT calcification:  Severe  Distance from Aortic annulus to Left Main coronary artery (diastole, mm): 13.2  Distance from Aortic annulus to Right Coronary artery (diastole, mm): 16.7  Sinus of Valsalva diameter, Right coronary (diastole, mm): 29.4  Sinus of Valsalva diameter, Left coronary (diastole, mm): 31.0  Sinus of Valsalva diameter, Non coronary (diastole, mm): 28.3  Diameter at the sinotubular junction (diastole, mm): 26.5 x 26.5  Maximum ascending aorta diameter at 40 mm above annulus (diastole, mm): 32.3  Aortic root angulation (diastole, degrees): 48.4  Aortic root calcification: Moderate-to-severe    Abdominal Aorta:        Minimum lumen diameter (MLD) (mm): 12.8        Diameter perpendicular to MLD (mm): 13.2  Left Femoral:        Minimum Lumen Diameter (mm): 7.2        Diameter perpendicular to MLD (mm): 8.1        Tortuosity: Mild        Calcification: Mild  Right Femoral:        Minimum Lumen Diameter (mm): 6.5        Diameter perpendicular to MLD (mm): 7.1        Tortuosity: Mild        Calcification: Mild  Left External Iliac:        Minimum Lumen Diameter (mm): 6.9        Diameter perpendicular to MLD (mm): 7.5        Tortuosity: Moderate        Calcification: Mild  Left Common Iliac:         Minimum Lumen Diameter (mm): 4.5        Diameter perpendicular to MLD (mm): 8.0        Tortuosity: Severe        Calcification: Severe  Right External Iliac:        Minimum Lumen Diameter (mm): 5.0        Diameter perpendicular to MLD (mm): 7.4        Tortuosity: Moderate        Calcification: Mild  Right Common Iliac:    Minimum Lumen Diameter (mm): 7.8        Diameter perpendicular to MLD (mm): 8.4        Tortuosity: Moderate        Calcification: Moderate  L Subclavian/Axillary: Not well visualized due to motion artifact        Minimum Lumen Diameter (mm): 5.6      Diameter perpendicular to MLD (mm): 6.2        Tortuosity: Mild        Calcification: Mild  R Subclavian/Axillary:        Minimum Lumen Diameter (mm): 4.3        Diameter perpendicular to MLD (mm): 5.1        Tortuosity: Mild        Calcification: Mild    IMPRESSION:  1. Calcified aortic valve. The calcium score of the aortic valve is 1870.  2. Please see the body of the report for aortic and peripheral access vessel measurements.  3. Dominant large left lower lobe mass associated with multiple left lung pleural nodules worrisome for neoplasm with metastatic disease.  4. 3.2 x 2.6 cm right middle lobe mass as described above also is worrisome for neoplasm.  5. A few pancreatic hypodense nodules. Dedicated contrast enhanced pancreatic MRI is recommended for complete evaluation.  6. Compression fracture deformities of the T5 and T6 vertebral bodies as described above are of indeterminate age.      KHLOE HAINES MD; Attending Cardiologist  This document has been electronically signed.  MONICA HANEY MD; Attending Radiologist  This document has been electronically signed. Jul 6 2021  9:12AM    < end of copied text >    CATH:  < from: Cardiac Cath Lab - Adult (07.14.21 @ 17:21) >  CORONARY VESSELS: The coronary circulation is right dominant.  LM:   --  LM: Normal.  LAD:   --  Proximal LAD: There was a discrete 40 % stenosis.  --  Mid LAD: There was a tubular 25 % stenosis.  --  Distal LAD: Normal.  CX:   --  Proximal circumflex: There was a tubular 20 % stenosis.  --  Mid circumflex: There was a diffuse 50 % stenosis.  RCA:   --  Proximal RCA: There was a diffuse 30 % stenosis.  --  Mid RCA: There was a diffuse 20 % in-stent restenosis through the prior  stent.  --  Distal RCA: Normal.  --  RPDA: Normal.  --  RPLS: Normal.  COMPLICATIONS: There were no complications.  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  Normal left main coronary artery  Right dominant system  Elevated right atrial pressure (mRA 16mmHg with a V wave of 23mmHg)  Moderate post-capillary pulmonary hypertension (sPAP 58mmHg, dPAP 23mmHg,  mPAP 37mmHg)  PCWP = 28mmHg with a V wave of 48mmHg  PAsat = 67.1% // AOsat = 99.2% (room air)  [place] // CI = 6.07l/min // 3.56l/min/m2  DIAGNOSTIC RECOMMENDATIONS: Keep right leg straigh for 4 hours following  removal of sheaths  Continue aggressive medical management of coronary artery disease and  associated risk factors  Closely monitor the patients kidney function  Patient being evaluated by the [facility] valve team for TAVR  Findings discussed with Dr. 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  Normal left main coronary artery  Right dominant system  Elevated right atrial pressure (mRA 16mmHg with a V wave of 23mmHg)  Moderate post-capillary pulmonary hypertension (sPAP 58mmHg, dPAP 23mmHg,  mPAP 37mmHg)  PCWP = 28mmHg with a V wave of 48mmHg  PAsat = 67.1% // AOsat = 99.2% (room air)  [place] // CI = 6.07l/min // 3.56l/min/m2  INTERVENTIONAL RECOMMENDATIONS: Keep right leg straigh for 4 hours  following removal of sheaths  Continue aggressive medical management of coronary artery disease and  associated risk factors  Closely monitor the patients kidney function  Patient being evaluated by the [facility] valve team for TAVR  Findings discussed with Dr. Hodges  Prepared and signed by  Alejandro Johns MD  Signed 07/14/2021 18:37:05    < end of copied text >

## 2021-07-21 NOTE — PROGRESS NOTE ADULT - PROBLEM SELECTOR PLAN 2
JACQUE - resolved . suspected prerenal/ATN i/s/o decreased PO intake and diarrhea  hypovolemic hyponatremia   - s/p HD x 2 sessions, currently improving w/o HD, morris removed, having good urine output. monitor bladder scans  - s/p shiley removed 7/15  - continue bumex to 2mg TID  - renally dose medications  - nephrology recs appreciate  - monitor k and phos, replete as needed   - monitor Is/Os: 800/800. patient advised to drink to thirst. 1.5 L fluid restriction/day. continue ure-na 15gm bid. Discussed with Dr. Tovar.  - Creatinine Trend: 1.08<1.11<0.91<1.12<1.33<1.29<1.32<1.70<--

## 2021-07-21 NOTE — PROGRESS NOTE ADULT - ATTENDING COMMENTS
No acute events reported overnight.  The patient is currently sitting in her bed.  Overall, she is in good spirits.  The patient is not having any chest pain/tightness or discomfort, fevers, chills or sweats, light sensation, dizzy or palpitation.  Examination is unchanged compared to the prior day.    Discussion was had with the patient concerning her upcoming TAVR procedure.  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  Indications and details for the TAVR procedure reviewed.  Benefits and risk were gone over.  Risks include but not limited to infection, bleeding, arrhythmia, TIA/stroke, hemodynamic instability, vascular injury, need for urgent surgery and death.  The patient is agreeable to proceed with the procedure.    All questions and concerns of the patient were addressed.  And plan were discussed with cardiology/Dr. Hodges and structural heart team.

## 2021-07-21 NOTE — PROGRESS NOTE ADULT - PROBLEM SELECTOR PLAN 1
Structural Heart following  Pt is scheduled for TAVR 7/22 with Dr. Pruitt  NPO p MN, pre-op chorhexidine shower  Check pre-op lab work  Continue care as per primary team

## 2021-07-21 NOTE — PROGRESS NOTE ADULT - SUBJECTIVE AND OBJECTIVE BOX
[facility] Division of Hospital Medicine  Alba Craft MD  Pager (M-F, 0B-1P): [phone]  Other Times:  [phone]    Patient is a 87y old  Female who presents with a chief complaint of urgent HD (21 Jul 2021 10:58)      SUBJECTIVE / OVERNIGHT EVENTS:    Patient was examined this morning. She feels well, no new concerns. Had her questions answered regarding the procedure by structural heart team yesterday. ROS negative.     ADDITIONAL REVIEW OF SYSTEMS:    MEDICATIONS  (STANDING):  atorvastatin 40 milliGRAM(s) Oral at bedtime  bisacodyl 5 milliGRAM(s) Oral at bedtime  buMETAnide Injectable 2 milliGRAM(s) IV Push every 8 hours  cefuroxime  IVPB 1500 milliGRAM(s) IV Intermittent once  chlorhexidine 0.12% Liquid 30 milliLiter(s) Swish and Spit once  chlorhexidine 2% Cloths 1 Application(s) Topical daily  chlorhexidine 4% Liquid 1 Application(s) Topical once  dextrose 40% Gel 15 Gram(s) Oral once  dextrose 40% Gel 15 Gram(s) Oral once  dextrose 5%. 1000 milliLiter(s) (50 mL/Hr) IV Continuous <Continuous>  dextrose 5%. 1000 milliLiter(s) (100 mL/Hr) IV Continuous <Continuous>  dextrose 50% Injectable 25 Gram(s) IV Push once  dextrose 50% Injectable 12.5 Gram(s) IV Push once  dextrose 50% Injectable 25 Gram(s) IV Push once  dextrose 50% Injectable 25 Gram(s) IV Push once  dextrose 50% Injectable 12.5 Gram(s) IV Push once  dextrose 50% Injectable 25 Gram(s) IV Push once  ferrous    sulfate 325 milliGRAM(s) Oral daily  glucagon  Injectable 1 milliGRAM(s) IntraMuscular once  glucagon  Injectable 1 milliGRAM(s) IntraMuscular once  heparin   Injectable 5000 Unit(s) SubCutaneous every 8 hours  insulin glargine Injectable (LANTUS) 6 Unit(s) SubCutaneous at bedtime  insulin lispro (ADMELOG) corrective regimen sliding scale   SubCutaneous at bedtime  insulin lispro (ADMELOG) corrective regimen sliding scale   SubCutaneous three times a day before meals  metoprolol succinate ER 25 milliGRAM(s) Oral <User Schedule>  polyethylene glycol 3350 17 Gram(s) Oral daily  senna 2 Tablet(s) Oral at bedtime  urea Oral Powder 15 Gram(s) Oral every 12 hours    MEDICATIONS  (PRN):  artificial  tears Solution 1 Drop(s) Both EYES every 12 hours PRN Dry Eyes  zolpidem 5 milliGRAM(s) Oral at bedtime PRN Insomnia      CAPILLARY BLOOD GLUCOSE      POCT Blood Glucose.: 237 mg/dL (21 Jul 2021 08:34)  POCT Blood Glucose.: 246 mg/dL (20 Jul 2021 21:34)  POCT Blood Glucose.: 208 mg/dL (20 Jul 2021 19:17)  POCT Blood Glucose.: 224 mg/dL (20 Jul 2021 18:09)  POCT Blood Glucose.: 193 mg/dL (20 Jul 2021 12:45)    I&O's Summary    20 Jul 2021 07:01  -  21 Jul 2021 07:00  --------------------------------------------------------  IN: 0 mL / OUT: 800 mL / NET: -800 mL    21 Jul 2021 07:01  -  21 Jul 2021 11:33  --------------------------------------------------------  IN: 240 mL / OUT: 1500 mL / NET: -1260 mL        PHYSICAL EXAM:  Vital Signs Last 24 Hrs  T(C): 36.6 (21 Jul 2021 06:03), Max: 36.8 (20 Jul 2021 21:18)  T(F): 97.8 (21 Jul 2021 06:03), Max: 98.3 (20 Jul 2021 21:18)  HR: 72 (21 Jul 2021 06:03) (66 - 77)  BP: 123/76 (21 Jul 2021 06:03) (100/65 - 132/58)  BP(mean): --  RR: 18 (21 Jul 2021 06:03) (18 - 18)  SpO2: 93% (21 Jul 2021 06:03) (93% - 95%)      CONSTITUTIONAL: NAD, well-developed, well-groomed  EYES: PERRL; conjunctiva and sclera clear  ENMT: Moist oral mucosa, no pharyngeal injection or exudates; normal dentition  NECK: Supple, no JVD  RESPIRATORY: Normal respiratory effort; lungs are clear to auscultation bilaterally  CARDIOVASCULAR: Regular rate and rhythm, normal S1 and S2, +systolic murmur; LE edema BL-unchanged, Peripheral pulses are 2+ bilaterally  ABDOMEN: Nontender to palpation, normoactive bowel sounds, no rebound/guarding;  MUSCULOSKELETAL: no clubbing or cyanosis of digits; no joint swelling or tenderness to palpation  SKIN: warm dry        LABS:                        8.0    7.25  )-----------( 246      ( 21 Jul 2021 07:03 )             25.7     07-21    133<L>  |  93<L>  |  34<H>  ----------------------------<  144<H>  3.3<L>   |  27  |  1.08    Ca    10.0      21 Jul 2021 07:01                  RADIOLOGY & ADDITIONAL TESTS:  Results Reviewed:   Imaging Personally Reviewed:  Electrocardiogram Personally Reviewed:    COORDINATION OF CARE:  Care Discussed with Consultants/Other Providers [Y/N]:  Prior or Outpatient Records Reviewed [Y/N]:

## 2021-07-22 NOTE — PROGRESS NOTE ADULT - SUBJECTIVE AND OBJECTIVE BOX
Subjective  " I am feeling well"    VITAL SIGNS    Telemetry: Afib 75     Vital Signs Last 24 Hrs  T(C): 36.8 (07-22-21 @ 17:00), Max: 36.8 (07-22-21 @ 17:00)  T(F): 98.2 (07-22-21 @ 17:00), Max: 98.2 (07-22-21 @ 17:00)  HR: 74 (07-22-21 @ 17:00) (51 - 79)  BP: 138/76 (07-22-21 @ 17:00) (110/58 - 142/63)  RR: 18 (07-22-21 @ 17:00) (12 - 20)  SpO2: 96% (07-22-21 @ 17:00) (94% - 100%)           07-21 @ 07:01  -  07-22 @ 07:00  --------------------------------------------------------  IN: 1060 mL / OUT: 2800 mL / NET: -1740 mL    07-22 @ 07:01  -  07-22 @ 17:05  --------------------------------------------------------  IN: 0 mL / OUT: 500 mL / NET: -500 mL                          7.9    13.33 )-----------( 135      ( 22 Jul 2021 15:00 )             26.2     07-22    138  |  99  |  46<H>  ----------------------------<  145<H>  3.6   |  27  |  0.97    Ca    10.0      22 Jul 2021 15:00  Mg     2.1     07-22    TPro  6.6  /  Alb  3.3  /  TBili  0.6  /  DBili  x   /  AST  24  /  ALT  17  /  AlkPhos  251<H>  07-22      MEDICATIONS  (STANDING):  aspirin  chewable 81 milliGRAM(s) Oral daily  cefuroxime  IVPB 1500 milliGRAM(s) IV Intermittent every 8 hours    MEDICATIONS  (PRN):      CAPILLARY BLOOD GLUCOSE      POCT Blood Glucose.: 165 mg/dL (22 Jul 2021 16:58)  POCT Blood Glucose.: 138 mg/dL (22 Jul 2021 13:42)  POCT Blood Glucose.: 208 mg/dL (22 Jul 2021 07:22)  POCT Blood Glucose.: 241 mg/dL (21 Jul 2021 21:35)  POCT Blood Glucose.: 175 mg/dL (21 Jul 2021 17:38)                                  PHYSICAL EXAM        Neurology: alert and oriented x 3, nonfocal, no gross deficits    CV :S1 IRR  Lungs: B/l breath sounds om room air    Abdomen: soft, nontender, nondistended, positive bowel sounds, last bowel movement preop    :  voids             Extremities: equal strength throughout   b/lle warm well perfused   Rt groin CDI site  benign nontender  DP pulse by doppler   Left groin CDI site benign nontender  DP pulse palapable                                            Discussed with Cardiothoracic Team at AM rounds. Subjective  " I am feeling well"    VITAL SIGNS    Telemetry: Afib 60s    Vital Signs Last 24 Hrs  T(C): 36.8 (07-22-21 @ 17:00), Max: 36.8 (07-22-21 @ 17:00)  T(F): 98.2 (07-22-21 @ 17:00), Max: 98.2 (07-22-21 @ 17:00)  HR: 74 (07-22-21 @ 17:00) (51 - 79)  BP: 138/76 (07-22-21 @ 17:00) (110/58 - 142/63)  RR: 18 (07-22-21 @ 17:00) (12 - 20)  SpO2: 96% (07-22-21 @ 17:00) (94% - 100%)           07-21 @ 07:01  -  07-22 @ 07:00  --------------------------------------------------------  IN: 1060 mL / OUT: 2800 mL / NET: -1740 mL    07-22 @ 07:01  -  07-22 @ 17:05  --------------------------------------------------------  IN: 0 mL / OUT: 500 mL / NET: -500 mL                          7.9    13.33 )-----------( 135      ( 22 Jul 2021 15:00 )             26.2     07-22    138  |  99  |  46<H>  ----------------------------<  145<H>  3.6   |  27  |  0.97    Ca    10.0      22 Jul 2021 15:00  Mg     2.1     07-22    TPro  6.6  /  Alb  3.3  /  TBili  0.6  /  DBili  x   /  AST  24  /  ALT  17  /  AlkPhos  251<H>  07-22      MEDICATIONS  (STANDING):  aspirin  chewable 81 milliGRAM(s) Oral daily  cefuroxime  IVPB 1500 milliGRAM(s) IV Intermittent every 8 hours    MEDICATIONS  (PRN):      CAPILLARY BLOOD GLUCOSE      POCT Blood Glucose.: 165 mg/dL (22 Jul 2021 16:58)  POCT Blood Glucose.: 138 mg/dL (22 Jul 2021 13:42)  POCT Blood Glucose.: 208 mg/dL (22 Jul 2021 07:22)  POCT Blood Glucose.: 241 mg/dL (21 Jul 2021 21:35)  POCT Blood Glucose.: 175 mg/dL (21 Jul 2021 17:38)                                  PHYSICAL EXAM        Neurology: alert and oriented x 3, nonfocal, no gross deficits    CV :S1 IRR  Lungs: B/l breath sounds om room air    Abdomen: soft, nontender, nondistended, positive bowel sounds, last bowel movement preop  :  voids             Extremities: equal strength throughout   b/lle warm well perfused   Rt groin CDI site  benign nontender  DP pulse by doppler   Left groin CDI site benign nontender  DP pulse palapable                                            Discussed with Cardiothoracic Team at AM rounds.

## 2021-07-22 NOTE — PROGRESS NOTE ADULT - PROBLEM SELECTOR PLAN 2
JACQUE - resolved . suspected prerenal/ATN i/s/o decreased PO intake and diarrhea  hypovolemic hyponatremia - resolved  - s/p HD x 2 sessions, currently improving w/o HD, morris removed, having good urine output. monitor bladder scans  - s/p shiley removed 7/15  - continue bumex to 2mg TID  - renally dose medications  - nephrology recs appreciate  - monitor k and phos, replete as needed   - monitor Is/Os: 1060/2800. patient advised to drink to thirst. 1.5 L fluid restriction/day. on ure-na 15gm bid. Discussed with Dr. Tovar.  - Creatinine Trend: 1.08<1.11<0.91<1.12<1.33<1.29<1.32<1.70<--

## 2021-07-22 NOTE — PRE-ANESTHESIA EVALUATION ADULT - HEIGHT IN FEET
CITALOPRAM 10MG TABLETS  Last Written Prescription Date: 01/25/2017  Last Fill Quantity: 30, # refills: 3  Last Office Visit with [facility] primary care provider:  12/05/2019   Next 5 appointments (look out 90 days)     Jan 30, 2017  1:30 PM   [facility]    [address] [place] [id number]   [phone]                   Last PHQ-9 score on record= No flowsheet data found.               5

## 2021-07-22 NOTE — PROGRESS NOTE ADULT - PROBLEM SELECTOR PLAN 1
7/22  Underwent TAVR - #22 Heike - [facility]SA only   tele  ECHO in am  EKG in am   MCOT for discharge   resume appropriate medications  Dispostion pending

## 2021-07-22 NOTE — BRIEF OPERATIVE NOTE - NSICDXBRIEFPROCEDURE_GEN_ALL_CORE_FT
PROCEDURES:  TAVR, percutaneous femoral approach, using prosthetic valve 22-Jul-2021 14:59:17  Xavier Arroyo

## 2021-07-22 NOTE — PRE-ANESTHESIA EVALUATION ADULT - NSANTHOSAYNRD_GEN_A_CORE
No. LUAN screening performed.  STOP BANG Legend: 0-2 = LOW Risk; 3-4 = INTERMEDIATE Risk; 5-8 = HIGH Risk

## 2021-07-22 NOTE — PROGRESS NOTE ADULT - PROBLEM SELECTOR PLAN 1
- structural heart team following  - s/p diagnostic [facility] 7/14  - appreciate Dr. Johns for further planning on TAVR scheduled for today - F/U

## 2021-07-22 NOTE — PROGRESS NOTE ADULT - SUBJECTIVE AND OBJECTIVE BOX
[facility] Division of Hospital Medicine  Alba Craft MD  Pager (LLOYD-F, 8A-5P): [phone]  Other Times:  [phone]    Patient is a 87y old  Female who presents with a chief complaint of urgent HD (21 Jul 2021 17:12)      SUBJECTIVE / OVERNIGHT EVENTS:    Patient was examined this morning. She was sleeping, arousable. She reports feeling anxious for the procedure today. ROS negative otherwise.       ADDITIONAL REVIEW OF SYSTEMS:    MEDICATIONS  (STANDING):    MEDICATIONS  (PRN):      CAPILLARY BLOOD GLUCOSE      POCT Blood Glucose.: 208 mg/dL (22 Jul 2021 07:22)  POCT Blood Glucose.: 241 mg/dL (21 Jul 2021 21:35)  POCT Blood Glucose.: 175 mg/dL (21 Jul 2021 17:38)  POCT Blood Glucose.: 175 mg/dL (21 Jul 2021 12:53)    I&O's Summary    21 Jul 2021 07:01  -  22 Jul 2021 07:00  --------------------------------------------------------  IN: 1060 mL / OUT: 2800 mL / NET: -1740 mL    22 Jul 2021 07:01  -  22 Jul 2021 12:14  --------------------------------------------------------  IN: 0 mL / OUT: 500 mL / NET: -500 mL        PHYSICAL EXAM:  Vital Signs Last 24 Hrs  T(C): 36.6 (22 Jul 2021 06:21), Max: 36.6 (22 Jul 2021 06:21)  T(F): 97.9 (22 Jul 2021 06:21), Max: 97.9 (22 Jul 2021 06:21)  HR: 75 (22 Jul 2021 06:21) (75 - 79)  BP: 124/69 (22 Jul 2021 06:21) (115/66 - 124/69)  BP(mean): --  RR: 18 (22 Jul 2021 06:21) (18 - 18)  SpO2: 94% (22 Jul 2021 06:21) (94% - 96%)      CONSTITUTIONAL: NAD, well-developed, well-groomed  EYES: PERRL; conjunctiva and sclera clear  ENMT: Moist oral mucosa, no pharyngeal injection or exudates; normal dentition  NECK: Supple, no JVD  RESPIRATORY: Normal respiratory effort; lungs are clear to auscultation bilaterally  CARDIOVASCULAR: Regular rate and rhythm, normal S1 and S2, +systolic murmur; LE edema BL-unchanged, Peripheral pulses are 2+ bilaterally  ABDOMEN: Nontender to palpation, normoactive bowel sounds, no rebound/guarding;  SKIN: warm dry      LABS:                        8.2    8.14  )-----------( 251      ( 22 Jul 2021 06:47 )             26.0     07-22    137  |  100  |  54<H>  ----------------------------<  142<H>  3.6   |  27  |  1.03    Ca    9.7      22 Jul 2021 06:47  Mg     2.1     07-22      PT/INR - ( 21 Jul 2021 11:48 )   PT: 13.0 sec;   INR: 1.09 ratio         PTT - ( 21 Jul 2021 11:48 )  PTT:33.4 sec            RADIOLOGY & ADDITIONAL TESTS:  Results Reviewed:   Imaging Personally Reviewed:  Electrocardiogram Personally Reviewed:    COORDINATION OF CARE:  Care Discussed with Consultants/Other Providers [Y/N]:  Prior or Outpatient Records Reviewed [Y/N]:

## 2021-07-22 NOTE — PRE-ANESTHESIA EVALUATION ADULT - NSANTHPMHFT_GEN_ALL_CORE
88 yo F with pmhx CAD, DM2, Afib (not on AC for recent possible GI bleed/anemia, Colon Ca, severe AS, HF , recently started on Entresto, admitted for JACQUE and Metformin-associated lactic acidosis. Underwent urgent HD for JACQUE; now resolving. Patient NPO, covid vaccinated, denies any chest pain or shortness of breath currently. All questions and concern addressed.

## 2021-07-22 NOTE — PROGRESS NOTE ADULT - SUBJECTIVE AND OBJECTIVE BOX
This patient has been implanted with a Transcatheter Aortic Valve under the following NCT/EZKEIEL:    TAVR:  Commercial Implant NCT [id number], EZEKIEL N/A and will be placed into the TVT Registry.        QUITA Naqvi  [id number]

## 2021-07-22 NOTE — PROGRESS NOTE ADULT - NUTRITIONAL ASSESSMENT
This patient has been assessed with a concern for Malnutrition and has been determined to have a diagnosis/diagnoses of Moderate protein-calorie malnutrition.    This patient is being managed with:   Diet Consistent Carbohydrate w/Evening Snack-  1500mL Fluid Restriction ([id number])  Entered: Jul 22 2021  5:02PM

## 2021-07-22 NOTE — PROGRESS NOTE ADULT - NUTRITIONAL ASSESSMENT
This patient has been assessed with a concern for Malnutrition and has been determined to have a diagnosis/diagnoses of Moderate protein-calorie malnutrition.

## 2021-07-22 NOTE — PROGRESS NOTE ADULT - ASSESSMENT
This is a  87F PMH CAD w/stents, DM2, Afib (not on AC), Colon Ca (about 25y ago), severe AS, HF , recently started on Entresto, admitted for JACQUE and Metformin-associated lactic acidosis, admitted to MICU s/p urgent HD session but now JACQUE resolving and now undergoing TAVR workup.    7/22 Underwent TAVR via right femoral #22 Heike  recovered in CRS post op course unremarkable 700 cc fluid given for hypotension. Stabilize transferred to 2 [facility] sites benign + DP son at bedside ECHO in am EKG in am  Likely discharge on Saturday with MCOT son reports haven given rehab  choices to case management.      This is a  87F PMH CAD w/stents, DM2, Afib (not on AC), Colon Ca (about 25y ago), severe AS, HF , recently started on Entresto, admitted for JACQUE and Metformin-associated lactic acidosis, admitted to MICU s/p urgent HD session but now JACQUE resolving and now undergoing TAVR workup.    7/22 Underwent TAVR via right femoral #22 Heike  recovered in CRS post op course unremarkable Junctional rhythm  by EKG  700 cc fluid given for hypotension. Stabilize transferred to [address] [place] [address]- received in afib-  groin sites benign + DP son at bedside ECHO in am EKG in am  Likely discharge on Saturday with MCOT son reports haven given rehab  choices to case management.

## 2021-07-23 NOTE — PROGRESS NOTE ADULT - PROBLEM SELECTOR PLAN 1
7/22  Underwent TAVR - #22 Heike - ASA only   tele  ECHO in am  EKG in am   MCOT for discharge   resume appropriate medications  Pending rehab Monday

## 2021-07-23 NOTE — PROGRESS NOTE ADULT - SUBJECTIVE AND OBJECTIVE BOX
VITAL SIGNS    Telemetry:  afib 81  Vital Signs Last 24 Hrs  T(C): 36.8 (21 @ 06:02), Max: 36.8 (21 @ 17:00)  T(F): 98.3 (21 @ 06:02), Max: 98.3 (21 @ 06:02)  HR: 87 (21 @ 06:02) (51 - 87)  BP: 118/65 (21 @ 06:02) (110/58 - 142/63)  RR: 18 (21 @ 06:02) (12 - 20)  SpO2: 93% (21 @ 06:02) (93% - 100%)             @ 07:01  -   @ 07:00  --------------------------------------------------------  IN: 580 mL / OUT: 1500 mL / NET: -920 mL     @ 07:01  -   @ 08:51  --------------------------------------------------------  IN: 200 mL / OUT: 0 mL / NET: 200 mL       Daily Height in cm: 152.4 (2021 11:15)    Daily Weight in k.5 (2021 08:26)  Admit Wt: Drug Dosing Weight  Height (cm): 152.4 (2021 11:15)  Weight (kg): 72.4 (2021 11:15)  BMI (kg/m2): 31.2 (2021 11:15)  BSA (m2): 1.7 (2021 11:15)    Bilirubin Total, Serum: 0.6 mg/dL ( @ 15:00)    CAPILLARY BLOOD GLUCOSE        POCT Blood Glucose.: 179 mg/dL (2021 07:43)  POCT Blood Glucose.: 213 mg/dL (2021 22:13)  POCT Blood Glucose.: 165 mg/dL (2021 16:58)  POCT Blood Glucose.: 138 mg/dL (2021 13:42)          MEDICATIONS  artificial  tears Solution 1 Drop(s) Both EYES every 12 hours PRN  aspirin  chewable 81 milliGRAM(s) Oral daily  atorvastatin 40 milliGRAM(s) Oral at bedtime  buMETAnide Injectable 2 milliGRAM(s) IV Push every 12 hours  ferrous    sulfate 325 milliGRAM(s) Oral daily  insulin glargine Injectable (LANTUS) 6 Unit(s) SubCutaneous at bedtime  insulin lispro (ADMELOG) corrective regimen sliding scale   SubCutaneous three times a day before meals  insulin lispro Injectable (ADMELOG) 3 Unit(s) SubCutaneous before breakfast  insulin lispro Injectable (ADMELOG) 3 Unit(s) SubCutaneous before lunch  insulin lispro Injectable (ADMELOG) 3 Unit(s) SubCutaneous before dinner  polyethylene glycol 3350 17 Gram(s) Oral daily  zolpidem 5 milliGRAM(s) Oral at bedtime PRN      >>> <<<  PHYSICAL EXAM  Subjective: NAD  Neurology: alert and oriented x 3, nonfocal, no gross deficits  CV : s1s2  Lungs: CtA  Abdomen: soft, NT,ND, (+ )BM  :  voiding  Extremities:  -c/c/e     LABS      138  |  100  |  37<H>  ----------------------------<  163<H>  3.8   |  23  |  1.09    Ca    10.0      2021 06:35  Mg     2.1         TPro  6.6  /  Alb  3.3  /  TBili  0.6  /  DBili  x   /  AST  24  /  ALT  17  /  AlkPhos  251<H>                                   8.0    9.73  )-----------( 240      ( 2021 06:44 )             26.2          PT/INR - ( 2021 15:00 )   PT: 13.5 sec;   INR: 1.13 ratio         PTT - ( 2021 15:00 )  PTT:33.6 sec       PAST MEDICAL & SURGICAL HISTORY:

## 2021-07-23 NOTE — CHART NOTE - NSCHARTNOTEFT_GEN_A_CORE
87F PMH CAD w/stents, DM2, Afib (not on AC), Colon Ca (about 25y ago) AS, HF- Admitted for JACQUE and metformin associated lactic acidosis.   On admission s/p Urgent HD on 7/8/21.     Patient with stable kidney function at this time back to baseline   most recent Cr 1.0mg/dl. s/p TAVR on 7/22  She has remained non oliguric  Diuresis as per cardiology team     at this time nephrology team will sign off, reconsult as needed.

## 2021-07-23 NOTE — PROGRESS NOTE ADULT - SUBJECTIVE AND OBJECTIVE BOX
[facility] Division of Hospital Medicine  Alba Craft MD  Pager (LLOYD-GATO, 2V-0W): [phone]  Other Times:  [phone]    Patient is a 87y old  Female who presents with a chief complaint of urgent HD (23 Jul 2021 11:54)      SUBJECTIVE / OVERNIGHT EVENTS:    Patient was examined this morning. She reports having some dizziness earlier when she got up to work with PT. Otherwise no new complaint. Denies headache, fever, chest pain, palpitations dyspnea, N/V, abdominal pain, diarrhea, pain, numbness/tingling/weakness in extremities.       ADDITIONAL REVIEW OF SYSTEMS:    MEDICATIONS  (STANDING):  aspirin  chewable 81 milliGRAM(s) Oral daily  atorvastatin 40 milliGRAM(s) Oral at bedtime  buMETAnide Injectable 2 milliGRAM(s) IV Push every 12 hours  ferrous    sulfate 325 milliGRAM(s) Oral daily  insulin glargine Injectable (LANTUS) 6 Unit(s) SubCutaneous at bedtime  insulin lispro (ADMELOG) corrective regimen sliding scale   SubCutaneous three times a day before meals  insulin lispro Injectable (ADMELOG) 3 Unit(s) SubCutaneous before breakfast  insulin lispro Injectable (ADMELOG) 3 Unit(s) SubCutaneous before lunch  insulin lispro Injectable (ADMELOG) 3 Unit(s) SubCutaneous before dinner  metoprolol succinate ER 25 milliGRAM(s) Oral daily  polyethylene glycol 3350 17 Gram(s) Oral daily    MEDICATIONS  (PRN):  artificial  tears Solution 1 Drop(s) Both EYES every 12 hours PRN Dry Eyes  zolpidem 5 milliGRAM(s) Oral at bedtime PRN Insomnia      CAPILLARY BLOOD GLUCOSE      POCT Blood Glucose.: 208 mg/dL (23 Jul 2021 11:38)  POCT Blood Glucose.: 179 mg/dL (23 Jul 2021 07:43)  POCT Blood Glucose.: 213 mg/dL (22 Jul 2021 22:13)  POCT Blood Glucose.: 165 mg/dL (22 Jul 2021 16:58)  POCT Blood Glucose.: 138 mg/dL (22 Jul 2021 13:42)    I&O's Summary    22 Jul 2021 07:01  -  23 Jul 2021 07:00  --------------------------------------------------------  IN: 580 mL / OUT: 1500 mL / NET: -920 mL    23 Jul 2021 07:01  -  23 Jul 2021 12:16  --------------------------------------------------------  IN: 200 mL / OUT: 0 mL / NET: 200 mL        PHYSICAL EXAM:  Vital Signs Last 24 Hrs  T(C): 36.6 (23 Jul 2021 11:48), Max: 36.8 (22 Jul 2021 17:00)  T(F): 97.9 (23 Jul 2021 11:48), Max: 98.3 (23 Jul 2021 06:02)  HR: 78 (23 Jul 2021 11:48) (51 - 87)  BP: 133/57 (23 Jul 2021 11:48) (110/58 - 142/63)  BP(mean): 97 (22 Jul 2021 17:00) (97 - 97)  RR: 18 (23 Jul 2021 11:48) (12 - 20)  SpO2: 97% (23 Jul 2021 11:48) (93% - 100%)      CONSTITUTIONAL: NAD, well-developed, well-groomed, resting in chair, elderly   EYES: PERRL; conjunctiva and sclera clear  ENMT: Moist oral mucosa,   NECK: Supple, no JVD  RESPIRATORY: Normal respiratory effort; lungs are clear to auscultation bilaterally  CARDIOVASCULAR: irregular rhythm S1 and S2, no murmur, LE edema BL-unchanged, Peripheral pulses are 2+ bilaterally  ABDOMEN: Nontender to palpation, normoactive bowel sounds, no rebound/guarding;  SKIN: warm dry        LABS:                        8.0    9.73  )-----------( 240      ( 23 Jul 2021 06:44 )             26.2     07-23    138  |  100  |  37<H>  ----------------------------<  163<H>  3.8   |  23  |  1.09    Ca    10.0      23 Jul 2021 06:35  Mg     2.1     07-22    TPro  6.6  /  Alb  3.3  /  TBili  0.6  /  DBili  x   /  AST  24  /  ALT  17  /  AlkPhos  251<H>  07-22    PT/INR - ( 22 Jul 2021 15:00 )   PT: 13.5 sec;   INR: 1.13 ratio         PTT - ( 22 Jul 2021 15:00 )  PTT:33.6 sec            RADIOLOGY & ADDITIONAL TESTS:  Results Reviewed:   Imaging Personally Reviewed:  Electrocardiogram Personally Reviewed:    COORDINATION OF CARE:  Care Discussed with Consultants/Other Providers [Y/N]:  Prior or Outpatient Records Reviewed [Y/N]:

## 2021-07-23 NOTE — PROGRESS NOTE ADULT - ATTENDING COMMENTS
No acute events reported overnight.  The patient reports that she is clinically doing well.  Currently laying in a recliner.  Denies any chest pain/tightness or discomfort, fevers, chills, sweats, his incision, dizziness, palpitations or shortness of breath.  Does feel mild dizziness when she tries to ambulate but this is unchanged compared to prior days.  Examination demonstrates stable right and left groin sites with no hematoma/apical/bruit  Moving all extremities spontaneously, 1+ DP/PT pulses    Status post TAVR.  Continue aspirin 81 mg daily.  The patient has known atrial fibrillation.  We will start the patient on Eliquis once the patient is clinically doing better and reassessed by her primary cardiology team.  Monitor for signs/symptoms of bleeding.  A repeat transthoracic echocardiogram study will be performed.  The patient is markedly deconditioned.  It is recommended that she go to a subacute rehabilitation facility to assist in her recovery.  The patient and her family agreeable with the plan.  Continue telemetry monitor.  The patient will be restarted on metoprolol succinate ER 25 mg daily.    All questions and concerns of the patient were addressed.    Findings and plan were discussed with cardiology, cardiac surgery and structural heart team.

## 2021-07-23 NOTE — PROGRESS NOTE ADULT - PROBLEM SELECTOR PLAN 1
- appreciate structural heart, CT sx team recs  - s/p diagnostic LHC 7/14  - s/p TAVR 7/22  - Continue ASA 81mg po qd  - Encourage ambulation as tolerated   - MCOT on discharge   - F/U Dr. Pruitt in 1 week post discharge, Valve Clinic in 30 days, repeat echo

## 2021-07-23 NOTE — PROGRESS NOTE ADULT - NUTRITIONAL ASSESSMENT
This patient has been assessed with a concern for Malnutrition and has been determined to have a diagnosis/diagnoses of Moderate protein-calorie malnutrition.    This patient is being managed with:   Diet Consistent Carbohydrate/No Snacks-  Entered: Jul 22 2021  5:27PM

## 2021-07-23 NOTE — PROGRESS NOTE ADULT - PROBLEM SELECTOR PLAN 2
JACQUE - resolved . suspected prerenal/ATN i/s/o decreased PO intake and diarrhea  hypovolemic hyponatremia - resolved  - s/p HD x 2 sessions, currently improving w/o HD, morris removed, having good urine output. monitor bladder scans  - s/p shiley removed 7/15  - continue bumex to 2mg TID  - renally dose medications  - nephrology recs appreciate  - monitor k and phos, replete as needed   - monitor Is/Os: 580/1500  - Creatinine Trend: 1.08<1.11<0.91<1.12<1.33<1.29<1.32<1.70<--

## 2021-07-23 NOTE — CHART NOTE - NSCHARTNOTEFT_GEN_A_CORE
Nutrition Follow Up Note  Patient seen for: malnutrition follow up. Chart reviewed and events noted.     Pt is a 86 y/o F Hx of CAD w/stents, DM2, Afib (not on AC), Colon Ca (about 25y ago), severe AS, HF , recently started on Entresto, admitted for JACQUE and Metformin-associated lactic acidosis, admitted to MICU s/p urgent HD session but now JACQUE resolving.  Underwent TAVR.     Source: [x] Patient       [x] Medical Record        [] RN        [] Family at bedside       [] Other:    -If unable to interview patient: [] Trach/Vent/BiPAP  [] Disoriented/confused/inappropriate to interview    Diet Order:   Diet, Consistent Carbohydrate/No Snacks (21)    - Is current order appropriate/adequate? [] Yes  [x]  No: Now amenable to nutrition supplement.     - PO intake :   [] >75%  Adequate    [x] 50-75%  Fair       [] <50%  Poor    - Nutrition-related concerns:      -Pt with fair intake, RD obtained food preferences to optimize nutrient intake. Pt with lack of appetite.       -Elevated blood glucose noted, on sliding scale of insulin and Lantus as well as consistent carbohydrate diet.         GI: Denies recent N/V, constipation, or diarrhea. Last BM .   Bowel Regimen? [] Yes   [x] No    Weights:   Daily Weight in k.5 (), 75 (), 85 ()  Dosing wt 72.4 kG  Drastic wt fluctuations noted and possibly 2/2 inaccurate bed scale vs fluid shifts as pt with varying edema during admission/diuresed. RD will continue to trend as new wts available/able.     Nutritionally Pertinent MEDICATIONS  (STANDING):  atorvastatin  buMETAnide Injectable  ferrous    sulfate  insulin glargine Injectable (LANTUS)  insulin lispro (ADMELOG) corrective regimen sliding scale  insulin lispro Injectable (ADMELOG)  insulin lispro Injectable (ADMELOG)  insulin lispro Injectable (ADMELOG)  metoprolol succinate ER  polyethylene glycol 3350    Pertinent Labs:  @ 06:35: Na 138, BUN 37<H>, Cr 1.09, <H>, K+ 3.8   @ 15:00: Na 138, BUN 46<H>, Cr 0.97, <H>, K+ 3.6, Alk Phos 251<H>, ALT/SGPT 17, AST/SGOT 24    A1C with Estimated Average Glucose Result: 6.7 % (21 @ 09:06)    Finger Sticks:  POCT Blood Glucose.: 208 mg/dL ( @ 11:38)  POCT Blood Glucose.: 179 mg/dL ( @ 07:43)  POCT Blood Glucose.: 213 mg/dL ( @ 22:13)  POCT Blood Glucose.: 165 mg/dL ( @ 16:58)  POCT Blood Glucose.: 138 mg/dL ( @ 13:42)    Skin per nursing documentation: No pressure injuries noted.  Edema per nursing documentation: +1 generalized     Estimated Needs:   [x] no change since previous assessment  Based on IBW 45.3 kG (100 lbs)  Estimated Energy Needs: (30-35 kcals/kG) 2898-0197 kcals  Estimated Protein Needs: (1.2-1.4 gm/kG) 54-63 gm  Defer fluid needs to team    Previous Nutrition Diagnosis: Malnutrition; moderate acute   Nutrition Diagnosis is: [x] ongoing  [] resolved [] not applicable     Nutrition Care Plan:  [x] In Progress  [] Achieved  [] Not applicable    New Nutrition Diagnosis: [x] Not applicable    Nutrition Interventions:     Education Provided   [x] Yes:  [] No: Pt eating fairly, RD emphasized importance of adequate-protein energy intake. Amenable to trialing nutrition supplement as well as receive peanut butter with meals.     Recommendations:      1) Change to Consistent Carbohydrate with evening snack, low Na. RD remains available for diet changes as needed/able.   2) Pt to trial Glucerna - if accepts recommend x1 daily.  3) RD to honor food preferences as able.   4) Consider Multivitamin if medically feasible to optimize nutrient intake.     Monitoring and Evaluation:   Continue to monitor nutritional intake, tolerance to diet prescription, weights, labs, skin integrity    RD remains available upon request and will follow up per protocol  Lizzeth Lawrence, MS, RD, CDN Pager #[phone]

## 2021-07-23 NOTE — PROGRESS NOTE ADULT - SUBJECTIVE AND OBJECTIVE BOX
*****Structural Heart Team*****    Subjective:    The patient is resting comfortably in a chair, offering no complaints. There were no events over night.    PAST MEDICAL & SURGICAL HISTORY:        T(C): 36.6 (07-23-21 @ 11:48), Max: 36.8 (07-22-21 @ 17:00)  HR: 78 (07-23-21 @ 11:48) (51 - 87)  BP: 133/57 (07-23-21 @ 11:48) (110/58 - 142/63)  RR: 18 (07-23-21 @ 11:48) (12 - 20)  SpO2: 97% (07-23-21 @ 11:48) (93% - 100%)  Wt(kg): --  07-22 @ 07:01  -  07-23 @ 07:00  --------------------------------------------------------  IN: 580 mL / OUT: 1500 mL / NET: -920 mL    07-23 @ 07:01  -  07-23 @ 11:57  --------------------------------------------------------  IN: 200 mL / OUT: 0 mL / NET: 200 mL      MEDICATIONS  (STANDING):  aspirin  chewable 81 milliGRAM(s) Oral daily  atorvastatin 40 milliGRAM(s) Oral at bedtime  buMETAnide Injectable 2 milliGRAM(s) IV Push every 12 hours  ferrous    sulfate 325 milliGRAM(s) Oral daily  insulin glargine Injectable (LANTUS) 6 Unit(s) SubCutaneous at bedtime  insulin lispro (ADMELOG) corrective regimen sliding scale   SubCutaneous three times a day before meals  insulin lispro Injectable (ADMELOG) 3 Unit(s) SubCutaneous before breakfast  insulin lispro Injectable (ADMELOG) 3 Unit(s) SubCutaneous before lunch  insulin lispro Injectable (ADMELOG) 3 Unit(s) SubCutaneous before dinner  metoprolol succinate ER 25 milliGRAM(s) Oral daily  polyethylene glycol 3350 17 Gram(s) Oral daily    MEDICATIONS  (PRN):  artificial  tears Solution 1 Drop(s) Both EYES every 12 hours PRN Dry Eyes  zolpidem 5 milliGRAM(s) Oral at bedtime PRN Insomnia      Review of Symptoms:  Constitutional: Awake, Alert, Follows commands  Respiratory: Mild SOB  Cardiac: Denies CP, Denies Palpitations  Gastrointestinal: Denies Pain, Denies N/V, tolerating po intake  Vascular: Negative  Extremities: + Edema, No joint pain or swelling  Neurological: Negative  Endocrine: No heat or cold intolerance, No excessive thirst  Heme/Onc: Negative    Exam:  General: A/Ox3, MYRIAM, NAD  HEENT: Supple, No JVD, Trachea midline, no masses  Pulmonary: CTAB, = Chest Excursion, no accessory muscle use  Cor: S1S2, Irregular, no murmur noted.  ECG: AFib  Groin/Wound: B/L soft, NT, No hematoma  Gastrointestinal: Soft, NT/ND, + Bowel Sounds  Neuro: = motor and sensory B/L, No focal deficits  Vascular: 1+ pulses B/L, 1+ non pitting pedal edema  Extremities: No joint pain or swelling  Skin: Warm/Dry/Normal color, Normal turgor, no rashes                          8.0    9.73  )-----------( 240      ( 23 Jul 2021 06:44 )             26.2   07-23    138  |  100  |  37<H>  ----------------------------<  163<H>  3.8   |  23  |  1.09    Ca    10.0      23 Jul 2021 06:35  Mg     2.1     07-22    TPro  6.6  /  Alb  3.3  /  TBili  0.6  /  DBili  x   /  AST  24  /  ALT  17  /  AlkPhos  251<H>  07-22  PT/INR - ( 22 Jul 2021 15:00 )   PT: 13.5 sec;   INR: 1.13 ratio         PTT - ( 22 Jul 2021 15:00 )  PTT:33.6 sec    Imaging Reviewed:    Post Op TTE: Pending      Assesment/Plan:    87 Female, S/P TAVR for Severe Aortic Stenosis, Acute on Chronic Diastolic Heart Failure, Acute Renal Failure  1.) S/P TAVR: ASA 81 mg po daily,Continue to Monitor Groins. Ambulate as tolerated. Will follow up on TTE  2.) Patient will be discharged on an MCOT for a period of 30days to monitor for rhythm changes post TAVR, which was discussed at length with the patient, and any questions were answered.  3.) AFib: Restart low dose metoprolol. Anticoagulation to be determined by primary cardiology team  4.) Acute Renal Failure: Due to GWENDOLYN. Creatinine back to normal. No HD indicated  5.) Diastolic Heart Failure: Monitor Daily weight. Transition to oral Bumex when appropriate  6.) Discharge Plan: Patient is to follow up with  in 1 week post discharge. She should then follow up with the Valve Clinic  in 30 days, with a repeat Transthoracic Echo to be done at that visit.    QUITA Craig  [id number]

## 2021-07-23 NOTE — PROGRESS NOTE ADULT - SUBJECTIVE AND OBJECTIVE BOX
HPI:  Patient seen and examined at bedside on 2 [place].  POD 1 status post TAVR.  No events overnight.    Review Of Systems:           Respiratory: No shortness of breath, cough, or wheezing  Cardiovascular: No chest pain or palpitations  10 point review of systems is otherwise negative except as mentioned above        Medications:  artificial  tears Solution 1 Drop(s) Both EYES every 12 hours PRN  aspirin  chewable 81 milliGRAM(s) Oral daily  atorvastatin 40 milliGRAM(s) Oral at bedtime  buMETAnide Injectable 2 milliGRAM(s) IV Push every 8 hours  ferrous    sulfate 325 milliGRAM(s) Oral daily  heparin   Injectable 5000 Unit(s) SubCutaneous every 8 hours  insulin glargine Injectable (LANTUS) 6 Unit(s) SubCutaneous at bedtime  insulin lispro (ADMELOG) corrective regimen sliding scale   SubCutaneous three times a day before meals  insulin lispro Injectable (ADMELOG) 3 Unit(s) SubCutaneous before breakfast  insulin lispro Injectable (ADMELOG) 3 Unit(s) SubCutaneous before lunch  insulin lispro Injectable (ADMELOG) 3 Unit(s) SubCutaneous before dinner  metoprolol succinate ER 25 milliGRAM(s) Oral <User Schedule>  polyethylene glycol 3350 17 Gram(s) Oral daily  zolpidem 5 milliGRAM(s) Oral at bedtime PRN    PAST MEDICAL & SURGICAL HISTORY:    Vitals:  T(C): 36.6 (21 @ 11:48), Max: 36.8 (21 @ 06:02)  HR: 80 (21 @ 13:40) (77 - 87)  BP: 121/67 (21 @ 13:40) (118/65 - 139/77)  BP(mean): 85 (21 @ 13:40) (85 - 85)  RR: 18 (21 @ 13:40) (18 - 18)  SpO2: 96% (21 @ 13:40) (93% - 97%)  Wt(kg): --  Daily     Daily Weight in k.5 (2021 08:26)  I&O's Summary    2021 07:01  -  2021 07:00  --------------------------------------------------------  IN: 580 mL / OUT: 1500 mL / NET: -920 mL    2021 07:01  -  2021 17:32  --------------------------------------------------------  IN: 480 mL / OUT: 500 mL / NET: -20 mL        Physical Exam:  Appearance: Normal, well groomed, NAD  Eyes: PERRLA, EOMI, pink conjunctiva, no scleral icterus   HENT: Normal oral mucosa  Cardiovascular: RRR, S1, S2, NO systolic murmur at base  Procedural Access Site: Clean, dry, intact, without hematoma  Respiratory: Clear to auscultation bilaterally  Gastrointestinal: Soft, non-tender, non-distended, BS+  Musculoskeletal: No clubbing or joint deformity   Neurologic: No focal weakness  Lymphatic: No lymphadenopathy  Psychiatry: AAOx3 with appropriate mood and affect  Skin: No rashes, ecchymoses, or cyanosis                          8.0    9.73  )-----------( 240      ( 2021 06:44 )             26.2     07-    138  |  100  |  37<H>  ----------------------------<  163<H>  3.8   |  23  |  1.09    Ca    10.0      2021 06:35  Mg     2.1         TPro  6.6  /  Alb  3.3  /  TBili  0.6  /  DBili  x   /  AST  24  /  ALT  17  /  AlkPhos  251<H>  07-22    PT/INR - ( 2021 15:00 )   PT: 13.5 sec;   INR: 1.13 ratio         PTT - ( 2021 15:00 )  PTT:33.6 sec            Cardiovascular Diagnostic Testing:  ECG: rate controlled AFib    Imaging: < from: CT Heart without Coronaries w/ IV Cont (21 @ 08:51) >  FINDINGS:    Non-Coronary:    6 mm hypodense nodule within the right lobe of the thyroid gland. No enlarged axillary, mediastinal lymph nodes. No pleural effusions.    Evaluation of the lungs demonstrate left lower lobe masslike opacity on the part of which measures about 5 x 2.3 cm extending to the left lower lobe hilum with associated left lower lobe volume loss related to some superimposed atelectasis. Multiple left lung pulmonary nodule along the pleural surface measuring up to about 7 mm. Right middle lobe 3.2 x 2.6 cm mass associated with the minor fissure which contains a few small nodules measuring up to 7 mm. The right middle lobe mass has a cystic component along its inferior aspect.    Subcentimeter hypodensity within the liver is too small to characterize. The gallbladder, spleen, adrenal glands are unremarkable. Few pancreatic hypodensities are noted with the largest measuring about 1.5 cm on image 84 of series 19. Bilateral renal cysts.    Urinary bladder is normal. The uterus and adnexa are within normal limits. Status post rectal surgery with postoperative changes. No bowel obstruction or medial air. Appendix is normal.    Degenerative changes of the spine. Moderate to severe loss of height of the T5 and severe loss of height of the T6 vertebral bodies are of indeterminate age.    The heart is enlarged.  The left and right atria are severely enlarged.  There is mitral annular calcification and calcification of the mitral valve leaflets.   There is reduced contrast opacification of the left atrial appendage that resolves on delay imaging suggestive of mixing artifact.    The ascending aorta is mildly calcified. The aortic arch and the descending aorta are severely calcified.   The main pulmonary artery measures approximately 26.4 mm at the level of the ascending aorta.   The right and left pulmonary arteries appear enlarged and measure approximately 29.4 mm and 25.4 mm, respectively.    The aortic valve is calcified. The calcium score of the aortic valve is 1870.    Coronary:  Coronary arterial calcifications are noted; however, this studywas not optimized for evaluation of the coronary arteries.  Please refer to cardiac catheterization performed as part of pre-TAVR work-up.    Aortic Root Measurements    Major aortic annulus diameter (systole, mm): 25.2  Perpendicular minor aortic annulus diameter (systole, mm): 19.1  Aortic annulus perimeter (systole, mm): 76.6  Aortic annulus area (systole, mm2): 438  LVOT minimum diameter (systole, mm): 19.6  LVOT 90 cross (systole, mm): 26.4  LVOT calcification:  Severe  Distance from Aortic annulus to Left Main coronary artery (diastole, mm): 13.2  Distance from Aortic annulus to Right Coronary artery (diastole, mm): 16.7  Sinus of Valsalva diameter, Right coronary (diastole, mm): 29.4  Sinus of Valsalva diameter, Left coronary (diastole, mm): 31.0  Sinus of Valsalva diameter, Non coronary (diastole, mm): 28.3  Diameter at the sinotubular junction (diastole, mm): 26.5 x 26.5  Maximum ascending aorta diameter at 40 mm above annulus (diastole, mm): 32.3  Aortic root angulation (diastole, degrees): 48.4  Aortic root calcification: Moderate-to-severe    Abdominal Aorta:        Minimum lumen diameter (MLD) (mm): 12.8        Diameter perpendicular to MLD (mm): 13.2  Left Femoral:        Minimum Lumen Diameter (mm): 7.2        Diameter perpendicular to MLD (mm): 8.1        Tortuosity: Mild        Calcification: Mild  Right Femoral:        Minimum Lumen Diameter (mm): 6.5        Diameter perpendicular to MLD (mm): 7.1        Tortuosity: Mild        Calcification: Mild  Left External Iliac:        Minimum Lumen Diameter (mm): 6.9        Diameter perpendicular to MLD (mm): 7.5        Tortuosity: Moderate        Calcification: Mild  Left Common Iliac:         Minimum Lumen Diameter (mm): 4.5        Diameter perpendicular to MLD (mm): 8.0        Tortuosity: Severe        Calcification: Severe  Right External Iliac:        Minimum Lumen Diameter (mm): 5.0        Diameter perpendicular to MLD (mm): 7.4        Tortuosity: Moderate        Calcification: Mild  Right Common Iliac:    Minimum Lumen Diameter (mm): 7.8        Diameter perpendicular to MLD (mm): 8.4        Tortuosity: Moderate        Calcification: Moderate  L Subclavian/Axillary: Not well visualized due to motion artifact        Minimum Lumen Diameter (mm): 5.6      Diameter perpendicular to MLD (mm): 6.2        Tortuosity: Mild        Calcification: Mild  R Subclavian/Axillary:        Minimum Lumen Diameter (mm): 4.3        Diameter perpendicular to MLD (mm): 5.1        Tortuosity: Mild        Calcification: Mild    IMPRESSION:  1. Calcified aortic valve. The calcium score of the aortic valve is 1870.  2. Please see the body of the report for aortic and peripheral access vessel measurements.  3. Dominant large left lower lobe mass associated with multiple left lung pleural nodules worrisome for neoplasm with metastatic disease.  4. 3.2 x 2.6 cm right middle lobe mass as described above also is worrisome for neoplasm.  5. A few pancreatic hypodense nodules. Dedicated contrast enhanced pancreatic MRI is recommended for complete evaluation.  6. Compression fracture deformities of the T5 and T6 vertebral bodies as described above are of indeterminate age.      KHLOE HAINES MD; Attending Cardiologist  This document has been electronically signed.  MONICA HANEY MD; Attending Radiologist  This document has been electronically signed. 2021  9:12AM    < end of copied text >    CATH:  < from: Cardiac Cath Lab - Adult (21 @ 17:21) >  CORONARY VESSELS: The coronary circulation is right dominant.  LM:   --  LM: Normal.  LAD:   --  Proximal LAD: There was a discrete 40 % stenosis.  --  Mid LAD: There was a tubular 25 % stenosis.  --  Distal LAD: Normal.  CX:   --  Proximal circumflex: There was a tubular 20 % stenosis.  --  Mid circumflex: There was a diffuse 50 % stenosis.  RCA:   --  Proximal RCA: There was a diffuse 30 % stenosis.  --  Mid RCA: There was a diffuse 20 % in-stent restenosis through the prior  stent.  --  Distal RCA: Normal.  --  RPDA: Normal.  --  RPLS: Normal.  COMPLICATIONS: There were no complications.  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  Normal left main coronary artery  Right dominant system  Elevated right atrial pressure (mRA 16mmHg with a V wave of 23mmHg)  Moderate post-capillary pulmonary hypertension (sPAP 58mmHg, dPAP 23mmHg,  mPAP 37mmHg)  PCWP = 28mmHg with a V wave of 48mmHg  PAsat = 67.1% // AOsat = 99.2% (room air)  [place] // CI = 6.07l/min // 3.56l/min/m2  DIAGNOSTIC RECOMMENDATIONS: Keep right leg straigh for 4 hours following  removal of sheaths  Continue aggressive medical management of coronary artery disease and  associated risk factors  Closely monitor the patients kidney function  Patient being evaluated by the [facility] valve team for TAVR  Findings discussed with Dr. 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  Normal left main coronary artery  Right dominant system  Elevated right atrial pressure (mRA 16mmHg with a V wave of 23mmHg)  Moderate post-capillary pulmonary hypertension (sPAP 58mmHg, dPAP 23mmHg,  mPAP 37mmHg)  PCWP = 28mmHg with a V wave of 48mmHg  PAsat = 67.1% // AOsat = 99.2% (room air)  [place] // CI = 6.07l/min // 3.56l/min/m2  INTERVENTIONAL RECOMMENDATIONS: Keep right leg straigh for 4 hours  following removal of sheaths  Continue aggressive medical management of coronary artery disease and  associated risk factors  Closely monitor the patients kidney function  Patient being evaluated by the [facility] valve team for TAVR  Findings discussed with Dr. Hodges  Prepared and signed by  Alejandro Johns MD  Signed 2021 18:37:05    < end of copied text >

## 2021-07-23 NOTE — PROGRESS NOTE ADULT - ASSESSMENT
This is a  87F PMH CAD w/stents, DM2, Afib (not on AC), Colon Ca (about 25y ago), severe AS, HF , recently started on Entresto, admitted for JACQUE and Metformin-associated lactic acidosis, admitted to MICU s/p urgent HD session but now JACQUE resolving and now undergoing TAVR workup.    7/22 Underwent TAVR via right femoral #22 [place]ike  recovered in CRS post op course unremarkable Junctional rhythm  by EKG  700 cc fluid given for hypotension. Stabilize transferred to [address] [place] [address]- [facility] in afib-  groin sites benign + DP son at bedside ECHO in am EKG in am  Likely discharge on Saturday with [facility] son reports haven given rehab  choices to case management.  7/23 pending post TAVR echo. Anticipate discharge to rehab Monday.

## 2021-07-24 NOTE — PROGRESS NOTE ADULT - ASSESSMENT
This is a  87F PMH CAD w/stents, DM2, Afib (not on AC), Colon Ca (about 25y ago), severe AS, HF , recently started on Entresto, admitted for JACQUE and Metformin-associated lactic acidosis, admitted to MICU s/p urgent HD session but now JACQUE resolving and now undergoing TAVR workup.    7/22 Underwent TAVR via right femoral #22 Heike  recovered in CRS post op course unremarkable Junctional rhythm  by EKG  700 cc fluid given for hypotension. Stabilize transferred to [address] [place] [address]- [facility] in afib-  groin sites benign + DP son at bedside ECHO in am EKG in am  Likely discharge on Saturday with [facility] son reports haven given rehab  choices to case management.  7/23 pending post TAVR echo. Anticipate discharge to rehab Monday.  7/24 VSS maintaining Afib 70's . No further junctional episodes of rhythm.

## 2021-07-24 NOTE — PROGRESS NOTE ADULT - SUBJECTIVE AND OBJECTIVE BOX
VITAL SIGNS    Telemetry:  afib 70's   Vital Signs Last 24 Hrs  T(C): 36.5 (21 @ 04:29), Max: 37.1 (21 @ 20:05)  T(F): 97.7 (21 @ 04:29), Max: 98.8 (21 @ 20:05)  HR: 76 (21 @ 04:29) (76 - 90)  BP: 122/71 (21 @ 04:29) (121/67 - 139/72)  RR: 18 (21 @ 04:29) (18 - 18)  SpO2: 92% (21 @ 04:29) (92% - 97%)             @ 07:01  -   @ 07:00  --------------------------------------------------------  IN: 975 mL / OUT: 1000 mL / NET: -25 mL       Daily     Daily Weight in k.3 (2021 08:17)  Admit Wt: Drug Dosing Weight  Height (cm): 152.4 (2021 11:15)  Weight (kg): 72.4 (2021 11:15)  BMI (kg/m2): 31.2 (2021 11:15)  BSA (m2): 1.7 (2021 11:15)      CAPILLARY BLOOD GLUCOSE      POCT Blood Glucose.: 144 mg/dL (2021 07:32)  POCT Blood Glucose.: 223 mg/dL (2021 21:35)  POCT Blood Glucose.: 280 mg/dL (2021 16:29)  POCT Blood Glucose.: 208 mg/dL (2021 11:38)          MEDICATIONS  artificial  tears Solution 1 Drop(s) Both EYES every 12 hours PRN  aspirin  chewable 81 milliGRAM(s) Oral daily  atorvastatin 40 milliGRAM(s) Oral at bedtime  buMETAnide Injectable 2 milliGRAM(s) IV Push every 8 hours  ferrous    sulfate 325 milliGRAM(s) Oral daily  heparin   Injectable 5000 Unit(s) SubCutaneous every 8 hours  insulin glargine Injectable (LANTUS) 6 Unit(s) SubCutaneous at bedtime  insulin lispro (ADMELOG) corrective regimen sliding scale   SubCutaneous three times a day before meals  insulin lispro Injectable (ADMELOG) 3 Unit(s) SubCutaneous before breakfast  insulin lispro Injectable (ADMELOG) 3 Unit(s) SubCutaneous before lunch  insulin lispro Injectable (ADMELOG) 3 Unit(s) SubCutaneous before dinner  metoprolol succinate ER 25 milliGRAM(s) Oral <User Schedule>  polyethylene glycol 3350 17 Gram(s) Oral daily  zolpidem 5 milliGRAM(s) Oral at bedtime PRN      >>> <<<  PHYSICAL EXAM  Subjective: NAD  Neurology: alert and oriented x 3, nonfocal, no gross deficits  CV : s1s2  Sternal Wound :  CDI , Stable  Lungs: CTA   Abdomen: soft, NT,ND, (+ )BM  :  voiding no hematoma to b/l groin  Extremities: -c/c/+1 edema      LABS      136  |  98  |  35<H>  ----------------------------<  107<H>  3.6   |  24  |  1.20    Ca    9.5      2021 06:56    TPro  6.6  /  Alb  3.3  /  TBili  0.6  /  DBili  x   /  AST  24  /  ALT  17  /  AlkPhos  251<H>                                   7.4    10.95 )-----------( 160      ( 2021 06:56 )             24.5          PT/INR - ( 2021 15:00 )   PT: 13.5 sec;   INR: 1.13 ratio         PTT - ( 2021 15:00 )  PTT:33.6 sec       PAST MEDICAL & SURGICAL HISTORY:

## 2021-07-24 NOTE — PROGRESS NOTE ADULT - NUTRITIONAL ASSESSMENT
This patient has been assessed with a concern for Malnutrition and has been determined to have a diagnosis/diagnoses of Moderate protein-calorie malnutrition.    This patient is being managed with:   Diet Consistent Carbohydrate/No Snacks-  Supplement Feeding Modality:  Oral  Glucerna Shake Cans or Servings Per Day:  1       Frequency:  Three Times a day  Entered: Jul 23 2021  6:01PM

## 2021-07-25 NOTE — PROGRESS NOTE ADULT - SUBJECTIVE AND OBJECTIVE BOX
VITAL SIGNS    Telemetry:    Vital Signs Last 24 Hrs  T(C): 37.1 (07-25-21 @ 04:30), Max: 37.1 (07-25-21 @ 04:30)  T(F): 98.8 (07-25-21 @ 04:30), Max: 98.8 (07-25-21 @ 04:30)  HR: 83 (07-25-21 @ 04:30) (72 - 83)  BP: 127/68 (07-25-21 @ 04:30) (106/64 - 127/68)  RR: 18 (07-25-21 @ 04:30) (18 - 18)  SpO2: 90% (07-25-21 @ 04:30) (90% - 95%)            07-24 @ 07:01  -  07-25 @ 07:00  --------------------------------------------------------  IN: 860 mL / OUT: 1550 mL / NET: -690 mL       Daily     Daily   Admit Wt: Drug Dosing Weight  Height (cm): 152.4 (22 Jul 2021 11:15)  Weight (kg): 72.4 (22 Jul 2021 11:15)  BMI (kg/m2): 31.2 (22 Jul 2021 11:15)  BSA (m2): 1.7 (22 Jul 2021 11:15)      CAPILLARY BLOOD GLUCOSE      POCT Blood Glucose.: 269 mg/dL (25 Jul 2021 11:47)  POCT Blood Glucose.: 191 mg/dL (25 Jul 2021 07:46)  POCT Blood Glucose.: 202 mg/dL (24 Jul 2021 21:52)  POCT Blood Glucose.: 241 mg/dL (24 Jul 2021 16:32)          MEDICATIONS  artificial  tears Solution 1 Drop(s) Both EYES every 12 hours PRN  aspirin  chewable 81 milliGRAM(s) Oral daily  atorvastatin 40 milliGRAM(s) Oral at bedtime  buMETAnide Injectable 2 milliGRAM(s) IV Push every 8 hours  ferrous    sulfate 325 milliGRAM(s) Oral daily  heparin   Injectable 5000 Unit(s) SubCutaneous every 8 hours  insulin glargine Injectable (LANTUS) 6 Unit(s) SubCutaneous at bedtime  insulin lispro (ADMELOG) corrective regimen sliding scale   SubCutaneous three times a day before meals  insulin lispro Injectable (ADMELOG) 3 Unit(s) SubCutaneous before breakfast  insulin lispro Injectable (ADMELOG) 3 Unit(s) SubCutaneous before lunch  insulin lispro Injectable (ADMELOG) 3 Unit(s) SubCutaneous before dinner  metoprolol succinate ER 25 milliGRAM(s) Oral <User Schedule>  polyethylene glycol 3350 17 Gram(s) Oral daily  zolpidem 5 milliGRAM(s) Oral at bedtime PRN      >>> <<<  PHYSICAL EXAM  Subjective: NAD  Neurology: alert and oriented x 3, nonfocal, no gross deficits  CV : s1s2  Lungs: CTA b/l  Abdomen: soft, NT,ND, ( )BM  :  voiding no hematoma to b/l groin  Extremities:   -c/c/e   Conclusions:  1. Mitral annular calcification and calcified mitral  leaflets with decreased diastolic opening. Mild mitral  regurgitation.  Peak mitral valve gradient equals 6 mm Hg,  mean transmitral valve gradient equals 2 mm Hg, consistent  with mild mitral stenosis.Gradients measuredat a heart  rate of 56/min.  2. Transcatheter aortic valve replacement. Peak transaortic  valve gradient equals 19 mm Hg, mean transaortic valve  gradient equals 10 mm Hg, which is probably normal in the  presence of a transcatheter aortic valve replacement.  Minimal aortic regurgitation-likely paravalvular.  3. Severely dilated left atrium.  LA volume index = 78  cc/m2.  4. Endocardium not well visualized; grossly normal left  ventricular systolic function.  5. Severe right atrial enlargement.  6. Right ventricular enlargement with borderline decreased  right ventricular systolic function.TAPSE 1.6 cm.  7. Tethered tricuspid valve. Severe tricuspid  regurgitation.    < end of copied text >  LABS  07-25    133<L>  |  95<L>  |  30<H>  ----------------------------<  167<H>  3.4<L>   |  26  |  0.86    Ca    8.7      25 Jul 2021 06:58                                   7.0    8.82  )-----------( 199      ( 25 Jul 2021 06:58 )             22.5                 PAST MEDICAL & SURGICAL HISTORY:

## 2021-07-25 NOTE — PROGRESS NOTE ADULT - ASSESSMENT
This is a  87F PMH CAD w/stents, DM2, Afib (not on AC), Colon Ca (about 25y ago), severe AS, HF , recently started on Entresto, admitted for JACQUE and Metformin-associated lactic acidosis, admitted to MICU s/p urgent HD session but now JACQUE resolving and now undergoing TAVR workup.    7/22 Underwent TAVR via right femoral #22 Heike  recovered in CRS post op course unremarkable Junctional rhythm  by EKG  700 cc fluid given for hypotension. Stabilize transferred to [address] [place] [address]- received in afib-  groin sites benign + DP son at bedside ECHO in am EKG in am  Likely discharge on Saturday with [facility] son reports haven given rehab  choices to case management.  7/23 pending post TAVR echo. Anticipate discharge to rehab Monday.  7/24 VSS maintaining Afib 70's . No further junctional episodes of rhythm.   7/25 Transthoracic Echocardiogram demonstrates Minimal aortic regurgitation-Tethered tricuspid valve, severe tricuspid regurgitation . No evidence of pericardial effusion.

## 2021-07-26 NOTE — PROGRESS NOTE ADULT - ASSESSMENT
87 year-old woman with known history of atrial fibrillation, previously on AC, recently stopped due to anemia, now POD 1 status post TAVR.  Procedure was well tolerated.  Patient now short of breath, seen to have pulmonary edema on CXR.  Unclear etiology of leukocytosis, but patient is noted to have a small pericardial effusion.     No need for further HD (discussed with nephrology).   Continue IV loop diuretics. Keep O > I, K > 4.0, Mag > 2.0.    ASA, statin, and beta-blocker as tolerated.  Avoid nephrotoxic agents.    Monitor tele for any evidence of heart block.    Discussed at length with Celia Puente NP.  Discharge planning per Structural Heart team.

## 2021-07-26 NOTE — PROGRESS NOTE ADULT - ASSESSMENT
87F PMH CAD w/stents, DM2, Afib (not on AC), Colon Ca (about 25y ago), severe AS, HF , recently started on Entresto, admitted for JACQUE and Metformin-associated lactic acidosis, admitted to MICU s/p urgent HD session but now JACQUE resolving and now undergoing TAVR workup.    7/22 Underwent TAVR via right femoral #22 Heike  recovered in CRS post op course unremarkable Junctional rhythm  by EKG  700 cc fluid given for hypotension. Stabilize transferred to [address] [place] [address]- received in afib-  groin sites benign + DP son at bedside ECHO in am EKG in am  Likely discharge on Saturday with [facility] son reports haven given rehab  choices to case management.  7/23 pending post TAVR echo. Anticipate discharge to rehab Monday.  7/24 VSS maintaining Afib 70's . No further junctional episodes of rhythm.   7/25 Transthoracic Echocardiogram demonstrates Minimal aortic regurgitation-Tethered tricuspid valve, severe tricuspid regurgitation . No evidence of pericardial effusion.   7/26 VSS - pt c/o slight SOB - echo shows small new pericardial effusion.  CXR-done- crackles bases - on IV bumex tid - diuresing - O2 N/c in place - will continue to monitor -rehab tuesday if stable       87F PMH CAD w/stents, DM2, Afib (not on AC), Colon Ca (about 25y ago), severe AS, HF , recently started on Entresto, admitted for JACQUE and Metformin-associated lactic acidosis, admitted to MICU s/p urgent HD session but now JACQUE resolving and now undergoing TAVR workup.    7/22 Underwent TAVR via right femoral #22 Heike  recovered in CRS post op course unremarkable Junctional rhythm  by EKG  700 cc fluid given for hypotension. Stabilize transferred to [address] [place] [address]- received in afib-  groin sites benign + DP son at bedside ECHO in am EKG in am  Likely discharge on Saturday with [facility] son reports haven given rehab  choices to case management.  7/23 pending post TAVR echo. Anticipate discharge to rehab Monday.  7/24 VSS maintaining Afib 70's . No further junctional episodes of rhythm.   7/25 Transthoracic Echocardiogram demonstrates Minimal aortic regurgitation-Tethered tricuspid valve, severe tricuspid regurgitation . No evidence of pericardial effusion.   7/26 VSS - pt c/o slight SOB - echo shows small new pericardial effusion.  CXR-done- crackles bases - on IV bumex tid - diuresing - O2 N/c in place -rounds made w/ Dr. Pruitt, Dr. Johns & structural heart team - new SOB - will get LE dopplers to r/o dvt - start heaprin gtt, change to bumex gtt 0.5mg /hr- give zaroxlyn 10mg iv x1 , WBC 16 from 8 - ck u/a c&s.  transfer to SDU for closer monitoring.

## 2021-07-26 NOTE — PROGRESS NOTE ADULT - SUBJECTIVE AND OBJECTIVE BOX
Structural Heart Team    Ms Knight is seen and examined reclining in a chair at the bedside.  She has 4L O2 via NC.  Though she appears comfortable at rest, she is very short of breath when she speaks.  She reports that she has felt worsening sob yesterday and today.  She denies chest pain and lightheadedness.         REVIEW OF SYSTEMS:    CONSTITUTIONAL: No weakness, fevers or chills  EYES/ENT: No visual changes;  No vertigo or throat pain   NECK: No pain or stiffness  RESPIRATORY: No cough, wheezing, hemoptysis; No shortness of breath  CARDIOVASCULAR: No chest pain or palpitations  GASTROINTESTINAL: No abdominal or epigastric pain. No nausea, vomiting, or hematemesis; No diarrhea or constipation. No melena or hematochezia.  GENITOURINARY: No dysuria, frequency or hematuria  NEUROLOGICAL: No numbness or weakness  SKIN: No itching, rashes      Allergies    No Known Allergies    Intolerances      Vital Signs Last 24 Hrs  T(C): 37.3 (26 Jul 2021 04:25), Max: 37.3 (26 Jul 2021 04:25)  T(F): 99.2 (26 Jul 2021 04:25), Max: 99.2 (26 Jul 2021 04:25)  HR: 91 (26 Jul 2021 08:00) (83 - 93)  BP: 143/77 (26 Jul 2021 08:00) (117/71 - 143/77)  BP(mean): --  RR: 18 (26 Jul 2021 08:00) (18 - 18)  SpO2: 98% (26 Jul 2021 08:00) (93% - 98%)    MEDICATIONS  (STANDING):  aspirin  chewable 81 milliGRAM(s) Oral daily  atorvastatin 40 milliGRAM(s) Oral at bedtime  buMETAnide Injectable 2 milliGRAM(s) IV Push every 8 hours  ferrous    sulfate 325 milliGRAM(s) Oral daily  heparin   Injectable 5000 Unit(s) SubCutaneous every 8 hours  insulin glargine Injectable (LANTUS) 6 Unit(s) SubCutaneous at bedtime  insulin lispro (ADMELOG) corrective regimen sliding scale   SubCutaneous three times a day before meals  insulin lispro Injectable (ADMELOG) 3 Unit(s) SubCutaneous before breakfast  insulin lispro Injectable (ADMELOG) 3 Unit(s) SubCutaneous before lunch  insulin lispro Injectable (ADMELOG) 3 Unit(s) SubCutaneous before dinner  metolazone 10 milliGRAM(s) Oral once  metoprolol succinate ER 25 milliGRAM(s) Oral <User Schedule>  polyethylene glycol 3350 17 Gram(s) Oral daily      Exam-  General: difficulty breathing while speaking  Cor: s1s2, IRR, no murmurs noted   EKG/Tele: Afib   Pulm: Clear, no wheezes, rales, or rhonchi, no use of accessory muscles  Gastrointestinal: soft, nontender, nondistended, +bowel sounds  Extremities: mild edema (unchanged from pre TAVR)  Neuro: A&Ox3, nonfocal                          8.2    16.84 )-----------( 246      ( 26 Jul 2021 07:24 )             26.1   07-26    134<L>  |  94<L>  |  27<H>  ----------------------------<  140<H>  3.9   |  26  |  1.04    Ca    9.3      26 Jul 2021 07:24      I&O's Summary    25 Jul 2021 07:01  -  26 Jul 2021 07:00  --------------------------------------------------------  IN: 320 mL / OUT: 1000 mL / NET: -680 mL    26 Jul 2021 07:01  -  26 Jul 2021 11:12  --------------------------------------------------------  IN: 300 mL / OUT: 0 mL / NET: 300 mL      < from: Transthoracic Echocardiogram (07.23.21 @ 19:50) >  LA:     6.2    2.0 - 4.0 cm  Ao:     2.7    2.0 - 3.8 cm  SEPTUM: 1.0    0.6 - 1.2 cm  PWT:    1.2    0.6 - 1.1 cm  LVIDd:  4.2    3.0 - 5.6 cm  LVIDs:  2.8    1.8 - 4.0 cm  Derived variables:  LVMI: 93 g/m2  RWT: 0.57  Fractional short: 33 %  EF (Teicholtz): 62 %  Doppler Peak Velocity (m/sec): MV=1.2 AoV=2.2  ------------------------------------------------------------------------  Observations:  Mitral Valve: Mitral annular calcification and calcified  mitral leaflets with decreased diastolic opening. Mild  mitral regurgitation.  Peak mitral valve gradient equals 6  mm Hg, mean transmitral valve gradient equals 2 mm Hg,  consistent with mild mitral stenosis.Gradients measured at  a heart rate of 56/min.  Aortic Valve/Aorta: Transcatheter aortic valve replacement.  Peak transaortic valve gradient equals 19 mm Hg, mean  transaortic valve gradient equals 10 mm Hg, which is  probably normal in the presence of a transcatheter aortic  valve replacement. Minimal paravalvular aortic  regurgitation.  Aortic Root: 2.7 cm.  LVOT diameter: 1.9 cm.  Left Atrium: Severely dilated left atrium.  LA volume index  = 78 cc/m2.  Left Ventricle: Endocardium not well visualized; grossly  normal left ventricular systolic function. Increased  relative wall thickness with normal left ventricular mass  index, consistent with concentric left ventricular  remodeling. Variability of R-R interval limits assessment.  Right Heart: Severe right atrial enlargement. Right  ventricular enlargement with borderline decreased right  ventricular systolic function.TAPSE 1.6 cm. Tethered  tricuspid valve. Severe tricuspid regurgitation. Pulmonic  valve not well visualized.  Pericardium/Pleura: Normal pericardium with no pericardial  effusion.  ------------------------------------------------------------------------  Conclusions:  1. Mitral annular calcification and calcified mitral  leaflets with decreased diastolic opening. Mild mitral  regurgitation.  Peak mitral valve gradient equals 6 mm Hg,  mean transmitral valve gradient equals 2 mm Hg, consistent  with mild mitral stenosis.Gradients measuredat a heart  rate of 56/min.  2. Transcatheter aortic valve replacement. Peak transaortic  valve gradient equals 19 mm Hg, mean transaortic valve  gradient equals 10 mm Hg, which is probably normal in the  presence of a transcatheter aortic valve replacement.  Minimal aortic regurgitation-likely paravalvular.  3. Severely dilated left atrium.  LA volume index = 78  cc/m2.  4. Endocardium not well visualized; grossly normal left  ventricular systolic function.  5. Severe right atrial enlargement.  6. Right ventricular enlargement with borderline decreased  right ventricular systolic function.TAPSE 1.6 cm.  7. Tethered tricuspid valve. Severe tricuspid  regurgitation.    < end of copied text >          Assessment/Plan:  Ms Thomson is an 88y/o female with Severe Aortic Stenosis, Acute Kidney Injury/Metabolic Acidosis, Chronic Diastolic Heart Failure, now s/p TAVR  - post TAVR echo done and noted  - daily EKG's  - +sob: follow up CXR and echo  - ?consider LE dopplers   - consider diuretic gtt    QUITA Alexander  [phone]     Structural Heart Team    Ms Knight is seen and examined reclining in a chair at the bedside.  She has 4L O2 via NC.  Though she appears comfortable at rest, she is very short of breath when she speaks.  She reports that she has felt worsening sob yesterday and today.  She denies chest pain and lightheadedness.         REVIEW OF SYSTEMS:    CONSTITUTIONAL: No weakness, fevers or chills  EYES/ENT: No visual changes;  No vertigo or throat pain   NECK: No pain or stiffness  RESPIRATORY: No cough, wheezing, hemoptysis; No shortness of breath  CARDIOVASCULAR: No chest pain or palpitations  GASTROINTESTINAL: No abdominal or epigastric pain. No nausea, vomiting, or hematemesis; No diarrhea or constipation. No melena or hematochezia.  GENITOURINARY: No dysuria, frequency or hematuria  NEUROLOGICAL: No numbness or weakness  SKIN: No itching, rashes      Allergies    No Known Allergies    Intolerances      Vital Signs Last 24 Hrs  T(C): 37.3 (26 Jul 2021 04:25), Max: 37.3 (26 Jul 2021 04:25)  T(F): 99.2 (26 Jul 2021 04:25), Max: 99.2 (26 Jul 2021 04:25)  HR: 91 (26 Jul 2021 08:00) (83 - 93)  BP: 143/77 (26 Jul 2021 08:00) (117/71 - 143/77)  BP(mean): --  RR: 18 (26 Jul 2021 08:00) (18 - 18)  SpO2: 98% (26 Jul 2021 08:00) (93% - 98%)    MEDICATIONS  (STANDING):  aspirin  chewable 81 milliGRAM(s) Oral daily  atorvastatin 40 milliGRAM(s) Oral at bedtime  buMETAnide Injectable 2 milliGRAM(s) IV Push every 8 hours  ferrous    sulfate 325 milliGRAM(s) Oral daily  heparin   Injectable 5000 Unit(s) SubCutaneous every 8 hours  insulin glargine Injectable (LANTUS) 6 Unit(s) SubCutaneous at bedtime  insulin lispro (ADMELOG) corrective regimen sliding scale   SubCutaneous three times a day before meals  insulin lispro Injectable (ADMELOG) 3 Unit(s) SubCutaneous before breakfast  insulin lispro Injectable (ADMELOG) 3 Unit(s) SubCutaneous before lunch  insulin lispro Injectable (ADMELOG) 3 Unit(s) SubCutaneous before dinner  metolazone 10 milliGRAM(s) Oral once  metoprolol succinate ER 25 milliGRAM(s) Oral <User Schedule>  polyethylene glycol 3350 17 Gram(s) Oral daily      Exam-  General: difficulty breathing while speaking  Cor: s1s2, IRR, no murmurs noted   EKG/Tele: Afib   Pulm: Clear, no wheezes, rales, or rhonchi, no use of accessory muscles  Gastrointestinal: soft, nontender, nondistended, +bowel sounds  Extremities: mild edema (unchanged from pre TAVR)  Neuro: A&Ox3, nonfocal                          8.2    16.84 )-----------( 246      ( 26 Jul 2021 07:24 )             26.1   07-26    134<L>  |  94<L>  |  27<H>  ----------------------------<  140<H>  3.9   |  26  |  1.04    Ca    9.3      26 Jul 2021 07:24      I&O's Summary    25 Jul 2021 07:01  -  26 Jul 2021 07:00  --------------------------------------------------------  IN: 320 mL / OUT: 1000 mL / NET: -680 mL    26 Jul 2021 07:01  -  26 Jul 2021 11:12  --------------------------------------------------------  IN: 300 mL / OUT: 0 mL / NET: 300 mL      < from: Transthoracic Echocardiogram (07.23.21 @ 19:50) >  LA:     6.2    2.0 - 4.0 cm  Ao:     2.7    2.0 - 3.8 cm  SEPTUM: 1.0    0.6 - 1.2 cm  PWT:    1.2    0.6 - 1.1 cm  LVIDd:  4.2    3.0 - 5.6 cm  LVIDs:  2.8    1.8 - 4.0 cm  Derived variables:  LVMI: 93 g/m2  RWT: 0.57  Fractional short: 33 %  EF (Teicholtz): 62 %  Doppler Peak Velocity (m/sec): MV=1.2 AoV=2.2  ------------------------------------------------------------------------  Observations:  Mitral Valve: Mitral annular calcification and calcified  mitral leaflets with decreased diastolic opening. Mild  mitral regurgitation.  Peak mitral valve gradient equals 6  mm Hg, mean transmitral valve gradient equals 2 mm Hg,  consistent with mild mitral stenosis.Gradients measured at  a heart rate of 56/min.  Aortic Valve/Aorta: Transcatheter aortic valve replacement.  Peak transaortic valve gradient equals 19 mm Hg, mean  transaortic valve gradient equals 10 mm Hg, which is  probably normal in the presence of a transcatheter aortic  valve replacement. Minimal paravalvular aortic  regurgitation.  Aortic Root: 2.7 cm.  LVOT diameter: 1.9 cm.  Left Atrium: Severely dilated left atrium.  LA volume index  = 78 cc/m2.  Left Ventricle: Endocardium not well visualized; grossly  normal left ventricular systolic function. Increased  relative wall thickness with normal left ventricular mass  index, consistent with concentric left ventricular  remodeling. Variability of R-R interval limits assessment.  Right Heart: Severe right atrial enlargement. Right  ventricular enlargement with borderline decreased right  ventricular systolic function.TAPSE 1.6 cm. Tethered  tricuspid valve. Severe tricuspid regurgitation. Pulmonic  valve not well visualized.  Pericardium/Pleura: Normal pericardium with no pericardial  effusion.  ------------------------------------------------------------------------  Conclusions:  1. Mitral annular calcification and calcified mitral  leaflets with decreased diastolic opening. Mild mitral  regurgitation.  Peak mitral valve gradient equals 6 mm Hg,  mean transmitral valve gradient equals 2 mm Hg, consistent  with mild mitral stenosis.Gradients measuredat a heart  rate of 56/min.  2. Transcatheter aortic valve replacement. Peak transaortic  valve gradient equals 19 mm Hg, mean transaortic valve  gradient equals 10 mm Hg, which is probably normal in the  presence of a transcatheter aortic valve replacement.  Minimal aortic regurgitation-likely paravalvular.  3. Severely dilated left atrium.  LA volume index = 78  cc/m2.  4. Endocardium not well visualized; grossly normal left  ventricular systolic function.  5. Severe right atrial enlargement.  6. Right ventricular enlargement with borderline decreased  right ventricular systolic function.TAPSE 1.6 cm.  7. Tethered tricuspid valve. Severe tricuspid  regurgitation.    < end of copied text >          Assessment/Plan:  Ms Thomson is an 88y/o female with Severe Aortic Stenosis, Acute Kidney Injury/Metabolic Acidosis, Chronic Diastolic Heart Failure, now s/p TAVR  - post TAVR echo done and noted  - daily EKG's  - +sob: follow up CXR and echo  - r/o PE  - LE dopplers requested stat  - start heparin drip  - consider diuretic gtt  - monitor closely      QUITA Alexander  [phone]

## 2021-07-26 NOTE — PROGRESS NOTE ADULT - ATTENDING COMMENTS
The patient on Sunday evening had worsening shortness of breath and this morning she appears to be acute on chronic diastolic heart failure.  She is only able to speak short sentences before feeling dyspneic.  Denies any fevers, chills, sweats, lightheaded sensation, dizziness or palpitation.  Not bringing up any phlegm.  On examination she has decreased breath sounds bilaterally with 1+ bilateral lower extremity edema and bibasilar crackles.  Chest x-ray demonstrated pulmonary vascular congestion/effusion.  Repeat transthoracic echocardiogram study was performed.  There is concern the patient may have had a pulmonary embolism.  Venous ultrasounds of the legs will be performed.  Would recommend the patient be started on heparin with close monitoring of her PTT and assess for signs/symptoms of bleeding.  At this time the patient will be started on a Bumex drip with goal ins and outs -2 L.  Recommend patient be transferred to stepdown unit for further observation/management.    All question concerns of the patient were addressed.    Fines and plan were discussed with cardiac surgery/Dr. Miller and structural heart team

## 2021-07-26 NOTE — PROGRESS NOTE ADULT - SUBJECTIVE AND OBJECTIVE BOX
VITAL SIGNS-Telemetry:  AFib   Vital Signs Last 24 Hrs  T(C): 37.3 (21 @ 04:25), Max: 37.3 (21 @ 04:25)  T(F): 99.2 (21 @ 04:25), Max: 99.2 (21 @ 04:25)  HR: 91 (21 @ 08:00) (83 - 93)  BP: 143/77 (21 @ 08:00) (117/71 - 143/77)  RR: 18 (21 @ 08:00) (18 - 18)  SpO2: 98% (21 @ 08:00) (93% - 98%)          @ 07:01  -   @ 07:00  --------------------------------------------------------  IN: 320 mL / OUT: 1000 mL / NET: -680 mL     @ 07:01  -   @ 09:43  --------------------------------------------------------  IN: 300 mL / OUT: 0 mL / NET: 300 mL    Daily     Daily Weight in k.6 (2021 08:17)    CAPILLARY BLOOD GLUCOSE  POCT Blood Glucose.: 172 mg/dL (2021 07:27)  POCT Blood Glucose.: 112 mg/dL (2021 16:46)  POCT Blood Glucose.: 269 mg/dL (2021 11:47)       PHYSICAL EXAM:  Neurology: alert and oriented x 3, nonfocal, no gross deficits  CV : IRR IRR  Lungs: crackles LLL  Abdomen: soft, nontender, nondistended, positive bowel sounds, last bowel movement         Extremities:     + edema L >R.  no calf tenderness    artificial  tears Solution 1 Drop(s) Both EYES every 12 hours PRN  aspirin  chewable 81 milliGRAM(s) Oral daily  atorvastatin 40 milliGRAM(s) Oral at bedtime  buMETAnide Injectable 2 milliGRAM(s) IV Push every 8 hours  ferrous    sulfate 325 milliGRAM(s) Oral daily  heparin   Injectable 5000 Unit(s) SubCutaneous every 8 hours  insulin glargine Injectable (LANTUS) 6 Unit(s) SubCutaneous at bedtime  insulin lispro (ADMELOG) corrective regimen sliding scale   SubCutaneous three times a day before meals  insulin lispro Injectable (ADMELOG) 3 Unit(s) SubCutaneous before breakfast  insulin lispro Injectable (ADMELOG) 3 Unit(s) SubCutaneous before lunch  insulin lispro Injectable (ADMELOG) 3 Unit(s) SubCutaneous before dinner  metoprolol succinate ER 25 milliGRAM(s) Oral <User Schedule>  polyethylene glycol 3350 17 Gram(s) Oral daily  zolpidem 5 milliGRAM(s) Oral at bedtime PRN    Physical Therapy Rec:   Home  [  ]   Home w/ PT  [  ]  Rehab  [ x ]  Discussed with Cardiothoracic Team at AM rounds.

## 2021-07-26 NOTE — PROGRESS NOTE ADULT - SUBJECTIVE AND OBJECTIVE BOX
HPI:  Patient seen and examined at bedside on 2 [place].  She is more short of breath than she has been recently.  Leukocytosis noted.  Repeat TTE and CXR.     Review Of Systems:           Respiratory: +shortness of breath  Cardiovascular: No chest pain or palpitations  10 point review of systems is otherwise negative except as mentioned above        Medications:  artificial  tears Solution 1 Drop(s) Both EYES every 12 hours PRN  aspirin  chewable 81 milliGRAM(s) Oral daily  atorvastatin 40 milliGRAM(s) Oral at bedtime  buMETAnide Infusion 0.5 mG/Hr IV Continuous <Continuous>  ferrous    sulfate 325 milliGRAM(s) Oral daily  heparin  Infusion 1000 Unit(s)/Hr IV Continuous <Continuous>  insulin glargine Injectable (LANTUS) 6 Unit(s) SubCutaneous at bedtime  insulin lispro (ADMELOG) corrective regimen sliding scale   SubCutaneous three times a day before meals  insulin lispro Injectable (ADMELOG) 3 Unit(s) SubCutaneous before breakfast  insulin lispro Injectable (ADMELOG) 3 Unit(s) SubCutaneous before lunch  insulin lispro Injectable (ADMELOG) 3 Unit(s) SubCutaneous before dinner  metoprolol succinate ER 25 milliGRAM(s) Oral <User Schedule>  polyethylene glycol 3350 17 Gram(s) Oral daily  zolpidem 5 milliGRAM(s) Oral at bedtime PRN    PAST MEDICAL & SURGICAL HISTORY:    Vitals:  T(C): 37.2 (21 @ 23:30), Max: 37.3 (21 @ 04:25)  HR: 80 (21 @ 23:30) (76 - 92)  BP: 129/58 (21 @ 23:30) (90/53 - 143/77)  BP(mean): 84 (21 @ 23:30) (81 - 84)  RR: 24 (21 @ 23:30) (18 - 24)  SpO2: 92% (21 @ 23:30) (92% - 98%)  Wt(kg): --  Daily     Daily Weight in k.6 (2021 08:17)  I&O's Summary    2021 07:01  -  2021 07:00  --------------------------------------------------------  IN: 320 mL / OUT: 1000 mL / NET: -680 mL    2021 07:01  -  2021 00:14  --------------------------------------------------------  IN: 943 mL / OUT: 1480 mL / NET: -537 mL        Physical Exam:  Appearance: Normal, well groomed, NAD  Eyes: PERRLA, EOMI, pink conjunctiva, no scleral icterus   HENT: Normal oral mucosa  Cardiovascular: RRR, S1, S2, NO systolic murmur at base  Procedural Access Site: Clean, dry, intact, without hematoma  Respiratory: Clear to auscultation bilaterally  Gastrointestinal: Soft, non-tender, non-distended, BS+  Musculoskeletal: No clubbing or joint deformity   Neurologic: No focal weakness  Lymphatic: No lymphadenopathy  Psychiatry: AAOx3 with appropriate mood and affect  Skin: No rashes, ecchymoses, or cyanosis                          7.9    17.29 )-----------( 234      ( 2021 20:22 )             26.2     07-26    134<L>  |  94<L>  |  27<H>  ----------------------------<  140<H>  3.9   |  26  |  1.04    Ca    9.3      2021 07:24      PT/INR - ( 2021 14:02 )   PT: 14.6 sec;   INR: 1.23 ratio         PTT - ( 2021 20:22 )  PTT:35.8 sec            Cardiovascular Diagnostic Testing:  ECG: rate controlled AFib    Imaging: < from: CT Heart without Coronaries w/ IV Cont (21 @ 08:51) >  FINDINGS:    Non-Coronary:    6 mm hypodense nodule within the right lobe of the thyroid gland. No enlarged axillary, mediastinal lymph nodes. No pleural effusions.    Evaluation of the lungs demonstrate left lower lobe masslike opacity on the part of which measures about 5 x 2.3 cm extending to the left lower lobe hilum with associated left lower lobe volume loss related to some superimposed atelectasis. Multiple left lung pulmonary nodule along the pleural surface measuring up to about 7 mm. Right middle lobe 3.2 x 2.6 cm mass associated with the minor fissure which contains a few small nodules measuring up to 7 mm. The right middle lobe mass has a cystic component along its inferior aspect.    Subcentimeter hypodensity within the liver is too small to characterize. The gallbladder, spleen, adrenal glands are unremarkable. Few pancreatic hypodensities are noted with the largest measuring about 1.5 cm on image 84 of series 19. Bilateral renal cysts.    Urinary bladder is normal. The uterus and adnexa are within normal limits. Status post rectal surgery with postoperative changes. No bowel obstruction or medial air. Appendix is normal.    Degenerative changes of the spine. Moderate to severe loss of height of the T5 and severe loss of height of the T6 vertebral bodies are of indeterminate age.    The heart is enlarged.  The left and right atria are severely enlarged.  There is mitral annular calcification and calcification of the mitral valve leaflets.   There is reduced contrast opacification of the left atrial appendage that resolves on delay imaging suggestive of mixing artifact.    The ascending aorta is mildly calcified. The aortic arch and the descending aorta are severely calcified.   The main pulmonary artery measures approximately 26.4 mm at the level of the ascending aorta.   The right and left pulmonary arteries appear enlarged and measure approximately 29.4 mm and 25.4 mm, respectively.    The aortic valve is calcified. The calcium score of the aortic valve is 1870.    Coronary:  Coronary arterial calcifications are noted; however, this studywas not optimized for evaluation of the coronary arteries.  Please refer to cardiac catheterization performed as part of pre-TAVR work-up.    Aortic Root Measurements    Major aortic annulus diameter (systole, mm): 25.2  Perpendicular minor aortic annulus diameter (systole, mm): 19.1  Aortic annulus perimeter (systole, mm): 76.6  Aortic annulus area (systole, mm2): 438  LVOT minimum diameter (systole, mm): 19.6  LVOT 90 cross (systole, mm): 26.4  LVOT calcification:  Severe  Distance from Aortic annulus to Left Main coronary artery (diastole, mm): 13.2  Distance from Aortic annulus to Right Coronary artery (diastole, mm): 16.7  Sinus of Valsalva diameter, Right coronary (diastole, mm): 29.4  Sinus of Valsalva diameter, Left coronary (diastole, mm): 31.0  Sinus of Valsalva diameter, Non coronary (diastole, mm): 28.3  Diameter at the sinotubular junction (diastole, mm): 26.5 x 26.5  Maximum ascending aorta diameter at 40 mm above annulus (diastole, mm): 32.3  Aortic root angulation (diastole, degrees): 48.4  Aortic root calcification: Moderate-to-severe    Abdominal Aorta:        Minimum lumen diameter (MLD) (mm): 12.8        Diameter perpendicular to MLD (mm): 13.2  Left Femoral:        Minimum Lumen Diameter (mm): 7.2        Diameter perpendicular to MLD (mm): 8.1        Tortuosity: Mild        Calcification: Mild  Right Femoral:        Minimum Lumen Diameter (mm): 6.5        Diameter perpendicular to MLD (mm): 7.1        Tortuosity: Mild        Calcification: Mild  Left External Iliac:        Minimum Lumen Diameter (mm): 6.9        Diameter perpendicular to MLD (mm): 7.5        Tortuosity: Moderate        Calcification: Mild  Left Common Iliac:         Minimum Lumen Diameter (mm): 4.5        Diameter perpendicular to MLD (mm): 8.0        Tortuosity: Severe        Calcification: Severe  Right External Iliac:        Minimum Lumen Diameter (mm): 5.0        Diameter perpendicular to MLD (mm): 7.4        Tortuosity: Moderate        Calcification: Mild  Right Common Iliac:    Minimum Lumen Diameter (mm): 7.8        Diameter perpendicular to MLD (mm): 8.4        Tortuosity: Moderate        Calcification: Moderate  L Subclavian/Axillary: Not well visualized due to motion artifact        Minimum Lumen Diameter (mm): 5.6      Diameter perpendicular to MLD (mm): 6.2        Tortuosity: Mild        Calcification: Mild  R Subclavian/Axillary:        Minimum Lumen Diameter (mm): 4.3        Diameter perpendicular to MLD (mm): 5.1        Tortuosity: Mild        Calcification: Mild    IMPRESSION:  1. Calcified aortic valve. The calcium score of the aortic valve is 1870.  2. Please see the body of the report for aortic and peripheral access vessel measurements.  3. Dominant large left lower lobe mass associated with multiple left lung pleural nodules worrisome for neoplasm with metastatic disease.  4. 3.2 x 2.6 cm right middle lobe mass as described above also is worrisome for neoplasm.  5. A few pancreatic hypodense nodules. Dedicated contrast enhanced pancreatic MRI is recommended for complete evaluation.  6. Compression fracture deformities of the T5 and T6 vertebral bodies as described above are of indeterminate age.      KHLOE HAINES MD; Attending Cardiologist  This document has been electronically signed.  MONICA HANEY MD; Attending Radiologist  This document has been electronically signed. 2021  9:12AM    < end of copied text >    CATH:  < from: Cardiac Cath Lab - Adult (21 @ 17:21) >  CORONARY VESSELS: The coronary circulation is right dominant.  LM:   --  LM: Normal.  LAD:   --  Proximal LAD: There was a discrete 40 % stenosis.  --  Mid LAD: There was a tubular 25 % stenosis.  --  Distal LAD: Normal.  CX:   --  Proximal circumflex: There was a tubular 20 % stenosis.  --  Mid circumflex: There was a diffuse 50 % stenosis.  RCA:   --  Proximal RCA: There was a diffuse 30 % stenosis.  --  Mid RCA: There was a diffuse 20 % in-stent restenosis through the prior  stent.  --  Distal RCA: Normal.  --  RPDA: Normal.  --  RPLS: Normal.  COMPLICATIONS: There were no complications.  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  Normal left main coronary artery  Right dominant system  Elevated right atrial pressure (mRA 16mmHg with a V wave of 23mmHg)  Moderate post-capillary pulmonary hypertension (sPAP 58mmHg, dPAP 23mmHg,  mPAP 37mmHg)  PCWP = 28mmHg with a V wave of 48mmHg  PAsat = 67.1% // AOsat = 99.2% (room air)  [place] // CI = 6.07l/min // 3.56l/min/m2  DIAGNOSTIC RECOMMENDATIONS: Keep right leg straigh for 4 hours following  removal of sheaths  Continue aggressive medical management of coronary artery disease and  associated risk factors  Closely monitor the patients kidney function  Patient being evaluated by the [facility] valve team for TAVR  Findings discussed with Dr. 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  Normal left main coronary artery  Right dominant system  Elevated right atrial pressure (mRA 16mmHg with a V wave of 23mmHg)  Moderate post-capillary pulmonary hypertension (sPAP 58mmHg, dPAP 23mmHg,  mPAP 37mmHg)  PCWP = 28mmHg with a V wave of 48mmHg  PAsat = 67.1% // AOsat = 99.2% (room air)  [place] // CI = 6.07l/min // 3.56l/min/m2  INTERVENTIONAL RECOMMENDATIONS: Keep right leg straigh for 4 hours  following removal of sheaths  Continue aggressive medical management of coronary artery disease and  associated risk factors  Closely monitor the patients kidney function  Patient being evaluated by the [facility] valve team for TAVR  Findings discussed with Dr. Hodges  Prepared and signed by  Alejandro Johns MD  Signed 2021 18:37:05    < end of copied text >

## 2021-07-27 NOTE — PROGRESS NOTE ADULT - PROBLEM SELECTOR PLAN 1
7/22  Underwent TAVR - #22 [facility] - ASA only   tele  ECHO in am  EKG in am   MCOT for discharge   resume appropriate medications  Pending rehab Monday 7/22  Underwent TAVR - #22 [facility]ike - ASA only   tele  MCOT for discharge    ambulate w asst today  Pending rehab   end of week   after diuresis

## 2021-07-27 NOTE — PROGRESS NOTE ADULT - ASSESSMENT
87 year-old woman with known history of atrial fibrillation, previously on AC, recently stopped due to anemia, now POD 5 status post TAVR.  Procedure was well tolerated.  Patient now short of breath, seen to have pulmonary edema on CXR.  Unclear etiology of leukocytosis, but patient is noted to have a small pericardial effusion.     No need for further HD (discussed with nephrology).   Continue IV loop diuretics. Keep O > I, K > 4.0, Mag > 2.0.    ASA, statin, and beta-blocker as tolerated.  Avoid nephrotoxic agents.    Monitor tele for any evidence of heart block.    Discussed at length with Celia Puente NP.  Discharge planning per Structural Heart team.

## 2021-07-27 NOTE — PROGRESS NOTE ADULT - ASSESSMENT
87F PMH CAD w/stents, DM2, Afib (not on AC), Colon Ca (about 25y ago), severe AS, HF , recently started on Entresto, admitted for JACQUE and Metformin-associated lactic acidosis, admitted to MICU s/p urgent HD session but now JACQUE resolving and now undergoing TAVR workup.    7/22 Underwent TAVR via right femoral #22 [facility]ike  recovered in CRS post op course unremarkable Junctional rhythm  by EKG  700 cc fluid given for hypotension. Stabilize transferred to [address] [place] [address]- received in afib-  groin sites benign + DP son at bedside ECHO in am EKG in am  Likely discharge on Saturday with MCOT son reports haven given rehab  choices to case management.  7/23 pending post TAVR echo. Anticipate discharge to rehab Monday.  7/24 VSS maintaining Afib 70's . No further junctional episodes of rhythm.   7/25 Transthoracic Echocardiogram demonstrates Minimal aortic regurgitation-Tethered tricuspid valve, severe tricuspid regurgitation . No evidence of pericardial effusion.   7/26 VSS - pt c/o slight SOB - echo shows small new pericardial effusion.  CXR-done- crackles bases - on IV bumex tid - diuresing - O2 N/c in place -rounds made w/ Dr. Pruitt, Dr. Johns & structural heart team - new SOB - will get LE dopplers to r/o dvt - start heaprin gtt, change to bumex gtt 0.5mg /hr- give zaroxlyn 10mg iv x1 , WBC 16 from 8 - ck u/a c&s.  transfer to SDU for closer monitoring.         87F PMH CAD w/stents, DM2, Afib (not on AC), Colon Ca (about 25y ago), severe AS, HF , recently started on Entresto, admitted for JACQUE and Metformin-associated lactic acidosis, admitted to MICU s/p urgent HD session but now JACQUE resolving and now undergoing TAVR workup.    7/22 Underwent TAVR via right femoral #22 [facility]  recovered in CRS post op course unremarkable Junctional rhythm  by EKG  700 cc fluid given for hypotension. Stabilize transferred to [address] [place] [address]- received in afib-  groin sites benign + DP son at bedside ECHO in am EKG in am  Likely discharge on Saturday with MCOT son reports haven given rehab  choices to case management.  7/23 pending post TAVR echo. Anticipate discharge to rehab Monday.  7/24 VSS maintaining Afib 70's . No further junctional episodes of rhythm.   7/25 Transthoracic Echocardiogram demonstrates Minimal aortic regurgitation-Tethered tricuspid valve, severe tricuspid regurgitation . No evidence of pericardial effusion.   7/26 VSS - pt c/o slight SOB - echo shows small new pericardial effusion.  CXR-done- crackles bases - on IV bumex tid - diuresing - O2 N/c in place -rounds made w/ Dr. Pruitt, Dr. Johns & structural heart team - new SOB - will get LE dopplers to r/o dvt - start heaprin gtt, change to bumex gtt 0.5mg /hr- give zaroxlyn 10mg iv x1 , WBC 16 from 8 - ck u/a c&s.  transfer to SDU for closer monitoring.    7/27    OOB to chair  bumex gtt   heparin gtt  neg  800 cc   24 hrs   chest xray   bl base pl effusion

## 2021-07-27 NOTE — PROGRESS NOTE ADULT - SUBJECTIVE AND OBJECTIVE BOX
VITAL SIGNS    Telemetry:      Vital Signs Last 24 Hrs  T(C): 36.6 (21 @ 03:09), Max: 37.2 (21 @ 19:17)  T(F): 97.9 (21 @ 03:09), Max: 99 (21 @ 23:30)  HR: 76 (21 @ 03:09) (76 - 92)  BP: 114/56 (21 @ 03:09) (90/53 - 143/77)  RR: 24 (21 @ 03:09) (18 - 24)  SpO2: 94% (21 @ 03:09) (92% - 98%)                   Daily     Daily Weight in k.6 (2021 08:17)        CAPILLARY BLOOD GLUCOSE      POCT Blood Glucose.: 108 mg/dL (2021 21:02)  POCT Blood Glucose.: 178 mg/dL (2021 16:57)  POCT Blood Glucose.: 327 mg/dL (2021 11:02)  POCT Blood Glucose.: 172 mg/dL (2021 07:27)          Drains:     MS         [  ] Drainage:                 L Pleural  [  ]  Drainage:                R Pleural  [  ]  Drainage:    Pacing Wires        [  ]   Settings:                                  Isolated  [  ]    Coumadin    [ ] YES          [  ]      NO         Reason:                         PHYSICAL EXAM  S"  Neurology: alert and oriented x 3, moves all extremities with no defecits  CV :  RRR  Lungs:   CTA B/L  Abdomen: soft, nontender, nondistended, positive bowel sounds, last bowel movement   Extremities:                                            VITAL SIGNS    Telemetry:  afib  70    Vital Signs Last 24 Hrs  T(C): 36.6 (21 @ 03:09), Max: 37.2 (21 @ 19:17)  T(F): 97.9 (21 @ 03:09), Max: 99 (21 @ 23:30)  HR: 76 (21 @ 03:09) (76 - 92)  BP: 114/56 (21 @ 03:09) (90/53 - 143/77)  RR: 24 (21 @ 03:09) (18 - 24)  SpO2: 94% (21 @ 03:09) (92% - 98%)                   Daily     Daily Weight in k.6 (2021 08:17)        CAPILLARY BLOOD GLUCOSE      POCT Blood Glucose.: 108 mg/dL (2021 21:02)  POCT Blood Glucose.: 178 mg/dL (2021 16:57)  POCT Blood Glucose.: 327 mg/dL (2021 11:02)  POCT Blood Glucose.: 172 mg/dL (2021 07:27)                      PHYSICAL EXAM  S"  No sob  No chest pain  Neurology: alert and oriented x 3, moves all extremities with no defecits  CV :  RRR  Lungs:   diminshed  bl bases  Abdomen: soft, nontender, nondistended, positive bowel sounds  Extremities:     plus one to two pedal edema

## 2021-07-27 NOTE — PROGRESS NOTE ADULT - ATTENDING COMMENTS
The patient is clinically doing better this morning.  However she continues to feel dyspneic and short of breath at rest.  This is a departure from how she felt initially following the TAVR procedure.  Denies any pain in her groin site.  Denies any fevers, chills, sweats, his sensation, dizziness, palpitations are phlegm production.  She is currently on a Bumex and heparin drip.  Would recommend that the patient's potassium/magnesium aggressively repleted.  Repeat labs will be checked later today.  Would recommend that Bumex be held and allow the patient to equilibrate.  If the patient continues to feel dyspneic would recommend that a CTA be performed to rule out pulmonary embolism and other potential pulmonary process.    Goals ins and outs -2 L.  Aim for potassium to greater than 4 magnesium to be greater than 2.    All questions and concerns of the patient were addressed.    Findings and plan were discussed with cardiac surgery and structural heart team.

## 2021-07-27 NOTE — PROGRESS NOTE ADULT - SUBJECTIVE AND OBJECTIVE BOX
Structural Heart Team    Ms Knight was seen earlier this morning.  She was reclining in a chair and reported feeling better than yesterday, but "not very much".  She is able to talk more without becoming sob.            REVIEW OF SYSTEMS:    CONSTITUTIONAL: No weakness, fevers or chills  EYES/ENT: No visual changes;  No vertigo or throat pain   NECK: No pain or stiffness  RESPIRATORY: No cough, wheezing, hemoptysis; No shortness of breath  CARDIOVASCULAR: No chest pain or palpitations  GASTROINTESTINAL: No abdominal or epigastric pain. No nausea, vomiting, or hematemesis; No diarrhea or constipation. No melena or hematochezia.  GENITOURINARY: No dysuria, frequency or hematuria  NEUROLOGICAL: No numbness or weakness  SKIN: No itching, rashes      Allergies    No Known Allergies    Intolerances      Vital Signs Last 24 Hrs  T(C): 36.7 (27 Jul 2021 11:17), Max: 37.2 (26 Jul 2021 19:17)  T(F): 98 (27 Jul 2021 11:17), Max: 99 (26 Jul 2021 23:30)  HR: 79 (27 Jul 2021 11:17) (76 - 80)  BP: 154/65 (27 Jul 2021 11:17) (114/56 - 154/65)  BP(mean): 80 (27 Jul 2021 07:13) (80 - 84)  RR: 18 (27 Jul 2021 11:17) (18 - 24)  SpO2: 96% (27 Jul 2021 11:17) (92% - 96%)    MEDICATIONS  (STANDING):  aspirin  chewable 81 milliGRAM(s) Oral daily  atorvastatin 40 milliGRAM(s) Oral at bedtime  ferrous    sulfate 325 milliGRAM(s) Oral daily  heparin  Infusion 1000 Unit(s)/Hr (12 mL/Hr) IV Continuous <Continuous>  insulin glargine Injectable (LANTUS) 6 Unit(s) SubCutaneous at bedtime  insulin lispro (ADMELOG) corrective regimen sliding scale   SubCutaneous three times a day before meals  insulin lispro Injectable (ADMELOG) 3 Unit(s) SubCutaneous before breakfast  insulin lispro Injectable (ADMELOG) 3 Unit(s) SubCutaneous before lunch  insulin lispro Injectable (ADMELOG) 3 Unit(s) SubCutaneous before dinner  metoprolol succinate ER 25 milliGRAM(s) Oral <User Schedule>  polyethylene glycol 3350 17 Gram(s) Oral daily      Exam-  General: difficulty breathing while speaking  Cor: s1s2, IRR, no murmurs noted   EKG/Tele: Afib   Pulm: Clear, no wheezes, rales, or rhonchi, no use of accessory muscles  Gastrointestinal: soft, nontender, nondistended, +bowel sounds  Extremities: mild edema (unchanged from pre TAVR)  Neuro: A&Ox3, nonfocal                          8.3    13.59 )-----------( 244      ( 27 Jul 2021 06:05 )             26.7   07-27    131<L>  |  87<L>  |  35<H>  ----------------------------<  149<H>  3.3<L>   |  27  |  1.08    Ca    9.9      27 Jul 2021 13:03  Mg     2.2     07-27    PT/INR - ( 27 Jul 2021 13:05 )   PT: 17.0 sec;   INR: 1.44 ratio         PTT - ( 27 Jul 2021 13:03 )  PTT:136.9 sec  I&O's Summary    26 Jul 2021 07:01  -  27 Jul 2021 07:00  --------------------------------------------------------  IN: 1130 mL / OUT: 2180 mL / NET: -1050 mL    27 Jul 2021 07:01  -  27 Jul 2021 14:14  --------------------------------------------------------  IN: 582 mL / OUT: 650 mL / NET: -68 mL              Assessment/Plan:  Ms Thomson is an 88y/o female with Severe Aortic Stenosis, Acute Kidney Injury/Metabolic Acidosis, Chronic Diastolic Heart Failure, now s/p TAVR  - minimally improved from yesterday  - dopplers negative for LE DVT  - CXR looks unchanged  - will probably require more bumex  - please monitor electrolytes closely and supplement as necessary    QUITA Alexander  [phone]

## 2021-07-27 NOTE — PROGRESS NOTE ADULT - SUBJECTIVE AND OBJECTIVE BOX
HPI:  Patient seen and examined at bedside on 2 [place].  She feels marginally better than yesterday.     Review Of Systems:           Respiratory: No shortness of breath, cough, or wheezing  Cardiovascular: No chest pain or palpitations  10 point review of systems is otherwise negative except as mentioned above        Medications:  artificial  tears Solution 1 Drop(s) Both EYES every 12 hours PRN  aspirin  chewable 81 milliGRAM(s) Oral daily  atorvastatin 40 milliGRAM(s) Oral at bedtime  buMETAnide Infusion 0.5 mG/Hr IV Continuous <Continuous>  ferrous    sulfate 325 milliGRAM(s) Oral daily  heparin  Infusion 1100 Unit(s)/Hr IV Continuous <Continuous>  insulin glargine Injectable (LANTUS) 6 Unit(s) SubCutaneous at bedtime  insulin lispro (ADMELOG) corrective regimen sliding scale   SubCutaneous three times a day before meals  insulin lispro Injectable (ADMELOG) 3 Unit(s) SubCutaneous before breakfast  insulin lispro Injectable (ADMELOG) 3 Unit(s) SubCutaneous before lunch  insulin lispro Injectable (ADMELOG) 3 Unit(s) SubCutaneous before dinner  metoprolol succinate ER 25 milliGRAM(s) Oral <User Schedule>  polyethylene glycol 3350 17 Gram(s) Oral daily  zolpidem 5 milliGRAM(s) Oral at bedtime PRN    PAST MEDICAL & SURGICAL HISTORY:    Vitals:  T(C): 36.7 (21 @ 18:48), Max: 36.7 (21 @ 11:17)  HR: 80 (21 @ 23:34) (76 - 83)  BP: 119/58 (21 @ 23:34) (114/56 - 154/65)  BP(mean): 81 (21 @ 23:34) (80 - 89)  RR: 18 (21 @ 23:34) (18 - 24)  SpO2: 96% (21 @ 23:34) (93% - 96%)  Wt(kg): --  Daily     Daily Weight in k.4 (2021 10:50)  I&O's Summary    2021 07:01  -  2021 07:00  --------------------------------------------------------  IN: 1130 mL / OUT: 2180 mL / NET: -1050 mL    2021 07:01  -  2021 00:48  --------------------------------------------------------  IN: 1139 mL / OUT: 1030 mL / NET: 109 mL        Physical Exam:  Appearance: Normal, well groomed, NAD  Eyes: PERRLA, EOMI, pink conjunctiva, no scleral icterus   HENT: Normal oral mucosa  Cardiovascular: RRR, S1, S2, NO systolic murmur at base  Procedural Access Site: Clean, dry, intact, without hematoma  Respiratory: Clear to auscultation bilaterally  Gastrointestinal: Soft, non-tender, non-distended, BS+  Musculoskeletal: No clubbing or joint deformity   Neurologic: No focal weakness  Lymphatic: No lymphadenopathy  Psychiatry: AAOx3 with appropriate mood and affect  Skin: No rashes, ecchymoses, or cyanosis                          8.3    13.59 )-----------( 244      ( 2021 06:05 )             26.7     07-27    x   |  x   |  x   ----------------------------<  x   3.9   |  x   |  x     Ca    9.9      2021 13:03  Mg     2.2     07-27      PT/INR - ( 2021 13:05 )   PT: 17.0 sec;   INR: 1.44 ratio         PTT - ( 2021 21:35 )  PTT:193.1 sec    Cardiovascular Diagnostic Testing:  ECG: rate controlled AFib    Imaging: < from: CT Heart without Coronaries w/ IV Cont (21 @ 08:51) >  FINDINGS:    Non-Coronary:    6 mm hypodense nodule within the right lobe of the thyroid gland. No enlarged axillary, mediastinal lymph nodes. No pleural effusions.    Evaluation of the lungs demonstrate left lower lobe masslike opacity on the part of which measures about 5 x 2.3 cm extending to the left lower lobe hilum with associated left lower lobe volume loss related to some superimposed atelectasis. Multiple left lung pulmonary nodule along the pleural surface measuring up to about 7 mm. Right middle lobe 3.2 x 2.6 cm mass associated with the minor fissure which contains a few small nodules measuring up to 7 mm. The right middle lobe mass has a cystic component along its inferior aspect.    Subcentimeter hypodensity within the liver is too small to characterize. The gallbladder, spleen, adrenal glands are unremarkable. Few pancreatic hypodensities are noted with the largest measuring about 1.5 cm on image 84 of series 19. Bilateral renal cysts.    Urinary bladder is normal. The uterus and adnexa are within normal limits. Status post rectal surgery with postoperative changes. No bowel obstruction or medial air. Appendix is normal.    Degenerative changes of the spine. Moderate to severe loss of height of the T5 and severe loss of height of the T6 vertebral bodies are of indeterminate age.    The heart is enlarged.  The left and right atria are severely enlarged.  There is mitral annular calcification and calcification of the mitral valve leaflets.   There is reduced contrast opacification of the left atrial appendage that resolves on delay imaging suggestive of mixing artifact.    The ascending aorta is mildly calcified. The aortic arch and the descending aorta are severely calcified.   The main pulmonary artery measures approximately 26.4 mm at the level of the ascending aorta.   The right and left pulmonary arteries appear enlarged and measure approximately 29.4 mm and 25.4 mm, respectively.    The aortic valve is calcified. The calcium score of the aortic valve is 1870.    Coronary:  Coronary arterial calcifications are noted; however, this studywas not optimized for evaluation of the coronary arteries.  Please refer to cardiac catheterization performed as part of pre-TAVR work-up.    Aortic Root Measurements    Major aortic annulus diameter (systole, mm): 25.2  Perpendicular minor aortic annulus diameter (systole, mm): 19.1  Aortic annulus perimeter (systole, mm): 76.6  Aortic annulus area (systole, mm2): 438  LVOT minimum diameter (systole, mm): 19.6  LVOT 90 cross (systole, mm): 26.4  LVOT calcification:  Severe  Distance from Aortic annulus to Left Main coronary artery (diastole, mm): 13.2  Distance from Aortic annulus to Right Coronary artery (diastole, mm): 16.7  Sinus of Valsalva diameter, Right coronary (diastole, mm): 29.4  Sinus of Valsalva diameter, Left coronary (diastole, mm): 31.0  Sinus of Valsalva diameter, Non coronary (diastole, mm): 28.3  Diameter at the sinotubular junction (diastole, mm): 26.5 x 26.5  Maximum ascending aorta diameter at 40 mm above annulus (diastole, mm): 32.3  Aortic root angulation (diastole, degrees): 48.4  Aortic root calcification: Moderate-to-severe    Abdominal Aorta:        Minimum lumen diameter (MLD) (mm): 12.8        Diameter perpendicular to MLD (mm): 13.2  Left Femoral:        Minimum Lumen Diameter (mm): 7.2        Diameter perpendicular to MLD (mm): 8.1        Tortuosity: Mild        Calcification: Mild  Right Femoral:        Minimum Lumen Diameter (mm): 6.5        Diameter perpendicular to MLD (mm): 7.1        Tortuosity: Mild        Calcification: Mild  Left External Iliac:        Minimum Lumen Diameter (mm): 6.9        Diameter perpendicular to MLD (mm): 7.5        Tortuosity: Moderate        Calcification: Mild  Left Common Iliac:         Minimum Lumen Diameter (mm): 4.5        Diameter perpendicular to MLD (mm): 8.0        Tortuosity: Severe        Calcification: Severe  Right External Iliac:        Minimum Lumen Diameter (mm): 5.0        Diameter perpendicular to MLD (mm): 7.4        Tortuosity: Moderate        Calcification: Mild  Right Common Iliac:    Minimum Lumen Diameter (mm): 7.8        Diameter perpendicular to MLD (mm): 8.4        Tortuosity: Moderate        Calcification: Moderate  L Subclavian/Axillary: Not well visualized due to motion artifact        Minimum Lumen Diameter (mm): 5.6      Diameter perpendicular to MLD (mm): 6.2        Tortuosity: Mild        Calcification: Mild  R Subclavian/Axillary:        Minimum Lumen Diameter (mm): 4.3        Diameter perpendicular to MLD (mm): 5.1        Tortuosity: Mild        Calcification: Mild    IMPRESSION:  1. Calcified aortic valve. The calcium score of the aortic valve is 1870.  2. Please see the body of the report for aortic and peripheral access vessel measurements.  3. Dominant large left lower lobe mass associated with multiple left lung pleural nodules worrisome for neoplasm with metastatic disease.  4. 3.2 x 2.6 cm right middle lobe mass as described above also is worrisome for neoplasm.  5. A few pancreatic hypodense nodules. Dedicated contrast enhanced pancreatic MRI is recommended for complete evaluation.  6. Compression fracture deformities of the T5 and T6 vertebral bodies as described above are of indeterminate age.      KHLOE HAINES MD; Attending Cardiologist  This document has been electronically signed.  MONICA HANEY MD; Attending Radiologist  This document has been electronically signed. 2021  9:12AM    < end of copied text >    CATH:  < from: Cardiac Cath Lab - Adult (21 @ 17:21) >  CORONARY VESSELS: The coronary circulation is right dominant.  LM:   --  LM: Normal.  LAD:   --  Proximal LAD: There was a discrete 40 % stenosis.  --  Mid LAD: There was a tubular 25 % stenosis.  --  Distal LAD: Normal.  CX:   --  Proximal circumflex: There was a tubular 20 % stenosis.  --  Mid circumflex: There was a diffuse 50 % stenosis.  RCA:   --  Proximal RCA: There was a diffuse 30 % stenosis.  --  Mid RCA: There was a diffuse 20 % in-stent restenosis through the prior  stent.  --  Distal RCA: Normal.  --  RPDA: Normal.  --  RPLS: Normal.  COMPLICATIONS: There were no complications.  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  Normal left main coronary artery  Right dominant system  Elevated right atrial pressure (mRA 16mmHg with a V wave of 23mmHg)  Moderate post-capillary pulmonary hypertension (sPAP 58mmHg, dPAP 23mmHg,  mPAP 37mmHg)  PCWP = 28mmHg with a V wave of 48mmHg  PAsat = 67.1% // AOsat = 99.2% (room air)  [place] // CI = 6.07l/min // 3.56l/min/m2  DIAGNOSTIC RECOMMENDATIONS: Keep right leg straigh for 4 hours following  removal of sheaths  Continue aggressive medical management of coronary artery disease and  associated risk factors  Closely monitor the patients kidney function  Patient being evaluated by the [facility] valve team for TAVR  Findings discussed with Dr. 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  Normal left main coronary artery  Right dominant system  Elevated right atrial pressure (mRA 16mmHg with a V wave of 23mmHg)  Moderate post-capillary pulmonary hypertension (sPAP 58mmHg, dPAP 23mmHg,  mPAP 37mmHg)  PCWP = 28mmHg with a V wave of 48mmHg  PAsat = 67.1% // AOsat = 99.2% (room air)  [place] // CI = 6.07l/min // 3.56l/min/m2  INTERVENTIONAL RECOMMENDATIONS: Keep right leg straigh for 4 hours  following removal of sheaths  Continue aggressive medical management of coronary artery disease and  associated risk factors  Closely monitor the patients kidney function  Patient being evaluated by the [facility] valve team for TAVR  Findings discussed with Dr. Hodges  Prepared and signed by  Alejandro Johns MD  Signed 2021 18:37:05    < end of copied text >

## 2021-07-28 NOTE — PROGRESS NOTE ADULT - SUBJECTIVE AND OBJECTIVE BOX
HPI:  Patient seen and examined at bedside on 2 [place].  Still short of breath and not feeling great, but slightly better than yesterday.  Metabolic derangements noted - hyponatremia.    Review Of Systems:           Respiratory: +shortness of breath, no cough or wheezing  Cardiovascular: No chest pain or palpitations  10 point review of systems is otherwise negative except as mentioned above        Medications:  artificial  tears Solution 1 Drop(s) Both EYES every 12 hours PRN  aspirin  chewable 81 milliGRAM(s) Oral daily  atorvastatin 40 milliGRAM(s) Oral at bedtime  ciprofloxacin     Tablet 250 milliGRAM(s) Oral every 12 hours  dextrose 40% Gel 15 Gram(s) Oral once  dextrose 5%. 1000 milliLiter(s) IV Continuous <Continuous>  dextrose 5%. 1000 milliLiter(s) IV Continuous <Continuous>  dextrose 50% Injectable 25 Gram(s) IV Push once  dextrose 50% Injectable 12.5 Gram(s) IV Push once  dextrose 50% Injectable 25 Gram(s) IV Push once  ferrous    sulfate 325 milliGRAM(s) Oral daily  glucagon  Injectable 1 milliGRAM(s) IntraMuscular once  heparin  Infusion 1100 Unit(s)/Hr IV Continuous <Continuous>  insulin glargine Injectable (LANTUS) 6 Unit(s) SubCutaneous at bedtime  insulin lispro (ADMELOG) corrective regimen sliding scale   SubCutaneous three times a day before meals  insulin lispro Injectable (ADMELOG) 3 Unit(s) SubCutaneous before breakfast  insulin lispro Injectable (ADMELOG) 3 Unit(s) SubCutaneous before lunch  insulin lispro Injectable (ADMELOG) 3 Unit(s) SubCutaneous before dinner  metoprolol succinate ER 25 milliGRAM(s) Oral <User Schedule>  polyethylene glycol 3350 17 Gram(s) Oral daily  torsemide 20 milliGRAM(s) Oral two times a day  zolpidem 5 milliGRAM(s) Oral at bedtime PRN    PAST MEDICAL & SURGICAL HISTORY:    Vitals:  T(C): 36.6 (21 @ 19:07), Max: 36.7 (21 @ 07:02)  HR: 74 (21 @ 19:07) (72 - 81)  BP: 117/56 (21 @ 19:07) (110/54 - 136/58)  BP(mean): 80 (21 @ 19:07) (78 - 85)  RR: 18 (21 @ 19:07) (18 - 18)  SpO2: 95% (21 @ 19:07) (94% - 98%)  Wt(kg): --  Daily     Daily Weight in k.9 (2021 08:27)  I&O's Summary    2021 07:01  -  2021 07:00  --------------------------------------------------------  IN: 1314.5 mL / OUT: 1850 mL / NET: -535.5 mL    2021 07:01  -  2021 23:25  --------------------------------------------------------  IN: 1143.5 mL / OUT: 1475 mL / NET: -331.5 mL        Physical Exam:  Appearance: Normal, well groomed, NAD  Eyes: PERRLA, EOMI, pink conjunctiva, no scleral icterus   HENT: Normal oral mucosa  Cardiovascular: RRR, S1, S2, NO systolic murmur at base  Procedural Access Site: Clean, dry, intact, without hematoma  Respiratory: Clear to auscultation bilaterally  Gastrointestinal: Soft, non-tender, non-distended, BS+  Musculoskeletal: No clubbing or joint deformity   Neurologic: No focal weakness  Lymphatic: No lymphadenopathy  Psychiatry: AAOx3 with appropriate mood and affect  Skin: No rashes, ecchymoses, or cyanosis                          7.6    12.12 )-----------( 289      ( 2021 04:27 )             24.6         x   |  x   |  x   ----------------------------<  x   5.6<H>   |  x   |  x     Ca    9.7      2021 04:27  Mg     2.1           PT/INR - ( 2021 04:27 )   PT: 16.4 sec;   INR: 1.39 ratio         PTT - ( 2021 20:19 )  PTT:72.1 sec        Cardiovascular Diagnostic Testing:  ECG: rate controlled AFib    Imaging: < from: CT Heart without Coronaries w/ IV Cont (21 @ 08:51) >  FINDINGS:    Non-Coronary:    6 mm hypodense nodule within the right lobe of the thyroid gland. No enlarged axillary, mediastinal lymph nodes. No pleural effusions.    Evaluation of the lungs demonstrate left lower lobe masslike opacity on the part of which measures about 5 x 2.3 cm extending to the left lower lobe hilum with associated left lower lobe volume loss related to some superimposed atelectasis. Multiple left lung pulmonary nodule along the pleural surface measuring up to about 7 mm. Right middle lobe 3.2 x 2.6 cm mass associated with the minor fissure which contains a few small nodules measuring up to 7 mm. The right middle lobe mass has a cystic component along its inferior aspect.    Subcentimeter hypodensity within the liver is too small to characterize. The gallbladder, spleen, adrenal glands are unremarkable. Few pancreatic hypodensities are noted with the largest measuring about 1.5 cm on image 84 of series 19. Bilateral renal cysts.    Urinary bladder is normal. The uterus and adnexa are within normal limits. Status post rectal surgery with postoperative changes. No bowel obstruction or medial air. Appendix is normal.    Degenerative changes of the spine. Moderate to severe loss of height of the T5 and severe loss of height of the T6 vertebral bodies are of indeterminate age.    The heart is enlarged.  The left and right atria are severely enlarged.  There is mitral annular calcification and calcification of the mitral valve leaflets.   There is reduced contrast opacification of the left atrial appendage that resolves on delay imaging suggestive of mixing artifact.    The ascending aorta is mildly calcified. The aortic arch and the descending aorta are severely calcified.   The main pulmonary artery measures approximately 26.4 mm at the level of the ascending aorta.   The right and left pulmonary arteries appear enlarged and measure approximately 29.4 mm and 25.4 mm, respectively.    The aortic valve is calcified. The calcium score of the aortic valve is 1870.    Coronary:  Coronary arterial calcifications are noted; however, this studywas not optimized for evaluation of the coronary arteries.  Please refer to cardiac catheterization performed as part of pre-TAVR work-up.    Aortic Root Measurements    Major aortic annulus diameter (systole, mm): 25.2  Perpendicular minor aortic annulus diameter (systole, mm): 19.1  Aortic annulus perimeter (systole, mm): 76.6  Aortic annulus area (systole, mm2): 438  LVOT minimum diameter (systole, mm): 19.6  LVOT 90 cross (systole, mm): 26.4  LVOT calcification:  Severe  Distance from Aortic annulus to Left Main coronary artery (diastole, mm): 13.2  Distance from Aortic annulus to Right Coronary artery (diastole, mm): 16.7  Sinus of Valsalva diameter, Right coronary (diastole, mm): 29.4  Sinus of Valsalva diameter, Left coronary (diastole, mm): 31.0  Sinus of Valsalva diameter, Non coronary (diastole, mm): 28.3  Diameter at the sinotubular junction (diastole, mm): 26.5 x 26.5  Maximum ascending aorta diameter at 40 mm above annulus (diastole, mm): 32.3  Aortic root angulation (diastole, degrees): 48.4  Aortic root calcification: Moderate-to-severe    Abdominal Aorta:        Minimum lumen diameter (MLD) (mm): 12.8        Diameter perpendicular to MLD (mm): 13.2  Left Femoral:        Minimum Lumen Diameter (mm): 7.2        Diameter perpendicular to MLD (mm): 8.1        Tortuosity: Mild        Calcification: Mild  Right Femoral:        Minimum Lumen Diameter (mm): 6.5        Diameter perpendicular to MLD (mm): 7.1        Tortuosity: Mild        Calcification: Mild  Left External Iliac:        Minimum Lumen Diameter (mm): 6.9        Diameter perpendicular to MLD (mm): 7.5        Tortuosity: Moderate        Calcification: Mild  Left Common Iliac:         Minimum Lumen Diameter (mm): 4.5        Diameter perpendicular to MLD (mm): 8.0        Tortuosity: Severe        Calcification: Severe  Right External Iliac:        Minimum Lumen Diameter (mm): 5.0        Diameter perpendicular to MLD (mm): 7.4        Tortuosity: Moderate        Calcification: Mild  Right Common Iliac:    Minimum Lumen Diameter (mm): 7.8        Diameter perpendicular to MLD (mm): 8.4        Tortuosity: Moderate        Calcification: Moderate  L Subclavian/Axillary: Not well visualized due to motion artifact        Minimum Lumen Diameter (mm): 5.6      Diameter perpendicular to MLD (mm): 6.2        Tortuosity: Mild        Calcification: Mild  R Subclavian/Axillary:        Minimum Lumen Diameter (mm): 4.3        Diameter perpendicular to MLD (mm): 5.1        Tortuosity: Mild        Calcification: Mild    IMPRESSION:  1. Calcified aortic valve. The calcium score of the aortic valve is 1870.  2. Please see the body of the report for aortic and peripheral access vessel measurements.  3. Dominant large left lower lobe mass associated with multiple left lung pleural nodules worrisome for neoplasm with metastatic disease.  4. 3.2 x 2.6 cm right middle lobe mass as described above also is worrisome for neoplasm.  5. A few pancreatic hypodense nodules. Dedicated contrast enhanced pancreatic MRI is recommended for complete evaluation.  6. Compression fracture deformities of the T5 and T6 vertebral bodies as described above are of indeterminate age.      KHLOE HAINES MD; Attending Cardiologist  This document has been electronically signed.  MONICA HANEY MD; Attending Radiologist  This document has been electronically signed. 2021  9:12AM    < end of copied text >    CATH:  < from: Cardiac Cath Lab - Adult (21 @ 17:21) >  CORONARY VESSELS: The coronary circulation is right dominant.  LM:   --  LM: Normal.  LAD:   --  Proximal LAD: There was a discrete 40 % stenosis.  --  Mid LAD: There was a tubular 25 % stenosis.  --  Distal LAD: Normal.  CX:   --  Proximal circumflex: There was a tubular 20 % stenosis.  --  Mid circumflex: There was a diffuse 50 % stenosis.  RCA:   --  Proximal RCA: There was a diffuse 30 % stenosis.  --  Mid RCA: There was a diffuse 20 % in-stent restenosis through the prior  stent.  --  Distal RCA: Normal.  --  RPDA: Normal.  --  RPLS: Normal.  COMPLICATIONS: There were no complications.  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  Normal left main coronary artery  Right dominant system  Elevated right atrial pressure (mRA 16mmHg with a V wave of 23mmHg)  Moderate post-capillary pulmonary hypertension (sPAP 58mmHg, dPAP 23mmHg,  mPAP 37mmHg)  PCWP = 28mmHg with a V wave of 48mmHg  PAsat = 67.1% // AOsat = 99.2% (room air)  [place] // CI = 6.07l/min // 3.56l/min/m2  DIAGNOSTIC RECOMMENDATIONS: Keep right leg straigh for 4 hours following  removal of sheaths  Continue aggressive medical management of coronary artery disease and  associated risk factors  Closely monitor the patients kidney function  Patient being evaluated by the [facility] valve team for TAVR  Findings discussed with Dr. 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  Normal left main coronary artery  Right dominant system  Elevated right atrial pressure (mRA 16mmHg with a V wave of 23mmHg)  Moderate post-capillary pulmonary hypertension (sPAP 58mmHg, dPAP 23mmHg,  mPAP 37mmHg)  PCWP = 28mmHg with a V wave of 48mmHg  PAsat = 67.1% // AOsat = 99.2% (room air)  [place] // CI = 6.07l/min // 3.56l/min/m2  INTERVENTIONAL RECOMMENDATIONS: Keep right leg straigh for 4 hours  following removal of sheaths  Continue aggressive medical management of coronary artery disease and  associated risk factors  Closely monitor the patients kidney function  Patient being evaluated by the [facility] valve team for TAVR  Findings discussed with Dr. Hodges  Prepared and signed by  Alejandro Johns MD  Signed 2021 18:37:05    < end of copied text >

## 2021-07-28 NOTE — PROVIDER CONTACT NOTE (CRITICAL VALUE NOTIFICATION) - SITUATION
Patient Potassium 6.2
Pt on Heparin gtt running at 11cc/hr
Patient .9
Pt on heparin gtt running at 10cc/ hr

## 2021-07-28 NOTE — PROVIDER CONTACT NOTE (CRITICAL VALUE NOTIFICATION) - ASSESSMENT
Afib on tele
no signs of bleeding noted, no CP or sob, VSS
Pt denies cp, dizziness, no SOB, no signs of bleeding noted.
No s/s bleeding. Heparin running at 12cc/hr.

## 2021-07-28 NOTE — PROGRESS NOTE ADULT - ATTENDING COMMENTS
No acute events reported overnight.  The patient reports that she is mildly improved today compared to the prior day.  She is laying in a recliner at a 30 degree angle.  Noted to have reasonable urinary output.  Bumex drip was discontinued early this morning secondary to hyponatremia with sodium of 127.  Physical examination unchanged compared to the prior day.    Significant clinical change from patient's immediate postoperative course following TAVR implantation.  The patient has been diuresed with Bumex drip with mild improvement in her symptoms with subsequent hyponatremia/contraction alkalosis.  Repeat TTE demonstrated functioning transcatheter aortic valve with no significant mitral regurgitation, severe tricuspid valve regurgitation normal ejection fraction.  Noted to have small pericardial effusion.  A repeat transthoracic echocardiogram study should be performed in the next 24 hours based upon the patient's clinical status.    The patient has been started on a heparin drip due to history of atrial fibrillation and concern for potential pulmonary embolism leading to her clinical decompensation.  Negative Dopplers of her legs.    Recommend CBC every 12 hours along with BNP/magnesium.  Aggressively replete electrolytes with goal potassium greater than 4 magnesium greater than 2.    The patient will be reassessed by nephrology and heart failure teams.    All question concerns of the patient were addressed.    Findings and plan were discussed with cardiac surgery/Dr. Pruitt, cardiology/Dr. Hodges, heart failure/Dr. Kee and structural heart team.

## 2021-07-28 NOTE — PROVIDER CONTACT NOTE (CRITICAL VALUE NOTIFICATION) - BACKGROUND
7/14 cath  7/22 TAVR via BL groins  7/25 RAGHU showed aortic regurg/ severe TR pmhx CAD, DM2. Afib, severe AS, HF, pulm nodules   7/14 cath  7/22 TAVR via BL groins  7/25 RAGHU showed aortic regurg/ severe TR

## 2021-07-28 NOTE — PROGRESS NOTE ADULT - SUBJECTIVE AND OBJECTIVE BOX
S:  feels slightly better than yesterday    Telemetry:  afib 70-90      Vital Signs Last 24 Hrs  T(C): 36.3 (21 @ 10:38), Max: 36.7 (21 @ 18:48)  T(F): 97.4 (21 @ 10:38), Max: 98 (21 @ 18:48)  HR: 76 (21 @ 10:38) (76 - 83)  BP: 136/58 (21 @ 10:38) (119/58 - 136/58)  RR: 18 (21 @ 10:38) (18 - 18)  SpO2: 97% (21 @ 10:38) (93% - 97%)                    @ 07:01  -   @ 07:00  --------------------------------------------------------  IN: 1314.5 mL / OUT: 1850 mL / NET: -535.5 mL     @ 07:  -   @ 11:50  --------------------------------------------------------  IN: 288.5 mL / OUT: 0 mL / NET: 288.5 mL      Daily Weight in k.9 (2021 08:27)      POCT Blood Glucose.: 166 mg/dL (2021 11:24)  POCT Blood Glucose.: 176 mg/dL (2021 07:34)  POCT Blood Glucose.: 217 mg/dL (2021 21:30)  POCT Blood Glucose.: 178 mg/dL (2021 16:28)          Drains:     MS         [  ] Drainage:                 L Pleural  [  ]  Drainage:                R Pleural  [  ]  Drainage:    Pacing Wires        [  ]   Settings:                                  Isolated  [  ]    Coumadin    [ ] YES          [ x ]      NO         Reason:                                 7.6    12.12 )-----------( 289      ( 2021 04:27 )             24.6       07-28    127<L>  |  84<L>  |  46<H>  ----------------------------<  149<H>  3.1<L>   |  29  |  1.04    Ca    9.7      2021 04:27  Mg     2.1             PT/INR - ( 2021 04:27 )   PT: 16.4 sec;   INR: 1.39 ratio         PTT - ( 2021 04:27 )  PTT:184.7 sec    PHYSICAL EXAM:    Neurology: alert and oriented x 3, nonfocal, no gross deficits    CV :  S1S2 heard irreg irreg    Lungs:  clear to ausc.  No w/r/r    Abdomen: soft, nontender, nondistended, positive bowel sounds, last bowel movement         Extremities:   no edema            artificial  tears Solution 1 Drop(s) Both EYES every 12 hours PRN  aspirin  chewable 81 milliGRAM(s) Oral daily  atorvastatin 40 milliGRAM(s) Oral at bedtime  ferrous    sulfate 325 milliGRAM(s) Oral daily  heparin  Infusion 1100 Unit(s)/Hr IV Continuous <Continuous>  insulin glargine Injectable (LANTUS) 6 Unit(s) SubCutaneous at bedtime  insulin lispro (ADMELOG) corrective regimen sliding scale   SubCutaneous three times a day before meals  insulin lispro Injectable (ADMELOG) 3 Unit(s) SubCutaneous before breakfast  insulin lispro Injectable (ADMELOG) 3 Unit(s) SubCutaneous before lunch  insulin lispro Injectable (ADMELOG) 3 Unit(s) SubCutaneous before dinner  metoprolol succinate ER 25 milliGRAM(s) Oral <User Schedule>  polyethylene glycol 3350 17 Gram(s) Oral daily  potassium bicarbonate/potassium citrate 25 milliEquivalent(s) Oral every 1 hour  tolvaptan 15 milliGRAM(s) Oral once  torsemide 20 milliGRAM(s) Oral two times a day  zolpidem 5 milliGRAM(s) Oral at bedtime PRN                              Physical Therapy Rec:   Home  [  ]   Home w/ PT  [  ]  Rehab  [ x ]    Discussed with Cardiothoracic Team at AM rounds.

## 2021-07-28 NOTE — CONSULT NOTE ADULT - SUBJECTIVE AND OBJECTIVE BOX
NEPHROLOGY - NSN    Patient seen and examined.    HPI:  MICU H&P    CHIEF COMPLAINT: urgent HD, hyperkalemia    HPI / INTERVAL HISTORY:  87F PMH CAD w/stents, DM2, Afib (not on AC's, dc'd pradaxa 1mo ago as pt w/u for anemia), Colon Ca (about 25y ago),  AS, HF on 80mg lasix qd, recently started on Entresto, presents to the ED with abnormal labs done on  showing JACQUE with hyperk to >6 mod hemolyzed. Pt states that she hasn't urinated in 2 days, also states she's had nonbloody loose stools over the past week. Otherwise she states SOB is at baseline. Denies any fevers/chills, cough, chest pain, palpitations, lightheadedness, abd pain, n/v, leg swelling worse than baseline.         On presentation patient with BUN/Cr 79/6.7 mg/dl. K: 6.6 (non hemolyzed). Previous Cr was at 1.26 21. Also noted with bicarb: 11 pH 7.2 - lactate 7. patient on Metformin at home and reports taking all of her medications.     Nephrology consulted for JACQUE / hyperkalemia and acidosis. Temporized in ED with Bicarb and calcium. Per tox will need Q1 FSG for sulfonylurea and JACQUE. Will be admitted to MICU for HD.    Underwent TAVR via right femoral #22 Heike  recovered in CRS post op course unremarkable Junctional rhythm  by EKG  700 cc fluid given for hypotension. Stabilize transferred to [facility]- received in afib-  groin sites benign + DP son at bedside ECHO in am EKG in am  Likely discharge on Saturday with [facility] son reports haven given rehab  choices to case management.   pending post TAVR echo. Anticipate discharge to rehab Monday.   VSS maintaining Afib 70's . No further junctional episodes of rhythm.    Transthoracic Echocardiogram demonstrates Minimal aortic regurgitation-Tethered tricuspid valve, severe tricuspid regurgitation . No evidence of pericardial effusion.    VSS - pt c/o slight SOB - echo shows small new pericardial effusion.  CXR-done- crackles bases - on IV bumex tid - diuresing - O2 N/c in place -rounds made w/ Dr. Pruitt Dr. Johns & structural heart team - new SOB - will get LE dopplers to r/o dvt - start heaprin gtt, change to bumex gtt 0.5mg /hr- give zaroxlyn 10mg iv x1 , WBC 16 from 8 - ck u/a c&s.  transfer to SDU for closer monitoring.        OOB to chair  bumex gtt   heparin gtt  neg  800 cc   24 hrs   chest xray   bl base pl effusion    ASk by Dr Pruitt to assume nephrological care       PAST MEDICAL & SURGICAL HISTORY:    FAMILY HISTORY:  No sig FH in first degree relatives     SOCIAL HISTORY:  Denies smoking drinking or illicit drug use    Allergies    No Known Allergies    Intolerances    REVIEW OF SYSTEMS:  +Fatigue, weakness, oliguria  Negative except per HPI    OBJECTIVE:  ICU Vital Signs Last 24 Hrs  T(C): 36.4 (2021 07:36), Max: 36.4 (2021 07:36)  T(F): 97.5 (2021 07:36), Max: 97.5 (2021 07:36)  HR: 79 (2021 10:30) (71 - 83)  BP: 128/64 (2021 10:30) (112/67 - 128/64)  BP(mean): 71 (2021 10:30) (71 - 77)    RR: 18 (2021 10:30) (18 - 20)  SpO2: 100% (2021 10:30) (95% - 100%)  CAPILLARY BLOOD GLUCOSE    POCT Blood Glucose.: 148 mg/dL (2021 11:19)    PHYSICAL EXAM:  Gen: NAD  	HEENT: MMM  	Pulm: CTA B/L  	CV: S1S2  	Abd: Soft, +BS   	Ext: No LE edema B/L  	Neuro: Awake  	Skin: Warm and dry             : no tenderness to palpation     MEDICATIONS  (PRN):    LABS:                        7.8    7.31  )-----------( 196      ( 2021 08:47 )             25.8          PAST MEDICAL & SURGICAL HISTORY:      MEDICATIONS  (STANDING):  aspirin  chewable 81 milliGRAM(s) Oral daily  atorvastatin 40 milliGRAM(s) Oral at bedtime  ferrous    sulfate 325 milliGRAM(s) Oral daily  heparin  Infusion 1100 Unit(s)/Hr (9 mL/Hr) IV Continuous <Continuous>  insulin glargine Injectable (LANTUS) 6 Unit(s) SubCutaneous at bedtime  insulin lispro (ADMELOG) corrective regimen sliding scale   SubCutaneous three times a day before meals  insulin lispro Injectable (ADMELOG) 3 Unit(s) SubCutaneous before breakfast  insulin lispro Injectable (ADMELOG) 3 Unit(s) SubCutaneous before lunch  insulin lispro Injectable (ADMELOG) 3 Unit(s) SubCutaneous before dinner  metoprolol succinate ER 25 milliGRAM(s) Oral <User Schedule>  polyethylene glycol 3350 17 Gram(s) Oral daily  potassium bicarbonate/potassium citrate 25 milliEquivalent(s) Oral every 1 hour      Allergies    No Known Allergies    Intolerances        SOCIAL HISTORY:  Denies alcohol abuse, drug abuse or tobacco usage.     FAMILY HISTORY:      VITALS:  T(C): 36.3 (21 @ 10:38), Max: 36.7 (21 @ 11:17)  HR: 76 (21 @ 10:38) (76 - 83)  BP: 136/58 (21 @ 10:38) (119/58 - 154/65)  RR: 18 (21 @ 10:38) (18 - 18)  SpO2: 97% (21 @ 10:38) (93% - 97%)    REVIEW OF SYSTEMS:  Denies any nausea, vomiting, diarrhea, fever or chills.   All other pertinent systems are reviewed and are negative.    PHYSICAL EXAM:  Constitutional: NAD  HEENT: EOMI  Neck:  No JVD, supple   Respiratory: CTA B/L  Cardiovascular: S1 and S2, RRR  Gastrointestinal: + BS, soft, NT, ND  Extremities: No peripheral edema, + peripheral pulses  Neurological: A/O x 3, CN2-12 intact  Psychiatric: Normal mood, normal affect  : No Steward  Skin: No rashes, C/D/I  Access: Not applicable    I and O's:     @ 07:01  -   @ 07:00  --------------------------------------------------------  IN: 1130 mL / OUT: 2180 mL / NET: -1050 mL     @ 07:01   @ 07:00  --------------------------------------------------------  IN: 1314.5 mL / OUT: 1850 mL / NET: -535.5 mL     @ 07: @ 11:02  --------------------------------------------------------  IN: 288.5 mL / OUT: 0 mL / NET: 288.5 mL          LABS:                        7.6    12.12 )-----------( 289      ( 2021 04:27 )             24.6         127<L>  |  84<L>  |  46<H>  ----------------------------<  149<H>  3.1<L>   |  29  |  1.04    Ca    9.7      2021 04:27  Mg     2.2             URINE:  Urinalysis Basic - ( 2021 12:20 )    Color: Yellow / Appearance: Clear / S.014 / pH: x  Gluc: x / Ketone: Negative  / Bili: Negative / Urobili: Negative   Blood: x / Protein: Negative / Nitrite: Negative   Leuk Esterase: Large / RBC: 3 /hpf / WBC 19 /HPF   Sq Epi: x / Non Sq Epi: 0 /hpf / Bacteria: Few        RADIOLOGY & ADDITIONAL STUDIES:    < from: Transthoracic Echocardiogram (21 @ 08:08) >    Patient name: ANITHA JHAVERI  YOB: 1934   Age: 87 (F)   MR#: [id number]  Study Date: 2021  Location: [address] [place], [place] [id number]: Rosemary Chow RDCS  Study quality: Technically fair  Referring Physician: Fredrick Pruitt MD  BloodPressure: 143/77 mmHg  Height: 152 cm  Weight: 72 kg  BSA: 1.7 m2  Heart Rate: 95 mmHg  ------------------------------------------------------------------------  PROCEDURE: Transthoracic echocardiogram with 2-D, M-Mode  and complete spectral and color flow Doppler.  INDICATION: Pericardial effusion (noninflammatory) (I31.3)  ------------------------------------------------------------------------  Dimensions:    Normal Values:  LA:            2.0 - 4.0 cm  Ao:            2.0 - 3.8 cm  SEPTUM:   0.6 - 1.2 cm  PWT:           0.6 - 1.1 cm  LVIDd:         3.0 - 5.6 cm  LVIDs:         1.8 - 4.0 cm  EF (Visual Estimate): 60-65 %  ------------------------------------------------------------------------  Observations:  Mitral Valve: Mitral annular calcification and calcified  mitral leaflets.  Aortic Valve/Aorta: Transcatheter aortic valve replacement.  Left Atrium: Severe left atrial enlargement.  Left Ventricle: Endocardium not well visualized; grossly  normal left ventricular systolic function.  Right Heart: Severe right atrial enlargement.  Pericardium/Pleura: Small pericardial effusion, largest  pocket measures up to 0.8 cm anterior to the right  ventricle. There is no significant inflow variation  measured across the mitral valve, but it is significant  across the tricuspid valve.  The IVC is enlarged (3.36cm)  and not collapsable No evidence of right atrial or right  ventricular collapse.  Hemodynamic: Estimated right atrial pressure is 8 mm Hg.  ------------------------------------------------------------------------  Conclusions:  1. Small pericardial effusion, largest pocket measures up  to 0.8 cm anterior to the right ventricle. There is no  significant inflow variation measured across the mitral  valve, but it is significant across the tricuspid valve.  The IVC is enlarged (3.36cm)  and not collapsable No  evidence of right atrial or right ventricular collapse.  *** Compared with echocardiogram of 2021, the  pericardial effusion is new.  ------------------------------------------------------------------------  Confirmed on  2021 - 09:18:47 by Rhys Regalado M.D.  ------------------------------------------------------------------------    < end of copied text >  < from: Transthoracic Echocardiogram (21 @ 19:50) >    Patient name: ANITHA JHAVERI  YOB: 1934   Age: 87 (F)   MR#: [id number]  Study Date: 2021  [facility]  [facility]  Study quality: Technically fair  Referring Physician: Fredrick Pruitt MD  Blood Pressure: 139/72 mmHg  Height: 152 cm  Weight: 72 kg  BSA: 1.7 m2  ------------------------------------------------------------------------  PROCEDURE: Transthoracic echocardiogram with 2-D, M-Mode  and complete spectral and color flow Doppler.  INDICATION: Cardiomyopathy, unspecified (I42.9)  ------------------------------------------------------------------------  Dimensions:    Normal Values:  LA:     6.2    2.0 - 4.0 cm  Ao:     2.7    2.0 - 3.8 cm  SEPTUM: 1.0    0.6 - 1.2 cm  PWT:    1.2    0.6 - 1.1 cm  LVIDd:  4.2    3.0 - 5.6 cm  LVIDs:  2.8    1.8 - 4.0 cm  Derived variables:  LVMI: 93 g/m2  RWT: 0.57  Fractional short: 33 %  EF (Teicholtz): 62 %  Doppler Peak Velocity (m/sec): MV=1.2 AoV=2.2  ------------------------------------------------------------------------  Observations:  Mitral Valve: Mitral annular calcification and calcified  mitral leaflets with decreased diastolic opening. Mild  mitral regurgitation.  Peak mitral valve gradient equals 6  mm Hg, mean transmitral valve gradient equals 2 mm Hg,  consistent with mild mitral stenosis.Gradients measured at  a heart rate of 56/min.  Aortic Valve/Aorta: Transcatheter aortic valve replacement.  Peak transaortic valve gradient equals 19 mm Hg, mean  transaortic valve gradient equals 10 mm Hg, which is  probably normal in the presence of a transcatheter aortic  valve replacement. Minimal paravalvular aortic  regurgitation.  Aortic Root: 2.7 cm.  LVOT diameter: 1.9 cm.  Left Atrium: Severely dilated left atrium.  LA volume index  = 78 cc/m2.  Left Ventricle: Endocardium not well visualized; grossly  normal left ventricular systolic function. Increased  relative wall thickness with normal left ventricular mass  index, consistent with concentric left ventricular  remodeling. Variability of R-R interval limits assessment.  Right Heart: Severe right atrial enlargement. Right  ventricular enlargement with borderline decreased right  ventricular systolic function.TAPSE 1.6 cm. Tethered  tricuspid valve. Severe tricuspid regurgitation. Pulmonic  valve not well visualized.  Pericardium/Pleura: Normal pericardium with no pericardial  effusion.  ------------------------------------------------------------------------  Conclusions:  1. Mitral annular calcification and calcified mitral  leaflets with decreased diastolic opening. Mild mitral  regurgitation.  Peak mitral valve gradient equals 6 mm Hg,  mean transmitral valve gradient equals 2 mm Hg, consistent  with mild mitral stenosis.Gradients measuredat a heart  rate of 56/min.  2. Transcatheter aortic valve replacement. Peak transaortic  valve gradient equals 19 mm Hg, mean transaortic valve  gradient equals 10 mm Hg, which is probably normal in the  presence of a transcatheter aortic valve replacement.  Minimal aortic regurgitation-likely paravalvular.  3. Severely dilated left atrium.  LA volume index = 78  cc/m2.  4. Endocardium not well visualized; grossly normal left  ventricular systolic function.  5. Severe right atrial enlargement.  6. Right ventricular enlargement with borderline decreased  right ventricular systolic function.TAPSE 1.6 cm.  7. Tethered tricuspid valve. Severe tricuspid  regurgitation.  ------------------------------------------------------------------------  Confirmed on  2021 - 17:10:17 by Wojciech Ashley M.D.  ---------------------------    < end of copied text >   NEPHROLOGY - NSN    Patient seen and examined.    HPI:  MICU H&P    CHIEF COMPLAINT: urgent HD, hyperkalemia    HPI / INTERVAL HISTORY:  87F PMH CAD w/stents, DM2, Afib (not on AC's, dc'd pradaxa 1mo ago as pt w/u for anemia), Colon Ca (about 25y ago),  AS, HF on 80mg lasix qd, recently started on Entresto, presents to the ED with abnormal labs done on  showing JACQUE with hyperk to >6 mod hemolyzed. Pt states that she hasn't urinated in 2 days, also states she's had nonbloody loose stools over the past week. Otherwise she states SOB is at baseline. Denies any fevers/chills, cough, chest pain, palpitations, lightheadedness, abd pain, n/v, leg swelling worse than baseline.         On presentation patient with BUN/Cr 79/6.7 mg/dl. K: 6.6 (non hemolyzed). Previous Cr was at 1.26 21. Also noted with bicarb: 11 pH 7.2 - lactate 7. patient on Metformin at home and reports taking all of her medications.     Nephrology consulted for JACQUE / hyperkalemia and acidosis. Temporized in ED with Bicarb and calcium. Per tox will need Q1 FSG for sulfonylurea and JACQUE. Will be admitted to MICU for HD.    Underwent TAVR via right femoral #22 Heike  recovered in CRS post op course unremarkable Junctional rhythm  by EKG  700 cc fluid given for hypotension. Stabilize transferred to 2 [facility]- received in afib-  groin sites benign + DP son at bedside ECHO in am EKG in am  Likely discharge on Saturday with [facility] son reports haven given rehab  choices to case management.   pending post TAVR echo. Anticipate discharge to rehab Monday.   VSS maintaining Afib 70's . No further junctional episodes of rhythm.    Transthoracic Echocardiogram demonstrates Minimal aortic regurgitation-Tethered tricuspid valve, severe tricuspid regurgitation . No evidence of pericardial effusion.    VSS - pt c/o slight SOB - echo shows small new pericardial effusion.  CXR-done- crackles bases - on IV bumex tid - diuresing - O2 N/c in place -rounds made w/ Dr. Pruitt Dr. Johns & structural heart team - new SOB - will get LE dopplers to r/o dvt - start heaprin gtt, change to bumex gtt 0.5mg /hr- give zaroxlyn 10mg iv x1 , WBC 16 from 8 - ck u/a c&s.  transfer to SDU for closer monitoring.        OOB to chair  bumex gtt   heparin gtt  neg  800 cc   24 hrs   chest xray   bl base pl effusion    ASk by Dr Pruitt to assume nephrological care       PAST MEDICAL & SURGICAL HISTORY:    FAMILY HISTORY:  No sig FH in first degree relatives     SOCIAL HISTORY:  Denies smoking drinking or illicit drug use    Allergies    No Known Allergies    Intolerances    REVIEW OF SYSTEMS:  +Fatigue, weakness, oliguria  Negative except per HPI    OBJECTIVE:  ICU Vital Signs Last 24 Hrs  T(C): 36.4 (2021 07:36), Max: 36.4 (2021 07:36)  T(F): 97.5 (2021 07:36), Max: 97.5 (2021 07:36)  HR: 79 (2021 10:30) (71 - 83)  BP: 128/64 (2021 10:30) (112/67 - 128/64)  BP(mean): 71 (2021 10:30) (71 - 77)    RR: 18 (2021 10:30) (18 - 20)  SpO2: 100% (2021 10:30) (95% - 100%)  CAPILLARY BLOOD GLUCOSE    POCT Blood Glucose.: 148 mg/dL (2021 11:19)    PHYSICAL EXAM:  Gen: NAD  	HEENT: MMM  	Pulm: CTA B/L  	CV: S1S2  	Abd: Soft, +BS   	Ext: No LE edema B/L  	Neuro: Awake  	Skin: Warm and dry             : no tenderness to palpation     MEDICATIONS  (PRN):    LABS:                        7.8    7.31  )-----------( 196      ( 2021 08:47 )             25.8          PAST MEDICAL & SURGICAL HISTORY:      MEDICATIONS  (STANDING):  aspirin  chewable 81 milliGRAM(s) Oral daily  atorvastatin 40 milliGRAM(s) Oral at bedtime  ferrous    sulfate 325 milliGRAM(s) Oral daily  heparin  Infusion 1100 Unit(s)/Hr (9 mL/Hr) IV Continuous <Continuous>  insulin glargine Injectable (LANTUS) 6 Unit(s) SubCutaneous at bedtime  insulin lispro (ADMELOG) corrective regimen sliding scale   SubCutaneous three times a day before meals  insulin lispro Injectable (ADMELOG) 3 Unit(s) SubCutaneous before breakfast  insulin lispro Injectable (ADMELOG) 3 Unit(s) SubCutaneous before lunch  insulin lispro Injectable (ADMELOG) 3 Unit(s) SubCutaneous before dinner  metoprolol succinate ER 25 milliGRAM(s) Oral <User Schedule>  polyethylene glycol 3350 17 Gram(s) Oral daily  potassium bicarbonate/potassium citrate 25 milliEquivalent(s) Oral every 1 hour      Allergies    No Known Allergies    Intolerances        SOCIAL HISTORY:  Denies alcohol abuse, drug abuse or tobacco usage.     FAMILY HISTORY:      VITALS:  T(C): 36.3 (21 @ 10:38), Max: 36.7 (21 @ 11:17)  HR: 76 (21 @ 10:38) (76 - 83)  BP: 136/58 (21 @ 10:38) (119/58 - 154/65)  RR: 18 (21 @ 10:38) (18 - 18)  SpO2: 97% (21 @ 10:38) (93% - 97%)    REVIEW OF SYSTEMS:  Denies any nausea, vomiting, diarrhea, fever or chills.   All other pertinent systems are reviewed and are negative.    PHYSICAL EXAM:  Constitutional: NAD  HEENT: EOMI  Neck:  +  JVD, supple   Respiratory: CTA B/L  Cardiovascular: S1 and S2, RRR  Gastrointestinal: + BS, soft, NT, ND  Extremities: +  peripheral edema, + peripheral pulses  Neurological: A/O x 3, CN2-12 intact  Psychiatric: Normal mood, normal affect  : No Steward  Skin: No rashes, C/D/I  Access: Not applicable    I and O's:     @ 07:01  -   @ 07:00  --------------------------------------------------------  IN: 1130 mL / OUT: 2180 mL / NET: -1050 mL     @ 07:01   @ 07:00  --------------------------------------------------------  IN: 1314.5 mL / OUT: 1850 mL / NET: -535.5 mL     @ 07: @ 11:02  --------------------------------------------------------  IN: 288.5 mL / OUT: 0 mL / NET: 288.5 mL          LABS:                        7.6    12.12 )-----------( 289      ( 2021 04:27 )             24.6         127<L>  |  84<L>  |  46<H>  ----------------------------<  149<H>  3.1<L>   |  29  |  1.04    Ca    9.7      2021 04:27  Mg     2.2             URINE:  Urinalysis Basic - ( 2021 12:20 )    Color: Yellow / Appearance: Clear / S.014 / pH: x  Gluc: x / Ketone: Negative  / Bili: Negative / Urobili: Negative   Blood: x / Protein: Negative / Nitrite: Negative   Leuk Esterase: Large / RBC: 3 /hpf / WBC 19 /HPF   Sq Epi: x / Non Sq Epi: 0 /hpf / Bacteria: Few        RADIOLOGY & ADDITIONAL STUDIES:    < from: Transthoracic Echocardiogram (21 @ 08:08) >    Patient name: ANITHA JHAVERI  YOB: 1934   Age: 87 (F)   MR#: [id number]  Study Date: 2021  Location: [address] [place], [place] [id number]: Rosemary Chow RDCS  Study quality: Technically fair  Referring Physician: Fredrick Pruitt MD  BloodPressure: 143/77 mmHg  Height: 152 cm  Weight: 72 kg  BSA: 1.7 m2  Heart Rate: 95 mmHg  ------------------------------------------------------------------------  PROCEDURE: Transthoracic echocardiogram with 2-D, M-Mode  and complete spectral and color flow Doppler.  INDICATION: Pericardial effusion (noninflammatory) (I31.3)  ------------------------------------------------------------------------  Dimensions:    Normal Values:  LA:            2.0 - 4.0 cm  Ao:            2.0 - 3.8 cm  SEPTUM:   0.6 - 1.2 cm  PWT:           0.6 - 1.1 cm  LVIDd:         3.0 - 5.6 cm  LVIDs:         1.8 - 4.0 cm  EF (Visual Estimate): 60-65 %  ------------------------------------------------------------------------  Observations:  Mitral Valve: Mitral annular calcification and calcified  mitral leaflets.  Aortic Valve/Aorta: Transcatheter aortic valve replacement.  Left Atrium: Severe left atrial enlargement.  Left Ventricle: Endocardium not well visualized; grossly  normal left ventricular systolic function.  Right Heart: Severe right atrial enlargement.  Pericardium/Pleura: Small pericardial effusion, largest  pocket measures up to 0.8 cm anterior to the right  ventricle. There is no significant inflow variation  measured across the mitral valve, but it is significant  across the tricuspid valve.  The IVC is enlarged (3.36cm)  and not collapsable No evidence of right atrial or right  ventricular collapse.  Hemodynamic: Estimated right atrial pressure is 8 mm Hg.  ------------------------------------------------------------------------  Conclusions:  1. Small pericardial effusion, largest pocket measures up  to 0.8 cm anterior to the right ventricle. There is no  significant inflow variation measured across the mitral  valve, but it is significant across the tricuspid valve.  The IVC is enlarged (3.36cm)  and not collapsable No  evidence of right atrial or right ventricular collapse.  *** Compared with echocardiogram of 2021, the  pericardial effusion is new.  ------------------------------------------------------------------------  Confirmed on  2021 - 09:18:47 by Rhys Regalado M.D.  ------------------------------------------------------------------------    < end of copied text >  < from: Transthoracic Echocardiogram (21 @ 19:50) >    Patient name: ANITHA JHAVERI  YOB: 1934   Age: 87 (F)   MR#: [id number]  Study Date: 2021  [facility]  [facility]  Study quality: Technically fair  Referring Physician: Fredrick Pruitt MD  Blood Pressure: 139/72 mmHg  Height: 152 cm  Weight: 72 kg  BSA: 1.7 m2  ------------------------------------------------------------------------  PROCEDURE: Transthoracic echocardiogram with 2-D, M-Mode  and complete spectral and color flow Doppler.  INDICATION: Cardiomyopathy, unspecified (I42.9)  ------------------------------------------------------------------------  Dimensions:    Normal Values:  LA:     6.2    2.0 - 4.0 cm  Ao:     2.7    2.0 - 3.8 cm  SEPTUM: 1.0    0.6 - 1.2 cm  PWT:    1.2    0.6 - 1.1 cm  LVIDd:  4.2    3.0 - 5.6 cm  LVIDs:  2.8    1.8 - 4.0 cm  Derived variables:  LVMI: 93 g/m2  RWT: 0.57  Fractional short: 33 %  EF (Teicholtz): 62 %  Doppler Peak Velocity (m/sec): MV=1.2 AoV=2.2  ------------------------------------------------------------------------  Observations:  Mitral Valve: Mitral annular calcification and calcified  mitral leaflets with decreased diastolic opening. Mild  mitral regurgitation.  Peak mitral valve gradient equals 6  mm Hg, mean transmitral valve gradient equals 2 mm Hg,  consistent with mild mitral stenosis.Gradients measured at  a heart rate of 56/min.  Aortic Valve/Aorta: Transcatheter aortic valve replacement.  Peak transaortic valve gradient equals 19 mm Hg, mean  transaortic valve gradient equals 10 mm Hg, which is  probably normal in the presence of a transcatheter aortic  valve replacement. Minimal paravalvular aortic  regurgitation.  Aortic Root: 2.7 cm.  LVOT diameter: 1.9 cm.  Left Atrium: Severely dilated left atrium.  LA volume index  = 78 cc/m2.  Left Ventricle: Endocardium not well visualized; grossly  normal left ventricular systolic function. Increased  relative wall thickness with normal left ventricular mass  index, consistent with concentric left ventricular  remodeling. Variability of R-R interval limits assessment.  Right Heart: Severe right atrial enlargement. Right  ventricular enlargement with borderline decreased right  ventricular systolic function.TAPSE 1.6 cm. Tethered  tricuspid valve. Severe tricuspid regurgitation. Pulmonic  valve not well visualized.  Pericardium/Pleura: Normal pericardium with no pericardial  effusion.  ------------------------------------------------------------------------  Conclusions:  1. Mitral annular calcification and calcified mitral  leaflets with decreased diastolic opening. Mild mitral  regurgitation.  Peak mitral valve gradient equals 6 mm Hg,  mean transmitral valve gradient equals 2 mm Hg, consistent  with mild mitral stenosis.Gradients measuredat a heart  rate of 56/min.  2. Transcatheter aortic valve replacement. Peak transaortic  valve gradient equals 19 mm Hg, mean transaortic valve  gradient equals 10 mm Hg, which is probably normal in the  presence of a transcatheter aortic valve replacement.  Minimal aortic regurgitation-likely paravalvular.  3. Severely dilated left atrium.  LA volume index = 78  cc/m2.  4. Endocardium not well visualized; grossly normal left  ventricular systolic function.  5. Severe right atrial enlargement.  6. Right ventricular enlargement with borderline decreased  right ventricular systolic function.TAPSE 1.6 cm.  7. Tethered tricuspid valve. Severe tricuspid  regurgitation.  ------------------------------------------------------------------------  Confirmed on  2021 - 17:10:17 by Wojciech Ashley M.D.  ---------------------------   < from: Xray Chest 1 View- PORTABLE-Urgent (Xray Chest 1 View- PORTABLE-Urgent .) (21 @ 07:31) >    EXAM:  XR CHEST PORTABLE URGENT 1V                            PROCEDURE DATE:  2021            INTERPRETATION:  TIME OF EXAM: 2021 at 7:28 AM.    CLINICAL INFORMATION: Post TAVR. Shortness of breath.    COMPARISON:  2021.    TECHNIQUE:   AP Portable chest x-ray. Overlying chin obscures part of the apices.    INTERPRETATION:    The cardiac silhouette is enlarged. Status post TAVR again noted. Calcified tortuous thoracic aorta.  Mediastinum is not accurately evaluated on this projection.  Continued patchy right lower lung opacities. There is linear atelectasis at the right base.  There is a trace right pleural effusion.  There is increased left basilar and retrocardiac opacity with obscuration of the left hemidiaphragm.  No pneumothorax seen.  There is osteoarthritic degenerative change of the spine.        IMPRESSION:  Enlarged cardiac silhouette.    Continued patchy right lower lung opacities which could be due to subsegmental atelectasis or pneumonia in the appropriate clinical context.    Increased left basilar and retrocardiac opacity, possibly an increasing small left pleural effusion with associated passive atelectasis. Atelectasis of other cause and/or pneumonia is not excluded.    --- End of Report ---                IGNACIO CARRASCO MD; Attending Radiologist  This document has been electronically signed. 2021  4:24PM    < end of copied text >

## 2021-07-28 NOTE — PROGRESS NOTE ADULT - SUBJECTIVE AND OBJECTIVE BOX
Structural Heart Team    Still short of breath and tired but says she is feeling a little better.  No acute events overnight.      REVIEW OF SYSTEMS:    CONSTITUTIONAL: No weakness, fevers or chills  EYES/ENT: No visual changes;  No vertigo or throat pain   NECK: No pain or stiffness  RESPIRATORY: No cough, wheezing, hemoptysis;  +shortness of breath  CARDIOVASCULAR: No chest pain or palpitations  GASTROINTESTINAL: No abdominal or epigastric pain. No nausea, vomiting, or hematemesis; No diarrhea or constipation. No melena or hematochezia.  GENITOURINARY: No dysuria, frequency or hematuria  NEUROLOGICAL: No numbness or weakness  SKIN: No itching, rashes      Allergies    No Known Allergies    Intolerances      Vital Signs Last 24 Hrs  T(C): 36.3 (28 Jul 2021 10:38), Max: 36.7 (27 Jul 2021 18:48)  T(F): 97.4 (28 Jul 2021 10:38), Max: 98 (27 Jul 2021 18:48)  HR: 76 (28 Jul 2021 10:38) (76 - 83)  BP: 136/58 (28 Jul 2021 10:38) (119/58 - 136/58)  BP(mean): 83 (28 Jul 2021 10:38) (81 - 89)  RR: 18 (28 Jul 2021 12:53) (18 - 18)  SpO2: 95% (28 Jul 2021 12:53) (93% - 98%)    MEDICATIONS  (STANDING):  aspirin  chewable 81 milliGRAM(s) Oral daily  atorvastatin 40 milliGRAM(s) Oral at bedtime  ferrous    sulfate 325 milliGRAM(s) Oral daily  heparin  Infusion 1100 Unit(s)/Hr (9 mL/Hr) IV Continuous <Continuous>  insulin glargine Injectable (LANTUS) 6 Unit(s) SubCutaneous at bedtime  insulin lispro (ADMELOG) corrective regimen sliding scale   SubCutaneous three times a day before meals  insulin lispro Injectable (ADMELOG) 3 Unit(s) SubCutaneous before breakfast  insulin lispro Injectable (ADMELOG) 3 Unit(s) SubCutaneous before lunch  insulin lispro Injectable (ADMELOG) 3 Unit(s) SubCutaneous before dinner  metoprolol succinate ER 25 milliGRAM(s) Oral <User Schedule>  polyethylene glycol 3350 17 Gram(s) Oral daily  torsemide 20 milliGRAM(s) Oral two times a day      Exam-  General: difficulty breathing while speaking  Cor: s1s2, IRR, no murmurs noted   Tele: Afib 70-90 PVC's  Pulm: Clear, no wheezes, rales, or rhonchi, no use of accessory muscles  Gastrointestinal: soft, nontender, nondistended, +bowel sounds  Extremities: mild edema   Neuro: A&Ox3, nonfocal                          7.6    12.12 )-----------( 289      ( 28 Jul 2021 04:27 )             24.6   07-28    127<L>  |  84<L>  |  46<H>  ----------------------------<  149<H>  3.1<L>   |  29  |  1.04    Ca    9.7      28 Jul 2021 04:27  Mg     2.1     07-28    PT/INR - ( 28 Jul 2021 04:27 )   PT: 16.4 sec;   INR: 1.39 ratio         PTT - ( 28 Jul 2021 04:27 )  PTT:184.7 sec  I&O's Summary    27 Jul 2021 07:01  -  28 Jul 2021 07:00  --------------------------------------------------------  IN: 1314.5 mL / OUT: 1850 mL / NET: -535.5 mL    28 Jul 2021 07:01  -  28 Jul 2021 13:10  --------------------------------------------------------  IN: 537.5 mL / OUT: 625 mL / NET: -87.5 mL              Assessment/Plan:  Ms Thomson is an 88y/o female with Severe Aortic Stenosis, Acute Kidney Injury/Metabolic Acidosis, Chronic Diastolic Heart Failure, now s/p TAVR  - minimally improved from yesterday  - dopplers negative for LE DVT  - CXR largely unchanged from yesterday  - on torsemide now  - appreciate renal input  - heart failure to see as well        QUITA Alexander  [phone]

## 2021-07-28 NOTE — PROVIDER CONTACT NOTE (CRITICAL VALUE NOTIFICATION) - ACTION/TREATMENT ORDERED:
Hold heparin x 1 hour and restart at 9cc/hr
STAT repeat K
Hold heparin for 1 hour then resume at 11cc/hr.
Hold Heparin x 1 hour and restart at 10cc/hr.

## 2021-07-28 NOTE — PROGRESS NOTE ADULT - ASSESSMENT
87F PMH CAD w/stents, DM2, Afib (not on AC), Colon Ca (about 25y ago), severe AS, HF , recently started on Entresto, admitted for JACQUE and Metformin-associated lactic acidosis, admitted to MICU s/p urgent HD session but now JACQUE resolving and now undergoing TAVR workup.    7/22 Underwent TAVR via right femoral #22 Heike  recovered in CRS post op course unremarkable Junctional rhythm  by EKG  700 cc fluid given for hypotension. Stabilize transferred to [address] [place] [address]- received in afib-  groin sites benign + DP son at bedside ECHO in am EKG in am  Likely discharge on Saturday with [facility] son reports haven given rehab  choices to case management.  7/23 pending post TAVR echo. Anticipate discharge to rehab Monday.  7/24 VSS maintaining Afib 70's . No further junctional episodes of rhythm.   7/25 Transthoracic Echocardiogram demonstrates Minimal aortic regurgitation-Tethered tricuspid valve, severe tricuspid regurgitation . No evidence of pericardial effusion.   7/26 VSS - pt c/o slight SOB - echo shows small new pericardial effusion.  CXR-done- crackles bases - on IV bumex tid - diuresing - O2 N/c in place -rounds made w/ Dr. Pruitt, Dr. Johns & structural heart team - new SOB - will get LE dopplers to r/o dvt - start heaprin gtt, change to bumex gtt 0.5mg /hr- give zaroxlyn 10mg iv x1 , WBC 16 from 8 - ck u/a c&s.  transfer to SDU for closer monitoring.    7/27    OOB to chair  bumex gtt   heparin gtt  neg  800 cc   24 hrs   chest xray   bl base pl effusion  7/28 VSS more tachypnic today.  CXR + atelectasis.   Na 127 Dr Ramos called for input.  -658cc/24 hrs

## 2021-07-28 NOTE — PROGRESS NOTE ADULT - ASSESSMENT
87 year-old woman with known history of atrial fibrillation, previously on AC, recently stopped due to anemia, now POD 5 status post TAVR.  Procedure was well tolerated.  Patient now short of breath, seen to have pulmonary edema on CXR.  Unclear etiology of leukocytosis, but patient is noted to have a small pericardial effusion.     No need for further HD (discussed with nephrology).   Continue IV loop diuretics. Keep O > I, K > 4.0, Mag > 2.0.    ASA, statin, and beta-blocker as tolerated.  Avoid nephrotoxic agents.    Monitor tele for any evidence of heart block.    Would repeat Covid-19 PCR and RVP.    Discussed at length with Alejandro Johns MD.  Eventual discharge planning per Structural Heart team.

## 2021-07-28 NOTE — CONSULT NOTE ADULT - ASSESSMENT
87F PMH CAD w/stents, DM2, Afib (not on AC's, dc'd pradaxa 1mo ago as pt w/u for anemia), Colon Ca (about 25y ago),  AS, SP TAVR  Decreased RV function   Pericardial effusion- small  Hyponatremia   87F PMH CAD w/stents, DM2, Afib (not on AC's, dc'd pradaxa 1mo ago as pt w/u for anemia), Colon Ca (about 25y ago),  AS, SP TAVR  Decreased RV function   Pericardial effusion- small  Hyponatremia  hypokalemia     1 CVS-Cont to maintain gentle negative balance;  Torsemide to start   She has JVD  2 Renal- Kelixir was started for the hypokalemia   Samsca 15mg po x 1   Monitor the BUN closely   3 Pulm-Has atelectasis on CXR and is on 3 liters--  Start Incentive spirometry now    Sayed [facility]   [phone]

## 2021-07-28 NOTE — PROGRESS NOTE ADULT - PROBLEM SELECTOR PLAN 1
7/22  Underwent TAVR - #22 Heike - ASA only   tele  [facility] for discharge    ambulate w asst today  Pending rehab   end of week   after diuresis 7/22  Underwent TAVR - #22 Heike - ASA only   tele  [facility] for discharge    ambulate w asst today  Pending rehab   end of week   after diuresis  Covid and RVP panel sent to ro viral etiology of dyspnea

## 2021-07-29 NOTE — PROGRESS NOTE ADULT - ATTENDING COMMENTS
No acute events reported overnight.  The patient breathing is mildly improved compared to the prior day.  She continues to feel very fatigued.  On examination she continues to be volume overloaded.  Sodium is 127.  She was given tolvaptan on 7/2815 mg.  Currently on Bumex 4 mg twice a day and she continues to be volume overloaded would recommend that her Bumex dose be increased with goals ins and outs closer to -1 to 2 L.  Closely monitor the patient's kidney function with K greater than 4 magnesium greater than 2.    All questions and concerns of the patient were addressed.    Findings and plan were discussed with cardiac surgery and structural heart team.

## 2021-07-29 NOTE — PROGRESS NOTE ADULT - SUBJECTIVE AND OBJECTIVE BOX
S:  feels slightly better.  less sob    Telemetry:  afib 50-70    Vital Signs Last 24 Hrs  T(C): 36.6 (07-29-21 @ 07:48), Max: 36.7 (07-28-21 @ 23:23)  T(F): 97.9 (07-29-21 @ 07:48), Max: 98 (07-28-21 @ 23:23)  HR: 71 (07-29-21 @ 07:48) (71 - 76)  BP: 120/60 (07-29-21 @ 07:48) (110/54 - 136/58)  RR: 18 (07-29-21 @ 07:48) (18 - 18)  SpO2: 100% (07-29-21 @ 07:48) (95% - 100%)                   07-28 @ 07:01  -  07-29 @ 07:00  --------------------------------------------------------  IN: 1402.5 mL / OUT: 1825 mL / NET: -422.5 mL    07-29 @ 07:01  -  07-29 @ 10:35  --------------------------------------------------------  IN: 360 mL / OUT: 0 mL / NET: 360 mL        POCT Blood Glucose.: 157 mg/dL (29 Jul 2021 07:51)  POCT Blood Glucose.: 201 mg/dL (28 Jul 2021 21:29)  POCT Blood Glucose.: 156 mg/dL (28 Jul 2021 16:53)  POCT Blood Glucose.: 166 mg/dL (28 Jul 2021 11:24)          Drains:     MS         [  ] Drainage:                 L Pleural  [  ]  Drainage:                R Pleural  [  ]  Drainage:    Pacing Wires        [  ]   Settings:                                  Isolated  [  ]    Coumadin    [ ] YES          [ x ]      NO         Reason:                                 8.0    10.03 )-----------( 306      ( 29 Jul 2021 02:06 )             25.8       07-29    127<L>  |  82<L>  |  48<H>  ----------------------------<  151<H>  4.4   |  31  |  1.11    Ca    10.3      29 Jul 2021 02:06  Mg     2.1     07-28        PT/INR - ( 28 Jul 2021 04:27 )   PT: 16.4 sec;   INR: 1.39 ratio         PTT - ( 29 Jul 2021 02:06 )  PTT:58.2 sec    PHYSICAL EXAM:    Neurology: alert and oriented x 3, nonfocal, no gross deficits    CV :  S1S2 heard irreg irreg    Lungs:  clear to ausc.  no w/r/r    Abdomen: soft, nontender, nondistended, positive bowel sounds,     Extremities:     + pedal edema          artificial  tears Solution 1 Drop(s) Both EYES every 12 hours PRN  aspirin  chewable 81 milliGRAM(s) Oral daily  atorvastatin 40 milliGRAM(s) Oral at bedtime  buMETAnide 4 milliGRAM(s) Oral two times a day  ciprofloxacin     Tablet 250 milliGRAM(s) Oral every 12 hours  dextrose 40% Gel 15 Gram(s) Oral once  dextrose 5%. 1000 milliLiter(s) IV Continuous <Continuous>  dextrose 5%. 1000 milliLiter(s) IV Continuous <Continuous>  dextrose 50% Injectable 25 Gram(s) IV Push once  dextrose 50% Injectable 12.5 Gram(s) IV Push once  dextrose 50% Injectable 25 Gram(s) IV Push once  ferrous    sulfate 325 milliGRAM(s) Oral daily  glucagon  Injectable 1 milliGRAM(s) IntraMuscular once  heparin   Injectable 5000 Unit(s) SubCutaneous every 8 hours  insulin glargine Injectable (LANTUS) 6 Unit(s) SubCutaneous at bedtime  insulin lispro (ADMELOG) corrective regimen sliding scale   SubCutaneous three times a day before meals  insulin lispro Injectable (ADMELOG) 3 Unit(s) SubCutaneous before breakfast  insulin lispro Injectable (ADMELOG) 3 Unit(s) SubCutaneous before lunch  insulin lispro Injectable (ADMELOG) 3 Unit(s) SubCutaneous before dinner  metoprolol succinate ER 25 milliGRAM(s) Oral <User Schedule>  polyethylene glycol 3350 17 Gram(s) Oral daily  zolpidem 5 milliGRAM(s) Oral at bedtime PRN                              Physical Therapy Rec:   Home  [  ]   Home w/ PT  [  ]  Rehab  [ x ]    Discussed with Cardiothoracic Team at AM rounds.

## 2021-07-29 NOTE — PROGRESS NOTE ADULT - ASSESSMENT
87F PMH CAD w/stents, DM2, Afib (not on AC), Colon Ca (about 25y ago), severe AS, HF , recently started on Entresto, admitted for JACQUE and Metformin-associated lactic acidosis, admitted to MICU s/p urgent HD session but now JACQUE resolving and now undergoing TAVR workup.    7/22 Underwent TAVR via right femoral #22 Heike  recovered in CRS post op course unremarkable Junctional rhythm  by EKG  700 cc fluid given for hypotension. Stabilize transferred to [address] [place] [address]- received in afib-  groin sites benign + DP son at bedside ECHO in am EKG in am  Likely discharge on Saturday with [facility] son reports haven given rehab  choices to case management.  7/23 pending post TAVR echo. Anticipate discharge to rehab Monday.  7/24 VSS maintaining Afib 70's . No further junctional episodes of rhythm.   7/25 Transthoracic Echocardiogram demonstrates Minimal aortic regurgitation-Tethered tricuspid valve, severe tricuspid regurgitation . No evidence of pericardial effusion.   7/26 VSS - pt c/o slight SOB - echo shows small new pericardial effusion.  CXR-done- crackles bases - on IV bumex tid - diuresing - O2 N/c in place -rounds made w/ Dr. Pruitt, Dr. Johns & structural heart team - new SOB - will get LE dopplers to r/o dvt - start heaprin gtt, change to bumex gtt 0.5mg /hr- give zaroxlyn 10mg iv x1 , WBC 16 from 8 - ck u/a c&s.  transfer to SDU for closer monitoring.    7/27    OOB to chair  bumex gtt   heparin gtt  neg  800 cc   24 hrs   chest xray   bl base pl effusion  7/28 VSS more tachypnic today.  CXR + atelectasis.   Na 127 Dr Ramos called for input.  -658cc/24 hrs  7/29 VSS sob improved.  -236cc/24hrs.  Na 127 received 1 dose samsca 15x1.  K 4.6 .  Hep gtt dc'd

## 2021-07-29 NOTE — PROGRESS NOTE ADULT - SUBJECTIVE AND OBJECTIVE BOX
HPI:  Patient seen and examined at bedside on 2 [place].  She is feeling marginally better than yesterday, but she remains short of breath.    Review Of Systems:           Respiratory: +shortness of breath, cough, or wheezing  Cardiovascular: No chest pain or palpitations  10 point review of systems is otherwise negative except as mentioned above        Medications:  artificial  tears Solution 1 Drop(s) Both EYES every 12 hours PRN  aspirin  chewable 81 milliGRAM(s) Oral daily  atorvastatin 40 milliGRAM(s) Oral at bedtime  buMETAnide 4 milliGRAM(s) Oral <User Schedule>  ciprofloxacin     Tablet 250 milliGRAM(s) Oral every 12 hours  dextrose 40% Gel 15 Gram(s) Oral once  dextrose 5%. 1000 milliLiter(s) IV Continuous <Continuous>  dextrose 5%. 1000 milliLiter(s) IV Continuous <Continuous>  dextrose 50% Injectable 25 Gram(s) IV Push once  dextrose 50% Injectable 12.5 Gram(s) IV Push once  dextrose 50% Injectable 25 Gram(s) IV Push once  ferrous    sulfate 325 milliGRAM(s) Oral daily  glucagon  Injectable 1 milliGRAM(s) IntraMuscular once  heparin   Injectable 5000 Unit(s) SubCutaneous every 8 hours  insulin glargine Injectable (LANTUS) 6 Unit(s) SubCutaneous at bedtime  insulin lispro (ADMELOG) corrective regimen sliding scale   SubCutaneous three times a day before meals  insulin lispro Injectable (ADMELOG) 3 Unit(s) SubCutaneous before lunch  insulin lispro Injectable (ADMELOG) 3 Unit(s) SubCutaneous before dinner  insulin lispro Injectable (ADMELOG) 3 Unit(s) SubCutaneous before breakfast  metoprolol succinate ER 25 milliGRAM(s) Oral <User Schedule>  polyethylene glycol 3350 17 Gram(s) Oral daily  zolpidem 5 milliGRAM(s) Oral at bedtime PRN    PAST MEDICAL & SURGICAL HISTORY:    Vitals:  T(C): 36.9 (07-29-21 @ 15:05), Max: 36.9 (07-29-21 @ 15:05)  HR: 79 (07-29-21 @ 15:05) (66 - 79)  BP: 116/58 (07-29-21 @ 15:05) (116/58 - 124/58)  BP(mean): 84 (07-29-21 @ 15:05) (83 - 84)  RR: 18 (07-29-21 @ 15:05) (18 - 18)  SpO2: 95% (07-29-21 @ 15:05) (95% - 100%)  Wt(kg): --  Daily     Daily   I&O's Summary    28 Jul 2021 07:01  -  29 Jul 2021 07:00  --------------------------------------------------------  IN: 1402.5 mL / OUT: 1825 mL / NET: -422.5 mL    29 Jul 2021 07:01  -  29 Jul 2021 19:30  --------------------------------------------------------  IN: 960 mL / OUT: 750 mL / NET: 210 mL        Physical Exam:  Appearance: Normal, well groomed, NAD  Eyes: PERRLA, EOMI, pink conjunctiva, no scleral icterus   HENT: Normal oral mucosa  Cardiovascular: RRR, S1, S2, NO systolic murmur at base  Procedural Access Site: Clean, dry, intact, without hematoma  Respiratory: Clear to auscultation bilaterally  Gastrointestinal: Soft, non-tender, non-distended, BS+  Musculoskeletal: No clubbing or joint deformity   Neurologic: No focal weakness  Lymphatic: No lymphadenopathy  Psychiatry: AAOx3 with appropriate mood and affect  Skin: No rashes, ecchymoses, or cyanosis                          8.0    10.03 )-----------( 306      ( 29 Jul 2021 02:06 )             25.8     07-29    127<L>  |  82<L>  |  48<H>  ----------------------------<  151<H>  4.4   |  31  |  1.11    Ca    10.3      29 Jul 2021 02:06  Mg     2.1     07-28      PT/INR - ( 28 Jul 2021 04:27 )   PT: 16.4 sec;   INR: 1.39 ratio         PTT - ( 29 Jul 2021 02:06 )  PTT:58.2 sec        Cardiovascular Diagnostic Testing:  ECG: rate controlled AFib    Imaging: < from: CT Heart without Coronaries w/ IV Cont (07.02.21 @ 08:51) >  FINDINGS:    Non-Coronary:    6 mm hypodense nodule within the right lobe of the thyroid gland. No enlarged axillary, mediastinal lymph nodes. No pleural effusions.    Evaluation of the lungs demonstrate left lower lobe masslike opacity on the part of which measures about 5 x 2.3 cm extending to the left lower lobe hilum with associated left lower lobe volume loss related to some superimposed atelectasis. Multiple left lung pulmonary nodule along the pleural surface measuring up to about 7 mm. Right middle lobe 3.2 x 2.6 cm mass associated with the minor fissure which contains a few small nodules measuring up to 7 mm. The right middle lobe mass has a cystic component along its inferior aspect.    Subcentimeter hypodensity within the liver is too small to characterize. The gallbladder, spleen, adrenal glands are unremarkable. Few pancreatic hypodensities are noted with the largest measuring about 1.5 cm on image 84 of series 19. Bilateral renal cysts.    Urinary bladder is normal. The uterus and adnexa are within normal limits. Status post rectal surgery with postoperative changes. No bowel obstruction or medial air. Appendix is normal.    Degenerative changes of the spine. Moderate to severe loss of height of the T5 and severe loss of height of the T6 vertebral bodies are of indeterminate age.    The heart is enlarged.  The left and right atria are severely enlarged.  There is mitral annular calcification and calcification of the mitral valve leaflets.   There is reduced contrast opacification of the left atrial appendage that resolves on delay imaging suggestive of mixing artifact.    The ascending aorta is mildly calcified. The aortic arch and the descending aorta are severely calcified.   The main pulmonary artery measures approximately 26.4 mm at the level of the ascending aorta.   The right and left pulmonary arteries appear enlarged and measure approximately 29.4 mm and 25.4 mm, respectively.    The aortic valve is calcified. The calcium score of the aortic valve is 1870.    Coronary:  Coronary arterial calcifications are noted; however, this studywas not optimized for evaluation of the coronary arteries.  Please refer to cardiac catheterization performed as part of pre-TAVR work-up.    Aortic Root Measurements    Major aortic annulus diameter (systole, mm): 25.2  Perpendicular minor aortic annulus diameter (systole, mm): 19.1  Aortic annulus perimeter (systole, mm): 76.6  Aortic annulus area (systole, mm2): 438  LVOT minimum diameter (systole, mm): 19.6  LVOT 90 cross (systole, mm): 26.4  LVOT calcification:  Severe  Distance from Aortic annulus to Left Main coronary artery (diastole, mm): 13.2  Distance from Aortic annulus to Right Coronary artery (diastole, mm): 16.7  Sinus of Valsalva diameter, Right coronary (diastole, mm): 29.4  Sinus of Valsalva diameter, Left coronary (diastole, mm): 31.0  Sinus of Valsalva diameter, Non coronary (diastole, mm): 28.3  Diameter at the sinotubular junction (diastole, mm): 26.5 x 26.5  Maximum ascending aorta diameter at 40 mm above annulus (diastole, mm): 32.3  Aortic root angulation (diastole, degrees): 48.4  Aortic root calcification: Moderate-to-severe    Abdominal Aorta:        Minimum lumen diameter (MLD) (mm): 12.8        Diameter perpendicular to MLD (mm): 13.2  Left Femoral:        Minimum Lumen Diameter (mm): 7.2        Diameter perpendicular to MLD (mm): 8.1        Tortuosity: Mild        Calcification: Mild  Right Femoral:        Minimum Lumen Diameter (mm): 6.5        Diameter perpendicular to MLD (mm): 7.1        Tortuosity: Mild        Calcification: Mild  Left External Iliac:        Minimum Lumen Diameter (mm): 6.9        Diameter perpendicular to MLD (mm): 7.5        Tortuosity: Moderate        Calcification: Mild  Left Common Iliac:         Minimum Lumen Diameter (mm): 4.5        Diameter perpendicular to MLD (mm): 8.0        Tortuosity: Severe        Calcification: Severe  Right External Iliac:        Minimum Lumen Diameter (mm): 5.0        Diameter perpendicular to MLD (mm): 7.4        Tortuosity: Moderate        Calcification: Mild  Right Common Iliac:    Minimum Lumen Diameter (mm): 7.8        Diameter perpendicular to MLD (mm): 8.4        Tortuosity: Moderate        Calcification: Moderate  L Subclavian/Axillary: Not well visualized due to motion artifact        Minimum Lumen Diameter (mm): 5.6      Diameter perpendicular to MLD (mm): 6.2        Tortuosity: Mild        Calcification: Mild  R Subclavian/Axillary:        Minimum Lumen Diameter (mm): 4.3        Diameter perpendicular to MLD (mm): 5.1        Tortuosity: Mild        Calcification: Mild    IMPRESSION:  1. Calcified aortic valve. The calcium score of the aortic valve is 1870.  2. Please see the body of the report for aortic and peripheral access vessel measurements.  3. Dominant large left lower lobe mass associated with multiple left lung pleural nodules worrisome for neoplasm with metastatic disease.  4. 3.2 x 2.6 cm right middle lobe mass as described above also is worrisome for neoplasm.  5. A few pancreatic hypodense nodules. Dedicated contrast enhanced pancreatic MRI is recommended for complete evaluation.  6. Compression fracture deformities of the T5 and T6 vertebral bodies as described above are of indeterminate age.      KHLOE HAINES MD; Attending Cardiologist  This document has been electronically signed.  MONICA HANEY MD; Attending Radiologist  This document has been electronically signed. Jul 6 2021  9:12AM    < end of copied text >    CATH:  < from: Cardiac Cath Lab - Adult (07.14.21 @ 17:21) >  CORONARY VESSELS: The coronary circulation is right dominant.  LM:   --  LM: Normal.  LAD:   --  Proximal LAD: There was a discrete 40 % stenosis.  --  Mid LAD: There was a tubular 25 % stenosis.  --  Distal LAD: Normal.  CX:   --  Proximal circumflex: There was a tubular 20 % stenosis.  --  Mid circumflex: There was a diffuse 50 % stenosis.  RCA:   --  Proximal RCA: There was a diffuse 30 % stenosis.  --  Mid RCA: There was a diffuse 20 % in-stent restenosis through the prior  stent.  --  Distal RCA: Normal.  --  RPDA: Normal.  --  RPLS: Normal.  COMPLICATIONS: There were no complications.  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  Normal left main coronary artery  Right dominant system  Elevated right atrial pressure (mRA 16mmHg with a V wave of 23mmHg)  Moderate post-capillary pulmonary hypertension (sPAP 58mmHg, dPAP 23mmHg,  mPAP 37mmHg)  PCWP = 28mmHg with a V wave of 48mmHg  PAsat = 67.1% // AOsat = 99.2% (room air)  [place] // CI = 6.07l/min // 3.56l/min/m2  DIAGNOSTIC RECOMMENDATIONS: Keep right leg straigh for 4 hours following  removal of sheaths  Continue aggressive medical management of coronary artery disease and  associated risk factors  Closely monitor the patients kidney function  Patient being evaluated by the [facility] valve team for TAVR  Findings discussed with Dr. 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  Normal left main coronary artery  Right dominant system  Elevated right atrial pressure (mRA 16mmHg with a V wave of 23mmHg)  Moderate post-capillary pulmonary hypertension (sPAP 58mmHg, dPAP 23mmHg,  mPAP 37mmHg)  PCWP = 28mmHg with a V wave of 48mmHg  PAsat = 67.1% // AOsat = 99.2% (room air)  [place] // CI = 6.07l/min // 3.56l/min/m2  INTERVENTIONAL RECOMMENDATIONS: Keep right leg straigh for 4 hours  following removal of sheaths  Continue aggressive medical management of coronary artery disease and  associated risk factors  Closely monitor the patients kidney function  Patient being evaluated by the [facility] valve team for TAVR  Findings discussed with Dr. Hodges  Prepared and signed by  Alejandro Johns MD  Signed 07/14/2021 18:37:05    < end of copied text >

## 2021-07-29 NOTE — PROGRESS NOTE ADULT - SUBJECTIVE AND OBJECTIVE BOX
[facility] DIVISION OF KIDNEY DISEASES AND HYPERTENSION -- FOLLOW UP NOTE  --------------------------------------------------------------------------------  If any questions, please feel free to contact me  NS pager: [phone], [facility]: [phone]  Vicente Cortes M.D.  Nephrology Fellow    (After 5 pm or on weekends please page the on-call fellow)  --------------------------------------------------------------------------------    Chief Complaint:  Patient is a 87y old  Female who presents with a chief complaint of urgent HD (29 Jul 2021 10:35)    24 hour events/subjective:  Patient seen and examined at bedside, she is c/o SOB, LE edema and fatigue. noted with  this morning. Vitals/labs/imaging reviewed       PAST HISTORY  --------------------------------------------------------------------------------  No significant changes to PMH, PSH, FHx, SHx, unless otherwise noted    ALLERGIES & MEDICATIONS  --------------------------------------------------------------------------------  Allergies    No Known Allergies    Intolerances      Standing Inpatient Medications  aspirin  chewable 81 milliGRAM(s) Oral daily  atorvastatin 40 milliGRAM(s) Oral at bedtime  buMETAnide 4 milliGRAM(s) Oral <User Schedule>  ciprofloxacin     Tablet 250 milliGRAM(s) Oral every 12 hours  dextrose 40% Gel 15 Gram(s) Oral once  dextrose 5%. 1000 milliLiter(s) IV Continuous <Continuous>  dextrose 5%. 1000 milliLiter(s) IV Continuous <Continuous>  dextrose 50% Injectable 25 Gram(s) IV Push once  dextrose 50% Injectable 12.5 Gram(s) IV Push once  dextrose 50% Injectable 25 Gram(s) IV Push once  ferrous    sulfate 325 milliGRAM(s) Oral daily  glucagon  Injectable 1 milliGRAM(s) IntraMuscular once  heparin   Injectable 5000 Unit(s) SubCutaneous every 8 hours  insulin glargine Injectable (LANTUS) 6 Unit(s) SubCutaneous at bedtime  insulin lispro (ADMELOG) corrective regimen sliding scale   SubCutaneous three times a day before meals  insulin lispro Injectable (ADMELOG) 3 Unit(s) SubCutaneous before lunch  insulin lispro Injectable (ADMELOG) 3 Unit(s) SubCutaneous before dinner  insulin lispro Injectable (ADMELOG) 3 Unit(s) SubCutaneous before breakfast  metoprolol succinate ER 25 milliGRAM(s) Oral <User Schedule>  polyethylene glycol 3350 17 Gram(s) Oral daily    PRN Inpatient Medications  artificial  tears Solution 1 Drop(s) Both EYES every 12 hours PRN  zolpidem 5 milliGRAM(s) Oral at bedtime PRN      REVIEW OF SYSTEMS  --------------------------------------------------------------------------------  Gen: +fatigue No fevers/chills  Skin: No rashes  Head/Eyes/Ears: Normal hearing,   Respiratory: +dyspnea, cough  CV: No chest pain  GI: No abdominal pain, diarrhea  : No dysuria, hematuria  MSK: +LE edema  All other systems were reviewed and are negative, except as noted.    VITALS/PHYSICAL EXAM  --------------------------------------------------------------------------------  T(C): 36.7 (07-29-21 @ 11:13), Max: 36.7 (07-28-21 @ 23:23)  HR: 66 (07-29-21 @ 11:13) (66 - 76)  BP: 124/58 (07-29-21 @ 11:13) (110/54 - 124/58)  RR: 18 (07-29-21 @ 11:13) (18 - 18)  SpO2: 100% (07-29-21 @ 11:13) (95% - 100%)  Wt(kg): --        07-28-21 @ 07:01  -  07-29-21 @ 07:00  --------------------------------------------------------  IN: 1402.5 mL / OUT: 1825 mL / NET: -422.5 mL    07-29-21 @ 07:01  -  07-29-21 @ 14:16  --------------------------------------------------------  IN: 720 mL / OUT: 0 mL / NET: 720 mL        Physical Exam:  	Gen: NAD  	HEENT: MMM  	Pulm: b/l wheezing, decreased breath sounds in bases   	CV: S1S2, RRR  	Abd: Soft, +BS, ND   	Ext: 2+ LE edema B/L  	Neuro: Awake, A&O x3  	Skin: Warm and dry      LABS/STUDIES  --------------------------------------------------------------------------------              8.0    10.03 >-----------<  306      [07-29-21 @ 02:06]              25.8     127  |  82  |  48  ----------------------------<  151      [07-29-21 @ 02:06]  4.4   |  31  |  1.11        Ca     10.3     [07-29-21 @ 02:06]      Mg     2.1     [07-28-21 @ 04:27]      PT/INR: PT 16.4 , INR 1.39       [07-28-21 @ 04:27]  PTT: 58.2       [07-29-21 @ 02:06]      Creatinine Trend:  SCr 1.11 [07-29 @ 02:06]  SCr 1.04 [07-28 @ 04:27]  SCr 1.08 [07-27 @ 13:03]  SCr 0.98 [07-27 @ 06:05]  SCr 1.04 [07-26 @ 07:24]    Urinalysis - [07-26-21 @ 12:20]      Color Yellow / Appearance Clear / SG 1.014 / pH 5.5      Gluc 200 mg/dL / Ketone Negative  / Bili Negative / Urobili Negative       Blood Negative / Protein Negative / Leuk Est Large / Nitrite Negative      RBC 3 / WBC 19 / Hyaline 0 / Gran  / Sq Epi  / Non Sq Epi 0 / Bacteria Few      TSH 6.37      [07-21-21 @ 14:10]    HBsAb <3.0      [07-09-21 @ 03:47]  HBsAg Nonreact      [07-09-21 @ 03:47]  HBcAb Nonreact      [07-09-21 @ 03:47]  HCV 0.14, Nonreact      [07-09-21 @ 03:47]

## 2021-07-29 NOTE — PROGRESS NOTE ADULT - SUBJECTIVE AND OBJECTIVE BOX
Structural Heart Team    Ms Knight is sitting up in a chair.  She appears improved since yesterday and reports that her breathing is "slowly" getting easier.  There were no acute events overnight.         REVIEW OF SYSTEMS:    CONSTITUTIONAL: No weakness, fevers or chills  EYES/ENT: No visual changes;  No vertigo or throat pain   NECK: No pain or stiffness  RESPIRATORY: No cough, wheezing, hemoptysis; No shortness of breath  CARDIOVASCULAR: No chest pain or palpitations  GASTROINTESTINAL: No abdominal or epigastric pain. No nausea, vomiting, or hematemesis; No diarrhea or constipation. No melena or hematochezia.  GENITOURINARY: No dysuria, frequency or hematuria  NEUROLOGICAL: No numbness or weakness  SKIN: No itching, rashes      Allergies    No Known Allergies    Intolerances      Vital Signs Last 24 Hrs  T(C): 36.6 (29 Jul 2021 07:48), Max: 36.7 (28 Jul 2021 23:23)  T(F): 97.9 (29 Jul 2021 07:48), Max: 98 (28 Jul 2021 23:23)  HR: 71 (29 Jul 2021 07:48) (71 - 76)  BP: 120/60 (29 Jul 2021 07:48) (110/54 - 136/58)  BP(mean): 84 (29 Jul 2021 03:00) (78 - 84)  RR: 18 (29 Jul 2021 07:48) (18 - 18)  SpO2: 100% (29 Jul 2021 07:48) (95% - 100%)    MEDICATIONS  (STANDING):  aspirin  chewable 81 milliGRAM(s) Oral daily  atorvastatin 40 milliGRAM(s) Oral at bedtime  buMETAnide 4 milliGRAM(s) Oral two times a day  ciprofloxacin     Tablet 250 milliGRAM(s) Oral every 12 hours  dextrose 40% Gel 15 Gram(s) Oral once  dextrose 5%. 1000 milliLiter(s) (50 mL/Hr) IV Continuous <Continuous>  dextrose 5%. 1000 milliLiter(s) (100 mL/Hr) IV Continuous <Continuous>  dextrose 50% Injectable 25 Gram(s) IV Push once  dextrose 50% Injectable 12.5 Gram(s) IV Push once  dextrose 50% Injectable 25 Gram(s) IV Push once  ferrous    sulfate 325 milliGRAM(s) Oral daily  glucagon  Injectable 1 milliGRAM(s) IntraMuscular once  heparin   Injectable 5000 Unit(s) SubCutaneous every 8 hours  insulin glargine Injectable (LANTUS) 6 Unit(s) SubCutaneous at bedtime  insulin lispro (ADMELOG) corrective regimen sliding scale   SubCutaneous three times a day before meals  insulin lispro Injectable (ADMELOG) 3 Unit(s) SubCutaneous before breakfast  insulin lispro Injectable (ADMELOG) 3 Unit(s) SubCutaneous before lunch  insulin lispro Injectable (ADMELOG) 3 Unit(s) SubCutaneous before dinner  metoprolol succinate ER 25 milliGRAM(s) Oral <User Schedule>  polyethylene glycol 3350 17 Gram(s) Oral daily      Exam-  General: NAD  Cor: s1s2, IRR, no murmurs noted   Tele: Afib 50-80  Pulm: Clear, no wheezes, rales, or rhonchi, no use of accessory muscles  Gastrointestinal: soft, nontender, nondistended, +bowel sounds  Extremities: pedal edema worse than before L>R  Neuro: A&Ox3, nonfocal                          8.0    10.03 )-----------( 306      ( 29 Jul 2021 02:06 )             25.8   07-29    127<L>  |  82<L>  |  48<H>  ----------------------------<  151<H>  4.4   |  31  |  1.11    Ca    10.3      29 Jul 2021 02:06  Mg     2.1     07-28    PT/INR - ( 28 Jul 2021 04:27 )   PT: 16.4 sec;   INR: 1.39 ratio         PTT - ( 29 Jul 2021 02:06 )  PTT:58.2 sec  I&O's Summary    28 Jul 2021 07:01  -  29 Jul 2021 07:00  --------------------------------------------------------  IN: 1402.5 mL / OUT: 1825 mL / NET: -422.5 mL              Assessment/Plan:  Ms Thomson is an 86y/o female with Severe Aortic Stenosis, Acute Kidney Injury/Metabolic Acidosis, Chronic Diastolic Heart Failure, now s/p TAVR  - continuing to improve slowly  - on bumex again (torsemide off)  - change heparin gtt to SQ heparin  - on Cipro for UTI  - still hyponatremic  - appreciate renal input    QUITA Alexander  [phone]

## 2021-07-29 NOTE — PROGRESS NOTE ADULT - PROBLEM SELECTOR PLAN 1
7/22  Underwent TAVR - #22 [facility] - ASA only   tele  [facility] for discharge    ambulate w asst today  Pending rehab   end of week   after diuresis  Covid and RVP negative  hep gtt dc'd as PE not suspected

## 2021-07-29 NOTE — PROGRESS NOTE ADULT - PROBLEM SELECTOR PLAN 2
Renal following Dr Ramos  diuretic changed to bumex 4 bid  Monitor I's & O's Renal following   diuretic changed to bumex 4 tid  Monitor I's & O's

## 2021-07-29 NOTE — PROGRESS NOTE ADULT - ASSESSMENT
87F PMH CAD w/stents, DM2, Afib (not on AC), Colon Ca (about 25y ago) AS, HF on 80mg lasix qd, initially presented to the ED with abnormal labs done on 7/7 showing JACQUE with hyperk. Admitted for Metformin associated lactic acidosis and JACQUE. Now with worsening hyponatremia.

## 2021-07-29 NOTE — PROGRESS NOTE ADULT - ASSESSMENT
87 year-old woman with known history of atrial fibrillation, previously on AC, recently stopped due to anemia, now POD 7 status post TAVR.  Procedure was well tolerated.  Patient now short of breath, seen to have pulmonary edema on CXR.  Unclear etiology of leukocytosis and shortness of breath.     No need for further HD (discussed with nephrology).   Continue IV loop diuretics. Keep O > I, K > 4.0, Mag > 2.0.    ASA, statin, and beta-blocker as tolerated.  Avoid nephrotoxic agents.    Monitor tele for any evidence of heart block.    Would repeat Covid-19 PCR and RVP.    Discussed at length with Alejandro Johns MD.  Eventual discharge planning per Structural Heart team.

## 2021-07-29 NOTE — PROGRESS NOTE ADULT - PROBLEM SELECTOR PLAN 1
Pt. with hyponatremia to 127 in setting of volume overload.   Last ECHO 7/26 Small pericardial effusion, There is no significant inflow variation measured across the mitral valve, but it is significant across the tricuspid valve. The IVC is enlarged (3.36cm)  and not collapsable No evidence of right atrial or right ventricular collapse.    s/p Tolvaptan 15mg x1 yesterday 7/29  Currently on bumex 4mg bid    At this time recommend checking Kely, Urine osm, serum osm.   increase Bumex to 4mg tid.   strict I/Os  monitor kidney function and urine output   Monitor serum sodium Q12 hours.   Do not correct serum sodium >6-8 meq/day. Pt. with hyponatremia to 127 in setting of volume overload.   Last ECHO 7/26 Small pericardial effusion, There is no significant inflow variation measured across the mitral valve, but it is significant across the tricuspid valve. The IVC is enlarged (3.36cm)  and not collapsable No evidence of right atrial or right ventricular collapse.    s/p Tolvaptan 15mg x1 yesterday 7/28  Currently on bumex 4mg bid    Remains volume overloaded on exam     At this time recommend checking Kely, Urine urea, Urine osm, serum osm.   increase Bumex to 4mg tid.   strict I/Os  monitor kidney function and urine output   Monitor serum sodium Q12 hours.   Do not correct serum sodium >6-8 meq/day.

## 2021-07-30 NOTE — PROGRESS NOTE ADULT - SUBJECTIVE AND OBJECTIVE BOX
VITAL SIGNS    Telemetry:    Vital Signs Last 24 Hrs  T(C): 36.8 (21 @ 07:27), Max: 36.9 (21 @ 15:05)  T(F): 98.2 (21 @ 07:27), Max: 98.5 (21 @ 15:05)  HR: 71 (21 @ 07:27) (66 - 82)  BP: 130/61 (21 @ 07:27) (114/59 - 130/61)  RR: 18 (21 @ 07:27) (18 - 18)  SpO2: 93% (21 @ 07:27) (92% - 100%)             @ 07:01  -   @ 07:00  --------------------------------------------------------  IN: 1120 mL / OUT: 2650 mL / NET: -1530 mL     @ 07:01  -   @ 10:00  --------------------------------------------------------  IN: 360 mL / OUT: 0 mL / NET: 360 mL       Daily     Daily Weight in k.4 (2021 08:38)  Admit Wt: Drug Dosing Weight  Height (cm): 152.4 (2021 11:15)  Weight (kg): 72.4 (2021 11:15)  BMI (kg/m2): 31.2 (2021 11:15)  BSA (m2): 1.7 (2021 11:15)      CAPILLARY BLOOD GLUCOSE      POCT Blood Glucose.: 192 mg/dL (2021 07:52)  POCT Blood Glucose.: 245 mg/dL (2021 21:54)  POCT Blood Glucose.: 135 mg/dL (2021 17:17)  POCT Blood Glucose.: 307 mg/dL (2021 11:42)  POCT Blood Glucose.: 320 mg/dL (2021 11:27)          artificial  tears Solution 1 Drop(s) Both EYES every 12 hours PRN  aspirin  chewable 81 milliGRAM(s) Oral daily  atorvastatin 40 milliGRAM(s) Oral at bedtime  buMETAnide 4 milliGRAM(s) Oral <User Schedule>  ciprofloxacin     Tablet 250 milliGRAM(s) Oral every 12 hours  dextrose 40% Gel 15 Gram(s) Oral once  dextrose 5%. 1000 milliLiter(s) IV Continuous <Continuous>  dextrose 5%. 1000 milliLiter(s) IV Continuous <Continuous>  dextrose 50% Injectable 25 Gram(s) IV Push once  dextrose 50% Injectable 12.5 Gram(s) IV Push once  dextrose 50% Injectable 25 Gram(s) IV Push once  ferrous    sulfate 325 milliGRAM(s) Oral daily  glucagon  Injectable 1 milliGRAM(s) IntraMuscular once  heparin   Injectable 5000 Unit(s) SubCutaneous every 8 hours  insulin glargine Injectable (LANTUS) 6 Unit(s) SubCutaneous at bedtime  insulin lispro (ADMELOG) corrective regimen sliding scale   SubCutaneous three times a day before meals  insulin lispro Injectable (ADMELOG) 3 Unit(s) SubCutaneous before lunch  insulin lispro Injectable (ADMELOG) 3 Unit(s) SubCutaneous before dinner  insulin lispro Injectable (ADMELOG) 3 Unit(s) SubCutaneous before breakfast  metoprolol succinate ER 25 milliGRAM(s) Oral <User Schedule>  polyethylene glycol 3350 17 Gram(s) Oral daily  zolpidem 5 milliGRAM(s) Oral at bedtime PRN      PHYSICAL EXAM    Subjective: "Hi.   Neurology: alert and oriented x 3, nonfocal, no gross deficits  CV : tele:  RSR  Sternal Wound :  CDI with dressing , Stable  Lungs: clear. RR easy, unlabored   Abdomen: soft, nontender, nondistended, positive bowel sounds, bowel movement   Neg N/V/D   :  pt voiding without difficulty   Extremities:   CHRISTIANSON; edema, neg calf tenderness.   PPP bilaterally      PW:  Chest tubes:                 VITAL SIGNS    Telemetry:  afib 70-90   Vital Signs Last 24 Hrs  T(C): 36.8 (21 @ 07:27), Max: 36.9 (21 @ 15:05)  T(F): 98.2 (21 @ 07:27), Max: 98.5 (21 @ 15:05)  HR: 71 (21 @ 07:27) (66 - 82)  BP: 130/61 (21 @ 07:27) (114/59 - 130/61)  RR: 18 (21 @ 07:27) (18 - 18)  SpO2: 93% (21 @ 07:27) (92% - 100%)             @ 07:01  -   @ 07:00  --------------------------------------------------------  IN: 1120 mL / OUT: 2650 mL / NET: -1530 mL     @ 07:01  -   @ 10:00  --------------------------------------------------------  IN: 360 mL / OUT: 0 mL / NET: 360 mL       Daily     Daily Weight in k.4 (2021 08:38)  Admit Wt: Drug Dosing Weight  Height (cm): 152.4 (2021 11:15)  Weight (kg): 72.4 (2021 11:15)  BMI (kg/m2): 31.2 (2021 11:15)  BSA (m2): 1.7 (2021 11:15)      CAPILLARY BLOOD GLUCOSE      POCT Blood Glucose.: 192 mg/dL (2021 07:52)  POCT Blood Glucose.: 245 mg/dL (2021 21:54)  POCT Blood Glucose.: 135 mg/dL (2021 17:17)  POCT Blood Glucose.: 307 mg/dL (2021 11:42)  POCT Blood Glucose.: 320 mg/dL (2021 11:27)          artificial  tears Solution 1 Drop(s) Both EYES every 12 hours PRN  aspirin  chewable 81 milliGRAM(s) Oral daily  atorvastatin 40 milliGRAM(s) Oral at bedtime  buMETAnide 4 milliGRAM(s) Oral <User Schedule>  ciprofloxacin     Tablet 250 milliGRAM(s) Oral every 12 hours  dextrose 40% Gel 15 Gram(s) Oral once  dextrose 5%. 1000 milliLiter(s) IV Continuous <Continuous>  dextrose 5%. 1000 milliLiter(s) IV Continuous <Continuous>  dextrose 50% Injectable 25 Gram(s) IV Push once  dextrose 50% Injectable 12.5 Gram(s) IV Push once  dextrose 50% Injectable 25 Gram(s) IV Push once  ferrous    sulfate 325 milliGRAM(s) Oral daily  glucagon  Injectable 1 milliGRAM(s) IntraMuscular once  heparin   Injectable 5000 Unit(s) SubCutaneous every 8 hours  insulin glargine Injectable (LANTUS) 6 Unit(s) SubCutaneous at bedtime  insulin lispro (ADMELOG) corrective regimen sliding scale   SubCutaneous three times a day before meals  insulin lispro Injectable (ADMELOG) 3 Unit(s) SubCutaneous before lunch  insulin lispro Injectable (ADMELOG) 3 Unit(s) SubCutaneous before dinner  insulin lispro Injectable (ADMELOG) 3 Unit(s) SubCutaneous before breakfast  metoprolol succinate ER 25 milliGRAM(s) Oral <User Schedule>  polyethylene glycol 3350 17 Gram(s) Oral daily  zolpidem 5 milliGRAM(s) Oral at bedtime PRN      PHYSICAL EXAM    Subjective: "I feel fair today."   Neurology: alert and oriented x 3, nonfocal, no gross deficits  CV : tele:  afib 70-90   Lungs: clear. RR easy, unlabored   Abdomen: soft, nontender, nondistended, positive bowel sounds,  + bowel movement   Neg N/V/D   :  pt voiding without difficulty -primafit   Extremities:   CHRISTIANSON; +2 LE edema, neg calf tenderness.   PPP bilaterally;   bilateral groin sites cdi herb- neg hematoma/ bleeding

## 2021-07-30 NOTE — PROGRESS NOTE ADULT - PROBLEM SELECTOR PLAN 1
7/22  Underwent TAVR - #22 [facility] - ASA only   tele  [facility] for discharge    ambulate w asst today  Pending rehab   end of week   after diuresis  Covid and RVP negative  hep gtt dc'd as PE not suspected continue postop care  continue asa only   statin  toprol 12.5 mg po qd  diuresis: bumex 4 mg po bid and add aldactone 12.5 mg po qd  increase activity as tolerated  discharge planning- rehab monday- ck covid pcr sunday

## 2021-07-30 NOTE — PROGRESS NOTE ADULT - SUBJECTIVE AND OBJECTIVE BOX
HPI:  Patient seen and examined at bedside on 2 [place].  Ongoing diuresis with interval improvement in symptoms.     Review Of Systems:           Respiratory: +shortness of breath  Cardiovascular: No chest pain or palpitations  10 point review of systems is otherwise negative except as mentioned above        Medications:  artificial  tears Solution 1 Drop(s) Both EYES every 12 hours PRN  aspirin  chewable 81 milliGRAM(s) Oral daily  atorvastatin 40 milliGRAM(s) Oral at bedtime  buMETAnide 4 milliGRAM(s) Oral <User Schedule>  ciprofloxacin     Tablet 250 milliGRAM(s) Oral every 12 hours  dextrose 40% Gel 15 Gram(s) Oral once  dextrose 5%. 1000 milliLiter(s) IV Continuous <Continuous>  dextrose 5%. 1000 milliLiter(s) IV Continuous <Continuous>  dextrose 50% Injectable 12.5 Gram(s) IV Push once  dextrose 50% Injectable 25 Gram(s) IV Push once  dextrose 50% Injectable 25 Gram(s) IV Push once  ferrous    sulfate 325 milliGRAM(s) Oral daily  glucagon  Injectable 1 milliGRAM(s) IntraMuscular once  heparin   Injectable 5000 Unit(s) SubCutaneous every 8 hours  insulin glargine Injectable (LANTUS) 6 Unit(s) SubCutaneous at bedtime  insulin lispro (ADMELOG) corrective regimen sliding scale   SubCutaneous three times a day before meals  insulin lispro Injectable (ADMELOG) 3 Unit(s) SubCutaneous before breakfast  insulin lispro Injectable (ADMELOG) 3 Unit(s) SubCutaneous before lunch  insulin lispro Injectable (ADMELOG) 3 Unit(s) SubCutaneous before dinner  polyethylene glycol 3350 17 Gram(s) Oral daily  spironolactone 12.5 milliGRAM(s) Oral daily  zolpidem 5 milliGRAM(s) Oral at bedtime PRN    PAST MEDICAL & SURGICAL HISTORY:    Vitals:  T(C): 36.7 (21 @ 18:43), Max: 36.8 (21 @ 07:27)  HR: 82 (21 @ 18:43) (71 - 82)  BP: 131/60 (21 @ 18:43) (111/70 - 131/60)  BP(mean): 86 (21 @ 18:43) (77 - 86)  RR: 18 (21 @ 18:43) (18 - 18)  SpO2: 96% (21 @ 18:43) (91% - 99%)  Wt(kg): --  Daily     Daily Weight in k.4 (2021 08:38)  I&O's Summary    2021 07:01  -  2021 07:00  --------------------------------------------------------  IN: 1120 mL / OUT: 2650 mL / NET: -1530 mL    2021 07:01  -  2021 22:32  --------------------------------------------------------  IN: 720 mL / OUT: 750 mL / NET: -30 mL        Physical Exam:  Appearance: Normal, well groomed, NAD  Eyes: PERRLA, EOMI, pink conjunctiva, no scleral icterus   HENT: Normal oral mucosa  Cardiovascular: RRR, S1, S2, NO systolic murmur at base  Procedural Access Site: Clean, dry, intact, without hematoma  Respiratory: Clear to auscultation bilaterally  Gastrointestinal: Soft, non-tender, non-distended, BS+  Musculoskeletal: No clubbing or joint deformity   Neurologic: No focal weakness  Lymphatic: No lymphadenopathy  Psychiatry: AAOx3 with appropriate mood and affect  Skin: No rashes, ecchymoses, or cyanosis                          7.7    9.15  )-----------( 328      ( 2021 06:00 )             24.9         133<L>  |  85<L>  |  52<H>  ----------------------------<  187<H>  3.5   |  35<H>  |  1.18    Ca    10.1      2021 09:25      PTT - ( 2021 02:06 )  PTT:58.2 sec      Serum Pro-Brain Natriuretic Peptide: 2398 pg/mL ( @ 09:25)      Cardiovascular Diagnostic Testing:  ECG: rate controlled AFib    Imaging: < from: CT Heart without Coronaries w/ IV Cont (21 @ 08:51) >  FINDINGS:    Non-Coronary:    6 mm hypodense nodule within the right lobe of the thyroid gland. No enlarged axillary, mediastinal lymph nodes. No pleural effusions.    Evaluation of the lungs demonstrate left lower lobe masslike opacity on the part of which measures about 5 x 2.3 cm extending to the left lower lobe hilum with associated left lower lobe volume loss related to some superimposed atelectasis. Multiple left lung pulmonary nodule along the pleural surface measuring up to about 7 mm. Right middle lobe 3.2 x 2.6 cm mass associated with the minor fissure which contains a few small nodules measuring up to 7 mm. The right middle lobe mass has a cystic component along its inferior aspect.    Subcentimeter hypodensity within the liver is too small to characterize. The gallbladder, spleen, adrenal glands are unremarkable. Few pancreatic hypodensities are noted with the largest measuring about 1.5 cm on image 84 of series 19. Bilateral renal cysts.    Urinary bladder is normal. The uterus and adnexa are within normal limits. Status post rectal surgery with postoperative changes. No bowel obstruction or medial air. Appendix is normal.    Degenerative changes of the spine. Moderate to severe loss of height of the T5 and severe loss of height of the T6 vertebral bodies are of indeterminate age.    The heart is enlarged.  The left and right atria are severely enlarged.  There is mitral annular calcification and calcification of the mitral valve leaflets.   There is reduced contrast opacification of the left atrial appendage that resolves on delay imaging suggestive of mixing artifact.    The ascending aorta is mildly calcified. The aortic arch and the descending aorta are severely calcified.   The main pulmonary artery measures approximately 26.4 mm at the level of the ascending aorta.   The right and left pulmonary arteries appear enlarged and measure approximately 29.4 mm and 25.4 mm, respectively.    The aortic valve is calcified. The calcium score of the aortic valve is 1870.    Coronary:  Coronary arterial calcifications are noted; however, this studywas not optimized for evaluation of the coronary arteries.  Please refer to cardiac catheterization performed as part of pre-TAVR work-up.    Aortic Root Measurements    Major aortic annulus diameter (systole, mm): 25.2  Perpendicular minor aortic annulus diameter (systole, mm): 19.1  Aortic annulus perimeter (systole, mm): 76.6  Aortic annulus area (systole, mm2): 438  LVOT minimum diameter (systole, mm): 19.6  LVOT 90 cross (systole, mm): 26.4  LVOT calcification:  Severe  Distance from Aortic annulus to Left Main coronary artery (diastole, mm): 13.2  Distance from Aortic annulus to Right Coronary artery (diastole, mm): 16.7  Sinus of Valsalva diameter, Right coronary (diastole, mm): 29.4  Sinus of Valsalva diameter, Left coronary (diastole, mm): 31.0  Sinus of Valsalva diameter, Non coronary (diastole, mm): 28.3  Diameter at the sinotubular junction (diastole, mm): 26.5 x 26.5  Maximum ascending aorta diameter at 40 mm above annulus (diastole, mm): 32.3  Aortic root angulation (diastole, degrees): 48.4  Aortic root calcification: Moderate-to-severe    Abdominal Aorta:        Minimum lumen diameter (MLD) (mm): 12.8        Diameter perpendicular to MLD (mm): 13.2  Left Femoral:        Minimum Lumen Diameter (mm): 7.2        Diameter perpendicular to MLD (mm): 8.1        Tortuosity: Mild        Calcification: Mild  Right Femoral:        Minimum Lumen Diameter (mm): 6.5        Diameter perpendicular to MLD (mm): 7.1        Tortuosity: Mild        Calcification: Mild  Left External Iliac:        Minimum Lumen Diameter (mm): 6.9        Diameter perpendicular to MLD (mm): 7.5        Tortuosity: Moderate        Calcification: Mild  Left Common Iliac:         Minimum Lumen Diameter (mm): 4.5        Diameter perpendicular to MLD (mm): 8.0        Tortuosity: Severe        Calcification: Severe  Right External Iliac:        Minimum Lumen Diameter (mm): 5.0        Diameter perpendicular to MLD (mm): 7.4        Tortuosity: Moderate        Calcification: Mild  Right Common Iliac:    Minimum Lumen Diameter (mm): 7.8        Diameter perpendicular to MLD (mm): 8.4        Tortuosity: Moderate        Calcification: Moderate  L Subclavian/Axillary: Not well visualized due to motion artifact        Minimum Lumen Diameter (mm): 5.6      Diameter perpendicular to MLD (mm): 6.2        Tortuosity: Mild        Calcification: Mild  R Subclavian/Axillary:        Minimum Lumen Diameter (mm): 4.3        Diameter perpendicular to MLD (mm): 5.1        Tortuosity: Mild        Calcification: Mild    IMPRESSION:  1. Calcified aortic valve. The calcium score of the aortic valve is 1870.  2. Please see the body of the report for aortic and peripheral access vessel measurements.  3. Dominant large left lower lobe mass associated with multiple left lung pleural nodules worrisome for neoplasm with metastatic disease.  4. 3.2 x 2.6 cm right middle lobe mass as described above also is worrisome for neoplasm.  5. A few pancreatic hypodense nodules. Dedicated contrast enhanced pancreatic MRI is recommended for complete evaluation.  6. Compression fracture deformities of the T5 and T6 vertebral bodies as described above are of indeterminate age.      KHLOE HAINES MD; Attending Cardiologist  This document has been electronically signed.  MONICA HANEY MD; Attending Radiologist  This document has been electronically signed. 2021  9:12AM    < end of copied text >    CATH:  < from: Cardiac Cath Lab - Adult (21 @ 17:21) >  CORONARY VESSELS: The coronary circulation is right dominant.  LM:   --  LM: Normal.  LAD:   --  Proximal LAD: There was a discrete 40 % stenosis.  --  Mid LAD: There was a tubular 25 % stenosis.  --  Distal LAD: Normal.  CX:   --  Proximal circumflex: There was a tubular 20 % stenosis.  --  Mid circumflex: There was a diffuse 50 % stenosis.  RCA:   --  Proximal RCA: There was a diffuse 30 % stenosis.  --  Mid RCA: There was a diffuse 20 % in-stent restenosis through the prior  stent.  --  Distal RCA: Normal.  --  RPDA: Normal.  --  RPLS: Normal.  COMPLICATIONS: There were no complications.  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  Normal left main coronary artery  Right dominant system  Elevated right atrial pressure (mRA 16mmHg with a V wave of 23mmHg)  Moderate post-capillary pulmonary hypertension (sPAP 58mmHg, dPAP 23mmHg,  mPAP 37mmHg)  PCWP = 28mmHg with a V wave of 48mmHg  PAsat = 67.1% // AOsat = 99.2% (room air)  [place] // CI = 6.07l/min // 3.56l/min/m2  DIAGNOSTIC RECOMMENDATIONS: Keep right leg straigh for 4 hours following  removal of sheaths  Continue aggressive medical management of coronary artery disease and  associated risk factors  Closely monitor the patients kidney function  Patient being evaluated by the [facility] valve team for TAVR  Findings discussed with Dr. 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  Normal left main coronary artery  Right dominant system  Elevated right atrial pressure (mRA 16mmHg with a V wave of 23mmHg)  Moderate post-capillary pulmonary hypertension (sPAP 58mmHg, dPAP 23mmHg,  mPAP 37mmHg)  PCWP = 28mmHg with a V wave of 48mmHg  PAsat = 67.1% // AOsat = 99.2% (room air)  [place] // CI = 6.07l/min // 3.56l/min/m2  INTERVENTIONAL RECOMMENDATIONS: Keep right leg straigh for 4 hours  following removal of sheaths  Continue aggressive medical management of coronary artery disease and  associated risk factors  Closely monitor the patients kidney function  Patient being evaluated by the [facility] valve team for TAVR  Findings discussed with Dr. Hodges  Prepared and signed by  Alejandro Johns MD  Signed 2021 18:37:05    < end of copied text >

## 2021-07-30 NOTE — PROGRESS NOTE ADULT - SUBJECTIVE AND OBJECTIVE BOX
Subjective  Patient continues to feel short of breath mildly improved compared to the prior day.  Continues to feel very fatigued in nature.  Denies any abdominal pain.  Has a decreased appetite.  No fevers, chills or sweats noted.  Improvement in the patient's creatinine to 1.1 and sodium to 133.    Review of systems  14 point review of systems otherwise unremarkable somewhat discussed above in history of present illness    Physical examination  No apparent distress, alert and oriented 3, elevated JVD with hepatojugular reflex  Irregular regular with 1 out of 6 holosystolic murmur at left lower sternal border  Decreased breath sounds bilaterally with no wheezing or crackles  Positive bowel sound, soft, obese, nontender, no masses guarding  1+ bilateral pitting edema   No clubbing or cyanosis  Moving all extremity spontaneously  1+ DP/PT pulses    Assessment/plan  Severe aortic valve stenosis/severe tricuspid valve regurgitation  --Patient status post TAVR procedure.  Concern that the patient had an episode of flash pulmonary edema following the TAVR procedure and continues to clinically appear mildly volume overloaded volume of the overload on clinical examination.  This is likely multifactorial.  --Appreciate recommendations from heart failure/nephrology teams.  --Patient is suppose to be on Bumex 4 mg 3 times a day and Aldactone 12.5 daily.  Goal ins and out -500 to -1 L.  Aim for potassium to greater than 4 and magnesium greater than 2.  Closely monitor the patient's electrolytes.  --Patient has been started on Toprol 12.5 mg daily.  --No vomiting has been given for the patient's hyponatremia.    --Heparin not reinitiated due to concern for recent GI bleed after discussing with cardiac surgical team/Dr. Pruitt.  --Continue to closely follow the patient sodium.    --Continue telemetry monitoring.    The patient does well over the weekend plan for transfer to rehab on 8/2/2021.    All questions concerns of the patient were addressed.    Findings and plan were discussed with cardiac surgery and structural heart team.

## 2021-07-30 NOTE — PROGRESS NOTE ADULT - PROBLEM SELECTOR PLAN 2
Renal following   diuretic changed to bumex 4 tid  Monitor I's & O's Renal following   diuretic changed to bumex 4 mg bid   Monitor I's & O's  daily bmp   daily weight

## 2021-07-30 NOTE — CONSULT NOTE ADULT - ASSESSMENT
87F PMH CAD w/ prior PCI, DM2, Afib (not on AC's, dc'd pradaxa 1mo ago as pt w/u for anemia), Metastatic bronchoalveolar cancer, Colon Ca (about 25y ago), HF on 80mg lasix qd, recently started on Entresto as an outpatient, presented to the ED with abnormal labs done on 7/7 showing JACQUE with hyperk to >6 mod hemolyzed. On presentation patient with BUN/Cr 79/6.7 mg/dl. K: 6.6 (non hemolyzed). Previous Cr was at 1.26 6/7/21. Also noted with bicarb: 11 pH 7.2 - lactate 7, admitted to MICU for urgent HD, now improved volume status, renal function improving off dialysis. Patient found to have severe AS s/p TAVR 7/22, well tolerated without complications however ongoing respiratory distress. Heart failure consulted for optimization of volume status given ongoing respiratory issues improved with uptitration of diuretics.     RHC 7/14 RA 16; PA 58/23/37 PCWP 28 (V 48) Sat 67 CO/CI 6.1/3.6

## 2021-07-30 NOTE — PROGRESS NOTE ADULT - ASSESSMENT
87F PMH CAD w/stents, DM2, Afib (not on AC), Colon Ca (about 25y ago), severe AS, HF , recently started on Entresto, admitted for JACQUE and Metformin-associated lactic acidosis, admitted to MICU s/p urgent HD session but now JACQUE resolving and now undergoing TAVR workup.    7/22 Underwent TAVR via right femoral #22 Heike  recovered in CRS post op course unremarkable Junctional rhythm  by EKG  700 cc fluid given for hypotension. Stabilize transferred to [address] [place] [address]- received in afib-  groin sites benign + DP son at bedside ECHO in am EKG in am  Likely discharge on Saturday with [facility]OT son reports haven given rehab  choices to case management.  7/23 pending post TAVR echo. Anticipate discharge to rehab Monday.  7/24 VSS maintaining Afib 70's . No further junctional episodes of rhythm.   7/25 Transthoracic Echocardiogram demonstrates Minimal aortic regurgitation-Tethered tricuspid valve, severe tricuspid regurgitation . No evidence of pericardial effusion.   7/26 VSS - pt c/o slight SOB - echo shows small new pericardial effusion.  CXR-done- crackles bases - on IV bumex tid - diuresing - O2 N/c in place -rounds made w/ Dr. Pruitt, Dr. Johns & structural heart team - new SOB - will get LE dopplers to r/o dvt - start heaprin gtt, change to bumex gtt 0.5mg /hr- give zaroxlyn 10mg iv x1 , WBC 16 from 8 - ck u/a c&s.  transfer to SDU for closer monitoring.    7/27    OOB to chair  bumex gtt   heparin gtt  neg  800 cc   24 hrs   chest xray   bl base pl effusion  7/28 VSS more tachypnic today.  CXR + atelectasis.   Na 127 Dr Ramos called for input.  -658cc/24 hrs  7/29 VSS sob improved.  -236cc/24hrs.  Na 127 received 1 dose samsca 15x1.  K 4.6 .  Hep gtt dc'd     87F PMH CAD w/stents, DM2, Afib (not on AC), Colon Ca (about 25y ago), severe AS, HF , recently started on Entresto, admitted for JACQUE and Metformin-associated lactic acidosis, admitted to MICU s/p urgent HD session but now JACQUE resolving and now undergoing TAVR workup.    7/22 Underwent TAVR via right femoral #22 Heike  recovered in CRS post op course unremarkable Junctional rhythm  by EKG  700 cc fluid given for hypotension. Stabilize transferred to [address] [place] [address]- received in afib-  groin sites benign + DP son at bedside ECHO in am EKG in am  Likely discharge on Saturday with MCOT son reports haven given rehab  choices to case management.  7/23 pending post TAVR echo. Anticipate discharge to rehab Monday.  7/24 VSS maintaining Afib 70's . No further junctional episodes of rhythm.   7/25 Transthoracic Echocardiogram demonstrates Minimal aortic regurgitation-Tethered tricuspid valve, severe tricuspid regurgitation . No evidence of pericardial effusion.   7/26 VSS - pt c/o slight SOB - echo shows small new pericardial effusion.  CXR-done- crackles bases - on IV bumex tid - diuresing - O2 N/c in place -rounds made w/ Dr. Pruitt, Dr. Johns & structural heart team - new SOB - will get LE dopplers to r/o dvt - start heaprin gtt, change to bumex gtt 0.5mg /hr- give zaroxlyn 10mg iv x1 , WBC 16 from 8 - ck u/a c&s.  transfer to SDU for closer monitoring.    7/27    OOB to chair  bumex gtt   heparin gtt  neg  800 cc   24 hrs   chest xray   bl base pl effusion  7/28 VSS more tachypnic today.  CXR + atelectasis.   Na 127 Dr Ramos called for input.  -658cc/24 hrs  7/29 VSS sob improved.  -236cc/24hrs.  Na 127 received 1 dose samsca 15x1.  K 4.6 .  Hep gtt dc'd  7/30 VSS;  bumex changed to 4 mg po bid and aldactone initiated as per CHF team; change toprol 12.5 qd; supplement hypokalemia; no anticoagulation for chronic afib secondary to hx GI bleed as per DR. Pruitt  Discharge planning- rehab mon; ck covid pcr sunday

## 2021-07-30 NOTE — PROGRESS NOTE ADULT - SUBJECTIVE AND OBJECTIVE BOX
[facility] DIVISION OF KIDNEY DISEASES AND HYPERTENSION -- FOLLOW UP NOTE  --------------------------------------------------------------------------------  If any questions, please feel free to contact me  NS pager: [phone], [facility]: [phone]  Vicente Cortes M.D.  Nephrology Fellow    (After 5 pm or on weekends please page the on-call fellow)  --------------------------------------------------------------------------------    Chief Complaint:  Patient is a 87y old  Female who presents with a chief complaint of urgent HD (30 Jul 2021 09:59)    24 hour events/subjective:  PAtient seen and examined at bedside in NAD, today Na improved to 133 and Cr:1.1mg/dl. non oliguric U/O: 2.6. Vitals/labs/imaging reviewed       PAST HISTORY  --------------------------------------------------------------------------------  No significant changes to PMH, PSH, FHx, SHx, unless otherwise noted    ALLERGIES & MEDICATIONS  --------------------------------------------------------------------------------  Allergies    No Known Allergies    Intolerances      Standing Inpatient Medications  aspirin  chewable 81 milliGRAM(s) Oral daily  atorvastatin 40 milliGRAM(s) Oral at bedtime  buMETAnide 4 milliGRAM(s) Oral <User Schedule>  ciprofloxacin     Tablet 250 milliGRAM(s) Oral every 12 hours  dextrose 40% Gel 15 Gram(s) Oral once  dextrose 5%. 1000 milliLiter(s) IV Continuous <Continuous>  dextrose 5%. 1000 milliLiter(s) IV Continuous <Continuous>  dextrose 50% Injectable 25 Gram(s) IV Push once  dextrose 50% Injectable 12.5 Gram(s) IV Push once  dextrose 50% Injectable 25 Gram(s) IV Push once  ferrous    sulfate 325 milliGRAM(s) Oral daily  glucagon  Injectable 1 milliGRAM(s) IntraMuscular once  heparin   Injectable 5000 Unit(s) SubCutaneous every 8 hours  insulin glargine Injectable (LANTUS) 6 Unit(s) SubCutaneous at bedtime  insulin lispro (ADMELOG) corrective regimen sliding scale   SubCutaneous three times a day before meals  insulin lispro Injectable (ADMELOG) 3 Unit(s) SubCutaneous before breakfast  insulin lispro Injectable (ADMELOG) 3 Unit(s) SubCutaneous before lunch  insulin lispro Injectable (ADMELOG) 3 Unit(s) SubCutaneous before dinner  polyethylene glycol 3350 17 Gram(s) Oral daily  spironolactone 12.5 milliGRAM(s) Oral daily    PRN Inpatient Medications  artificial  tears Solution 1 Drop(s) Both EYES every 12 hours PRN  zolpidem 5 milliGRAM(s) Oral at bedtime PRN      REVIEW OF SYSTEMS  --------------------------------------------------------------------------------  Gen: +fatigue No fevers/chills  Skin: No rashes  Head/Eyes/Ears: Normal hearing,   Respiratory: No dyspnea, cough  CV: No chest pain  GI: No abdominal pain, diarrhea  : No dysuria, hematuria  MSK: +LE edema    All other systems were reviewed and are negative, except as noted.    VITALS/PHYSICAL EXAM  --------------------------------------------------------------------------------  T(C): 36.6 (07-30-21 @ 11:05), Max: 36.9 (07-29-21 @ 15:05)  HR: 74 (07-30-21 @ 11:05) (71 - 82)  BP: 125/58 (07-30-21 @ 11:05) (114/59 - 130/61)  RR: 18 (07-30-21 @ 11:05) (18 - 18)  SpO2: 99% (07-30-21 @ 11:05) (92% - 99%)  Wt(kg): --        07-29-21 @ 07:01  -  07-30-21 @ 07:00  --------------------------------------------------------  IN: 1120 mL / OUT: 2650 mL / NET: -1530 mL    07-30-21 @ 07:01  -  07-30-21 @ 14:33  --------------------------------------------------------  IN: 360 mL / OUT: 0 mL / NET: 360 mL        Physical Exam:  	Gen: NAD  	HEENT: MMM  	Pulm: b/l wheezing, decreased breath sounds in bases   	CV: S1S2, RRR  	Abd: Soft, +BS, ND   	Ext: 2+ LE edema B/L  	Neuro: Awake, A&O x3  	Skin: Warm and dry    LABS/STUDIES  --------------------------------------------------------------------------------              7.7    9.15  >-----------<  328      [07-30-21 @ 06:00]              24.9     133  |  85  |  52  ----------------------------<  187      [07-30-21 @ 09:25]  3.5   |  35  |  1.18        Ca     10.1     [07-30-21 @ 09:25]        PTT: 58.2       [07-29-21 @ 02:06]      Creatinine Trend:  SCr 1.18 [07-30 @ 09:25]  SCr 1.34 [07-30 @ 06:00]  SCr 1.11 [07-29 @ 02:06]  SCr 1.04 [07-28 @ 04:27]  SCr 1.08 [07-27 @ 13:03]    Urinalysis - [07-26-21 @ 12:20]      Color Yellow / Appearance Clear / SG 1.014 / pH 5.5      Gluc 200 mg/dL / Ketone Negative  / Bili Negative / Urobili Negative       Blood Negative / Protein Negative / Leuk Est Large / Nitrite Negative      RBC 3 / WBC 19 / Hyaline 0 / Gran  / Sq Epi  / Non Sq Epi 0 / Bacteria Few      TSH 6.37      [07-21-21 @ 14:10]    HBsAb <3.0      [07-09-21 @ 03:47]  HBsAg Nonreact      [07-09-21 @ 03:47]  HBcAb Nonreact      [07-09-21 @ 03:47]  HCV 0.14, Nonreact      [07-09-21 @ 03:47]

## 2021-07-30 NOTE — PROGRESS NOTE ADULT - ASSESSMENT
87 year-old woman with known history of colon ca (remote), now with lung mass that is concerning for neoplasm, also with atrial fibrillation, previously on AC, recently stopped due to anemia, now POD 8 status post TAVR.  Procedure was well tolerated, but now grossly volume overloaded with dyspnea at rest.  Patient now short of breath, seen to have pulmonary edema on CXR.  Unclear etiology of leukocytosis and shortness of breath.     Continue IV loop diuretics. Keep O > I, K > 4.0, Mag > 2.0.    ASA, statin, and beta-blocker as tolerated.  Avoid nephrotoxic agents.    Monitor tele for any evidence of heart block.    Would repeat Covid-19 PCR and RVP.    Discussed at length with Alejandro Johns MD and Don Kee MD from Heart Failure.  I will be covered by Leslie Vail, DO this weekend. Please call her with any acute concerns.  Eventual discharge planning per Structural Heart team.

## 2021-07-30 NOTE — CONSULT NOTE ADULT - PROBLEM SELECTOR RECOMMENDATION 9
- improving s/p addition of bumex 4mg tid, can decrease to bid dosing   - add aldactone 12.5mg daily  - strict I/Os, bid lytes, daily standing weights  - monitor on telemetry  - while this is likely contributing to patients respiratory issues, likely multifactorial in setting of known pulmonary malignancy - improving s/p addition of bumex 4mg tid, can decrease to bid dosing   - add aldactone 12.5mg daily  - transition toprol to 12.5mg daily   - strict I/Os, bid lytes, daily standing weights  - monitor on telemetry  - while this is likely contributing to patients respiratory issues, likely multifactorial in setting of known pulmonary malignancy

## 2021-07-30 NOTE — CONSULT NOTE ADULT - PROBLEM SELECTOR RECOMMENDATION 3
- rates well controlled   - not on anticoagulation in the setting of anemia, high risk for CVA, AC per primary cardiology team - rates well controlled   - transition toprol to 12.5mg daily   - not on anticoagulation in the setting of anemia, high risk for CVA, AC per primary cardiology team

## 2021-07-30 NOTE — PROGRESS NOTE ADULT - PROBLEM SELECTOR PLAN 3
Dr Ramos following  Maintain negative fluid balance  Samsca 15 x 1 Dr Ramos following  Maintain negative fluid balance  daily bmp  na 133 today

## 2021-07-30 NOTE — CONSULT NOTE ADULT - SUBJECTIVE AND OBJECTIVE BOX
All Cardiology service information can be found  on [email or web address], password: [email or web address]    Patient seen and evaluated at bedside    Chief Complaint: SOB     HPI / INTERVAL HISTORY:  87F PMH CAD w/ prior PCI, DM2, Afib (not on AC's, dc'd pradaxa 1mo ago as pt w/u for anemia), Metastatic bronchoalveolar cancer, Colon Ca (about 25y ago), HF on 80mg lasix qd, recently started on Entresto as an outpatient, presented to the ED with abnormal labs done on  showing JACQUE with hyperk to >6 mod hemolyzed. On presentation patient with BUN/Cr 79/6.7 mg/dl. K: 6.6 (non hemolyzed). Previous Cr was at 1.26 21. Also noted with bicarb: 11 pH 7.2 - lactate 7, admitted to MICU for urgent HD, now improved volume status, renal function improving off dialysis. Patient found to have severe AS s/p TAVR , well tolerated without complications however ongoing respiratory distress. Heart failure consulted for optimization of volume status given ongoing respiratory issues. Patient recently started on intermittent bumex with good response, improving from a respiratory perspective.       PMHx: as above     Allergies:  No Known Allergies    Current Medications:   artificial  tears Solution 1 Drop(s) Both EYES every 12 hours PRN  aspirin  chewable 81 milliGRAM(s) Oral daily  atorvastatin 40 milliGRAM(s) Oral at bedtime  buMETAnide 4 milliGRAM(s) Oral <User Schedule>  ciprofloxacin     Tablet 250 milliGRAM(s) Oral every 12 hours  dextrose 40% Gel 15 Gram(s) Oral once  dextrose 5%. 1000 milliLiter(s) IV Continuous <Continuous>  dextrose 5%. 1000 milliLiter(s) IV Continuous <Continuous>  dextrose 50% Injectable 25 Gram(s) IV Push once  dextrose 50% Injectable 12.5 Gram(s) IV Push once  dextrose 50% Injectable 25 Gram(s) IV Push once  ferrous    sulfate 325 milliGRAM(s) Oral daily  glucagon  Injectable 1 milliGRAM(s) IntraMuscular once  heparin   Injectable 5000 Unit(s) SubCutaneous every 8 hours  insulin glargine Injectable (LANTUS) 6 Unit(s) SubCutaneous at bedtime  insulin lispro (ADMELOG) corrective regimen sliding scale   SubCutaneous three times a day before meals  insulin lispro Injectable (ADMELOG) 3 Unit(s) SubCutaneous before breakfast  insulin lispro Injectable (ADMELOG) 3 Unit(s) SubCutaneous before lunch  insulin lispro Injectable (ADMELOG) 3 Unit(s) SubCutaneous before dinner  polyethylene glycol 3350 17 Gram(s) Oral daily  spironolactone 12.5 milliGRAM(s) Oral daily  zolpidem 5 milliGRAM(s) Oral at bedtime PRN    Physical Exam:  T(F): 97.9 (-), Max: 98.3 ()  HR: 81 () (71 - 82)  BP: 111/70 () (111/70 - 130/61)  RR: 18 ()  SpO2: 91% ()  GENERAL: No acute distress, well-developed  CHEST/LUNG: Clear to auscultation bilaterally; decreased air movement, no wheezes, elevated JVP   HEART: Regular rate and rhythm; No murmurs, rubs, or gallops; mid systolic murmur   ABDOMEN: Soft, Nontender, Nondistended; Bowel sounds present  EXTREMITIES: b/l non pitting edema, warm   NEUROLOGY: AAOx3, non-focal, cranial nerves intact  SKIN: Normal color, No rashes or lesions    TTE 1. Mitral annular calcification and calcified mitral  leaflets with decreased diastolic opening. Mild mitral  regurgitation.  Peak mitral valve gradient equals 6 mm Hg,  mean transmitral valve gradient equals 2 mm Hg, consistent  with mild mitral stenosis.Gradients measuredat a heart  rate of 56/min.  2. Transcatheter aortic valve replacement. Peak transaortic  valve gradient equals 19 mm Hg, mean transaortic valve  gradient equals 10 mm Hg, which is probably normal in the  presence of a transcatheter aortic valve replacement.  Minimal aortic regurgitation-likely paravalvular.  3. Severely dilated left atrium.  LA volume index = 78  cc/m2.  4. Endocardium not well visualized; grossly normal left  ventricular systolic function.  5. Severe right atrial enlargement.  6. Right ventricular enlargement with borderline decreased  right ventricular systolic function.TAPSE 1.6 cm.  7. Tethered tricuspid valve. Severe tricuspid  regurgitation.    L/RHC CORONARY VESSELS: The coronary circulation is right dominant.  LM:   --  LM: Normal.  LAD:   --  Proximal LAD: There was a discrete 40 % stenosis.  --  Mid LAD: There was a tubular 25 % stenosis.  --  Distal LAD: Normal.  CX:   --  Proximal circumflex: There was a tubular 20 % stenosis.  --  Mid circumflex: There was a diffuse 50 % stenosis.  RCA:   --  Proximal RCA: There was a diffuse 30 % stenosis.  --  Mid RCA: There was a diffuse 20 % in-stent restenosis through the prior  stent.  --  Distal RCA: Normal.  --  RPDA: Normal.  --  RPLS: Normal.  COMPLICATIONS: There were no complications.  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  Normal left main coronary artery  Right dominant system  Elevated right atrial pressure (mRA 16mmHg with a V wave of 23mmHg)  Moderate post-capillary pulmonary hypertension (sPAP 58mmHg, dPAP 23mmHg,  mPAP 37mmHg)  PCWP = 28mmHg with a V wave of 48mmHg  PAsat = 67.1% // AOsat = 99.2% (room air)  [place] CO // CI = 6.07l/min // 3.56l/min/m2  DIAGNOSTIC RECOMMENDATIONS: Keep right leg straigh for 4 hours following  removal of sheaths  Continue aggressive medical management of coronary artery disease and  associated risk factors  Closely monitor the patients kidney function  Patient being evaluated by the [facility] valve team for TAVR  Findings discussed with Dr. 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  Normal left main coronary artery  Right dominant system  Elevated right atrial pressure (mRA 16mmHg with a V wave of 23mmHg)  Moderate post-capillary pulmonary hypertension (sPAP 58mmHg, dPAP 23mmHg,  mPAP 37mmHg)  PCWP = 28mmHg with a V wave of 48mmHg  PAsat = 67.1% // AOsat = 99.2% (room air)  [place] CO // CI = 6.07l/min // 3.56l/min/m2  INTERVENTIONAL RECOMMENDATIONS: Keep right leg straigh for 4 hours  following removal of sheaths  Continue aggressive medical management of coronary artery disease and  associated risk factors  Closely monitor the patients kidney function  Patient being evaluated by the [facility] valve team for TAVR  Findings discussed with Dr. Hodges  Prepared and signed by  Alejandro Johns MD  Signed 2021 18:37:05  HEMODYNAMIC TABLES  Pressures:  Baseline  Pressures:  - HR: 77  Pressures:  - Rhythm:  Pressures:  -- Aortic Pressure (S/D/M): 121/45/77  Pressures:  -- Pulmonary Artery (S/D/M): 58/23/37  Pressures:  -- Pulmonary Capillary Wedge: 40/50/28  Pressures:  -- Right Atrium (a/v/M): 18/23/16  Pressures:  -- Right Ventricle (s/edp): 58/14/--  O2 Sats:  Baseline  O2 Sats:  - HR: 77  O2 Sats:  - Rhythm:  O2 Sats:  -- AO: 8.6/99.2/11.6  O2 Sats:  -- PA: 8.6/67.1/7.85  Outputs:  Baseline  Outputs:  -- CALCULATIONS[age]  Outputs:  -- CALCULATIONS: Body Surface Area: 1.70  Outputs:  -- CALCULATIONS: Height in cm: 152.00  Outputs:  -- CALCULATIONS: Sex: Female  Outputs:  -- CALCULATIONS: Weight in k.50  Outputs:  -- OUTPUTS: Blood Oxygen Difference: 3.75  Outputs:  -- OUTPUTS: CO by [place]: 6.07  Outputs:  -- OUTPUTS: [place] cardiac index: 3.56  Outputs:  -- OUTPUTS: O2 consumption: 228.00  Outputs:  -- OUTPUTS: Vo2 Indexed: 133.83  Outputs:  -- RESISTANCES: Left ventricular stroke work: 57.81  Outputs:  -- RESISTANCES: Left Ventricular Stroke Work index: 33.94  Outputs:  -- RESISTANCES: Pulmonary vascular index (dsc): 201.93  Outputs:  -- RESISTANCES: Pulmonary vascular index (Wood Units): 2.52  Outputs:  -- RESISTANCES: Pulmonary vascular resistance (dsc): 118.53  Outputs:  -- RESISTANCES: Pulmonary vascular resistance (Wood Units): 1.48  Outputs:  -- RESISTANCES: PVR_SVR Ratio: 0.15  Outputs:  -- RESISTANCES: Right ventricular stroke work: 24.78  Outputs:  -- RESISTANCES: Right ventricular stroke work index: 14.54  Outputs:  -- RESISTANCES: Systemic vascular index (dsc): 1368.65  Outputs:  -- RESISTANCES: Systemic vascular index (Wood Units): 17.11  Outputs:  -- RESISTANCES: Systemic vascular resistance (dsc): 803.38  Outputs:  -- RESISTANCES: Systemic vascular resistance (Wood Units): 10.04  Outputs:  -- RESISTANCES: Total pulmonary index (dsc): 830.17  Outputs:  -- RESISTANCES: Total pulmonary index (Wood Units): 10.38  Outputs:  -- RESISTANCES: Total pulmonary resistance (dsc): 487.29  Outputs:  -- RESISTANCES: Total pulmonary resistance (Wood Units): 6.09  Outputs:  -- RESISTANCES: Total vascular index (Wood Units): 21.60  Outputs:  -- RESISTANCES: Total vascular resistance (dsc): 1014.10  Outputs:  -- RESISTANCES: Total vascular resistance (Wood Units): 12.68  Outputs:  -- RESISTANCES: Total vascular resistance index (dsc): 1727.65  Outputs:  -- RESISTANCES: TPR_TVR Ratio: 0.48  Outputs:  -- SHUNTS: Qs Indexed: 3.56  Outputs:  -- SHUNTS: Systemic flow: 6.07      Imaging:    CXR: Personally reviewed    Labs: Personally reviewed                        7.7    9.15  )-----------( 328      ( 2021 06:00 )             24.9         133<L>  |  85<L>  |  52<H>  ----------------------------<  187<H>  3.5   |  35<H>  |  1.18    Ca    10.1      2021 09:25    PTT - ( 2021 02:06 )  PTT:58.2 sec    Serum Pro-Brain Natriuretic Peptide: 2398 pg/mL ( @ 09:25)

## 2021-07-31 NOTE — PROGRESS NOTE ADULT - SUBJECTIVE AND OBJECTIVE BOX
[facility] DIVISION OF KIDNEY DISEASES AND HYPERTENSION -- FOLLOW UP NOTE  --------------------------------------------------------------------------------  Chief Complaint:    24 hour events/subjective:  appears SOB  constipation      PAST HISTORY  --------------------------------------------------------------------------------  No significant changes to PMH, PSH, FHx, SHx, unless otherwise noted    ALLERGIES & MEDICATIONS  --------------------------------------------------------------------------------  Allergies    No Known Allergies    Intolerances      Standing Inpatient Medications  aspirin  chewable 81 milliGRAM(s) Oral daily  atorvastatin 40 milliGRAM(s) Oral at bedtime  buMETAnide 4 milliGRAM(s) Oral <User Schedule>  ciprofloxacin     Tablet 250 milliGRAM(s) Oral every 12 hours  dextrose 40% Gel 15 Gram(s) Oral once  dextrose 5%. 1000 milliLiter(s) IV Continuous <Continuous>  dextrose 5%. 1000 milliLiter(s) IV Continuous <Continuous>  dextrose 50% Injectable 25 Gram(s) IV Push once  dextrose 50% Injectable 12.5 Gram(s) IV Push once  dextrose 50% Injectable 25 Gram(s) IV Push once  ferrous    sulfate 325 milliGRAM(s) Oral daily  glucagon  Injectable 1 milliGRAM(s) IntraMuscular once  heparin   Injectable 5000 Unit(s) SubCutaneous every 8 hours  insulin glargine Injectable (LANTUS) 6 Unit(s) SubCutaneous at bedtime  insulin lispro (ADMELOG) corrective regimen sliding scale   SubCutaneous three times a day before meals  insulin lispro Injectable (ADMELOG) 3 Unit(s) SubCutaneous before breakfast  insulin lispro Injectable (ADMELOG) 3 Unit(s) SubCutaneous before lunch  insulin lispro Injectable (ADMELOG) 3 Unit(s) SubCutaneous before dinner  metoprolol succinate ER 12.5 milliGRAM(s) Oral daily  polyethylene glycol 3350 17 Gram(s) Oral daily  spironolactone 12.5 milliGRAM(s) Oral daily    PRN Inpatient Medications  artificial  tears Solution 1 Drop(s) Both EYES every 12 hours PRN  zolpidem 5 milliGRAM(s) Oral at bedtime PRN      REVIEW OF SYSTEMS  --------------------------------------------------------------------------------  unable to do    VITALS/PHYSICAL EXAM  --------------------------------------------------------------------------------  T(C): 36.7 (07-31-21 @ 12:01), Max: 36.8 (07-31-21 @ 06:17)  HR: 80 (07-31-21 @ 12:01) (79 - 82)  BP: 119/63 (07-31-21 @ 12:01) (111/70 - 131/60)  RR: 18 (07-31-21 @ 12:01) (18 - 18)  SpO2: 92% (07-31-21 @ 12:01) (91% - 96%)  Wt(kg): --        07-30-21 @ 07:01  -  07-31-21 @ 07:00  --------------------------------------------------------  IN: 720 mL / OUT: 1050 mL / NET: -330 mL    07-31-21 @ 07:01  -  07-31-21 @ 13:44  --------------------------------------------------------  IN: 300 mL / OUT: 0 mL / NET: 300 mL      PHYSICAL EXAM: vital signs as above  in no apparent distress  Neck: Supple, +JVD,    CVS: S1 S2 no M/R/G  Ext: no cyanosis or clubbing, 1+edema bl    LABS/STUDIES  --------------------------------------------------------------------------------              7.6    11.89 >-----------<  331      [07-31-21 @ 06:43]              24.4     131  |  86  |  53  ----------------------------<  144      [07-31-21 @ 06:39]  3.7   |  35  |  1.42        Ca     9.9     [07-31-21 @ 06:39]            Creatinine Trend:  SCr 1.42 [07-31 @ 06:39]  SCr 1.18 [07-30 @ 09:25]  SCr 1.34 [07-30 @ 06:00]  SCr 1.11 [07-29 @ 02:06]  SCr 1.04 [07-28 @ 04:27]    Urinalysis - [07-26-21 @ 12:20]      Color Yellow / Appearance Clear / SG 1.014 / pH 5.5      Gluc 200 mg/dL / Ketone Negative  / Bili Negative / Urobili Negative       Blood Negative / Protein Negative / Leuk Est Large / Nitrite Negative      RBC 3 / WBC 19 / Hyaline 0 / Gran  / Sq Epi  / Non Sq Epi 0 / Bacteria Few      TSH 6.37      [07-21-21 @ 14:10]    HBsAb <3.0      [07-09-21 @ 03:47]  HBsAg Nonreact      [07-09-21 @ 03:47]  HBcAb Nonreact      [07-09-21 @ 03:47]  HCV 0.14, Nonreact      [07-09-21 @ 03:47]

## 2021-07-31 NOTE — PROGRESS NOTE ADULT - SUBJECTIVE AND OBJECTIVE BOX
VITAL SIGNS    Subjective: "I'm feeling ok." Denies CP, palpitation, SOB, GARCIA, HA, dizziness, N/V/D, fever or chills.  No acute event noted overnight.     Telemetry:  Afib      Vital Signs Last 24 Hrs  T(C): 36.7 (21 @ 12:01), Max: 36.8 (21 @ 06:17)  T(F): 98.1 (21 @ 12:01), Max: 98.2 (21 @ 06:17)  HR: 80 (21 12:01) (79 - 80)  BP: 119/63 (21 @ 12:01) (119/63 - 125/69)  RR: 18 (21 @ 12:01) (18 - 18)  SpO2: 92% (21 @ 12:01) (92% - 92%)            @ 07:01  -   @ 07:00  --------------------------------------------------------  IN: 720 mL / OUT: 1050 mL / NET: -330 mL     @ 07:01  -   @ 19:11  --------------------------------------------------------  IN: 1070 mL / OUT: 500 mL / NET: 570 mL    Daily     Daily Weight in k (2021 12:01)    CAPILLARY BLOOD GLUCOSE    POCT Blood Glucose.: 161 mg/dL (2021 16:27)  POCT Blood Glucose.: 249 mg/dL (2021 13:46)  POCT Blood Glucose.: 221 mg/dL (2021 11:28)  POCT Blood Glucose.: 164 mg/dL (2021 07:25)  POCT Blood Glucose.: 250 mg/dL (2021 21:36)        PHYSICAL EXAM    Neurology: alert and oriented x 3, nonfocal, no gross deficits    CV: (+) S1 and S2, No murmurs, rubs, gallops or clicks     Lungs:  B/L diminished at base     Abdomen: soft, nontender, nondistended, positive bowel sounds, (+) Flatus; (+) BM     :  Voiding               Extremities:  B/L LE (+) 1 edema; negative calf tenderness; (+) 2 DP palpable; B/L groin sites C/D/I         artificial  tears Solution 1 Drop(s) Both EYES every 12 hours PRN  aspirin  chewable 81 milliGRAM(s) Oral daily  atorvastatin 40 milliGRAM(s) Oral at bedtime  ciprofloxacin  Tablet 250 milliGRAM(s) Oral every 12 hours  dextrose 40% Gel 15 Gram(s) Oral once  dextrose 5%. 1000 milliLiter(s) IV Continuous <Continuous>  dextrose 5%. 1000 milliLiter(s) IV Continuous <Continuous>  dextrose 50% Injectable 25 Gram(s) IV Push once  dextrose 50% Injectable 12.5 Gram(s) IV Push once  dextrose 50% Injectable 25 Gram(s) IV Push once  ferrous    sulfate 325 milliGRAM(s) Oral daily  glucagon  Injectable 1 milliGRAM(s) IntraMuscular once  heparin   Injectable 5000 Unit(s) SubCutaneous every 8 hours  insulin glargine Injectable (LANTUS) 6 Unit(s) SubCutaneous at bedtime  insulin lispro (ADMELOG) corrective regimen sliding scale   SubCutaneous three times a day before meals  insulin lispro Injectable (ADMELOG) 3 Unit(s) SubCutaneous before breakfast  insulin lispro Injectable (ADMELOG) 3 Unit(s) SubCutaneous before lunch  insulin lispro Injectable (ADMELOG) 3 Unit(s) SubCutaneous before dinner  metoprolol succinate ER 12.5 milliGRAM(s) Oral daily  polyethylene glycol 3350 17 Gram(s) Oral daily  spironolactone 12.5 milliGRAM(s) Oral daily  zolpidem 5 milliGRAM(s) Oral at bedtime PRN    Physical Therapy Rec:   Home  [  ]   Home w/ PT  [  ]  Rehab  [ X ]    Discussed with Cardiothoracic Team at AM rounds.     VITAL SIGNS    Subjective: "I'm feeling ok." Denies CP, palpitation, SOB, GARCIA, HA, dizziness, N/V/D, fever or chills.  No acute event noted overnight.     Telemetry:  Afib      Vital Signs Last 24 Hrs  T(C): 36.7 (21 @ 12:01), Max: 36.8 (21 @ 06:17)  T(F): 98.1 (21 @ 12:01), Max: 98.2 (21 @ 06:17)  HR: 80 (21 12:01) (79 - 80)  BP: 119/63 (21 @ 12:01) (119/63 - 125/69)  RR: 18 (21 @ 12:01) (18 - 18)  SpO2: 92% (21 @ 12:01) (92% - 92%)            @ 07:01  -   @ 07:00  --------------------------------------------------------  IN: 720 mL / OUT: 1050 mL / NET: -330 mL     @ 07:01  -   @ 19:11  --------------------------------------------------------  IN: 1070 mL / OUT: 500 mL / NET: 570 mL    Daily     Daily Weight in k (2021 12:01)    CAPILLARY BLOOD GLUCOSE    POCT Blood Glucose.: 161 mg/dL (2021 16:27)  POCT Blood Glucose.: 249 mg/dL (2021 13:46)  POCT Blood Glucose.: 221 mg/dL (2021 11:28)  POCT Blood Glucose.: 164 mg/dL (2021 07:25)  POCT Blood Glucose.: 250 mg/dL (2021 21:36)        PHYSICAL EXAM    Neurology: alert and oriented x 3, nonfocal, no gross deficits    CV: (+) S1 and S2, No murmurs, rubs, gallops or clicks     Lungs:  B/L diminished at base     Abdomen: soft, nontender, nondistended, positive bowel sounds, (+) Flatus; (+) BM     :  Voiding --> Prima[employer] external collection device on place                Extremities:  B/L LE (+) 1 edema; negative calf tenderness; (+) 2 DP palpable; B/L groin sites C/D/I         artificial  tears Solution 1 Drop(s) Both EYES every 12 hours PRN  aspirin  chewable 81 milliGRAM(s) Oral daily  atorvastatin 40 milliGRAM(s) Oral at bedtime  ciprofloxacin  Tablet 250 milliGRAM(s) Oral every 12 hours  dextrose 40% Gel 15 Gram(s) Oral once  dextrose 5%. 1000 milliLiter(s) IV Continuous <Continuous>  dextrose 5%. 1000 milliLiter(s) IV Continuous <Continuous>  dextrose 50% Injectable 25 Gram(s) IV Push once  dextrose 50% Injectable 12.5 Gram(s) IV Push once  dextrose 50% Injectable 25 Gram(s) IV Push once  ferrous    sulfate 325 milliGRAM(s) Oral daily  glucagon  Injectable 1 milliGRAM(s) IntraMuscular once  heparin   Injectable 5000 Unit(s) SubCutaneous every 8 hours  insulin glargine Injectable (LANTUS) 6 Unit(s) SubCutaneous at bedtime  insulin lispro (ADMELOG) corrective regimen sliding scale   SubCutaneous three times a day before meals  insulin lispro Injectable (ADMELOG) 3 Unit(s) SubCutaneous before breakfast  insulin lispro Injectable (ADMELOG) 3 Unit(s) SubCutaneous before lunch  insulin lispro Injectable (ADMELOG) 3 Unit(s) SubCutaneous before dinner  metoprolol succinate ER 12.5 milliGRAM(s) Oral daily  polyethylene glycol 3350 17 Gram(s) Oral daily  spironolactone 12.5 milliGRAM(s) Oral daily  zolpidem 5 milliGRAM(s) Oral at bedtime PRN    Physical Therapy Rec:   Home  [  ]   Home w/ PT  [  ]  Rehab  [ X ]    Discussed with Cardiothoracic Team at AM rounds.

## 2021-07-31 NOTE — PROGRESS NOTE ADULT - PROBLEM SELECTOR PLAN 2
Renal following   Bumex 4 mg IV x 1 this evening   Bumex 4 mg PO Daily to start in AM  Continue on aldactone 12.5 mg po Daily   Monitor I's & O's  daily bmp   Daily Standing weight

## 2021-07-31 NOTE — PROGRESS NOTE ADULT - ASSESSMENT
87F PMH CAD w/ prior PCI, DM2, Afib (not on AC's, dc'd pradaxa 1mo ago as pt w/u for anemia), Metastatic bronchoalveolar cancer, Colon Ca (about 25y ago), HF on 80mg lasix qd, recently started on Entresto as an outpatient, presented to the ED with abnormal labs done on 7/7 showing JACQUE with hyperk to >6 mod hemolyzed. On presentation patient with BUN/Cr 79/6.7 mg/dl. K: 6.6 (non hemolyzed). Previous Cr was at 1.26 6/7/21. Also noted with bicarb: 11 pH 7.2 - lactate 7, admitted to MICU for urgent HD, now improved volume status, renal function improving off dialysis. Patient found to have severe AS s/p TAVR 7/22, well tolerated without complications however ongoing respiratory distress. Heart failure consulted for optimization of volume status given ongoing respiratory issues improved with uptitration of diuretics.     RHC 7/14 RA 16, PA 58/23/37, PCWP 28 (V 48), Sat 67 CO/CI 6.1/3.6  TTE 7/26/21 - EF 60-65%, TAVR, severe LA enlargement, severe RA enlargement, small pericardial effusion, IVC enlarged (3.6 cm), mod RV dilatation

## 2021-07-31 NOTE — PROGRESS NOTE ADULT - PROBLEM SELECTOR PLAN 1
Pt. with hyponatremia to 127 in setting of volume overload.   Last ECHO 7/26 Small pericardial effusion, There is no significant inflow variation measured across the mitral valve, but it is significant across the tricuspid valve. The IVC is enlarged (3.36cm)  and not collapsable No evidence of right atrial or right ventricular collapse.    s/p Tolvaptan 15mg x1 on 7/28 : with no change in sodium   Received bumex TID yesterday : Net negative over 1 liter  Now with improvement n sodium . Renal function is stable  Appears SOB and dyspenic now, cont bumex dose oral for now and give additional IV bumex for now  Should help Na as well  strict I/Os  monitor kidney function and urine output   Monitor serum sodium Q12 hours.   Do not correct serum sodium >6-8 meq/day.    d/w with Dr Don Hamilton MD  Cell   Pager   Office

## 2021-07-31 NOTE — PROGRESS NOTE ADULT - SUBJECTIVE AND OBJECTIVE BOX
Interval History:  appears to be breathing better but patient still reports dyspnea  urinating well     Medications:  artificial  tears Solution 1 Drop(s) Both EYES every 12 hours PRN  aspirin  chewable 81 milliGRAM(s) Oral daily  atorvastatin 40 milliGRAM(s) Oral at bedtime  ciprofloxacin     Tablet 250 milliGRAM(s) Oral every 12 hours  dextrose 40% Gel 15 Gram(s) Oral once  dextrose 5%. 1000 milliLiter(s) IV Continuous <Continuous>  dextrose 5%. 1000 milliLiter(s) IV Continuous <Continuous>  dextrose 50% Injectable 25 Gram(s) IV Push once  dextrose 50% Injectable 12.5 Gram(s) IV Push once  dextrose 50% Injectable 25 Gram(s) IV Push once  ferrous    sulfate 325 milliGRAM(s) Oral daily  glucagon  Injectable 1 milliGRAM(s) IntraMuscular once  heparin   Injectable 5000 Unit(s) SubCutaneous every 8 hours  insulin glargine Injectable (LANTUS) 6 Unit(s) SubCutaneous at bedtime  insulin lispro (ADMELOG) corrective regimen sliding scale   SubCutaneous three times a day before meals  insulin lispro Injectable (ADMELOG) 3 Unit(s) SubCutaneous before breakfast  insulin lispro Injectable (ADMELOG) 3 Unit(s) SubCutaneous before lunch  insulin lispro Injectable (ADMELOG) 3 Unit(s) SubCutaneous before dinner  metoprolol succinate ER 12.5 milliGRAM(s) Oral daily  polyethylene glycol 3350 17 Gram(s) Oral daily  spironolactone 12.5 milliGRAM(s) Oral daily  zolpidem 5 milliGRAM(s) Oral at bedtime PRN      Vitals:  T(C): 36.7 (21 @ 12:01), Max: 36.8 (21 @ 06:17)  HR: 80 (21 @ 12:01) (79 - 80)  BP: 119/63 (21 @ 12:01) (119/63 - 125/69)  BP(mean): --  RR: 18 (21 @ 12:01) (18 - 18)  SpO2: 92% (21 @ 12:01) (92% - 92%)    Daily     Daily Weight in k (2021 12:01)        I&O's Summary    2021 07:01  -  2021 07:00  --------------------------------------------------------  IN: 720 mL / OUT: 1050 mL / NET: -330 mL    2021 07:01  -  2021 20:51  --------------------------------------------------------  IN: 1070 mL / OUT: 500 mL / NET: 570 mL        Physical Exam:  Appearance: mild distress  HEENT: PERRL  Neck: JVD approx 10 cm with HJR with resp variation  Cardiovascular: Normal S1 S2, No murmurs/rubs/gallops  Respiratory: improved aeration; no longer wheezing  Gastrointestinal: Soft, Non-tender	  Skin: No cyanosis	  Neurologic: Non-focal  Extremities: trace LE edema  Psychiatry: A & O x 3, Mood & affect appropriate    Labs:                        7.6    11.89 )-----------( 331      ( 2021 06:43 )             24.4     07-31    x   |  x   |  x   ----------------------------<  x   5.4<H>   |  x   |  x     Ca    9.9      2021 06:39

## 2021-07-31 NOTE — PROGRESS NOTE ADULT - PROBLEM SELECTOR PLAN 1
continue postop care  continue asa only   statin  Toprol 12.5 mg po qd  Bumex 4 mg IV x 1 this evening   Bumex 4 mg PO Daily to start in AM  Continue on aldactone 12.5 mg po Daily   Supplement Potassium to maintain K+ level > 4.5    increase activity as tolerated  discharge planning- rehab Monday- ck covid pcr sunday

## 2021-07-31 NOTE — PROGRESS NOTE ADULT - ASSESSMENT
87F PMH CAD w/stents, DM2, Afib (not on AC), Colon Ca (about 25y ago), severe AS, HF , recently started on Entresto, admitted for JACQUE and Metformin-associated lactic acidosis, admitted to MICU s/p urgent HD session but now JACQUE resolving and now undergoing TAVR workup.    7/22 Underwent TAVR via right femoral #22 Heike  recovered in CRS post op course unremarkable Junctional rhythm  by EKG  700 cc fluid given for hypotension. Stabilize transferred to [address] [place] [address]- received in afib-  groin sites benign + DP son at bedside ECHO in am EKG in am  Likely discharge on Saturday with [facility] son reports haven given rehab  choices to case management.  7/23 pending post TAVR echo. Anticipate discharge to rehab Monday.  7/24 VSS maintaining Afib 70's . No further junctional episodes of rhythm.   7/25 Transthoracic Echocardiogram demonstrates Minimal aortic regurgitation-Tethered tricuspid valve, severe tricuspid regurgitation . No evidence of pericardial effusion.   7/26 VSS - pt c/o slight SOB - echo shows small new pericardial effusion.  CXR-done- crackles bases - on IV bumex tid - diuresing - O2 N/c in place -rounds made w/ Dr. Pruitt, Dr. Johns & structural heart team - new SOB - will get LE dopplers to r/o dvt - start heaprin gtt, change to bumex gtt 0.5mg /hr- give zaroxlyn 10mg iv x1 , WBC 16 from 8 - ck u/a c&s.  transfer to SDU for closer monitoring.    7/27 OOB to chair  bumex gtt   heparin gtt  neg  800 cc   24 hrs   chest xray   bl base pl effusion  7/28 VSS more tachypnic today.  CXR + atelectasis.   Na 127 Dr Ramos called for input.  -658cc/24 hrs  7/29 VSS sob improved.  -236cc/24hrs.  Na 127 received 1 dose samsca 15x1.  K 4.6 .  Hep gtt dc'd  7/30 VSS;  bumex changed to 4 mg po bid and aldactone initiated as per CHF team; change toprol 12.5 qd; supplement hypokalemia; no anticoagulation for chronic afib secondary to hx GI bleed as per Dr. Pruitt  7/31 (+) tachypnea and dyspnea --> Per CHF Dr. Kee will call Pulmonary for consults (Dr. Ponce).  Bumex  4 mg IV x 1 this evening and them decreased Bumex 4 mg PO daily to start in AM.  Continue with current medication regimen. Supplement Potassium to maintain K+ level > 4.5     Discharge planning- rehab mon; ck covid pcr Sunday.

## 2021-08-01 PROBLEM — I10 HYPERTENSION: Status: ACTIVE | Noted: 2021-01-01

## 2021-08-01 PROBLEM — Z79.01 ANTICOAGULANT LONG-TERM USE: Status: ACTIVE | Noted: 2021-01-01

## 2021-08-01 PROBLEM — I25.10 CAD (CORONARY ARTERY DISEASE): Status: ACTIVE | Noted: 2021-01-01

## 2021-08-01 PROBLEM — R89.9 ABNORMAL LABORATORY TEST RESULT: Status: ACTIVE | Noted: 2021-01-01

## 2021-08-01 PROBLEM — R93.1 ABNORMAL ECHOCARDIOGRAM: Status: ACTIVE | Noted: 2021-01-01

## 2021-08-01 PROBLEM — I35.0 AORTIC STENOSIS: Status: ACTIVE | Noted: 2021-01-01

## 2021-08-01 PROBLEM — R06.00 DYSPNEA: Status: ACTIVE | Noted: 2021-01-01

## 2021-08-01 NOTE — REVIEW OF SYSTEMS
[Feeling Fatigued] : feeling fatigued [Weight Loss (___ Lbs)] : [unfilled] ~Ulb weight loss [SOB] : shortness of breath [Dyspnea on exertion] : dyspnea during exertion [Lower Ext Edema] : lower extremity edema [Blood in Stool] : blood in stool [Negative] : Respiratory [Fever] : no fever [Headache] : no headache [Chills] : no chills [Leg Claudication] : no intermittent leg claudication [Syncope] : no syncope [Vomiting] : no vomiting [Heartburn] : no heartburn [Dysphagia] : no dysphagia [Diarrhea] : diarrhea [Constipation] : no constipation [Joint Pain] : no joint pain [Joint Swelling] : no joint swelling [Joint Stiffness] : no joint stiffness [Myalgia] : no myalgia [Dizziness] : no dizziness [Tremor] : no tremor was seen [Numbness (Hypoesthesia)] : no numbness [Convulsions] : no convulsions [Weakness] : no weakness

## 2021-08-01 NOTE — PROGRESS NOTE ADULT - PROBLEM SELECTOR PLAN 1
Continue on aldactone 12.5 mg po Daily   Supplement Potassium to maintain K+ level > 4.5    increase activity as tolerated  discharge planning- rehab -

## 2021-08-01 NOTE — CONSULT NOTE ADULT - ASSESSMENT
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  She is status post acute dialysis, MICU stay and recent TAVR. Cath results (pre TAVR) this admission note elevated PCWP and elevated PA pressure Mean 37, with evidence of right heart failure, secondary to pulmonary hypertension, which is mostly secondary to left heart disease.  As valve has been repaired this may improve.  Patient denies any acute change in her breathing,  States she has had difficulty for months and that it is related to her heart.  Lung masses may be contributing.  Given her age, comorbidities and recent acute illness, I would not pursue diagnostic workup at this time.    REc:    Continue diuresis as her heart failure team.  Continue to decrease oxygen as tolerated.  Will likely require oxygen at rehab.  On follow up can see Dr. Ponce in the office for further evaluation and workup.

## 2021-08-01 NOTE — REASON FOR VISIT
[Structural Heart and Valve Disease] : structural heart and valve disease [Family Member] : family member [FreeTextEntry3] : Michael [FreeTextEntry1] : The patient is an 86 y/o female, with a PMHx of AFib, CAD with PCI, Type 2 DM, Colon Ca,OPen pedal wound and Aortic Stenosis. She was referred By Dr. Hodges for evaluation of her Aortic Stenosis, and possible TAVR procedure.\par Today she presents with her son, stating she feels lousy. She complains in worsening SOB and GARCIA. Simple activities like putting on socks causes her to have SOB. She will rest for about 5 minutes prior to feeling any relief. She states this has rapidly gotten worse over the past few weeks. She denies any chest discomfort, or feeling dizzy (except when she notes she isn't drinking). She does have pedal edema, which she states has gotten better over the past 2 weeks. She states she had weeping edema of the lower extremities, which has gotten better since diuresing. She has no issues with waking up at night because of breathing, but she does get up often to urinate. She sleeps with one pillow on her stomach. She lives alone, and has good family support. She does have a cleaning lady come in, and has recently started using her walker more because of symptoms. She has had no admissions to the hospital for Heart Failure over the past year. She completed her COVID vaccination in March, and never had COVID.

## 2021-08-01 NOTE — ASSESSMENT
[FreeTextEntry1] : Critical aortic valve stenosis/severe tricuspid valve regurgitation\par --Discussion was had with the patient and her family concerning findings on the transthoracic echocardiogram study that demonstrated critical aortic valve stenosis and severe functional tricuspid valve regurgitation.  The different treatment options were explained occluding medical therapy, surgery and percutaneous interventions.  Due to the patient age and comorbidities she is considered to be an appropriate candidate to be worked up for TAVR.  Indications and details of the TAVR procedure reviewed.  Benefits and risk were gone over.  Risks include but not limited to infection, bleeding, arrhythmia, TIA/stroke, hemodynamic instability, vascular injury, need for urgent surgery and death.\par --The necessary work-up for TAVR was explained including PFTs, carotid ultrasound, cardiac catheterization and TAVR CTA.  Patient for the studies were reviewed.  Benefits and risks were gone over.  Contrast will be administered during the cardiac catheterization, TAVR CTA and TAVR procedure.  Risks of contrast administration was explained.\par --There was concern that the patient has underlying bronchoalveolar carcinoma.  We will need to reach out to the patient pulmonologist to assess the patient prognosis.  If the prognosis is felt to be less than 2 years it is not recommended that the patient proceed with the TAVR procedure.\par --Patient has multiple risk factors that are contributing to her clinical decompensation including obesity, atrial fibrillation, coronary artery disease, diastolic dysfunction, deconditioned status and severe mixed valvular disease.  Due to the patient's comorbidities it is not clear to what degree of benefit the patient may derive following any percutaneous intervention.  This was discussed with the patient and her family who understand the limitations of proceeding with the procedure.  Stool guiac positive.  Hg 7.8/7.4 in June and April was 10.  Patient has a history of colon cancer.\par --Due to worsening anemia (anticoagulation/Pradaxa held) would recommend patient be seen by GI for medical assessment/optimization prior to proceeding with any intervention.\par --Based upon how the patient clinically does following the TAVR procedure she will then be reassessed as an outpatient to determine if she is an appropriate candidate to be worked up for transcatheter [id number] repair of her tricuspid valve/clinical study.\par \par All questions and concerns of the patient and her family were addressed.\par \par Findings and plan were discussed with cardiology/Dr. Hodges.

## 2021-08-01 NOTE — PROGRESS NOTE ADULT - ATTENDING COMMENTS
I have seen this patient with the fellow and agree with their assessment and plan. I was physically present for significant portions of the evaluation and management (E/M) service provided.  I agree with the above history, physical, and plan which I have reviewed and edited where appropriate.    Na was stable till this am on oral diuretics  Repeat Na first, if truly low, can give 2% bolus and continue IV diuretics with it as chloride is low and can make diuresis a bit resistant.  Goal Na tomorrow 130ish  Part of the hyponatremia may be SIADH from the cancer vs not all volume overload  May benefit from urena 30gm BID as well as crt downtrending. If starting urena monitor BUN on it closely    Luis Fernando Hamilton MD  Cell   Pager   Office     d/w with CT surg and CHF team

## 2021-08-01 NOTE — PROGRESS NOTE ADULT - SUBJECTIVE AND OBJECTIVE BOX
[facility] DIVISION OF KIDNEY DISEASES AND HYPERTENSION -- FOLLOW UP NOTE  --------------------------------------------------------------------------------  If any questions, please feel free to contact me  NS pager: [phone], [facility]: [phone]  Vicente Cortes M.D.  Nephrology Fellow    (After 5 pm or on weekends please page the on-call fellow)  --------------------------------------------------------------------------------  Chief Complaint:  Patient is a 87y old  Female who presents with a chief complaint of urgent HD (31 Jul 2021 20:51)    24 hour events/subjective:  Patient seen and examined at bedside, in NAD, today Na dropped to 126 this morning  and Cr stable. non oliguric U/O: 900cc in the past 24hrs. Vitals/labs/imaging reviewed         PAST HISTORY  --------------------------------------------------------------------------------  No significant changes to PMH, PSH, FHx, SHx, unless otherwise noted    ALLERGIES & MEDICATIONS  --------------------------------------------------------------------------------  Allergies    No Known Allergies    Intolerances      Standing Inpatient Medications  aspirin  chewable 81 milliGRAM(s) Oral daily  atorvastatin 40 milliGRAM(s) Oral at bedtime  buMETAnide 4 milliGRAM(s) Oral daily  ciprofloxacin     Tablet 250 milliGRAM(s) Oral every 12 hours  dextrose 40% Gel 15 Gram(s) Oral once  dextrose 5%. 1000 milliLiter(s) IV Continuous <Continuous>  dextrose 5%. 1000 milliLiter(s) IV Continuous <Continuous>  dextrose 50% Injectable 25 Gram(s) IV Push once  dextrose 50% Injectable 12.5 Gram(s) IV Push once  dextrose 50% Injectable 25 Gram(s) IV Push once  ferrous    sulfate 325 milliGRAM(s) Oral daily  glucagon  Injectable 1 milliGRAM(s) IntraMuscular once  heparin   Injectable 5000 Unit(s) SubCutaneous every 8 hours  insulin glargine Injectable (LANTUS) 6 Unit(s) SubCutaneous at bedtime  insulin lispro (ADMELOG) corrective regimen sliding scale   SubCutaneous three times a day before meals  insulin lispro Injectable (ADMELOG) 3 Unit(s) SubCutaneous before breakfast  insulin lispro Injectable (ADMELOG) 3 Unit(s) SubCutaneous before lunch  insulin lispro Injectable (ADMELOG) 3 Unit(s) SubCutaneous before dinner  metoprolol succinate ER 12.5 milliGRAM(s) Oral daily  polyethylene glycol 3350 17 Gram(s) Oral daily  spironolactone 12.5 milliGRAM(s) Oral daily    PRN Inpatient Medications  artificial  tears Solution 1 Drop(s) Both EYES every 12 hours PRN  zolpidem 5 milliGRAM(s) Oral at bedtime PRN      REVIEW OF SYSTEMS  --------------------------------------------------------------------------------  Gen: No fevers/chills  Skin: No rashes  Head/Eyes/Ears: Normal hearing,   Respiratory: No dyspnea, cough  CV: No chest pain  GI: No abdominal pain, diarrhea  : No dysuria, hematuria  MSK: +LE edema  All other systems were reviewed and are negative, except as noted.    VITALS/PHYSICAL EXAM  --------------------------------------------------------------------------------  T(C): 36.6 (08-01-21 @ 05:45), Max: 36.7 (07-31-21 @ 12:01)  HR: 80 (08-01-21 @ 05:45) (77 - 80)  BP: 111/63 (08-01-21 @ 05:45) (111/63 - 125/81)  RR: 18 (08-01-21 @ 05:45) (18 - 18)  SpO2: 95% (08-01-21 @ 05:45) (92% - 99%)  Wt(kg): --        07-31-21 @ 07:01  -  08-01-21 @ 07:00  --------------------------------------------------------  IN: 1410 mL / OUT: 900 mL / NET: 510 mL    08-01-21 @ 07:01  -  08-01-21 @ 10:27  --------------------------------------------------------  IN: 300 mL / OUT: 0 mL / NET: 300 mL        Physical Exam:  	Gen: NAD  	HEENT: MMM  	Pulm: CTA b/l  	CV: S1S2, RRR  	Abd: Soft, +BS, ND   	Ext: 2+ LE edema B/L  	Neuro: Awake, A&O x3  	Skin: Warm and dry      LABS/STUDIES  --------------------------------------------------------------------------------              8.5    11.57 >-----------<  324      [08-01-21 @ 06:45]              27.4     124  |  83  |  51  ----------------------------<  165      [08-01-21 @ 06:45]  4.9   |  28  |  1.26        Ca     10.0     [08-01-21 @ 06:45]      Mg     2.8     [08-01-21 @ 06:45]            Creatinine Trend:  SCr 1.26 [08-01 @ 06:45]  SCr 1.42 [07-31 @ 06:39]  SCr 1.18 [07-30 @ 09:25]  SCr 1.34 [07-30 @ 06:00]  SCr 1.11 [07-29 @ 02:06]    Urinalysis - [07-26-21 @ 12:20]      Color Yellow / Appearance Clear / SG 1.014 / pH 5.5      Gluc 200 mg/dL / Ketone Negative  / Bili Negative / Urobili Negative       Blood Negative / Protein Negative / Leuk Est Large / Nitrite Negative      RBC 3 / WBC 19 / Hyaline 0 / Gran  / Sq Epi  / Non Sq Epi 0 / Bacteria Few      TSH 6.37      [07-21-21 @ 14:10]    HBsAb <3.0      [07-09-21 @ 03:47]  HBsAg Nonreact      [07-09-21 @ 03:47]  HBcAb Nonreact      [07-09-21 @ 03:47]  HCV 0.14, Nonreact      [07-09-21 @ 03:47]

## 2021-08-01 NOTE — PROGRESS NOTE ADULT - SUBJECTIVE AND OBJECTIVE BOX
VITAL SIGNS    afib  80  Telemetry:      Vital Signs Last 24 Hrs  T(C): 36.8 (21 @ 12:37), Max: 36.8 (21 @ 12:37)  T(F): 98.2 (21 @ 12:37), Max: 98.2 (21 @ 12:37)  HR: 73 (21 @ 12:37) (73 - 80)  BP: 122/70 (21 @ 12:37) (111/63 - 125/81)  RR: 18 (21 @ 12:37) (18 - 18)  SpO2: 93% (21 @ 12:37) (93% - 99%)                   Daily     Daily Weight in k (01 Aug 2021 07:12)        CAPILLARY BLOOD GLUCOSE      POCT Blood Glucose.: 239 mg/dL (01 Aug 2021 11:37)  POCT Blood Glucose.: 194 mg/dL (01 Aug 2021 07:57)  POCT Blood Glucose.: 194 mg/dL (2021 21:43)  POCT Blood Glucose.: 161 mg/dL (2021 16:27)  POCT Blood Glucose.: 249 mg/dL (2021 13:46)                         PHYSICAL EXAM  s   No chest pain  No  SOB'  Neurology: alert and oriented x 3, moves all extremities with no defecits  CV :  RRR    Lungs:   CTA B/L  Abdomen: soft, nontender, nondistended, positive bowel sounds, last bowel movement   Extremities:    + 2 pedal edema

## 2021-08-01 NOTE — CHART NOTE - NSCHARTNOTEFT_GEN_A_CORE
Rpt Na is 125  Needs more diuresis and rpt Na  If still <127, can start ure-na 15gm BID and increase to 30gm BID as some of this may be cancer related and not all CHF

## 2021-08-01 NOTE — PHYSICAL EXAM
[No Rub] : no rub [Murmur] : murmur [Clear Lung Fields] : clear lung fields [Good Air Entry] : good air entry [Soft] : abdomen soft [Non Tender] : non-tender [No Masses/organomegaly] : no masses/organomegaly [Edema ___] : edema [unfilled] [Normal Bowel Sounds] : normal bowel sounds [Normal] : moves all extremities, no focal deficits, normal speech [Alert and Oriented] : alert and oriented [Well Developed] : well developed [Lethargic] : lethargic [Obese] : obese [de-identified] : Visible SOB after ambulating [de-identified] : IV/VI Holosystolic murmur [de-identified] : Irregular irregular [de-identified] : Mild SOB, getting better over time [de-identified] : Ambulates with walker [de-identified] : Right inner calf with small non healing wound. Area looks erythematous

## 2021-08-01 NOTE — PROGRESS NOTE ADULT - PROBLEM SELECTOR PLAN 1
Pt. with hyponatremia to 127 in setting of volume overload.   Last ECHO 7/26 Small pericardial effusion, There is no significant inflow variation measured across the mitral valve, but it is significant across the tricuspid valve. The IVC is enlarged (3.36cm)  and not collapsable No evidence of right atrial or right ventricular collapse.    s/p Tolvaptan 15mg x1 on 7/28 : with no change in sodium   on bumex po 4mg bid, s/p extra dose yesterday IV   this morning with worsening Na to 126  please repeat BMP, if persistently low Na consider bolus of Hypertonic fluid  Kidney function is stable  strict I/Os  monitor kidney function and urine output   Monitor serum sodium Q12 hours.   Do not correct serum sodium >6-8 meq/day. Pt. with hyponatremia to 127 in setting of volume overload.   Last ECHO 7/26 Small pericardial effusion, There is no significant inflow variation measured across the mitral valve, but it is significant across the tricuspid valve. The IVC is enlarged (3.36cm)  and not collapsable No evidence of right atrial or right ventricular collapse.    s/p Tolvaptan 15mg x1 on 7/28 : with no change in sodium   on bumex po 4mg bid, s/p extra dose yesterday IV   this morning with worsening Na to 126  please repeat BMP, if persistently low Na consider bolus of Hypertonic fluid bolus of 2%  Kidney function is stable  strict I/Os  monitor kidney function and urine output   Monitor serum sodium Q12 hours.   Do not correct serum sodium >6-8 meq/day.

## 2021-08-01 NOTE — CONSULT NOTE ADULT - SUBJECTIVE AND OBJECTIVE BOX
CHIEF COMPLAINT: admitted with acute renal failure and severe acidosis.    HPI: 87F PMH CAD w/ prior PCI, DM2, Afib (not on AC's, dc'd pradaxa 1mo ago as pt w/u for anemia), Metastatic bronchoalveolar cancer, Colon Ca (about 25y ago), HF on 80mg lasix qd, recently started on Entresto as an outpatient, presented to the ED with abnormal labs done on 7/7 showing JACQUE with hyperk to >6 mod hemolyzed. On presentation patient with BUN/Cr 79/6.7 mg/dl. K: 6.6 (non hemolyzed). Previous Cr was at 1.26 6/7/21. Also noted with bicarb: 11 pH 7.2 - lactate 7, admitted to MICU for urgent HD, now improved volume status, renal function improving off dialysis. Patient found to have severe AS s/p TAVR 7/22, well tolerated without complications however ongoing respiratory distress. Heart failure team was consulted for optimization of volume status given ongoing respiratory issues improved with uptitration of diuretics.     RHC 7/14 RA 16, PA 58/23/37, PCWP 28 (V 48), Sat 67 CO/CI 6.1/3.6  TTE 7/26/21 - EF 60-65%, TAVR, severe LA enlargement, severe RA enlargement, small pericardial effusion, IVC enlarged (3.6 cm), mod RV dilatation     Pulmonary now consulted for ongoing breathing difficulty.  Patient has diagnosis of metastatic bronchoalveolar cell carcinoma.      PAST MEDICAL & SURGICAL HISTORY:      FAMILY HISTORY:      SOCIAL HISTORY:  Smoking: __ packs x ___ years  EtOH Use:  Marital Status:  Occupation:  Recent Travel:  Country of Birth:  Advance Directives:    Allergies    No Known Allergies    Intolerances        HOME MEDICATIONS:    REVIEW OF SYSTEMS:  Constitutional:   Eyes:  ENT:  CV:  Resp:  GI:  :  MSK:  Integumentary:  Neurological:  Psychiatric:  Endocrine:  Hematologic/Lymphatic:  Allergic/Immunologic:  [ ] All other systems negative  [ ] Unable to assess ROS because ________    OBJECTIVE:  ICU Vital Signs Last 24 Hrs  T(C): 36.6 (01 Aug 2021 05:45), Max: 36.6 (01 Aug 2021 05:45)  T(F): 97.9 (01 Aug 2021 05:45), Max: 97.9 (01 Aug 2021 05:45)  HR: 80 (01 Aug 2021 05:45) (77 - 80)  BP: 111/63 (01 Aug 2021 05:45) (111/63 - 125/81)  BP(mean): --  ABP: --  ABP(mean): --  RR: 18 (01 Aug 2021 05:45) (18 - 18)  SpO2: 95% (01 Aug 2021 05:45) (95% - 99%)        07-31 @ 07:01 - 08-01 @ 07:00  --------------------------------------------------------  IN: 1410 mL / OUT: 900 mL / NET: 510 mL    08-01 @ 07:01 - 08-01 @ 12:27  --------------------------------------------------------  IN: 600 mL / OUT: 0 mL / NET: 600 mL      CAPILLARY BLOOD GLUCOSE      POCT Blood Glucose.: 239 mg/dL (01 Aug 2021 11:37)      PHYSICAL EXAM:  General:   HEENT:   Lymph Nodes:  Neck:   Respiratory:   Cardiovascular:   Abdomen:   Extremities:   Skin:   Neurological:  Psychiatry:    HOSPITAL MEDICATIONS:  MEDICATIONS  (STANDING):  aspirin  chewable 81 milliGRAM(s) Oral daily  atorvastatin 40 milliGRAM(s) Oral at bedtime  buMETAnide 4 milliGRAM(s) Oral daily  ciprofloxacin     Tablet 250 milliGRAM(s) Oral every 12 hours  dextrose 40% Gel 15 Gram(s) Oral once  dextrose 5%. 1000 milliLiter(s) (50 mL/Hr) IV Continuous <Continuous>  dextrose 5%. 1000 milliLiter(s) (100 mL/Hr) IV Continuous <Continuous>  dextrose 50% Injectable 25 Gram(s) IV Push once  dextrose 50% Injectable 12.5 Gram(s) IV Push once  dextrose 50% Injectable 25 Gram(s) IV Push once  ferrous    sulfate 325 milliGRAM(s) Oral daily  glucagon  Injectable 1 milliGRAM(s) IntraMuscular once  heparin   Injectable 5000 Unit(s) SubCutaneous every 8 hours  insulin glargine Injectable (LANTUS) 6 Unit(s) SubCutaneous at bedtime  insulin lispro (ADMELOG) corrective regimen sliding scale   SubCutaneous three times a day before meals  insulin lispro Injectable (ADMELOG) 3 Unit(s) SubCutaneous before breakfast  insulin lispro Injectable (ADMELOG) 3 Unit(s) SubCutaneous before lunch  insulin lispro Injectable (ADMELOG) 3 Unit(s) SubCutaneous before dinner  metoprolol succinate ER 12.5 milliGRAM(s) Oral daily  polyethylene glycol 3350 17 Gram(s) Oral daily  spironolactone 12.5 milliGRAM(s) Oral daily    MEDICATIONS  (PRN):  artificial  tears Solution 1 Drop(s) Both EYES every 12 hours PRN Dry Eyes  zolpidem 5 milliGRAM(s) Oral at bedtime PRN Insomnia      LABS:                        8.5    11.57 )-----------( 324      ( 01 Aug 2021 06:45 )             27.4     08-01    124<L>  |  83<L>  |  51<H>  ----------------------------<  165<H>  4.9   |  28  |  1.26    Ca    10.0      01 Aug 2021 06:45  Mg     2.8     08-01                MICROBIOLOGY:     RADIOLOGY:  [ ] Reviewed and interpreted by me    PULMONARY FUNCTION TESTS:    EKG: CHIEF COMPLAINT: admitted with acute renal failure and severe acidosis.    HPI: 87F PMH CAD w/ prior PCI, DM2, Afib (not on AC's, dc'd pradaxa 1mo ago as pt w/u for anemia), Metastatic bronchoalveolar cancer, Colon Ca (about 25y ago), HF on 80mg lasix qd, recently started on Entresto as an outpatient, presented to the ED with abnormal labs done on 7/7 showing JACQUE with hyperk to >6 mod hemolyzed. On presentation patient with BUN/Cr 79/6.7 mg/dl. K: 6.6 (non hemolyzed). Previous Cr was at 1.26 6/7/21. Also noted with bicarb: 11 pH 7.2 - lactate 7, admitted to MICU for urgent HD, now improved volume status, renal function improving off dialysis. Patient found to have severe AS s/p TAVR 7/22, well tolerated without complications however ongoing respiratory distress. Heart failure team was consulted for optimization of volume status given ongoing respiratory issues improved with uptitration of diuretics.     RHC 7/14 RA 16, PA 58/23/37, PCWP 28 (V 48), Sat 67 CO/CI 6.1/3.6  TTE 7/26/21 - EF 60-65%, TAVR, severe LA enlargement, severe RA enlargement, small pericardial effusion, IVC enlarged (3.6 cm), mod RV dilatation     Pulmonary now consulted for ongoing breathing difficulty.  Patient has diagnosis of metastatic bronchoalveolar cell carcinoma.  She reports 50 year history of tobacco use, 1/2 PPC and quit 26 years ago.  Denies history of asthma or COPD has never used inhalers.  Reports that her internist many years ago diagnosed her with a pulmonary problem (lesions in lungs) but that she never had a biopsy or diagnosis of this problem.  I spoke with her son Reji who confirms presentce of CT abnormalities for at least 8 years and that his Mom did not want to pursue a diagnosis or treatment.  she has not used oxygen previously.      SOCIAL HISTORY:  Smoking: _0.5_ packs x 50___ years  uit 26 years ago.      Allergies    No Known Allergies    Intolerances        HOME MEDICATIONS:    REVIEW OF SYSTEMS:  Resp:reports ongoing dyspnea which is stable, denies cough, chest discomfort, wheezing  [x ] All other systems negative  [ ] Unable to assess ROS because ________    OBJECTIVE:  ICU Vital Signs Last 24 Hrs  T(C): 36.6 (01 Aug 2021 05:45), Max: 36.6 (01 Aug 2021 05:45)  T(F): 97.9 (01 Aug 2021 05:45), Max: 97.9 (01 Aug 2021 05:45)  HR: 80 (01 Aug 2021 05:45) (77 - 80)  BP: 111/63 (01 Aug 2021 05:45) (111/63 - 125/81)  BP(mean): --  ABP: --  ABP(mean): --  RR: 18 (01 Aug 2021 05:45) (18 - 18)  SpO2: 95% (01 Aug 2021 05:45) (95% - 99%)        07-31 @ 07:01 - 08-01 @ 07:00  --------------------------------------------------------  IN: 1410 mL / OUT: 900 mL / NET: 510 mL    08-01 @ 07:01 - 08-01 @ 12:27  --------------------------------------------------------  IN: 600 mL / OUT: 0 mL / NET: 600 mL      CAPILLARY BLOOD GLUCOSE      POCT Blood Glucose.: 239 mg/dL (01 Aug 2021 11:37)      PHYSICAL EXAM:  General: NAD, appears comfortable  HEENT:   Lymph Nodes:  Neck:   Respiratory: tubular BS in left base.  Otherwise clear, without rales or wheezes  Cardiovascular: RRR normal S1S2  Abdomen:   Extremities: 2-edema  Skin:   Neurological:  Psychiatry: alert and oriented    HOSPITAL MEDICATIONS:  MEDICATIONS  (STANDING):  aspirin  chewable 81 milliGRAM(s) Oral daily  atorvastatin 40 milliGRAM(s) Oral at bedtime  buMETAnide 4 milliGRAM(s) Oral daily  ciprofloxacin     Tablet 250 milliGRAM(s) Oral every 12 hours  dextrose 40% Gel 15 Gram(s) Oral once  dextrose 5%. 1000 milliLiter(s) (50 mL/Hr) IV Continuous <Continuous>  dextrose 5%. 1000 milliLiter(s) (100 mL/Hr) IV Continuous <Continuous>  dextrose 50% Injectable 25 Gram(s) IV Push once  dextrose 50% Injectable 12.5 Gram(s) IV Push once  dextrose 50% Injectable 25 Gram(s) IV Push once  ferrous    sulfate 325 milliGRAM(s) Oral daily  glucagon  Injectable 1 milliGRAM(s) IntraMuscular once  heparin   Injectable 5000 Unit(s) SubCutaneous every 8 hours  insulin glargine Injectable (LANTUS) 6 Unit(s) SubCutaneous at bedtime  insulin lispro (ADMELOG) corrective regimen sliding scale   SubCutaneous three times a day before meals  insulin lispro Injectable (ADMELOG) 3 Unit(s) SubCutaneous before breakfast  insulin lispro Injectable (ADMELOG) 3 Unit(s) SubCutaneous before lunch  insulin lispro Injectable (ADMELOG) 3 Unit(s) SubCutaneous before dinner  metoprolol succinate ER 12.5 milliGRAM(s) Oral daily  polyethylene glycol 3350 17 Gram(s) Oral daily  spironolactone 12.5 milliGRAM(s) Oral daily    MEDICATIONS  (PRN):  artificial  tears Solution 1 Drop(s) Both EYES every 12 hours PRN Dry Eyes  zolpidem 5 milliGRAM(s) Oral at bedtime PRN Insomnia      LABS:                        8.5    11.57 )-----------( 324      ( 01 Aug 2021 06:45 )             27.4     08-01    124<L>  |  83<L>  |  51<H>  ----------------------------<  165<H>  4.9   |  28  |  1.26    Ca    10.0      01 Aug 2021 06:45  Mg     2.8     08-01    MICROBIOLOGY:     RADIOLOGY:  [x ] Reviewed and interpreted by me- cardiac CT reviewed focusing on lung windows.  She has bilateral masses- one in RML measuring 3.2 x 2.6cm and LLL measuring 5x 2.3 and several scattered nodules in both lungs measuring up to 7mm.  There is no emphysema or pneumonia.  This was personally reviewed by me.    -

## 2021-08-01 NOTE — PROGRESS NOTE ADULT - ASSESSMENT
87F PMH CAD w/stents, DM2, Afib (not on AC), Colon Ca (about 25y ago), severe AS, HF , recently started on Entresto, admitted for JACQUE and Metformin-associated lactic acidosis, admitted to MICU s/p urgent HD session but now JACQUE resolving and now undergoing TAVR workup.    7/22 Underwent TAVR via right femoral #22 Heike  recovered in CRS post op course unremarkable Junctional rhythm  by EKG  700 cc fluid given for hypotension. Stabilize transferred to [address] [place] [address]- received in afib-  groin sites benign + DP son at bedside ECHO in am EKG in am  Likely discharge on Saturday with [facility] son reports haven given rehab  choices to case management.  7/23 pending post TAVR echo. Anticipate discharge to rehab Monday.  7/24 VSS maintaining Afib 70's . No further junctional episodes of rhythm.   7/25 Transthoracic Echocardiogram demonstrates Minimal aortic regurgitation-Tethered tricuspid valve, severe tricuspid regurgitation . No evidence of pericardial effusion.   7/26 VSS - pt c/o slight SOB - echo shows small new pericardial effusion.  CXR-done- crackles bases - on IV bumex tid - diuresing - O2 N/c in place -rounds made w/ Dr. Pruitt, Dr. Johns & structural heart team - new SOB - will get LE dopplers to r/o dvt - start heaprin gtt, change to bumex gtt 0.5mg /hr- give zaroxlyn 10mg iv x1 , WBC 16 from 8 - ck u/a c&s.  transfer to SDU for closer monitoring.    7/27 OOB to chair  bumex gtt   heparin gtt  neg  800 cc   24 hrs   chest xray   bl base pl effusion  7/28 VSS more tachypnic today.  CXR + atelectasis.   Na 127 Dr Ramos called for input.  -658cc/24 hrs  7/29 VSS sob improved.  -236cc/24hrs.  Na 127 received 1 dose samsca 15x1.  K 4.6 .  Hep gtt dc'd  7/30 VSS;  bumex changed to 4 mg po bid and aldactone initiated as per CHF team; change toprol 12.5 qd; supplement hypokalemia; no anticoagulation for chronic afib secondary to hx GI bleed as per Dr. Pruitt  7/31 (+) tachypnea and dyspnea --> Per CHF Dr. Kee will call Pulmonary for consults (Dr. Ponce).  Bumex  4 mg IV x 1 this evening and them decreased Bumex 4 mg PO daily to start in AM.  Continue with current medication regimen. Supplement Potassium to maintain K+ level > 4.5     8/1   OOB to chair,     na  124,  FR   covid ordered   Pulm  renal and HF following

## 2021-08-01 NOTE — CARDIOLOGY SUMMARY
[de-identified] : 6/7/21: Avtar [de-identified] : 6/24/21: ALEXA 0.5, mGr  49, pGr 74, AoV 4.3, EF 55-60%, Mild MR, Severe TR

## 2021-08-02 NOTE — PROGRESS NOTE ADULT - ATTENDING COMMENTS
No acute events reported overnight.  The patient reports that she feels markedly weak/fatigued.  Ambulated a short distance which was limited by need to urinate and dyspnea.  She is frustrated by her lack of progress.  On examination the patient has decreased breath sounds in her lung bases with 1 out of 6 holosystolic murmur at left upper sternal border with 1+ bilateral lower extremity edema.    Discussion was had with the patient concerning her concomitant medical issues which are likely contributing to her failure to thrive following her TAVR procedure.    This includes her severe tricuspid valve regurgitation, acute on chronic heart failure with preserved ejection fraction, bronchoalveolar carcinoma, cardiorenal syndrome, atrial fibrillation, anemia and hyponatremia.    Patient volume status is hard to assess based upon examination.  May benefit from repeat right heart catheterization.  Discussions were previously had with the patient and her family concerning her concomitant severe valvular disease/tricuspid valve regurgitation.    Currently on Bumex 4 mg daily and Ure-Na 15 mg twice daily.    Continue metoprolol succinate 12.5 mg daily and spironolactone 12.5 mg daily.    Continue atorvastatin 40 mg daily.    All questions and concerns of the patient were addressed.    Findings and plan were discussed with cardiac surgery and structural heart team.

## 2021-08-02 NOTE — PROGRESS NOTE ADULT - SUBJECTIVE AND OBJECTIVE BOX
HPI:  Patient seen and examined at bedside on .  Na has fallen again.  Patient remains deconditioned and dyspneic with minimal exertion.     Review Of Systems:           Respiratory: No shortness of breath, cough, or wheezing  Cardiovascular: No chest pain or palpitations  10 point review of systems is otherwise negative except as mentioned above        Medications:  artificial  tears Solution 1 Drop(s) Both EYES every 12 hours PRN  aspirin  chewable 81 milliGRAM(s) Oral daily  atorvastatin 40 milliGRAM(s) Oral at bedtime  budesonide  80 MICROgram(s)/formoterol 4.5 MICROgram(s) Inhaler 2 Puff(s) Inhalation two times a day  buMETAnide 4 milliGRAM(s) Oral daily  ciprofloxacin     Tablet 250 milliGRAM(s) Oral every 12 hours  dextrose 40% Gel 15 Gram(s) Oral once  dextrose 5%. 1000 milliLiter(s) IV Continuous <Continuous>  dextrose 5%. 1000 milliLiter(s) IV Continuous <Continuous>  dextrose 50% Injectable 25 Gram(s) IV Push once  dextrose 50% Injectable 12.5 Gram(s) IV Push once  dextrose 50% Injectable 25 Gram(s) IV Push once  ferrous    sulfate 325 milliGRAM(s) Oral daily  glucagon  Injectable 1 milliGRAM(s) IntraMuscular once  heparin   Injectable 5000 Unit(s) SubCutaneous every 8 hours  insulin glargine Injectable (LANTUS) 6 Unit(s) SubCutaneous at bedtime  insulin lispro (ADMELOG) corrective regimen sliding scale   SubCutaneous three times a day before meals  insulin lispro Injectable (ADMELOG) 3 Unit(s) SubCutaneous before dinner  insulin lispro Injectable (ADMELOG) 3 Unit(s) SubCutaneous before breakfast  insulin lispro Injectable (ADMELOG) 3 Unit(s) SubCutaneous before lunch  metoprolol succinate ER 12.5 milliGRAM(s) Oral daily  polyethylene glycol 3350 17 Gram(s) Oral daily  spironolactone 12.5 milliGRAM(s) Oral daily  tiotropium 18 MICROgram(s) Capsule 1 Capsule(s) Inhalation daily  urea Oral Powder 15 Gram(s) Oral two times a day  zolpidem 5 milliGRAM(s) Oral at bedtime PRN    PAST MEDICAL & SURGICAL HISTORY:    Vitals:  T(C): 36.7 (21 @ 19:00), Max: 36.7 (21 @ 05:12)  HR: 85 (21 @ 19:00) (81 - 85)  BP: 120/56 (21 @ 19:00) (120/56 - 139/80)  BP(mean): --  RR: 18 (21 @ 19:00) (18 - 20)  SpO2: 99% (21 @ 19:00) (95% - 99%)  Wt(kg): --  Daily     Daily Weight in k (02 Aug 2021 08:38)  I&O's Summary    01 Aug 2021 07:01  -  02 Aug 2021 07:00  --------------------------------------------------------  IN: 1260 mL / OUT: 650 mL / NET: 610 mL    02 Aug 2021 07:01  -  02 Aug 2021 20:49  --------------------------------------------------------  IN: 600 mL / OUT: 200 mL / NET: 400 mL        Physical Exam:  Appearance: Normal, well groomed, NAD  Eyes: PERRLA, EOMI, pink conjunctiva, no scleral icterus   HENT: Normal oral mucosa  Cardiovascular: RRR, S1, S2, NO systolic murmur at base  Procedural Access Site: Clean, dry, intact, without hematoma  Respiratory: Clear to auscultation bilaterally  Gastrointestinal: Soft, non-tender, non-distended, BS+  Musculoskeletal: No clubbing or joint deformity   Neurologic: No focal weakness  Lymphatic: No lymphadenopathy  Psychiatry: AAOx3 with appropriate mood and affect  Skin: No rashes, ecchymoses, or cyanosis                          8.2    11.53 )-----------( 370      ( 02 Aug 2021 09:20 )             27.3     08-02    126<L>  |  82<L>  |  65<H>  ----------------------------<  198<H>  4.3   |  34<H>  |  1.28    Ca    9.7      02 Aug 2021 09:20  Mg     2.8                 Serum Pro-Brain Natriuretic Peptide: 2398 pg/mL ( @ 09:25)      Cardiovascular Diagnostic Testing:  ECG: rate controlled AFib    Imaging: < from: CT Heart without Coronaries w/ IV Cont (21 @ 08:51) >  FINDINGS:    Non-Coronary:    6 mm hypodense nodule within the right lobe of the thyroid gland. No enlarged axillary, mediastinal lymph nodes. No pleural effusions.    Evaluation of the lungs demonstrate left lower lobe masslike opacity on the part of which measures about 5 x 2.3 cm extending to the left lower lobe hilum with associated left lower lobe volume loss related to some superimposed atelectasis. Multiple left lung pulmonary nodule along the pleural surface measuring up to about 7 mm. Right middle lobe 3.2 x 2.6 cm mass associated with the minor fissure which contains a few small nodules measuring up to 7 mm. The right middle lobe mass has a cystic component along its inferior aspect.    Subcentimeter hypodensity within the liver is too small to characterize. The gallbladder, spleen, adrenal glands are unremarkable. Few pancreatic hypodensities are noted with the largest measuring about 1.5 cm on image 84 of series 19. Bilateral renal cysts.    Urinary bladder is normal. The uterus and adnexa are within normal limits. Status post rectal surgery with postoperative changes. No bowel obstruction or medial air. Appendix is normal.    Degenerative changes of the spine. Moderate to severe loss of height of the T5 and severe loss of height of the T6 vertebral bodies are of indeterminate age.    The heart is enlarged.  The left and right atria are severely enlarged.  There is mitral annular calcification and calcification of the mitral valve leaflets.   There is reduced contrast opacification of the left atrial appendage that resolves on delay imaging suggestive of mixing artifact.    The ascending aorta is mildly calcified. The aortic arch and the descending aorta are severely calcified.   The main pulmonary artery measures approximately 26.4 mm at the level of the ascending aorta.   The right and left pulmonary arteries appear enlarged and measure approximately 29.4 mm and 25.4 mm, respectively.    The aortic valve is calcified. The calcium score of the aortic valve is 1870.    Coronary:  Coronary arterial calcifications are noted; however, this studywas not optimized for evaluation of the coronary arteries.  Please refer to cardiac catheterization performed as part of pre-TAVR work-up.    Aortic Root Measurements    Major aortic annulus diameter (systole, mm): 25.2  Perpendicular minor aortic annulus diameter (systole, mm): 19.1  Aortic annulus perimeter (systole, mm): 76.6  Aortic annulus area (systole, mm2): 438  LVOT minimum diameter (systole, mm): 19.6  LVOT 90 cross (systole, mm): 26.4  LVOT calcification:  Severe  Distance from Aortic annulus to Left Main coronary artery (diastole, mm): 13.2  Distance from Aortic annulus to Right Coronary artery (diastole, mm): 16.7  Sinus of Valsalva diameter, Right coronary (diastole, mm): 29.4  Sinus of Valsalva diameter, Left coronary (diastole, mm): 31.0  Sinus of Valsalva diameter, Non coronary (diastole, mm): 28.3  Diameter at the sinotubular junction (diastole, mm): 26.5 x 26.5  Maximum ascending aorta diameter at 40 mm above annulus (diastole, mm): 32.3  Aortic root angulation (diastole, degrees): 48.4  Aortic root calcification: Moderate-to-severe    Abdominal Aorta:        Minimum lumen diameter (MLD) (mm): 12.8        Diameter perpendicular to MLD (mm): 13.2  Left Femoral:        Minimum Lumen Diameter (mm): 7.2        Diameter perpendicular to MLD (mm): 8.1        Tortuosity: Mild        Calcification: Mild  Right Femoral:        Minimum Lumen Diameter (mm): 6.5        Diameter perpendicular to MLD (mm): 7.1        Tortuosity: Mild        Calcification: Mild  Left External Iliac:        Minimum Lumen Diameter (mm): 6.9        Diameter perpendicular to MLD (mm): 7.5        Tortuosity: Moderate        Calcification: Mild  Left Common Iliac:         Minimum Lumen Diameter (mm): 4.5        Diameter perpendicular to MLD (mm): 8.0        Tortuosity: Severe        Calcification: Severe  Right External Iliac:        Minimum Lumen Diameter (mm): 5.0        Diameter perpendicular to MLD (mm): 7.4        Tortuosity: Moderate        Calcification: Mild  Right Common Iliac:    Minimum Lumen Diameter (mm): 7.8        Diameter perpendicular to MLD (mm): 8.4        Tortuosity: Moderate        Calcification: Moderate  L Subclavian/Axillary: Not well visualized due to motion artifact        Minimum Lumen Diameter (mm): 5.6      Diameter perpendicular to MLD (mm): 6.2        Tortuosity: Mild        Calcification: Mild  R Subclavian/Axillary:        Minimum Lumen Diameter (mm): 4.3        Diameter perpendicular to MLD (mm): 5.1        Tortuosity: Mild        Calcification: Mild    IMPRESSION:  1. Calcified aortic valve. The calcium score of the aortic valve is 1870.  2. Please see the body of the report for aortic and peripheral access vessel measurements.  3. Dominant large left lower lobe mass associated with multiple left lung pleural nodules worrisome for neoplasm with metastatic disease.  4. 3.2 x 2.6 cm right middle lobe mass as described above also is worrisome for neoplasm.  5. A few pancreatic hypodense nodules. Dedicated contrast enhanced pancreatic MRI is recommended for complete evaluation.  6. Compression fracture deformities of the T5 and T6 vertebral bodies as described above are of indeterminate age.      KHLOE HAINES MD; Attending Cardiologist  This document has been electronically signed.  MONICA HANEY MD; Attending Radiologist  This document has been electronically signed. 2021  9:12AM    < end of copied text >    CATH:  < from: Cardiac Cath Lab - Adult (21 @ 17:21) >  CORONARY VESSELS: The coronary circulation is right dominant.  LM:   --  LM: Normal.  LAD:   --  Proximal LAD: There was a discrete 40 % stenosis.  --  Mid LAD: There was a tubular 25 % stenosis.  --  Distal LAD: Normal.  CX:   --  Proximal circumflex: There was a tubular 20 % stenosis.  --  Mid circumflex: There was a diffuse 50 % stenosis.  RCA:   --  Proximal RCA: There was a diffuse 30 % stenosis.  --  Mid RCA: There was a diffuse 20 % in-stent restenosis through the prior  stent.  --  Distal RCA: Normal.  --  RPDA: Normal.  --  RPLS: Normal.  COMPLICATIONS: There were no complications.  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  Normal left main coronary artery  Right dominant system  Elevated right atrial pressure (mRA 16mmHg with a V wave of 23mmHg)  Moderate post-capillary pulmonary hypertension (sPAP 58mmHg, dPAP 23mmHg,  mPAP 37mmHg)  PCWP = 28mmHg with a V wave of 48mmHg  PAsat = 67.1% // AOsat = 99.2% (room air)  [place] // CI = 6.07l/min // 3.56l/min/m2  DIAGNOSTIC RECOMMENDATIONS: Keep right leg straigh for 4 hours following  removal of sheaths  Continue aggressive medical management of coronary artery disease and  associated risk factors  Closely monitor the patients kidney function  Patient being evaluated by the [facility] valve team for TAVR  Findings discussed with Dr. 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  Normal left main coronary artery  Right dominant system  Elevated right atrial pressure (mRA 16mmHg with a V wave of 23mmHg)  Moderate post-capillary pulmonary hypertension (sPAP 58mmHg, dPAP 23mmHg,  mPAP 37mmHg)  PCWP = 28mmHg with a V wave of 48mmHg  PAsat = 67.1% // AOsat = 99.2% (room air)  [place] // CI = 6.07l/min // 3.56l/min/m2  INTERVENTIONAL RECOMMENDATIONS: Keep right leg straigh for 4 hours  following removal of sheaths  Continue aggressive medical management of coronary artery disease and  associated risk factors  Closely monitor the patients kidney function  Patient being evaluated by the [facility] valve team for TAVR  Findings discussed with Dr. Hodges  Prepared and signed by  Alejandro Johns MD  Signed 2021 18:37:05    < end of copied text >

## 2021-08-02 NOTE — PROGRESS NOTE ADULT - SUBJECTIVE AND OBJECTIVE BOX
*****Structural Heart Team*****    Subjective:  The patient is resting in a chair, stating she feels tired today. She ambulated a little earlier today.      PAST MEDICAL & SURGICAL HISTORY:        T(C): 36.4 (08-02-21 @ 11:29), Max: 36.7 (08-02-21 @ 05:12)  HR: 83 (08-02-21 @ 11:29) (74 - 83)  BP: 133/84 (08-02-21 @ 11:29) (112/57 - 139/80)  RR: 20 (08-02-21 @ 12:38) (18 - 20)  SpO2: 95% (08-02-21 @ 12:38) (95% - 97%)  Wt(kg): --  08-01 @ 07:01  -  08-02 @ 07:00  --------------------------------------------------------  IN: 1260 mL / OUT: 650 mL / NET: 610 mL    08-02 @ 07:01  -  08-02 @ 14:43  --------------------------------------------------------  IN: 600 mL / OUT: 0 mL / NET: 600 mL      MEDICATIONS  (STANDING):  aspirin  chewable 81 milliGRAM(s) Oral daily  atorvastatin 40 milliGRAM(s) Oral at bedtime  budesonide  80 MICROgram(s)/formoterol 4.5 MICROgram(s) Inhaler 2 Puff(s) Inhalation two times a day  buMETAnide 4 milliGRAM(s) Oral daily  ciprofloxacin     Tablet 250 milliGRAM(s) Oral every 12 hours  ferrous    sulfate 325 milliGRAM(s) Oral daily  glucagon  Injectable 1 milliGRAM(s) IntraMuscular once  heparin   Injectable 5000 Unit(s) SubCutaneous every 8 hours  insulin glargine Injectable (LANTUS) 6 Unit(s) SubCutaneous at bedtime  insulin lispro (ADMELOG) corrective regimen sliding scale   SubCutaneous three times a day before meals  insulin lispro Injectable (ADMELOG) 3 Unit(s) SubCutaneous before dinner  insulin lispro Injectable (ADMELOG) 3 Unit(s) SubCutaneous before breakfast  insulin lispro Injectable (ADMELOG) 3 Unit(s) SubCutaneous before lunch  metoprolol succinate ER 12.5 milliGRAM(s) Oral daily  polyethylene glycol 3350 17 Gram(s) Oral daily  spironolactone 12.5 milliGRAM(s) Oral daily  tiotropium 18 MICROgram(s) Capsule 1 Capsule(s) Inhalation daily  urea Oral Powder 15 Gram(s) Oral two times a day    MEDICATIONS  (PRN):  artificial  tears Solution 1 Drop(s) Both EYES every 12 hours PRN Dry Eyes  zolpidem 5 milliGRAM(s) Oral at bedtime PRN Insomnia      Review of Symptoms:  Constitutional: Awake, Alert, Follows commands  Respiratory: currently denies  Cardiac: Denies CP, Denies Palpitations  Gastrointestinal: Denies Pain, Denies N/V, tolerating po intake  Vascular: Negative  Extremities: + Edema, No joint pain or swelling  Neurological: Negative  Endocrine: No heat or cold intolerance, No excessive thirst  Heme/Onc: Negative    Exam:  General: A/Ox3, MYRIAM, NAD  HEENT: Supple, No JVD, Trachea midline, no masses  Pulmonary: CTAB, = Chest Excursion, no accessory muscle use  Cor: S1S2, RRR, No murmur noted  ECG: SR  Groin/Wound: Soft, NT. No hematoma  Gastrointestinal: Soft, NT/ND, + Bowel Sounds  Neuro: = motor and sensory B/L, No focal deficits  Vascular: 1+ pulses B/L  Extremities: No joint pain or swelling  Skin: Warm/Dry/Normal color, Normal turgor, no rashes                          8.2    11.53 )-----------( 370      ( 02 Aug 2021 09:20 )             27.3   08-02    126<L>  |  82<L>  |  65<H>  ----------------------------<  198<H>  4.3   |  34<H>  |  1.28    Ca    9.7      02 Aug 2021 09:20  Mg     2.8     08-01        Imaging Reviewed:    Post Op TTE:  < from: Transthoracic Echocardiogram (07.26.21 @ 08:08) >  EF (Visual Estimate): 60-65 %  ------------------------------------------------------------------------  Observations:  Mitral Valve: Mitral annular calcification and calcified  mitral leaflets.  Aortic Valve/Aorta: Transcatheter aortic valve replacement.  Left Atrium: Severe left atrial enlargement.  Left Ventricle: Endocardium not well visualized; grossly  normal left ventricular systolic function.  Right Heart: Severe right atrial enlargement.  Pericardium/Pleura: Small pericardial effusion, largest  pocket measures up to 0.8 cm anterior to the right  ventricle. There is no significant inflow variation  measured across the mitral valve, but it is significant  across the tricuspid valve.  The IVC is enlarged (3.36cm)  and not collapsable No evidence of right atrial or right  ventricular collapse.  Hemodynamic: Estimated right atrial pressure is 8 mm Hg.  ------------------------------------------------------------------------  Conclusions:  1. Small pericardial effusion, largest pocket measures up  to 0.8 cm anterior to the right ventricle. There is no  significant inflow variation measured across the mitral  valve, but it is significant across the tricuspid valve.  The IVC is enlarged (3.36cm)  and not collapsable No  evidence of right atrial or right ventricular collapse.  *** Compared with echocardiogram of 7/23/2021, the  pericardial effusion is new.    < end of copied text >      Assesment/Plan:  87 Female s/p TAVR for Severe Aortic Stenosis,     1.) S/P TAVR: ASA 81 mg po daily, Continue to Monitor Groins. Ambulate as tolerated.   2.) Patient will be discharged on an MCOT for a period of 30days to monitor for rhythm changes post TAVR, which was discussed at length with the patient, and any questions were answered.  3.) Chronic Diastolic Heart FAilure: Monitor I and O's. Continue bumetanide  4.) Walk as tolerated  5.) Discharge Plan: Patient is to follow up with Dr. Pruitt in 1 week post discharge. She should then follow up with the [facility]  in 30 days, with a repeat Transthoracic Echo to be done at that visit.    QUITA Craig  [id number]

## 2021-08-02 NOTE — PROGRESS NOTE ADULT - TIME BILLING
as above:  multifactorial dyspnea-CHF/fluid OL-WHO class 2 PAH (cardiac related), asthma (remote), ? RML tumor, debility, anemia--O2 NC sat above 90%  CHF-as per CHF team-Andreas et al; reconsider RHC/echo repeat; BNP f/up  Renal-HD M/W/F  asthma-symbicort 160 2 bid, spiriva 1 q day, incentive spirometry  abnormal CT c/w CA-? primary lung vs metastatic dz (non smoker)--consider CTNA while her for dx and ? rx (RT etc.), eventual pet/ct  DVT prophylaxis-on A/C  GI-prophylaxis                           PT           respiratory failure--O2 likely upon DC to rehab/home.    Barrington Ponce MD-Pulmonary   [phone]

## 2021-08-02 NOTE — PROGRESS NOTE ADULT - PROBLEM SELECTOR PLAN 1
Continue on aldactone 12.5 mg po Daily   Supplement Potassium to maintain K+ level > 4.5    increase activity as tolerated   ambulance w asst  discharge planning- rehab - when stable

## 2021-08-02 NOTE — PROGRESS NOTE ADULT - SUBJECTIVE AND OBJECTIVE BOX
VITAL SIGNS    Telemetry:     afib 80    Vital Signs Last 24 Hrs  T(C): 36.7 (21 @ 05:12), Max: 36.8 (21 @ 12:37)  T(F): 98.1 (21 @ 05:12), Max: 98.2 (21 @ 12:37)  HR: 81 (21 @ 05:12) (73 - 81)  BP: 139/80 (21 @ 05:12) (112/57 - 139/80)  RR: 18 (21 @ 05:12) (18 - 18)  SpO2: 95% (21 @ 05:12) (93% - 96%)                   Daily     Daily Weight in k (02 Aug 2021 08:38)        CAPILLARY BLOOD GLUCOSE      POCT Blood Glucose.: 185 mg/dL (02 Aug 2021 07:47)  POCT Blood Glucose.: 187 mg/dL (01 Aug 2021 21:48)  POCT Blood Glucose.: 168 mg/dL (01 Aug 2021 16:29)  POCT Blood Glucose.: 239 mg/dL (01 Aug 2021 11:37)                          PHYSICAL EXAM    Neurology: alert and oriented x 3, moves all extremities with no defecits  CV :  RRR  Lungs:   CTA B/L  Abdomen: soft, nontender, nondistended, positive bowel sounds,  Extremities:      plus one pedal edema    no calf tenderness

## 2021-08-02 NOTE — PROGRESS NOTE ADULT - ASSESSMENT
87 year-old woman with known history of colon ca (remote), now with lung mass that is concerning for neoplasm, also with atrial fibrillation, previously on AC, recently stopped due to anemia, now POD 11 status post TAVR.  Procedure was well tolerated, but now grossly volume overloaded with dyspnea at rest.  Patient now short of breath, seen to have pulmonary edema on CXR.  Unclear etiology of shortness of breath and hyponatremia, but HFpEF is contributing.     Continue loop diuretics. Keep O > I, K > 4.0, Mag > 2.0.    ASA, statin, and beta-blocker as tolerated.  Avoid nephrotoxic agents.    Monitor tele for any evidence of heart block.    Discussed at length with Alejandro Johns MD.

## 2021-08-02 NOTE — PROGRESS NOTE ADULT - SUBJECTIVE AND OBJECTIVE BOX
[facility] DIVISION OF KIDNEY DISEASES AND HYPERTENSION -- FOLLOW UP NOTE  --------------------------------------------------------------------------------  Chief Complaint:/subjective: no complaints except tired    24 hour events:as above, no acute events        PAST HISTORY  --------------------------------------------------------------------------------  No significant changes to PMH, PSH, FHx, SHx, unless otherwise noted    ALLERGIES & MEDICATIONS  --------------------------------------------------------------------------------  Allergies    No Known Allergies    Intolerances      Standing Inpatient Medications  aspirin  chewable 81 milliGRAM(s) Oral daily  atorvastatin 40 milliGRAM(s) Oral at bedtime  budesonide  80 MICROgram(s)/formoterol 4.5 MICROgram(s) Inhaler 2 Puff(s) Inhalation two times a day  buMETAnide 4 milliGRAM(s) Oral daily  ciprofloxacin     Tablet 250 milliGRAM(s) Oral every 12 hours  dextrose 40% Gel 15 Gram(s) Oral once  dextrose 5%. 1000 milliLiter(s) IV Continuous <Continuous>  dextrose 5%. 1000 milliLiter(s) IV Continuous <Continuous>  dextrose 50% Injectable 25 Gram(s) IV Push once  dextrose 50% Injectable 12.5 Gram(s) IV Push once  dextrose 50% Injectable 25 Gram(s) IV Push once  ferrous    sulfate 325 milliGRAM(s) Oral daily  glucagon  Injectable 1 milliGRAM(s) IntraMuscular once  heparin   Injectable 5000 Unit(s) SubCutaneous every 8 hours  insulin glargine Injectable (LANTUS) 6 Unit(s) SubCutaneous at bedtime  insulin lispro (ADMELOG) corrective regimen sliding scale   SubCutaneous three times a day before meals  insulin lispro Injectable (ADMELOG) 3 Unit(s) SubCutaneous before breakfast  insulin lispro Injectable (ADMELOG) 3 Unit(s) SubCutaneous before lunch  insulin lispro Injectable (ADMELOG) 3 Unit(s) SubCutaneous before dinner  metoprolol succinate ER 12.5 milliGRAM(s) Oral daily  polyethylene glycol 3350 17 Gram(s) Oral daily  spironolactone 12.5 milliGRAM(s) Oral daily  tiotropium 18 MICROgram(s) Capsule 1 Capsule(s) Inhalation daily  urea Oral Powder 15 Gram(s) Oral two times a day    PRN Inpatient Medications  artificial  tears Solution 1 Drop(s) Both EYES every 12 hours PRN  zolpidem 5 milliGRAM(s) Oral at bedtime PRN      REVIEW OF SYSTEMS  --------------------------------------------------------------------------------  Gen: No weight changes, fatigue, fevers/chills, weakness  Skin: No rashes  Head/Eyes/Ears/Mouth: No headache;   Respiratory: No dyspnea, cough  CV: No chest pain, PND, orthopnea  GI: No abdominal pain, diarrhea, constipation, nausea, vomiting  : No increased frequency, dysuria, hematuria, nocturia  MSK: No joint pain/swelling; no back pain; no edema  Neuro: No dizziness/lightheadedness, weakness  Heme: No easy bruising or bleeding  Psych: No significant nervousness, anxiety, stress, depression    All other systems were reviewed and are negative, except as noted.    VITALS/PHYSICAL EXAM  --------------------------------------------------------------------------------  T(C): 36.7 (08-02-21 @ 05:12), Max: 36.8 (08-01-21 @ 12:37)  HR: 81 (08-02-21 @ 05:12) (73 - 81)  BP: 139/80 (08-02-21 @ 05:12) (112/57 - 139/80)  RR: 18 (08-02-21 @ 05:12) (18 - 18)  SpO2: 95% (08-02-21 @ 05:12) (93% - 96%)  Wt(kg): --  Adult Advanced Hemodynamics Last 24 Hrs  ABP: --  ABP(mean): --  CVP(mm Hg): --  CO: --  CI: --  PA: --  PA(mean): --  PCWP: --  SVR: --  SVRI: --        08-01-21 @ 07:01  -  08-02-21 @ 07:00  --------------------------------------------------------  IN: 1260 mL / OUT: 650 mL / NET: 610 mL    08-02-21 @ 07:01  -  08-02-21 @ 11:27  --------------------------------------------------------  IN: 300 mL / OUT: 0 mL / NET: 300 mL      Physical Exam:  	Gen: NAD,   	HEENT:   no jvp  	Pulm: CTA B/L  	CV: RRR, S1S2; no rub  	Back:  no sacral edema  	Abd: +BS, soft,    	: No suprapubic tenderness  	Ext: no edema  	Neuro:awake  	Psych: alert  	Skin: Warm,   	Vascular access:    LABS/STUDIES  --------------------------------------------------------------------------------              8.2    11.53 >-----------<  370      [08-02-21 @ 09:20]              27.3     Hemoglobin: 8.2 g/dL (08-02-21 @ 09:20)  Hemoglobin: 8.5 g/dL (08-01-21 @ 06:45)    Platelet Count - Automated: 370 K/uL (08-02-21 @ 09:20)  Platelet Count - Automated: 324 K/uL (08-01-21 @ 06:45)    126  |  82  |  65  ----------------------------<  198      [08-02-21 @ 09:20]  4.3   |  34  |  1.28        Ca     9.7     [08-02-21 @ 09:20]      Mg     2.8     [08-01-21 @ 06:45]            Creatinine Trend:  SCr 1.28 [08-02 @ 09:20]  SCr 1.26 [08-01 @ 06:45]  SCr 1.42 [07-31 @ 06:39]  SCr 1.18 [07-30 @ 09:25]  SCr 1.34 [07-30 @ 06:00]    Urinalysis - [07-26-21 @ 12:20]      Color Yellow / Appearance Clear / SG 1.014 / pH 5.5      Gluc 200 mg/dL / Ketone Negative  / Bili Negative / Urobili Negative       Blood Negative / Protein Negative / Leuk Est Large / Nitrite Negative      RBC 3 / WBC 19 / Hyaline 0 / Gran  / Sq Epi  / Non Sq Epi 0 / Bacteria Few      TSH 6.37      [07-21-21 @ 14:10]    HBsAb <3.0      [07-09-21 @ 03:47]  HBsAg Nonreact      [07-09-21 @ 03:47]  HBcAb Nonreact      [07-09-21 @ 03:47]  HCV 0.14, Nonreact      [07-09-21 @ 03:47]

## 2021-08-02 NOTE — PROGRESS NOTE ADULT - ASSESSMENT
87F PMH CAD w/stents, DM2, Afib (not on AC), Colon Ca (about 25y ago), severe AS, HF , recently started on Entresto, admitted for JACQUE and Metformin-associated lactic acidosis, admitted to MICU s/p urgent HD session but now JACQUE resolving and now undergoing TAVR workup.    7/22 Underwent TAVR via right femoral #22 Heike  recovered in CRS post op course unremarkable Junctional rhythm  by EKG  700 cc fluid given for hypotension. Stabilize transferred to [address] [place] [address]- received in afib-  groin sites benign + DP son at bedside ECHO in am EKG in am  Likely discharge on Saturday with MCOT son reports haven given rehab  choices to case management.  7/23 pending post TAVR echo. Anticipate discharge to rehab Monday.  7/24 VSS maintaining Afib 70's . No further junctional episodes of rhythm.   7/25 Transthoracic Echocardiogram demonstrates Minimal aortic regurgitation-Tethered tricuspid valve, severe tricuspid regurgitation . No evidence of pericardial effusion.   7/26 VSS - pt c/o slight SOB - echo shows small new pericardial effusion.  CXR-done- crackles bases - on IV bumex tid - diuresing - O2 N/c in place -rounds made w/ Dr. Pruitt, Dr. Johns & structural heart team - new SOB - will get LE dopplers to r/o dvt - start heaprin gtt, change to bumex gtt 0.5mg /hr- give zaroxlyn 10mg iv x1 , WBC 16 from 8 - ck u/a c&s.  transfer to SDU for closer monitoring.    7/27 OOB to chair  bumex gtt   heparin gtt  neg  800 cc   24 hrs   chest xray   bl base pl effusion  7/28 VSS more tachypnic today.  CXR + atelectasis.   Na 127 Dr Ramos called for input.  -658cc/24 hrs  7/29 VSS sob improved.  -236cc/24hrs.  Na 127 received 1 dose samsca 15x1.  K 4.6 .  Hep gtt dc'd  7/30 VSS;  bumex changed to 4 mg po bid and aldactone initiated as per CHF team; change toprol 12.5 qd; supplement hypokalemia; no anticoagulation for chronic afib secondary to hx GI bleed as per Dr. Pruitt  7/31 (+) tachypnea and dyspnea --> Per CHF Dr. Kee will call Pulmonary for consults (Dr. Ponce).  Bumex  4 mg IV x 1 this evening and them decreased Bumex 4 mg PO daily to start in AM.  Continue with current medication regimen. Supplement Potassium to maintain K+ level > 4.5     8/1   OOB to chair,     na  124,  FR   covid ordered   Pulm  renal and HF following  8/2       OOB    w/ asst    diuretics   bumex 4mg qd  and aldactone 12.5,  creat stable  na  improved to 126

## 2021-08-02 NOTE — PROGRESS NOTE ADULT - PROBLEM SELECTOR PLAN 1
Pt. with hyponatremia to 127 in setting of volume overload.   Last ECHO 7/26 Small pericardial effusion, There is no significant inflow variation measured across the mitral valve, but it is significant across the tricuspid valve. The IVC is enlarged (3.36cm)  and not collapsable No evidence of right atrial or right ventricular collapse.  s/p Tolvaptan 15mg x1 on 7/28 : with no change in sodium   on bumex po 4mg bid, s/p extra dose   IV   124-->126  on bumex now 4mg po daily  on urnena 15g bid   Kidney function is stable  strict I/Os  monitor kidney function and urine output   Monitor serum sodium Q12 hours.   Do not correct serum sodium >6-8 meq/day.

## 2021-08-02 NOTE — PROGRESS NOTE ADULT - SUBJECTIVE AND OBJECTIVE BOX
Jacque Riley MD  Cardiology Fellow  [phone]  All Cardiology service information can be found 24/7 on [email or web address], password: [email or web address]    Patient seen and examined at bedside.    Overnight Events:     Review Of Systems: No chest pain, shortness of breath, or palpitations            Current Meds:  artificial  tears Solution 1 Drop(s) Both EYES every 12 hours PRN  aspirin  chewable 81 milliGRAM(s) Oral daily  atorvastatin 40 milliGRAM(s) Oral at bedtime  budesonide  80 MICROgram(s)/formoterol 4.5 MICROgram(s) Inhaler 2 Puff(s) Inhalation two times a day  buMETAnide 4 milliGRAM(s) Oral daily  ciprofloxacin     Tablet 250 milliGRAM(s) Oral every 12 hours  dextrose 40% Gel 15 Gram(s) Oral once  dextrose 5%. 1000 milliLiter(s) IV Continuous <Continuous>  dextrose 5%. 1000 milliLiter(s) IV Continuous <Continuous>  dextrose 50% Injectable 25 Gram(s) IV Push once  dextrose 50% Injectable 12.5 Gram(s) IV Push once  dextrose 50% Injectable 25 Gram(s) IV Push once  ferrous    sulfate 325 milliGRAM(s) Oral daily  glucagon  Injectable 1 milliGRAM(s) IntraMuscular once  heparin   Injectable 5000 Unit(s) SubCutaneous every 8 hours  insulin glargine Injectable (LANTUS) 6 Unit(s) SubCutaneous at bedtime  insulin lispro (ADMELOG) corrective regimen sliding scale   SubCutaneous three times a day before meals  insulin lispro Injectable (ADMELOG) 3 Unit(s) SubCutaneous before dinner  insulin lispro Injectable (ADMELOG) 3 Unit(s) SubCutaneous before breakfast  insulin lispro Injectable (ADMELOG) 3 Unit(s) SubCutaneous before lunch  metoprolol succinate ER 12.5 milliGRAM(s) Oral daily  polyethylene glycol 3350 17 Gram(s) Oral daily  spironolactone 12.5 milliGRAM(s) Oral daily  tiotropium 18 MICROgram(s) Capsule 1 Capsule(s) Inhalation daily  urea Oral Powder 15 Gram(s) Oral two times a day  zolpidem 5 milliGRAM(s) Oral at bedtime PRN      Vitals:  T(F): 98.1 (08-02), Max: 98.2 (08-01)  HR: 81 (08-02) (73 - 81)  BP: 139/80 (08-02) (112/57 - 139/80)  RR: 18 (08-02)  SpO2: 95% (08-02)  I&O's Summary    01 Aug 2021 07:01  -  02 Aug 2021 07:00  --------------------------------------------------------  IN: 1260 mL / OUT: 650 mL / NET: 610 mL        Physical Exam:  Appearance: No acute distress; well appearing  Eyes: PERRL, EOMI, pink conjunctiva  HEENT: Normal oral mucosa  Cardiovascular: RRR, S1, S2, no murmurs, rubs, or gallops; no edema; no JVD  Respiratory: Clear to auscultation bilaterally  Gastrointestinal: soft, non-tender, non-distended with normal bowel sounds  Musculoskeletal: No clubbing; no joint deformity   Neurologic: Non-focal  Lymphatic: No lymphadenopathy  Psychiatry: AAOx3, mood & affect appropriate  Skin: No rashes, ecchymoses, or cyanosis                          8.5    11.57 )-----------( 324      ( 01 Aug 2021 06:45 )             27.4     08-01    125<L>  |  x   |  x   ----------------------------<  x   x    |  x   |  x     Ca    10.0      01 Aug 2021 06:45  Mg     2.8     08-01            Serum Pro-Brain Natriuretic Peptide: 2398 pg/mL (07-30 @ 09:25)          New ECG(s): Personally reviewed    Echo:    Stress Testing:     Cath:    Imaging:    Interpretation of Telemetry: AF 60-90, trigem

## 2021-08-02 NOTE — PROGRESS NOTE ADULT - SUBJECTIVE AND OBJECTIVE BOX
CHIEF COMPLAINT: f/up sob, fluid OL-PH WHO class 2, CHF, abnormal CT-c/w CA (RML)-some sob  Interval Events: CHF; nephrology    REVIEW OF SYSTEMS:  Constitutional: No fevers or chills. No weight loss. + fatigue or generalized malaise.  Eyes: No itching or discharge from the eyes  ENT: No ear pain. No ear discharge. No nasal congestion. No post nasal drip. No epistaxis. No throat pain. No sore throat. No difficulty swallowing.   CV: No chest pain. No palpitations. No lightheadedness or dizziness.   Resp: No dyspnea at rest. + dyspnea on exertion. No orthopnea. No wheezing. No cough. No stridor. No sputum production. No chest pain with respiration.  GI: No nausea. No vomiting. No diarrhea.  MSK: No joint pain or pain in any extremities  Integumentary: No skin lesions. + pedal edema.  Neurological: No gross motor weakness. No sensory changes.  [+ ] All other systems negative  [ ] Unable to assess ROS because ________    OBJECTIVE:  ICU Vital Signs Last 24 Hrs  T(C): 36.6 (01 Aug 2021 19:20), Max: 36.8 (01 Aug 2021 12:37)  T(F): 97.8 (01 Aug 2021 19:20), Max: 98.2 (01 Aug 2021 12:37)  HR: 74 (01 Aug 2021 19:20) (73 - 80)  BP: 112/57 (01 Aug 2021 19:20) (111/63 - 122/70)  BP(mean): --  ABP: --  ABP(mean): --  RR: 18 (01 Aug 2021 19:20) (18 - 18)  SpO2: 96% (01 Aug 2021 19:20) (93% - 96%)        07-31 @ 07:01  -  08-01 @ 07:00  --------------------------------------------------------  IN: 1410 mL / OUT: 900 mL / NET: 510 mL    08-01 @ 07:01  -  08-02 @ 05:13  --------------------------------------------------------  IN: 1140 mL / OUT: 150 mL / NET: 990 mL      CAPILLARY BLOOD GLUCOSE      POCT Blood Glucose.: 187 mg/dL (01 Aug 2021 21:48)      PHYSICAL EXAM: NAD in bed on NC O2  General: Awake, alert, oriented X 3.   HEENT: Atraumatic, normocephalic.                 Mallampatti Grade 2                No nasal congestion.                No tonsillar or pharyngeal exudates.  Lymph Nodes: No palpable lymphadenopathy  Neck: No JVD. No carotid bruit.   Respiratory: Normal chest expansion                         Normal percussion                         Normal and equal air entry                         No wheeze, rhonchi but bibasilar rales.  Cardiovascular: S1 S2 normal. No murmurs, rubs or gallops.   Abdomen: Soft, non-tender, non-distended. No organomegaly. Normoactive bowel sounds.  Extremities: Warm to touch. Peripheral pulse palpable. + pedal edema.   Skin: No rashes or skin lesions  Neurological: Motor and sensory examination equal and normal in all four extremities.  Psychiatry: Appropriate mood and affect.    HOSPITAL MEDICATIONS:  MEDICATIONS  (STANDING):  aspirin  chewable 81 milliGRAM(s) Oral daily  atorvastatin 40 milliGRAM(s) Oral at bedtime  buMETAnide 4 milliGRAM(s) Oral daily  ciprofloxacin     Tablet 250 milliGRAM(s) Oral every 12 hours  dextrose 40% Gel 15 Gram(s) Oral once  dextrose 5%. 1000 milliLiter(s) (50 mL/Hr) IV Continuous <Continuous>  dextrose 5%. 1000 milliLiter(s) (100 mL/Hr) IV Continuous <Continuous>  dextrose 50% Injectable 25 Gram(s) IV Push once  dextrose 50% Injectable 12.5 Gram(s) IV Push once  dextrose 50% Injectable 25 Gram(s) IV Push once  ferrous    sulfate 325 milliGRAM(s) Oral daily  glucagon  Injectable 1 milliGRAM(s) IntraMuscular once  heparin   Injectable 5000 Unit(s) SubCutaneous every 8 hours  insulin glargine Injectable (LANTUS) 6 Unit(s) SubCutaneous at bedtime  insulin lispro (ADMELOG) corrective regimen sliding scale   SubCutaneous three times a day before meals  insulin lispro Injectable (ADMELOG) 3 Unit(s) SubCutaneous before dinner  insulin lispro Injectable (ADMELOG) 3 Unit(s) SubCutaneous before lunch  insulin lispro Injectable (ADMELOG) 3 Unit(s) SubCutaneous before breakfast  metoprolol succinate ER 12.5 milliGRAM(s) Oral daily  polyethylene glycol 3350 17 Gram(s) Oral daily  spironolactone 12.5 milliGRAM(s) Oral daily  urea Oral Powder 15 Gram(s) Oral two times a day    MEDICATIONS  (PRN):  artificial  tears Solution 1 Drop(s) Both EYES every 12 hours PRN Dry Eyes  zolpidem 5 milliGRAM(s) Oral at bedtime PRN Insomnia      LABS:                        8.5    11.57 )-----------( 324      ( 01 Aug 2021 06:45 )             27.4     08-01    125<L>  |  x   |  x   ----------------------------<  x   x    |  x   |  x     Ca    10.0      01 Aug 2021 06:45  Mg     2.8     08-01                MICROBIOLOGY:     RADIOLOGY: < from: CT Heart without Coronaries w/ IV Cont (07.02.21 @ 08:51) >  FINDINGS:    Non-Coronary:    6 mm hypodense nodule within the right lobe of the thyroid gland. No enlarged axillary, mediastinal lymph nodes. No pleural effusions.    Evaluation of the lungs demonstrate left lower lobe masslike opacity on the part of which measures about 5 x 2.3 cm extending to the left lower lobe hilum with associated left lower lobe volume loss related to some superimposed atelectasis. Multiple left lung pulmonary nodule along the pleural surface measuring up to about 7 mm. Right middle lobe 3.2 x 2.6 cm mass associated with the minor fissure which contains a few small nodules measuring up to 7 mm. The right middle lobe mass has a cystic component along its inferior aspect.    Subcentimeter hypodensity within the liver is too small to characterize. The gallbladder, spleen, adrenal glands are unremarkable. Few pancreatic hypodensities are noted with the largest measuring about 1.5 cm on image 84 of series 19. Bilateral renal cysts.    Urinary bladder is normal. The uterus and adnexa are within normal limits. Status post rectal surgery with postoperative changes. No bowel obstruction or medial air. Appendix is normal.    Degenerative changes of the spine. Moderate to severe loss of height of the T5 and severe loss of height of the T6 vertebral bodies are of indeterminate age.    The heart is enlarged.  The left and right atria are severely enlarged.  There is mitral annular calcification and calcification of the mitral valve leaflets.   There is reduced contrast opacification of the left atrial appendage that resolves on delay imaging suggestive of mixing artifact.    The ascending aorta is mildly calcified. The aortic arch and the descending aorta are severely calcified.   The main pulmonary artery measures approximately 26.4 mm at the level of the ascending aorta.   The right and left pulmonary arteries appear enlarged and measure approximately 29.4 mm and 25.4 mm, respectively.    The aortic valve is calcified. The calcium score of the aortic valve is 1870.    Coronary:  Coronary arterial calcifications are noted; however, this studywas not optimized for evaluation of the coronary arteries.  Please refer to cardiac catheterization performed as part of pre-TAVR work-up.    Aortic Root Measurements    Major aortic annulus diameter (systole, mm): 25.2  Perpendicular minor aortic annulus diameter (systole, mm): 19.1  Aortic annulus perimeter (systole, mm): 76.6  Aortic annulus area (systole, mm2): 438  LVOT minimum diameter (systole, mm): 19.6  LVOT 90 cross (systole, mm): 26.4  LVOT calcification:  Severe  Distance from Aortic annulus to Left Main coronary artery (diastole, mm): 13.2  Distance from Aortic annulus to Right Coronary artery (diastole, mm): 16.7  Sinus of Valsalva diameter, Right coronary (diastole, mm): 29.4  Sinus of Valsalva diameter, Left coronary (diastole, mm): 31.0  Sinus of Valsalva diameter, Non coronary (diastole, mm): 28.3  Diameter at the sinotubular junction (diastole, mm): 26.5 x 26.5  Maximum ascending aorta diameter at 40 mm above annulus (diastole, mm): 32.3  Aortic root angulation (diastole, degrees): 48.4  Aortic root calcification: Moderate-to-severe    Abdominal Aorta:        Minimum lumen diameter (MLD) (mm): 12.8        Diameter perpendicular to MLD (mm): 13.2  Left Femoral:        Minimum Lumen Diameter (mm): 7.2        Diameter perpendicular to MLD (mm): 8.1        Tortuosity: Mild        Calcification: Mild  Right Femoral:        Minimum Lumen Diameter (mm): 6.5        Diameter perpendicular to MLD (mm): 7.1        Tortuosity: Mild        Calcification: Mild  Left External Iliac:        Minimum Lumen Diameter (mm): 6.9        Diameter perpendicular to MLD (mm): 7.5        Tortuosity: Moderate        Calcification: Mild  Left Common Iliac:         Minimum Lumen Diameter (mm): 4.5        Diameter perpendicular to MLD (mm): 8.0        Tortuosity: Severe        Calcification: Severe  Right External Iliac:        Minimum Lumen Diameter (mm): 5.0        Diameter perpendicular to MLD (mm): 7.4        Tortuosity: Moderate        Calcification: Mild  Right Common Iliac:    Minimum Lumen Diameter (mm): 7.8        Diameter perpendicular to MLD (mm): 8.4        Tortuosity: Moderate        Calcification: Moderate  L Subclavian/Axillary: Not well visualized due to motion artifact        Minimum Lumen Diameter (mm): 5.6      Diameter perpendicular to MLD (mm): 6.2        Tortuosity: Mild        Calcification: Mild  R Subclavian/Axillary:        Minimum Lumen Diameter (mm): 4.3        Diameter perpendicular to MLD (mm): 5.1        Tortuosity: Mild        Calcification: Mild    IMPRESSION:  1. Calcified aortic valve. The calcium score of the aortic valve is 1870.  2. Please see the body of the report for aortic and peripheral access vessel measurements.  3. Dominant large left lower lobe mass associated with multiple left lung pleural nodules worrisome for neoplasm with metastatic disease.  4. 3.2 x 2.6 cm right middle lobe mass as described above also is worrisome for neoplasm.  5. A few pancreatic hypodense nodules. Dedicated contrast enhanced pancreatic MRI is recommended for complete evaluation.  6. Compression fracture deformities of the T5 and T6 vertebral bodies as described above are of indeterminate age.              KHLOE HAINES MD; Attending Cardiologist  This document has been electronically signed.    < end of copied text >    [ ] Reviewed and interpreted by me    Point of Care Ultrasound Findings:    PFT:    EKG:

## 2021-08-02 NOTE — PROGRESS NOTE ADULT - ASSESSMENT
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  She is status post acute dialysis, MICU stay and recent TAVR. Cath results (pre TAVR) this admission note elevated PCWP and elevated PA pressure Mean 37, with evidence of right heart failure, secondary to pulmonary hypertension, which is mostly secondary to left heart disease.  As valve has been repaired this may improve.  Patient denies any acute change in her breathing,  States she has had difficulty for months and that it is related to her heart.  Lung masses may be contributing--DDX is primary lung CA vs less likely metastatic colon CA. vs other. Admits to asthma-years ago.    REC:    Continue diuresis as her heart failure team-consider repeat echo and ? RHC                            SEE BELOW:  asthma-symbicort 160, spiriva, incentive spirometry  abnormal CT-lung mass RML--move to consider CT NEEDLE bx.  Continue to decrease oxygen as tolerated.  Will likely require oxygen at rehab.  *******************************  8/2-no significant changes

## 2021-08-03 NOTE — PROGRESS NOTE ADULT - PROBLEM SELECTOR PLAN 3
has bronchoalveolar cancer; unclear prognosis   should f/u with outpt pulmonologist has bronchoalveolar cancer; unclear prognosis   pulmonary involved

## 2021-08-03 NOTE — PROGRESS NOTE ADULT - PROBLEM SELECTOR PLAN 1
Pt. with hyponatremia to 127 in setting of volume overload.   Last ECHO 7/26 Small pericardial effusion, There is no significant inflow variation measured across the mitral valve, but it is significant across the tricuspid valve. The IVC is enlarged (3.36cm)  and not collapsable No evidence of right atrial or right ventricular collapse.  s/p Tolvaptan 15mg x1 on 7/28 : with no change in sodium   on bumex po 4mg bid, s/p extra dose   IV   -sodium remains low  on bumex now 4mg po daily  on urnena 15g bid-->increase urena today to 30 g bid  check u osm, u na, serum u acid  strict I/Os  monitor kidney function and urine output   Monitor serum sodium Q12 hours.   Do not correct serum sodium >6-8 meq/day

## 2021-08-03 NOTE — PROGRESS NOTE ADULT - PROBLEM SELECTOR PLAN 5
continues to be dyspneic  diuretics as above continues to be dyspneic  diuretics as above  given worsening sodium, worsening renal function, and persistent symptoms despite diuretics, recommend proceeding with RHC for evaluation of filling pressures and cardiac output

## 2021-08-03 NOTE — PROGRESS NOTE ADULT - PROBLEM SELECTOR PLAN 3
had small pericardial effusion  repeat cardiac echo   cont bumex 4mg po daily  for RHC to eval filling pressures and CO

## 2021-08-03 NOTE — PROGRESS NOTE ADULT - ASSESSMENT
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  She is status post acute dialysis, MICU stay and recent TAVR. Cath results (pre TAVR) this admission note elevated PCWP and elevated PA pressure Mean 37, with evidence of right heart failure, secondary to pulmonary hypertension, which is mostly secondary to left heart disease.  As valve has been repaired this may improve.  Patient denies any acute change in her breathing,  States she has had difficulty for months and that it is related to her heart.  Lung masses may be contributing--DDX is primary lung CA vs less likely metastatic colon CA. vs other. Admits to asthma-years ago.    REC:    Continue diuresis as her heart failure team-consider repeat echo and ? RHC                            SEE BELOW:  asthma-symbicort 160, spiriva, incentive spirometry  abnormal CT-lung mass RML--move to consider CT NEEDLE bx.  Continue to decrease oxygen as tolerated.  Will likely require oxygen at rehab.  *******************************  8/2-no significant changes   8/3-struct heart contemplating RHC here

## 2021-08-03 NOTE — PROGRESS NOTE ADULT - SUBJECTIVE AND OBJECTIVE BOX
VITAL SIGNS    Telemetry:  afib  Vital Signs Last 24 Hrs  T(C): 36.7 (08-03-21 @ 05:18), Max: 36.7 (08-02-21 @ 19:00)  T(F): 98.1 (08-03-21 @ 05:18), Max: 98.1 (08-03-21 @ 05:18)  HR: 87 (08-03-21 @ 05:18) (83 - 87)  BP: 122/62 (08-03-21 @ 05:18) (120/56 - 133/84)  RR: 18 (08-03-21 @ 05:18) (18 - 20)  SpO2: 93% (08-03-21 @ 05:18) (93% - 99%)            08-02 @ 07:01  -  08-03 @ 07:00  --------------------------------------------------------  IN: 1000 mL / OUT: 750 mL / NET: 250 mL    08-03 @ 07:01  -  08-03 @ 09:42  --------------------------------------------------------  IN: 0 mL / OUT: 100 mL / NET: -100 mL       Daily     Daily   Admit Wt: Drug Dosing Weight  Height (cm): 152.4 (22 Jul 2021 11:15)  Weight (kg): 72.4 (22 Jul 2021 11:15)  BMI (kg/m2): 31.2 (22 Jul 2021 11:15)  BSA (m2): 1.7 (22 Jul 2021 11:15)      CAPILLARY BLOOD GLUCOSE      POCT Blood Glucose.: 191 mg/dL (03 Aug 2021 07:54)  POCT Blood Glucose.: 217 mg/dL (02 Aug 2021 21:22)  POCT Blood Glucose.: 152 mg/dL (02 Aug 2021 17:01)  POCT Blood Glucose.: 258 mg/dL (02 Aug 2021 11:12)          MEDICATIONS  artificial  tears Solution 1 Drop(s) Both EYES every 12 hours PRN  aspirin  chewable 81 milliGRAM(s) Oral daily  atorvastatin 40 milliGRAM(s) Oral at bedtime  budesonide  80 MICROgram(s)/formoterol 4.5 MICROgram(s) Inhaler 2 Puff(s) Inhalation two times a day  buMETAnide 4 milliGRAM(s) Oral daily  ciprofloxacin     Tablet 250 milliGRAM(s) Oral every 12 hours  dextrose 40% Gel 15 Gram(s) Oral once  dextrose 5%. 1000 milliLiter(s) IV Continuous <Continuous>  dextrose 5%. 1000 milliLiter(s) IV Continuous <Continuous>  dextrose 50% Injectable 25 Gram(s) IV Push once  dextrose 50% Injectable 12.5 Gram(s) IV Push once  dextrose 50% Injectable 25 Gram(s) IV Push once  ferrous    sulfate 325 milliGRAM(s) Oral daily  glucagon  Injectable 1 milliGRAM(s) IntraMuscular once  heparin   Injectable 5000 Unit(s) SubCutaneous every 8 hours  insulin glargine Injectable (LANTUS) 6 Unit(s) SubCutaneous at bedtime  insulin lispro (ADMELOG) corrective regimen sliding scale   SubCutaneous three times a day before meals  insulin lispro Injectable (ADMELOG) 3 Unit(s) SubCutaneous before breakfast  insulin lispro Injectable (ADMELOG) 3 Unit(s) SubCutaneous before lunch  insulin lispro Injectable (ADMELOG) 3 Unit(s) SubCutaneous before dinner  metoprolol succinate ER 12.5 milliGRAM(s) Oral daily  polyethylene glycol 3350 17 Gram(s) Oral daily  spironolactone 12.5 milliGRAM(s) Oral daily  tiotropium 18 MICROgram(s) Capsule 1 Capsule(s) Inhalation daily  urea Oral Powder 15 Gram(s) Oral two times a day  zolpidem 5 milliGRAM(s) Oral at bedtime PRN      >>> <<<  PHYSICAL EXAM  Subjective: NAD  Neurology: alert and oriented x 3, nonfocal, no gross deficits  CV : s1s2  Lungs: CTA b/;  Abdomen: soft, NT,ND, (+ )BM  :  voiding  Extremities: -c/c/e      cardiac CT revealed bilateral masses- one in RML measuring 3.2 x 2.6cm and LLL measuring 5x 2.3 and several scattered nodules in both lungs measuring up to 7mm.   LABS  08-03    123<L>  |  83<L>  |  80<H>  ----------------------------<  150<H>  4.2   |  28  |  1.65<H>    Ca    9.8      03 Aug 2021 05:49                                   7.6    14.45 )-----------( 342      ( 03 Aug 2021 05:49 )             24.9                 PAST MEDICAL & SURGICAL HISTORY:

## 2021-08-03 NOTE — PROGRESS NOTE ADULT - TIME BILLING
as above:  multifactorial dyspnea-CHF/fluid OL-WHO class 2 PAH (cardiac related), asthma (remote), ? RML tumor, debility, anemia--O2 NC sat above 90%  CHF-as per CHF team-Andreas et al; reconsider RHC/echo repeat; BNP f/up  Renal-HD M/W/F  asthma-symbicort 160 2 bid, spiriva 1 q day, incentive spirometry  abnormal CT c/w CA-? primary lung vs metastatic dz (non smoker)--consider CTNA while her for dx and ? rx (RT etc.), eventual pet/ct  DVT prophylaxis-on A/C  GI-prophylaxis                           PT           respiratory failure--O2 likely upon DC to rehab/home.    Barrington Ponce MD-Pulmonary   [phone] as above: no signif changes-? RHC  multifactorial dyspnea-CHF/fluid OL-WHO class 2 PAH (cardiac related), asthma (remote), ? RML tumor, debility, anemia--O2 NC sat above 90%  CHF-as per CHF team-Andreas et al; reconsider RHC/echo repeat; BNP f/up  Renal-HD M/W/F  asthma-symbicort 160 2 bid, spiriva 1 q day, incentive spirometry  abnormal CT c/w CA-? primary lung vs metastatic dz (non smoker)--consider CTNA while her for dx and ? rx (RT etc.), eventual pet/ct  DVT prophylaxis-on A/C  GI-prophylaxis                           PT           respiratory failure--O2 likely upon DC to rehab/home.    Barrington Ponce MD-Pulmonary   [phone]

## 2021-08-03 NOTE — PROGRESS NOTE ADULT - SUBJECTIVE AND OBJECTIVE BOX
[facility] DIVISION OF KIDNEY DISEASES AND HYPERTENSION -- FOLLOW UP NOTE  --------------------------------------------------------------------------------  Chief Complaint:/subjective: pt laying, appears sob, states she doesnt feel well today but cant pin point any; denies sob, cp, nausea, vomiting     24 hour events:no acute events        PAST HISTORY  --------------------------------------------------------------------------------  No significant changes to PMH, PSH, FHx, SHx, unless otherwise noted    ALLERGIES & MEDICATIONS  --------------------------------------------------------------------------------  Allergies    No Known Allergies    Intolerances      Standing Inpatient Medications  aspirin  chewable 81 milliGRAM(s) Oral daily  atorvastatin 40 milliGRAM(s) Oral at bedtime  budesonide  80 MICROgram(s)/formoterol 4.5 MICROgram(s) Inhaler 2 Puff(s) Inhalation two times a day  buMETAnide 4 milliGRAM(s) Oral daily  ciprofloxacin     Tablet 250 milliGRAM(s) Oral daily  dextrose 40% Gel 15 Gram(s) Oral once  dextrose 50% Injectable 25 Gram(s) IV Push once  dextrose 50% Injectable 12.5 Gram(s) IV Push once  dextrose 50% Injectable 25 Gram(s) IV Push once  ferrous    sulfate 325 milliGRAM(s) Oral daily  glucagon  Injectable 1 milliGRAM(s) IntraMuscular once  heparin   Injectable 5000 Unit(s) SubCutaneous every 8 hours  insulin glargine Injectable (LANTUS) 6 Unit(s) SubCutaneous at bedtime  insulin lispro (ADMELOG) corrective regimen sliding scale   SubCutaneous three times a day before meals  insulin lispro Injectable (ADMELOG) 3 Unit(s) SubCutaneous before breakfast  insulin lispro Injectable (ADMELOG) 3 Unit(s) SubCutaneous before lunch  insulin lispro Injectable (ADMELOG) 3 Unit(s) SubCutaneous before dinner  metoprolol succinate ER 12.5 milliGRAM(s) Oral daily  polyethylene glycol 3350 17 Gram(s) Oral daily  spironolactone 12.5 milliGRAM(s) Oral daily  tiotropium 18 MICROgram(s) Capsule 1 Capsule(s) Inhalation daily  urea Oral Powder 15 Gram(s) Oral two times a day    PRN Inpatient Medications  artificial  tears Solution 1 Drop(s) Both EYES every 12 hours PRN  zolpidem 5 milliGRAM(s) Oral at bedtime PRN      REVIEW OF SYSTEMS  --------------------------------------------------------------------------------  Gen: No weight changes, fatigue, fevers/chills, weakness  Skin: No rashes  Head/Eyes/Ears/Mouth: No headache;   Respiratory: No dyspnea, cough  CV: No chest pain, PND, orthopnea  GI: No abdominal pain, diarrhea, constipation, nausea, vomiting  : No increased frequency, dysuria, hematuria, nocturia  MSK: No joint pain/swelling; no back pain; no edema  Neuro: No dizziness/lightheadedness, weakness  Heme: No easy bruising or bleeding  Psych: No significant nervousness, anxiety, stress, depression    All other systems were reviewed and are negative, except as noted.    VITALS/PHYSICAL EXAM  --------------------------------------------------------------------------------  T(C): 36.4 (08-03-21 @ 12:21), Max: 36.7 (08-02-21 @ 19:00)  HR: 81 (08-03-21 @ 12:21) (81 - 87)  BP: 116/57 (08-03-21 @ 12:21) (116/57 - 122/62)  RR: 18 (08-03-21 @ 12:21) (18 - 18)  SpO2: 95% (08-03-21 @ 12:21) (93% - 99%)  Wt(kg): --  Adult Advanced Hemodynamics Last 24 Hrs  ABP: --  ABP(mean): --  CVP(mm Hg): --  CO: --  CI: --  PA: --  PA(mean): --  PCWP: --  SVR: --  SVRI: --        08-02-21 @ 07:01  -  08-03-21 @ 07:00  --------------------------------------------------------  IN: 1000 mL / OUT: 750 mL / NET: 250 mL    08-03-21 @ 07:01  -  08-03-21 @ 13:09  --------------------------------------------------------  IN: 200 mL / OUT: 100 mL / NET: 100 mL      Physical Exam:  	Gen: NAD   	HEENT:  no jvp  	Pulm: CTA B/L  	CV: RRR, S1S2; no rub  	Back:  ; no sacral edema  	Abd: +BS, soft,   	: No suprapubic tenderness  	Ext: trace LE edema  	Neuro: awake  	Psych: alert  	Skin: Warm   	Vascular access:    LABS/STUDIES  --------------------------------------------------------------------------------              7.6    14.45 >-----------<  342      [08-03-21 @ 05:49]              24.9     Hemoglobin: 7.6 g/dL (08-03-21 @ 05:49)  Hemoglobin: 8.2 g/dL (08-02-21 @ 09:20)    Platelet Count - Automated: 342 K/uL (08-03-21 @ 05:49)  Platelet Count - Automated: 370 K/uL (08-02-21 @ 09:20)    123  |  83  |  80  ----------------------------<  150      [08-03-21 @ 05:49]  4.2   |  28  |  1.65        Ca     9.8     [08-03-21 @ 05:49]            Creatinine Trend:  SCr 1.65 [08-03 @ 05:49]  SCr 1.28 [08-02 @ 09:20]  SCr 1.26 [08-01 @ 06:45]  SCr 1.42 [07-31 @ 06:39]  SCr 1.18 [07-30 @ 09:25]    Urinalysis - [07-26-21 @ 12:20]      Color Yellow / Appearance Clear / SG 1.014 / pH 5.5      Gluc 200 mg/dL / Ketone Negative  / Bili Negative / Urobili Negative       Blood Negative / Protein Negative / Leuk Est Large / Nitrite Negative      RBC 3 / WBC 19 / Hyaline 0 / Gran  / Sq Epi  / Non Sq Epi 0 / Bacteria Few      TSH 6.37      [07-21-21 @ 14:10]    HBsAb <3.0      [07-09-21 @ 03:47]  HBsAg Nonreact      [07-09-21 @ 03:47]  HBcAb Nonreact      [07-09-21 @ 03:47]  HCV 0.14, Nonreact      [07-09-21 @ 03:47]

## 2021-08-03 NOTE — PROGRESS NOTE ADULT - SUBJECTIVE AND OBJECTIVE BOX
*****Structural Heart Team*****    Subjective:          T(C): 36.4 (08-03-21 @ 12:21), Max: 36.7 (08-02-21 @ 19:00)  HR: 81 (08-03-21 @ 12:21) (81 - 87)  BP: 116/57 (08-03-21 @ 12:21) (116/57 - 122/62)  RR: 18 (08-03-21 @ 12:21) (18 - 18)  SpO2: 95% (08-03-21 @ 12:21) (93% - 99%)  Wt(kg): --  08-02 @ 07:01  -  08-03 @ 07:00  --------------------------------------------------------  IN: 1000 mL / OUT: 750 mL / NET: 250 mL    08-03 @ 07:01  -  08-03 @ 18:20  --------------------------------------------------------  IN: 500 mL / OUT: 300 mL / NET: 200 mL        MEDICATIONS  (STANDING):  aspirin  chewable 81 milliGRAM(s) Oral daily  atorvastatin 40 milliGRAM(s) Oral at bedtime  budesonide  80 MICROgram(s)/formoterol 4.5 MICROgram(s) Inhaler 2 Puff(s) Inhalation two times a day  buMETAnide 4 milliGRAM(s) Oral daily  ciprofloxacin     Tablet 250 milliGRAM(s) Oral daily  dextrose 40% Gel 15 Gram(s) Oral once  dextrose 50% Injectable 25 Gram(s) IV Push once  dextrose 50% Injectable 12.5 Gram(s) IV Push once  dextrose 50% Injectable 25 Gram(s) IV Push once  ferrous    sulfate 325 milliGRAM(s) Oral daily  glucagon  Injectable 1 milliGRAM(s) IntraMuscular once  heparin   Injectable 5000 Unit(s) SubCutaneous every 8 hours  insulin glargine Injectable (LANTUS) 6 Unit(s) SubCutaneous at bedtime  insulin lispro (ADMELOG) corrective regimen sliding scale   SubCutaneous three times a day before meals  insulin lispro Injectable (ADMELOG) 3 Unit(s) SubCutaneous before breakfast  insulin lispro Injectable (ADMELOG) 3 Unit(s) SubCutaneous before lunch  insulin lispro Injectable (ADMELOG) 3 Unit(s) SubCutaneous before dinner  metoprolol succinate ER 12.5 milliGRAM(s) Oral daily  polyethylene glycol 3350 17 Gram(s) Oral daily  tiotropium 18 MICROgram(s) Capsule 1 Capsule(s) Inhalation daily  urea Oral Powder 30 Gram(s) Oral every 12 hours    MEDICATIONS  (PRN):  artificial  tears Solution 1 Drop(s) Both EYES every 12 hours PRN Dry Eyes  zolpidem 5 milliGRAM(s) Oral at bedtime PRN Insomnia      Home Medications:  atorvastatin 40 mg oral tablet: 1 tab(s) orally once a day at bedtime (08 Jul 2021 17:00)  Elemental Iron: 45 milligram(s) orally (08 Jul 2021 17:00)  Entresto 24 mg-26 mg oral tablet: 1 tab(s) orally 2 times a day (08 Jul 2021 17:00)  furosemide 80 mg oral tablet: 1 tab(s) orally once a day (08 Jul 2021 17:00)  glipiZIDE 10 mg oral tablet: 1 tab(s) orally once a day (08 Jul 2021 17:00)  Januvia 100 mg oral tablet: 1 tab(s) orally once a day (08 Jul 2021 17:00)  lysine 500 mg oral tablet: 2 tab(s) orally once a day (08 Jul 2021 17:00)  metFORMIN 1000 mg oral tablet: 1 tab(s) orally 2 times a day (08 Jul 2021 17:00)  metOLazone: 25 milligram(s) orally once a day (08 Jul 2021 17:00)  metoprolol succinate 25 mg oral tablet, extended release: 1 tab(s) orally once a day   Monday, Wednesday, Friday with dinner (08 Jul 2021 17:00)  Vitamin C 500 mg oral tablet: 1 tab(s) orally once a day (08 Jul 2021 17:00)  Vitamin D2 2000 intl units (50 mcg) oral capsule:  (08 Jul 2021 17:00)  zolpidem 5 mg oral tablet: 1 tab(s) orally once a day (at bedtime) (08 Jul 2021 17:00)                        7.6    14.45 )-----------( 342      ( 03 Aug 2021 05:49 )             24.9   08-03    123<L>  |  83<L>  |  80<H>  ----------------------------<  150<H>  4.2   |  28  |  1.65<H>    Ca    9.8      03 Aug 2021 05:49           *****Structural Heart Team*****    Subjective  No acute events reported overnight.  The patient reports that she feels very weak/fatigue.  Dyspneic upon minimal activity.  Denies any chest pain/tightness or discomfort, fevers, chills, sweats, sensation, dizziness or palpitations.    Review of systems  14 point review of systems otherwise unremarkable except what described above in history of present illness    Physical examination  No apparent distress, alert and oriented 3, elevated JVD with hepatojugular reflex  Irregular irregular with 1 out of 6 holosystolic murmur at left lower sternal border  Decreased breath sounds bilaterally with no wheezing or crackles  Positive bowel sound, soft, obese, nontender, no masses guarding  1+ bilateral pitting edema   No clubbing or cyanosis  Moving all extremity spontaneously  1+ DP/PT pulses    Assessment/plan  Severe aortic valve stenosis/severe tricuspid valve regurgitation  --Patient status post TAVR procedure.   --Discussion was had with the patient and her family who are at the bedside of the clinical examination concerning her clinical status and failure to thrive following TAVR procedure.  Reviewed with the patient and her family her multiple comorbidities, deconditioned status, frailty and concomitant atrial fibrillation/anemia/severe tricuspid valve regurgitation/metastatic bronchoalveolar carcinoma/renal insufficiency which is likely contributing to her symptoms.  For further assessment it is recommended that a transthoracic echocardiogram study and a right heart catheterization be performed.  Indications and details for the procedure reviewed.  Benefits and risk were discussed.  Risk include but not limited to infection, bleeding, arrhythmia, TIA/stroke, hemodynamic instability, vascular tree, need for urgent surgery and death.  The patient and her family are agreeable to proceed with a right heart catheterization.  --Appreciate recommendations from heart failure/nephrology teams.  --Continue Bumex 4 mg daily.  Goal ins and out -500 to -1 L.  Aim for potassium to greater than 4 and magnesium greater than 2.  Closely monitor the patient's electrolytes.  --Continue Toprol 12.5 mg daily.  --Patient continues to be hyponatremic.  --Heparin drip not reinitiated due to concern for recent GI bleed after discussing with cardiac surgical team/Dr. Pruitt.  Continue to closely monitor CBC and assess for signs or symptoms of bleeding.  --Continue to closely follow the patient sodium.    --Continue telemetry monitoring.    All questions and concerns of the patient and her family were addressed.    Findings and plan were discussed with heart failure/Dr. Lane, cardiac surgery/Dr. Magaña and structural heart team.        T(C): 36.4 (08-03-21 @ 12:21), Max: 36.7 (08-02-21 @ 19:00)  HR: 81 (08-03-21 @ 12:21) (81 - 87)  BP: 116/57 (08-03-21 @ 12:21) (116/57 - 122/62)  RR: 18 (08-03-21 @ 12:21) (18 - 18)  SpO2: 95% (08-03-21 @ 12:21) (93% - 99%)  Wt(kg): --  08-02 @ 07:01  -  08-03 @ 07:00  --------------------------------------------------------  IN: 1000 mL / OUT: 750 mL / NET: 250 mL    08-03 @ 07:01  -  08-03 @ 18:20  --------------------------------------------------------  IN: 500 mL / OUT: 300 mL / NET: 200 mL        MEDICATIONS  (STANDING):  aspirin  chewable 81 milliGRAM(s) Oral daily  atorvastatin 40 milliGRAM(s) Oral at bedtime  budesonide  80 MICROgram(s)/formoterol 4.5 MICROgram(s) Inhaler 2 Puff(s) Inhalation two times a day  buMETAnide 4 milliGRAM(s) Oral daily  ciprofloxacin     Tablet 250 milliGRAM(s) Oral daily  dextrose 40% Gel 15 Gram(s) Oral once  dextrose 50% Injectable 25 Gram(s) IV Push once  dextrose 50% Injectable 12.5 Gram(s) IV Push once  dextrose 50% Injectable 25 Gram(s) IV Push once  ferrous    sulfate 325 milliGRAM(s) Oral daily  glucagon  Injectable 1 milliGRAM(s) IntraMuscular once  heparin   Injectable 5000 Unit(s) SubCutaneous every 8 hours  insulin glargine Injectable (LANTUS) 6 Unit(s) SubCutaneous at bedtime  insulin lispro (ADMELOG) corrective regimen sliding scale   SubCutaneous three times a day before meals  insulin lispro Injectable (ADMELOG) 3 Unit(s) SubCutaneous before breakfast  insulin lispro Injectable (ADMELOG) 3 Unit(s) SubCutaneous before lunch  insulin lispro Injectable (ADMELOG) 3 Unit(s) SubCutaneous before dinner  metoprolol succinate ER 12.5 milliGRAM(s) Oral daily  polyethylene glycol 3350 17 Gram(s) Oral daily  tiotropium 18 MICROgram(s) Capsule 1 Capsule(s) Inhalation daily  urea Oral Powder 30 Gram(s) Oral every 12 hours    MEDICATIONS  (PRN):  artificial  tears Solution 1 Drop(s) Both EYES every 12 hours PRN Dry Eyes  zolpidem 5 milliGRAM(s) Oral at bedtime PRN Insomnia      Home Medications:  atorvastatin 40 mg oral tablet: 1 tab(s) orally once a day at bedtime (08 Jul 2021 17:00)  Elemental Iron: 45 milligram(s) orally (08 Jul 2021 17:00)  Entresto 24 mg-26 mg oral tablet: 1 tab(s) orally 2 times a day (08 Jul 2021 17:00)  furosemide 80 mg oral tablet: 1 tab(s) orally once a day (08 Jul 2021 17:00)  glipiZIDE 10 mg oral tablet: 1 tab(s) orally once a day (08 Jul 2021 17:00)  Januvia 100 mg oral tablet: 1 tab(s) orally once a day (08 Jul 2021 17:00)  lysine 500 mg oral tablet: 2 tab(s) orally once a day (08 Jul 2021 17:00)  metFORMIN 1000 mg oral tablet: 1 tab(s) orally 2 times a day (08 Jul 2021 17:00)  metOLazone: 25 milligram(s) orally once a day (08 Jul 2021 17:00)  metoprolol succinate 25 mg oral tablet, extended release: 1 tab(s) orally once a day   Monday, Wednesday, Friday with dinner (08 Jul 2021 17:00)  Vitamin C 500 mg oral tablet: 1 tab(s) orally once a day (08 Jul 2021 17:00)  Vitamin D2 2000 intl units (50 mcg) oral capsule:  (08 Jul 2021 17:00)  zolpidem 5 mg oral tablet: 1 tab(s) orally once a day (at bedtime) (08 Jul 2021 17:00)                        7.6    14.45 )-----------( 342      ( 03 Aug 2021 05:49 )             24.9   08-03    123<L>  |  83<L>  |  80<H>  ----------------------------<  150<H>  4.2   |  28  |  1.65<H>    Ca    9.8      03 Aug 2021 05:49

## 2021-08-03 NOTE — PROGRESS NOTE ADULT - ASSESSMENT
87F PMH CAD w/stents, DM2, Afib (not on AC), Colon Ca (about 25y ago), severe AS, HF , recently started on Entresto, admitted for JACQUE and Metformin-associated lactic acidosis, admitted to MICU s/p urgent HD session but now JACQUE resolving and now undergoing TAVR workup.    7/22 Underwent TAVR via right femoral #22 Heike  recovered in CRS post op course unremarkable Junctional rhythm  by EKG  700 cc fluid given for hypotension. Stabilize transferred to [address] [place] [address]- received in afib-  groin sites benign + DP son at bedside ECHO in am EKG in am  Likely discharge on Saturday with MCOT son reports haven given rehab  choices to case management.  7/23 pending post TAVR echo. Anticipate discharge to rehab Monday.  7/24 VSS maintaining Afib 70's . No further junctional episodes of rhythm.   7/25 Transthoracic Echocardiogram demonstrates Minimal aortic regurgitation-Tethered tricuspid valve, severe tricuspid regurgitation . No evidence of pericardial effusion.   7/26 VSS - pt c/o slight SOB - echo shows small new pericardial effusion.  CXR-done- crackles bases - on IV bumex tid - diuresing - O2 N/c in place -rounds made w/ Dr. Pruitt, Dr. Johns & structural heart team - new SOB - will get LE dopplers to r/o dvt - start heaprin gtt, change to bumex gtt 0.5mg /hr- give zaroxlyn 10mg iv x1 , WBC 16 from 8 - ck u/a c&s.  transfer to SDU for closer monitoring.    7/27 OOB to chair  bumex gtt   heparin gtt  neg  800 cc   24 hrs   chest xray   bl base pl effusion  7/28 VSS more tachypnic today.  CXR + atelectasis.   Na 127 Dr Ramos called for input.  -658cc/24 hrs  7/29 VSS sob improved.  -236cc/24hrs.  Na 127 received 1 dose samsca 15x1.  K 4.6 .  Hep gtt dc'd  7/30 VSS;  bumex changed to 4 mg po bid and aldactone initiated as per CHF team; change toprol 12.5 qd; supplement hypokalemia; no anticoagulation for chronic afib secondary to hx GI bleed as per Dr. Pruitt  7/31 (+) tachypnea and dyspnea --> Per CHF Dr. Kee will call Pulmonary for consults (Dr. Ponce).  Bumex  4 mg IV x 1 this evening and them decreased Bumex 4 mg PO daily to start in AM.  Continue with current medication regimen. Supplement Potassium to maintain K+ level > 4.5     8/1   OOB to chair,     na  124,  FR   covid ordered   Pulm  renal and HF following  8/2       OOB    w/ asst    diuretics   bumex 4mg qd  and aldactone 12.5,  creat stable  na  improved to 126  8/3 Repeat echo today and plan for right heart cath tomorrow.  Persistent hyponatremia 123 consider samsca 15 today.  Symbicort 160 2 bid, spiriva 1 q day, for asthma.  Pulmonary consult suggest CTNA and eventual PET scan to investigate Metastatic bronchoalveolar cancer.  Given her age, comorbidities and recent acute illness. Will not pursue diagnostic workup at this time.

## 2021-08-03 NOTE — PROGRESS NOTE ADULT - PROBLEM SELECTOR PLAN 2
rate controlled; on toprol xl 12.5 mg daily  not on AC d/t prior GIB rate controlled; on toprol xl 12.5 mg daily  not on AC d/t prior GIB  rhythm controlled approach per EP and cardiology. No contraindication from HF.

## 2021-08-03 NOTE — CHART NOTE - NSCHARTNOTEFT_GEN_A_CORE
Nutrition Follow Up Note  Patient seen for: malnutrition follow up. Chart reviewed and events noted.     Pt is a 88 y/o F Hx of CAD w/stents, DM2, Afib (not on AC), Colon Ca (about 25y ago), severe AS, HF , recently started on Entresto, admitted for JACQUE and Metformin-associated lactic acidosis, admitted to MICU s/p urgent HD session but now JACQUE resolving.  Underwent TAVR. 8/3 Repeat echo today and plan for right heart cath tomorrow.    Source: [x] Patient       [x] Medical Record        [] RN        [] Family at bedside       [] Other:    -If unable to interview patient: [] Trach/Vent/BiPAP  [] Disoriented/confused/inappropriate to interview    Diet Order:   Diet, Consistent Carbohydrate/No Snacks:   Supplement Feeding Modality:  Oral  Glucerna Shake Cans or Servings Per Day:  1       Frequency:  Three Times a day (21 @ 18:02)    - Is current order appropriate/adequate? [x] Yes  []  No:     - PO intake :   [] >75%  Adequate    [x] 50-75%  Fair       [] <50%  Poor    - Nutrition-related concerns:      -Pt with fair intake (per RN flowsheet typically =>50% meals) continues with lack of appetite; accepts Glucerna x1 daily. Receiving some food from home.       -Elevated blood glucose noted, on sliding scale of insulin and Lantus as well as consistent carbohydrate diet.       -Pt on antibiotics for UTI      -Continues on ferrous sulfate         GI: Denies recent N/V, constipation, or diarrhea. Last BM 8/3.   Bowel Regimen? [] Yes   [x] No    Weights:   Daily Weight in k (), 67 (), 81.5 (), 75 (), 85 ()  Dosing wt 72.4 kG  Drastic wt fluctuations noted and possibly 2/2 inaccurate bed scale vs fluid shifts as pt with varying edema during admission/diuresed. RD will continue to trend as new wts available/able.     Nutritionally Pertinent MEDICATIONS  (STANDING):  atorvastatin  buMETAnide  ciprofloxacin     Tablet  dextrose 40% Gel  dextrose 50% Injectable  dextrose 50% Injectable  dextrose 50% Injectable  ferrous    sulfate  glucagon  Injectable  insulin glargine Injectable (LANTUS)  insulin lispro (ADMELOG) corrective regimen sliding scale  insulin lispro Injectable (ADMELOG)  insulin lispro Injectable (ADMELOG)  insulin lispro Injectable (ADMELOG)  metoprolol succinate ER  polyethylene glycol 3350  urea Oral Powder    Pertinent Labs:  @ 05:49: Na 123<L>, BUN 80<H>, Cr 1.65<H>, <H>, K+ 4.2    A1C with Estimated Average Glucose Result: 6.7 % (21 @ 09:06)    Finger Sticks:  POCT Blood Glucose.: 216 mg/dL ( @ 11:36)  POCT Blood Glucose.: 191 mg/dL ( @ 07:54)  POCT Blood Glucose.: 217 mg/dL ( @ 21:22)    Skin per nursing documentation: No pressure injuries noted.  Edema per nursing documentation: +2 Cruz. ankle and foot    Estimated Needs:   [x] no change since previous assessment  Based on IBW 45.3 kG (100 lbs)  Estimated Energy Needs: (30-35 kcals/kG) 0760-1311 kcals  Estimated Protein Needs: (1.2-1.4 gm/kG) 54-63 gm  Defer fluid needs to team    Previous Nutrition Diagnosis: Malnutrition; moderate acute   Nutrition Diagnosis is: [x] ongoing  [] resolved [] not applicable     Nutrition Care Plan:  [x] In Progress  [] Achieved  [] Not applicable    New Nutrition Diagnosis: [x] Not applicable    Nutrition Interventions:     Education Provided   [x] Yes:  [] No: Pt eating fair, RD emphasized importance of adequate-protein energy intake. Accepts Glucerna x1 daily. RD obtained food preferences and will honor as able.     Recommendations:      1) Change to Consistent Carbohydrate with evening snack, low Na. RD remains available for diet changes as needed/able.   2) Change to Glucerna x1 daily per pt request.   3) RD to honor food preferences as able.   4) Consider Multivitamin if medically feasible to optimize nutrient intake.     Monitoring and Evaluation:   Continue to monitor nutritional intake, tolerance to diet prescription, weights, labs, skin integrity    RD remains available upon request and will follow up per protocol  Lizzeth Lawrence, MS, RD, CDN Pager #[phone]

## 2021-08-03 NOTE — PROGRESS NOTE ADULT - SUBJECTIVE AND OBJECTIVE BOX
CHIEF COMPLAINT: f/up sob, fluid OL-PH WHO class 2, CHF, abnormal CT-c/w CA (RML)-some sob    Interval Events: struct heart, nephrology    REVIEW OF SYSTEMS:  Constitutional: No fevers or chills. No weight loss. No fatigue or generalized malaise.  Eyes: No itching or discharge from the eyes  ENT: No ear pain. No ear discharge. No nasal congestion. No post nasal drip. No epistaxis. No throat pain. No sore throat. No difficulty swallowing.   CV: No chest pain. No palpitations. No lightheadedness or dizziness.   Resp: No dyspnea at rest. No dyspnea on exertion. No orthopnea. No wheezing. No cough. No stridor. No sputum production. No chest pain with respiration.  GI: No nausea. No vomiting. No diarrhea.  MSK: No joint pain or pain in any extremities  Integumentary: No skin lesions. No pedal edema.  Neurological: No gross motor weakness. No sensory changes.  [ ] All other systems negative  [ ] Unable to assess ROS because ________    OBJECTIVE:  ICU Vital Signs Last 24 Hrs  T(C): 36.7 (03 Aug 2021 05:18), Max: 36.7 (02 Aug 2021 19:00)  T(F): 98.1 (03 Aug 2021 05:18), Max: 98.1 (03 Aug 2021 05:18)  HR: 87 (03 Aug 2021 05:18) (83 - 87)  BP: 122/62 (03 Aug 2021 05:18) (120/56 - 133/84)  BP(mean): --  ABP: --  ABP(mean): --  RR: 18 (03 Aug 2021 05:18) (18 - 20)  SpO2: 93% (03 Aug 2021 05:18) (93% - 99%)        08-01 @ 07:01  -  08-02 @ 07:00  --------------------------------------------------------  IN: 1260 mL / OUT: 650 mL / NET: 610 mL    08-02 @ 07:01  -  08-03 @ 05:29  --------------------------------------------------------  IN: 800 mL / OUT: 750 mL / NET: 50 mL      CAPILLARY BLOOD GLUCOSE      POCT Blood Glucose.: 217 mg/dL (02 Aug 2021 21:22)      PHYSICAL EXAM:  General: Awake, alert, oriented X 3.   HEENT: Atraumatic, normocephalic.                 Mallampatti Grade                 No nasal congestion.                No tonsillar or pharyngeal exudates.  Lymph Nodes: No palpable lymphadenopathy  Neck: No JVD. No carotid bruit.   Respiratory: Normal chest expansion                         Normal percussion                         Normal and equal air entry                         No wheeze, rhonchi or rales.  Cardiovascular: S1 S2 normal. No murmurs, rubs or gallops.   Abdomen: Soft, non-tender, non-distended. No organomegaly. Normoactive bowel sounds.  Extremities: Warm to touch. Peripheral pulse palpable. No pedal edema.   Skin: No rashes or skin lesions  Neurological: Motor and sensory examination equal and normal in all four extremities.  Psychiatry: Appropriate mood and affect.    HOSPITAL MEDICATIONS:  MEDICATIONS  (STANDING):  aspirin  chewable 81 milliGRAM(s) Oral daily  atorvastatin 40 milliGRAM(s) Oral at bedtime  budesonide  80 MICROgram(s)/formoterol 4.5 MICROgram(s) Inhaler 2 Puff(s) Inhalation two times a day  buMETAnide 4 milliGRAM(s) Oral daily  ciprofloxacin     Tablet 250 milliGRAM(s) Oral every 12 hours  dextrose 40% Gel 15 Gram(s) Oral once  dextrose 5%. 1000 milliLiter(s) (50 mL/Hr) IV Continuous <Continuous>  dextrose 5%. 1000 milliLiter(s) (100 mL/Hr) IV Continuous <Continuous>  dextrose 50% Injectable 25 Gram(s) IV Push once  dextrose 50% Injectable 12.5 Gram(s) IV Push once  dextrose 50% Injectable 25 Gram(s) IV Push once  ferrous    sulfate 325 milliGRAM(s) Oral daily  glucagon  Injectable 1 milliGRAM(s) IntraMuscular once  heparin   Injectable 5000 Unit(s) SubCutaneous every 8 hours  insulin glargine Injectable (LANTUS) 6 Unit(s) SubCutaneous at bedtime  insulin lispro (ADMELOG) corrective regimen sliding scale   SubCutaneous three times a day before meals  insulin lispro Injectable (ADMELOG) 3 Unit(s) SubCutaneous before breakfast  insulin lispro Injectable (ADMELOG) 3 Unit(s) SubCutaneous before lunch  insulin lispro Injectable (ADMELOG) 3 Unit(s) SubCutaneous before dinner  metoprolol succinate ER 12.5 milliGRAM(s) Oral daily  polyethylene glycol 3350 17 Gram(s) Oral daily  spironolactone 12.5 milliGRAM(s) Oral daily  tiotropium 18 MICROgram(s) Capsule 1 Capsule(s) Inhalation daily  urea Oral Powder 15 Gram(s) Oral two times a day    MEDICATIONS  (PRN):  artificial  tears Solution 1 Drop(s) Both EYES every 12 hours PRN Dry Eyes  zolpidem 5 milliGRAM(s) Oral at bedtime PRN Insomnia      LABS:                        8.2    11.53 )-----------( 370      ( 02 Aug 2021 09:20 )             27.3     08-02    126<L>  |  82<L>  |  65<H>  ----------------------------<  198<H>  4.3   |  34<H>  |  1.28    Ca    9.7      02 Aug 2021 09:20  Mg     2.8     08-01                MICROBIOLOGY:     RADIOLOGY:  [ ] Reviewed and interpreted by me    Point of Care Ultrasound Findings:    PFT:    EKG: CHIEF COMPLAINT: f/up sob, fluid OL-PH WHO class 2, CHF, abnormal CT-c/w CA (RML)-some sob but overall weak    Interval Events: struct heart, nephrology    REVIEW OF SYSTEMS:  Constitutional: No fevers or chills. No weight loss. + fatigue or generalized malaise.  Eyes: No itching or discharge from the eyes  ENT: No ear pain. No ear discharge. No nasal congestion. No post nasal drip. No epistaxis. No throat pain. No sore throat. No difficulty swallowing.   CV: No chest pain. No palpitations. No lightheadedness or dizziness.   Resp: No dyspnea at rest. + dyspnea on exertion. No orthopnea. No wheezing. No cough. No stridor. No sputum production. No chest pain with respiration.  GI: No nausea. No vomiting. No diarrhea.  MSK: No joint pain or pain in any extremities  Integumentary: No skin lesions. No pedal edema.  Neurological: No gross motor weakness. No sensory changes.  [+ ] All other systems negative  [ ] Unable to assess ROS because ________    OBJECTIVE:  ICU Vital Signs Last 24 Hrs  T(C): 36.7 (03 Aug 2021 05:18), Max: 36.7 (02 Aug 2021 19:00)  T(F): 98.1 (03 Aug 2021 05:18), Max: 98.1 (03 Aug 2021 05:18)  HR: 87 (03 Aug 2021 05:18) (83 - 87)  BP: 122/62 (03 Aug 2021 05:18) (120/56 - 133/84)  BP(mean): --  ABP: --  ABP(mean): --  RR: 18 (03 Aug 2021 05:18) (18 - 20)  SpO2: 93% (03 Aug 2021 05:18) (93% - 99%)        08-01 @ 07:01  -  08-02 @ 07:00  --------------------------------------------------------  IN: 1260 mL / OUT: 650 mL / NET: 610 mL    08-02 @ 07:01 - 08-03 @ 05:29  --------------------------------------------------------  IN: 800 mL / OUT: 750 mL / NET: 50 mL      CAPILLARY BLOOD GLUCOSE      POCT Blood Glucose.: 217 mg/dL (02 Aug 2021 21:22)      PHYSICAL EXAM:  General: Awake, alert, oriented X 3.   HEENT: Atraumatic, normocephalic.                 Mallampatti Grade 3                No nasal congestion.                No tonsillar or pharyngeal exudates.  Lymph Nodes: No palpable lymphadenopathy  Neck: No JVD. No carotid bruit.   Respiratory: Normal chest expansion                         Normal percussion                         Normal and equal air entry                         No wheeze, rhonchi but bibasilar rales.  Cardiovascular: S1 S2 normal. + murmurs, rubs or gallops.   Abdomen: Soft, non-tender, non-distended. No organomegaly. Normoactive bowel sounds.  Extremities: Warm to touch. Peripheral pulse palpable. + pedal edema.   Skin: No rashes or skin lesions  Neurological: Motor and sensory examination equal and normal in all four extremities.  Psychiatry: Appropriate mood and affect.    HOSPITAL MEDICATIONS:  MEDICATIONS  (STANDING):  aspirin  chewable 81 milliGRAM(s) Oral daily  atorvastatin 40 milliGRAM(s) Oral at bedtime  budesonide  80 MICROgram(s)/formoterol 4.5 MICROgram(s) Inhaler 2 Puff(s) Inhalation two times a day  buMETAnide 4 milliGRAM(s) Oral daily  ciprofloxacin     Tablet 250 milliGRAM(s) Oral every 12 hours  dextrose 40% Gel 15 Gram(s) Oral once  dextrose 5%. 1000 milliLiter(s) (50 mL/Hr) IV Continuous <Continuous>  dextrose 5%. 1000 milliLiter(s) (100 mL/Hr) IV Continuous <Continuous>  dextrose 50% Injectable 25 Gram(s) IV Push once  dextrose 50% Injectable 12.5 Gram(s) IV Push once  dextrose 50% Injectable 25 Gram(s) IV Push once  ferrous    sulfate 325 milliGRAM(s) Oral daily  glucagon  Injectable 1 milliGRAM(s) IntraMuscular once  heparin   Injectable 5000 Unit(s) SubCutaneous every 8 hours  insulin glargine Injectable (LANTUS) 6 Unit(s) SubCutaneous at bedtime  insulin lispro (ADMELOG) corrective regimen sliding scale   SubCutaneous three times a day before meals  insulin lispro Injectable (ADMELOG) 3 Unit(s) SubCutaneous before breakfast  insulin lispro Injectable (ADMELOG) 3 Unit(s) SubCutaneous before lunch  insulin lispro Injectable (ADMELOG) 3 Unit(s) SubCutaneous before dinner  metoprolol succinate ER 12.5 milliGRAM(s) Oral daily  polyethylene glycol 3350 17 Gram(s) Oral daily  spironolactone 12.5 milliGRAM(s) Oral daily  tiotropium 18 MICROgram(s) Capsule 1 Capsule(s) Inhalation daily  urea Oral Powder 15 Gram(s) Oral two times a day    MEDICATIONS  (PRN):  artificial  tears Solution 1 Drop(s) Both EYES every 12 hours PRN Dry Eyes  zolpidem 5 milliGRAM(s) Oral at bedtime PRN Insomnia      LABS:                        8.2    11.53 )-----------( 370      ( 02 Aug 2021 09:20 )             27.3     08-02    126<L>  |  82<L>  |  65<H>  ----------------------------<  198<H>  4.3   |  34<H>  |  1.28    Ca    9.7      02 Aug 2021 09:20  Mg     2.8     08-01                MICROBIOLOGY:     RADIOLOGY:  [ ] Reviewed and interpreted by me    Point of Care Ultrasound Findings:    PFT:    EKG:

## 2021-08-03 NOTE — PROGRESS NOTE ADULT - SUBJECTIVE AND OBJECTIVE BOX
Jacque Riley MD  Cardiology Fellow  [phone]  All Cardiology service information can be found 24/7 on [email or web address], password: [email or web address]    Patient seen and examined at bedside.    Overnight Events:     Review Of Systems: No chest pain, shortness of breath, or palpitations            Current Meds:  artificial  tears Solution 1 Drop(s) Both EYES every 12 hours PRN  aspirin  chewable 81 milliGRAM(s) Oral daily  atorvastatin 40 milliGRAM(s) Oral at bedtime  budesonide  80 MICROgram(s)/formoterol 4.5 MICROgram(s) Inhaler 2 Puff(s) Inhalation two times a day  buMETAnide 4 milliGRAM(s) Oral daily  ciprofloxacin     Tablet 250 milliGRAM(s) Oral every 12 hours  dextrose 40% Gel 15 Gram(s) Oral once  dextrose 5%. 1000 milliLiter(s) IV Continuous <Continuous>  dextrose 5%. 1000 milliLiter(s) IV Continuous <Continuous>  dextrose 50% Injectable 25 Gram(s) IV Push once  dextrose 50% Injectable 12.5 Gram(s) IV Push once  dextrose 50% Injectable 25 Gram(s) IV Push once  ferrous    sulfate 325 milliGRAM(s) Oral daily  glucagon  Injectable 1 milliGRAM(s) IntraMuscular once  heparin   Injectable 5000 Unit(s) SubCutaneous every 8 hours  insulin glargine Injectable (LANTUS) 6 Unit(s) SubCutaneous at bedtime  insulin lispro (ADMELOG) corrective regimen sliding scale   SubCutaneous three times a day before meals  insulin lispro Injectable (ADMELOG) 3 Unit(s) SubCutaneous before breakfast  insulin lispro Injectable (ADMELOG) 3 Unit(s) SubCutaneous before lunch  insulin lispro Injectable (ADMELOG) 3 Unit(s) SubCutaneous before dinner  metoprolol succinate ER 12.5 milliGRAM(s) Oral daily  polyethylene glycol 3350 17 Gram(s) Oral daily  spironolactone 12.5 milliGRAM(s) Oral daily  tiotropium 18 MICROgram(s) Capsule 1 Capsule(s) Inhalation daily  urea Oral Powder 15 Gram(s) Oral two times a day  zolpidem 5 milliGRAM(s) Oral at bedtime PRN      Vitals:  T(F): 98.1 (08-03), Max: 98.1 (08-03)  HR: 87 (08-03) (83 - 87)  BP: 122/62 (08-03) (120/56 - 133/84)  RR: 18 (08-03)  SpO2: 93% (08-03)  I&O's Summary    02 Aug 2021 07:01  -  03 Aug 2021 07:00  --------------------------------------------------------  IN: 1000 mL / OUT: 750 mL / NET: 250 mL        Physical Exam:  Appearance: No acute distress; well appearing  Eyes: PERRL, EOMI, pink conjunctiva  HEENT: Normal oral mucosa  Cardiovascular: RRR, S1, S2, no murmurs, rubs, or gallops; no edema; no JVD  Respiratory: Clear to auscultation bilaterally  Gastrointestinal: soft, non-tender, non-distended with normal bowel sounds  Musculoskeletal: No clubbing; no joint deformity   Neurologic: Non-focal  Lymphatic: No lymphadenopathy  Psychiatry: AAOx3, mood & affect appropriate  Skin: No rashes, ecchymoses, or cyanosis                          7.6    14.45 )-----------( 342      ( 03 Aug 2021 05:49 )             24.9     08-03    123<L>  |  83<L>  |  80<H>  ----------------------------<  150<H>  4.2   |  28  |  1.65<H>    Ca    9.8      03 Aug 2021 05:49            Serum Pro-Brain Natriuretic Peptide: 2398 pg/mL (07-30 @ 09:25)          New ECG(s): Personally reviewed    Echo:    Stress Testing:     Cath:    Imaging:    Interpretation of Telemetry:   Jacque Riley MD  Cardiology Fellow  [phone]  All Cardiology service information can be found 24/7 on [email or web address], password: [email or web address]    Patient seen and examined at bedside.    Overnight Events: patient still SOB when sitting and walking    Review Of Systems: SOB+  Current Meds:  artificial  tears Solution 1 Drop(s) Both EYES every 12 hours PRN  aspirin  chewable 81 milliGRAM(s) Oral daily  atorvastatin 40 milliGRAM(s) Oral at bedtime  budesonide  80 MICROgram(s)/formoterol 4.5 MICROgram(s) Inhaler 2 Puff(s) Inhalation two times a day  buMETAnide 4 milliGRAM(s) Oral daily  ciprofloxacin     Tablet 250 milliGRAM(s) Oral every 12 hours  dextrose 40% Gel 15 Gram(s) Oral once  dextrose 5%. 1000 milliLiter(s) IV Continuous <Continuous>  dextrose 5%. 1000 milliLiter(s) IV Continuous <Continuous>  dextrose 50% Injectable 25 Gram(s) IV Push once  dextrose 50% Injectable 12.5 Gram(s) IV Push once  dextrose 50% Injectable 25 Gram(s) IV Push once  ferrous    sulfate 325 milliGRAM(s) Oral daily  glucagon  Injectable 1 milliGRAM(s) IntraMuscular once  heparin   Injectable 5000 Unit(s) SubCutaneous every 8 hours  insulin glargine Injectable (LANTUS) 6 Unit(s) SubCutaneous at bedtime  insulin lispro (ADMELOG) corrective regimen sliding scale   SubCutaneous three times a day before meals  insulin lispro Injectable (ADMELOG) 3 Unit(s) SubCutaneous before breakfast  insulin lispro Injectable (ADMELOG) 3 Unit(s) SubCutaneous before lunch  insulin lispro Injectable (ADMELOG) 3 Unit(s) SubCutaneous before dinner  metoprolol succinate ER 12.5 milliGRAM(s) Oral daily  polyethylene glycol 3350 17 Gram(s) Oral daily  spironolactone 12.5 milliGRAM(s) Oral daily  tiotropium 18 MICROgram(s) Capsule 1 Capsule(s) Inhalation daily  urea Oral Powder 15 Gram(s) Oral two times a day  zolpidem 5 milliGRAM(s) Oral at bedtime PRN      Vitals:  T(F): 98.1 (08-03), Max: 98.1 (08-03)  HR: 87 (08-03) (83 - 87)  BP: 122/62 (08-03) (120/56 - 133/84)  RR: 18 (08-03)  SpO2: 93% (08-03)  I&O's Summary    02 Aug 2021 07:01  -  03 Aug 2021 07:00  --------------------------------------------------------  IN: 1000 mL / OUT: 750 mL / NET: 250 mL        Physical Exam:  Appearance: No acute distress; well appearing  Eyes: PERRL, EOMI, pink conjunctiva  HEENT: Normal oral mucosa  Cardiovascular: RRR, S1, S2, no murmurs, rubs, or gallops; JVD up to earlobe, HJR+  Respiratory: decreased breath sounds at the bases  Gastrointestinal: soft, non-tender, non-distended with normal bowel sounds  Musculoskeletal: 1+ pitting edema   Neurologic: Non-focal  Lymphatic: No lymphadenopathy  Psychiatry: AAOx3, mood & affect appropriate                        7.6    14.45 )-----------( 342      ( 03 Aug 2021 05:49 )             24.9     08-03    123<L>  |  83<L>  |  80<H>  ----------------------------<  150<H>  4.2   |  28  |  1.65<H>    Ca    9.8      03 Aug 2021 05:49            Serum Pro-Brain Natriuretic Peptide: 2398 pg/mL (07-30 @ 09:25)            Interpretation of Telemetry:AF 70-80

## 2021-08-03 NOTE — PROGRESS NOTE ADULT - SUBJECTIVE AND OBJECTIVE BOX
HPI:  Patient seen and examined at bedside on 2 [place].  Worsening hyponatremia and renal insufficiency.    Review Of Systems:           Respiratory: No shortness of breath, cough, or wheezing  Cardiovascular: No chest pain or palpitations  10 point review of systems is otherwise negative except as mentioned above        Medications:  artificial  tears Solution 1 Drop(s) Both EYES every 12 hours PRN  aspirin  chewable 81 milliGRAM(s) Oral daily  atorvastatin 40 milliGRAM(s) Oral at bedtime  budesonide  80 MICROgram(s)/formoterol 4.5 MICROgram(s) Inhaler 2 Puff(s) Inhalation two times a day  buMETAnide 4 milliGRAM(s) Oral daily  ciprofloxacin     Tablet 250 milliGRAM(s) Oral daily  dextrose 40% Gel 15 Gram(s) Oral once  dextrose 50% Injectable 25 Gram(s) IV Push once  dextrose 50% Injectable 12.5 Gram(s) IV Push once  dextrose 50% Injectable 25 Gram(s) IV Push once  ferrous    sulfate 325 milliGRAM(s) Oral daily  glucagon  Injectable 1 milliGRAM(s) IntraMuscular once  heparin   Injectable 5000 Unit(s) SubCutaneous every 8 hours  insulin glargine Injectable (LANTUS) 6 Unit(s) SubCutaneous at bedtime  insulin lispro (ADMELOG) corrective regimen sliding scale   SubCutaneous three times a day before meals  insulin lispro Injectable (ADMELOG) 3 Unit(s) SubCutaneous before breakfast  insulin lispro Injectable (ADMELOG) 3 Unit(s) SubCutaneous before lunch  insulin lispro Injectable (ADMELOG) 3 Unit(s) SubCutaneous before dinner  metoprolol succinate ER 12.5 milliGRAM(s) Oral daily  polyethylene glycol 3350 17 Gram(s) Oral daily  tiotropium 18 MICROgram(s) Capsule 1 Capsule(s) Inhalation daily  urea Oral Powder 30 Gram(s) Oral every 12 hours  zolpidem 5 milliGRAM(s) Oral at bedtime PRN    PAST MEDICAL & SURGICAL HISTORY:    Vitals:  T(C): 36.4 (08-03-21 @ 20:55), Max: 36.7 (08-03-21 @ 05:18)  HR: 88 (08-03-21 @ 20:55) (81 - 88)  BP: 114/57 (08-03-21 @ 20:55) (114/57 - 122/62)  BP(mean): 76 (08-03-21 @ 20:55) (76 - 76)  RR: 18 (08-03-21 @ 20:55) (18 - 18)  SpO2: 94% (08-03-21 @ 20:55) (93% - 95%)  Wt(kg): --  Daily     Daily   I&O's Summary    02 Aug 2021 07:01  -  03 Aug 2021 07:00  --------------------------------------------------------  IN: 1000 mL / OUT: 750 mL / NET: 250 mL    03 Aug 2021 07:01  -  03 Aug 2021 23:14  --------------------------------------------------------  IN: 740 mL / OUT: 900 mL / NET: -160 mL        Physical Exam:  Appearance: Normal, well groomed, NAD  Eyes: PERRLA, EOMI, pink conjunctiva, no scleral icterus   HENT: Normal oral mucosa  Cardiovascular: RRR, S1, S2, NO systolic murmur at base  Procedural Access Site: Clean, dry, intact, without hematoma  Respiratory: Clear to auscultation bilaterally  Gastrointestinal: Soft, non-tender, non-distended, BS+  Musculoskeletal: No clubbing or joint deformity   Neurologic: No focal weakness  Lymphatic: No lymphadenopathy  Psychiatry: AAOx3 with appropriate mood and affect  Skin: No rashes, ecchymoses, or cyanosis                          7.6    14.45 )-----------( 342      ( 03 Aug 2021 05:49 )             24.9     08-03    123<L>  |  83<L>  |  80<H>  ----------------------------<  150<H>  4.2   |  28  |  1.65<H>    Ca    9.8      03 Aug 2021 05:49            Serum Pro-Brain Natriuretic Peptide: 2398 pg/mL (07-30 @ 09:25)        Cardiovascular Diagnostic Testing:  ECG: rate controlled AFib    Imaging: < from: CT Heart without Coronaries w/ IV Cont (07.02.21 @ 08:51) >  FINDINGS:    Non-Coronary:    6 mm hypodense nodule within the right lobe of the thyroid gland. No enlarged axillary, mediastinal lymph nodes. No pleural effusions.    Evaluation of the lungs demonstrate left lower lobe masslike opacity on the part of which measures about 5 x 2.3 cm extending to the left lower lobe hilum with associated left lower lobe volume loss related to some superimposed atelectasis. Multiple left lung pulmonary nodule along the pleural surface measuring up to about 7 mm. Right middle lobe 3.2 x 2.6 cm mass associated with the minor fissure which contains a few small nodules measuring up to 7 mm. The right middle lobe mass has a cystic component along its inferior aspect.    Subcentimeter hypodensity within the liver is too small to characterize. The gallbladder, spleen, adrenal glands are unremarkable. Few pancreatic hypodensities are noted with the largest measuring about 1.5 cm on image 84 of series 19. Bilateral renal cysts.    Urinary bladder is normal. The uterus and adnexa are within normal limits. Status post rectal surgery with postoperative changes. No bowel obstruction or medial air. Appendix is normal.    Degenerative changes of the spine. Moderate to severe loss of height of the T5 and severe loss of height of the T6 vertebral bodies are of indeterminate age.    The heart is enlarged.  The left and right atria are severely enlarged.  There is mitral annular calcification and calcification of the mitral valve leaflets.   There is reduced contrast opacification of the left atrial appendage that resolves on delay imaging suggestive of mixing artifact.    The ascending aorta is mildly calcified. The aortic arch and the descending aorta are severely calcified.   The main pulmonary artery measures approximately 26.4 mm at the level of the ascending aorta.   The right and left pulmonary arteries appear enlarged and measure approximately 29.4 mm and 25.4 mm, respectively.    The aortic valve is calcified. The calcium score of the aortic valve is 1870.    Coronary:  Coronary arterial calcifications are noted; however, this studywas not optimized for evaluation of the coronary arteries.  Please refer to cardiac catheterization performed as part of pre-TAVR work-up.    Aortic Root Measurements    Major aortic annulus diameter (systole, mm): 25.2  Perpendicular minor aortic annulus diameter (systole, mm): 19.1  Aortic annulus perimeter (systole, mm): 76.6  Aortic annulus area (systole, mm2): 438  LVOT minimum diameter (systole, mm): 19.6  LVOT 90 cross (systole, mm): 26.4  LVOT calcification:  Severe  Distance from Aortic annulus to Left Main coronary artery (diastole, mm): 13.2  Distance from Aortic annulus to Right Coronary artery (diastole, mm): 16.7  Sinus of Valsalva diameter, Right coronary (diastole, mm): 29.4  Sinus of Valsalva diameter, Left coronary (diastole, mm): 31.0  Sinus of Valsalva diameter, Non coronary (diastole, mm): 28.3  Diameter at the sinotubular junction (diastole, mm): 26.5 x 26.5  Maximum ascending aorta diameter at 40 mm above annulus (diastole, mm): 32.3  Aortic root angulation (diastole, degrees): 48.4  Aortic root calcification: Moderate-to-severe    Abdominal Aorta:        Minimum lumen diameter (MLD) (mm): 12.8        Diameter perpendicular to MLD (mm): 13.2  Left Femoral:        Minimum Lumen Diameter (mm): 7.2        Diameter perpendicular to MLD (mm): 8.1        Tortuosity: Mild        Calcification: Mild  Right Femoral:        Minimum Lumen Diameter (mm): 6.5        Diameter perpendicular to MLD (mm): 7.1        Tortuosity: Mild        Calcification: Mild  Left External Iliac:        Minimum Lumen Diameter (mm): 6.9        Diameter perpendicular to MLD (mm): 7.5        Tortuosity: Moderate        Calcification: Mild  Left Common Iliac:         Minimum Lumen Diameter (mm): 4.5        Diameter perpendicular to MLD (mm): 8.0        Tortuosity: Severe        Calcification: Severe  Right External Iliac:        Minimum Lumen Diameter (mm): 5.0        Diameter perpendicular to MLD (mm): 7.4        Tortuosity: Moderate        Calcification: Mild  Right Common Iliac:    Minimum Lumen Diameter (mm): 7.8        Diameter perpendicular to MLD (mm): 8.4        Tortuosity: Moderate        Calcification: Moderate  L Subclavian/Axillary: Not well visualized due to motion artifact        Minimum Lumen Diameter (mm): 5.6      Diameter perpendicular to MLD (mm): 6.2        Tortuosity: Mild        Calcification: Mild  R Subclavian/Axillary:        Minimum Lumen Diameter (mm): 4.3        Diameter perpendicular to MLD (mm): 5.1        Tortuosity: Mild        Calcification: Mild    IMPRESSION:  1. Calcified aortic valve. The calcium score of the aortic valve is 1870.  2. Please see the body of the report for aortic and peripheral access vessel measurements.  3. Dominant large left lower lobe mass associated with multiple left lung pleural nodules worrisome for neoplasm with metastatic disease.  4. 3.2 x 2.6 cm right middle lobe mass as described above also is worrisome for neoplasm.  5. A few pancreatic hypodense nodules. Dedicated contrast enhanced pancreatic MRI is recommended for complete evaluation.  6. Compression fracture deformities of the T5 and T6 vertebral bodies as described above are of indeterminate age.      KHLOE HAINES MD; Attending Cardiologist  This document has been electronically signed.  MONICA HANEY MD; Attending Radiologist  This document has been electronically signed. Jul 6 2021  9:12AM    < end of copied text >    CATH:  < from: Cardiac Cath Lab - Adult (07.14.21 @ 17:21) >  CORONARY VESSELS: The coronary circulation is right dominant.  LM:   --  LM: Normal.  LAD:   --  Proximal LAD: There was a discrete 40 % stenosis.  --  Mid LAD: There was a tubular 25 % stenosis.  --  Distal LAD: Normal.  CX:   --  Proximal circumflex: There was a tubular 20 % stenosis.  --  Mid circumflex: There was a diffuse 50 % stenosis.  RCA:   --  Proximal RCA: There was a diffuse 30 % stenosis.  --  Mid RCA: There was a diffuse 20 % in-stent restenosis through the prior  stent.  --  Distal RCA: Normal.  --  RPDA: Normal.  --  RPLS: Normal.  COMPLICATIONS: There were no complications.  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  Normal left main coronary artery  Right dominant system  Elevated right atrial pressure (mRA 16mmHg with a V wave of 23mmHg)  Moderate post-capillary pulmonary hypertension (sPAP 58mmHg, dPAP 23mmHg,  mPAP 37mmHg)  PCWP = 28mmHg with a V wave of 48mmHg  PAsat = 67.1% // AOsat = 99.2% (room air)  [place] // CI = 6.07l/min // 3.56l/min/m2  DIAGNOSTIC RECOMMENDATIONS: Keep right leg straigh for 4 hours following  removal of sheaths  Continue aggressive medical management of coronary artery disease and  associated risk factors  Closely monitor the patients kidney function  Patient being evaluated by the [facility] valve team for TAVR  Findings discussed with Dr. 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  Normal left main coronary artery  Right dominant system  Elevated right atrial pressure (mRA 16mmHg with a V wave of 23mmHg)  Moderate post-capillary pulmonary hypertension (sPAP 58mmHg, dPAP 23mmHg,  mPAP 37mmHg)  PCWP = 28mmHg with a V wave of 48mmHg  PAsat = 67.1% // AOsat = 99.2% (room air)  [place] // CI = 6.07l/min // 3.56l/min/m2  INTERVENTIONAL RECOMMENDATIONS: Keep right leg straigh for 4 hours  following removal of sheaths  Continue aggressive medical management of coronary artery disease and  associated risk factors  Closely monitor the patients kidney function  Patient being evaluated by the [facility] valve team for TAVR  Findings discussed with Dr. Hodges  Prepared and signed by  Alejandro Johns MD  Signed 07/14/2021 18:37:05    < end of copied text >

## 2021-08-03 NOTE — PROGRESS NOTE ADULT - ASSESSMENT
87 year-old woman with known history of colon ca (remote), now with lung mass that is concerning for neoplasm, also with atrial fibrillation, previously on AC, recently stopped due to anemia, now POD 11 status post TAVR.  Procedure was well tolerated, but now grossly volume overloaded with dyspnea at rest.  Patient now short of breath, seen to have pulmonary edema on CXR.  Unclear etiology of shortness of breath and hyponatremia, but HFpEF is contributing.     Continue loop diuretics. Keep O > I, K > 4.0, Mag > 2.0.    ASA, statin, and beta-blocker as tolerated.  Avoid nephrotoxic agents.    Monitor tele for any evidence of heart block.    Discussed with Monserrat Sherman MD - will plan for RHC to better assess volume status.

## 2021-08-04 NOTE — PROGRESS NOTE ADULT - PROBLEM SELECTOR PLAN 1
Pt. with hyponatremia to 127 in setting of volume overload.   Last ECHO 7/26 Small pericardial effusion, There is no significant inflow variation measured across the mitral valve, but it is significant across the tricuspid valve. The IVC is enlarged (3.36cm)  and not collapsable No evidence of right atrial or right ventricular collapse.  s/p Tolvaptan 15mg x1 on 7/28 : with no change in sodium   on bumex po 4mg bid, s/p extra dose   IV   -sodium remains low  on bumex now 4mg po daily, change to bid  on urnena 15g bid-->increased urena today to 30 g bid, bun increasing now, would decrease back to 15 po bid  rest of management per RHC- if pt needs fluid or pt needs IV diuresis  check u osm, u na, serum u acid  strict I/Os  monitor kidney function and urine output   Monitor serum sodium Q12 hours.   Do not correct serum sodium >6-8 meq/day

## 2021-08-04 NOTE — PROGRESS NOTE ADULT - SUBJECTIVE AND OBJECTIVE BOX
*****Structural Heart Team*****    Subjective:    Patient resting in chair, stating she feels tired, otherwise ok.    PAST MEDICAL & SURGICAL HISTORY:        T(C): 36.4 (08-04-21 @ 04:18), Max: 36.4 (08-03-21 @ 12:21)  HR: 90 (08-04-21 @ 04:18) (81 - 90)  BP: 118/70 (08-04-21 @ 04:18) (114/57 - 118/70)  RR: 18 (08-04-21 @ 04:18) (18 - 18)  SpO2: 91% (08-04-21 @ 04:18) (91% - 95%)  Wt(kg): --  08-03 @ 07:01  -  08-04 @ 07:00  --------------------------------------------------------  IN: 1190 mL / OUT: 1450 mL / NET: -260 mL    08-04 @ 07:01  -  08-04 @ 12:08  --------------------------------------------------------  IN: 200 mL / OUT: 0 mL / NET: 200 mL      MEDICATIONS  (STANDING):  aspirin  chewable 81 milliGRAM(s) Oral daily  atorvastatin 40 milliGRAM(s) Oral at bedtime  budesonide  80 MICROgram(s)/formoterol 4.5 MICROgram(s) Inhaler 2 Puff(s) Inhalation two times a day  buMETAnide 4 milliGRAM(s) Oral daily  ciprofloxacin     Tablet 250 milliGRAM(s) Oral daily  dextrose 40% Gel 15 Gram(s) Oral once  dextrose 50% Injectable 25 Gram(s) IV Push once  dextrose 50% Injectable 12.5 Gram(s) IV Push once  dextrose 50% Injectable 25 Gram(s) IV Push once  ferrous    sulfate 325 milliGRAM(s) Oral daily  glucagon  Injectable 1 milliGRAM(s) IntraMuscular once  heparin   Injectable 5000 Unit(s) SubCutaneous every 8 hours  insulin glargine Injectable (LANTUS) 6 Unit(s) SubCutaneous at bedtime  insulin lispro (ADMELOG) corrective regimen sliding scale   SubCutaneous three times a day before meals  insulin lispro Injectable (ADMELOG) 3 Unit(s) SubCutaneous before breakfast  insulin lispro Injectable (ADMELOG) 3 Unit(s) SubCutaneous before lunch  insulin lispro Injectable (ADMELOG) 3 Unit(s) SubCutaneous before dinner  metoprolol succinate ER 12.5 milliGRAM(s) Oral daily  polyethylene glycol 3350 17 Gram(s) Oral daily  tiotropium 18 MICROgram(s) Capsule 1 Capsule(s) Inhalation daily  urea Oral Powder 30 Gram(s) Oral every 12 hours    MEDICATIONS  (PRN):  artificial  tears Solution 1 Drop(s) Both EYES every 12 hours PRN Dry Eyes  zolpidem 5 milliGRAM(s) Oral at bedtime PRN Insomnia      Review of Symptoms:  Constitutional: Awake, Alert, Follows commands  Respiratory: Mild SOB  Cardiac: Denies CP, Denies Palpitations  Gastrointestinal: Denies Pain, Denies N/V, tolerating po intake  Vascular: Negative  Extremities: + Edema, No joint pain or swelling  Neurological: Negative  Endocrine: No heat or cold intolerance, No excessive thirst  Heme/Onc: Negative    Exam:  General: A/Ox3, Shaye, NAD  HEENT: Supple, No JVD, Trachea midline, no masses  Pulmonary: CTAB, = Chest Excursion, no accessory muscle use  Cor: S1S2, RRR, No murmur noted  ECG: SR  Groin/Wound: Soft, no hematoma  Gastrointestinal: Soft, NT/ND, + Bowel Sounds  Neuro: = motor and sensory B/L, No focal deficits  Vascular: 1+ pulses B/L, Trace Edema  Extremities: No joint pain or swelling  Skin: Warm/Dry/Normal color, Normal turgor, no rashes                          7.5    12.24 )-----------( 328      ( 04 Aug 2021 04:15 )             24.9   08-04    123<L>  |  84<L>  |  104<H>  ----------------------------<  194<H>  4.0   |  28  |  1.27    Ca    9.9      04 Aug 2021 04:15        Imaging Reviewed:    Post Op TTE:      Assesment/Plan:    87 Female s/p TAVR for Severe Aortic Stenosis,     1.) S/P TAVR: ASA 81 mg po daily, Continue to Monitor Groins. Ambulate as tolerated.   2.) Patient will be discharged on an MCOT for a period of 30days to monitor for rhythm changes post TAVR, which was discussed at length with the patient, and any questions were answered.  3.) Chronic Diastolic Heart FAilure: Monitor I and O's. Continue bumetanide. Plan for Right heart catheterization today to evaluate function and fluid status  4.) Walk as tolerated  5.) Discharge Plan: Patient is to follow up with Dr. Pruitt in 1 week post discharge. She should then follow up with the [facility]ve [facility]  in 30 days, with a repeat Transthoracic Echo to be done at that visit.      QUITA Craig  [id number]

## 2021-08-04 NOTE — PROGRESS NOTE ADULT - PROBLEM SELECTOR PLAN 2
BUN/cr elevated today  cr increased 1.2 to 1.65, now downtrending and bun put of proportion to increase in cr  likley bun uptrend urena related  check bladder scan

## 2021-08-04 NOTE — PROGRESS NOTE ADULT - SUBJECTIVE AND OBJECTIVE BOX
VITAL SIGNS    Telemetry:  afib 70-90  Vital Signs Last 24 Hrs  T(C): 36.8 (21 @ 12:11), Max: 36.8 (21 @ 12:11)  T(F): 98.2 (21 @ 12:11), Max: 98.2 (21 @ 12:11)  HR: 86 (21 @ 12:11) (86 - 90)  BP: 106/66 (21 @ 12:11) (106/66 - 118/70)  RR: 18 (21 @ 12:11) (18 - 18)  SpO2: 96% (21 @ 12:11) (91% - 96%)            08-03 @ 07:01  -   @ 07:00  --------------------------------------------------------  IN: 1190 mL / OUT: 1450 mL / NET: -260 mL    08 @ 07:01  -   @ 14:28  --------------------------------------------------------  IN: 400 mL / OUT: 600 mL / NET: -200 mL       Daily     Daily Weight in k.8 (04 Aug 2021 08:12)  Admit Wt: Drug Dosing Weight  Height (cm): 152.4 (2021 11:15)  Weight (kg): 72.4 (2021 11:15)  BMI (kg/m2): 31.2 (2021 11:15)  BSA (m2): 1.7 (2021 11:15)      CAPILLARY BLOOD GLUCOSE      POCT Blood Glucose.: 178 mg/dL (04 Aug 2021 11:16)  POCT Blood Glucose.: 249 mg/dL (04 Aug 2021 07:22)  POCT Blood Glucose.: 228 mg/dL (03 Aug 2021 22:50)  POCT Blood Glucose.: 163 mg/dL (03 Aug 2021 17:56)          MEDICATIONS  artificial  tears Solution 1 Drop(s) Both EYES every 12 hours PRN  aspirin  chewable 81 milliGRAM(s) Oral daily  atorvastatin 40 milliGRAM(s) Oral at bedtime  budesonide  80 MICROgram(s)/formoterol 4.5 MICROgram(s) Inhaler 2 Puff(s) Inhalation two times a day  buMETAnide 4 milliGRAM(s) Oral daily  ciprofloxacin     Tablet 250 milliGRAM(s) Oral daily  dextrose 40% Gel 15 Gram(s) Oral once  dextrose 50% Injectable 25 Gram(s) IV Push once  dextrose 50% Injectable 12.5 Gram(s) IV Push once  dextrose 50% Injectable 25 Gram(s) IV Push once  ferrous    sulfate 325 milliGRAM(s) Oral daily  glucagon  Injectable 1 milliGRAM(s) IntraMuscular once  heparin   Injectable 5000 Unit(s) SubCutaneous every 8 hours  insulin glargine Injectable (LANTUS) 6 Unit(s) SubCutaneous at bedtime  insulin lispro (ADMELOG) corrective regimen sliding scale   SubCutaneous three times a day before meals  insulin lispro Injectable (ADMELOG) 3 Unit(s) SubCutaneous before breakfast  insulin lispro Injectable (ADMELOG) 3 Unit(s) SubCutaneous before lunch  insulin lispro Injectable (ADMELOG) 3 Unit(s) SubCutaneous before dinner  metoprolol succinate ER 12.5 milliGRAM(s) Oral daily  polyethylene glycol 3350 17 Gram(s) Oral daily  tiotropium 18 MICROgram(s) Capsule 1 Capsule(s) Inhalation daily  urea Oral Powder 30 Gram(s) Oral every 12 hours  zolpidem 5 milliGRAM(s) Oral at bedtime PRN      >>> <<<  PHYSICAL EXAM  Subjective: NAD  Neurology: alert and oriented x 3, nonfocal, no gross deficits  CV : s1s2  Lungs: CTA b/l  Abdomen: soft, NT,ND, ( +)BM  :  voiding no hematoma to b/l groin  Extremities: -c/c/e      LABS  -    123<L>  |  84<L>  |  104<H>  ----------------------------<  194<H>  4.0   |  28  |  1.27    Ca    9.9      04 Aug 2021 04:15                                   7.5    12.24 )-----------( 328      ( 04 Aug 2021 04:15 )             24.9                 PAST MEDICAL & SURGICAL HISTORY:        VITAL SIGNS    Telemetry:  afib 70-90  Vital Signs Last 24 Hrs  T(C): 36.8 (21 @ 12:11), Max: 36.8 (21 @ 12:11)  T(F): 98.2 (21 @ 12:11), Max: 98.2 (21 @ 12:11)  HR: 86 (21 @ 12:11) (86 - 90)  BP: 106/66 (21 @ 12:11) (106/66 - 118/70)  RR: 18 (21 @ 12:11) (18 - 18)  SpO2: 96% (21 @ 12:11) (91% - 96%)            08-03 @ 07:01  -   @ 07:00  --------------------------------------------------------  IN: 1190 mL / OUT: 1450 mL / NET: -260 mL    08 @ 07:01  -   @ 14:28  --------------------------------------------------------  IN: 400 mL / OUT: 600 mL / NET: -200 mL       Daily     Daily Weight in k.8 (04 Aug 2021 08:12)  Admit Wt: Drug Dosing Weight  Height (cm): 152.4 (2021 11:15)  Weight (kg): 72.4 (2021 11:15)  BMI (kg/m2): 31.2 (2021 11:15)  BSA (m2): 1.7 (2021 11:15)      CAPILLARY BLOOD GLUCOSE      POCT Blood Glucose.: 178 mg/dL (04 Aug 2021 11:16)  POCT Blood Glucose.: 249 mg/dL (04 Aug 2021 07:22)  POCT Blood Glucose.: 228 mg/dL (03 Aug 2021 22:50)  POCT Blood Glucose.: 163 mg/dL (03 Aug 2021 17:56)          MEDICATIONS  artificial  tears Solution 1 Drop(s) Both EYES every 12 hours PRN  aspirin  chewable 81 milliGRAM(s) Oral daily  atorvastatin 40 milliGRAM(s) Oral at bedtime  budesonide  80 MICROgram(s)/formoterol 4.5 MICROgram(s) Inhaler 2 Puff(s) Inhalation two times a day  buMETAnide 4 milliGRAM(s) Oral daily  ciprofloxacin     Tablet 250 milliGRAM(s) Oral daily  dextrose 40% Gel 15 Gram(s) Oral once  dextrose 50% Injectable 25 Gram(s) IV Push once  dextrose 50% Injectable 12.5 Gram(s) IV Push once  dextrose 50% Injectable 25 Gram(s) IV Push once  ferrous    sulfate 325 milliGRAM(s) Oral daily  glucagon  Injectable 1 milliGRAM(s) IntraMuscular once  heparin   Injectable 5000 Unit(s) SubCutaneous every 8 hours  insulin glargine Injectable (LANTUS) 6 Unit(s) SubCutaneous at bedtime  insulin lispro (ADMELOG) corrective regimen sliding scale   SubCutaneous three times a day before meals  insulin lispro Injectable (ADMELOG) 3 Unit(s) SubCutaneous before breakfast  insulin lispro Injectable (ADMELOG) 3 Unit(s) SubCutaneous before lunch  insulin lispro Injectable (ADMELOG) 3 Unit(s) SubCutaneous before dinner  metoprolol succinate ER 12.5 milliGRAM(s) Oral daily  polyethylene glycol 3350 17 Gram(s) Oral daily  tiotropium 18 MICROgram(s) Capsule 1 Capsule(s) Inhalation daily  urea Oral Powder 30 Gram(s) Oral every 12 hours  zolpidem 5 milliGRAM(s) Oral at bedtime PRN      >>> <<<  PHYSICAL EXAM  Subjective: NAD, general weakness  Neurology: alert and oriented x 3, nonfocal, no gross deficits  CV : s1s2  Lungs: CTA b/l  Abdomen: soft, NT,ND, ( +)BM  :  voiding no hematoma to b/l groin  Extremities: -c/c/+2 edema      LABS  -    123<L>  |  84<L>  |  104<H>  ----------------------------<  194<H>  4.0   |  28  |  1.27    Ca    9.9      04 Aug 2021 04:15                                   7.5    12.24 )-----------( 328      ( 04 Aug 2021 04:15 )             24.9                 PAST MEDICAL & SURGICAL HISTORY:

## 2021-08-04 NOTE — PROGRESS NOTE ADULT - ATTENDING COMMENTS
The patient is scheduled to undergo a cardiac catheterization/right heart catheterization later today.  Indications and details of procedure reviewed with the patient.  Benefits and risk were gone over.  Risk include but not limited to infection, bleeding, arrhythmia, TIA/stroke, Hemovac instability, vascular injury, need for urgent surgery and death.    There was concern the patient has not clinically improved following her TAVR procedure.  Discussions have been had with the patient and her family concerning her comorbidities.  Appreciate assistance from the patient's medical team including cardiac surgery, nephrology and heart failure.    All questions and concerns of the patient were addressed.

## 2021-08-04 NOTE — PROGRESS NOTE ADULT - SUBJECTIVE AND OBJECTIVE BOX
HPI:  Patient seen and examined at bedside on 2 [place].  She is feeling a little better today after a shower.  Plan for RHC today, as discussed with Monserrat Sherman MD and Alejandro Johns MD.    Review Of Systems:           Respiratory: No shortness of breath, cough, or wheezing  Cardiovascular: No chest pain or palpitations  10 point review of systems is otherwise negative except as mentioned above        Medications:  artificial  tears Solution 1 Drop(s) Both EYES every 12 hours PRN  aspirin  chewable 81 milliGRAM(s) Oral daily  atorvastatin 40 milliGRAM(s) Oral at bedtime  budesonide  80 MICROgram(s)/formoterol 4.5 MICROgram(s) Inhaler 2 Puff(s) Inhalation two times a day  buMETAnide 4 milliGRAM(s) Oral daily  ciprofloxacin     Tablet 250 milliGRAM(s) Oral daily  dextrose 40% Gel 15 Gram(s) Oral once  dextrose 50% Injectable 25 Gram(s) IV Push once  dextrose 50% Injectable 12.5 Gram(s) IV Push once  dextrose 50% Injectable 25 Gram(s) IV Push once  ferrous    sulfate 325 milliGRAM(s) Oral daily  glucagon  Injectable 1 milliGRAM(s) IntraMuscular once  heparin   Injectable 5000 Unit(s) SubCutaneous every 8 hours  insulin glargine Injectable (LANTUS) 6 Unit(s) SubCutaneous at bedtime  insulin lispro (ADMELOG) corrective regimen sliding scale   SubCutaneous three times a day before meals  insulin lispro Injectable (ADMELOG) 3 Unit(s) SubCutaneous before breakfast  insulin lispro Injectable (ADMELOG) 3 Unit(s) SubCutaneous before lunch  insulin lispro Injectable (ADMELOG) 6 Unit(s) SubCutaneous before dinner  metoprolol succinate ER 12.5 milliGRAM(s) Oral daily  polyethylene glycol 3350 17 Gram(s) Oral daily  tiotropium 18 MICROgram(s) Capsule 1 Capsule(s) Inhalation daily  urea Oral Powder 30 Gram(s) Oral every 12 hours  zolpidem 5 milliGRAM(s) Oral at bedtime PRN    PAST MEDICAL & SURGICAL HISTORY:    Vitals:  T(C): 36.4 (21 @ 19:45), Max: 36.8 (21 @ 12:11)  HR: 75 (21 @ 21:45) (74 - 90)  BP: 135/70 (21 @ 21:45) (106/66 - 141/63)  BP(mean): --  RR: 20 (21 @ 21:45) (18 - 24)  SpO2: 94% (21 @ 21:45) (91% - 100%)  Wt(kg): --  Daily     Daily Weight in k.8 (04 Aug 2021 08:12)  I&O's Summary    03 Aug 2021 07:01  -  04 Aug 2021 07:00  --------------------------------------------------------  IN: 1190 mL / OUT: 1450 mL / NET: -260 mL    04 Aug 2021 07:01  -  05 Aug 2021 00:56  --------------------------------------------------------  IN: 740 mL / OUT: 800 mL / NET: -60 mL        Physical Exam:  Appearance: Normal, well groomed, NAD  Eyes: PERRLA, EOMI, pink conjunctiva, no scleral icterus   HENT: Normal oral mucosa  Cardiovascular: RRR, S1, S2, NO systolic murmur at base  Procedural Access Site: Clean, dry, intact, without hematoma  Respiratory: Clear to auscultation bilaterally  Gastrointestinal: Soft, non-tender, non-distended, BS+  Musculoskeletal: No clubbing or joint deformity   Neurologic: No focal weakness  Lymphatic: No lymphadenopathy  Psychiatry: AAOx3 with appropriate mood and affect  Skin: No rashes, ecchymoses, or cyanosis                          7.5    12.24 )-----------( 328      ( 04 Aug 2021 04:15 )             24.9     08-04    123<L>  |  84<L>  |  104<H>  ----------------------------<  194<H>  4.0   |  28  |  1.27    Ca    9.9      04 Aug 2021 04:15            Serum Pro-Brain Natriuretic Peptide: 2398 pg/mL ( @ 09:25)      Cardiovascular Diagnostic Testing:  ECG: rate controlled AFib    Imaging: < from: CT Heart without Coronaries w/ IV Cont (21 @ 08:51) >  FINDINGS:    Non-Coronary:    6 mm hypodense nodule within the right lobe of the thyroid gland. No enlarged axillary, mediastinal lymph nodes. No pleural effusions.    Evaluation of the lungs demonstrate left lower lobe masslike opacity on the part of which measures about 5 x 2.3 cm extending to the left lower lobe hilum with associated left lower lobe volume loss related to some superimposed atelectasis. Multiple left lung pulmonary nodule along the pleural surface measuring up to about 7 mm. Right middle lobe 3.2 x 2.6 cm mass associated with the minor fissure which contains a few small nodules measuring up to 7 mm. The right middle lobe mass has a cystic component along its inferior aspect.    Subcentimeter hypodensity within the liver is too small to characterize. The gallbladder, spleen, adrenal glands are unremarkable. Few pancreatic hypodensities are noted with the largest measuring about 1.5 cm on image 84 of series 19. Bilateral renal cysts.    Urinary bladder is normal. The uterus and adnexa are within normal limits. Status post rectal surgery with postoperative changes. No bowel obstruction or medial air. Appendix is normal.    Degenerative changes of the spine. Moderate to severe loss of height of the T5 and severe loss of height of the T6 vertebral bodies are of indeterminate age.    The heart is enlarged.  The left and right atria are severely enlarged.  There is mitral annular calcification and calcification of the mitral valve leaflets.   There is reduced contrast opacification of the left atrial appendage that resolves on delay imaging suggestive of mixing artifact.    The ascending aorta is mildly calcified. The aortic arch and the descending aorta are severely calcified.   The main pulmonary artery measures approximately 26.4 mm at the level of the ascending aorta.   The right and left pulmonary arteries appear enlarged and measure approximately 29.4 mm and 25.4 mm, respectively.    The aortic valve is calcified. The calcium score of the aortic valve is 1870.    Coronary:  Coronary arterial calcifications are noted; however, this studywas not optimized for evaluation of the coronary arteries.  Please refer to cardiac catheterization performed as part of pre-TAVR work-up.    Aortic Root Measurements    Major aortic annulus diameter (systole, mm): 25.2  Perpendicular minor aortic annulus diameter (systole, mm): 19.1  Aortic annulus perimeter (systole, mm): 76.6  Aortic annulus area (systole, mm2): 438  LVOT minimum diameter (systole, mm): 19.6  LVOT 90 cross (systole, mm): 26.4  LVOT calcification:  Severe  Distance from Aortic annulus to Left Main coronary artery (diastole, mm): 13.2  Distance from Aortic annulus to Right Coronary artery (diastole, mm): 16.7  Sinus of Valsalva diameter, Right coronary (diastole, mm): 29.4  Sinus of Valsalva diameter, Left coronary (diastole, mm): 31.0  Sinus of Valsalva diameter, Non coronary (diastole, mm): 28.3  Diameter at the sinotubular junction (diastole, mm): 26.5 x 26.5  Maximum ascending aorta diameter at 40 mm above annulus (diastole, mm): 32.3  Aortic root angulation (diastole, degrees): 48.4  Aortic root calcification: Moderate-to-severe    Abdominal Aorta:        Minimum lumen diameter (MLD) (mm): 12.8        Diameter perpendicular to MLD (mm): 13.2  Left Femoral:        Minimum Lumen Diameter (mm): 7.2        Diameter perpendicular to MLD (mm): 8.1        Tortuosity: Mild        Calcification: Mild  Right Femoral:        Minimum Lumen Diameter (mm): 6.5        Diameter perpendicular to MLD (mm): 7.1        Tortuosity: Mild        Calcification: Mild  Left External Iliac:        Minimum Lumen Diameter (mm): 6.9        Diameter perpendicular to MLD (mm): 7.5        Tortuosity: Moderate        Calcification: Mild  Left Common Iliac:         Minimum Lumen Diameter (mm): 4.5        Diameter perpendicular to MLD (mm): 8.0        Tortuosity: Severe        Calcification: Severe  Right External Iliac:        Minimum Lumen Diameter (mm): 5.0        Diameter perpendicular to MLD (mm): 7.4        Tortuosity: Moderate        Calcification: Mild  Right Common Iliac:    Minimum Lumen Diameter (mm): 7.8        Diameter perpendicular to MLD (mm): 8.4        Tortuosity: Moderate        Calcification: Moderate  L Subclavian/Axillary: Not well visualized due to motion artifact        Minimum Lumen Diameter (mm): 5.6      Diameter perpendicular to MLD (mm): 6.2        Tortuosity: Mild        Calcification: Mild  R Subclavian/Axillary:        Minimum Lumen Diameter (mm): 4.3        Diameter perpendicular to MLD (mm): 5.1        Tortuosity: Mild        Calcification: Mild    IMPRESSION:  1. Calcified aortic valve. The calcium score of the aortic valve is 1870.  2. Please see the body of the report for aortic and peripheral access vessel measurements.  3. Dominant large left lower lobe mass associated with multiple left lung pleural nodules worrisome for neoplasm with metastatic disease.  4. 3.2 x 2.6 cm right middle lobe mass as described above also is worrisome for neoplasm.  5. A few pancreatic hypodense nodules. Dedicated contrast enhanced pancreatic MRI is recommended for complete evaluation.  6. Compression fracture deformities of the T5 and T6 vertebral bodies as described above are of indeterminate age.      KHLOE HAINES MD; Attending Cardiologist  This document has been electronically signed.  MONICA HANEY MD; Attending Radiologist  This document has been electronically signed. 2021  9:12AM    < end of copied text >    CATH:  < from: Cardiac Cath Lab - Adult (21 @ 17:21) >  CORONARY VESSELS: The coronary circulation is right dominant.  LM:   --  LM: Normal.  LAD:   --  Proximal LAD: There was a discrete 40 % stenosis.  --  Mid LAD: There was a tubular 25 % stenosis.  --  Distal LAD: Normal.  CX:   --  Proximal circumflex: There was a tubular 20 % stenosis.  --  Mid circumflex: There was a diffuse 50 % stenosis.  RCA:   --  Proximal RCA: There was a diffuse 30 % stenosis.  --  Mid RCA: There was a diffuse 20 % in-stent restenosis through the prior  stent.  --  Distal RCA: Normal.  --  RPDA: Normal.  --  RPLS: Normal.  COMPLICATIONS: There were no complications.  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  Normal left main coronary artery  Right dominant system  Elevated right atrial pressure (mRA 16mmHg with a V wave of 23mmHg)  Moderate post-capillary pulmonary hypertension (sPAP 58mmHg, dPAP 23mmHg,  mPAP 37mmHg)  PCWP = 28mmHg with a V wave of 48mmHg  PAsat = 67.1% // AOsat = 99.2% (room air)  [place] // CI = 6.07l/min // 3.56l/min/m2  DIAGNOSTIC RECOMMENDATIONS: Keep right leg straigh for 4 hours following  removal of sheaths  Continue aggressive medical management of coronary artery disease and  associated risk factors  Closely monitor the patients kidney function  Patient being evaluated by the [facility] valve team for TAVR  Findings discussed with Dr. 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  Normal left main coronary artery  Right dominant system  Elevated right atrial pressure (mRA 16mmHg with a V wave of 23mmHg)  Moderate post-capillary pulmonary hypertension (sPAP 58mmHg, dPAP 23mmHg,  mPAP 37mmHg)  PCWP = 28mmHg with a V wave of 48mmHg  PAsat = 67.1% // AOsat = 99.2% (room air)  [place] // CI = 6.07l/min // 3.56l/min/m2  INTERVENTIONAL RECOMMENDATIONS: Keep right leg straigh for 4 hours  following removal of sheaths  Continue aggressive medical management of coronary artery disease and  associated risk factors  Closely monitor the patients kidney function  Patient being evaluated by the [facility] valve team for TAVR  Findings discussed with Dr. Hodges  Prepared and signed by  Alejandro Johns MD  Signed 2021 18:37:05    < end of copied text >

## 2021-08-04 NOTE — PROGRESS NOTE ADULT - ASSESSMENT
87F PMH CAD w/stents, DM2, Afib (not on AC), Colon Ca (about 25y ago), severe AS, HF , recently started on Entresto, admitted for JACQUE and Metformin-associated lactic acidosis, admitted to MICU s/p urgent HD session but now JACQUE resolving and now undergoing TAVR workup.    7/22 Underwent TAVR via right femoral #22 Heike  recovered in CRS post op course unremarkable Junctional rhythm  by EKG  700 cc fluid given for hypotension. Stabilize transferred to [address] [place] [address]- received in afib-  groin sites benign + DP son at bedside ECHO in am EKG in am  Likely discharge on Saturday with MCOT son reports haven given rehab  choices to case management.  7/23 pending post TAVR echo. Anticipate discharge to rehab Monday.  7/24 VSS maintaining Afib 70's . No further junctional episodes of rhythm.   7/25 Transthoracic Echocardiogram demonstrates Minimal aortic regurgitation-Tethered tricuspid valve, severe tricuspid regurgitation . No evidence of pericardial effusion.   7/26 VSS - pt c/o slight SOB - echo shows small new pericardial effusion.  CXR-done- crackles bases - on IV bumex tid - diuresing - O2 N/c in place -rounds made w/ Dr. Pruitt, Dr. Johns & structural heart team - new SOB - will get LE dopplers to r/o dvt - start heaprin gtt, change to bumex gtt 0.5mg /hr- give zaroxlyn 10mg iv x1 , WBC 16 from 8 - ck u/a c&s.  transfer to SDU for closer monitoring.    7/27 OOB to chair  bumex gtt   heparin gtt  neg  800 cc   24 hrs   chest xray   bl base pl effusion  7/28 VSS more tachypnic today.  CXR + atelectasis.   Na 127 Dr Ramos called for input.  -658cc/24 hrs  7/29 VSS sob improved.  -236cc/24hrs.  Na 127 received 1 dose samsca 15x1.  K 4.6 .  Hep gtt dc'd  7/30 VSS;  bumex changed to 4 mg po bid and aldactone initiated as per CHF team; change toprol 12.5 qd; supplement hypokalemia; no anticoagulation for chronic afib secondary to hx GI bleed as per Dr. Pruitt  7/31 (+) tachypnea and dyspnea --> Per CHF Dr. Kee will call Pulmonary for consults (Dr. Ponce).  Bumex  4 mg IV x 1 this evening and them decreased Bumex 4 mg PO daily to start in AM.  Continue with current medication regimen. Supplement Potassium to maintain K+ level > 4.5     8/1   OOB to chair,     na  124,  FR   covid ordered   Pulm  renal and HF following  8/2       OOB    w/ asst    diuretics   bumex 4mg qd  and aldactone 12.5,  creat stable  na  improved to 126  8/3 Repeat echo today and plan for right heart cath tomorrow.  Persistent hyponatremia 123 consider samsca 15 today.  Symbicort 160 2 bid, spiriva 1 q day, for asthma.  Pulmonary consult suggest CTNA and eventual PET scan to investigate Metastatic bronchoalveolar cancer.  Given her age, comorbidities and recent acute illness, will not pursue diagnostic workup at this time. 8/4 Right heart cath today

## 2021-08-04 NOTE — PROGRESS NOTE ADULT - ASSESSMENT
87 year-old woman with known history of colon ca (remote), now with lung mass that is concerning for neoplasm, also with atrial fibrillation, previously on AC, recently stopped due to anemia, now POD 12 status post TAVR.  Procedure was well tolerated, but now grossly volume overloaded with dyspnea at rest.  Patient now short of breath, seen to have pulmonary edema on CXR.  Unclear etiology of shortness of breath and hyponatremia, but HFpEF is contributing.     Continue loop diuretics. Keep O > I, K > 4.0, Mag > 2.0.    ASA, statin, and beta-blocker as tolerated.  Avoid nephrotoxic agents.    Monitor tele for any evidence of heart block.    Discussed with Monserrat Sherman MD - will plan for RHC to better assess volume status.

## 2021-08-04 NOTE — PROGRESS NOTE ADULT - TIME BILLING
as above: no signif changes-? RHC likely to assist in directing care  multifactorial dyspnea-CHF/fluid OL-WHO class 2 PAH (cardiac related), asthma (remote), ? RML tumor, debility, anemia--O2 NC sat above 90%  CHF-as per CHF team-Andreas et al; reconsider RHC/echo repeat; BNP f/up  Renal-HD M/W/F  (hyponatremia)  asthma-symbicort 160 2 bid, spiriva 1 q day, incentive spirometry  abnormal CT c/w CA-? primary lung vs metastatic dz (non smoker)--consider CTNA while her for dx and ? rx (RT etc.), eventual pet/ct  DVT prophylaxis-on A/C  GI-prophylaxis                           PT           respiratory failure--O2 likely upon DC to rehab/home.    Barrington Ponce MD-Pulmonary   [phone]

## 2021-08-04 NOTE — PROGRESS NOTE ADULT - SUBJECTIVE AND OBJECTIVE BOX
CHIEF COMPLAINT: f/up sob, fluid OL-PH WHO class 2, CHF, abnormal CT-c/w CA (RML)-some sob but overall weak    Interval Events: struct heart, nephrology and CHF group    REVIEW OF SYSTEMS:  Constitutional: No fevers or chills. No weight loss. + fatigue or generalized malaise.  Eyes: No itching or discharge from the eyes  ENT: No ear pain. No ear discharge. No nasal congestion. No post nasal drip. No epistaxis. No throat pain. No sore throat. No difficulty swallowing.   CV: No chest pain. No palpitations. No lightheadedness or dizziness.   Resp: No dyspnea at rest. + dyspnea on exertion. No orthopnea. No wheezing. No cough. No stridor. No sputum production. No chest pain with respiration.  GI: No nausea. No vomiting. No diarrhea.  MSK: No joint pain or pain in any extremities  Integumentary: No skin lesions. No pedal edema.  Neurological: No gross motor weakness. No sensory changes.  [+ ] All other systems negative  [ ] Unable to assess ROS because ________    OBJECTIVE:  ICU Vital Signs Last 24 Hrs  T(C): 36.4 (04 Aug 2021 04:18), Max: 36.4 (03 Aug 2021 12:21)  T(F): 97.5 (04 Aug 2021 04:18), Max: 97.6 (03 Aug 2021 12:21)  HR: 90 (04 Aug 2021 04:18) (81 - 90)  BP: 118/70 (04 Aug 2021 04:18) (114/57 - 118/70)  BP(mean): 76 (03 Aug 2021 20:55) (76 - 76)  ABP: --  ABP(mean): --  RR: 18 (04 Aug 2021 04:18) (18 - 18)  SpO2: 91% (04 Aug 2021 04:18) (91% - 95%)        08-02 @ 07:01  -  08-03 @ 07:00  --------------------------------------------------------  IN: 1000 mL / OUT: 750 mL / NET: 250 mL    08-03 @ 07:01 - 08-04 @ 05:20  --------------------------------------------------------  IN: 1190 mL / OUT: 1250 mL / NET: -60 mL      CAPILLARY BLOOD GLUCOSE      POCT Blood Glucose.: 228 mg/dL (03 Aug 2021 22:50)      PHYSICAL EXAM: NAD in bed  General: Awake, alert, oriented X 3.   HEENT: Atraumatic, normocephalic.                 Mallampatti Grade 3                No nasal congestion.                No tonsillar or pharyngeal exudates.  Lymph Nodes: No palpable lymphadenopathy  Neck: No JVD. No carotid bruit.   Respiratory: Normal chest expansion                         Normal percussion                         Normal and equal air entry                         No wheeze, rhonchi or rales.  Cardiovascular: S1 S2 normal. + murmurs, rubs or gallops.   Abdomen: Soft, non-tender, non-distended. No organomegaly. Normoactive bowel sounds.  Extremities: Warm to touch. Peripheral pulse palpable. trace pedal edema.   Skin: No rashes or skin lesions  Neurological: Motor and sensory examination equal and normal in all four extremities.  Psychiatry: Appropriate mood and affect.    HOSPITAL MEDICATIONS:  MEDICATIONS  (STANDING):  aspirin  chewable 81 milliGRAM(s) Oral daily  atorvastatin 40 milliGRAM(s) Oral at bedtime  budesonide  80 MICROgram(s)/formoterol 4.5 MICROgram(s) Inhaler 2 Puff(s) Inhalation two times a day  buMETAnide 4 milliGRAM(s) Oral daily  ciprofloxacin     Tablet 250 milliGRAM(s) Oral daily  dextrose 40% Gel 15 Gram(s) Oral once  dextrose 50% Injectable 25 Gram(s) IV Push once  dextrose 50% Injectable 12.5 Gram(s) IV Push once  dextrose 50% Injectable 25 Gram(s) IV Push once  ferrous    sulfate 325 milliGRAM(s) Oral daily  glucagon  Injectable 1 milliGRAM(s) IntraMuscular once  heparin   Injectable 5000 Unit(s) SubCutaneous every 8 hours  insulin glargine Injectable (LANTUS) 6 Unit(s) SubCutaneous at bedtime  insulin lispro (ADMELOG) corrective regimen sliding scale   SubCutaneous three times a day before meals  insulin lispro Injectable (ADMELOG) 3 Unit(s) SubCutaneous before breakfast  insulin lispro Injectable (ADMELOG) 3 Unit(s) SubCutaneous before lunch  insulin lispro Injectable (ADMELOG) 3 Unit(s) SubCutaneous before dinner  metoprolol succinate ER 12.5 milliGRAM(s) Oral daily  polyethylene glycol 3350 17 Gram(s) Oral daily  tiotropium 18 MICROgram(s) Capsule 1 Capsule(s) Inhalation daily  urea Oral Powder 30 Gram(s) Oral every 12 hours    MEDICATIONS  (PRN):  artificial  tears Solution 1 Drop(s) Both EYES every 12 hours PRN Dry Eyes  zolpidem 5 milliGRAM(s) Oral at bedtime PRN Insomnia      LABS:                        7.5    12.24 )-----------( 328      ( 04 Aug 2021 04:15 )             24.9     08-04    123<L>  |  84<L>  |  104<H>  ----------------------------<  194<H>  4.0   |  28  |  1.27    Ca    9.9      04 Aug 2021 04:15                MICROBIOLOGY:     RADIOLOGY:  [ ] Reviewed and interpreted by me    Point of Care Ultrasound Findings:    PFT:    EKG:

## 2021-08-04 NOTE — PROGRESS NOTE ADULT - ASSESSMENT
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  She is status post acute dialysis, MICU stay and recent TAVR. Cath results (pre TAVR) this admission note elevated PCWP and elevated PA pressure Mean 37, with evidence of right heart failure, secondary to pulmonary hypertension, which is mostly secondary to left heart disease.  As valve has been repaired this may improve.  Patient denies any acute change in her breathing,  States she has had difficulty for months and that it is related to her heart.  Lung masses may be contributing--DDX is primary lung CA vs less likely metastatic colon CA. vs other. Admits to asthma-years ago.    REC:    Continue diuresis as her heart failure team-consider repeat echo and ? RHC                            SEE BELOW:  asthma-symbicort 160, spiriva, incentive spirometry  abnormal CT-lung mass RML--move to consider CT NEEDLE bx.  Continue to decrease oxygen as tolerated.  Will likely require oxygen at rehab.  *******************************  8/2-no significant changes   8/3-struct heart contemplating RHC here  8/4-moving to RHC for additional info to direct care

## 2021-08-04 NOTE — PROGRESS NOTE ADULT - SUBJECTIVE AND OBJECTIVE BOX
[facility] DIVISION OF KIDNEY DISEASES AND HYPERTENSION -- FOLLOW UP NOTE  --------------------------------------------------------------------------------  Chief Complaint:/subjective: feels tired, dizzy with standing, no sob, no change UO    24 hour events:as above        PAST HISTORY  --------------------------------------------------------------------------------  No significant changes to PMH, PSH, FHx, SHx, unless otherwise noted    ALLERGIES & MEDICATIONS  --------------------------------------------------------------------------------  Allergies    No Known Allergies    Intolerances      Standing Inpatient Medications  aspirin  chewable 81 milliGRAM(s) Oral daily  atorvastatin 40 milliGRAM(s) Oral at bedtime  budesonide  80 MICROgram(s)/formoterol 4.5 MICROgram(s) Inhaler 2 Puff(s) Inhalation two times a day  buMETAnide 4 milliGRAM(s) Oral daily  ciprofloxacin     Tablet 250 milliGRAM(s) Oral daily  dextrose 40% Gel 15 Gram(s) Oral once  dextrose 50% Injectable 25 Gram(s) IV Push once  dextrose 50% Injectable 12.5 Gram(s) IV Push once  dextrose 50% Injectable 25 Gram(s) IV Push once  ferrous    sulfate 325 milliGRAM(s) Oral daily  glucagon  Injectable 1 milliGRAM(s) IntraMuscular once  heparin   Injectable 5000 Unit(s) SubCutaneous every 8 hours  insulin glargine Injectable (LANTUS) 6 Unit(s) SubCutaneous at bedtime  insulin lispro (ADMELOG) corrective regimen sliding scale   SubCutaneous three times a day before meals  insulin lispro Injectable (ADMELOG) 3 Unit(s) SubCutaneous before breakfast  insulin lispro Injectable (ADMELOG) 3 Unit(s) SubCutaneous before lunch  insulin lispro Injectable (ADMELOG) 3 Unit(s) SubCutaneous before dinner  metoprolol succinate ER 12.5 milliGRAM(s) Oral daily  polyethylene glycol 3350 17 Gram(s) Oral daily  tiotropium 18 MICROgram(s) Capsule 1 Capsule(s) Inhalation daily  urea Oral Powder 30 Gram(s) Oral every 12 hours    PRN Inpatient Medications  artificial  tears Solution 1 Drop(s) Both EYES every 12 hours PRN  zolpidem 5 milliGRAM(s) Oral at bedtime PRN      REVIEW OF SYSTEMS  --------------------------------------------------------------------------------  Gen: No weight changes, fatigue, fevers/chills, weakness  Skin: No rashes  Head/Eyes/Ears/Mouth: No headache;   Respiratory: No dyspnea, cough  CV: No chest pain, PND, orthopnea  GI: No abdominal pain, diarrhea, constipation, nausea, vomiting  : No increased frequency, dysuria, hematuria, nocturia  MSK: No joint pain/swelling; no back pain; no edema  Neuro: No dizziness/lightheadedness, weakness  Heme: No easy bruising or bleeding  Psych: No significant nervousness, anxiety, stress, depression    All other systems were reviewed and are negative, except as noted.    VITALS/PHYSICAL EXAM  --------------------------------------------------------------------------------  T(C): 36.4 (08-04-21 @ 04:18), Max: 36.4 (08-03-21 @ 12:21)  HR: 90 (08-04-21 @ 04:18) (81 - 90)  BP: 118/70 (08-04-21 @ 04:18) (114/57 - 118/70)  RR: 18 (08-04-21 @ 04:18) (18 - 18)  SpO2: 91% (08-04-21 @ 04:18) (91% - 95%)  Wt(kg): --  Adult Advanced Hemodynamics Last 24 Hrs  ABP: --  ABP(mean): --  CVP(mm Hg): --  CO: --  CI: --  PA: --  PA(mean): --  PCWP: --  SVR: --  SVRI: --        08-03-21 @ 07:01  -  08-04-21 @ 07:00  --------------------------------------------------------  IN: 1190 mL / OUT: 1450 mL / NET: -260 mL    08-04-21 @ 07:01  -  08-04-21 @ 11:12  --------------------------------------------------------  IN: 200 mL / OUT: 0 mL / NET: 200 mL      Physical Exam:  	Gen: NAD,    	HEENT:   no jvp  	Pulm: CTA B/L  	CV: RRR, S1S2; no rub  	Back:   no sacral edema  	Abd: +BS, soft,   	: No suprapubic tenderness  	Ext: no edema  	Neuro: awake  	Psych: alert  	Skin: Warm   	Vascular access:    LABS/STUDIES  --------------------------------------------------------------------------------              7.5    12.24 >-----------<  328      [08-04-21 @ 04:15]              24.9     Hemoglobin: 7.5 g/dL (08-04-21 @ 04:15)  Hemoglobin: 7.6 g/dL (08-03-21 @ 05:49)    Platelet Count - Automated: 328 K/uL (08-04-21 @ 04:15)  Platelet Count - Automated: 342 K/uL (08-03-21 @ 05:49)    123  |  84  |  104  ----------------------------<  194      [08-04-21 @ 04:15]  4.0   |  28  |  1.27        Ca     9.9     [08-04-21 @ 04:15]            Creatinine Trend:  SCr 1.27 [08-04 @ 04:15]  SCr 1.65 [08-03 @ 05:49]  SCr 1.28 [08-02 @ 09:20]  SCr 1.26 [08-01 @ 06:45]  SCr 1.42 [07-31 @ 06:39]    Urinalysis - [07-26-21 @ 12:20]      Color Yellow / Appearance Clear / SG 1.014 / pH 5.5      Gluc 200 mg/dL / Ketone Negative  / Bili Negative / Urobili Negative       Blood Negative / Protein Negative / Leuk Est Large / Nitrite Negative      RBC 3 / WBC 19 / Hyaline 0 / Gran  / Sq Epi  / Non Sq Epi 0 / Bacteria Few    Urine Creatinine 46      [08-03-21 @ 16:36]  Urine Protein 4      [08-04-21 @ 00:45]  Urine Sodium 9      [08-03-21 @ 16:36]  Urine Urea Nitrogen 794      [08-04-21 @ 00:45]  Urine Osmolality 393      [08-03-21 @ 16:36]    TSH 6.37      [07-21-21 @ 14:10]    HBsAb <3.0      [07-09-21 @ 03:47]  HBsAg Nonreact      [07-09-21 @ 03:47]  HBcAb Nonreact      [07-09-21 @ 03:47]  HCV 0.14, Nonreact      [07-09-21 @ 03:47]

## 2021-08-05 NOTE — PROGRESS NOTE ADULT - PROBLEM SELECTOR PLAN 5
continues to be dyspneic, RHC with elevated filling pressures.   -regimen: metolazone 5mg, followed by bumex 5mg 30 mins later and then bumex drip at 2/hr continues to be dyspneic, RHC with elevated filling pressures.   -recommend aggressive diuretic regimen: metolazone 5mg, followed 30 minutes later by bumex 4mg and then start bumex drip at 2/hr

## 2021-08-05 NOTE — PROGRESS NOTE ADULT - ASSESSMENT
87F PMH CAD w/ prior PCI, DM2, Afib (not on AC's, dc'd pradaxa 1mo ago as pt w/u for anemia), Metastatic bronchoalveolar cancer, Colon Ca (about 25y ago), HF on 80mg lasix qd, recently started on Entresto as an outpatient, presented to the ED with abnormal labs done on 7/7 showing JACQUE with hyperk to >6 mod hemolyzed. On presentation patient with BUN/Cr 79/6.7 mg/dl. K: 6.6 (non hemolyzed). Previous Cr was at 1.26 6/7/21. Also noted with bicarb: 11 pH 7.2 - lactate 7, admitted to MICU for urgent HD, now improved volume status, renal function improving off dialysis. Patient found to have severe AS s/p TAVR 7/22, well tolerated without complications however ongoing respiratory distress. Heart failure consulted for optimization of volume status given ongoing respiratory issues improved with uptitration of diuretics.     RHC 7/14 RA 16, PA 58/23/37, PCWP 28 (V 48), Sat 67 CO/CI 6.1/3.6  TTE 7/26/21 - EF 60-65%, TAVR, severe LA enlargement, severe RA enlargement, small pericardial effusion, IVC enlarged (3.6 cm), mod RV dilatation   8/5/2021: RA 17 PA 60/25/37 W 27 CO/CI 5.9/3.5

## 2021-08-05 NOTE — PROGRESS NOTE ADULT - PROBLEM SELECTOR PLAN 2
Renal following     NA   is 126   Bumex 4 mg PO Daily Renal following     NA   is 130  Bumex gtt  aggressive diuretic regimen: metolazone 5mg, followed 30 minutes later by bumex 4mg and then start bumex drip at 2/hr

## 2021-08-05 NOTE — PROGRESS NOTE ADULT - SUBJECTIVE AND OBJECTIVE BOX
VITAL SIGNS    Telemetry:    Vital Signs Last 24 Hrs  T(C): 36.4 (08-05-21 @ 13:03), Max: 36.5 (08-05-21 @ 04:45)  T(F): 97.6 (08-05-21 @ 13:03), Max: 97.7 (08-05-21 @ 04:45)  HR: 82 (08-05-21 @ 13:03) (74 - 87)  BP: 132/71 (08-05-21 @ 13:03) (119/56 - 141/63)  RR: 18 (08-05-21 @ 13:03) (18 - 24)  SpO2: 94% (08-05-21 @ 13:03) (91% - 100%)            08-04 @ 07:01  -  08-05 @ 07:00  --------------------------------------------------------  IN: 980 mL / OUT: 1200 mL / NET: -220 mL    08-05 @ 07:01  -  08-05 @ 17:03  --------------------------------------------------------  IN: 240 mL / OUT: 1200 mL / NET: -960 mL       Daily     Daily   Admit Wt: Drug Dosing Weight  Height (cm): 152.4 (22 Jul 2021 11:15)  Weight (kg): 72.4 (22 Jul 2021 11:15)  BMI (kg/m2): 31.2 (22 Jul 2021 11:15)  BSA (m2): 1.7 (22 Jul 2021 11:15)      CAPILLARY BLOOD GLUCOSE      POCT Blood Glucose.: 309 mg/dL (05 Aug 2021 11:30)  POCT Blood Glucose.: 205 mg/dL (05 Aug 2021 07:43)  POCT Blood Glucose.: 293 mg/dL (04 Aug 2021 21:39)  POCT Blood Glucose.: 232 mg/dL (04 Aug 2021 19:53)          MEDICATIONS  artificial  tears Solution 1 Drop(s) Both EYES every 12 hours PRN  aspirin  chewable 81 milliGRAM(s) Oral daily  atorvastatin 40 milliGRAM(s) Oral at bedtime  budesonide  80 MICROgram(s)/formoterol 4.5 MICROgram(s) Inhaler 2 Puff(s) Inhalation two times a day  buMETAnide Infusion 2 mG/Hr IV Continuous <Continuous>  buMETAnide IVPB 4 milliGRAM(s) IV Intermittent once  ciprofloxacin     Tablet 250 milliGRAM(s) Oral daily  dextrose 40% Gel 15 Gram(s) Oral once  dextrose 50% Injectable 25 Gram(s) IV Push once  dextrose 50% Injectable 12.5 Gram(s) IV Push once  dextrose 50% Injectable 25 Gram(s) IV Push once  ferrous    sulfate 325 milliGRAM(s) Oral daily  glucagon  Injectable 1 milliGRAM(s) IntraMuscular once  heparin   Injectable 5000 Unit(s) SubCutaneous every 8 hours  insulin glargine Injectable (LANTUS) 6 Unit(s) SubCutaneous at bedtime  insulin lispro (ADMELOG) corrective regimen sliding scale   SubCutaneous three times a day before meals  insulin lispro Injectable (ADMELOG) 3 Unit(s) SubCutaneous before breakfast  insulin lispro Injectable (ADMELOG) 3 Unit(s) SubCutaneous before lunch  insulin lispro Injectable (ADMELOG) 6 Unit(s) SubCutaneous before dinner  metoprolol succinate ER 12.5 milliGRAM(s) Oral daily  polyethylene glycol 3350 17 Gram(s) Oral daily  tiotropium 18 MICROgram(s) Capsule 1 Capsule(s) Inhalation daily  zolpidem 5 milliGRAM(s) Oral at bedtime PRN      >>> <<<  PHYSICAL EXAM  Subjective:  Neurology: alert and oriented x 3, nonfocal, no gross deficits  CV :  Sternal Wound :  CDI , Stable  Lungs:  Abdomen: soft, NT,ND, ( )BM  :  voiding  Extremities:       LABS  08-05    130<L>  |  87<L>  |  150<H>  ----------------------------<  258<H>  3.9   |  32<H>  |  1.24    Ca    10.6<H>      05 Aug 2021 10:11                                   7.9    12.97 )-----------( 372      ( 05 Aug 2021 10:11 )             25.7                 PAST MEDICAL & SURGICAL HISTORY:        VITAL SIGNS    Telemetry: afib 70-90   Vital Signs Last 24 Hrs  T(C): 36.4 (08-05-21 @ 13:03), Max: 36.5 (08-05-21 @ 04:45)  T(F): 97.6 (08-05-21 @ 13:03), Max: 97.7 (08-05-21 @ 04:45)  HR: 82 (08-05-21 @ 13:03) (74 - 87)  BP: 132/71 (08-05-21 @ 13:03) (119/56 - 141/63)  RR: 18 (08-05-21 @ 13:03) (18 - 24)  SpO2: 94% (08-05-21 @ 13:03) (91% - 100%)            08-04 @ 07:01  -  08-05 @ 07:00  --------------------------------------------------------  IN: 980 mL / OUT: 1200 mL / NET: -220 mL    08-05 @ 07:01  -  08-05 @ 17:03  --------------------------------------------------------  IN: 240 mL / OUT: 1200 mL / NET: -960 mL       Daily     Daily   Admit Wt: Drug Dosing Weight  Height (cm): 152.4 (22 Jul 2021 11:15)  Weight (kg): 72.4 (22 Jul 2021 11:15)  BMI (kg/m2): 31.2 (22 Jul 2021 11:15)  BSA (m2): 1.7 (22 Jul 2021 11:15)      CAPILLARY BLOOD GLUCOSE      POCT Blood Glucose.: 309 mg/dL (05 Aug 2021 11:30)  POCT Blood Glucose.: 205 mg/dL (05 Aug 2021 07:43)  POCT Blood Glucose.: 293 mg/dL (04 Aug 2021 21:39)  POCT Blood Glucose.: 232 mg/dL (04 Aug 2021 19:53)          MEDICATIONS  artificial  tears Solution 1 Drop(s) Both EYES every 12 hours PRN  aspirin  chewable 81 milliGRAM(s) Oral daily  atorvastatin 40 milliGRAM(s) Oral at bedtime  budesonide  80 MICROgram(s)/formoterol 4.5 MICROgram(s) Inhaler 2 Puff(s) Inhalation two times a day  buMETAnide Infusion 2 mG/Hr IV Continuous <Continuous>  buMETAnide IVPB 4 milliGRAM(s) IV Intermittent once  ciprofloxacin     Tablet 250 milliGRAM(s) Oral daily  dextrose 40% Gel 15 Gram(s) Oral once  dextrose 50% Injectable 25 Gram(s) IV Push once  dextrose 50% Injectable 12.5 Gram(s) IV Push once  dextrose 50% Injectable 25 Gram(s) IV Push once  ferrous    sulfate 325 milliGRAM(s) Oral daily  glucagon  Injectable 1 milliGRAM(s) IntraMuscular once  heparin   Injectable 5000 Unit(s) SubCutaneous every 8 hours  insulin glargine Injectable (LANTUS) 6 Unit(s) SubCutaneous at bedtime  insulin lispro (ADMELOG) corrective regimen sliding scale   SubCutaneous three times a day before meals  insulin lispro Injectable (ADMELOG) 3 Unit(s) SubCutaneous before breakfast  insulin lispro Injectable (ADMELOG) 3 Unit(s) SubCutaneous before lunch  insulin lispro Injectable (ADMELOG) 6 Unit(s) SubCutaneous before dinner  metoprolol succinate ER 12.5 milliGRAM(s) Oral daily  polyethylene glycol 3350 17 Gram(s) Oral daily  tiotropium 18 MICROgram(s) Capsule 1 Capsule(s) Inhalation daily  zolpidem 5 milliGRAM(s) Oral at bedtime PRN      >>> <<<  PHYSICAL EXAM  Subjective: Increased sob  Neurology: alert and oriented x 3, nonfocal, no gross deficits  CV :s1s2 irreg  Sternal Wound :  CDI , Stable  Lungs: cta  Abdomen: soft, NT,ND, (+ )BM  :  voiding  Extremities:  +2 edema     LABS  08-05    130<L>  |  87<L>  |  150<H>  ----------------------------<  258<H>  3.9   |  32<H>  |  1.24    Ca    10.6<H>      05 Aug 2021 10:11                                   7.9    12.97 )-----------( 372      ( 05 Aug 2021 10:11 )             25.7                 PAST MEDICAL & SURGICAL HISTORY:

## 2021-08-05 NOTE — PROGRESS NOTE ADULT - PROBLEM SELECTOR PLAN 1
Continue on aldactone 12.5 mg po Daily   Supplement Potassium to maintain K+ level > 4.5    increase activity as tolerated   ambulance w asst  discharge planning- rehab - when stable Continue on aldactone 12.5 mg po Daily   Supplement Potassium to maintain K+ level > 4.5

## 2021-08-05 NOTE — PROGRESS NOTE ADULT - PROBLEM SELECTOR PLAN 3
Maintain negative fluid balance  daily bmp  na 126  today Afib rate controlled; on toprol xl 12.5 mg daily  not on AC d/t prior GIB.

## 2021-08-05 NOTE — PROGRESS NOTE ADULT - PROBLEM SELECTOR PLAN 2
rate controlled; on toprol xl 12.5 mg daily  not on AC d/t prior GIB  rhythm controlled approach per EP and cardiology. No contraindication from HF. rate controlled; on toprol xl 12.5 mg daily  not on AC d/t prior GIB

## 2021-08-05 NOTE — PROGRESS NOTE ADULT - SUBJECTIVE AND OBJECTIVE BOX
CHIEF COMPLAINT: f/up sob, fluid OL-PH WHO class 2, CHF, abnormal CT-c/w CA (RML)-mild sob and minimal cough    Interval Events: struct heart, nephrology and CHF group-s/p RHC      REVIEW OF SYSTEMS:  Constitutional: No fevers or chills. No weight loss. + fatigue or generalized malaise.  Eyes: No itching or discharge from the eyes  ENT: No ear pain. No ear discharge. No nasal congestion. No post nasal drip. No epistaxis. No throat pain. No sore throat. No difficulty swallowing.   CV: No chest pain. No palpitations. No lightheadedness or dizziness.   Resp: No dyspnea at rest. + dyspnea on exertion. No orthopnea. No wheezing. No cough. No stridor. No sputum production. No chest pain with respiration.  GI: No nausea. No vomiting. No diarrhea.  MSK: No joint pain or pain in any extremities  Integumentary: No skin lesions. No pedal edema.  Neurological: No gross motor weakness. No sensory changes.  [+ ] All other systems negative  [ ] Unable to assess ROS because ________    OBJECTIVE:  ICU Vital Signs Last 24 Hrs  T(C): 36.5 (05 Aug 2021 04:45), Max: 36.8 (04 Aug 2021 12:11)  T(F): 97.7 (05 Aug 2021 04:45), Max: 98.2 (04 Aug 2021 12:11)  HR: 87 (05 Aug 2021 04:45) (74 - 87)  BP: 123/61 (05 Aug 2021 04:45) (106/66 - 141/63)  BP(mean): --  ABP: --  ABP(mean): --  RR: 18 (05 Aug 2021 04:45) (18 - 24)  SpO2: 91% (05 Aug 2021 04:45) (91% - 100%)        08-03 @ 07:01  -  08-04 @ 07:00  --------------------------------------------------------  IN: 1190 mL / OUT: 1450 mL / NET: -260 mL    08-04 @ 07:01 - 08-05 @ 05:26  --------------------------------------------------------  IN: 740 mL / OUT: 1200 mL / NET: -460 mL      CAPILLARY BLOOD GLUCOSE      POCT Blood Glucose.: 293 mg/dL (04 Aug 2021 21:39)      PHYSICAL EXAM:  General: Awake, alert, oriented X 3.   HEENT: Atraumatic, normocephalic.                 Mallampatti Grade 3                No nasal congestion.                No tonsillar or pharyngeal exudates.  Lymph Nodes: No palpable lymphadenopathy  Neck: No JVD. No carotid bruit.   Respiratory: Normal chest expansion                         Normal percussion                         Normal and equal air entry                         No wheeze, rhonchi but bibasilar +rales.  Cardiovascular: S1 S2 normal. + murmurs, rubs or gallops.   Abdomen: Soft, non-tender, non-distended. No organomegaly. Normoactive bowel sounds.  Extremities: Warm to touch. Peripheral pulse palpable. No pedal edema.   Skin: No rashes or skin lesions  Neurological: Motor and sensory examination equal and normal in all four extremities.  Psychiatry: Appropriate mood and affect.    HOSPITAL MEDICATIONS:  MEDICATIONS  (STANDING):  aspirin  chewable 81 milliGRAM(s) Oral daily  atorvastatin 40 milliGRAM(s) Oral at bedtime  budesonide  80 MICROgram(s)/formoterol 4.5 MICROgram(s) Inhaler 2 Puff(s) Inhalation two times a day  buMETAnide 4 milliGRAM(s) Oral daily  ciprofloxacin     Tablet 250 milliGRAM(s) Oral daily  dextrose 40% Gel 15 Gram(s) Oral once  dextrose 50% Injectable 25 Gram(s) IV Push once  dextrose 50% Injectable 12.5 Gram(s) IV Push once  dextrose 50% Injectable 25 Gram(s) IV Push once  ferrous    sulfate 325 milliGRAM(s) Oral daily  glucagon  Injectable 1 milliGRAM(s) IntraMuscular once  heparin   Injectable 5000 Unit(s) SubCutaneous every 8 hours  insulin glargine Injectable (LANTUS) 6 Unit(s) SubCutaneous at bedtime  insulin lispro (ADMELOG) corrective regimen sliding scale   SubCutaneous three times a day before meals  insulin lispro Injectable (ADMELOG) 3 Unit(s) SubCutaneous before breakfast  insulin lispro Injectable (ADMELOG) 3 Unit(s) SubCutaneous before lunch  insulin lispro Injectable (ADMELOG) 6 Unit(s) SubCutaneous before dinner  metoprolol succinate ER 12.5 milliGRAM(s) Oral daily  polyethylene glycol 3350 17 Gram(s) Oral daily  tiotropium 18 MICROgram(s) Capsule 1 Capsule(s) Inhalation daily  urea Oral Powder 30 Gram(s) Oral every 12 hours    MEDICATIONS  (PRN):  artificial  tears Solution 1 Drop(s) Both EYES every 12 hours PRN Dry Eyes  zolpidem 5 milliGRAM(s) Oral at bedtime PRN Insomnia      LABS:                        7.5    12.24 )-----------( 328      ( 04 Aug 2021 04:15 )             24.9     08-04    123<L>  |  84<L>  |  104<H>  ----------------------------<  194<H>  4.0   |  28  |  1.27    Ca    9.9      04 Aug 2021 04:15                MICROBIOLOGY:     RADIOLOGY:  [ ] Reviewed and interpreted by me    Point of Care Ultrasound Findings:    PFT:    EKG:

## 2021-08-05 NOTE — PROGRESS NOTE ADULT - ATTENDING COMMENTS
Remains volume overloaded but RHC showed cardiac index 3.5. No role for inotropes at this time given preserved cardiac output. Will be aggressive with diuretics for volume removal--Metolazone plus Bumex bolus then Bumex gtt. If inadequate response, may need to consider ultrafiltration.

## 2021-08-05 NOTE — PROGRESS NOTE ADULT - SUBJECTIVE AND OBJECTIVE BOX
Subjective  No acute events reported overnight.  Patient denies any pain in her right groin site.  Denies any chest pain/tightness,, fevers, chills, sweats, lightheaded sensation or shortness of breath at rest.  The patient does feel very fatigued.  She is very frustrated by her lack of progress.    Review of systems  14 point review of systems otherwise unremarkable supple discussed above in history of present illness    Telemetry  Atrial fibrillation with rates in the 70s to 90s with occasional PVCs    Physical Examination  No apparent distress, alert and oriented 3, elevated JVD with hepatojugular reflex  Irregular irregular with 1 out of 6 holosystolic murmur at left lower sternal border  Decreased breath sounds bilaterally with no wheezing or crackles  Positive bowel sound, soft, obese, nontender, no masses guarding  1+ bilateral pitting edema   Right groin ecchymosis with no hematoma/bruit  No clubbing or cyanosis  Moving all extremities spontaneously  1+ DP/PT pulses    Assessment/plan  Severe aortic valve stenosis/severe tricuspid valve regurgitation  --Patient status post TAVR procedure.   --The patient is status post repeat transthoracic echocardiogram study and cardiac catheterization.  Findings from the studies were reviewed with the patient.    --Concerned that the patient has not made significant improvement despite aggressive medical management.  Patient is being assessed for possible inotropic assisted diuresis or ultrafiltration.  Appreciate recommendations from heart failure/nephrology teams.  --Continue Bumex 4 mg daily.  Goal ins and out -500 to -1 L.  Aim for potassium to greater than 4 and magnesium greater than 2.  Closely monitor the patient's electrolytes.  --Continue Toprol 12.5 mg daily.  --Patient continues to be hyponatremic.  --Heparin drip not reinitiated due to concern for recent GI bleed after discussing with cardiac surgical team/Dr. Pruitt.  Continue to closely monitor CBC and assess for signs or symptoms of bleeding.  --Continue to closely follow the patient sodium.    --Continue telemetry monitoring.    All questions and concerns of the patient and her family were addressed.    Findings and plan were discussed with heart failure/Dr. Sherman, cardiac surgery/Dr. Magaña and structural heart team.          T(C): 36.5 (08-05-21 @ 04:45), Max: 36.8 (08-04-21 @ 12:11)  HR: 87 (08-05-21 @ 04:45) (74 - 87)  BP: 123/61 (08-05-21 @ 04:45) (106/66 - 141/63)  RR: 18 (08-05-21 @ 04:45) (18 - 24)  SpO2: 91% (08-05-21 @ 04:45) (91% - 100%)  Wt(kg): --  08-04 @ 07:01  -  08-05 @ 07:00  --------------------------------------------------------  IN: 980 mL / OUT: 1200 mL / NET: -220 mL    08-05 @ 07:01  -  08-05 @ 09:50  --------------------------------------------------------  IN: 240 mL / OUT: 0 mL / NET: 240 mL        MEDICATIONS  (STANDING):  aspirin  chewable 81 milliGRAM(s) Oral daily  atorvastatin 40 milliGRAM(s) Oral at bedtime  budesonide  80 MICROgram(s)/formoterol 4.5 MICROgram(s) Inhaler 2 Puff(s) Inhalation two times a day  buMETAnide 4 milliGRAM(s) Oral daily  ciprofloxacin     Tablet 250 milliGRAM(s) Oral daily  dextrose 40% Gel 15 Gram(s) Oral once  dextrose 50% Injectable 25 Gram(s) IV Push once  dextrose 50% Injectable 12.5 Gram(s) IV Push once  dextrose 50% Injectable 25 Gram(s) IV Push once  ferrous    sulfate 325 milliGRAM(s) Oral daily  glucagon  Injectable 1 milliGRAM(s) IntraMuscular once  heparin   Injectable 5000 Unit(s) SubCutaneous every 8 hours  insulin glargine Injectable (LANTUS) 6 Unit(s) SubCutaneous at bedtime  insulin lispro (ADMELOG) corrective regimen sliding scale   SubCutaneous three times a day before meals  insulin lispro Injectable (ADMELOG) 3 Unit(s) SubCutaneous before breakfast  insulin lispro Injectable (ADMELOG) 3 Unit(s) SubCutaneous before lunch  insulin lispro Injectable (ADMELOG) 6 Unit(s) SubCutaneous before dinner  metoprolol succinate ER 12.5 milliGRAM(s) Oral daily  polyethylene glycol 3350 17 Gram(s) Oral daily  tiotropium 18 MICROgram(s) Capsule 1 Capsule(s) Inhalation daily  urea Oral Powder 30 Gram(s) Oral every 12 hours    MEDICATIONS  (PRN):  artificial  tears Solution 1 Drop(s) Both EYES every 12 hours PRN Dry Eyes  zolpidem 5 milliGRAM(s) Oral at bedtime PRN Insomnia    Home Medications:  atorvastatin 40 mg oral tablet: 1 tab(s) orally once a day at bedtime (08 Jul 2021 17:00)  Elemental Iron: 45 milligram(s) orally (08 Jul 2021 17:00)  Entresto 24 mg-26 mg oral tablet: 1 tab(s) orally 2 times a day (08 Jul 2021 17:00)  furosemide 80 mg oral tablet: 1 tab(s) orally once a day (08 Jul 2021 17:00)  glipiZIDE 10 mg oral tablet: 1 tab(s) orally once a day (08 Jul 2021 17:00)  Januvia 100 mg oral tablet: 1 tab(s) orally once a day (08 Jul 2021 17:00)  lysine 500 mg oral tablet: 2 tab(s) orally once a day (08 Jul 2021 17:00)  metFORMIN 1000 mg oral tablet: 1 tab(s) orally 2 times a day (08 Jul 2021 17:00)  metOLazone: 25 milligram(s) orally once a day (08 Jul 2021 17:00)  metoprolol succinate 25 mg oral tablet, extended release: 1 tab(s) orally once a day   Monday, Wednesday, Friday with dinner (08 Jul 2021 17:00)  Vitamin C 500 mg oral tablet: 1 tab(s) orally once a day (08 Jul 2021 17:00)  Vitamin D2 2000 intl units (50 mcg) oral capsule:  (08 Jul 2021 17:00)  zolpidem 5 mg oral tablet: 1 tab(s) orally once a day (at bedtime) (08 Jul 2021 17:00)                        7.5    12.24 )-----------( 328      ( 04 Aug 2021 04:15 )             24.9   08-04    123<L>  |  84<L>  |  104<H>  ----------------------------<  194<H>  4.0   |  28  |  1.27    Ca    9.9      04 Aug 2021 04:15

## 2021-08-05 NOTE — PROGRESS NOTE ADULT - ASSESSMENT
87F PMH CAD w/stents, DM2, Afib (not on AC), Colon Ca (about 25y ago), severe AS, HF , recently started on Entresto, admitted for JACQUE and Metformin-associated lactic acidosis, admitted to MICU s/p urgent HD session but now JACQUE resolving and now undergoing TAVR workup.    7/22 Underwent TAVR via right femoral #22 Heike  recovered in CRS post op course unremarkable Junctional rhythm  by EKG  700 cc fluid given for hypotension. Stabilize transferred to [address] [place] [address]- received in afib-  groin sites benign + DP son at bedside ECHO in am EKG in am  Likely discharge on Saturday with [facility]OT son reports haven given rehab  choices to case management.  7/23 pending post TAVR echo. Anticipate discharge to rehab Monday.  7/24 VSS maintaining Afib 70's . No further junctional episodes of rhythm.   7/25 Transthoracic Echocardiogram demonstrates Minimal aortic regurgitation-Tethered tricuspid valve, severe tricuspid regurgitation . No evidence of pericardial effusion.   7/26 VSS - pt c/o slight SOB - echo shows small new pericardial effusion.  CXR-done- crackles bases - on IV bumex tid - diuresing - O2 N/c in place -rounds made w/ Dr. Pruitt, Dr. Johns & structural heart team - new SOB - will get LE dopplers to r/o dvt - start heaprin gtt, change to bumex gtt 0.5mg /hr- give zaroxlyn 10mg iv x1 , WBC 16 from 8 - ck u/a c&s.  transfer to SDU for closer monitoring.    7/27 OOB to chair  bumex gtt   heparin gtt  neg  800 cc   24 hrs   chest xray   bl base pl effusion  7/28 VSS more tachypnic today.  CXR + atelectasis.   Na 127 Dr Ramos called for input.  -658cc/24 hrs  7/29 VSS sob improved.  -236cc/24hrs.  Na 127 received 1 dose samsca 15x1.  K 4.6 .  Hep gtt dc'd  7/30 VSS;  bumex changed to 4 mg po bid and aldactone initiated as per CHF team; change toprol 12.5 qd; supplement hypokalemia; no anticoagulation for chronic afib secondary to hx GI bleed as per Dr. Pruitt  7/31 (+) tachypnea and dyspnea --> Per CHF Dr. Kee will call Pulmonary for consults (Dr. Ponce).  Bumex  4 mg IV x 1 this evening and them decreased Bumex 4 mg PO daily to start in AM.  Continue with current medication regimen. Supplement Potassium to maintain K+ level > 4.5     8/1   OOB to chair,     na  124,  FR   covid ordered   Pulm  renal and HF following  8/2       OOB    w/ asst    diuretics   bumex 4mg qd  and aldactone 12.5,  creat stable  na  improved to 126  8/3 Repeat echo today and plan for right heart cath tomorrow.  Persistent hyponatremia 123 consider samsca 15 today.  Symbicort 160 2 bid, spiriva 1 q day, for asthma.  Pulmonary consult suggest CTNA and eventual PET scan to investigate Metastatic bronchoalveolar cancer.  Given her age, comorbidities and recent acute illness, will not pursue diagnostic workup at this time. 8/4 Right heart cath today  8/5 RHC filling pressures and pulmonary wedge elevated. bumex drip initiated with metolazone. Discontinued urena. Patient is being assessed for possible inotropic assisted diuresis or ultrafiltration.

## 2021-08-05 NOTE — PROGRESS NOTE ADULT - PROBLEM SELECTOR PLAN 1
hypervolemic  RHC filling pressures and pul wedge elevated  start bumex drip   bumex IV and metolazone  stop urena

## 2021-08-05 NOTE — PROGRESS NOTE ADULT - SUBJECTIVE AND OBJECTIVE BOX
[facility] DIVISION OF KIDNEY DISEASES AND HYPERTENSION -- FOLLOW UP NOTE  --------------------------------------------------------------------------------  Chief Complaint:/subjective: pt feels labored, weak; no other complaints    24 hour events:no acute events        PAST HISTORY  --------------------------------------------------------------------------------  No significant changes to PMH, PSH, FHx, SHx, unless otherwise noted    ALLERGIES & MEDICATIONS  --------------------------------------------------------------------------------  Allergies    No Known Allergies    Intolerances      Standing Inpatient Medications  aspirin  chewable 81 milliGRAM(s) Oral daily  atorvastatin 40 milliGRAM(s) Oral at bedtime  budesonide  80 MICROgram(s)/formoterol 4.5 MICROgram(s) Inhaler 2 Puff(s) Inhalation two times a day  buMETAnide Infusion 2 mG/Hr IV Continuous <Continuous>  buMETAnide IVPB 4 milliGRAM(s) IV Intermittent once  ciprofloxacin     Tablet 250 milliGRAM(s) Oral daily  dextrose 40% Gel 15 Gram(s) Oral once  dextrose 50% Injectable 25 Gram(s) IV Push once  dextrose 50% Injectable 12.5 Gram(s) IV Push once  dextrose 50% Injectable 25 Gram(s) IV Push once  ferrous    sulfate 325 milliGRAM(s) Oral daily  glucagon  Injectable 1 milliGRAM(s) IntraMuscular once  heparin   Injectable 5000 Unit(s) SubCutaneous every 8 hours  insulin glargine Injectable (LANTUS) 6 Unit(s) SubCutaneous at bedtime  insulin lispro (ADMELOG) corrective regimen sliding scale   SubCutaneous three times a day before meals  insulin lispro Injectable (ADMELOG) 3 Unit(s) SubCutaneous before breakfast  insulin lispro Injectable (ADMELOG) 3 Unit(s) SubCutaneous before lunch  insulin lispro Injectable (ADMELOG) 6 Unit(s) SubCutaneous before dinner  metoprolol succinate ER 12.5 milliGRAM(s) Oral daily  polyethylene glycol 3350 17 Gram(s) Oral daily  tiotropium 18 MICROgram(s) Capsule 1 Capsule(s) Inhalation daily  urea Oral Powder 30 Gram(s) Oral every 12 hours    PRN Inpatient Medications  artificial  tears Solution 1 Drop(s) Both EYES every 12 hours PRN  zolpidem 5 milliGRAM(s) Oral at bedtime PRN      REVIEW OF SYSTEMS  --------------------------------------------------------------------------------  Gen: No weight changes, fatigue, fevers/chills, weakness  Skin: No rashes  Head/Eyes/Ears/Mouth: No headache;   Respiratory: No dyspnea, cough  CV: No chest pain, PND, orthopnea  GI: No abdominal pain, diarrhea, constipation, nausea, vomiting  : No increased frequency, dysuria, hematuria, nocturia  MSK: No joint pain/swelling; no back pain; no edema  Neuro: No dizziness/lightheadedness, weakness  Heme: No easy bruising or bleeding  Psych: No significant nervousness, anxiety, stress, depression    All other systems were reviewed and are negative, except as noted.    VITALS/PHYSICAL EXAM  --------------------------------------------------------------------------------  T(C): 36.4 (08-05-21 @ 13:03), Max: 36.5 (08-04-21 @ 15:20)  HR: 82 (08-05-21 @ 13:03) (74 - 87)  BP: 132/71 (08-05-21 @ 13:03) (119/56 - 141/63)  RR: 18 (08-05-21 @ 13:03) (18 - 24)  SpO2: 94% (08-05-21 @ 13:03) (91% - 100%)  Wt(kg): --  Adult Advanced Hemodynamics Last 24 Hrs  ABP: --  ABP(mean): --  CVP(mm Hg): --  CO: --  CI: --  PA: --  PA(mean): --  PCWP: --  SVR: --  SVRI: --        08-04-21 @ 07:01  -  08-05-21 @ 07:00  --------------------------------------------------------  IN: 980 mL / OUT: 1200 mL / NET: -220 mL    08-05-21 @ 07:01  -  08-05-21 @ 14:49  --------------------------------------------------------  IN: 240 mL / OUT: 1200 mL / NET: -960 mL      Physical Exam:  	Gen: NAD,    	HEENT:  , no jvp  	Pulm: CTA B/L  	CV: RRR, S1S2; no rub  	Back:   no sacral edema  	Abd: +BS, soft,    	: No suprapubic tenderness  	Ext: 1+ b/l  edema  	Neuro:awake  	Psych:alert  	Skin: Warm   	Vascular access:    LABS/STUDIES  --------------------------------------------------------------------------------              7.9    12.97 >-----------<  372      [08-05-21 @ 10:11]              25.7     Hemoglobin: 7.9 g/dL (08-05-21 @ 10:11)  Hemoglobin: 7.5 g/dL (08-04-21 @ 04:15)    Platelet Count - Automated: 372 K/uL (08-05-21 @ 10:11)  Platelet Count - Automated: 328 K/uL (08-04-21 @ 04:15)    130  |  87  |  150  ----------------------------<  258      [08-05-21 @ 10:11]  3.9   |  32  |  1.24        Ca     10.6     [08-05-21 @ 10:11]            Creatinine Trend:  SCr 1.24 [08-05 @ 10:11]  SCr 1.27 [08-04 @ 04:15]  SCr 1.65 [08-03 @ 05:49]  SCr 1.28 [08-02 @ 09:20]  SCr 1.26 [08-01 @ 06:45]    Urinalysis - [07-26-21 @ 12:20]      Color Yellow / Appearance Clear / SG 1.014 / pH 5.5      Gluc 200 mg/dL / Ketone Negative  / Bili Negative / Urobili Negative       Blood Negative / Protein Negative / Leuk Est Large / Nitrite Negative      RBC 3 / WBC 19 / Hyaline 0 / Gran  / Sq Epi  / Non Sq Epi 0 / Bacteria Few    Urine Creatinine 46      [08-03-21 @ 16:36]  Urine Protein 4      [08-04-21 @ 00:45]  Urine Sodium 9      [08-03-21 @ 16:36]  Urine Urea Nitrogen 794      [08-04-21 @ 00:45]  Urine Osmolality 393      [08-03-21 @ 16:36]    TSH 6.37      [07-21-21 @ 14:10]    HBsAb <3.0      [07-09-21 @ 03:47]  HBsAg Nonreact      [07-09-21 @ 03:47]  HBcAb Nonreact      [07-09-21 @ 03:47]  HCV 0.14, Nonreact      [07-09-21 @ 03:47]

## 2021-08-05 NOTE — PROGRESS NOTE ADULT - SUBJECTIVE AND OBJECTIVE BOX
Jacque Riley MD  Cardiology Fellow  [phone]  All Cardiology service information can be found 24/7 on [email or web address], password: [email or web address]    Patient seen and examined at bedside.    Overnight Events:     Review Of Systems: No chest pain, shortness of breath, or palpitations            Current Meds:  artificial  tears Solution 1 Drop(s) Both EYES every 12 hours PRN  aspirin  chewable 81 milliGRAM(s) Oral daily  atorvastatin 40 milliGRAM(s) Oral at bedtime  budesonide  80 MICROgram(s)/formoterol 4.5 MICROgram(s) Inhaler 2 Puff(s) Inhalation two times a day  buMETAnide 4 milliGRAM(s) Oral daily  ciprofloxacin     Tablet 250 milliGRAM(s) Oral daily  dextrose 40% Gel 15 Gram(s) Oral once  dextrose 50% Injectable 25 Gram(s) IV Push once  dextrose 50% Injectable 12.5 Gram(s) IV Push once  dextrose 50% Injectable 25 Gram(s) IV Push once  ferrous    sulfate 325 milliGRAM(s) Oral daily  glucagon  Injectable 1 milliGRAM(s) IntraMuscular once  heparin   Injectable 5000 Unit(s) SubCutaneous every 8 hours  insulin glargine Injectable (LANTUS) 6 Unit(s) SubCutaneous at bedtime  insulin lispro (ADMELOG) corrective regimen sliding scale   SubCutaneous three times a day before meals  insulin lispro Injectable (ADMELOG) 3 Unit(s) SubCutaneous before breakfast  insulin lispro Injectable (ADMELOG) 3 Unit(s) SubCutaneous before lunch  insulin lispro Injectable (ADMELOG) 6 Unit(s) SubCutaneous before dinner  metoprolol succinate ER 12.5 milliGRAM(s) Oral daily  polyethylene glycol 3350 17 Gram(s) Oral daily  tiotropium 18 MICROgram(s) Capsule 1 Capsule(s) Inhalation daily  urea Oral Powder 30 Gram(s) Oral every 12 hours  zolpidem 5 milliGRAM(s) Oral at bedtime PRN      Vitals:  T(F): 97.7 (08-05), Max: 98.2 (08-04)  HR: 87 (08-05) (74 - 87)  BP: 123/61 (08-05) (106/66 - 141/63)  RR: 18 (08-05)  SpO2: 91% (08-05)  I&O's Summary    04 Aug 2021 07:01  -  05 Aug 2021 07:00  --------------------------------------------------------  IN: 980 mL / OUT: 1200 mL / NET: -220 mL        Physical Exam:  Appearance: No acute distress; well appearing  Eyes: PERRL, EOMI, pink conjunctiva  HEENT: Normal oral mucosa  Cardiovascular: RRR, S1, S2, no murmurs, rubs, or gallops; no edema; no JVD  Respiratory: Clear to auscultation bilaterally  Gastrointestinal: soft, non-tender, non-distended with normal bowel sounds  Musculoskeletal: No clubbing; no joint deformity   Neurologic: Non-focal  Lymphatic: No lymphadenopathy  Psychiatry: AAOx3, mood & affect appropriate  Skin: No rashes, ecchymoses, or cyanosis                          7.5    12.24 )-----------( 328      ( 04 Aug 2021 04:15 )             24.9     08-04    123<L>  |  84<L>  |  104<H>  ----------------------------<  194<H>  4.0   |  28  |  1.27    Ca    9.9      04 Aug 2021 04:15            Serum Pro-Brain Natriuretic Peptide: 2398 pg/mL (07-30 @ 09:25)          New ECG(s): Personally reviewed    Echo:    Stress Testing:     Cath:    Imaging:    Interpretation of Telemetry:   Jacque Riley MD  Cardiology Fellow  [phone]  All Cardiology service information can be found 24/7 on [email or web address], password: [email or web address]    Patient seen and examined at bedside.    Overnight Events:     Review Of Systems: No chest pain, shortness of breath, or palpitations            Current Meds:  artificial  tears Solution 1 Drop(s) Both EYES every 12 hours PRN  aspirin  chewable 81 milliGRAM(s) Oral daily  atorvastatin 40 milliGRAM(s) Oral at bedtime  budesonide  80 MICROgram(s)/formoterol 4.5 MICROgram(s) Inhaler 2 Puff(s) Inhalation two times a day  buMETAnide 4 milliGRAM(s) Oral daily  ciprofloxacin     Tablet 250 milliGRAM(s) Oral daily  dextrose 40% Gel 15 Gram(s) Oral once  dextrose 50% Injectable 25 Gram(s) IV Push once  dextrose 50% Injectable 12.5 Gram(s) IV Push once  dextrose 50% Injectable 25 Gram(s) IV Push once  ferrous    sulfate 325 milliGRAM(s) Oral daily  glucagon  Injectable 1 milliGRAM(s) IntraMuscular once  heparin   Injectable 5000 Unit(s) SubCutaneous every 8 hours  insulin glargine Injectable (LANTUS) 6 Unit(s) SubCutaneous at bedtime  insulin lispro (ADMELOG) corrective regimen sliding scale   SubCutaneous three times a day before meals  insulin lispro Injectable (ADMELOG) 3 Unit(s) SubCutaneous before breakfast  insulin lispro Injectable (ADMELOG) 3 Unit(s) SubCutaneous before lunch  insulin lispro Injectable (ADMELOG) 6 Unit(s) SubCutaneous before dinner  metoprolol succinate ER 12.5 milliGRAM(s) Oral daily  polyethylene glycol 3350 17 Gram(s) Oral daily  tiotropium 18 MICROgram(s) Capsule 1 Capsule(s) Inhalation daily  urea Oral Powder 30 Gram(s) Oral every 12 hours  zolpidem 5 milliGRAM(s) Oral at bedtime PRN      Vitals:  T(F): 97.7 (08-05), Max: 98.2 (08-04)  HR: 87 (08-05) (74 - 87)  BP: 123/61 (08-05) (106/66 - 141/63)  RR: 18 (08-05)  SpO2: 91% (08-05)  I&O's Summary    04 Aug 2021 07:01  -  05 Aug 2021 07:00  --------------------------------------------------------  IN: 980 mL / OUT: 1200 mL / NET: -220 mL        Physical Exam:  Appearance: No acute distress; well appearing  Eyes: PERRL, EOMI, pink conjunctiva  HEENT: Normal oral mucosa  Cardiovascular: RRR, S1, S2, no murmurs, rubs, or gallops;   Respiratory: decreased breath sounds at the posterior lung fields, JVD+, HJR +  Gastrointestinal: soft, non-tender, non-distended with normal bowel sounds  Musculoskeletal: No clubbing; no joint deformity   Neurologic: Non-focal  Lymphatic: No lymphadenopathy  Psychiatry: AAOx3, mood & affect appropriate  Skin: No rashes, ecchymoses, or cyanosis                          7.5    12.24 )-----------( 328      ( 04 Aug 2021 04:15 )             24.9     08-04    123<L>  |  84<L>  |  104<H>  ----------------------------<  194<H>  4.0   |  28  |  1.27    Ca    9.9      04 Aug 2021 04:15            Serum Pro-Brain Natriuretic Peptide: 2398 pg/mL (07-30 @ 09:25)         Jacque Riley MD  Cardiology Fellow  [phone]  All Cardiology service information can be found 24/7 on [email or web address], password: [email or web address]    Patient seen and examined at bedside.    Subjective: Still with shortness of breath. Laying in bed with labored breathing.     Current Meds:  artificial  tears Solution 1 Drop(s) Both EYES every 12 hours PRN  aspirin  chewable 81 milliGRAM(s) Oral daily  atorvastatin 40 milliGRAM(s) Oral at bedtime  budesonide  80 MICROgram(s)/formoterol 4.5 MICROgram(s) Inhaler 2 Puff(s) Inhalation two times a day  buMETAnide 4 milliGRAM(s) Oral daily  ciprofloxacin     Tablet 250 milliGRAM(s) Oral daily  dextrose 40% Gel 15 Gram(s) Oral once  dextrose 50% Injectable 25 Gram(s) IV Push once  dextrose 50% Injectable 12.5 Gram(s) IV Push once  dextrose 50% Injectable 25 Gram(s) IV Push once  ferrous    sulfate 325 milliGRAM(s) Oral daily  glucagon  Injectable 1 milliGRAM(s) IntraMuscular once  heparin   Injectable 5000 Unit(s) SubCutaneous every 8 hours  insulin glargine Injectable (LANTUS) 6 Unit(s) SubCutaneous at bedtime  insulin lispro (ADMELOG) corrective regimen sliding scale   SubCutaneous three times a day before meals  insulin lispro Injectable (ADMELOG) 3 Unit(s) SubCutaneous before breakfast  insulin lispro Injectable (ADMELOG) 3 Unit(s) SubCutaneous before lunch  insulin lispro Injectable (ADMELOG) 6 Unit(s) SubCutaneous before dinner  metoprolol succinate ER 12.5 milliGRAM(s) Oral daily  polyethylene glycol 3350 17 Gram(s) Oral daily  tiotropium 18 MICROgram(s) Capsule 1 Capsule(s) Inhalation daily  urea Oral Powder 30 Gram(s) Oral every 12 hours  zolpidem 5 milliGRAM(s) Oral at bedtime PRN      Vitals:  T(F): 97.7 (08-05), Max: 98.2 (08-04)  HR: 87 (08-05) (74 - 87)  BP: 123/61 (08-05) (106/66 - 141/63)  RR: 18 (08-05)  SpO2: 91% (08-05)  I&O's Summary    04 Aug 2021 07:01  -  05 Aug 2021 07:00  --------------------------------------------------------  IN: 980 mL / OUT: 1200 mL / NET: -220 mL        Physical Exam:  Appearance: Mild respiratory distress; chronically ill appearing  Eyes: PERRL, EOMI, pink conjunctiva  HEENT: Normal oral mucosa  Cardiovascular: RRR, S1, S2, no murmurs, rubs, or gallops;   Respiratory: decreased breath sounds at the posterior lung fields, JVD+, HJR +  Gastrointestinal: soft, non-tender, non-distended with normal bowel sounds  Musculoskeletal: No clubbing; no joint deformity   Neurologic: Non-focal  Lymphatic: No lymphadenopathy  Psychiatry: AAOx3, mood & affect appropriate  Skin: No rashes, ecchymoses, or cyanosis                          7.5    12.24 )-----------( 328      ( 04 Aug 2021 04:15 )             24.9     08-04    123<L>  |  84<L>  |  104<H>  ----------------------------<  194<H>  4.0   |  28  |  1.27    Ca    9.9      04 Aug 2021 04:15            Serum Pro-Brain Natriuretic Peptide: 2398 pg/mL (07-30 @ 09:25)

## 2021-08-05 NOTE — PROGRESS NOTE ADULT - ASSESSMENT
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  She is status post acute dialysis, MICU stay and recent TAVR. Cath results (pre TAVR) this admission note elevated PCWP and elevated PA pressure Mean 37, with evidence of right heart failure, secondary to pulmonary hypertension, which is mostly secondary to left heart disease.  As valve has been repaired this may improve.  Patient denies any acute change in her breathing,  States she has had difficulty for months and that it is related to her heart.  Lung masses may be contributing--DDX is primary lung CA vs less likely metastatic colon CA. vs other. Admits to asthma-years ago.    REC:    Continue diuresis as her heart failure team-consider repeat echo and ? RHC                            SEE BELOW:  asthma-symbicort 160, spiriva, incentive spirometry  abnormal CT-lung mass RML--move to consider CT NEEDLE bx.  Continue to decrease oxygen as tolerated.  Will likely require oxygen at rehab.  *******************************  8/2-no significant changes   8/3-struct heart contemplating RHC here  8/4-moving to RHC for additional info to direct care  8/5-RHC-elev pcwp-27 , Mean 35+--c/w left sided PHtn

## 2021-08-05 NOTE — PROGRESS NOTE ADULT - TIME BILLING
as above: no signif changes-s/p RHC c/w WHO class 2  multifactorial dyspnea-CHF/fluid OL-WHO class 2 PAH (cardiac related), asthma (remote), ? RML tumor, debility, anemia--O2 NC sat above 90%  CHF-as per CHF team-Andreas et al; s/p  RHC/echo repeated---WHO class 2--more aggressive rx of left heart Dz; BNP f/up  Renal-HD M/W/F  (hyponatremia)  asthma-symbicort 160 2 bid, spiriva 1 q day, incentive spirometry  abnormal CT c/w CA-? primary lung vs metastatic dz (non smoker)--consider CTNA while her for dx and ? rx (RT etc.), eventual pet/ct  DVT prophylaxis-on A/C  GI-prophylaxis                           PT           respiratory failure--O2 likely upon DC to rehab/home.    Barrington Ponce MD-Pulmonary   [phone]

## 2021-08-06 NOTE — PROGRESS NOTE ADULT - PROBLEM SELECTOR PROBLEM 1
S/P TAVR (transcatheter aortic valve replacement) Acute on chronic heart failure with preserved ejection fraction

## 2021-08-06 NOTE — PROGRESS NOTE ADULT - ASSESSMENT
87F PMH CAD w/stents, DM2, Afib (not on AC), Colon Ca (about 25y ago), severe AS, HF , recently started on Entresto, admitted for JACQUE and Metformin-associated lactic acidosis, admitted to MICU s/p urgent HD session but now JACQUE resolving and now undergoing TAVR workup.    7/22 Underwent TAVR via right femoral #22 Heike  recovered in CRS post op course unremarkable Junctional rhythm  by EKG  700 cc fluid given for hypotension. Stabilize transferred to [address] [place] [address]- received in afib-  groin sites benign + DP son at bedside ECHO in am EKG in am  Likely discharge on Saturday with [facility]OT son reports haven given rehab  choices to case management.  7/23 pending post TAVR echo. Anticipate discharge to rehab Monday.  7/24 VSS maintaining Afib 70's . No further junctional episodes of rhythm.   7/25 Transthoracic Echocardiogram demonstrates Minimal aortic regurgitation-Tethered tricuspid valve, severe tricuspid regurgitation . No evidence of pericardial effusion.   7/26 VSS - pt c/o slight SOB - echo shows small new pericardial effusion.  CXR-done- crackles bases - on IV bumex tid - diuresing - O2 N/c in place -rounds made w/ Dr. Pruitt, Dr. Johns & structural heart team - new SOB - will get LE dopplers to r/o dvt - start heaprin gtt, change to bumex gtt 0.5mg /hr- give zaroxlyn 10mg iv x1 , WBC 16 from 8 - ck u/a c&s.  transfer to SDU for closer monitoring.    7/27 OOB to chair  bumex gtt   heparin gtt  neg  800 cc   24 hrs   chest xray   bl base pl effusion  7/28 VSS more tachypnic today.  CXR + atelectasis.   Na 127 Dr Ramos called for input.  -658cc/24 hrs  7/29 VSS sob improved.  -236cc/24hrs.  Na 127 received 1 dose samsca 15x1.  K 4.6 .  Hep gtt dc'd  7/30 VSS;  bumex changed to 4 mg po bid and aldactone initiated as per CHF team; change toprol 12.5 qd; supplement hypokalemia; no anticoagulation for chronic afib secondary to hx GI bleed as per Dr. Pruitt  7/31 (+) tachypnea and dyspnea --> Per CHF Dr. Kee will call Pulmonary for consults (Dr. Ponce).  Bumex  4 mg IV x 1 this evening and them decreased Bumex 4 mg PO daily to start in AM.  Continue with current medication regimen. Supplement Potassium to maintain K+ level > 4.5     8/1   OOB to chair,     na  124,  FR   covid ordered   Pulm  renal and HF following  8/2       OOB    w/ asst    diuretics   bumex 4mg qd  and aldactone 12.5,  creat stable  na  improved to 126  8/3 Repeat echo today and plan for right heart cath tomorrow.  Persistent hyponatremia 123 consider samsca 15 today.  Symbicort 160 2 bid, spiriva 1 q day, for asthma.  Pulmonary consult suggest CTNA and eventual PET scan to investigate Metastatic bronchoalveolar cancer.  Given her age, comorbidities and recent acute illness, will not pursue diagnostic workup at this time. 8/4 Right heart cath today  8/5 RHC filling pressures and pulmonary wedge elevated. bumex drip initiated with metolazone. Discontinued urena. Patient is being assessed for possible inotropic assisted diuresis or ultrafiltration.   87F PMH CAD w/stents, DM2, Afib (not on AC), Colon Ca (about 25y ago), severe AS, HF , recently started on Entresto, admitted for JACQUE and Metformin-associated lactic acidosis, admitted to MICU s/p urgent HD session but now JACQUE resolving and now undergoing TAVR workup.    7/22 Underwent TAVR via right femoral #22 Heike  recovered in CRS post op course unremarkable Junctional rhythm  by EKG  700 cc fluid given for hypotension. Stabilize transferred to [address] [place] [address]- received in afib-  groin sites benign + DP son at bedside ECHO in am EKG in am  Likely discharge on Saturday with [facility]OT son reports haven given rehab  choices to case management.  7/23 pending post TAVR echo. Anticipate discharge to rehab Monday.  7/24 VSS maintaining Afib 70's . No further junctional episodes of rhythm.   7/25 Transthoracic Echocardiogram demonstrates Minimal aortic regurgitation-Tethered tricuspid valve, severe tricuspid regurgitation . No evidence of pericardial effusion.   7/26 VSS - pt c/o slight SOB - echo shows small new pericardial effusion.  CXR-done- crackles bases - on IV bumex tid - diuresing - O2 N/c in place -rounds made w/ Dr. Pruitt, Dr. Johns & structural heart team - new SOB - will get LE dopplers to r/o dvt - start heaprin gtt, change to bumex gtt 0.5mg /hr- give zaroxlyn 10mg iv x1 , WBC 16 from 8 - ck u/a c&s.  transfer to SDU for closer monitoring.    7/27 OOB to chair  bumex gtt   heparin gtt  neg  800 cc   24 hrs   chest xray   bl base pl effusion  7/28 VSS more tachypnic today.  CXR + atelectasis.   Na 127 Dr Ramos called for input.  -658cc/24 hrs  7/29 VSS sob improved.  -236cc/24hrs.  Na 127 received 1 dose samsca 15x1.  K 4.6 .  Hep gtt dc'd  7/30 VSS;  bumex changed to 4 mg po bid and aldactone initiated as per CHF team; change toprol 12.5 qd; supplement hypokalemia; no anticoagulation for chronic afib secondary to hx GI bleed as per Dr. Pruitt  7/31 (+) tachypnea and dyspnea --> Per CHF Dr. Kee will call Pulmonary for consults (Dr. Ponce).  Bumex  4 mg IV x 1 this evening and them decreased Bumex 4 mg PO daily to start in AM.  Continue with current medication regimen. Supplement Potassium to maintain K+ level > 4.5     8/1   OOB to chair,     na  124,  FR   covid ordered   Pulm  renal and HF following  8/2       OOB    w/ asst    diuretics   bumex 4mg qd  and aldactone 12.5,  creat stable  na  improved to 126  8/3 Repeat echo today and plan for right heart cath tomorrow.  Persistent hyponatremia 123 consider samsca 15 today.  Symbicort 160 2 bid, spiriva 1 q day, for asthma.  Pulmonary consult suggest CTNA and eventual PET scan to investigate Metastatic bronchoalveolar cancer.  Given her age, comorbidities and recent acute illness, will not pursue diagnostic workup at this time. 8/4 Right heart cath today  8/5 RHC filling pressures and pulmonary wedge elevated. bumex drip initiated with metolazone. Discontinued urena. Patient is being assessed for possible inotropic assisted diuresis or ultrafiltration.  8/6 VSS: continue bumex gttp; potassium supplemented for hypokalemia   Discharge planning- rehab when stable    87F PMH CAD w/stents, DM2, Afib (not on AC), Colon Ca (about 25y ago), severe AS, HF , recently started on Entresto, admitted for JACQUE and Metformin-associated lactic acidosis, admitted to MICU s/p urgent HD session but now JACQUE resolving and now undergoing TAVR workup.    7/22 Underwent TAVR via right femoral #22 Heike  recovered in CRS post op course unremarkable Junctional rhythm  by EKG  700 cc fluid given for hypotension. Stabilize transferred to [address] [place] [address]- received in afib-  groin sites benign + DP son at bedside ECHO in am EKG in am  Likely discharge on Saturday with [facility] son reports haven given rehab  choices to case management.  7/23 pending post TAVR echo. Anticipate discharge to rehab Monday.  7/24 VSS maintaining Afib 70's . No further junctional episodes of rhythm.   7/25 Transthoracic Echocardiogram demonstrates Minimal aortic regurgitation-Tethered tricuspid valve, severe tricuspid regurgitation . No evidence of pericardial effusion.   7/26 VSS - pt c/o slight SOB - echo shows small new pericardial effusion.  CXR-done- crackles bases - on IV bumex tid - diuresing - O2 N/c in place -rounds made w/ Dr. Pruitt, Dr. Johns & structural heart team - new SOB - will get LE dopplers to r/o dvt - start heaprin gtt, change to bumex gtt 0.5mg /hr- give zaroxlyn 10mg iv x1 , WBC 16 from 8 - ck u/a c&s.  transfer to SDU for closer monitoring.    7/27 OOB to chair  bumex gtt   heparin gtt  neg  800 cc   24 hrs   chest xray   bl base pl effusion  7/28 VSS more tachypnic today.  CXR + atelectasis.   Na 127 Dr Ramos called for input.  -658cc/24 hrs  7/29 VSS sob improved.  -236cc/24hrs.  Na 127 received 1 dose samsca 15x1.  K 4.6 .  Hep gtt dc'd  7/30 VSS;  bumex changed to 4 mg po bid and aldactone initiated as per CHF team; change toprol 12.5 qd; supplement hypokalemia; no anticoagulation for chronic afib secondary to hx GI bleed as per Dr. Pruitt  7/31 (+) tachypnea and dyspnea --> Per CHF Dr. Kee will call Pulmonary for consults (Dr. Ponce).  Bumex  4 mg IV x 1 this evening and them decreased Bumex 4 mg PO daily to start in AM.  Continue with current medication regimen. Supplement Potassium to maintain K+ level > 4.5     8/1   OOB to chair,     na  124,  FR   covid ordered   Pulm  renal and HF following  8/2       OOB    w/ asst    diuretics   bumex 4mg qd  and aldactone 12.5,  creat stable  na  improved to 126  8/3 Repeat echo today and plan for right heart cath tomorrow.  Persistent hyponatremia 123 consider samsca 15 today.  Symbicort 160 2 bid, spiriva 1 q day, for asthma.  Pulmonary consult suggest CTNA and eventual PET scan to investigate Metastatic bronchoalveolar cancer.  Given her age, comorbidities and recent acute illness, will not pursue diagnostic workup at this time. 8/4 Right heart cath today  8/5 RHC filling pressures and pulmonary wedge elevated. bumex drip initiated with metolazone. Discontinued urena. Patient is being assessed for possible inotropic assisted diuresis or ultrafiltration.  8/6 VSS: continue bumex gttp; potassium supplemented for hypokalemia; endo consulted for uncontrolled dm2- aic 6.7- bs 324 - additional admelog given   Discharge planning- rehab when stable

## 2021-08-06 NOTE — PROGRESS NOTE ADULT - PROBLEM SELECTOR PLAN 4
cardiorenal team following  monitor renal function has bronchoalveolar cancer; unclear prognosis   pulmonary involved

## 2021-08-06 NOTE — PROGRESS NOTE ADULT - ATTENDING COMMENTS
#hypervolemia/elevated RHF pressures  -started on bumex drip, continue  add diamox and metolazone today  cont diuresis  #gabrielle-bun/cr improving  #hyponatremia- hypervolemic- improving with diuresis  #met alkalosis- add diamox

## 2021-08-06 NOTE — PROGRESS NOTE ADULT - SUBJECTIVE AND OBJECTIVE BOX
[facility] DIVISION OF PULMONARY, CRITICAL CARE and SLEEP MEDICINE  PULMONARY PROGRESS NOTE  OFFICE NUMBER: [phone] (Afterhours and Weekends)  CONSULT NUMBER: [phone] (Monday - Friday: 9am-5pm)    PATIENT INFORMATION:  NAME: ANITHA JHAVERI:  MRN: [id number]    ***Coverage for Dr. Ponce***    CHIEF COMPLAINT: Patient is a 87y old  Female who presents with a chief complaint of urgent HD (06 Aug 2021 12:02)      [x] INITIAL CONSULT, H&P, FAMILY HISTORY and PAST MEDICAL AND SURGICAL HISTORY REVIEWED    OVERNIGHT EVENTS or CHANGES TO HPI: No significant events. On Bumex drip.     ========================REVIEW OF SYSTEMS========================  CONSTITUTIONAL: Denies  CARDIOVASCULAR: Denies  PULMONARY: Denies  [x] REMAINING REVIEW OF SYSTEMS NEGATIVE  [] UNABLE TO OBTAIN REVIEW OF SYSTEMS DUE TO _______________    ========================MEDICATIONS=============================  MEDICATIONS  (STANDING):  aspirin  chewable 81 milliGRAM(s) Oral daily  atorvastatin 40 milliGRAM(s) Oral at bedtime  budesonide  80 MICROgram(s)/formoterol 4.5 MICROgram(s) Inhaler 2 Puff(s) Inhalation two times a day  buMETAnide Infusion 2 mG/Hr (10 mL/Hr) IV Continuous <Continuous>  ciprofloxacin     Tablet 250 milliGRAM(s) Oral daily  dextrose 40% Gel 15 Gram(s) Oral once  dextrose 50% Injectable 25 Gram(s) IV Push once  dextrose 50% Injectable 12.5 Gram(s) IV Push once  dextrose 50% Injectable 25 Gram(s) IV Push once  ferrous    sulfate 325 milliGRAM(s) Oral daily  glucagon  Injectable 1 milliGRAM(s) IntraMuscular once  heparin   Injectable 5000 Unit(s) SubCutaneous every 8 hours  insulin glargine Injectable (LANTUS) 6 Unit(s) SubCutaneous at bedtime  insulin lispro (ADMELOG) corrective regimen sliding scale   SubCutaneous three times a day before meals  insulin lispro Injectable (ADMELOG) 3 Unit(s) SubCutaneous before breakfast  insulin lispro Injectable (ADMELOG) 3 Unit(s) SubCutaneous before lunch  insulin lispro Injectable (ADMELOG) 6 Unit(s) SubCutaneous before dinner  metoprolol succinate ER 12.5 milliGRAM(s) Oral daily  polyethylene glycol 3350 17 Gram(s) Oral daily  spironolactone 25 milliGRAM(s) Oral daily  tiotropium 18 MICROgram(s) Capsule 1 Capsule(s) Inhalation daily      MEDICATIONS  (PRN):  artificial  tears Solution 1 Drop(s) Both EYES every 12 hours PRN Dry Eyes  zolpidem 5 milliGRAM(s) Oral at bedtime PRN Insomnia      ========================PHYSICAL EXAM============================    VITALS: ICU Vital Signs Last 24 Hrs  T(C): 36.4 (06 Aug 2021 13:08), Max: 36.6 (05 Aug 2021 19:47)  T(F): 97.5 (06 Aug 2021 13:08), Max: 97.8 (05 Aug 2021 19:47)  HR: 82 (06 Aug 2021 13:08) (73 - 82)  BP: 121/77 (06 Aug 2021 13:08) (114/53 - 129/71)  BP(mean): 74 (05 Aug 2021 19:47) (74 - 74)  ABP: --  ABP(mean): --  RR: 18 (06 Aug 2021 13:08) (18 - 18)  SpO2: 96% (06 Aug 2021 13:08) (93% - 96%)      INTAKE and OUTPUT: I&O's Summary    05 Aug 2021 07:01  -  06 Aug 2021 07:00  --------------------------------------------------------  IN: 640 mL / OUT: 2850 mL / NET: -2210 mL    06 Aug 2021 07:01  -  06 Aug 2021 13:18  --------------------------------------------------------  IN: 370 mL / OUT: 1000 mL / NET: -630 mL        VENTILATOR SETTINGS: 4L NC saturating 96%    GENERAL: Laying in recliner UNAD  EYES: PERRLA  CARDIOVASCULAR: RRR, Loud P2, No M/R/G  RESPIRATORY: CTA B/L with slight decrease at bases likely from poor inspiratory effort  EXTREMITIES 1/4 edema in LE bilaterally  NEUROLOGIC: Nonfocal    ========================LABORATORY RESULTS AND IMAGING=============                        7.8    11.01 )-----------( 321      ( 06 Aug 2021 05:19 )             24.6                                                    08-06    136  |  87<L>  |  118<H>  ----------------------------<  155<H>  3.2<L>   |  35<H>  |  1.00    Ca    10.3      06 Aug 2021 05:19        Creatinine Trend: 1.00<--, 1.24<--, 1.27<--, 1.65<--, 1.28<--, 1.26<--    CT Chest:  left lower lobe mass associated with multiple left lung pleural nodules worrisome for neoplasm with metastatic disease.  4. 3.2 x 2.6 cm right middle lobe mass as described above also is worrisome for neoplasm.        THANK YOU FOR ALLOWING US TO PARTICIPATE IN THE CARE OF THIS PATIENT

## 2021-08-06 NOTE — PROGRESS NOTE ADULT - SUBJECTIVE AND OBJECTIVE BOX
VITAL SIGNS    Telemetry:    Vital Signs Last 24 Hrs  T(C): 36.4 (08-06-21 @ 04:45), Max: 36.6 (08-05-21 @ 19:47)  T(F): 97.6 (08-06-21 @ 04:45), Max: 97.8 (08-05-21 @ 19:47)  HR: 80 (08-06-21 @ 04:45) (73 - 82)  BP: 129/71 (08-06-21 @ 04:45) (114/53 - 132/71)  RR: 18 (08-06-21 @ 04:45) (18 - 18)  SpO2: 94% (08-06-21 @ 04:45) (93% - 94%)            08-05 @ 07:01  -  08-06 @ 07:00  --------------------------------------------------------  IN: 640 mL / OUT: 2850 mL / NET: -2210 mL    08-06 @ 07:01  -  08-06 @ 11:17  --------------------------------------------------------  IN: 370 mL / OUT: 0 mL / NET: 370 mL       Daily     Daily   Admit Wt: Drug Dosing Weight  Height (cm): 152.4 (22 Jul 2021 11:15)  Weight (kg): 72.4 (22 Jul 2021 11:15)  BMI (kg/m2): 31.2 (22 Jul 2021 11:15)  BSA (m2): 1.7 (22 Jul 2021 11:15)      CAPILLARY BLOOD GLUCOSE      POCT Blood Glucose.: 179 mg/dL (06 Aug 2021 07:52)  POCT Blood Glucose.: 151 mg/dL (05 Aug 2021 21:48)  POCT Blood Glucose.: 225 mg/dL (05 Aug 2021 17:18)  POCT Blood Glucose.: 309 mg/dL (05 Aug 2021 11:30)          artificial  tears Solution 1 Drop(s) Both EYES every 12 hours PRN  aspirin  chewable 81 milliGRAM(s) Oral daily  atorvastatin 40 milliGRAM(s) Oral at bedtime  budesonide  80 MICROgram(s)/formoterol 4.5 MICROgram(s) Inhaler 2 Puff(s) Inhalation two times a day  buMETAnide Infusion 2 mG/Hr IV Continuous <Continuous>  ciprofloxacin     Tablet 250 milliGRAM(s) Oral daily  dextrose 40% Gel 15 Gram(s) Oral once  dextrose 50% Injectable 25 Gram(s) IV Push once  dextrose 50% Injectable 12.5 Gram(s) IV Push once  dextrose 50% Injectable 25 Gram(s) IV Push once  ferrous    sulfate 325 milliGRAM(s) Oral daily  glucagon  Injectable 1 milliGRAM(s) IntraMuscular once  heparin   Injectable 5000 Unit(s) SubCutaneous every 8 hours  insulin glargine Injectable (LANTUS) 6 Unit(s) SubCutaneous at bedtime  insulin lispro (ADMELOG) corrective regimen sliding scale   SubCutaneous three times a day before meals  insulin lispro Injectable (ADMELOG) 3 Unit(s) SubCutaneous before breakfast  insulin lispro Injectable (ADMELOG) 3 Unit(s) SubCutaneous before lunch  insulin lispro Injectable (ADMELOG) 6 Unit(s) SubCutaneous before dinner  metolazone 5 milliGRAM(s) Oral once  metoprolol succinate ER 12.5 milliGRAM(s) Oral daily  polyethylene glycol 3350 17 Gram(s) Oral daily  tiotropium 18 MICROgram(s) Capsule 1 Capsule(s) Inhalation daily  zolpidem 5 milliGRAM(s) Oral at bedtime PRN      PHYSICAL EXAM    Subjective: "Hi.   Neurology: alert and oriented x 3, nonfocal, no gross deficits  CV : tele:  RSR  Sternal Wound :  CDI with dressing , Stable  Lungs: clear. RR easy, unlabored   Abdomen: soft, nontender, nondistended, positive bowel sounds, bowel movement   Neg N/V/D   :  pt voiding without difficulty   Extremities:   CHRISTIANSON; edema, neg calf tenderness.   PPP bilaterally      PW:  Chest tubes:                 VITAL SIGNS    Telemetry:  afib 80's   Vital Signs Last 24 Hrs  T(C): 36.4 (08-06-21 @ 04:45), Max: 36.6 (08-05-21 @ 19:47)  T(F): 97.6 (08-06-21 @ 04:45), Max: 97.8 (08-05-21 @ 19:47)  HR: 80 (08-06-21 @ 04:45) (73 - 82)  BP: 129/71 (08-06-21 @ 04:45) (114/53 - 132/71)  RR: 18 (08-06-21 @ 04:45) (18 - 18)  SpO2: 94% (08-06-21 @ 04:45) (93% - 94%)            08-05 @ 07:01  -  08-06 @ 07:00  --------------------------------------------------------  IN: 640 mL / OUT: 2850 mL / NET: -2210 mL    08-06 @ 07:01  -  08-06 @ 11:17  --------------------------------------------------------  IN: 370 mL / OUT: 0 mL / NET: 370 mL       Daily     Daily   Admit Wt: Drug Dosing Weight  Height (cm): 152.4 (22 Jul 2021 11:15)  Weight (kg): 72.4 (22 Jul 2021 11:15)  BMI (kg/m2): 31.2 (22 Jul 2021 11:15)  BSA (m2): 1.7 (22 Jul 2021 11:15)      CAPILLARY BLOOD GLUCOSE      POCT Blood Glucose.: 179 mg/dL (06 Aug 2021 07:52)  POCT Blood Glucose.: 151 mg/dL (05 Aug 2021 21:48)  POCT Blood Glucose.: 225 mg/dL (05 Aug 2021 17:18)  POCT Blood Glucose.: 309 mg/dL (05 Aug 2021 11:30)          artificial  tears Solution 1 Drop(s) Both EYES every 12 hours PRN  aspirin  chewable 81 milliGRAM(s) Oral daily  atorvastatin 40 milliGRAM(s) Oral at bedtime  budesonide  80 MICROgram(s)/formoterol 4.5 MICROgram(s) Inhaler 2 Puff(s) Inhalation two times a day  buMETAnide Infusion 2 mG/Hr IV Continuous <Continuous>  ciprofloxacin     Tablet 250 milliGRAM(s) Oral daily  dextrose 40% Gel 15 Gram(s) Oral once  dextrose 50% Injectable 25 Gram(s) IV Push once  dextrose 50% Injectable 12.5 Gram(s) IV Push once  dextrose 50% Injectable 25 Gram(s) IV Push once  ferrous    sulfate 325 milliGRAM(s) Oral daily  glucagon  Injectable 1 milliGRAM(s) IntraMuscular once  heparin   Injectable 5000 Unit(s) SubCutaneous every 8 hours  insulin glargine Injectable (LANTUS) 6 Unit(s) SubCutaneous at bedtime  insulin lispro (ADMELOG) corrective regimen sliding scale   SubCutaneous three times a day before meals  insulin lispro Injectable (ADMELOG) 3 Unit(s) SubCutaneous before breakfast  insulin lispro Injectable (ADMELOG) 3 Unit(s) SubCutaneous before lunch  insulin lispro Injectable (ADMELOG) 6 Unit(s) SubCutaneous before dinner  metolazone 5 milliGRAM(s) Oral once  metoprolol succinate ER 12.5 milliGRAM(s) Oral daily  polyethylene glycol 3350 17 Gram(s) Oral daily  tiotropium 18 MICROgram(s) Capsule 1 Capsule(s) Inhalation daily  zolpidem 5 milliGRAM(s) Oral at bedtime PRN      PHYSICAL EXAM    Subjective: " I still don't feel good."   Neurology: alert and oriented x 3, nonfocal, no gross deficits  CV : tele:  afib 70-90   Lungs: clear diminished at the bases. RR easy, unlabored   Abdomen: soft, nontender, nondistended, positive bowel sounds, bowel movement   Neg N/V/D   :  pt voiding without difficulty   Extremities:   CHRISTIANSON; +1 LE edema, neg calf tenderness.   PPP bilaterally

## 2021-08-06 NOTE — PROGRESS NOTE ADULT - SUBJECTIVE AND OBJECTIVE BOX
HPI:  Patient seen and examined at bedside on 2 [place].  On Bumex gtt with interval improvement in Na and creatinine. Contraction alkalosis noted.    Review Of Systems:           Respiratory: No shortness of breath, cough, or wheezing  Cardiovascular: No chest pain or palpitations  10 point review of systems is otherwise negative except as mentioned above        Medications:  artificial  tears Solution 1 Drop(s) Both EYES every 12 hours PRN  aspirin  chewable 81 milliGRAM(s) Oral daily  atorvastatin 40 milliGRAM(s) Oral at bedtime  budesonide  80 MICROgram(s)/formoterol 4.5 MICROgram(s) Inhaler 2 Puff(s) Inhalation two times a day  buMETAnide Infusion 2 mG/Hr IV Continuous <Continuous>  ciprofloxacin     Tablet 250 milliGRAM(s) Oral daily  dextrose 40% Gel 15 Gram(s) Oral once  dextrose 50% Injectable 25 Gram(s) IV Push once  dextrose 50% Injectable 12.5 Gram(s) IV Push once  dextrose 50% Injectable 25 Gram(s) IV Push once  ferrous    sulfate 325 milliGRAM(s) Oral daily  glucagon  Injectable 1 milliGRAM(s) IntraMuscular once  heparin   Injectable 5000 Unit(s) SubCutaneous every 8 hours  insulin glargine Injectable (LANTUS) 6 Unit(s) SubCutaneous at bedtime  insulin lispro (ADMELOG) corrective regimen sliding scale   SubCutaneous three times a day before meals  insulin lispro Injectable (ADMELOG) 3 Unit(s) SubCutaneous before breakfast  insulin lispro Injectable (ADMELOG) 3 Unit(s) SubCutaneous before lunch  insulin lispro Injectable (ADMELOG) 6 Unit(s) SubCutaneous before dinner  metoprolol succinate ER 12.5 milliGRAM(s) Oral daily  polyethylene glycol 3350 17 Gram(s) Oral daily  spironolactone 25 milliGRAM(s) Oral daily  tiotropium 18 MICROgram(s) Capsule 1 Capsule(s) Inhalation daily    PAST MEDICAL & SURGICAL HISTORY:    Vitals:  T(C): 36.4 (08-06-21 @ 13:08), Max: 36.4 (08-06-21 @ 04:45)  HR: 82 (08-06-21 @ 13:08) (80 - 82)  BP: 121/77 (08-06-21 @ 13:08) (121/77 - 129/71)  BP(mean): --  RR: 18 (08-06-21 @ 13:08) (18 - 18)  SpO2: 96% (08-06-21 @ 13:08) (94% - 96%)  Wt(kg): --  Daily     Daily   I&O's Summary    05 Aug 2021 07:01  -  06 Aug 2021 07:00  --------------------------------------------------------  IN: 640 mL / OUT: 2850 mL / NET: -2210 mL    06 Aug 2021 07:01  -  06 Aug 2021 21:37  --------------------------------------------------------  IN: 730 mL / OUT: 1600 mL / NET: -870 mL        Physical Exam:  Appearance: Normal, well groomed, NAD  Eyes: PERRLA, EOMI, pink conjunctiva, no scleral icterus   HENT: Normal oral mucosa  Cardiovascular: RRR, S1, S2, NO systolic murmur at base  Procedural Access Site: Clean, dry, intact, without hematoma  Respiratory: Clear to auscultation bilaterally  Gastrointestinal: Soft, non-tender, non-distended, BS+  Musculoskeletal: No clubbing or joint deformity   Neurologic: No focal weakness  Lymphatic: No lymphadenopathy  Psychiatry: AAOx3 with appropriate mood and affect  Skin: No rashes, ecchymoses, or cyanosis                          7.8    11.01 )-----------( 321      ( 06 Aug 2021 05:19 )             24.6     08-06    x   |  x   |  x   ----------------------------<  x   5.4<H>   |  x   |  x     Ca    10.3      06 Aug 2021 05:19          Echo:  < from: Limited Transthoracic Echo (08.03.21 @ 18:28) >  ------------------------------------------------------------------------  Dimensions:    Normal Values:  LA:     5.8    2.0 - 4.0 cm  Ao:     2.8    2.0 - 3.8 cm  SEPTUM: 0.8    0.6 - 1.2 cm  PWT:    0.8    0.6 - 1.1 cm  LVIDd:  4.3    3.0 - 5.6 cm  LVIDs:  1.9    1.8 - 4.0 cm  Derived variables:  LVMI: 62 g/m2  RWT: 0.37  Fractional short: 56 %  EF (Visual Estimate): >70 %  EF (Teicholtz): 87 %  EF (Lorenzo Rule): 72 %  ------------------------------------------------------------------------  Observations:  Mitral Valve: Mitral annular calcification, otherwise  normal mitral valve. Mild-moderate mitral regurgitation.  Aortic Valve/Aorta: Transcatheter aortic valve replacement.  Peak left ventricular outflow tract gradient equals 3 mm  Hg.  Aortic Root: 2.8 cm.  LVOT diameter: 1.8 cm.  Left Atrium: Severely dilated left atrium.  LA volume index  = 76 cc/m2.  Left Ventricle: Hyperdynamic left ventricular systolic  function. Normal left ventricular internal dimensions and  wall thicknesses. Increased E/e'  is consistent with  elevated left ventricular filling pressure.  Right Heart: Severe right atrial enlargement. Right  ventricular enlargement with decreased right ventricular  systolic function. Tricuspid valve not well visualized.  Moderate-severe tricuspid regurgitation. Pulmonic valve not  well visualized.  Pericardium/Pleura: Moderate circumferential pericardial  effusion measuring 1.3 cm at its greatest dimention  posteriorly adjacent to the LV.   No evidence of right  atrial or right ventricular collapse. No evidence of  tamponade physiology.  Hemodynamic: Estimated right ventricular systolic pressure  equals 74 mm Hg, assuming right atrial pressure [id number] -  15 mm Hg, consistent with severe pulmonary hypertension.  Diastology is suggestive of elevated left atrial pressure.  ------------------------------------------------------------------------  Conclusions:  1. Mitral annular calcification, otherwise normal mitral  valve. Mild-moderate mitral regurgitation.  2. Transcatheter aortic valve replacement.  3. Severely dilated left atrium.  LA volume index = 76  cc/m2.  4. Hyperdynamic left ventricular systolic function.  5. Increased E/e'  is consistent with elevated left  ventricular filling pressure.  6. Right ventricular enlargement with decreased right  ventricular systolic function.  7. Tricuspid valve not well visualized. Moderate-severe  tricuspid regurgitation.  8. Estimated pulmonary artery systolic pressure equals 74  mm Hg, assuming right atrial pressure equals 10 - 15 mm Hg,  consistent with severe pulmonary pressures.  9. Moderate circumferential pericardial effusion measuring  1.3 cm at its greatest dimention posteriorly adjacent to  the LV.   No evidence of right atrial or right ventricular  collapse. No evidence of tamponade physiology.  *** Compared with echocardiogram of 7/26/2021, pericardial  effusion is slightly larger.  ------------------------------------------------------------------------  Confirmed on  8/4/2021 - 14:08:19 by Maco Castellano M.D.  -----------------------    < end of copied text >        Cath:  < from: Cardiac Cath Lab (08.04.21 @ 17:18) >  DIAGNOSTIC IMPRESSIONS: Elevated right atrial pressure (mRA 17mmHg with a V  wave of 19mmHg)  Moderate post-capillary pulmonary hypertension (sPAP 60mmHg, dPAP 25mmHg,  mPAP 37mmHg)  PCWP = 27mmHg with a V wave of 32mmHg  PAsat = 58% // RAsat = 94%  [place] CO // CI = 5.92l/min // 3.50l/min/m2  DIAGNOSTIC RECOMMENDATIONS: Keep right leg straight for 4 hours following  removal of sheath  Continue aggressive medical management  Findings discussed with Dr. Sherman  INTERVENTIONAL IMPRESSIONS: Elevated right atrial pressure (mRA 17mmHg with  a V wave of 19mmHg)  Moderate post-capillary pulmonary hypertension (sPAP 60mmHg, dPAP 25mmHg,  mPAP 37mmHg)  PCWP = 27mmHg with a V wave of 32mmHg  PAsat = 58% // RAsat = 94%  [place] CO // CI = 5.92l/min // 3.50l/min/m2  INTERVENTIONAL RECOMMENDATIONS: Keep right leg straight for 4 hours  following removal of sheath  Continue aggressive medical management  Findings discussed with Dr. Sherman  Prepared and signed by  Alejandro Johns MD  Signed 08/04/2021 20:22:38    < end of copied text >

## 2021-08-06 NOTE — PROGRESS NOTE ADULT - PROBLEM SELECTOR PLAN 1
Continue on aldactone 12.5 mg po Daily   Supplement Potassium to maintain K+ level > 4.5 Continue on aldactone 12.5 mg po Daily   Supplement Potassium to maintain K+ level > 4.5  pulm toilet  discharge planning- rehab when stable

## 2021-08-06 NOTE — PROGRESS NOTE ADULT - PROBLEM SELECTOR PLAN 4
Maintain negative fluid balance  daily bmp  Na 130  today Maintain negative fluid balance  daily bmp  Na 136  today

## 2021-08-06 NOTE — PROGRESS NOTE ADULT - PROBLEM SELECTOR PLAN 1
hypervolemic  RHC filling pressures and pul wedge elevated  start bumex drip 8/5  given bumex IV and metolazone 8/5  stopped  urena 8/5  now improving sodium with diuresis

## 2021-08-06 NOTE — PROGRESS NOTE ADULT - SUBJECTIVE AND OBJECTIVE BOX
[facility] DIVISION OF KIDNEY DISEASES AND HYPERTENSION -- FOLLOW UP NOTE  --------------------------------------------------------------------------------  Chief Complaint:/subjective: clinically improving and diuresing well , now on bumex drip; no sob, just states weak but improving    24 hour events:as above        PAST HISTORY  --------------------------------------------------------------------------------  No significant changes to PMH, PSH, FHx, SHx, unless otherwise noted    ALLERGIES & MEDICATIONS  --------------------------------------------------------------------------------  Allergies    No Known Allergies    Intolerances      Standing Inpatient Medications  aspirin  chewable 81 milliGRAM(s) Oral daily  atorvastatin 40 milliGRAM(s) Oral at bedtime  budesonide  80 MICROgram(s)/formoterol 4.5 MICROgram(s) Inhaler 2 Puff(s) Inhalation two times a day  buMETAnide Infusion 2 mG/Hr IV Continuous <Continuous>  ciprofloxacin     Tablet 250 milliGRAM(s) Oral daily  dextrose 40% Gel 15 Gram(s) Oral once  dextrose 50% Injectable 25 Gram(s) IV Push once  dextrose 50% Injectable 12.5 Gram(s) IV Push once  dextrose 50% Injectable 25 Gram(s) IV Push once  ferrous    sulfate 325 milliGRAM(s) Oral daily  glucagon  Injectable 1 milliGRAM(s) IntraMuscular once  heparin   Injectable 5000 Unit(s) SubCutaneous every 8 hours  insulin glargine Injectable (LANTUS) 6 Unit(s) SubCutaneous at bedtime  insulin lispro (ADMELOG) corrective regimen sliding scale   SubCutaneous three times a day before meals  insulin lispro Injectable (ADMELOG) 3 Unit(s) SubCutaneous before breakfast  insulin lispro Injectable (ADMELOG) 3 Unit(s) SubCutaneous before lunch  insulin lispro Injectable (ADMELOG) 6 Unit(s) SubCutaneous before dinner  metoprolol succinate ER 12.5 milliGRAM(s) Oral daily  polyethylene glycol 3350 17 Gram(s) Oral daily  spironolactone 25 milliGRAM(s) Oral daily  tiotropium 18 MICROgram(s) Capsule 1 Capsule(s) Inhalation daily    PRN Inpatient Medications  artificial  tears Solution 1 Drop(s) Both EYES every 12 hours PRN  zolpidem 5 milliGRAM(s) Oral at bedtime PRN      REVIEW OF SYSTEMS  --------------------------------------------------------------------------------  Gen: No weight changes, fatigue, fevers/chills, weakness  Skin: No rashes  Head/Eyes/Ears/Mouth: No headache;   Respiratory: No dyspnea, cough  CV: No chest pain, PND, orthopnea  GI: No abdominal pain, diarrhea, constipation, nausea, vomiting  : No increased frequency, dysuria, hematuria, nocturia  MSK: No joint pain/swelling; no back pain; no edema  Neuro: No dizziness/lightheadedness, weakness  Heme: No easy bruising or bleeding  Psych: No significant nervousness, anxiety, stress, depression    All other systems were reviewed and are negative, except as noted.    VITALS/PHYSICAL EXAM  --------------------------------------------------------------------------------  T(C): 36.4 (08-06-21 @ 04:45), Max: 36.6 (08-05-21 @ 19:47)  HR: 80 (08-06-21 @ 04:45) (73 - 82)  BP: 129/71 (08-06-21 @ 04:45) (114/53 - 132/71)  RR: 18 (08-06-21 @ 04:45) (18 - 18)  SpO2: 94% (08-06-21 @ 04:45) (93% - 94%)  Wt(kg): --  Adult Advanced Hemodynamics Last 24 Hrs  ABP: --  ABP(mean): --  CVP(mm Hg): --  CO: --  CI: --  PA: --  PA(mean): --  PCWP: --  SVR: --  SVRI: --        08-05-21 @ 07:01  -  08-06-21 @ 07:00  --------------------------------------------------------  IN: 640 mL / OUT: 2850 mL / NET: -2210 mL    08-06-21 @ 07:01  -  08-06-21 @ 12:02  --------------------------------------------------------  IN: 370 mL / OUT: 0 mL / NET: 370 mL      Physical Exam:  	Gen: NAD,    	HEENT:  , no jvp  	Pulm: CTA B/L  	CV: RRR, S1S2; no rub  	Back:  no sacral edema  	Abd: +BS, soft,    	: No suprapubic tenderness  	Ext:2+ b/l LE edema  	Neuro: awake  	Psych:alert  	Skin: Warm,    	Vascular access:    LABS/STUDIES  --------------------------------------------------------------------------------              7.8    11.01 >-----------<  321      [08-06-21 @ 05:19]              24.6     Hemoglobin: 7.8 g/dL (08-06-21 @ 05:19)  Hemoglobin: 7.9 g/dL (08-05-21 @ 10:11)    Platelet Count - Automated: 321 K/uL (08-06-21 @ 05:19)  Platelet Count - Automated: 372 K/uL (08-05-21 @ 10:11)    136  |  87  |  118  ----------------------------<  155      [08-06-21 @ 05:19]  3.2   |  35  |  1.00        Ca     10.3     [08-06-21 @ 05:19]            Creatinine Trend:  SCr 1.00 [08-06 @ 05:19]  SCr 1.24 [08-05 @ 10:11]  SCr 1.27 [08-04 @ 04:15]  SCr 1.65 [08-03 @ 05:49]  SCr 1.28 [08-02 @ 09:20]    Urinalysis - [07-26-21 @ 12:20]      Color Yellow / Appearance Clear / SG 1.014 / pH 5.5      Gluc 200 mg/dL / Ketone Negative  / Bili Negative / Urobili Negative       Blood Negative / Protein Negative / Leuk Est Large / Nitrite Negative      RBC 3 / WBC 19 / Hyaline 0 / Gran  / Sq Epi  / Non Sq Epi 0 / Bacteria Few    Urine Creatinine 46      [08-03-21 @ 16:36]  Urine Protein 4      [08-04-21 @ 00:45]  Urine Sodium 9      [08-03-21 @ 16:36]  Urine Urea Nitrogen 794      [08-04-21 @ 00:45]  Urine Osmolality 393      [08-03-21 @ 16:36]    TSH 6.37      [07-21-21 @ 14:10]    HBsAb <3.0      [07-09-21 @ 03:47]  HBsAg Nonreact      [07-09-21 @ 03:47]  HBcAb Nonreact      [07-09-21 @ 03:47]  HCV 0.14, Nonreact      [07-09-21 @ 03:47]

## 2021-08-06 NOTE — PROGRESS NOTE ADULT - ASSESSMENT
87 year-old woman with known history of colon ca (remote), now with lung mass that is concerning for neoplasm, also with atrial fibrillation, previously on AC, recently stopped due to anemia, now POD 12 status post TAVR.  Procedure was well tolerated, but now grossly volume overloaded with dyspnea at rest.  RHC consistent with volume overload, HFpEF.  Continue loop diuretics. Keep O > I, K > 4.0, Mag > 2.0.    ASA, statin, and beta-blocker as tolerated.  Avoid nephrotoxic agents.    Discussed with patient's son, Reji, by phone.  Case discussed with Alejandro Johns MD at bedside.    Strongly encourage daily activity as tolerated. PT as often as feasible.

## 2021-08-06 NOTE — PROGRESS NOTE ADULT - PROBLEM SELECTOR PLAN 1
tolerated well continues to be dyspneic, RHC with elevated filling pressures yesterday  -recommend aggressive diuretic regimen: metolazone 5mg again today and continue bumex drip at 2/hr  -I/O, daily weights

## 2021-08-06 NOTE — PROGRESS NOTE ADULT - PROBLEM SELECTOR PLAN 5
continues to be dyspneic, RHC with elevated filling pressures.   -recommend aggressive diuretic regimen: metolazone 5mg, followed 30 minutes later by bumex 4mg and then start bumex drip at 2/hr cardiorenal team following  monitor renal function

## 2021-08-06 NOTE — PROGRESS NOTE ADULT - PROBLEM SELECTOR PLAN 3
Spoke with Dr. Garcia Rater about POC, orders to monitor pt over night for cervical change. has bronchoalveolar cancer; unclear prognosis   pulmonary involved rate controlled; on toprol xl 12.5 mg daily  not on AC d/t prior GIB

## 2021-08-06 NOTE — PROGRESS NOTE ADULT - ASSESSMENT
87F PMH CAD w/ prior PCI, DM2, Afib (not on AC's, Colon Ca (about 25y ago), Heart failure presented to the ED with acute renal failure requiring acute dialysis. MICU stay and recent TAVR. Cath results (pre TAVR) this admission note elevated PCWP and elevated PA pressure Mean 37, with evidence of right heart failure (group II).     Continue diuresis as her heart failure team (Recent RHC again showed increase PCWP)                 asthma-symbicort 160, spiriva, incentive spirometry  abnormal CT-lung with RML mass and multiple pleural nodules--move to consider CT NEEDLE bx.  Continue to decrease oxygen as tolerated.  Will likely require oxygen at rehab.    Will continue to follow

## 2021-08-06 NOTE — PROGRESS NOTE ADULT - SUBJECTIVE AND OBJECTIVE BOX
Jaqcue Riley MD  Cardiology Fellow  [phone]  All Cardiology service information can be found 24/7 on [email or web address], password: [email or web address]    Patient seen and examined at bedside.    Overnight Events:     Review Of Systems: No chest pain, shortness of breath, or palpitations            Current Meds:  artificial  tears Solution 1 Drop(s) Both EYES every 12 hours PRN  aspirin  chewable 81 milliGRAM(s) Oral daily  atorvastatin 40 milliGRAM(s) Oral at bedtime  budesonide  80 MICROgram(s)/formoterol 4.5 MICROgram(s) Inhaler 2 Puff(s) Inhalation two times a day  buMETAnide Infusion 2 mG/Hr IV Continuous <Continuous>  ciprofloxacin     Tablet 250 milliGRAM(s) Oral daily  dextrose 40% Gel 15 Gram(s) Oral once  dextrose 50% Injectable 25 Gram(s) IV Push once  dextrose 50% Injectable 12.5 Gram(s) IV Push once  dextrose 50% Injectable 25 Gram(s) IV Push once  ferrous    sulfate 325 milliGRAM(s) Oral daily  glucagon  Injectable 1 milliGRAM(s) IntraMuscular once  heparin   Injectable 5000 Unit(s) SubCutaneous every 8 hours  insulin glargine Injectable (LANTUS) 6 Unit(s) SubCutaneous at bedtime  insulin lispro (ADMELOG) corrective regimen sliding scale   SubCutaneous three times a day before meals  insulin lispro Injectable (ADMELOG) 3 Unit(s) SubCutaneous before breakfast  insulin lispro Injectable (ADMELOG) 3 Unit(s) SubCutaneous before lunch  insulin lispro Injectable (ADMELOG) 6 Unit(s) SubCutaneous before dinner  metoprolol succinate ER 12.5 milliGRAM(s) Oral daily  polyethylene glycol 3350 17 Gram(s) Oral daily  tiotropium 18 MICROgram(s) Capsule 1 Capsule(s) Inhalation daily  zolpidem 5 milliGRAM(s) Oral at bedtime PRN      Vitals:  T(F): 97.6 (08-06), Max: 97.8 (08-05)  HR: 80 (08-06) (73 - 82)  BP: 129/71 (08-06) (114/53 - 132/71)  RR: 18 (08-06)  SpO2: 94% (08-06)  I&O's Summary    05 Aug 2021 07:01  -  06 Aug 2021 07:00  --------------------------------------------------------  IN: 640 mL / OUT: 2850 mL / NET: -2210 mL        Physical Exam:  Appearance: No acute distress; well appearing  Eyes: PERRL, EOMI, pink conjunctiva  HEENT: Normal oral mucosa  Cardiovascular: RRR, S1, S2, no murmurs, rubs, or gallops; no edema; no JVD  Respiratory: Clear to auscultation bilaterally  Gastrointestinal: soft, non-tender, non-distended with normal bowel sounds  Musculoskeletal: No clubbing; no joint deformity   Neurologic: Non-focal  Lymphatic: No lymphadenopathy  Psychiatry: AAOx3, mood & affect appropriate  Skin: No rashes, ecchymoses, or cyanosis                          7.8    11.01 )-----------( 321      ( 06 Aug 2021 05:19 )             24.6     08-06    136  |  87<L>  |  118<H>  ----------------------------<  155<H>  3.2<L>   |  35<H>  |  1.00    Ca    10.3      06 Aug 2021 05:19            Serum Pro-Brain Natriuretic Peptide: 2398 pg/mL (07-30 @ 09:25)          New ECG(s): Personally reviewed    Echo:    Stress Testing:     Cath:    Imaging:    Interpretation of Telemetry:   Jacque Riley MD  Cardiology Fellow  [phone]  All Cardiology service information can be found 24/7 on [email or web address], password: [email or web address]    Patient seen and examined at bedside.    Overnight Events: s/p metolazone and bumex    Review Of Systems: No chest pain, shortness of breath, or palpitations            Current Meds:  artificial  tears Solution 1 Drop(s) Both EYES every 12 hours PRN  aspirin  chewable 81 milliGRAM(s) Oral daily  atorvastatin 40 milliGRAM(s) Oral at bedtime  budesonide  80 MICROgram(s)/formoterol 4.5 MICROgram(s) Inhaler 2 Puff(s) Inhalation two times a day  buMETAnide Infusion 2 mG/Hr IV Continuous <Continuous>  ciprofloxacin     Tablet 250 milliGRAM(s) Oral daily  dextrose 40% Gel 15 Gram(s) Oral once  dextrose 50% Injectable 25 Gram(s) IV Push once  dextrose 50% Injectable 12.5 Gram(s) IV Push once  dextrose 50% Injectable 25 Gram(s) IV Push once  ferrous    sulfate 325 milliGRAM(s) Oral daily  glucagon  Injectable 1 milliGRAM(s) IntraMuscular once  heparin   Injectable 5000 Unit(s) SubCutaneous every 8 hours  insulin glargine Injectable (LANTUS) 6 Unit(s) SubCutaneous at bedtime  insulin lispro (ADMELOG) corrective regimen sliding scale   SubCutaneous three times a day before meals  insulin lispro Injectable (ADMELOG) 3 Unit(s) SubCutaneous before breakfast  insulin lispro Injectable (ADMELOG) 3 Unit(s) SubCutaneous before lunch  insulin lispro Injectable (ADMELOG) 6 Unit(s) SubCutaneous before dinner  metoprolol succinate ER 12.5 milliGRAM(s) Oral daily  polyethylene glycol 3350 17 Gram(s) Oral daily  tiotropium 18 MICROgram(s) Capsule 1 Capsule(s) Inhalation daily  zolpidem 5 milliGRAM(s) Oral at bedtime PRN      Vitals:  T(F): 97.6 (08-06), Max: 97.8 (08-05)  HR: 80 (08-06) (73 - 82)  BP: 129/71 (08-06) (114/53 - 132/71)  RR: 18 (08-06)  SpO2: 94% (08-06)  I&O's Summary    05 Aug 2021 07:01  -  06 Aug 2021 07:00  --------------------------------------------------------  IN: 640 mL / OUT: 2850 mL / NET: -2210 mL        Physical Exam:  Gen: well appearing in NAD  HEENT: EOMI, MMM  Cardio: S1 S2 no murmurs, rubs or gallops  Chest: decreased breath sounds at the bases  Abd: soft nontender to palpation, bS+  Extremities: 1+ pitting edema                        7.8    11.01 )-----------( 321      ( 06 Aug 2021 05:19 )             24.6     08-06    136  |  87<L>  |  118<H>  ----------------------------<  155<H>  3.2<L>   |  35<H>  |  1.00    Ca    10.3      06 Aug 2021 05:19            Serum Pro-Brain Natriuretic Peptide: 2398 pg/mL (07-30 @ 09:25)

## 2021-08-06 NOTE — PROGRESS NOTE ADULT - ATTENDING COMMENTS
Responded well to aggressive diuresis yesterday. Continue Bumex gtt today and give additional dose of Metolazone. Monitor and aggressively replete electrolytes. Keep K > 4 and Mag > 2. Start Aldactone for potassium sparing.

## 2021-08-07 NOTE — PROGRESS NOTE ADULT - SUBJECTIVE AND OBJECTIVE BOX
Subjective:  No change in dyspnea over past 24 hours. Still moderate dyspnea at rest.       VITAL SIGNS    Telemetry:    Vital Signs Last 24 Hrs  T(C): 36.6 (08-07-21 @ 05:04), Max: 36.6 (08-07-21 @ 05:04)  T(F): 97.8 (08-07-21 @ 05:04), Max: 97.8 (08-07-21 @ 05:04)  HR: 85 (08-07-21 @ 05:04) (82 - 88)  BP: 114/67 (08-07-21 @ 05:04) (114/67 - 131/85)  RR: 18 (08-07-21 @ 05:04) (18 - 18)  SpO2: 95% (08-07-21 @ 05:04) (95% - 96%)            08-06 @ 07:01  -  08-07 @ 07:00  --------------------------------------------------------  IN: 1170 mL / OUT: 2200 mL / NET: -1030 mL       Daily     Daily   Admit Wt: Drug Dosing Weight  Height (cm): 152.4 (22 Jul 2021 11:15)  Weight (kg): 72.4 (22 Jul 2021 11:15)  BMI (kg/m2): 31.2 (22 Jul 2021 11:15)  BSA (m2): 1.7 (22 Jul 2021 11:15)    Bilirubin Total, Serum: 1.0 mg/dL (08-07 @ 05:55)    CAPILLARY BLOOD GLUCOSE      POCT Blood Glucose.: 231 mg/dL (07 Aug 2021 07:58)  POCT Blood Glucose.: 192 mg/dL (06 Aug 2021 22:23)  POCT Blood Glucose.: 324 mg/dL (06 Aug 2021 16:40)  POCT Blood Glucose.: 224 mg/dL (06 Aug 2021 11:23)          artificial  tears Solution 1 Drop(s) Both EYES every 12 hours PRN  aspirin  chewable 81 milliGRAM(s) Oral daily  atorvastatin 40 milliGRAM(s) Oral at bedtime  budesonide  80 MICROgram(s)/formoterol 4.5 MICROgram(s) Inhaler 2 Puff(s) Inhalation two times a day  buMETAnide Infusion 2 mG/Hr IV Continuous <Continuous>  ciprofloxacin     Tablet 250 milliGRAM(s) Oral daily  dextrose 40% Gel 15 Gram(s) Oral once  dextrose 50% Injectable 25 Gram(s) IV Push once  dextrose 50% Injectable 12.5 Gram(s) IV Push once  dextrose 50% Injectable 25 Gram(s) IV Push once  ferrous    sulfate 325 milliGRAM(s) Oral daily  glucagon  Injectable 1 milliGRAM(s) IntraMuscular once  heparin   Injectable 5000 Unit(s) SubCutaneous every 8 hours  insulin glargine Injectable (LANTUS) 12 Unit(s) SubCutaneous at bedtime  insulin lispro (ADMELOG) corrective regimen sliding scale   SubCutaneous three times a day before meals  insulin lispro Injectable (ADMELOG) 6 Unit(s) SubCutaneous three times a day before meals  metoprolol succinate ER 12.5 milliGRAM(s) Oral daily  polyethylene glycol 3350 17 Gram(s) Oral daily  spironolactone 25 milliGRAM(s) Oral daily  tiotropium 18 MICROgram(s) Capsule 1 Capsule(s) Inhalation daily      PHYSICAL EXAM    Subjective: "Hi.   Neurology: alert and oriented x 3, nonfocal, no gross deficits  CV : tele:  RSR  Sternal Wound :  CDI with dressing , Stable  Lungs: clear. RR easy, unlabored   Abdomen: soft, nontender, nondistended, positive bowel sounds, bowel movement   Neg N/V/D   :  pt voiding without difficulty   Extremities:   CHRISTIANSON; edema, neg calf tenderness.   PPP bilaterally                  Assessment and Plan:   · Assessment	  87F PMH CAD w/stents, DM2, Afib (not on AC), Colon Ca (about 25y ago), severe AS, HF , recently started on Entresto, admitted for JACQUE and Metformin-associated lactic acidosis, admitted to MICU s/p urgent HD session but now JACQUE resolving and now undergoing TAVR workup.    7/22 Underwent TAVR via right femoral #22 Heike  rToday notes    Has continued evidence of fluid overload, continued dyspnea. Had good diuresis over past 24 hrs but still dyspnea at rest.

## 2021-08-07 NOTE — CONSULT NOTE ADULT - PROBLEM SELECTOR RECOMMENDATION 9
Will start Lantus 12 units at bed time.  Will start Admelog 6  units before each meal in addition to Admelog correction scale coverage.  Patient counseled for compliance with consistent low carb diet.

## 2021-08-07 NOTE — PROGRESS NOTE ADULT - PROBLEM SELECTOR PLAN 1
hypervolemic- resolved  RHC filling pressures and pul wedge elevated  start bumex drip 8/5  given bumex IV and metolazone 8/5  Metolazone discontinued, start Diamoxx  stopped  urena 8/5  now improving sodium with diuresis in the setting of cardiorenal syndrome, noted to have improved UO today in response to combined use of bumex and metolazone  continue loop diuretics  will add acetazolamide today for sequential diuresis, as patient also with elevated serum bicarbonate levels  may consider hypertonic saline with loop diuretics, if UO again starts to decline.   Monitor UO

## 2021-08-07 NOTE — CONSULT NOTE ADULT - ASSESSMENT
Assessment  DMT2: 87y Female with DM T2 with hyperglycemia admitted with JACQUE , patient was on oral hypoglycemic agents at home, now on insulin coverage, blood sugars running high,  no hypoglycemic episode,  eating meals,  compliant with low carb diet.  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  JACQUE: labs, chart reviewed.  Overweight: No strict exercise routines, neither on low calorie diet.                 Kenton Manley MD  Cell: [phone]  Office: [phone]

## 2021-08-07 NOTE — PROGRESS NOTE ADULT - PROBLEM SELECTOR PLAN 2
prerenal azotemia , cardiac  elevated BUN also associated from Urena as well  cont to monitor bun/cr trend  Now resolved hypervolemic- initially resolved, now again with low sodium in the setting of use of metolazone  continue bumex drip  Metolazone discontinued today  monitor sodium level

## 2021-08-07 NOTE — PROGRESS NOTE ADULT - SUBJECTIVE AND OBJECTIVE BOX
Subjective  No acute events reported overnight.  Patient notes that she has a wheeze over the last 2 days.  Mildly better today.  Laying in the bed.  Feels very weak and frustrated.  Overall does not feel any better despite brisk diuresis noted over the prior day.  No chest pain/tightness/discomfort, fevers, chills, sweats, light-headed sensation, dizziness or palpitations.    Review of Systems  14 point review of systems is negative except what is described above in the history of present illness.    Physical Examination  Telemetry  Atrial fibrillation with rates in the 70s to 90s with occasional PVCs    Physical Examination  No apparent distress, alert and oriented 3, elevated JVD with hepatojugular reflex  Irregular irregular with 1 out of 6 holosystolic murmur at left lower sternal border  Decreased breath sounds bilaterally with no wheezing or crackles  Positive bowel sound, soft, obese, nontender, no masses guarding  1+ bilateral pitting edema   Right groin ecchymosis with no hematoma/bruit  No clubbing or cyanosis  Moving all extremities spontaneously  1+ DP/PT pulses    Assessment/plan  Severe aortic valve stenosis/severe tricuspid valve regurgitation  --Patient status post TAVR procedure.   --The patient is status post repeat transthoracic echocardiogram study and right heart cardiac catheterization.  Findings from the studies were reviewed with the patient, her family and cardiology/Dr. Hodges.    --Concerned that overall the patient has not made significant improvement despite aggressive medical management.   If patient does not continue to respond to aggressive IV diuresis plan for hypertonic saline/diuretics or ultrafiltration.  Appreciate recommendations from heart failure/nephrology teams.  --Continue Bumex drip with metolazone.  Diamox to be started today.    --Aim for potassium to greater than 4 and magnesium greater than 2.  Closely monitor the patient's electrolytes.  --Continue Toprol 12.5 mg daily.  --Daily PT.  Out of bed to chair.  --Heparin drip not reinitiated due to concern for recent GI bleed after discussing with cardiac surgical team/Dr. Pruitt.  Continue to closely monitor CBC and assess for signs or symptoms of bleeding.  Recommend heparin DVT prophylaxis.  --Continue to closely follow the patient sodium.    --Continue telemetry monitoring.    All questions and concerns of the patient and her family (son-Reji) were addressed.    Findings and plan were discussed with heart failure/Dr. Sherman, cardiology/Dr. Hodges and structural heart team.

## 2021-08-07 NOTE — PROGRESS NOTE ADULT - SUBJECTIVE AND OBJECTIVE BOX
CC: c/o no appetite; shortness of breath is the same  HPI: S/P TAVR; fluid overload      HPI / INTERVAL HISTORY:  87F PMH CAD w/stents, DM2, Afib (not on AC's, dc'd pradaxa 1mo ago as pt w/u for anemia), Colon Ca (about 25y ago),  AS, HF on 80mg lasix qd, recently started on Entresto, presents to the ED with abnormal labs done on 7/7 showing JACQUE with hyperk to >6 mod hemolyzed. Pt states that she hasn't urinated in 2 days, also states she's had nonbloody loose stools over the past week. Otherwise she states SOB is at baseline. Denies any fevers/chills, cough, chest pain, palpitations, lightheadedness, abd pain, n/v, leg swelling worse than baseline.    Home medications: Metformin, Lasix 80qd, Metolazone 25 mg qd, Glipizide, atorvastatin 40 mg qhs, Zolpidem 5 mg qhs, Entresto 24 mg, Metoprolol 25 mg and Januvia     On presentation patient with BUN/Cr 79/6.7 mg/dl. K: 6.6 (non hemolyzed). Previous Cr was at 1.26 6/7/21. Also noted with bicarb: 11 pH 7.2 - lactate 7. patient on Metformin at home and reports taking all of her medications.     Nephrology consulted for JACQUE / hyperkalemia and acidosis. Temporized in ED with Bicarb and calcium. Per tox will need Q1 FSG for sulfonylurea and JACQUE. Will be admitted to MICU for HD.     PAST MEDICAL & SURGICAL HISTORY:    FAMILY HISTORY:  No sig FH in first degree relatives     SOCIAL HISTORY:  Denies smoking drinking or illicit drug use    Allergies    No Known Allergies    Intolerances    REVIEW OF SYSTEMS:  +Fatigue, weakness, oliguria  Negative except per HPI    OBJECTIVE:  ICU Vital Signs Last 24 Hrs  T(C): 36.4 (08 Jul 2021 07:36), Max: 36.4 (08 Jul 2021 07:36)  T(F): 97.5 (08 Jul 2021 07:36), Max: 97.5 (08 Jul 2021 07:36)  HR: 79 (08 Jul 2021 10:30) (71 - 83)  BP: 128/64 (08 Jul 2021 10:30) (112/67 - 128/64)  BP(mean): 71 (08 Jul 2021 10:30) (71 - 77)    RR: 18 (08 Jul 2021 10:30) (18 - 20)  SpO2: 100% (08 Jul 2021 10:30) (95% - 100%)  CAPILLARY BLOOD GLUCOSE    POCT Blood Glucose.: 148 mg/dL (08 Jul 2021 11:19)    PHYSICAL EXAM:  Gen: NAD  	HEENT: MMM  	Pulm: CTA B/L  	CV: IR MR               Abd: Soft, +BS   	Ext: No LE edema B/L  	Neuro: Awake  	Skin: Warm and dry             : no tenderness to palpation     MEDICATIONS  (PRN):    LABS:                        7.8    7.31  )-----------( 196      ( 08 Jul 2021 08:47 )             25.8     Hgb Trend: 7.8<--  07-08    124<L>  |  84<L>  |  79<H>  ----------------------------<  32<LL>  6.6<HH>   |  11<L>  |  6.76<H>    Ca    9.6      08 Jul 2021 08:48  Phos  6.4     07-08  Mg     2.0     07-08    TPro  7.2  /  Alb  3.9  /  TBili  0.4  /  DBili  x   /  AST  25  /  ALT  17  /  AlkPhos  205<H>  07-08    Creatinine Trend: 6.76<--    Venous Blood Gas:  07-08 @ 08:47  7.24/34/42/14/64  VBG Lactate: 7.0 (08 Jul 2021 11:43)    Review Of Systems:     No chest pain, shortness of breath, or palpitations            Medications:  artificial  tears Solution 1 Drop(s) Both EYES every 12 hours PRN  aspirin  chewable 81 milliGRAM(s) Oral daily  atorvastatin 40 milliGRAM(s) Oral at bedtime  budesonide  80 MICROgram(s)/formoterol 4.5 MICROgram(s) Inhaler 2 Puff(s) Inhalation two times a day  buMETAnide Infusion 2 mG/Hr IV Continuous <Continuous>  ciprofloxacin     Tablet 250 milliGRAM(s) Oral daily  dextrose 40% Gel 15 Gram(s) Oral once  dextrose 50% Injectable 25 Gram(s) IV Push once  dextrose 50% Injectable 12.5 Gram(s) IV Push once  dextrose 50% Injectable 25 Gram(s) IV Push once  ferrous    sulfate 325 milliGRAM(s) Oral daily  glucagon  Injectable 1 milliGRAM(s) IntraMuscular once  heparin   Injectable 5000 Unit(s) SubCutaneous every 8 hours  insulin glargine Injectable (LANTUS) 6 Unit(s) SubCutaneous at bedtime  insulin lispro (ADMELOG) corrective regimen sliding scale   SubCutaneous three times a day before meals  insulin lispro Injectable (ADMELOG) 3 Unit(s) SubCutaneous before breakfast  insulin lispro Injectable (ADMELOG) 3 Unit(s) SubCutaneous before lunch  insulin lispro Injectable (ADMELOG) 6 Unit(s) SubCutaneous before dinner  metoprolol succinate ER 12.5 milliGRAM(s) Oral daily  polyethylene glycol 3350 17 Gram(s) Oral daily  spironolactone 25 milliGRAM(s) Oral daily  tiotropium 18 MICROgram(s) Capsule 1 Capsule(s) Inhalation daily    PAST MEDICAL & SURGICAL HISTORY:    Vitals:  T(C): 36.6 (08-07-21 @ 05:04), Max: 36.6 (08-07-21 @ 05:04)  HR: 85 (08-07-21 @ 05:04) (82 - 88)  BP: 114/67 (08-07-21 @ 05:04) (114/67 - 131/85)  BP(mean): 83 (08-07-21 @ 05:04) (83 - 100)  RR: 18 (08-07-21 @ 05:04) (18 - 18)  SpO2: 95% (08-07-21 @ 05:04) (95% - 96%)  Wt(kg): --  Daily     Daily   I&O's Summary    06 Aug 2021 07:01  -  07 Aug 2021 07:00  --------------------------------------------------------  IN: 1170 mL / OUT: 2200 mL / NET: -1030 mL        Physical Exam:  Appearance: No acute distress; well appearing  Eyes: PERRL, EOMI, pink conjunctiva  HENT: Normal oral mucosa  Cardiovascular: IR, MR murmur, no  rubs, or gallops; no edema; no JVD  Respiratory: Clear to auscultation bilaterally  Gastrointestinal: soft, non-tender, non-distended with normal bowel sounds  Musculoskeletal: No clubbing; no joint deformity   Neurologic: Non-focal  Lymphatic: No lymphadenopathy  Psychiatry: AAOx3, mood & affect appropriate  Skin: No rashes, ecchymoses, or cyanosis                          8.2    13.34 )-----------( 400      ( 07 Aug 2021 05:55 )             27.0     08-07    133<L>  |  83<L>  |  109<H>  ----------------------------<  209<H>  3.9   |  37<H>  |  1.05    Ca    10.9<H>      07 Aug 2021 05:55  Mg     2.4     08-07    TPro  7.5  /  Alb  3.1<L>  /  TBili  1.0  /  DBili  x   /  AST  21  /  ALT  11  /  AlkPhos  280<H>  08-07                  ECG:    Echo:    Stress Testing:     Cath:    Imaging:    Interpretation of Telemetry: ALVINO Cole

## 2021-08-07 NOTE — PROGRESS NOTE ADULT - PROBLEM SELECTOR PLAN 3
had small pericardial effusion  repeat cardiac echo in the setting of hypervolemia/ cardiorenal syndrome  resolved

## 2021-08-07 NOTE — CONSULT NOTE ADULT - SUBJECTIVE AND OBJECTIVE BOX
HPI:  MICU H&P    CHIEF COMPLAINT: urgent HD, hyperkalemia    HPI / INTERVAL HISTORY:  87F PMH CAD w/stents, DM2, Afib (not on AC's, dc'd pradaxa 1mo ago as pt w/u for anemia), Colon Ca (about 25y ago),  AS, HF on 80mg lasix qd, recently started on Entresto, presents to the ED with abnormal labs done on 7/7 showing JACQUE with hyperk to >6 mod hemolyzed. Pt states that she hasn't urinated in 2 days, also states she's had nonbloody loose stools over the past week. Otherwise she states SOB is at baseline. Denies any fevers/chills, cough, chest pain, palpitations, lightheadedness, abd pain, n/v, leg swelling worse than baseline.    Home medications: Metformin, Lasix 80qd, Metolazone 25 mg qd, Glipizide, atorvastatin 40 mg qhs, Zolpidem 5 mg qhs, Entresto 24 mg, Metoprolol 25 mg and Januvia     On presentation patient with BUN/Cr 79/6.7 mg/dl. K: 6.6 (non hemolyzed). Previous Cr was at 1.26 6/7/21. Also noted with bicarb: 11 pH 7.2 - lactate 7. patient on Metformin at home and reports taking all of her medications.     Nephrology consulted for JACQUE / hyperkalemia and acidosis. Temporized in ED with Bicarb and calcium. Per tox will need Q1 FSG for sulfonylurea and JACQUE. Will be admitted to MICU for HD.     PAST MEDICAL & SURGICAL HISTORY:    FAMILY HISTORY:  No sig FH in first degree relatives     SOCIAL HISTORY:  Denies smoking drinking or illicit drug use    Allergies    No Known Allergies    Intolerances    REVIEW OF SYSTEMS:  +Fatigue, weakness, oliguria  Negative except per HPI    OBJECTIVE:  ICU Vital Signs Last 24 Hrs  T(C): 36.4 (08 Jul 2021 07:36), Max: 36.4 (08 Jul 2021 07:36)  T(F): 97.5 (08 Jul 2021 07:36), Max: 97.5 (08 Jul 2021 07:36)  HR: 79 (08 Jul 2021 10:30) (71 - 83)  BP: 128/64 (08 Jul 2021 10:30) (112/67 - 128/64)  BP(mean): 71 (08 Jul 2021 10:30) (71 - 77)    RR: 18 (08 Jul 2021 10:30) (18 - 20)  SpO2: 100% (08 Jul 2021 10:30) (95% - 100%)  CAPILLARY BLOOD GLUCOSE    POCT Blood Glucose.: 148 mg/dL (08 Jul 2021 11:19)    PHYSICAL EXAM:  Gen: NAD  	HEENT: MMM  	Pulm: CTA B/L  	CV: S1S2  	Abd: Soft, +BS   	Ext: No LE edema B/L  	Neuro: Awake  	Skin: Warm and dry             : no tenderness to palpation     MEDICATIONS  (PRN):    LABS:                        7.8    7.31  )-----------( 196      ( 08 Jul 2021 08:47 )             25.8     Hgb Trend: 7.8<--  07-08    124<L>  |  84<L>  |  79<H>  ----------------------------<  32<LL>  6.6<HH>   |  11<L>  |  6.76<H>    Ca    9.6      08 Jul 2021 08:48  Phos  6.4     07-08  Mg     2.0     07-08    TPro  7.2  /  Alb  3.9  /  TBili  0.4  /  DBili  x   /  AST  25  /  ALT  17  /  AlkPhos  205<H>  07-08    Creatinine Trend: 6.76<--    Venous Blood Gas:  07-08 @ 08:47  7.24/34/42/14/64  VBG Lactate: 7.0 (08 Jul 2021 11:43)  Patient has history of diabetes, on oral meds at home, no recent hypoglycemic episodes, no polyuria polydipsia. Patient follows up with PCP for diabetes management.    PAST MEDICAL & SURGICAL HISTORY:      FAMILY HISTORY:      Social History:    Outpatient Medications:    MEDICATIONS  (STANDING):  aspirin  chewable 81 milliGRAM(s) Oral daily  atorvastatin 40 milliGRAM(s) Oral at bedtime  budesonide  80 MICROgram(s)/formoterol 4.5 MICROgram(s) Inhaler 2 Puff(s) Inhalation two times a day  buMETAnide Infusion 2 mG/Hr (10 mL/Hr) IV Continuous <Continuous>  ciprofloxacin     Tablet 250 milliGRAM(s) Oral daily  dextrose 40% Gel 15 Gram(s) Oral once  dextrose 50% Injectable 25 Gram(s) IV Push once  dextrose 50% Injectable 12.5 Gram(s) IV Push once  dextrose 50% Injectable 25 Gram(s) IV Push once  ferrous    sulfate 325 milliGRAM(s) Oral daily  glucagon  Injectable 1 milliGRAM(s) IntraMuscular once  heparin   Injectable 5000 Unit(s) SubCutaneous every 8 hours  insulin glargine Injectable (LANTUS) 12 Unit(s) SubCutaneous at bedtime  insulin lispro (ADMELOG) corrective regimen sliding scale   SubCutaneous three times a day before meals  insulin lispro Injectable (ADMELOG) 6 Unit(s) SubCutaneous three times a day before meals  metoprolol succinate ER 12.5 milliGRAM(s) Oral daily  polyethylene glycol 3350 17 Gram(s) Oral daily  spironolactone 25 milliGRAM(s) Oral daily  tiotropium 18 MICROgram(s) Capsule 1 Capsule(s) Inhalation daily    MEDICATIONS  (PRN):  artificial  tears Solution 1 Drop(s) Both EYES every 12 hours PRN Dry Eyes      Allergies    No Known Allergies    Intolerances      Review of Systems:  Constitutional: No fever, no chills  Eyes: No blurry vision  Neuro: No tremors  HEENT: No pain, no neck swelling  Cardiovascular: No chest pain, no palpitations  Respiratory: No SOB, no cough  GI: No nausea, vomiting, abdominal pain  : No dysuria  Skin: no rash  MSK: Has leg swelling.  Psych: no depression  Endocrine: no polyuria, polydipsia    UNABLE TO OBTAIN    ALL OTHER SYSTEMS REVIEWED AND NEGATIVE        PHYSICAL EXAM:  VITALS: T(C): 36.6 (08-07-21 @ 05:04)  T(F): 97.8 (08-07-21 @ 05:04), Max: 97.8 (08-07-21 @ 05:04)  HR: 85 (08-07-21 @ 05:04) (82 - 88)  BP: 114/67 (08-07-21 @ 05:04) (114/67 - 131/85)  RR:  (18 - 18)  SpO2:  (95% - 96%)  Wt(kg): --  GENERAL: NAD, well-groomed, well-developed  EYES: No proptosis, no lid lag  HEENT:  Atraumatic, Normocephalic  THYROID: Normal size, no palpable nodules  RESPIRATORY: Clear to auscultation bilaterally; No rales, rhonchi, wheezing  CARDIOVASCULAR: Si S2, No murmurs;  GI: Soft, non distended, normal bowel sounds  SKIN: Dry, intact, No rashes or lesions  MUSCULOSKELETAL: Has BL lower extremity edema.  NEURO:  no tremor, sensation decreased in feet BL,    POCT Blood Glucose.: 231 mg/dL (08-07-21 @ 07:58)  POCT Blood Glucose.: 192 mg/dL (08-06-21 @ 22:23)  POCT Blood Glucose.: 324 mg/dL (08-06-21 @ 16:40)  POCT Blood Glucose.: 224 mg/dL (08-06-21 @ 11:23)  POCT Blood Glucose.: 179 mg/dL (08-06-21 @ 07:52)  POCT Blood Glucose.: 151 mg/dL (08-05-21 @ 21:48)  POCT Blood Glucose.: 225 mg/dL (08-05-21 @ 17:18)  POCT Blood Glucose.: 309 mg/dL (08-05-21 @ 11:30)  POCT Blood Glucose.: 205 mg/dL (08-05-21 @ 07:43)  POCT Blood Glucose.: 293 mg/dL (08-04-21 @ 21:39)  POCT Blood Glucose.: 232 mg/dL (08-04-21 @ 19:53)  POCT Blood Glucose.: 178 mg/dL (08-04-21 @ 11:16)                            8.2    13.34 )-----------( 400      ( 07 Aug 2021 05:55 )             27.0       08-07    133<L>  |  83<L>  |  109<H>  ----------------------------<  209<H>  3.9   |  37<H>  |  1.05    EGFR if : 55<L>  EGFR if non : 48<L>    Ca    10.9<H>      08-07  Mg     2.4     08-07    TPro  7.5  /  Alb  3.1<L>  /  TBili  1.0  /  DBili  x   /  AST  21  /  ALT  11  /  AlkPhos  280<H>  08-07      Thyroid Function Tests:  07-21 @ 14:10 TSH 6.37 FreeT4 -- T3 78 Anti TPO -- Anti Thyroglobulin Ab -- TSI --              Radiology:

## 2021-08-07 NOTE — PROGRESS NOTE ADULT - PROBLEM SELECTOR PLAN 1
Continue on aldactone 12.5 mg po Daily   Supplement Potassium to maintain K+ level > 4.5  pulm toilet  discharge planning- rehab when stable Continue on aldactone 12.5 mg po Daily   diuresis with bumex gttp   Supplement Potassium to maintain K+ level > 4.5  pulm toilet  discharge planning- rehab when stable

## 2021-08-07 NOTE — PROGRESS NOTE ADULT - SUBJECTIVE AND OBJECTIVE BOX
[facility] DIVISION OF KIDNEY DISEASES AND HYPERTENSION -- FOLLOW UP NOTE  --------------------------------------------------------------------------------  Chief Complaint:    24 hour events/subjective:    No acute events overnight. Pt. complaining of dyspnea. Case discussed with Heart failure team.     PAST HISTORY  --------------------------------------------------------------------------------  No significant changes to PMH, PSH, FHx, SHx, unless otherwise noted    ALLERGIES & MEDICATIONS  --------------------------------------------------------------------------------  Allergies    No Known Allergies    Intolerances      Standing Inpatient Medications  acetaZOLAMIDE    Tablet 500 milliGRAM(s) Oral two times a day  aspirin  chewable 81 milliGRAM(s) Oral daily  atorvastatin 40 milliGRAM(s) Oral at bedtime  budesonide  80 MICROgram(s)/formoterol 4.5 MICROgram(s) Inhaler 2 Puff(s) Inhalation two times a day  buMETAnide Infusion 2 mG/Hr IV Continuous <Continuous>  ciprofloxacin     Tablet 250 milliGRAM(s) Oral daily  dextrose 40% Gel 15 Gram(s) Oral once  dextrose 50% Injectable 25 Gram(s) IV Push once  dextrose 50% Injectable 12.5 Gram(s) IV Push once  dextrose 50% Injectable 25 Gram(s) IV Push once  ferrous    sulfate 325 milliGRAM(s) Oral daily  glucagon  Injectable 1 milliGRAM(s) IntraMuscular once  heparin   Injectable 5000 Unit(s) SubCutaneous every 8 hours  insulin glargine Injectable (LANTUS) 12 Unit(s) SubCutaneous at bedtime  insulin lispro (ADMELOG) corrective regimen sliding scale   SubCutaneous three times a day before meals  insulin lispro Injectable (ADMELOG) 6 Unit(s) SubCutaneous three times a day before meals  metoprolol succinate ER 12.5 milliGRAM(s) Oral daily  polyethylene glycol 3350 17 Gram(s) Oral daily  potassium chloride    Tablet ER 20 milliEquivalent(s) Oral once  spironolactone 25 milliGRAM(s) Oral daily  tiotropium 18 MICROgram(s) Capsule 1 Capsule(s) Inhalation daily    PRN Inpatient Medications  artificial  tears Solution 1 Drop(s) Both EYES every 12 hours PRN      REVIEW OF SYSTEMS  --------------------------------------------------------------------------------  Gen: No fevers/chills  Skin: No rashes  Head/Eyes/Ears: Normal hearing,   Respiratory: dyspnea  CV: No chest pain  GI: No abdominal pain, diarrhea  : No dysuria, hematuria  MSK: No  edema  Heme: No easy bruising or bleeding  Psych: No significant depression      All other systems were reviewed and are negative, except as noted.    VITALS/PHYSICAL EXAM  --------------------------------------------------------------------------------  T(C): 36.6 (08-07-21 @ 14:19), Max: 36.6 (08-07-21 @ 05:04)  HR: 88 (08-07-21 @ 16:51) (80 - 88)  BP: 120/63 (08-07-21 @ 16:51) (114/67 - 131/85)  RR: 18 (08-07-21 @ 16:51) (18 - 18)  SpO2: 94% (08-07-21 @ 14:19) (94% - 96%)  Wt(kg): --        08-06-21 @ 07:01  -  08-07-21 @ 07:00  --------------------------------------------------------  IN: 1170 mL / OUT: 2200 mL / NET: -1030 mL    08-07-21 @ 07:01  -  08-07-21 @ 18:57  --------------------------------------------------------  IN: 240 mL / OUT: 950 mL / NET: -710 mL        Physical Exam:  	Gen: NAD  	HEENT: MMM  	Pulm: Decreased air flow,   	CV: S1S2  	Abd: Soft, +BS   	Ext: trace LE edema B/L  	Neuro: Awake  	Skin: Warm and dry        LABS/STUDIES  --------------------------------------------------------------------------------              8.2    13.34 >-----------<  400      [08-07-21 @ 05:55]              27.0     133  |  83  |  109  ----------------------------<  209      [08-07-21 @ 05:55]  3.9   |  37  |  1.05        Ca     10.9     [08-07-21 @ 05:55]      Mg     2.4     [08-07-21 @ 05:55]    TPro  7.5  /  Alb  3.1  /  TBili  1.0  /  DBili  x   /  AST  21  /  ALT  11  /  AlkPhos  280  [08-07-21 @ 05:55]    Creatinine Trend:  SCr 1.05 [08-07 @ 05:55]  SCr 1.00 [08-06 @ 05:19]  SCr 1.24 [08-05 @ 10:11]  SCr 1.27 [08-04 @ 04:15]  SCr 1.65 [08-03 @ 05:49]    Urinalysis - [07-26-21 @ 12:20]      Color Yellow / Appearance Clear / SG 1.014 / pH 5.5      Gluc 200 mg/dL / Ketone Negative  / Bili Negative / Urobili Negative       Blood Negative / Protein Negative / Leuk Est Large / Nitrite Negative      RBC 3 / WBC 19 / Hyaline 0 / Gran  / Sq Epi  / Non Sq Epi 0 / Bacteria Few    Urine Creatinine 46      [08-03-21 @ 16:36]  Urine Protein 4      [08-04-21 @ 00:45]  Urine Sodium 9      [08-03-21 @ 16:36]  Urine Urea Nitrogen 794      [08-04-21 @ 00:45]  Urine Osmolality 393      [08-03-21 @ 16:36]    TSH 6.37      [07-21-21 @ 14:10]    HBsAb <3.0      [07-09-21 @ 03:47]  HBsAg Nonreact      [07-09-21 @ 03:47]  HBcAb Nonreact      [07-09-21 @ 03:47]  HCV 0.14, Nonreact      [07-09-21 @ 03:47]

## 2021-08-07 NOTE — PROGRESS NOTE ADULT - SUBJECTIVE AND OBJECTIVE BOX
VITAL SIGNS    Telemetry:    Vital Signs Last 24 Hrs  T(C): 36.6 (08-07-21 @ 05:04), Max: 36.6 (08-07-21 @ 05:04)  T(F): 97.8 (08-07-21 @ 05:04), Max: 97.8 (08-07-21 @ 05:04)  HR: 85 (08-07-21 @ 05:04) (82 - 88)  BP: 114/67 (08-07-21 @ 05:04) (114/67 - 131/85)  RR: 18 (08-07-21 @ 05:04) (18 - 18)  SpO2: 95% (08-07-21 @ 05:04) (95% - 96%)            08-06 @ 07:01  -  08-07 @ 07:00  --------------------------------------------------------  IN: 1170 mL / OUT: 2200 mL / NET: -1030 mL       Daily     Daily   Admit Wt: Drug Dosing Weight  Height (cm): 152.4 (22 Jul 2021 11:15)  Weight (kg): 72.4 (22 Jul 2021 11:15)  BMI (kg/m2): 31.2 (22 Jul 2021 11:15)  BSA (m2): 1.7 (22 Jul 2021 11:15)    Bilirubin Total, Serum: 1.0 mg/dL (08-07 @ 05:55)    CAPILLARY BLOOD GLUCOSE      POCT Blood Glucose.: 231 mg/dL (07 Aug 2021 07:58)  POCT Blood Glucose.: 192 mg/dL (06 Aug 2021 22:23)  POCT Blood Glucose.: 324 mg/dL (06 Aug 2021 16:40)  POCT Blood Glucose.: 224 mg/dL (06 Aug 2021 11:23)          artificial  tears Solution 1 Drop(s) Both EYES every 12 hours PRN  aspirin  chewable 81 milliGRAM(s) Oral daily  atorvastatin 40 milliGRAM(s) Oral at bedtime  budesonide  80 MICROgram(s)/formoterol 4.5 MICROgram(s) Inhaler 2 Puff(s) Inhalation two times a day  buMETAnide Infusion 2 mG/Hr IV Continuous <Continuous>  ciprofloxacin     Tablet 250 milliGRAM(s) Oral daily  dextrose 40% Gel 15 Gram(s) Oral once  dextrose 50% Injectable 25 Gram(s) IV Push once  dextrose 50% Injectable 12.5 Gram(s) IV Push once  dextrose 50% Injectable 25 Gram(s) IV Push once  ferrous    sulfate 325 milliGRAM(s) Oral daily  glucagon  Injectable 1 milliGRAM(s) IntraMuscular once  heparin   Injectable 5000 Unit(s) SubCutaneous every 8 hours  insulin glargine Injectable (LANTUS) 12 Unit(s) SubCutaneous at bedtime  insulin lispro (ADMELOG) corrective regimen sliding scale   SubCutaneous three times a day before meals  insulin lispro Injectable (ADMELOG) 6 Unit(s) SubCutaneous three times a day before meals  metoprolol succinate ER 12.5 milliGRAM(s) Oral daily  polyethylene glycol 3350 17 Gram(s) Oral daily  spironolactone 25 milliGRAM(s) Oral daily  tiotropium 18 MICROgram(s) Capsule 1 Capsule(s) Inhalation daily      PHYSICAL EXAM    Subjective: "Hi.   Neurology: alert and oriented x 3, nonfocal, no gross deficits  CV : tele:  RSR  Sternal Wound :  CDI with dressing , Stable  Lungs: clear. RR easy, unlabored   Abdomen: soft, nontender, nondistended, positive bowel sounds, bowel movement   Neg N/V/D   :  pt voiding without difficulty   Extremities:   CHRISTIANSON; edema, neg calf tenderness.   PPP bilaterally      PW:  Chest tubes:                 VITAL SIGNS    Telemetry:  afib 70's   Vital Signs Last 24 Hrs  T(C): 36.6 (08-07-21 @ 05:04), Max: 36.6 (08-07-21 @ 05:04)  T(F): 97.8 (08-07-21 @ 05:04), Max: 97.8 (08-07-21 @ 05:04)  HR: 85 (08-07-21 @ 05:04) (82 - 88)  BP: 114/67 (08-07-21 @ 05:04) (114/67 - 131/85)  RR: 18 (08-07-21 @ 05:04) (18 - 18)  SpO2: 95% (08-07-21 @ 05:04) (95% - 96%)            08-06 @ 07:01  -  08-07 @ 07:00  --------------------------------------------------------  IN: 1170 mL / OUT: 2200 mL / NET: -1030 mL         Daily   Admit Wt: Drug Dosing Weight  Height (cm): 152.4 (22 Jul 2021 11:15)  Weight (kg): 72.4 (22 Jul 2021 11:15)  BMI (kg/m2): 31.2 (22 Jul 2021 11:15)  BSA (m2): 1.7 (22 Jul 2021 11:15)    Bilirubin Total, Serum: 1.0 mg/dL (08-07 @ 05:55)    CAPILLARY BLOOD GLUCOSE      POCT Blood Glucose.: 231 mg/dL (07 Aug 2021 07:58)  POCT Blood Glucose.: 192 mg/dL (06 Aug 2021 22:23)  POCT Blood Glucose.: 324 mg/dL (06 Aug 2021 16:40)  POCT Blood Glucose.: 224 mg/dL (06 Aug 2021 11:23)          artificial  tears Solution 1 Drop(s) Both EYES every 12 hours PRN  aspirin  chewable 81 milliGRAM(s) Oral daily  atorvastatin 40 milliGRAM(s) Oral at bedtime  budesonide  80 MICROgram(s)/formoterol 4.5 MICROgram(s) Inhaler 2 Puff(s) Inhalation two times a day  buMETAnide Infusion 2 mG/Hr IV Continuous <Continuous>  ciprofloxacin     Tablet 250 milliGRAM(s) Oral daily  dextrose 40% Gel 15 Gram(s) Oral once  dextrose 50% Injectable 25 Gram(s) IV Push once  dextrose 50% Injectable 12.5 Gram(s) IV Push once  dextrose 50% Injectable 25 Gram(s) IV Push once  ferrous    sulfate 325 milliGRAM(s) Oral daily  glucagon  Injectable 1 milliGRAM(s) IntraMuscular once  heparin   Injectable 5000 Unit(s) SubCutaneous every 8 hours  insulin glargine Injectable (LANTUS) 12 Unit(s) SubCutaneous at bedtime  insulin lispro (ADMELOG) corrective regimen sliding scale   SubCutaneous three times a day before meals  insulin lispro Injectable (ADMELOG) 6 Unit(s) SubCutaneous three times a day before meals  metoprolol succinate ER 12.5 milliGRAM(s) Oral daily  polyethylene glycol 3350 17 Gram(s) Oral daily  spironolactone 25 milliGRAM(s) Oral daily  tiotropium 18 MICROgram(s) Capsule 1 Capsule(s) Inhalation daily      PHYSICAL EXAM    Subjective: "I feel the same."   Neurology: alert and oriented x 3, nonfocal, no gross deficits  CV : tele:  afib    Lungs: clear. RR easy, unlabored   Abdomen: soft, nontender, nondistended, positive bowel sounds,+ bowel movement   Neg N/V/D; obese abdomen    :  pt voiding without difficulty - primafit in place   Extremities:   CHRISTIANSON; +2 LE edema, neg calf tenderness.   PPP bilaterally; bilateral groin sites stable - neg bleeding/ hematoma

## 2021-08-07 NOTE — PROGRESS NOTE ADULT - ASSESSMENT
87F PMH CAD w/stents, DM2, Afib (not on AC), Colon Ca (about 25y ago), severe AS, HF , recently started on Entresto, admitted for JACQUE and Metformin-associated lactic acidosis, admitted to MICU s/p urgent HD session but now JACQUE resolving and now undergoing TAVR workup.    7/22 Underwent TAVR via right femoral #22 He[place]  recovered in CRS post op course unremarkable Junctional rhythm  by EKG  700 cc fluid given for hypotension. Stabilize transferred to [address] [place] [address]- received in afib-  groin sites benign + DP son at bedside ECHO in am EKG in am  Likely discharge on Saturday with [facility] son reports haven given rehab  choices to case management.  7/23 pending post TAVR echo. Anticipate discharge to rehab Monday.  7/24 VSS maintaining Afib 70's . No further junctional episodes of rhythm.   7/25 Transthoracic Echocardiogram demonstrates Minimal aortic regurgitation-Tethered tricuspid valve, severe tricuspid regurgitation . No evidence of pericardial effusion.   7/26 VSS - pt c/o slight SOB - echo shows small new pericardial effusion.  CXR-done- crackles bases - on IV bumex tid - diuresing - O2 N/c in place -rounds made w/ Dr. Pruitt, Dr. Johns & structural heart team - new SOB - will get LE dopplers to r/o dvt - start heaprin gtt, change to bumex gtt 0.5mg /hr- give zaroxlyn 10mg iv x1 , WBC 16 from 8 - ck u/a c&s.  transfer to SDU for closer monitoring.    7/27 OOB to chair  bumex gtt   heparin gtt  neg  800 cc   24 hrs   chest xray   bl base pl effusion  7/28 VSS more tachypnic today.  CXR + atelectasis.   Na 127 Dr Ramos called for input.  -658cc/24 hrs  7/29 VSS sob improved.  -236cc/24hrs.  Na 127 received 1 dose samsca 15x1.  K 4.6 .  Hep gtt dc'd  7/30 VSS;  bumex changed to 4 mg po bid and aldactone initiated as per CHF team; change toprol 12.5 qd; supplement hypokalemia; no anticoagulation for chronic afib secondary to hx GI bleed as per Dr. Pruitt  7/31 (+) tachypnea and dyspnea --> Per CHF Dr. Kee will call Pulmonary for consults (Dr. Ponce).  Bumex  4 mg IV x 1 this evening and them decreased Bumex 4 mg PO daily to start in AM.  Continue with current medication regimen. Supplement Potassium to maintain K+ level > 4.5     8/1   OOB to chair,     na  124,  FR   covid ordered   Pulm  renal and HF following  8/2       OOB    w/ asst    diuretics   bumex 4mg qd  and aldactone 12.5,  creat stable  na  improved to 126  8/3 Repeat echo today and plan for right heart cath tomorrow.  Persistent hyponatremia 123 consider samsca 15 today.  Symbicort 160 2 bid, spiriva 1 q day, for asthma.  Pulmonary consult suggest CTNA and eventual PET scan to investigate Metastatic bronchoalveolar cancer.  Given her age, comorbidities and recent acute illness, will not pursue diagnostic workup at this time. 8/4 Right heart cath today  8/5 RHC filling pressures and pulmonary wedge elevated. bumex drip initiated with metolazone. Discontinued urena. Patient is being assessed for possible inotropic assisted diuresis or ultrafiltration.  8/6 VSS: continue bumex gttp; potassium supplemented for hypokalemia; endo consulted for uncontrolled dm2- aic 6.7- bs 324 - additional admelog given   Discharge planning- rehab when stable    87F PMH CAD w/stents, DM2, Afib (not on AC), Colon Ca (about 25y ago), severe AS, HF , recently started on Entresto, admitted for JACQUE and Metformin-associated lactic acidosis, admitted to MICU s/p urgent HD session but now JACQUE resolving and now undergoing TAVR workup.    7/22 Underwent TAVR via right femoral #22 [place]  recovered in CRS post op course unremarkable Junctional rhythm  by EKG  700 cc fluid given for hypotension. Stabilize transferred to [address] [place] [address]- received in afib-  groin sites benign + DP son at bedside ECHO in am EKG in am  Likely discharge on Saturday with [facility] son reports haven given rehab  choices to case management.  7/23 pending post TAVR echo. Anticipate discharge to rehab Monday.  7/24 VSS maintaining Afib 70's . No further junctional episodes of rhythm.   7/25 Transthoracic Echocardiogram demonstrates Minimal aortic regurgitation-Tethered tricuspid valve, severe tricuspid regurgitation . No evidence of pericardial effusion.   7/26 VSS - pt c/o slight SOB - echo shows small new pericardial effusion.  CXR-done- crackles bases - on IV bumex tid - diuresing - O2 N/c in place -rounds made w/ Dr. Pruitt, Dr. Johns & structural heart team - new SOB - will get LE dopplers to r/o dvt - start heaprin gtt, change to bumex gtt 0.5mg /hr- give zaroxlyn 10mg iv x1 , WBC 16 from 8 - ck u/a c&s.  transfer to SDU for closer monitoring.    7/27 OOB to chair  bumex gtt   heparin gtt  neg  800 cc   24 hrs   chest xray   bl base pl effusion  7/28 VSS more tachypnic today.  CXR + atelectasis.   Na 127 Dr Ramos called for input.  -658cc/24 hrs  7/29 VSS sob improved.  -236cc/24hrs.  Na 127 received 1 dose samsca 15x1.  K 4.6 .  Hep gtt dc'd  7/30 VSS;  bumex changed to 4 mg po bid and aldactone initiated as per CHF team; change toprol 12.5 qd; supplement hypokalemia; no anticoagulation for chronic afib secondary to hx GI bleed as per Dr. Pruitt  7/31 (+) tachypnea and dyspnea --> Per CHF Dr. Kee will call Pulmonary for consults (Dr. Ponce).  Bumex  4 mg IV x 1 this evening and them decreased Bumex 4 mg PO daily to start in AM.  Continue with current medication regimen. Supplement Potassium to maintain K+ level > 4.5     8/1   OOB to chair,     na  124,  FR   covid ordered   Pulm  renal and HF following  8/2       OOB    w/ asst    diuretics   bumex 4mg qd  and aldactone 12.5,  creat stable  na  improved to 126  8/3 Repeat echo today and plan for right heart cath tomorrow.  Persistent hyponatremia 123 consider samsca 15 today.  Symbicort 160 2 bid, spiriva 1 q day, for asthma.  Pulmonary consult suggest CTNA and eventual PET scan to investigate Metastatic bronchoalveolar cancer.  Given her age, comorbidities and recent acute illness, will not pursue diagnostic workup at this time. 8/4 Right heart cath today  8/5 RHC filling pressures and pulmonary wedge elevated. bumex drip initiated with metolazone. Discontinued urena. Patient is being assessed for possible inotropic assisted diuresis or ultrafiltration.  8/6 VSS: continue bumex gttp; potassium supplemented for hypokalemia; endo consulted for uncontrolled dm2- aic 6.7- bs 324 - additional admelog given   8/7 VSS; diamox added today; bumex gttp continued for CHF; bs improved today   Discharge planning- rehab when stable

## 2021-08-07 NOTE — PROGRESS NOTE ADULT - PROBLEM SELECTOR PLAN 2
Renal following     NA   is 136  Bumex gtt  strict I & O's   daily weight Renal following     NA   is 133  Bumex gtt  strict I & O's   daily weight

## 2021-08-08 NOTE — PROGRESS NOTE ADULT - SUBJECTIVE AND OBJECTIVE BOX
HPI:  MICU H&P    CHIEF COMPLAINT: urgent HD, hyperkalemia    HPI / INTERVAL HISTORY:  87F PMH CAD w/stents, DM2, Afib (not on AC's, dc'd pradaxa 1mo ago as pt w/u for anemia), Colon Ca (about 25y ago),  AS, HF on 80mg lasix qd, recently started on Entresto, presents to the ED with abnormal labs done on 7/7 showing JACQUE with hyperk to >6 mod hemolyzed. Pt states that she hasn't urinated in 2 days, also states she's had nonbloody loose stools over the past week. Otherwise she states SOB is at baseline. Denies any fevers/chills, cough, chest pain, palpitations, lightheadedness, abd pain, n/v, leg swelling worse than baseline.    Home medications: Metformin, Lasix 80qd, Metolazone 25 mg qd, Glipizide, atorvastatin 40 mg qhs, Zolpidem 5 mg qhs, Entresto 24 mg, Metoprolol 25 mg and Januvia     On presentation patient with BUN/Cr 79/6.7 mg/dl. K: 6.6 (non hemolyzed). Previous Cr was at 1.26 6/7/21. Also noted with bicarb: 11 pH 7.2 - lactate 7. patient on Metformin at home and reports taking all of her medications.     Nephrology consulted for JACQUE / hyperkalemia and acidosis. Temporized in ED with Bicarb and calcium. Per tox will need Q1 FSG for sulfonylurea and JACQUE. Will be admitted to MICU for HD.     PAST MEDICAL & SURGICAL HISTORY:    FAMILY HISTORY:  No sig FH in first degree relatives     SOCIAL HISTORY:  Denies smoking drinking or illicit drug use    Allergies    No Known Allergies    Intolerances    REVIEW OF SYSTEMS:  +Fatigue, weakness, oliguria  Negative except per HPI    OBJECTIVE:  ICU Vital Signs Last 24 Hrs  T(C): 36.4 (08 Jul 2021 07:36), Max: 36.4 (08 Jul 2021 07:36)  T(F): 97.5 (08 Jul 2021 07:36), Max: 97.5 (08 Jul 2021 07:36)  HR: 79 (08 Jul 2021 10:30) (71 - 83)  BP: 128/64 (08 Jul 2021 10:30) (112/67 - 128/64)  BP(mean): 71 (08 Jul 2021 10:30) (71 - 77)    RR: 18 (08 Jul 2021 10:30) (18 - 20)  SpO2: 100% (08 Jul 2021 10:30) (95% - 100%)  CAPILLARY BLOOD GLUCOSE    POCT Blood Glucose.: 148 mg/dL (08 Jul 2021 11:19)    PHYSICAL EXAM:  Gen: NAD  	HEENT: MMM  	Pulm: CTA B/L  	CV: IR ZAYNAB  	Abd: Soft, +BS   	Ext: No LE edema B/L  	Neuro: Awake  	Skin: Warm and dry             : no tenderness to palpation     MEDICATIONS  (PRN):    LABS:                        7.8    7.31  )-----------( 196      ( 08 Jul 2021 08:47 )             25.8     Hgb Trend: 7.8<--  07-08    124<L>  |  84<L>  |  79<H>  ----------------------------<  32<LL>  6.6<HH>   |  11<L>  |  6.76<H>    Ca    9.6      08 Jul 2021 08:48  Phos  6.4     07-08  Mg     2.0     07-08    TPro  7.2  /  Alb  3.9  /  TBili  0.4  /  DBili  x   /  AST  25  /  ALT  17  /  AlkPhos  205<H>  07-08    Creatinine Trend: 6.76<--    Venous Blood Gas:  07-08 @ 08:47  7.24/34/42/14/64  VBG Lactate: 7.0 (08 Jul 2021 11:43)    Review Of Systems:     No chest pain, shortness of breath, or palpitations            Medications:  acetaZOLAMIDE    Tablet 500 milliGRAM(s) Oral two times a day  artificial  tears Solution 1 Drop(s) Both EYES every 12 hours PRN  aspirin  chewable 81 milliGRAM(s) Oral daily  atorvastatin 40 milliGRAM(s) Oral at bedtime  budesonide  80 MICROgram(s)/formoterol 4.5 MICROgram(s) Inhaler 2 Puff(s) Inhalation two times a day  buMETAnide Infusion 2 mG/Hr IV Continuous <Continuous>  ciprofloxacin     Tablet 250 milliGRAM(s) Oral daily  dextrose 40% Gel 15 Gram(s) Oral once  dextrose 50% Injectable 25 Gram(s) IV Push once  dextrose 50% Injectable 12.5 Gram(s) IV Push once  dextrose 50% Injectable 25 Gram(s) IV Push once  ferrous    sulfate 325 milliGRAM(s) Oral daily  glucagon  Injectable 1 milliGRAM(s) IntraMuscular once  heparin   Injectable 5000 Unit(s) SubCutaneous every 8 hours  insulin glargine Injectable (LANTUS) 12 Unit(s) SubCutaneous at bedtime  insulin lispro (ADMELOG) corrective regimen sliding scale   SubCutaneous three times a day before meals  insulin lispro Injectable (ADMELOG) 6 Unit(s) SubCutaneous three times a day before meals  metoprolol succinate ER 12.5 milliGRAM(s) Oral daily  polyethylene glycol 3350 17 Gram(s) Oral daily  spironolactone 25 milliGRAM(s) Oral daily  tiotropium 18 MICROgram(s) Capsule 1 Capsule(s) Inhalation daily    PAST MEDICAL & SURGICAL HISTORY:    Vitals:  T(C): 36.4 (08-08-21 @ 05:05), Max: 36.6 (08-07-21 @ 14:19)  HR: 82 (08-08-21 @ 05:05) (80 - 88)  BP: 112/67 (08-08-21 @ 05:05) (112/67 - 120/63)  BP(mean): 83 (08-07-21 @ 20:39) (83 - 83)  RR: 18 (08-08-21 @ 05:05) (18 - 18)  SpO2: 94% (08-08-21 @ 05:05) (94% - 94%)  Wt(kg): --  Daily     Daily   I&O's Summary    07 Aug 2021 07:01  -  08 Aug 2021 07:00  --------------------------------------------------------  IN: 370 mL / OUT: 2650 mL / NET: -2280 mL        Physical Exam:  Appearance: No acute distress; well appearing  Eyes: PERRL, EOMI, pink conjunctiva  HENT: Normal oral mucosa  Cardiovascular: IR, , ZAYNAB MR murmurs no rubs, or gallops; no edema; no JVD  Respiratory: Clear to auscultation bilaterally  Gastrointestinal: soft, non-tender, non-distended with normal bowel sounds  Musculoskeletal: No clubbing; no joint deformity   Neurologic: Non-focal  Lymphatic: No lymphadenopathy  Psychiatry: AAOx3, mood & affect appropriate  Skin: No rashes, ecchymoses, or cyanosis                          8.2    13.34 )-----------( 400      ( 07 Aug 2021 05:55 )             27.0     08-07    133<L>  |  83<L>  |  109<H>  ----------------------------<  209<H>  3.9   |  37<H>  |  1.05    Ca    10.9<H>      07 Aug 2021 05:55  Mg     2.4     08-07    TPro  7.5  /  Alb  3.1<L>  /  TBili  1.0  /  DBili  x   /  AST  21  /  ALT  11  /  AlkPhos  280<H>  08-07                  ECG:    Echo:    Stress Testing:     Cath:    Imaging:    Interpretation of Telemetry:

## 2021-08-08 NOTE — PROGRESS NOTE ADULT - PROBLEM SELECTOR PLAN 5
in the setting of use of diuretics and alkalosis  monitor and replete as needed  consider increasing the dose of aldactone

## 2021-08-08 NOTE — PROGRESS NOTE ADULT - PROBLEM SELECTOR PLAN 2
hypervolemic- initially resolved, then again with low sodium in the setting of use of metolazone, stable today  continue bumex drip  Metolazone discontinued yesterday  monitor sodium level

## 2021-08-08 NOTE — PROGRESS NOTE ADULT - SUBJECTIVE AND OBJECTIVE BOX
Chief complaint  Patient is a 87y old  Female who presents with a chief complaint of urgent HD (08 Aug 2021 12:56)   Review of systems  Patient in bed, looks comfortable, no hypoglycemic episodes.    Labs and Fingersticks  CAPILLARY BLOOD GLUCOSE      POCT Blood Glucose.: 173 mg/dL (08 Aug 2021 11:53)  POCT Blood Glucose.: 175 mg/dL (08 Aug 2021 07:59)  POCT Blood Glucose.: 126 mg/dL (07 Aug 2021 21:44)  POCT Blood Glucose.: 172 mg/dL (07 Aug 2021 16:16)      Anion Gap, Serum: 19 *H* (08-08 @ 09:43)  Anion Gap, Serum: 13 (08-07 @ 05:55)      Calcium, Total Serum: 10.7 *H* (08-08 @ 09:43)  Calcium, Total Serum: 10.9 *H* (08-07 @ 05:55)  Albumin, Serum: 3.1 *L* (08-07 @ 05:55)    Alanine Aminotransferase (ALT/SGPT): 11 (08-07 @ 05:55)  Alkaline Phosphatase, Serum: 280 *H* (08-07 @ 05:55)  Aspartate Aminotransferase (AST/SGOT): 21 (08-07 @ 05:55)        08-08    139  |  82<L>  |  98<H>  ----------------------------<  182<H>  2.7<LL>   |  38<H>  |  1.18    Ca    10.7<H>      08 Aug 2021 09:43  Mg     2.4     08-07    TPro  7.5  /  Alb  3.1<L>  /  TBili  1.0  /  DBili  x   /  AST  21  /  ALT  11  /  AlkPhos  280<H>  08-07                        8.5    13.28 )-----------( 414      ( 08 Aug 2021 09:43 )             28.3     Medications  MEDICATIONS  (STANDING):  acetaZOLAMIDE    Tablet 500 milliGRAM(s) Oral two times a day  aspirin  chewable 81 milliGRAM(s) Oral daily  atorvastatin 40 milliGRAM(s) Oral at bedtime  budesonide  80 MICROgram(s)/formoterol 4.5 MICROgram(s) Inhaler 2 Puff(s) Inhalation two times a day  buMETAnide Infusion 2 mG/Hr (10 mL/Hr) IV Continuous <Continuous>  ciprofloxacin     Tablet 250 milliGRAM(s) Oral daily  dextrose 40% Gel 15 Gram(s) Oral once  dextrose 50% Injectable 25 Gram(s) IV Push once  dextrose 50% Injectable 12.5 Gram(s) IV Push once  dextrose 50% Injectable 25 Gram(s) IV Push once  ferrous    sulfate 325 milliGRAM(s) Oral daily  glucagon  Injectable 1 milliGRAM(s) IntraMuscular once  heparin   Injectable 5000 Unit(s) SubCutaneous every 8 hours  insulin glargine Injectable (LANTUS) 15 Unit(s) SubCutaneous at bedtime  insulin lispro (ADMELOG) corrective regimen sliding scale   SubCutaneous three times a day before meals  insulin lispro Injectable (ADMELOG) 7 Unit(s) SubCutaneous three times a day before meals  metoprolol succinate ER 12.5 milliGRAM(s) Oral daily  polyethylene glycol 3350 17 Gram(s) Oral daily  potassium chloride    Tablet ER 20 milliEquivalent(s) Oral every 2 hours  spironolactone 25 milliGRAM(s) Oral daily  tiotropium 18 MICROgram(s) Capsule 1 Capsule(s) Inhalation daily      Physical Exam  General: Patient comfortable in bed  Vital Signs Last 12 Hrs  T(F): 97.3 (08-08-21 @ 12:19), Max: 97.5 (08-08-21 @ 05:05)  HR: 80 (08-08-21 @ 12:19) (80 - 82)  BP: 110/64 (08-08-21 @ 12:19) (110/64 - 112/67)  BP(mean): --  RR: 18 (08-08-21 @ 12:19) (18 - 18)  SpO2: 94% (08-08-21 @ 12:19) (94% - 94%)  Neck: No palpable thyroid nodules.  CVS: S1S2, No murmurs  Respiratory: No wheezing, no crepitations  GI: Abdomen soft, bowel sounds positive  Musculoskeletal:  edema lower extremities.   Skin: No skin rashes, no ecchymosis    Diagnostics    Free Thyroxine, Serum: AM Sched. Collection: 09-Aug-2021 06:00 (08-08 @ 12:19)  Thyroid Stimulating Hormone, Serum: AM Sched. Collection: 09-Aug-2021 06:00 (08-08 @ 12:19)           Chief complaint  Patient is a 87y old  Female who presents with a chief complaint of urgent HD (08 Aug 2021 12:56)   Review of systems  Patient in bed, looks comfortable, no hypoglycemic episodes.    Labs and Fingersticks  CAPILLARY BLOOD GLUCOSE      POCT Blood Glucose.: 173 mg/dL (08 Aug 2021 11:53)  POCT Blood Glucose.: 175 mg/dL (08 Aug 2021 07:59)  POCT Blood Glucose.: 126 mg/dL (07 Aug 2021 21:44)  POCT Blood Glucose.: 172 mg/dL (07 Aug 2021 16:16)      Anion Gap, Serum: 19 *H* (08-08 @ 09:43)  Anion Gap, Serum: 13 (08-07 @ 05:55)      Calcium, Total Serum: 10.7 *H* (08-08 @ 09:43)  Calcium, Total Serum: 10.9 *H* (08-07 @ 05:55)  Albumin, Serum: 3.1 *L* (08-07 @ 05:55)    Alanine Aminotransferase (ALT/SGPT): 11 (08-07 @ 05:55)  Alkaline Phosphatase, Serum: 280 *H* (08-07 @ 05:55)  Aspartate Aminotransferase (AST/SGOT): 21 (08-07 @ 05:55)        08-08    139  |  82<L>  |  98<H>  ----------------------------<  182<H>  2.7<LL>   |  38<H>  |  1.18    Ca    10.7<H>      08 Aug 2021 09:43  Mg     2.4     08-07    TPro  7.5  /  Alb  3.1<L>  /  TBili  1.0  /  DBili  x   /  AST  21  /  ALT  11  /  AlkPhos  280<H>  08-07                        8.5    13.28 )-----------( 414      ( 08 Aug 2021 09:43 )             28.3     Medications  MEDICATIONS  (STANDING):  acetaZOLAMIDE    Tablet 500 milliGRAM(s) Oral two times a day  aspirin  chewable 81 milliGRAM(s) Oral daily  atorvastatin 40 milliGRAM(s) Oral at bedtime  budesonide  80 MICROgram(s)/formoterol 4.5 MICROgram(s) Inhaler 2 Puff(s) Inhalation two times a day  buMETAnide Infusion 2 mG/Hr (10 mL/Hr) IV Continuous <Continuous>  ciprofloxacin     Tablet 250 milliGRAM(s) Oral daily  dextrose 40% Gel 15 Gram(s) Oral once  dextrose 50% Injectable 25 Gram(s) IV Push once  dextrose 50% Injectable 12.5 Gram(s) IV Push once  dextrose 50% Injectable 25 Gram(s) IV Push once  ferrous    sulfate 325 milliGRAM(s) Oral daily  glucagon  Injectable 1 milliGRAM(s) IntraMuscular once  heparin   Injectable 5000 Unit(s) SubCutaneous every 8 hours  insulin glargine Injectable (LANTUS) 15 Unit(s) SubCutaneous at bedtime  insulin lispro (ADMELOG) corrective regimen sliding scale   SubCutaneous three times a day before meals  insulin lispro Injectable (ADMELOG) 7 Unit(s) SubCutaneous three times a day before meals  metoprolol succinate ER 12.5 milliGRAM(s) Oral daily  polyethylene glycol 3350 17 Gram(s) Oral daily  potassium chloride    Tablet ER 20 milliEquivalent(s) Oral every 2 hours  spironolactone 25 milliGRAM(s) Oral daily  tiotropium 18 MICROgram(s) Capsule 1 Capsule(s) Inhalation daily      Physical Exam  General: Patient comfortable in bed  Vital Signs Last 12 Hrs  T(F): 97.3 (08-08-21 @ 12:19), Max: 97.5 (08-08-21 @ 05:05)  HR: 80 (08-08-21 @ 12:19) (80 - 82)  BP: 110/64 (08-08-21 @ 12:19) (110/64 - 112/67)  BP(mean): --  RR: 18 (08-08-21 @ 12:19) (18 - 18)  SpO2: 94% (08-08-21 @ 12:19) (94% - 94%)  Neck: No palpable thyroid nodules.  CVS: S1S2, No murmurs  Respiratory: No wheezing, no crepitations  GI: Abdomen soft, bowel sounds positive  Musculoskeletal:  edema lower extremities.   Skin: No skin rashes, no ecchymosis    Diagnostics    Free Thyroxine, Serum: AM Sched. Collection: 09-Aug-2021 06:00 (08-08 @ 12:19)  Thyroid Stimulating Hormone, Serum: AM Sched. Collection: 09-Aug-2021 06:00 (08-08 @ 12:19)

## 2021-08-08 NOTE — PROGRESS NOTE ADULT - ASSESSMENT
Assessment  DMT2: 87y Female with DM T2 with hyperglycemia admitted with JACQUE, A1C 6.7%, was on oral hypoglycemic agents at home, now on basal bolus insulin, blood sugars fluctuating/running high and not at target, no hypoglycemic episodse, eating meals, appears comfortable.  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  Hypothyroidism: Patient has no history thyroid disease, was not on any thyroid supplements, TSH elevated July 2021.  JACQUE: labs, chart reviewed.  Overweight: No strict exercise routines, neither on low calorie diet.       Kenton Manley MD  Cell: [phone]  Office: [phone]       Assessment  DMT2: 87y Female with DM T2 with hyperglycemia admitted with JACQUE, A1C 6.7%, was on oral hypoglycemic agents at home, now on basal bolus insulin, blood sugars fluctuating/running high and not at target, no hypoglycemic  episodse, eating meals, appears comfortable.  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  Hypothyroidism: Patient has no history thyroid disease, was not on any thyroid supplements, TSH elevated July 2021.  JACQUE: labs, chart reviewed.  Overweight: No strict exercise routines, neither on low calorie diet.       Kenton Manley MD  Cell: [phone]  Office: [phone]

## 2021-08-08 NOTE — PROGRESS NOTE ADULT - PROBLEM SELECTOR PLAN 1
Will increase Lantus to 15u at bedtime.  Will increase Admelog to 7u before each meal and continue Admelog correction scale coverage. Will continue monitoring FS and FU.  Patient on several oral meds at home including high-dose sulfonylurea and Metformin, she will require adjustments to her DM regimen given older age and JACQUE. Will continue monitoring and FU DC recs.  Patient counseled for compliance with consistent low carb diet and exercise as tolerated outpatient.

## 2021-08-08 NOTE — PROGRESS NOTE ADULT - PROBLEM SELECTOR PLAN 1
Continue on aldactone 12.5 mg po Daily   diuresis with bumex gtt       discharge planning- rehab when stable

## 2021-08-08 NOTE — PROGRESS NOTE ADULT - SUBJECTIVE AND OBJECTIVE BOX
[facility] DIVISION OF KIDNEY DISEASES AND HYPERTENSION -- PROGRESS NOTE    Chief complaint: JACQUE, hypervolemia    24 hour events/subjective: increased UO noted        PAST HISTORY  --------------------------------------------------------------------------------  No significant changes to PMH, PSH, FHx, SHx, unless otherwise noted    ALLERGIES & MEDICATIONS  --------------------------------------------------------------------------------  Allergies    No Known Allergies    Intolerances      Standing Inpatient Medications  acetaZOLAMIDE    Tablet 500 milliGRAM(s) Oral two times a day  aspirin  chewable 81 milliGRAM(s) Oral daily  atorvastatin 40 milliGRAM(s) Oral at bedtime  budesonide  80 MICROgram(s)/formoterol 4.5 MICROgram(s) Inhaler 2 Puff(s) Inhalation two times a day  buMETAnide Infusion 2 mG/Hr IV Continuous <Continuous>  ciprofloxacin     Tablet 250 milliGRAM(s) Oral daily  dextrose 40% Gel 15 Gram(s) Oral once  dextrose 50% Injectable 25 Gram(s) IV Push once  dextrose 50% Injectable 12.5 Gram(s) IV Push once  dextrose 50% Injectable 25 Gram(s) IV Push once  ferrous    sulfate 325 milliGRAM(s) Oral daily  glucagon  Injectable 1 milliGRAM(s) IntraMuscular once  heparin   Injectable 5000 Unit(s) SubCutaneous every 8 hours  insulin glargine Injectable (LANTUS) 15 Unit(s) SubCutaneous at bedtime  insulin lispro (ADMELOG) corrective regimen sliding scale   SubCutaneous three times a day before meals  insulin lispro Injectable (ADMELOG) 7 Unit(s) SubCutaneous three times a day before meals  metoprolol succinate ER 12.5 milliGRAM(s) Oral daily  polyethylene glycol 3350 17 Gram(s) Oral daily  potassium chloride    Tablet ER 20 milliEquivalent(s) Oral every 2 hours  spironolactone 25 milliGRAM(s) Oral daily  tiotropium 18 MICROgram(s) Capsule 1 Capsule(s) Inhalation daily    PRN Inpatient Medications  artificial  tears Solution 1 Drop(s) Both EYES every 12 hours PRN      REVIEW OF SYSTEMS  --------------------------------------------------------------------------------  Constitutional: [ ] Fever [ ] Chills [x ] Fatigue [ ] Weight change   HEENT: [ ] Blurred vision [ ] Eye Pain [ ] Headache [ ] Runny nose [ ] Sore Throat   Respiratory: [ ] Cough [ ] Wheezing [ x] Shortness of breath  Cardiovascular: [ ] Chest Pain [ ] Palpitations [ ] GARCIA [ ] PND [ ] Orthopnea  Gastrointestinal: [ ] Abdominal Pain [ ] Diarrhea [ ] Constipation [ ] Hemorrhoids [ ] Nausea [ ] Vomiting  Genitourinary: [ ] Nocturia [ ] Dysuria [ ] Incontinence  Extremities: [ ] Swelling [ ] Joint Pain  Neurologic: [ ] Focal deficit [ ] Paresthesias [ ] Syncope  Lymphatic: [ ] Swelling [ ] Lymphadenopathy   Skin: [ ] Rash [ ] Ecchymoses [ ] Wounds [ ] Lesions  Psychiatry: [ ] Depression [ ] Suicidal/Homicidal Ideation [ ] Anxiety [ ] Sleep Disturbances  [x ] 10 point review of systems is otherwise negative except as mentioned above              [ ]Unable to obtain due to   All other systems were reviewed and are negative, except as noted.    VITALS/PHYSICAL EXAM  --------------------------------------------------------------------------------  T(C): 36.3 (08-08-21 @ 12:19), Max: 36.6 (08-07-21 @ 14:19)  HR: 80 (08-08-21 @ 12:19) (80 - 88)  BP: 110/64 (08-08-21 @ 12:19) (110/64 - 120/63)  RR: 18 (08-08-21 @ 12:19) (18 - 18)  SpO2: 94% (08-08-21 @ 12:19) (94% - 94%)  Wt(kg): --        08-07-21 @ 07:01  -  08-08-21 @ 07:00  --------------------------------------------------------  IN: 370 mL / OUT: 2650 mL / NET: -2280 mL    08-08-21 @ 07:01  -  08-08-21 @ 12:56  --------------------------------------------------------  IN: 180 mL / OUT: 0 mL / NET: 180 mL      Physical Exam:  	Gen: lethargic  	HEENT: on nasal cannula  	Pulm: decreased breath sounds B/L  	CV: normal S1S2; no rub  	Abd: soft                      Back : No sacral edema  	: + urinary catheter  	Skin: Warm    LABS/STUDIES  --------------------------------------------------------------------------------              8.5    13.28 >-----------<  414      [08-08-21 @ 09:43]              28.3     139  |  82  |  98  ----------------------------<  182      [08-08-21 @ 09:43]  2.7   |  38  |  1.18        Ca     10.7     [08-08-21 @ 09:43]      Mg     2.4     [08-07-21 @ 05:55]    TPro  7.5  /  Alb  3.1  /  TBili  1.0  /  DBili  x   /  AST  21  /  ALT  11  /  AlkPhos  280  [08-07-21 @ 05:55]          Creatinine Trend:  SCr 1.18 [08-08 @ 09:43]  SCr 1.05 [08-07 @ 05:55]  SCr 1.00 [08-06 @ 05:19]  SCr 1.24 [08-05 @ 10:11]  SCr 1.27 [08-04 @ 04:15]    Urinalysis - [07-26-21 @ 12:20]      Color Yellow / Appearance Clear / SG 1.014 / pH 5.5      Gluc 200 mg/dL / Ketone Negative  / Bili Negative / Urobili Negative       Blood Negative / Protein Negative / Leuk Est Large / Nitrite Negative      RBC 3 / WBC 19 / Hyaline 0 / Gran  / Sq Epi  / Non Sq Epi 0 / Bacteria Few    Urine Creatinine 46      [08-03-21 @ 16:36]  Urine Protein 4      [08-04-21 @ 00:45]  Urine Sodium 9      [08-03-21 @ 16:36]  Urine Urea Nitrogen 794      [08-04-21 @ 00:45]  Urine Osmolality 393      [08-03-21 @ 16:36]    TSH 6.37      [07-21-21 @ 14:10]

## 2021-08-08 NOTE — PROGRESS NOTE ADULT - ASSESSMENT
87F PMH CAD w/stents, DM2, Afib (not on AC), Colon Ca (about 25y ago), severe AS, HF , recently started on Entresto, admitted for JACQUE and Metformin-associated lactic acidosis, admitted to MICU s/p urgent HD session but now JACQUE resolving and now undergoing TAVR workup.    7/22 Underwent TAVR via right femoral #22 Heike  recovered in CRS post op course unremarkable Junctional rhythm  by EKG  700 cc fluid given for hypotension. Stabilize transferred to [address] [place] [address]- received in afib-  groin sites benign + DP son at bedside ECHO in am EKG in am  Likely discharge on Saturday with [facility]OT son reports haven given rehab  choices to case management.  7/23 pending post TAVR echo. Anticipate discharge to rehab Monday.  7/24 VSS maintaining Afib 70's . No further junctional episodes of rhythm.   7/25 Transthoracic Echocardiogram demonstrates Minimal aortic regurgitation-Tethered tricuspid valve, severe tricuspid regurgitation . No evidence of pericardial effusion.   7/26 VSS - pt c/o slight SOB - echo shows small new pericardial effusion.  CXR-done- crackles bases - on IV bumex tid - diuresing - O2 N/c in place -rounds made w/ Dr. Pruitt, Dr. Johns & structural heart team - new SOB - will get LE dopplers to r/o dvt - start heaprin gtt, change to bumex gtt 0.5mg /hr- give zaroxlyn 10mg iv x1 , WBC 16 from 8 - ck u/a c&s.  transfer to SDU for closer monitoring.    7/27 OOB to chair  bumex gtt   heparin gtt  neg  800 cc   24 hrs   chest xray   bl base pl effusion  7/28 VSS more tachypnic today.  CXR + atelectasis.   Na 127 Dr Ramos called for input.  -658cc/24 hrs  7/29 VSS sob improved.  -236cc/24hrs.  Na 127 received 1 dose samsca 15x1.  K 4.6 .  Hep gtt dc'd  7/30 VSS;  bumex changed to 4 mg po bid and aldactone initiated as per CHF team; change toprol 12.5 qd; supplement hypokalemia; no anticoagulation for chronic afib secondary to hx GI bleed as per Dr. Pruitt  7/31 (+) tachypnea and dyspnea --> Per CHF Dr. Kee will call Pulmonary for consults (Dr. Ponce).  Bumex  4 mg IV x 1 this evening and them decreased Bumex 4 mg PO daily to start in AM.  Continue with current medication regimen. Supplement Potassium to maintain K+ level > 4.5     8/1   OOB to chair,     na  124,  FR   covid ordered   Pulm  renal and HF following  8/2       OOB    w/ asst    diuretics   bumex 4mg qd  and aldactone 12.5,  creat stable  na  improved to 126  8/3 Repeat echo today and plan for right heart cath tomorrow.  Persistent hyponatremia 123 consider samsca 15 today.  Symbicort 160 2 bid, spiriva 1 q day, for asthma.  Pulmonary consult suggest CTNA and eventual PET scan to investigate Metastatic bronchoalveolar cancer.  Given her age, comorbidities and recent acute illness, will not pursue diagnostic workup at this time. 8/4 Right heart cath today  8/5 RHC filling pressures and pulmonary wedge elevated. bumex drip initiated with metolazone. Discontinued urena. Patient is being assessed for possible inotropic assisted diuresis or ultrafiltration.  8/6 VSS: continue bumex gttp; potassium supplemented for hypokalemia; endo consulted for uncontrolled dm2- aic 6.7- bs 324 - additional admelog given   8/7 VSS; diamox added today; bumex gttp continued for CHF; bs improved today   8/8   bumex gtt   neg 2liters    OOb to chair

## 2021-08-08 NOTE — PROGRESS NOTE ADULT - PROBLEM SELECTOR PLAN 1
in the setting of cardiorenal syndrome, noted to have improved UO today in response to combined use of bumex and acetazolamide  continue diuretics (bumex + acetazolamide)  Monitor UO

## 2021-08-08 NOTE — PROGRESS NOTE ADULT - SUBJECTIVE AND OBJECTIVE BOX
VITAL SIG    Vital Signs Last 24 Hrs  T(C): 36.4 (21 @ 05:05), Max: 36.6 (21 @ 14:19)  T(F): 97.5 (21 @ 05:05), Max: 97.9 (21 @ 14:19)  HR: 82 (21 @ 05:05) (80 - 88)  BP: 112/67 (21 @ 05:05) (112/67 - 120/63)  RR: 18 (21 @ 05:05) (18 - 18)  SpO2: 94% (21 @ 05:05) (94% - 94%)                   Daily     Daily Weight in k.6 (08 Aug 2021 07:15)        CAPILLARY BLOOD GLUCOSE      POCT Blood Glucose.: 173 mg/dL (08 Aug 2021 11:53)  POCT Blood Glucose.: 175 mg/dL (08 Aug 2021 07:59)  POCT Blood Glucose.: 126 mg/dL (07 Aug 2021 21:44)  POCT Blood Glucose.: 172 mg/dL (07 Aug 2021 16:16)                      PHYSICAL EXAM  s   No chest pain  + SOB"  Neurology: alert and oriented x 3, moves all extremities with no defecits  CV :  RRR  Lungs:   CTA B/L  Abdomen: soft, nontender, nondistended, positive bowel sounds,   Extremities:     plus one pedal edema

## 2021-08-09 NOTE — PROGRESS NOTE ADULT - ASSESSMENT
87 year-old woman with known history of colon ca (remote), now with lung mass that is concerning for neoplasm, also with atrial fibrillation, previously on AC, recently stopped due to anemia, now POD 12 status post TAVR.  Procedure was well tolerated, but now grossly volume overloaded with dyspnea at rest.  RHC consistent with volume overload, HFpEF.  Continue loop diuretics. Keep O > I, K > 4.0, Mag > 2.0.    ASA, statin, and beta-blocker as tolerated.  Avoid nephrotoxic agents.    Consider CVVH for uremia and volume overload.  Will check ABG first.    Discussed with patient's son, Reji, by phone.  Case discussed with Alejandro Johns MD and Joyce Key MD.    Strongly encourage daily activity as tolerated. PT as often as feasible.

## 2021-08-09 NOTE — PROGRESS NOTE ADULT - PROBLEM SELECTOR PLAN 1
hypervolemic  RHC filling pressures and pul wedge elevated  start bumex drip 8/5  given bumex IV and metolazone 8/5  stopped  urena 8/5  now improving sodium with diuresis hypervolemic  RHC filling pressures and pul wedge elevated  start bumex drip 8/5  given bumex IV and metolazone 8/5  stopped  urena 8/5  now improving sodium with diuresis. BUN elevated likely from poor urea clearance. Not on steroids. Does not appear uremic at this time. Poor candidate for long term dialysis  metabolic alkalosis- HCO3 41 likely metabolic alkalosis from diuresis. Check VBG. Continue acetazolamide 500 bid. f/u BMP

## 2021-08-09 NOTE — PROGRESS NOTE ADULT - PROBLEM SELECTOR PLAN 5
possible high adh component as well from pulmonary lesions  pulmonary follow up possible high adh component as well from pulmonary lesions  pulmonary follow up    Hypercalcemia- check PTHrp, iPTH, 25-OH vitamin D, 1,25 vitamin D, SPEP, serum MESHA, serum free light chains

## 2021-08-09 NOTE — PROGRESS NOTE ADULT - ATTENDING COMMENTS
#hypervolemia/elevated RHF pressures  -started on bumex drip, continue  added diamox, continue     cont diuresis  #gabrielle-bun/cr improving/stable; cont to monitor trend  #hyponatremia- hypervolemic- improving with diuresis  #met alkalosis- confirm with ABG

## 2021-08-09 NOTE — PROGRESS NOTE ADULT - PROBLEM SELECTOR PLAN 1
continues to be dyspneic, RHC with elevated filling pressures  -recommend aggressive diuretic regimen: metolazone 5mg again today and continue bumex drip at 2/hr  -I/O, daily weights

## 2021-08-09 NOTE — PROGRESS NOTE ADULT - PROBLEM SELECTOR PLAN 1
Will increase Lantus to 20u at bedtime.  Will increase Admelog to 8u before each meal and continue Admelog correction scale coverage. Will continue monitoring FS and FU.  Patient on several oral meds at home including high-dose sulfonylurea and Metformin, she will require adjustments to her DM regimen given older age and JACQUE.   Can likely DC on Tradjenta 5mg daily, discontinue prior hypoglycemic agents, FU endo 4 weeks.  Patient counseled for compliance with consistent low carb diet and exercise as tolerated outpatient.

## 2021-08-09 NOTE — PROGRESS NOTE ADULT - SUBJECTIVE AND OBJECTIVE BOX
Chief complaint  Patient is a 87y old  Female who presents with a chief complaint of urgent HD (09 Aug 2021 07:40)   Review of systems  Patient in bed, looks comfortable, no hypoglycemic episodes.    Labs and Fingersticks  CAPILLARY BLOOD GLUCOSE      POCT Blood Glucose.: 250 mg/dL (09 Aug 2021 11:22)  POCT Blood Glucose.: 204 mg/dL (09 Aug 2021 07:41)  POCT Blood Glucose.: 205 mg/dL (08 Aug 2021 21:26)  POCT Blood Glucose.: 121 mg/dL (08 Aug 2021 16:35)      Anion Gap, Serum: 15 (08-09 @ 06:07)  Anion Gap, Serum: 19 *H* (08-08 @ 09:43)      Calcium, Total Serum: 10.6 *H* (08-09 @ 06:07)  Calcium, Total Serum: 10.7 *H* (08-08 @ 09:43)          08-09    141  |  85<L>  |  98<H>  ----------------------------<  171<H>  3.0<L>   |  41<H>  |  1.23    Ca    10.6<H>      09 Aug 2021 06:07                          8.5    13.86 )-----------( 431      ( 09 Aug 2021 06:07 )             29.0     Medications  MEDICATIONS  (STANDING):  acetaZOLAMIDE    Tablet 500 milliGRAM(s) Oral two times a day  aspirin  chewable 81 milliGRAM(s) Oral daily  atorvastatin 40 milliGRAM(s) Oral at bedtime  budesonide  80 MICROgram(s)/formoterol 4.5 MICROgram(s) Inhaler 2 Puff(s) Inhalation two times a day  buMETAnide Infusion 2 mG/Hr (10 mL/Hr) IV Continuous <Continuous>  ciprofloxacin     Tablet 250 milliGRAM(s) Oral daily  dextrose 40% Gel 15 Gram(s) Oral once  dextrose 50% Injectable 25 Gram(s) IV Push once  dextrose 50% Injectable 12.5 Gram(s) IV Push once  dextrose 50% Injectable 25 Gram(s) IV Push once  ferrous    sulfate 325 milliGRAM(s) Oral daily  glucagon  Injectable 1 milliGRAM(s) IntraMuscular once  heparin   Injectable 5000 Unit(s) SubCutaneous every 8 hours  insulin glargine Injectable (LANTUS) 20 Unit(s) SubCutaneous at bedtime  insulin lispro (ADMELOG) corrective regimen sliding scale   SubCutaneous three times a day before meals  insulin lispro Injectable (ADMELOG) 8 Unit(s) SubCutaneous three times a day before meals  metoprolol succinate ER 12.5 milliGRAM(s) Oral daily  polyethylene glycol 3350 17 Gram(s) Oral daily  spironolactone 25 milliGRAM(s) Oral daily  tiotropium 18 MICROgram(s) Capsule 1 Capsule(s) Inhalation daily      Physical Exam  General: Patient comfortable in bed  Vital Signs Last 12 Hrs  T(F): 97.5 (08-09-21 @ 12:44), Max: 97.5 (08-09-21 @ 05:00)  HR: 84 (08-09-21 @ 13:37) (79 - 84)  BP: 103/66 (08-09-21 @ 13:37) (100/61 - 103/66)  BP(mean): --  RR: 18 (08-09-21 @ 12:44) (18 - 18)  SpO2: 96% (08-09-21 @ 13:37) (93% - 96%)  Neck: No palpable thyroid nodules.  CVS: S1S2, No murmurs  Respiratory: No wheezing, no crepitations  GI: Abdomen soft, bowel sounds positive  Musculoskeletal:  edema lower extremities.   Skin: No skin rashes, no ecchymosis    Diagnostics    Free Thyroxine, Serum: AM Sched. Collection: 09-Aug-2021 06:00 (08-08 @ 12:19)  Thyroid Stimulating Hormone, Serum: AM Sched. Collection: 09-Aug-2021 06:00 (08-08 @ 12:19)           Chief complaint  Patient is a 87y old  Female who presents with a chief complaint of urgent HD (09 Aug 2021 07:40)   Review of systems  Patient in bed, looks comfortable, no hypoglycemic episodes.    Labs and Fingersticks  CAPILLARY BLOOD GLUCOSE    POCT Blood Glucose.: 250 mg/dL (09 Aug 2021 11:22)  POCT Blood Glucose.: 204 mg/dL (09 Aug 2021 07:41)  POCT Blood Glucose.: 205 mg/dL (08 Aug 2021 21:26)  POCT Blood Glucose.: 121 mg/dL (08 Aug 2021 16:35)      Anion Gap, Serum: 15 (08-09 @ 06:07)  Anion Gap, Serum: 19 *H* (08-08 @ 09:43)      Calcium, Total Serum: 10.6 *H* (08-09 @ 06:07)  Calcium, Total Serum: 10.7 *H* (08-08 @ 09:43)          08-09    141  |  85<L>  |  98<H>  ----------------------------<  171<H>  3.0<L>   |  41<H>  |  1.23    Ca    10.6<H>      09 Aug 2021 06:07                          8.5    13.86 )-----------( 431      ( 09 Aug 2021 06:07 )             29.0     Medications  MEDICATIONS  (STANDING):  acetaZOLAMIDE    Tablet 500 milliGRAM(s) Oral two times a day  aspirin  chewable 81 milliGRAM(s) Oral daily  atorvastatin 40 milliGRAM(s) Oral at bedtime  budesonide  80 MICROgram(s)/formoterol 4.5 MICROgram(s) Inhaler 2 Puff(s) Inhalation two times a day  buMETAnide Infusion 2 mG/Hr (10 mL/Hr) IV Continuous <Continuous>  ciprofloxacin     Tablet 250 milliGRAM(s) Oral daily  dextrose 40% Gel 15 Gram(s) Oral once  dextrose 50% Injectable 25 Gram(s) IV Push once  dextrose 50% Injectable 12.5 Gram(s) IV Push once  dextrose 50% Injectable 25 Gram(s) IV Push once  ferrous    sulfate 325 milliGRAM(s) Oral daily  glucagon  Injectable 1 milliGRAM(s) IntraMuscular once  heparin   Injectable 5000 Unit(s) SubCutaneous every 8 hours  insulin glargine Injectable (LANTUS) 20 Unit(s) SubCutaneous at bedtime  insulin lispro (ADMELOG) corrective regimen sliding scale   SubCutaneous three times a day before meals  insulin lispro Injectable (ADMELOG) 8 Unit(s) SubCutaneous three times a day before meals  metoprolol succinate ER 12.5 milliGRAM(s) Oral daily  polyethylene glycol 3350 17 Gram(s) Oral daily  spironolactone 25 milliGRAM(s) Oral daily  tiotropium 18 MICROgram(s) Capsule 1 Capsule(s) Inhalation daily      Physical Exam  General: Patient comfortable in bed  Vital Signs Last 12 Hrs  T(F): 97.5 (08-09-21 @ 12:44), Max: 97.5 (08-09-21 @ 05:00)  HR: 84 (08-09-21 @ 13:37) (79 - 84)  BP: 103/66 (08-09-21 @ 13:37) (100/61 - 103/66)  BP(mean): --  RR: 18 (08-09-21 @ 12:44) (18 - 18)  SpO2: 96% (08-09-21 @ 13:37) (93% - 96%)  Neck: No palpable thyroid nodules.  CVS: S1S2, No murmurs  Respiratory: No wheezing, no crepitations  GI: Abdomen soft, bowel sounds positive  Musculoskeletal:  edema lower extremities.   Skin: No skin rashes, no ecchymosis    Diagnostics    Free Thyroxine, Serum: AM Sched. Collection: 09-Aug-2021 06:00 (08-08 @ 12:19)  Thyroid Stimulating Hormone, Serum: AM Sched. Collection: 09-Aug-2021 06:00 (08-08 @ 12:19)

## 2021-08-09 NOTE — PROGRESS NOTE ADULT - ASSESSMENT
87F PMH CAD w/stents, DM2, Afib (not on AC), Colon Ca (about 25y ago), severe AS, HF , recently started on Entresto, admitted for JACQUE and Metformin-associated lactic acidosis, admitted to MICU s/p urgent HD session but now JACQUE resolving and now undergoing TAVR workup.    7/22 Underwent TAVR via right femoral #22 Heike  recovered in CRS post op course unremarkable Junctional rhythm  by EKG  700 cc fluid given for hypotension. Stabilize transferred to [address] [place] [address]- received in afib-  groin sites benign + DP son at bedside ECHO in am EKG in am  Likely discharge on Saturday with [facility] son reports haven given rehab  choices to case management.  7/23 pending post TAVR echo. Anticipate discharge to rehab Monday.  7/24 VSS maintaining Afib 70's . No further junctional episodes of rhythm.   7/25 Transthoracic Echocardiogram demonstrates Minimal aortic regurgitation-Tethered tricuspid valve, severe tricuspid regurgitation . No evidence of pericardial effusion.   7/26 VSS - pt c/o slight SOB - echo shows small new pericardial effusion.  CXR-done- crackles bases - on IV bumex tid - diuresing - O2 N/c in place -rounds made w/ Dr. Pruitt, Dr. Johns & structural heart team - new SOB - will get LE dopplers to r/o dvt - start heaprin gtt, change to bumex gtt 0.5mg /hr- give zaroxlyn 10mg iv x1 , WBC 16 from 8 - ck u/a c&s.  transfer to SDU for closer monitoring.    7/27 OOB to chair  bumex gtt   heparin gtt  neg  800 cc   24 hrs   chest xray   bl base pl effusion  7/28 VSS more tachypnic today.  CXR + atelectasis.   Na 127 Dr aRmos called for input.  -658cc/24 hrs  7/29 VSS sob improved.  -236cc/24hrs.  Na 127 received 1 dose samsca 15x1.  K 4.6 .  Hep gtt dc'd  7/30 VSS;  bumex changed to 4 mg po bid and aldactone initiated as per CHF team; change toprol 12.5 qd; supplement hypokalemia; no anticoagulation for chronic afib secondary to hx GI bleed as per Dr. Pruitt  7/31 (+) tachypnea and dyspnea --> Per CHF Dr. Kee will call Pulmonary for consults (Dr. Ponce).  Bumex  4 mg IV x 1 this evening and them decreased Bumex 4 mg PO daily to start in AM.  Continue with current medication regimen. Supplement Potassium to maintain K+ level > 4.5     8/1   OOB to chair,     na  124,  FR   covid ordered   Pulm  renal and HF following  8/2       OOB    w/ asst    diuretics   bumex 4mg qd  and aldactone 12.5,  creat stable  na  improved to 126  8/3 Repeat echo today and plan for right heart cath tomorrow.  Persistent hyponatremia 123 consider samsca 15 today.  Symbicort 160 2 bid, spiriva 1 q day, for asthma.  Pulmonary consult suggest CTNA and eventual PET scan to investigate Metastatic bronchoalveolar cancer.  Given her age, comorbidities and recent acute illness, will not pursue diagnostic workup at this time. 8/4 Right heart cath today  8/5 RHC filling pressures and pulmonary wedge elevated. bumex drip initiated with metolazone. Discontinued urena. Patient is being assessed for possible inotropic assisted diuresis or ultrafiltration.  8/6 VSS: continue bumex gttp; potassium supplemented for hypokalemia; endo consulted for uncontrolled dm2- aic 6.7- bs 324 - additional admelog given   8/7 VSS; diamox added today; bumex gttp continued for CHF; bs improved today   8/8   bumex gtt   neg 2liters    OOb to chair   87F PMH CAD w/stents, DM2, Afib (not on AC), Colon Ca (about 25y ago), severe AS, HF , recently started on Entresto, admitted for JACQUE and Metformin-associated lactic acidosis, admitted to MICU s/p urgent HD session but now JACQUE resolving and now undergoing TAVR workup.    7/22 Underwent TAVR via right femoral #22 Heike  recovered in CRS post op course unremarkable Junctional rhythm  by EKG  700 cc fluid given for hypotension. Stabilize transferred to [address] [place] [address]- received in afib-  groin sites benign + DP son at bedside ECHO in am EKG in am  Likely discharge on Saturday with [facility] son reports haven given rehab  choices to case management.  7/23 pending post TAVR echo. Anticipate discharge to rehab Monday.  7/24 VSS maintaining Afib 70's . No further junctional episodes of rhythm.   7/25 Transthoracic Echocardiogram demonstrates Minimal aortic regurgitation-Tethered tricuspid valve, severe tricuspid regurgitation . No evidence of pericardial effusion.   7/26 VSS - pt c/o slight SOB - echo shows small new pericardial effusion.  CXR-done- crackles bases - on IV bumex tid - diuresing - O2 N/c in place -rounds made w/ Dr. Pruitt, Dr. Johns & structural heart team - new SOB - will get LE dopplers to r/o dvt - start heaprin gtt, change to bumex gtt 0.5mg /hr- give zaroxlyn 10mg iv x1 , WBC 16 from 8 - ck u/a c&s.  transfer to SDU for closer monitoring.    7/27 OOB to chair  bumex gtt   heparin gtt  neg  800 cc   24 hrs   chest xray   bl base pl effusion  7/28 VSS more tachypnic today.  CXR + atelectasis.   Na 127 Dr Ramos called for input.  -658cc/24 hrs  7/29 VSS sob improved.  -236cc/24hrs.  Na 127 received 1 dose samsca 15x1.  K 4.6 .  Hep gtt dc'd  7/30 VSS;  bumex changed to 4 mg po bid and aldactone initiated as per CHF team; change toprol 12.5 qd; supplement hypokalemia; no anticoagulation for chronic afib secondary to hx GI bleed as per Dr. Pruitt  7/31 (+) tachypnea and dyspnea --> Per CHF Dr. Kee will call Pulmonary for consults (Dr. Ponce).  Bumex  4 mg IV x 1 this evening and them decreased Bumex 4 mg PO daily to start in AM.  Continue with current medication regimen. Supplement Potassium to maintain K+ level > 4.5     8/1   OOB to chair,     na  124,  FR   covid ordered   Pulm  renal and HF following  8/2       OOB    w/ asst    diuretics   bumex 4mg qd  and aldactone 12.5,  creat stable  na  improved to 126  8/3 Repeat echo today and plan for right heart cath tomorrow.  Persistent hyponatremia 123 consider samsca 15 today.  Symbicort 160 2 bid, spiriva 1 q day, for asthma.  Pulmonary consult suggest CTNA and eventual PET scan to investigate Metastatic bronchoalveolar cancer.  Given her age, comorbidities and recent acute illness, will not pursue diagnostic workup at this time. 8/4 Right heart cath today  8/5 RHC filling pressures and pulmonary wedge elevated. bumex drip initiated with metolazone. Discontinued urena. Patient is being assessed for possible inotropic assisted diuresis or ultrafiltration.  8/6 VSS: continue bumex gttp; potassium supplemented for hypokalemia; endo consulted for uncontrolled dm2- aic 6.7- bs 324 - additional admelog given   8/7 VSS; diamox added today; bumex gttp continued for CHF; bs improved today   8/8   bumex gtt   neg 2liters    OOb to chair  8/9   generalized weakness,   PT unable to work w/ patient secondary to c/o weakness    neg 1200  cc on bumex gtt.

## 2021-08-09 NOTE — PROGRESS NOTE ADULT - SUBJECTIVE AND OBJECTIVE BOX
CHIEF COMPLAINT: f/up sob, fluid OL-PH WHO class 2, CHF, abnormal CT-c/w CA (RML)-mild sob and minimal cough    Interval Events: struct heart, nephrology and CHF group-s/p RHC    REVIEW OF SYSTEMS:  Constitutional: No fevers or chills. No weight loss. No fatigue or generalized malaise.  Eyes: No itching or discharge from the eyes  ENT: No ear pain. No ear discharge. No nasal congestion. No post nasal drip. No epistaxis. No throat pain. No sore throat. No difficulty swallowing.   CV: No chest pain. No palpitations. No lightheadedness or dizziness.   Resp: No dyspnea at rest. No dyspnea on exertion. No orthopnea. No wheezing. No cough. No stridor. No sputum production. No chest pain with respiration.  GI: No nausea. No vomiting. No diarrhea.  MSK: No joint pain or pain in any extremities  Integumentary: No skin lesions. No pedal edema.  Neurological: No gross motor weakness. No sensory changes.  [ ] All other systems negative  [ ] Unable to assess ROS because ________    OBJECTIVE:  ICU Vital Signs Last 24 Hrs  T(C): 36.4 (09 Aug 2021 05:00), Max: 36.4 (08 Aug 2021 20:00)  T(F): 97.5 (09 Aug 2021 05:00), Max: 97.5 (08 Aug 2021 20:00)  HR: 79 (09 Aug 2021 05:00) (79 - 80)  BP: 100/61 (09 Aug 2021 05:00) (100/61 - 110/64)  BP(mean): --  ABP: --  ABP(mean): --  RR: 18 (09 Aug 2021 05:00) (18 - 18)  SpO2: 93% (09 Aug 2021 05:00) (91% - 94%)        08-07 @ 07:01  -  08-08 @ 07:00  --------------------------------------------------------  IN: 370 mL / OUT: 2650 mL / NET: -2280 mL    08-08 @ 07:01  -  08-09 @ 05:38  --------------------------------------------------------  IN: 540 mL / OUT: 1725 mL / NET: -1185 mL      CAPILLARY BLOOD GLUCOSE      POCT Blood Glucose.: 205 mg/dL (08 Aug 2021 21:26)      PHYSICAL EXAM:  General: Awake, alert, oriented X 3.   HEENT: Atraumatic, normocephalic.                 Mallampatti Grade                 No nasal congestion.                No tonsillar or pharyngeal exudates.  Lymph Nodes: No palpable lymphadenopathy  Neck: No JVD. No carotid bruit.   Respiratory: Normal chest expansion                         Normal percussion                         Normal and equal air entry                         No wheeze, rhonchi or rales.  Cardiovascular: S1 S2 normal. No murmurs, rubs or gallops.   Abdomen: Soft, non-tender, non-distended. No organomegaly. Normoactive bowel sounds.  Extremities: Warm to touch. Peripheral pulse palpable. No pedal edema.   Skin: No rashes or skin lesions  Neurological: Motor and sensory examination equal and normal in all four extremities.  Psychiatry: Appropriate mood and affect.    HOSPITAL MEDICATIONS:  MEDICATIONS  (STANDING):  acetaZOLAMIDE    Tablet 500 milliGRAM(s) Oral two times a day  aspirin  chewable 81 milliGRAM(s) Oral daily  atorvastatin 40 milliGRAM(s) Oral at bedtime  budesonide  80 MICROgram(s)/formoterol 4.5 MICROgram(s) Inhaler 2 Puff(s) Inhalation two times a day  buMETAnide Infusion 2 mG/Hr (10 mL/Hr) IV Continuous <Continuous>  ciprofloxacin     Tablet 250 milliGRAM(s) Oral daily  dextrose 40% Gel 15 Gram(s) Oral once  dextrose 50% Injectable 25 Gram(s) IV Push once  dextrose 50% Injectable 12.5 Gram(s) IV Push once  dextrose 50% Injectable 25 Gram(s) IV Push once  ferrous    sulfate 325 milliGRAM(s) Oral daily  glucagon  Injectable 1 milliGRAM(s) IntraMuscular once  heparin   Injectable 5000 Unit(s) SubCutaneous every 8 hours  insulin glargine Injectable (LANTUS) 15 Unit(s) SubCutaneous at bedtime  insulin lispro (ADMELOG) corrective regimen sliding scale   SubCutaneous three times a day before meals  insulin lispro Injectable (ADMELOG) 7 Unit(s) SubCutaneous three times a day before meals  metoprolol succinate ER 12.5 milliGRAM(s) Oral daily  polyethylene glycol 3350 17 Gram(s) Oral daily  spironolactone 25 milliGRAM(s) Oral daily  tiotropium 18 MICROgram(s) Capsule 1 Capsule(s) Inhalation daily    MEDICATIONS  (PRN):  artificial  tears Solution 1 Drop(s) Both EYES every 12 hours PRN Dry Eyes      LABS:                        8.5    13.28 )-----------( 414      ( 08 Aug 2021 09:43 )             28.3     08-08    x   |  x   |  x   ----------------------------<  x   3.9   |  x   |  x     Ca    10.7<H>      08 Aug 2021 09:43  Mg     2.4     08-07    TPro  7.5  /  Alb  3.1<L>  /  TBili  1.0  /  DBili  x   /  AST  21  /  ALT  11  /  AlkPhos  280<H>  08-07              MICROBIOLOGY:     RADIOLOGY:  [ ] Reviewed and interpreted by me    Point of Care Ultrasound Findings:    PFT:    EKG: CHIEF COMPLAINT: f/up sob, fluid OL-PH WHO class 2, CHF, abnormal CT-c/w CA (RML)-some sob on exertion but not really exerting, poor appetite    Interval Events: struct heart, nephrology and CHF group-s/p RHC-bumex drip in place.    REVIEW OF SYSTEMS:  Constitutional: No fevers or chills. No weight loss. + fatigue or generalized malaise.  Eyes: No itching or discharge from the eyes  ENT: No ear pain. No ear discharge. No nasal congestion. No post nasal drip. No epistaxis. No throat pain. No sore throat. No difficulty swallowing.   CV: No chest pain. No palpitations. No lightheadedness or dizziness.   Resp: No dyspnea at rest. + dyspnea on exertion. No orthopnea. No wheezing. No cough. No stridor. No sputum production. No chest pain with respiration.  GI: No nausea. No vomiting. No diarrhea.  MSK: No joint pain or pain in any extremities  Integumentary: No skin lesions. + pedal edema.  Neurological: No gross motor weakness. No sensory changes.  [+ ] All other systems negative  [ ] Unable to assess ROS because ________    OBJECTIVE:  ICU Vital Signs Last 24 Hrs  T(C): 36.4 (09 Aug 2021 05:00), Max: 36.4 (08 Aug 2021 20:00)  T(F): 97.5 (09 Aug 2021 05:00), Max: 97.5 (08 Aug 2021 20:00)  HR: 79 (09 Aug 2021 05:00) (79 - 80)  BP: 100/61 (09 Aug 2021 05:00) (100/61 - 110/64)  BP(mean): --  ABP: --  ABP(mean): --  RR: 18 (09 Aug 2021 05:00) (18 - 18)  SpO2: 93% (09 Aug 2021 05:00) (91% - 94%)        08-07 @ 07:01  -  08-08 @ 07:00  --------------------------------------------------------  IN: 370 mL / OUT: 2650 mL / NET: -2280 mL    08-08 @ 07:01  -  08-09 @ 05:38  --------------------------------------------------------  IN: 540 mL / OUT: 1725 mL / NET: -1185 mL      CAPILLARY BLOOD GLUCOSE      POCT Blood Glucose.: 205 mg/dL (08 Aug 2021 21:26)      PHYSICAL EXAM: NAD in bed on NC O2  General: Awake, alert, oriented X 3.   HEENT: Atraumatic, normocephalic.                 Mallampatti Grade 3                No nasal congestion.                No tonsillar or pharyngeal exudates.  Lymph Nodes: No palpable lymphadenopathy  Neck: No JVD. No carotid bruit.   Respiratory: Normal chest expansion                         Normal percussion                         Normal and equal air entry                         No wheeze, rhonchi but bibasilar rales.  Cardiovascular: S1 S2 normal. No murmurs, rubs or gallops.   Abdomen: Soft, non-tender, non-distended. No organomegaly. Normoactive bowel sounds.  Extremities: Warm to touch. Peripheral pulse palpable. trace pedal edema.   Skin: No rashes or skin lesions  Neurological: Motor and sensory examination equal and normal in all four extremities.  Psychiatry: Appropriate mood and affect.    HOSPITAL MEDICATIONS:  MEDICATIONS  (STANDING):  acetaZOLAMIDE    Tablet 500 milliGRAM(s) Oral two times a day  aspirin  chewable 81 milliGRAM(s) Oral daily  atorvastatin 40 milliGRAM(s) Oral at bedtime  budesonide  80 MICROgram(s)/formoterol 4.5 MICROgram(s) Inhaler 2 Puff(s) Inhalation two times a day  buMETAnide Infusion 2 mG/Hr (10 mL/Hr) IV Continuous <Continuous>  ciprofloxacin     Tablet 250 milliGRAM(s) Oral daily  dextrose 40% Gel 15 Gram(s) Oral once  dextrose 50% Injectable 25 Gram(s) IV Push once  dextrose 50% Injectable 12.5 Gram(s) IV Push once  dextrose 50% Injectable 25 Gram(s) IV Push once  ferrous    sulfate 325 milliGRAM(s) Oral daily  glucagon  Injectable 1 milliGRAM(s) IntraMuscular once  heparin   Injectable 5000 Unit(s) SubCutaneous every 8 hours  insulin glargine Injectable (LANTUS) 15 Unit(s) SubCutaneous at bedtime  insulin lispro (ADMELOG) corrective regimen sliding scale   SubCutaneous three times a day before meals  insulin lispro Injectable (ADMELOG) 7 Unit(s) SubCutaneous three times a day before meals  metoprolol succinate ER 12.5 milliGRAM(s) Oral daily  polyethylene glycol 3350 17 Gram(s) Oral daily  spironolactone 25 milliGRAM(s) Oral daily  tiotropium 18 MICROgram(s) Capsule 1 Capsule(s) Inhalation daily    MEDICATIONS  (PRN):  artificial  tears Solution 1 Drop(s) Both EYES every 12 hours PRN Dry Eyes      LABS:                        8.5    13.28 )-----------( 414      ( 08 Aug 2021 09:43 )             28.3     08-08    x   |  x   |  x   ----------------------------<  x   3.9   |  x   |  x     Ca    10.7<H>      08 Aug 2021 09:43  Mg     2.4     08-07    TPro  7.5  /  Alb  3.1<L>  /  TBili  1.0  /  DBili  x   /  AST  21  /  ALT  11  /  AlkPhos  280<H>  08-07              MICROBIOLOGY:     RADIOLOGY:  [ ] Reviewed and interpreted by me    Point of Care Ultrasound Findings:    PFT:    EKG:

## 2021-08-09 NOTE — PROGRESS NOTE ADULT - SUBJECTIVE AND OBJECTIVE BOX
[facility] DIVISION OF KIDNEY DISEASES AND HYPERTENSION -- FOLLOW UP NOTE  --------------------------------------------------------------------------------  Chief Complaint:/subjective: pt stated she wants to get up and start trying to walk, states sob better, edema less    24 hour events:as above        PAST HISTORY  --------------------------------------------------------------------------------  No significant changes to PMH, PSH, FHx, SHx, unless otherwise noted    ALLERGIES & MEDICATIONS  --------------------------------------------------------------------------------  Allergies    No Known Allergies    Intolerances      Standing Inpatient Medications  acetaZOLAMIDE    Tablet 500 milliGRAM(s) Oral two times a day  aspirin  chewable 81 milliGRAM(s) Oral daily  atorvastatin 40 milliGRAM(s) Oral at bedtime  budesonide  80 MICROgram(s)/formoterol 4.5 MICROgram(s) Inhaler 2 Puff(s) Inhalation two times a day  buMETAnide Infusion 2 mG/Hr IV Continuous <Continuous>  ciprofloxacin     Tablet 250 milliGRAM(s) Oral daily  dextrose 40% Gel 15 Gram(s) Oral once  dextrose 50% Injectable 25 Gram(s) IV Push once  dextrose 50% Injectable 12.5 Gram(s) IV Push once  dextrose 50% Injectable 25 Gram(s) IV Push once  ferrous    sulfate 325 milliGRAM(s) Oral daily  glucagon  Injectable 1 milliGRAM(s) IntraMuscular once  heparin   Injectable 5000 Unit(s) SubCutaneous every 8 hours  insulin glargine Injectable (LANTUS) 20 Unit(s) SubCutaneous at bedtime  insulin lispro (ADMELOG) corrective regimen sliding scale   SubCutaneous three times a day before meals  insulin lispro Injectable (ADMELOG) 8 Unit(s) SubCutaneous three times a day before meals  metoprolol succinate ER 12.5 milliGRAM(s) Oral daily  polyethylene glycol 3350 17 Gram(s) Oral daily  spironolactone 25 milliGRAM(s) Oral daily  tiotropium 18 MICROgram(s) Capsule 1 Capsule(s) Inhalation daily    PRN Inpatient Medications  artificial  tears Solution 1 Drop(s) Both EYES every 12 hours PRN      REVIEW OF SYSTEMS  --------------------------------------------------------------------------------  Gen: No weight changes, fatigue, fevers/chills, weakness  Skin: No rashes  Head/Eyes/Ears/Mouth: No headache;   Respiratory: No dyspnea, cough  CV: No chest pain, PND, orthopnea  GI: No abdominal pain, diarrhea, constipation, nausea, vomiting  : No increased frequency, dysuria, hematuria, nocturia  MSK: No joint pain/swelling; no back pain; no edema  Neuro: No dizziness/lightheadedness, weakness  Heme: No easy bruising or bleeding  Psych: No significant nervousness, anxiety, stress, depression    All other systems were reviewed and are negative, except as noted.    VITALS/PHYSICAL EXAM  --------------------------------------------------------------------------------  T(C): 36.4 (08-09-21 @ 12:44), Max: 36.4 (08-08-21 @ 20:00)  HR: 84 (08-09-21 @ 13:37) (79 - 84)  BP: 103/66 (08-09-21 @ 13:37) (100/61 - 103/66)  RR: 18 (08-09-21 @ 12:44) (18 - 18)  SpO2: 96% (08-09-21 @ 13:37) (91% - 96%)  Wt(kg): --  Adult Advanced Hemodynamics Last 24 Hrs  ABP: --  ABP(mean): --  CVP(mm Hg): --  CO: --  CI: --  PA: --  PA(mean): --  PCWP: --  SVR: --  SVRI: --        08-08-21 @ 07:01  -  08-09-21 @ 07:00  --------------------------------------------------------  IN: 780 mL / OUT: 1725 mL / NET: -945 mL    08-09-21 @ 07:01  -  08-09-21 @ 14:05  --------------------------------------------------------  IN: 360 mL / OUT: 0 mL / NET: 360 mL      Physical Exam:  	Gen: NAD,    	HEENT: , no jvp  	Pulm: CTA B/L  	CV: RRR, S1S2; no rub  	Back: no sacral edema  	Abd: +BS, soft,   	: No suprapubic tenderness  	Ext: no edema  	Neuro: awake  	Psych: alert  	Skin: Warm,  	Vascular access:    LABS/STUDIES  --------------------------------------------------------------------------------              8.5    13.86 >-----------<  431      [08-09-21 @ 06:07]              29.0     Hemoglobin: 8.5 g/dL (08-09-21 @ 06:07)  Hemoglobin: 8.5 g/dL (08-08-21 @ 09:43)    Platelet Count - Automated: 431 K/uL (08-09-21 @ 06:07)  Platelet Count - Automated: 414 K/uL (08-08-21 @ 09:43)    141  |  85  |  98  ----------------------------<  171      [08-09-21 @ 06:07]  3.0   |  41  |  1.23        Ca     10.6     [08-09-21 @ 06:07]            Creatinine Trend:  SCr 1.23 [08-09 @ 06:07]  SCr 1.18 [08-08 @ 09:43]  SCr 1.05 [08-07 @ 05:55]  SCr 1.00 [08-06 @ 05:19]  SCr 1.24 [08-05 @ 10:11]    Urinalysis - [07-26-21 @ 12:20]      Color Yellow / Appearance Clear / SG 1.014 / pH 5.5      Gluc 200 mg/dL / Ketone Negative  / Bili Negative / Urobili Negative       Blood Negative / Protein Negative / Leuk Est Large / Nitrite Negative      RBC 3 / WBC 19 / Hyaline 0 / Gran  / Sq Epi  / Non Sq Epi 0 / Bacteria Few    Urine Creatinine 46      [08-03-21 @ 16:36]  Urine Protein 4      [08-04-21 @ 00:45]  Urine Sodium 9      [08-03-21 @ 16:36]  Urine Urea Nitrogen 794      [08-04-21 @ 00:45]  Urine Osmolality 393      [08-03-21 @ 16:36]    TSH 6.37      [07-21-21 @ 14:10]

## 2021-08-09 NOTE — PROGRESS NOTE ADULT - SUBJECTIVE AND OBJECTIVE BOX
HPI:  Patient seen and examined at bedside on 2 [place].  She is more lethargic than her baseline.  BUN remains markedly elevated, but improving since last week.  Ongoing Bumex gtt.    Review Of Systems:           Respiratory: +shortness of breath  Cardiovascular: No chest pain or palpitations  10 point review of systems is otherwise negative except as mentioned above        Medications:  acetaZOLAMIDE    Tablet 500 milliGRAM(s) Oral two times a day  artificial  tears Solution 1 Drop(s) Both EYES every 12 hours PRN  aspirin  chewable 81 milliGRAM(s) Oral daily  atorvastatin 40 milliGRAM(s) Oral at bedtime  budesonide  80 MICROgram(s)/formoterol 4.5 MICROgram(s) Inhaler 2 Puff(s) Inhalation two times a day  buMETAnide Infusion 2 mG/Hr IV Continuous <Continuous>  ciprofloxacin     Tablet 250 milliGRAM(s) Oral daily  dextrose 40% Gel 15 Gram(s) Oral once  dextrose 50% Injectable 25 Gram(s) IV Push once  dextrose 50% Injectable 12.5 Gram(s) IV Push once  dextrose 50% Injectable 25 Gram(s) IV Push once  ferrous    sulfate 325 milliGRAM(s) Oral daily  glucagon  Injectable 1 milliGRAM(s) IntraMuscular once  heparin   Injectable 5000 Unit(s) SubCutaneous every 8 hours  insulin glargine Injectable (LANTUS) 20 Unit(s) SubCutaneous at bedtime  insulin lispro (ADMELOG) corrective regimen sliding scale   SubCutaneous three times a day before meals  insulin lispro Injectable (ADMELOG) 8 Unit(s) SubCutaneous three times a day before meals  metoprolol succinate ER 12.5 milliGRAM(s) Oral daily  polyethylene glycol 3350 17 Gram(s) Oral daily  spironolactone 25 milliGRAM(s) Oral two times a day  tiotropium 18 MICROgram(s) Capsule 1 Capsule(s) Inhalation daily    PAST MEDICAL & SURGICAL HISTORY:    Vitals:  T(C): 36.4 (21 @ 22:57), Max: 36.4 (21 @ 05:00)  HR: 84 (21 @ 22:57) (69 - 84)  BP: 117/54 (21 @ 22:57) (98/59 - 128/52)  BP(mean): 75 (21 @ 22:57) (75 - 78)  RR: 20 (21 @ 22:57) (18 - 20)  SpO2: 98% (21 @ 22:57) (93% - 98%)  Wt(kg): --  Daily     Daily Weight in k.5 (09 Aug 2021 08:40)  I&O's Summary    08 Aug 2021 07:01  -  09 Aug 2021 07:00  --------------------------------------------------------  IN: 780 mL / OUT: 1725 mL / NET: -945 mL    09 Aug 2021 07:01  -  10 Aug 2021 00:14  --------------------------------------------------------  IN: 450 mL / OUT: 300 mL / NET: 150 mL        Physical Exam:  Appearance: Normal, well groomed, NAD  Eyes: PERRLA, EOMI, pink conjunctiva, no scleral icterus   HENT: Normal oral mucosa  Cardiovascular: RRR, S1, S2, NO systolic murmur at base  Procedural Access Site: Clean, dry, intact, without hematoma  Respiratory: Clear to auscultation bilaterally  Gastrointestinal: Soft, non-tender, non-distended, BS+  Musculoskeletal: No clubbing or joint deformity   Neurologic: No focal weakness  Lymphatic: No lymphadenopathy  Psychiatry: AAOx3 with appropriate mood and affect  Skin: No rashes, ecchymoses, or cyanosis                          8.5    13.86 )-----------( 431      ( 09 Aug 2021 06:07 )             29.0     08-09    x   |  x   |  x   ----------------------------<  x   4.2   |  x   |  x     Ca    10.6<H>      09 Aug 2021 06:07            Echo:  < from: Limited Transthoracic Echo (21 @ 18:28) >  ------------------------------------------------------------------------  Dimensions:    Normal Values:  LA:     5.8    2.0 - 4.0 cm  Ao:     2.8    2.0 - 3.8 cm  SEPTUM: 0.8    0.6 - 1.2 cm  PWT:    0.8    0.6 - 1.1 cm  LVIDd:  4.3    3.0 - 5.6 cm  LVIDs:  1.9    1.8 - 4.0 cm  Derived variables:  LVMI: 62 g/m2  RWT: 0.37  Fractional short: 56 %  EF (Visual Estimate): >70 %  EF (Teicholtz): 87 %  EF (Lorenzo Rule): 72 %  ------------------------------------------------------------------------  Observations:  Mitral Valve: Mitral annular calcification, otherwise  normal mitral valve. Mild-moderate mitral regurgitation.  Aortic Valve/Aorta: Transcatheter aortic valve replacement.  Peak left ventricular outflow tract gradient equals 3 mm  Hg.  Aortic Root: 2.8 cm.  LVOT diameter: 1.8 cm.  Left Atrium: Severely dilated left atrium.  LA volume index  = 76 cc/m2.  Left Ventricle: Hyperdynamic left ventricular systolic  function. Normal left ventricular internal dimensions and  wall thicknesses. Increased E/e'  is consistent with  elevated left ventricular filling pressure.  Right Heart: Severe right atrial enlargement. Right  ventricular enlargement with decreased right ventricular  systolic function. Tricuspid valve not well visualized.  Moderate-severe tricuspid regurgitation. Pulmonic valve not  well visualized.  Pericardium/Pleura: Moderate circumferential pericardial  effusion measuring 1.3 cm at its greatest dimention  posteriorly adjacent to the LV.   No evidence of right  atrial or right ventricular collapse. No evidence of  tamponade physiology.  Hemodynamic: Estimated right ventricular systolic pressure  equals 74 mm Hg, assuming right atrial pressure [id number] -  15 mm Hg, consistent with severe pulmonary hypertension.  Diastology is suggestive of elevated left atrial pressure.  ------------------------------------------------------------------------  Conclusions:  1. Mitral annular calcification, otherwise normal mitral  valve. Mild-moderate mitral regurgitation.  2. Transcatheter aortic valve replacement.  3. Severely dilated left atrium.  LA volume index = 76  cc/m2.  4. Hyperdynamic left ventricular systolic function.  5. Increased E/e'  is consistent with elevated left  ventricular filling pressure.  6. Right ventricular enlargement with decreased right  ventricular systolic function.  7. Tricuspid valve not well visualized. Moderate-severe  tricuspid regurgitation.  8. Estimated pulmonary artery systolic pressure equals 74  mm Hg, assuming right atrial pressure equals 10 - 15 mm Hg,  consistent with severe pulmonary pressures.  9. Moderate circumferential pericardial effusion measuring  1.3 cm at its greatest dimention posteriorly adjacent to  the LV.   No evidence of right atrial or right ventricular  collapse. No evidence of tamponade physiology.  *** Compared with echocardiogram of 2021, pericardial  effusion is slightly larger.  ------------------------------------------------------------------------  Confirmed on  2021 - 14:08:19 by Maco Castellano M.D.  -----------------------    < end of copied text >        Cath:  < from: Cardiac Cath Lab (21 @ 17:18) >  DIAGNOSTIC IMPRESSIONS: Elevated right atrial pressure (mRA 17mmHg with a V  wave of 19mmHg)  Moderate post-capillary pulmonary hypertension (sPAP 60mmHg, dPAP 25mmHg,  mPAP 37mmHg)  PCWP = 27mmHg with a V wave of 32mmHg  PAsat = 58% // RAsat = 94%  [place] CO // CI = 5.92l/min // 3.50l/min/m2  DIAGNOSTIC RECOMMENDATIONS: Keep right leg straight for 4 hours following  removal of sheath  Continue aggressive medical management  Findings discussed with Dr. Sherman  INTERVENTIONAL IMPRESSIONS: Elevated right atrial pressure (mRA 17mmHg with  a V wave of 19mmHg)  Moderate post-capillary pulmonary hypertension (sPAP 60mmHg, dPAP 25mmHg,  mPAP 37mmHg)  PCWP = 27mmHg with a V wave of 32mmHg  PAsat = 58% // RAsat = 94%  [place] CO // CI = 5.92l/min // 3.50l/min/m2  INTERVENTIONAL RECOMMENDATIONS: Keep right leg straight for 4 hours  following removal of sheath  Continue aggressive medical management  Findings discussed with Dr. Sherman  Prepared and signed by  Alejandro Johns MD  Signed 2021 20:22:38    < end of copied text >

## 2021-08-09 NOTE — PROGRESS NOTE ADULT - SUBJECTIVE AND OBJECTIVE BOX
VITAL SIGNS    Telemetry:     afib 80    Vital Signs Last 24 Hrs  T(C): 36.4 (21 @ 05:00), Max: 36.4 (21 @ 20:00)  T(F): 97.5 (21 @ 05:00), Max: 97.5 (21 @ 20:00)  HR: 79 (21 @ 05:00) (79 - 80)  BP: 100/61 (21 @ 05:00) (100/61 - 110/64)  RR: 18 (21 @ 05:00) (18 - 18)  SpO2: 93% (21 @ 05:00) (91% - 94%)                   Daily     Daily Weight in k.6 (08 Aug 2021 07:15)        CAPILLARY BLOOD GLUCOSE      POCT Blood Glucose.: 205 mg/dL (08 Aug 2021 21:26)  POCT Blood Glucose.: 121 mg/dL (08 Aug 2021 16:35)  POCT Blood Glucose.: 173 mg/dL (08 Aug 2021 11:53)  POCT Blood Glucose.: 175 mg/dL (08 Aug 2021 07:                     PHYSICAL EXAM  s"  Neurology: alert and oriented x 3, moves all extremities with no defecits  CV :  RRR    Lungs:    Abdomen: soft, nontender, nondistended, positive bowel sounds, last bowel movement   Extremities:                                            VITAL SIGNS    Telemetry:     afib 80    Vital Signs Last 24 Hrs  T(C): 36.4 (21 @ 05:00), Max: 36.4 (21 @ 20:00)  T(F): 97.5 (21 @ 05:00), Max: 97.5 (21 @ 20:00)  HR: 79 (21 @ 05:00) (79 - 80)  BP: 100/61 (21 @ 05:00) (100/61 - 110/64)  RR: 18 (21 @ 05:00) (18 - 18)  SpO2: 93% (21 @ 05:00) (91% - 94%)                   Daily     Daily Weight in k.6 (08 Aug 2021 07:15)        CAPILLARY BLOOD GLUCOSE      POCT Blood Glucose.: 205 mg/dL (08 Aug 2021 21:26)  POCT Blood Glucose.: 121 mg/dL (08 Aug 2021 16:35)  POCT Blood Glucose.: 173 mg/dL (08 Aug 2021 11:53)  POCT Blood Glucose.: 175 mg/dL (08 Aug 2021 07:                     PHYSICAL EXAM  s"  No  sob ,  Feels weak  No chest pain "  Neurology: alert and oriented x 3, moves all extremities with no defecits  CV :  RRR    Lungs:    Abdomen: soft, nontender, nondistended, positive bowel sounds  Extremities:     plus one pedal edema

## 2021-08-09 NOTE — PROGRESS NOTE ADULT - ASSESSMENT
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  She is status post acute dialysis, MICU stay and recent TAVR. Cath results (pre TAVR) this admission note elevated PCWP and elevated PA pressure Mean 37, with evidence of right heart failure, secondary to pulmonary hypertension, which is mostly secondary to left heart disease.  As valve has been repaired this may improve.  Patient denies any acute change in her breathing,  States she has had difficulty for months and that it is related to her heart.  Lung masses may be contributing--DDX is primary lung CA vs less likely metastatic colon CA. vs other. Admits to asthma-years ago.    REC:    Continue diuresis as her heart failure team-consider repeat echo and ? RHC                            SEE BELOW:  asthma-symbicort 160, spiriva, incentive spirometry  abnormal CT-lung mass RML--move to consider CT NEEDLE bx.  Continue to decrease oxygen as tolerated.  Will likely require oxygen at rehab.  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  She is status post acute dialysis, MICU stay and recent TAVR. Cath results (pre TAVR) this admission note elevated PCWP and elevated PA pressure Mean 37, with evidence of right heart failure, secondary to pulmonary hypertension, which is mostly secondary to left heart disease.  As valve has been repaired this may improve.  Patient denies any acute change in her breathing,  States she has had difficulty for months and that it is related to her heart.  Lung masses may be contributing--DDX is primary lung CA vs less likely metastatic colon CA. vs other. Admits to asthma-years ago.    REC:    Continue diuresis as her heart failure team-consider repeat echo and ? RHC                            SEE BELOW:  asthma-symbicort 160, spiriva, incentive spirometry  abnormal CT-lung mass RML--move to consider CT NEEDLE bx.  Continue to decrease oxygen as tolerated.  Will likely require oxygen at rehab.  *******************************  8/2-no significant changes   8/3-struct heart contemplating RHC here  8/4-moving to RHC for additional info to direct care  8/5-RHC-elev pcwp-27 , Mean 35+--c/w left sided PHtn  8/9-bumex drip in place; no signif ambulation-nephrology involved

## 2021-08-09 NOTE — CHART NOTE - NSCHARTNOTEFT_GEN_A_CORE
This is  87F PMH CAD w/stents, DM2, Afib (not on AC), Colon Ca (about 25y ago), severe AS, HF , recently started on Entresto, admitted for JACQUE and Metformin-associated lactic acidosis, admitted to MICU s/p urgent HD session stabilized - proceeded with  TAVR workup.    7/22 Underwent TAVR via right femoral #22 Heike Patient  seen today by nephrology Bumex gtt for diuresis   prolonged hospital course Followed by Pulmonary - Dr Ponce -Heart failure team - House nephrology    8/9  This evening now requiring BIPAP  as per Dr Johns patient to be transferred to  CICU for CVVHD and escalation of care.  Plan d/w patient and family at bedside - of note patient is DNR /DNI .  D/w Dr Carroll Peng C[place]  [id number] This is  87F PMH CAD w/stents, DM2, Afib (not on AC), Colon Ca (about 25y ago), severe AS, HF , recently started on Entresto, admitted for JACQUE and Metformin-associated lactic acidosis, admitted to MICU s/p urgent HD session stabilized - proceeded with  TAVR workup.    7/22 Underwent TAVR via right femoral #22 Heike Patient  seen  by nephrology Bumex gtt for diuresis   prolonged hospital course Followed by Pulmonary - Dr Ponce -Heart failure team - House nephrology  - Structural Heart team  8/9  This evening now requiring BIPAP  as per Dr Johns patient to be transferred to  CICU for CVVHD and escalation of care.  Plan d/w patient and family at bedside - of note patient is DNR /DNI .  D/w Dr Carroll Peng CTS NP  [id number] This is  87F PMH CAD w/stents, DM2, Afib (not on AC), Colon Ca (about 25y ago), severe AS, HF , recently started on Entresto, admitted for JACQUE and Metformin-associated lactic acidosis, admitted to MICU s/p urgent HD session stabilized - proceeded with  TAVR workup.    7/22 Underwent TAVR via right femoral #22 Heike Patient  seen  by nephrology Bumex gtt for diuresis   prolonged hospital course Followed by Pulmonary - Dr Ponce -Heart failure team - House nephrology  - Structural Heart team  8/9  This evening now requiring BIPAP  as per Dr Johns patient to be transferred to  CICU for CVVHD and escalation of care.  Plan d/w patient and family at bedside - of note patient and family endorse DNI.  D/w Dr Carroll Peng CTS NP  [id number]

## 2021-08-09 NOTE — PROGRESS NOTE ADULT - ASSESSMENT
87F PMH CAD w/stents, DM2, Afib (not on AC), Colon Ca (about 25y ago) AS, HF on 80mg lasix qd, initially presented to the ED with abnormal labs done on 7/7 showing JACQUE with hyperk. Admitted for Metformin associated lactic acidosis and JACQUE. Now with worsening hyponatremia s/p RHC with elevated filling pressures.

## 2021-08-09 NOTE — PROGRESS NOTE ADULT - TIME BILLING
as above: no signif changes-s/p RHC c/w WHO class 2  multifactorial dyspnea-CHF/fluid OL-WHO class 2 PAH (cardiac related), asthma (remote), ? RML tumor, debility, anemia--O2 NC sat above 90%  CHF-as per CHF team-Andreas et al; s/p  RHC/echo repeated---WHO class 2--more aggressive rx of left heart Dz; BNP f/up  Renal-HD M/W/F  (hyponatremia)  asthma-symbicort 160 2 bid, spiriva 1 q day, incentive spirometry  abnormal CT c/w CA-? primary lung vs metastatic dz (non smoker)--consider CTNA while her for dx and ? rx (RT etc.), eventual pet/ct  DVT prophylaxis-on A/C  GI-prophylaxis                           PT           respiratory failure--O2 likely upon DC to rehab/home.    Barrington Ponce MD-Pulmonary   [phone] as above: no real changes in clinical status--on BUMEX drip now-s/p RHC c/w WHO class 2  multifactorial dyspnea-CHF/fluid OL-WHO class 2 PAH (cardiac related), asthma (remote), ? RML tumor, debility, anemia--O2 NC sat above 90%  CHF-as per CHF team-Andreas et al; s/p  RHC/echo repeated---WHO class 2--more aggressive rx of left heart Dz; BNP f/up--dumex drip in place  Renal-HD M/W/F  (hyponatremia)--nephrology input critical here  asthma-symbicort 160 2 bid, spiriva 1 q day, incentive spirometry  abnormal CT c/w CA-? primary lung vs metastatic dz (non smoker)--consider CTNA while her for dx and ? rx (RT etc.), eventual pet/ct  DVT prophylaxis-on A/C  GI-prophylaxis                           PT           respiratory failure--O2 likely upon DC to rehab/home.     ***********HOSPICE/palliative evaln to be considered    Barrington Ponce MD-Pulmonary   [phone]

## 2021-08-09 NOTE — PROGRESS NOTE ADULT - ASSESSMENT
Assessment  DMT2: 87y Female with DM T2 with hyperglycemia admitted with JACQUE, A1C 6.7%, was on oral hypoglycemic agents at home, now on basal bolus insulin, increased dose yesterday, blood sugars still running high and not at target, no hypoglycemic episodes, eating meals, appears comfortable.  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  Hypothyroidism: Patient has no history thyroid disease, was not on any thyroid supplements, TSH elevated July 2021, repeat TFTs pending.  JACQUE: labs, chart reviewed.  Overweight: No strict exercise routines, neither on low calorie diet.       Kenton Manley MD  Cell: [phone]  Office: [phone]       Assessment  DMT2: 87y Female with DM T2 with hyperglycemia admitted with JACQUE, A1C 6.7%, was on oral hypoglycemic agents at home, now on basal bolus insulin, increased dose yesterday, blood sugars still running high and not at target,  no hypoglycemic episodes, eating meals, appears comfortable.  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  Hypothyroidism: Patient has no history thyroid disease, was not on any thyroid supplements, TSH elevated July 2021, repeat TFTs pending.  JACQUE: labs, chart reviewed.  Overweight: No strict exercise routines, neither on low calorie diet.       Kenton Manley MD  Cell: [phone]  Office: [phone]

## 2021-08-09 NOTE — PROGRESS NOTE ADULT - SUBJECTIVE AND OBJECTIVE BOX
Jacque Riley MD  Cardiology Fellow  [phone]  All Cardiology service information can be found 24/7 on [email or web address], password: [email or web address]    Patient seen and examined at bedside.    Overnight Events:     Review Of Systems: No chest pain, shortness of breath, or palpitations            Current Meds:  acetaZOLAMIDE    Tablet 500 milliGRAM(s) Oral two times a day  artificial  tears Solution 1 Drop(s) Both EYES every 12 hours PRN  aspirin  chewable 81 milliGRAM(s) Oral daily  atorvastatin 40 milliGRAM(s) Oral at bedtime  budesonide  80 MICROgram(s)/formoterol 4.5 MICROgram(s) Inhaler 2 Puff(s) Inhalation two times a day  buMETAnide Infusion 2 mG/Hr IV Continuous <Continuous>  ciprofloxacin     Tablet 250 milliGRAM(s) Oral daily  dextrose 40% Gel 15 Gram(s) Oral once  dextrose 50% Injectable 25 Gram(s) IV Push once  dextrose 50% Injectable 12.5 Gram(s) IV Push once  dextrose 50% Injectable 25 Gram(s) IV Push once  ferrous    sulfate 325 milliGRAM(s) Oral daily  glucagon  Injectable 1 milliGRAM(s) IntraMuscular once  heparin   Injectable 5000 Unit(s) SubCutaneous every 8 hours  insulin glargine Injectable (LANTUS) 15 Unit(s) SubCutaneous at bedtime  insulin lispro (ADMELOG) corrective regimen sliding scale   SubCutaneous three times a day before meals  insulin lispro Injectable (ADMELOG) 7 Unit(s) SubCutaneous three times a day before meals  metoprolol succinate ER 12.5 milliGRAM(s) Oral daily  polyethylene glycol 3350 17 Gram(s) Oral daily  spironolactone 25 milliGRAM(s) Oral daily  tiotropium 18 MICROgram(s) Capsule 1 Capsule(s) Inhalation daily      Vitals:  T(F): 97.5 (08-09), Max: 97.5 (08-08)  HR: 79 (08-09) (79 - 80)  BP: 100/61 (08-09) (100/61 - 110/64)  RR: 18 (08-09)  SpO2: 93% (08-09)  I&O's Summary    08 Aug 2021 07:01  -  09 Aug 2021 07:00  --------------------------------------------------------  IN: 780 mL / OUT: 1725 mL / NET: -945 mL        Physical Exam:  Appearance: No acute distress; well appearing  Eyes: PERRL, EOMI, pink conjunctiva  HEENT: Normal oral mucosa  Cardiovascular: RRR, S1, S2, no murmurs, rubs, or gallops; no edema; no JVD  Respiratory: Clear to auscultation bilaterally  Gastrointestinal: soft, non-tender, non-distended with normal bowel sounds  Musculoskeletal: No clubbing; no joint deformity   Neurologic: Non-focal  Lymphatic: No lymphadenopathy  Psychiatry: AAOx3, mood & affect appropriate  Skin: No rashes, ecchymoses, or cyanosis                          8.5    13.86 )-----------( 431      ( 09 Aug 2021 06:07 )             29.0     08-09    141  |  85<L>  |  98<H>  ----------------------------<  171<H>  3.0<L>   |  41<H>  |  1.23    Ca    10.6<H>      09 Aug 2021 06:07                    New ECG(s): Personally reviewed    Echo:    Stress Testing:     Cath:    Imaging:    Interpretation of Telemetry:

## 2021-08-10 NOTE — PROCEDURE NOTE - NSINFORMCONSENT_GEN_A_CORE
Son/Benefits, risks, and possible complications of procedure explained to patient/caregiver who verbalized understanding and gave written consent.

## 2021-08-10 NOTE — PROGRESS NOTE ADULT - PROBLEM SELECTOR PLAN 1
Suggest to continue holding standing-dose Admelog if patient is not eating.  Will increase Lantus to 24u at bedtime.  Will continue Admelog 8u before each meal as well as Admelog correction scale coverage. Will continue monitoring FS and FU.  Patient on several oral meds at home including high-dose sulfonylurea and Metformin, she will require adjustments to her DM regimen given older age and JACQUE. Will continue monitoring and FU DC recommendations.

## 2021-08-10 NOTE — PROGRESS NOTE ADULT - NSICDXPILOT_GEN_ALL_CORE
[place]
Attempted to call report to 1W  No answer     Reji Rojas RN  08/07/21 [id number]
[place]
Bed: 05  Expected date:   Expected time:   Means of arrival:   Comments:
[place]
Report given to [facility] on Jose Apple RN  08/07/21 [id number]
[place]

## 2021-08-10 NOTE — PROGRESS NOTE ADULT - PROBLEM SELECTOR PLAN 4
has bronchoalveolar cancer; unclear prognosis   pulmonary involved - pending biopsy/work-up, but suspect cancer

## 2021-08-10 NOTE — PROGRESS NOTE ADULT - PROBLEM SELECTOR PLAN 5
possible high adh component as well from pulmonary lesions  pulmonary follow up    Hypercalcemia- check PTHrp, iPTH, 25-OH vitamin D, 1,25 vitamin D, SPEP, serum MESHA, serum free light chains

## 2021-08-10 NOTE — PROGRESS NOTE ADULT - SUBJECTIVE AND OBJECTIVE BOX
Chief complaint  Patient is a 87y old  Female who presents with a chief complaint of urgent HD (10 Aug 2021 13:14)   Review of systems  Patient transferred to cicu,, no hypoglycemic episodes.    Labs and Fingersticks  CAPILLARY BLOOD GLUCOSE      POCT Blood Glucose.: 122 mg/dL (10 Aug 2021 14:10)  POCT Blood Glucose.: 176 mg/dL (10 Aug 2021 08:56)  POCT Blood Glucose.: 213 mg/dL (09 Aug 2021 23:07)  POCT Blood Glucose.: 207 mg/dL (09 Aug 2021 21:52)  POCT Blood Glucose.: 111 mg/dL (09 Aug 2021 16:24)      Anion Gap, Serum: 16 (08-10 @ 06:37)  Anion Gap, Serum: 15 (08-09 @ 06:07)      Calcium, Total Serum: 10.8 *H* (08-10 @ 06:37)  Calcium, Total Serum: 10.6 *H* (08-09 @ 06:07)  Albumin, Serum: 3.2 *L* (08-10 @ 06:37)    Alanine Aminotransferase (ALT/SGPT): 10 (08-10 @ 06:37)  Alkaline Phosphatase, Serum: 271 *H* (08-10 @ 06:37)  Aspartate Aminotransferase (AST/SGOT): 20 (08-10 @ 06:37)        08-10    138  |  84<L>  |  110<H>  ----------------------------<  171<H>  3.0<L>   |  38<H>  |  1.44<H>    Ca    10.8<H>      10 Aug 2021 06:37  Phos  5.0     08-10  Mg     3.2     08-10    TPro  7.8  /  Alb  3.2<L>  /  TBili  0.9  /  DBili  x   /  AST  20  /  ALT  10  /  AlkPhos  271<H>  08-10                        8.4    13.10 )-----------( 402      ( 10 Aug 2021 13:37 )             28.2     Medications  MEDICATIONS  (STANDING):  aspirin  chewable 81 milliGRAM(s) Oral daily  atorvastatin 40 milliGRAM(s) Oral at bedtime  budesonide  80 MICROgram(s)/formoterol 4.5 MICROgram(s) Inhaler 2 Puff(s) Inhalation two times a day  buMETAnide Infusion 4 mG/Hr (20 mL/Hr) IV Continuous <Continuous>  chlorhexidine 2% Cloths 1 Application(s) Topical <User Schedule>  dextrose 40% Gel 15 Gram(s) Oral once  dextrose 50% Injectable 25 Gram(s) IV Push once  dextrose 50% Injectable 12.5 Gram(s) IV Push once  dextrose 50% Injectable 25 Gram(s) IV Push once  ferrous    sulfate 325 milliGRAM(s) Oral daily  glucagon  Injectable 1 milliGRAM(s) IntraMuscular once  heparin   Injectable 5000 Unit(s) SubCutaneous every 8 hours  insulin glargine Injectable (LANTUS) 24 Unit(s) SubCutaneous at bedtime  insulin lispro (ADMELOG) corrective regimen sliding scale   SubCutaneous three times a day before meals  insulin lispro Injectable (ADMELOG) 8 Unit(s) SubCutaneous three times a day before meals  polyethylene glycol 3350 17 Gram(s) Oral daily  tiotropium 18 MICROgram(s) Capsule 1 Capsule(s) Inhalation daily      Physical Exam  General: Patient comfortable in bed  Vital Signs Last 12 Hrs  T(F): 98.1 (08-10-21 @ 07:00), Max: 98.1 (08-10-21 @ 07:00)  HR: 84 (08-10-21 @ 13:00) (75 - 86)  BP: 114/51 (08-10-21 @ 13:00) (78/44 - 128/61)  BP(mean): 78 (08-10-21 @ 13:00) (61 - 95)  RR: 28 (08-10-21 @ 13:00) (20 - 50)  SpO2: 100% (08-10-21 @ 13:00) (98% - 100%)    Diagnostics    Free Thyroxine, Serum: AM Sched. Collection: 09-Aug-2021 06:00 (08-08 @ 12:19)  Thyroid Stimulating Hormone, Serum: AM Sched. Collection: 09-Aug-2021 06:00 (08-08 @ 12:19)           Chief complaint  Patient is a 87y old  Female who presents with a chief complaint of urgent HD (10 Aug 2021 13:14)   Review of systems  Patient transferred to cicu,, no hypoglycemic episodes.    Labs and Fingersticks  CAPILLARY BLOOD GLUCOSE      POCT Blood Glucose.: 122 mg/dL (10 Aug 2021 14:10)  POCT Blood Glucose.: 176 mg/dL (10 Aug 2021 08:56)  POCT Blood Glucose.: 213 mg/dL (09 Aug 2021 23:07)  POCT Blood Glucose.: 207 mg/dL (09 Aug 2021 21:52)  POCT Blood Glucose.: 111 mg/dL (09 Aug 2021 16:24)      Anion Gap, Serum: 16 (08-10 @ 06:37)  Anion Gap, Serum: 15 (08-09 @ 06:07)      Calcium, Total Serum: 10.8 *H* (08-10 @ 06:37)  Calcium, Total Serum: 10.6 *H* (08-09 @ 06:07)  Albumin, Serum: 3.2 *L* (08-10 @ 06:37)    Alanine Aminotransferase (ALT/SGPT): 10 (08-10 @ 06:37)  Alkaline Phosphatase, Serum: 271 *H* (08-10 @ 06:37)  Aspartate Aminotransferase (AST/SGOT): 20 (08-10 @ 06:37)        08-10    138  |  84<L>  |  110<H>  ----------------------------<  171<H>  3.0<L>   |  38<H>  |  1.44<H>    Ca    10.8<H>      10 Aug 2021 06:37  Phos  5.0     08-10  Mg     3.2     08-10    TPro  7.8  /  Alb  3.2<L>  /  TBili  0.9  /  DBili  x   /  AST  20  /  ALT  10  /  AlkPhos  271<H>  08-10                        8.4    13.10 )-----------( 402      ( 10 Aug 2021 13:37 )             28.2     Medications  MEDICATIONS  (STANDING):  aspirin  chewable 81 milliGRAM(s) Oral daily  atorvastatin 40 milliGRAM(s) Oral at bedtime  budesonide  80 MICROgram(s)/formoterol 4.5 MICROgram(s) Inhaler 2 Puff(s) Inhalation two times a day  buMETAnide Infusion 4 mG/Hr (20 mL/Hr) IV Continuous <Continuous>  chlorhexidine 2% Cloths 1 Application(s) Topical <User Schedule>  dextrose 40% Gel 15 Gram(s) Oral once  dextrose 50% Injectable 25 Gram(s) IV Push once  dextrose 50% Injectable 12.5 Gram(s) IV Push once  dextrose 50% Injectable 25 Gram(s) IV Push once  ferrous    sulfate 325 milliGRAM(s) Oral daily  glucagon  Injectable 1 milliGRAM(s) IntraMuscular once  heparin   Injectable 5000 Unit(s) SubCutaneous every 8 hours  insulin glargine Injectable (LANTUS) 24 Unit(s) SubCutaneous at bedtime  insulin lispro (ADMELOG) corrective regimen sliding scale   SubCutaneous three times a day before meals  insulin lispro Injectable (ADMELOG) 8 Unit(s) SubCutaneous three times a day before meals  polyethylene glycol 3350 17 Gram(s) Oral daily  tiotropium 18 MICROgram(s) Capsule 1 Capsule(s) Inhalation daily      Physical Exam  General: Patient comfortable in bed  Vital Signs Last 12 Hrs  T(F): 98.1 (08-10-21 @ 07:00), Max: 98.1 (08-10-21 @ 07:00)  HR: 84 (08-10-21 @ 13:00) (75 - 86)  BP: 114/51 (08-10-21 @ 13:00) (78/44 - 128/61)  BP(mean): 78 (08-10-21 @ 13:00) (61 - 95)  RR: 28 (08-10-21 @ 13:00) (20 - 50)  SpO2: 100% (08-10-21 @ 13:00) (98% - 100%)    Diagnostics    Free Thyroxine, Serum: AM Sched. Collection: 09-Aug-2021 06:00 (08-08 @ 12:19)  Thyroid Stimulating Hormone, Serum: AM Sched. Collection: 09-Aug-2021 06:00 (08-08 @ 12:19)

## 2021-08-10 NOTE — CHART NOTE - NSCHARTNOTEFT_GEN_A_CORE
As per discussion with patients son, Reji Knight, patient is DNR/DNI.     James Quiroz MD  Cardiology Fellow - PGY 4  For all New Consults and Questions:  [email or web address] Mobile   Login: Vibease

## 2021-08-10 NOTE — PROGRESS NOTE ADULT - PROBLEM SELECTOR PLAN 2
prerenal azotemia , cardiac  Cr 1.44 &   cont to monitor bun/cr trend  BUN disproportionately elevated to Cr likely from pre renal azotemia from aggressive diuresis & poor clearence due to JACQUE. Pt also with metabolic alkalosis (contraction from diuresis). Not on steroids. No UGI bleed suspected. Does not appear uremic at this time. Poor candidate for long term dialysis. Will discuss with family about dialysis goals. Check CVP & PCWP    metabolic alkalosis- HCO3 is 38 likely metabolic alkalosis from diuresis. ABG with primary metab alkalosis. Continue acetazolamide 500 bid. f/u BMP

## 2021-08-10 NOTE — PROGRESS NOTE ADULT - ASSESSMENT
Assessment  DMT2: 87y Female with DM T2 with hyperglycemia admitted with JACQUE, A1C 6.7%, was on oral hypoglycemic agents at home, now on basal bolus insulin, bolus doses being held 2/2 patient on bipap/not eating meals, blood sugars running slightly high this AM though overall improving, no hypoglycemic episodes, transferred to cicu.  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  Hypothyroidism: Patient has no history thyroid disease, was not on any thyroid supplements, TSH elevated July 2021, repeat TFTs pending.  JACQUE: labs, chart reviewed.  Overweight: No strict exercise routines, neither on low calorie diet.       Kenton Manley MD  Cell: [phone]  Office: [phone]       Assessment  DMT2: 87y Female with DM T2 with hyperglycemia admitted with JACQUE, A1C 6.7%, was on oral hypoglycemic agents at home, now on basal bolus insulin, bolus doses being held 2/2 patient on bipap/not eating meals, blood sugars running  slightly high this AM though overall improving, no hypoglycemic episodes, transferred to cicu.  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  Hypothyroidism: Patient has no history thyroid disease, was not on any thyroid supplements, TSH elevated July 2021, repeat TFTs pending.  JACQUE: labs, chart reviewed.  Overweight: No strict exercise routines, neither on low calorie diet.       Kenton Manley MD  Cell: [phone]  Office: [phone]

## 2021-08-10 NOTE — PROGRESS NOTE ADULT - ASSESSMENT
87F PMH CAD w/ prior PCI, DM2, Afib (not on AC's, dc'd pradaxa 1mo ago as pt w/u for anemia), Metastatic bronchoalveolar cancer, Colon Ca (about 25y ago), HF on 80mg lasix qd, recently started on Entresto as an outpatient, presented to the ED with abnormal labs done on 7/7 showing JACQUE with hyperk to >6 mod hemolyzed. On presentation patient with BUN/Cr 79/6.7 mg/dl. K: 6.6 (non hemolyzed). Previous Cr was at 1.26 6/7/21. Also noted with bicarb: 11 pH 7.2 - lactate 7, admitted to MICU for urgent HD, now improved volume status, renal function improving off dialysis. Patient found to have severe AS s/p TAVR 7/22, well tolerated without complications however ongoing respiratory distress. Heart failure consulted for optimization of volume status given ongoing respiratory issues improved with uptitration of diuretics.     RHC 7/14 RA 16, PA 58/23/37, PCWP 28 (V 48), Sat 67 CO/CI 6.1/3.6  TTE 7/26/21 - EF 60-65%, TAVR, severe LA enlargement, severe RA enlargement, small pericardial effusion, IVC enlarged (3.6 cm), mod RV dilatation   8/5/2021: RA 17 PA 60/25/37 W 27 CO/CI 5.9/3.5 87F PMH CAD w/ prior PCI, DM2, Afib (not on AC's, dc'd pradaxa 1mo ago as pt w/u for anemia), Metastatic bronchoalveolar cancer, Colon Ca (about 25y ago), HF on 80mg lasix qd, recently started on Entresto as an outpatient, presented to the ED with abnormal labs done on 7/7 showing JACQUE with hyperk to >6 mod hemolyzed. On presentation patient with BUN/Cr 79/6.7 mg/dl. K: 6.6 (non hemolyzed). Previous Cr was at 1.26 6/7/21. Also noted with bicarb: 11 pH 7.2 - lactate 7, admitted to MICU for urgent HD, now improved volume status, renal function improving off dialysis. Patient found to have severe AS s/p TAVR 7/22, well tolerated without complications however ongoing respiratory distress. Heart failure consulted for optimization of volume status given ongoing respiratory issues improved with uptitration of diuretics.     [place] 8/10: CVP 6 PA 45/13 (25) W 12 Pa Sat 72  RHC 7/14 RA 16, PA 58/23/37, PCWP 28 (V 48), Sat 67 CO/CI 6.1/3.6  TTE 7/26/21 - EF 60-65%, TAVR, severe LA enlargement, severe RA enlargement, small pericardial effusion, IVC enlarged (3.6 cm), mod RV dilatation   8/5/2021: RA 17 PA 60/25/37 W 27 CO/CI 5.9/3.5

## 2021-08-10 NOTE — PROGRESS NOTE ADULT - TIME BILLING
as above: no real changes in clinical status--on BUMEX drip now-s/p RHC c/w WHO class 2  multifactorial dyspnea-CHF/fluid OL-WHO class 2 PAH (cardiac related), asthma (remote), ? RML tumor, debility, anemia--O2 NC sat above 90%  CHF-as per CHF team-Andreas et al; s/p  RHC/echo repeated---WHO class 2--more aggressive rx of left heart Dz; BNP f/up--dumex drip in place  Renal-HD M/W/F  (hyponatremia)--nephrology input critical here  asthma-symbicort 160 2 bid, spiriva 1 q day, incentive spirometry  abnormal CT c/w CA-? primary lung vs metastatic dz (non smoker)--consider CTNA while her for dx and ? rx (RT etc.), eventual pet/ct  DVT prophylaxis-on A/C  GI-prophylaxis                           PT           respiratory failure--O2 likely upon DC to rehab/home.     ***********HOSPICE/palliative evaln to be considered    Barrington Ponce MD-Pulmonary   [phone] as above: deteriorating clinical status--on BUMEX drip/acetazolamide now-s/p RHC c/w WHO class 2--moving to CICU for CVVHD  multifactorial dyspnea-CHF/fluid OL-WHO class 2 PAH (cardiac related), asthma (remote), ? RML tumor, debility, anemia--O2 NC sat above 90%  CHF-as per CHF team-Andreas et al; s/p  RHC/echo repeated---WHO class 2-aggressive rx of left heart Dz; BNP f/up-bumex drip in place-CVVHD  Renal-HD (hyponatremia)--nephrology input critical here-moving to CICU for care  asthma-symbicort 160 2 bid, spiriva 1 q day, incentive spirometry; bipap to continue 12/5 as needed and O/N  abnormal CT c/w CA-? primary lung vs metastatic dz (non smoker)--consider CTNA while her for dx and ? rx (RT etc.), eventual pet/ct  DVT prophylaxis-on A/C  GI-prophylaxis                           PT           respiratory failure--O2 likely upon DC to rehab/home.     ***********HOSPICE/palliative evaln to be considered    Barrington Ponce MD-Pulmonary   [phone]

## 2021-08-10 NOTE — PROGRESS NOTE ADULT - PROBLEM SELECTOR PLAN 1
hypervolemic  RHC filling pressures and pul wedge elevated  start bumex drip 8/5  given bumex IV and metolazone 8/5  stopped  urena 8/5 with improving BUN  Now improved sodium with diuresis.

## 2021-08-10 NOTE — CHART NOTE - NSCHARTNOTEFT_GEN_A_CORE
CCU Accept Note    MISSYANITHA DIANE  87y  Patient is a 87y old  Female who presents with a chief complaint of urgent HD (09 Aug 2021 14:05)      ====================  HPI & Hospital Course:   87F PMH CAD w/stents, DM2, Afib (not on AC's, dc'd pradaxa 1mo ago as pt w/u for anemia), Colon Ca (about 25y ago),  AS, HF on 80mg lasix qd, recently started on Entresto, sent in to the ED for abnormal labs done on 7/7 showing JACQUE with hyperk to >6 ,  JACQUE thought to be likely secondary to a combination of pre-renal ATN in the setting of diarrhea and poor PO intake, contrast nephropathy from recent Ct coronaries, and severe lactic acidosis iso metformin and sulfonylurea use. Received  hemodialysis 7/8 and 7/9.  Now with resolution of lactic acidosis & hyperkalemia. Briefly on pressors in the MICU for low pressure iso dialysis + decreased PO intake, and then titrated off and transferred to the floors.      Patient underwent a TAVR on 7/22. Course was complicated by volume overloaded and patient was started on bumex TID for diuresis, and then placed on bumex gtt with metolazone pushes and eventually transitioned to po medications. Course was also complicated by persist hyponatremia of unclear etiology. Patient received RHC on 8/5 for elevated filling pressures and elevated pulmonary wedge pressures, and then restarted on bumex gtt with metolazone and then started on acetazolamide 500 bid. Despite aggressive diuresis, patient continued to be tachypneic and volume overloaded and started on BIPAP 8/9. Patient was brought to CICU for possible CVVHD and for closer hemodynamic and respiratory monitoring.       Past Medical History:     Past Surgical History:     Home Medications:  atorvastatin 40 mg oral tablet: 1 tab(s) orally once a day at bedtime (08 Jul 2021 17:00)  Elemental Iron: 45 milligram(s) orally (08 Jul 2021 17:00)  Entresto 24 mg-26 mg oral tablet: 1 tab(s) orally 2 times a day (08 Jul 2021 17:00)  furosemide 80 mg oral tablet: 1 tab(s) orally once a day (08 Jul 2021 17:00)  glipiZIDE 10 mg oral tablet: 1 tab(s) orally once a day (08 Jul 2021 17:00)  Januvia 100 mg oral tablet: 1 tab(s) orally once a day (08 Jul 2021 17:00)  lysine 500 mg oral tablet: 2 tab(s) orally once a day (08 Jul 2021 17:00)  metFORMIN 1000 mg oral tablet: 1 tab(s) orally 2 times a day (08 Jul 2021 17:00)  metOLazone: 25 milligram(s) orally once a day (08 Jul 2021 17:00)  metoprolol succinate 25 mg oral tablet, extended release: 1 tab(s) orally once a day   Monday, Wednesday, Friday with dinner (08 Jul 2021 17:00)  Vitamin C 500 mg oral tablet: 1 tab(s) orally once a day (08 Jul 2021 17:00)  Vitamin D2 2000 intl units (50 mcg) oral capsule:  (08 Jul 2021 17:00)  zolpidem 5 mg oral tablet: 1 tab(s) orally once a day (at bedtime) (08 Jul 2021 17:00)      Current Medications:   MEDICATIONS  (STANDING):  acetaZOLAMIDE    Tablet 500 milliGRAM(s) Oral two times a day  aspirin  chewable 81 milliGRAM(s) Oral daily  atorvastatin 40 milliGRAM(s) Oral at bedtime  budesonide  80 MICROgram(s)/formoterol 4.5 MICROgram(s) Inhaler 2 Puff(s) Inhalation two times a day  buMETAnide Infusion 2 mG/Hr (10 mL/Hr) IV Continuous <Continuous>  ciprofloxacin     Tablet 250 milliGRAM(s) Oral daily  dextrose 40% Gel 15 Gram(s) Oral once  dextrose 50% Injectable 25 Gram(s) IV Push once  dextrose 50% Injectable 12.5 Gram(s) IV Push once  dextrose 50% Injectable 25 Gram(s) IV Push once  ferrous    sulfate 325 milliGRAM(s) Oral daily  glucagon  Injectable 1 milliGRAM(s) IntraMuscular once  heparin   Injectable 5000 Unit(s) SubCutaneous every 8 hours  insulin glargine Injectable (LANTUS) 20 Unit(s) SubCutaneous at bedtime  insulin lispro (ADMELOG) corrective regimen sliding scale   SubCutaneous three times a day before meals  insulin lispro Injectable (ADMELOG) 8 Unit(s) SubCutaneous three times a day before meals  metoprolol succinate ER 12.5 milliGRAM(s) Oral daily  polyethylene glycol 3350 17 Gram(s) Oral daily  spironolactone 25 milliGRAM(s) Oral two times a day  tiotropium 18 MICROgram(s) Capsule 1 Capsule(s) Inhalation daily    MEDICATIONS  (PRN):  artificial  tears Solution 1 Drop(s) Both EYES every 12 hours PRN Dry Eyes      Allergies:     Family History:     Social History:     ====================  Vital Signs Last 24 Hrs  T(C): 36.4 (09 Aug 2021 22:57), Max: 36.4 (09 Aug 2021 05:00)  T(F): 97.6 (09 Aug 2021 22:57), Max: 97.6 (09 Aug 2021 22:57)  HR: 84 (09 Aug 2021 22:57) (69 - 84)  BP: 117/54 (09 Aug 2021 22:57) (98/59 - 128/52)  BP(mean): 75 (09 Aug 2021 22:57) (75 - 78)  RR: 20 (09 Aug 2021 22:57) (18 - 20)  SpO2: 98% (09 Aug 2021 22:57) (93% - 98%)    Adult Advanced Hemodynamics Last 24 Hrs  CVP(mm Hg): --  CVP(cm H2O): --  CO: --  CI: --  PA: --  PA(mean): --  PCWP: --  SVR: --  SVRI: --  PVR: --  PVRI: --    Physical Exam:   General: NAD  HEENT: PERRL, EOMI, normal sclera and conjunctiva, no oral lesions  Neck: Supple, no JVD  Lungs: CTA bilaterally  Heart: RRR, normal S1S2, no murmurs/rubs/gallops  Abdomen: Soft, ND/NT  Extremities: 2+ peripheral pulses, no cyanosis/clubbing/edema, full ROM  Skin: Warm, well-perfused  Neuro: A&O x3, no focal deficits      ====================  Labs & Imaging:   CBC Full  -  ( 09 Aug 2021 06:07 )  WBC Count : 13.86 K/uL  RBC Count : 3.42 M/uL  Hemoglobin : 8.5 g/dL  Hematocrit : 29.0 %  Platelet Count - Automated : 431 K/uL  Mean Cell Volume : 84.8 fl  Mean Cell Hemoglobin : 24.9 pg  Mean Cell Hemoglobin Concentration : 29.3 gm/dL  Auto Neutrophil # : x  Auto Lymphocyte # : x  Auto Monocyte # : x  Auto Eosinophil # : x  Auto Basophil # : x  Auto Neutrophil % : x  Auto Lymphocyte % : x  Auto Monocyte % : x  Auto Eosinophil % : x  Auto Basophil % : x    08-09    x   |  x   |  x   ----------------------------<  x   4.2   |  x   |  x     Ca    10.6<H>      09 Aug 2021 06:07        ABG - ( 09 Aug 2021 15:49 )  pH, Arterial: 7.44  pH, Blood: x     /  pCO2: 67    /  pO2: 53    / HCO3: 44    / Base Excess: 17.8  /  SaO2: 85          ====================  Assessment & Plan:   87F PMH CAD w/ prior PCI, DM2, Afib (not on AC's, dc'd pradaxa 1mo ago as pt w/u for anemia), Metastatic bronchoalveolar cancer, Colon Ca (about 25y ago), HF on 80mg lasix qd, recently started on Entresto as an outpatient, presented to the ED with abnormal labs done on 7/7 showing JACQUE with hyperk to >6 mod hemolyzed. Patient was admitted to MICU for urgent HD, now improved volume status, renal function improving off dialysis. Patient found to have severe AS s/p TAVR 7/22. Course was complicated by persist volume overload despite diuresis and hyponatremia. Patient placed on BIPAP and transferred to CICU for possible CVVHD.  ====================    #Neuro    #Resp    #CV    #GI    #ID    #Renal    #Heme    #Endo    #DVT PPx CCU Accept Note    MISSYANITHA DIANE  87y  Patient is a 87y old  Female who presents with a chief complaint of urgent HD (09 Aug 2021 14:05)      ====================  HPI & Hospital Course:   87F PMH CAD w/stents, DM2, Afib (not on AC's, dc'd pradaxa 1mo ago as pt w/u for anemia), Colon Ca (about 25y ago),  AS, HF on 80mg lasix qd, recently started on Entresto, sent in to the ED for abnormal labs done on 7/7 showing JACQUE with hyperk to >6 ,  JACQUE thought to be likely secondary to a combination of pre-renal ATN in the setting of diarrhea and poor PO intake, contrast nephropathy from recent Ct coronaries, and severe lactic acidosis iso metformin and sulfonylurea use. Received  hemodialysis 7/8 and 7/9.  Now with resolution of lactic acidosis & hyperkalemia. Briefly on pressors in the MICU for low pressure iso dialysis + decreased PO intake, and then titrated off and transferred to the floors.      Patient underwent a TAVR on 7/22. Course was complicated by volume overloaded and patient was started on bumex TID for diuresis, and then placed on bumex gtt with metolazone pushes and eventually transitioned to po medications. Course was also complicated by persist hyponatremia of unclear etiology. Patient received RHC on 8/5 for elevated filling pressures and elevated pulmonary wedge pressures, and then restarted on bumex gtt with metolazone and then started on acetazolamide 500 bid. Despite aggressive diuresis, patient continued to be tachypneic and volume overloaded and started on BIPAP 8/9. Patient was brought to CICU for possible CVVHD and for closer hemodynamic and respiratory monitoring.       Past Medical History:     Past Surgical History:     Home Medications:  atorvastatin 40 mg oral tablet: 1 tab(s) orally once a day at bedtime (08 Jul 2021 17:00)  Elemental Iron: 45 milligram(s) orally (08 Jul 2021 17:00)  Entresto 24 mg-26 mg oral tablet: 1 tab(s) orally 2 times a day (08 Jul 2021 17:00)  furosemide 80 mg oral tablet: 1 tab(s) orally once a day (08 Jul 2021 17:00)  glipiZIDE 10 mg oral tablet: 1 tab(s) orally once a day (08 Jul 2021 17:00)  Januvia 100 mg oral tablet: 1 tab(s) orally once a day (08 Jul 2021 17:00)  lysine 500 mg oral tablet: 2 tab(s) orally once a day (08 Jul 2021 17:00)  metFORMIN 1000 mg oral tablet: 1 tab(s) orally 2 times a day (08 Jul 2021 17:00)  metOLazone: 25 milligram(s) orally once a day (08 Jul 2021 17:00)  metoprolol succinate 25 mg oral tablet, extended release: 1 tab(s) orally once a day   Monday, Wednesday, Friday with dinner (08 Jul 2021 17:00)  Vitamin C 500 mg oral tablet: 1 tab(s) orally once a day (08 Jul 2021 17:00)  Vitamin D2 2000 intl units (50 mcg) oral capsule:  (08 Jul 2021 17:00)  zolpidem 5 mg oral tablet: 1 tab(s) orally once a day (at bedtime) (08 Jul 2021 17:00)      Current Medications:   MEDICATIONS  (STANDING):  acetaZOLAMIDE    Tablet 500 milliGRAM(s) Oral two times a day  aspirin  chewable 81 milliGRAM(s) Oral daily  atorvastatin 40 milliGRAM(s) Oral at bedtime  budesonide  80 MICROgram(s)/formoterol 4.5 MICROgram(s) Inhaler 2 Puff(s) Inhalation two times a day  buMETAnide Infusion 2 mG/Hr (10 mL/Hr) IV Continuous <Continuous>  ciprofloxacin     Tablet 250 milliGRAM(s) Oral daily  dextrose 40% Gel 15 Gram(s) Oral once  dextrose 50% Injectable 25 Gram(s) IV Push once  dextrose 50% Injectable 12.5 Gram(s) IV Push once  dextrose 50% Injectable 25 Gram(s) IV Push once  ferrous    sulfate 325 milliGRAM(s) Oral daily  glucagon  Injectable 1 milliGRAM(s) IntraMuscular once  heparin   Injectable 5000 Unit(s) SubCutaneous every 8 hours  insulin glargine Injectable (LANTUS) 20 Unit(s) SubCutaneous at bedtime  insulin lispro (ADMELOG) corrective regimen sliding scale   SubCutaneous three times a day before meals  insulin lispro Injectable (ADMELOG) 8 Unit(s) SubCutaneous three times a day before meals  metoprolol succinate ER 12.5 milliGRAM(s) Oral daily  polyethylene glycol 3350 17 Gram(s) Oral daily  spironolactone 25 milliGRAM(s) Oral two times a day  tiotropium 18 MICROgram(s) Capsule 1 Capsule(s) Inhalation daily    MEDICATIONS  (PRN):  artificial  tears Solution 1 Drop(s) Both EYES every 12 hours PRN Dry Eyes      Allergies:     Family History:     Social History:     ====================  Vital Signs Last 24 Hrs  T(C): 36.4 (09 Aug 2021 22:57), Max: 36.4 (09 Aug 2021 05:00)  T(F): 97.6 (09 Aug 2021 22:57), Max: 97.6 (09 Aug 2021 22:57)  HR: 84 (09 Aug 2021 22:57) (69 - 84)  BP: 117/54 (09 Aug 2021 22:57) (98/59 - 128/52)  BP(mean): 75 (09 Aug 2021 22:57) (75 - 78)  RR: 20 (09 Aug 2021 22:57) (18 - 20)  SpO2: 98% (09 Aug 2021 22:57) (93% - 98%)    Adult Advanced Hemodynamics Last 24 Hrs  CVP(mm Hg): --  CVP(cm H2O): --  CO: --  CI: --  PA: --  PA(mean): --  PCWP: --  SVR: --  SVRI: --  PVR: --  PVRI: --    Physical Exam:   General: NAD  HEENT: PERRL, EOMI, normal sclera and conjunctiva, no oral lesions  Neck: Supple, no JVD  Lungs: CTA bilaterally  Heart: RRR, normal S1S2, no murmurs/rubs/gallops  Abdomen: Soft, ND/NT  Extremities: 2+ peripheral pulses, no cyanosis/clubbing/edema, full ROM  Skin: Warm, well-perfused  Neuro: A&O x3, no focal deficits      ====================  Labs & Imaging:   CBC Full  -  ( 09 Aug 2021 06:07 )  WBC Count : 13.86 K/uL  RBC Count : 3.42 M/uL  Hemoglobin : 8.5 g/dL  Hematocrit : 29.0 %  Platelet Count - Automated : 431 K/uL  Mean Cell Volume : 84.8 fl  Mean Cell Hemoglobin : 24.9 pg  Mean Cell Hemoglobin Concentration : 29.3 gm/dL  Auto Neutrophil # : x  Auto Lymphocyte # : x  Auto Monocyte # : x  Auto Eosinophil # : x  Auto Basophil # : x  Auto Neutrophil % : x  Auto Lymphocyte % : x  Auto Monocyte % : x  Auto Eosinophil % : x  Auto Basophil % : x    08-09    x   |  x   |  x   ----------------------------<  x   4.2   |  x   |  x     Ca    10.6<H>      09 Aug 2021 06:07        ABG - ( 09 Aug 2021 15:49 )  pH, Arterial: 7.44  pH, Blood: x     /  pCO2: 67    /  pO2: 53    / HCO3: 44    / Base Excess: 17.8  /  SaO2: 85          ====================  Assessment & Plan:   87F PMH CAD w/ prior PCI, DM2, Afib (not on AC's, dc'd pradaxa 1mo ago as pt w/u for anemia), Metastatic bronchoalveolar cancer, Colon Ca (about 25y ago), HF on 80mg lasix qd, recently started on Entresto as an outpatient, presented to the ED with abnormal labs done on 7/7 showing JACQUE with hyperk to >6 mod hemolyzed. Patient was admitted to MICU for urgent HD, now improved volume status, renal function improving off dialysis. Patient found to have severe AS s/p TAVR 7/22. Course was complicated by persist volume overload despite diuresis and hyponatremia. Patient placed on BIPAP and transferred to CICU for possible CVVHD.  ====================    Neuro  - no acute issues, following all commands    Resp  #Acute hypercarbic respiratory failure  -likely secondary to volume overload  -started on BIPAP overnight 10/5 40%  -will get ABG this AM and de-escalate as appropriate    CV  #HFpEF  -RHC 7/14 RA 16, PA 58/23/37, PCWP 28 (V 48), Sat 67 CO/CI 6.1/3.6  TTE 7/26/21 - EF 60-65%, TAVR, severe LA enlargement, severe RA enlargement, small pericardial effusion, IVC enlarged (3.6 cm), mod RV dilatation   8/5/2021: RA 17 PA 60/25/37 W 27 CO/CI 5.9/3.5  -additionally, RHC with elevated filling pressures   -patient persistently volume overloaded (b/l B-lines on POCUS)  -c/w metoprolol succinate 12.5 qd  -currently on bumex gtt @2 + spirinolacton 25 BID + acetazolamide 500 BID, despite this, patient still overloaded  -will attempt to maximize diuresis. called nephrology overnight for possible CVVHD for volume removal (and for uremia). awaiting recs    #Afib  -metoprolol 12.5 qd   -not on AC due to GIB    #AS s/p TAVR  -tolerated well  -structural cardsiology following    #GI  -npo while on bipap    #ID  -was on cipro for UTI, but has been treated for >10d, so dc'd on 8/10  -no other acute issues    Renal  #JACQUE  -Cr elevation, with volume overload, and now with uremia  -patient making good urine on bumex 2mg     #Heme    #Endo    #DVT PPx. CCU Accept Note    MISSYANITHA DIANE  87y  Patient is a 87y old  Female who presents with a chief complaint of urgent HD (09 Aug 2021 14:05)      ====================  HPI & Hospital Course:   87F PMH CAD w/stents, DM2, Afib (not on AC's, dc'd pradaxa 1mo ago as pt w/u for anemia), Colon Ca (about 25y ago),  AS, HF on 80mg lasix qd, recently started on Entresto, sent in to the ED for abnormal labs done on 7/7 showing JACQUE with hyperk to >6 ,  JACQUE thought to be likely secondary to a combination of pre-renal ATN in the setting of diarrhea and poor PO intake, contrast nephropathy from recent Ct coronaries, and severe lactic acidosis iso metformin and sulfonylurea use. Received  hemodialysis 7/8 and 7/9.  Now with resolution of lactic acidosis & hyperkalemia. Briefly on pressors in the MICU for low pressure iso dialysis + decreased PO intake, and then titrated off and transferred to the floors.      Patient underwent a TAVR on 7/22. Course was complicated by volume overloaded and patient was started on bumex TID for diuresis, and then placed on bumex gtt with metolazone pushes and eventually transitioned to po medications. Course was also complicated by persist hyponatremia of unclear etiology. Patient received RHC on 8/5 for elevated filling pressures and elevated pulmonary wedge pressures, and then restarted on bumex gtt with metolazone and then started on acetazolamide 500 bid. Despite aggressive diuresis, patient continued to be tachypneic and volume overloaded and started on BIPAP 8/9. Patient was brought to CICU for possible CVVHD and for closer hemodynamic and respiratory monitoring.       Past Medical History:     Past Surgical History:     Home Medications:  atorvastatin 40 mg oral tablet: 1 tab(s) orally once a day at bedtime (08 Jul 2021 17:00)  Elemental Iron: 45 milligram(s) orally (08 Jul 2021 17:00)  Entresto 24 mg-26 mg oral tablet: 1 tab(s) orally 2 times a day (08 Jul 2021 17:00)  furosemide 80 mg oral tablet: 1 tab(s) orally once a day (08 Jul 2021 17:00)  glipiZIDE 10 mg oral tablet: 1 tab(s) orally once a day (08 Jul 2021 17:00)  Januvia 100 mg oral tablet: 1 tab(s) orally once a day (08 Jul 2021 17:00)  lysine 500 mg oral tablet: 2 tab(s) orally once a day (08 Jul 2021 17:00)  metFORMIN 1000 mg oral tablet: 1 tab(s) orally 2 times a day (08 Jul 2021 17:00)  metOLazone: 25 milligram(s) orally once a day (08 Jul 2021 17:00)  metoprolol succinate 25 mg oral tablet, extended release: 1 tab(s) orally once a day   Monday, Wednesday, Friday with dinner (08 Jul 2021 17:00)  Vitamin C 500 mg oral tablet: 1 tab(s) orally once a day (08 Jul 2021 17:00)  Vitamin D2 2000 intl units (50 mcg) oral capsule:  (08 Jul 2021 17:00)  zolpidem 5 mg oral tablet: 1 tab(s) orally once a day (at bedtime) (08 Jul 2021 17:00)      Current Medications:   MEDICATIONS  (STANDING):  acetaZOLAMIDE    Tablet 500 milliGRAM(s) Oral two times a day  aspirin  chewable 81 milliGRAM(s) Oral daily  atorvastatin 40 milliGRAM(s) Oral at bedtime  budesonide  80 MICROgram(s)/formoterol 4.5 MICROgram(s) Inhaler 2 Puff(s) Inhalation two times a day  buMETAnide Infusion 2 mG/Hr (10 mL/Hr) IV Continuous <Continuous>  ciprofloxacin     Tablet 250 milliGRAM(s) Oral daily  dextrose 40% Gel 15 Gram(s) Oral once  dextrose 50% Injectable 25 Gram(s) IV Push once  dextrose 50% Injectable 12.5 Gram(s) IV Push once  dextrose 50% Injectable 25 Gram(s) IV Push once  ferrous    sulfate 325 milliGRAM(s) Oral daily  glucagon  Injectable 1 milliGRAM(s) IntraMuscular once  heparin   Injectable 5000 Unit(s) SubCutaneous every 8 hours  insulin glargine Injectable (LANTUS) 20 Unit(s) SubCutaneous at bedtime  insulin lispro (ADMELOG) corrective regimen sliding scale   SubCutaneous three times a day before meals  insulin lispro Injectable (ADMELOG) 8 Unit(s) SubCutaneous three times a day before meals  metoprolol succinate ER 12.5 milliGRAM(s) Oral daily  polyethylene glycol 3350 17 Gram(s) Oral daily  spironolactone 25 milliGRAM(s) Oral two times a day  tiotropium 18 MICROgram(s) Capsule 1 Capsule(s) Inhalation daily    MEDICATIONS  (PRN):  artificial  tears Solution 1 Drop(s) Both EYES every 12 hours PRN Dry Eyes      Allergies:     Family History:     Social History:     ====================  Vital Signs Last 24 Hrs  T(C): 36.4 (09 Aug 2021 22:57), Max: 36.4 (09 Aug 2021 05:00)  T(F): 97.6 (09 Aug 2021 22:57), Max: 97.6 (09 Aug 2021 22:57)  HR: 84 (09 Aug 2021 22:57) (69 - 84)  BP: 117/54 (09 Aug 2021 22:57) (98/59 - 128/52)  BP(mean): 75 (09 Aug 2021 22:57) (75 - 78)  RR: 20 (09 Aug 2021 22:57) (18 - 20)  SpO2: 98% (09 Aug 2021 22:57) (93% - 98%)    Adult Advanced Hemodynamics Last 24 Hrs  CVP(mm Hg): --  CVP(cm H2O): --  CO: --  CI: --  PA: --  PA(mean): --  PCWP: --  SVR: --  SVRI: --  PVR: --  PVRI: --    Physical Exam:   General: NAD  HEENT: PERRL, EOMI, normal sclera and conjunctiva, no oral lesions  Neck: Supple, no JVD  Lungs: CTA bilaterally  Heart: RRR, normal S1S2, no murmurs/rubs/gallops  Abdomen: Soft, ND/NT  Extremities: 2+ peripheral pulses, no cyanosis/clubbing/edema, full ROM  Skin: Warm, well-perfused  Neuro: A&O x3, no focal deficits      ====================  Labs & Imaging:   CBC Full  -  ( 09 Aug 2021 06:07 )  WBC Count : 13.86 K/uL  RBC Count : 3.42 M/uL  Hemoglobin : 8.5 g/dL  Hematocrit : 29.0 %  Platelet Count - Automated : 431 K/uL  Mean Cell Volume : 84.8 fl  Mean Cell Hemoglobin : 24.9 pg  Mean Cell Hemoglobin Concentration : 29.3 gm/dL  Auto Neutrophil # : x  Auto Lymphocyte # : x  Auto Monocyte # : x  Auto Eosinophil # : x  Auto Basophil # : x  Auto Neutrophil % : x  Auto Lymphocyte % : x  Auto Monocyte % : x  Auto Eosinophil % : x  Auto Basophil % : x    08-09    x   |  x   |  x   ----------------------------<  x   4.2   |  x   |  x     Ca    10.6<H>      09 Aug 2021 06:07        ABG - ( 09 Aug 2021 15:49 )  pH, Arterial: 7.44  pH, Blood: x     /  pCO2: 67    /  pO2: 53    / HCO3: 44    / Base Excess: 17.8  /  SaO2: 85          ====================  Assessment & Plan:   87F PMH CAD w/ prior PCI, DM2, Afib (not on AC's, dc'd pradaxa 1mo ago as pt w/u for anemia), Metastatic bronchoalveolar cancer, Colon Ca (about 25y ago), HF on 80mg lasix qd, recently started on Entresto as an outpatient, presented to the ED with abnormal labs done on 7/7 showing JACQUE with hyperk to >6 mod hemolyzed. Patient was admitted to MICU for urgent HD, now improved volume status, renal function improving off dialysis. Patient found to have severe AS s/p TAVR 7/22. Course was complicated by persist volume overload despite diuresis and hyponatremia. Patient placed on BIPAP and transferred to CICU for possible CVVHD.  ====================  Neuro  - no acute issues, following all commands    Resp  #Acute hypercarbic respiratory failure  -likely secondary to volume overload  -started on BIPAP overnight 10/5 40%  -will get ABG this AM and de-escalate as appropriate    CV  #HFpEF  -RHC 7/14 RA 16, PA 58/23/37, PCWP 28 (V 48), Sat 67 CO/CI 6.1/3.6  TTE 7/26/21 - EF 60-65%, TAVR, severe LA enlargement, severe RA enlargement, small pericardial effusion, IVC enlarged (3.6 cm), mod RV dilatation   8/5/2021: RA 17 PA 60/25/37 W 27 CO/CI 5.9/3.5  -additionally, RHC with elevated filling pressures   -patient persistently volume overloaded (b/l B-lines on POCUS)  -c/w metoprolol succinate 12.5 qd  -currently on bumex gtt @2 + spirinolacton 25 BID + acetazolamide 500 BID, despite this, patient still overloaded  -will attempt to maximize diuresis. called nephrology overnight for possible CVVHD for volume removal (and for uremia). awaiting recs    #Afib  -metoprolol 12.5 qd   -not on AC due to GIB    #AS s/p TAVR  -tolerated well  -structural cardiology following    #GI  -npo while on bipap    #ID  -was on cipro for UTI, but has been treated for >10d, so dc'd on 8/10  -no other acute issues    Renal  #JACQUE  -Cr elevation, with volume overload, and now with uremia  -patient making good urine on bumex gtt 2mg and other diuretics  -given persistent volume overload and uremia, will ask nephrology about diuresis  -place morris (only on primafit prior to this), strict Is/Os, renally dose all meds   -hyponatremia resolving with diuresis    Heme  #Anemia  -likely multifactorial from sepsis vs CKD  -keep active type and screen    Endo  #T2DM  -c/w lantus 20 and admelog 8    #DVT PPx.  -heparin subq CCU Accept Note    MISSYANITHA DIANE  87y  Patient is a 87y old  Female who presents with a chief complaint of urgent HD (09 Aug 2021 14:05)      ====================  HPI & Hospital Course:   87F PMH CAD w/stents, DM2, Afib (not on AC's, dc'd pradaxa 1mo ago as pt w/u for anemia), Colon Ca (about 25y ago),  AS, HF on 80mg lasix qd, recently started on Entresto, sent in to the ED for abnormal labs done on 7/7 showing JACQUE with hyperk to >6 ,  JACQUE thought to be likely secondary to a combination of pre-renal ATN in the setting of diarrhea and poor PO intake, contrast nephropathy from recent Ct coronaries, and severe lactic acidosis iso metformin and sulfonylurea use. Received  hemodialysis 7/8 and 7/9.  Now with resolution of lactic acidosis & hyperkalemia. Briefly on pressors in the MICU for low pressure iso dialysis + decreased PO intake, and then titrated off and transferred to the floors.      Patient underwent a TAVR on 7/22. Course was complicated by volume overloaded and patient was started on bumex TID for diuresis, and then placed on bumex gtt with metolazone pushes and eventually transitioned to po medications. Course was also complicated by persist hyponatremia of unclear etiology. Patient received RHC on 8/5 for elevated filling pressures and elevated pulmonary wedge pressures, and then restarted on bumex gtt with metolazone and then started on acetazolamide 500 bid. Despite aggressive diuresis, patient continued to be tachypneic and volume overloaded and started on BIPAP 8/9. Patient was brought to CICU for possible CVVHD and for closer hemodynamic and respiratory monitoring.       Past Medical History:     Past Surgical History:     Home Medications:  atorvastatin 40 mg oral tablet: 1 tab(s) orally once a day at bedtime (08 Jul 2021 17:00)  Elemental Iron: 45 milligram(s) orally (08 Jul 2021 17:00)  Entresto 24 mg-26 mg oral tablet: 1 tab(s) orally 2 times a day (08 Jul 2021 17:00)  furosemide 80 mg oral tablet: 1 tab(s) orally once a day (08 Jul 2021 17:00)  glipiZIDE 10 mg oral tablet: 1 tab(s) orally once a day (08 Jul 2021 17:00)  Januvia 100 mg oral tablet: 1 tab(s) orally once a day (08 Jul 2021 17:00)  lysine 500 mg oral tablet: 2 tab(s) orally once a day (08 Jul 2021 17:00)  metFORMIN 1000 mg oral tablet: 1 tab(s) orally 2 times a day (08 Jul 2021 17:00)  metOLazone: 25 milligram(s) orally once a day (08 Jul 2021 17:00)  metoprolol succinate 25 mg oral tablet, extended release: 1 tab(s) orally once a day   Monday, Wednesday, Friday with dinner (08 Jul 2021 17:00)  Vitamin C 500 mg oral tablet: 1 tab(s) orally once a day (08 Jul 2021 17:00)  Vitamin D2 2000 intl units (50 mcg) oral capsule:  (08 Jul 2021 17:00)  zolpidem 5 mg oral tablet: 1 tab(s) orally once a day (at bedtime) (08 Jul 2021 17:00)      Current Medications:   MEDICATIONS  (STANDING):  acetaZOLAMIDE    Tablet 500 milliGRAM(s) Oral two times a day  aspirin  chewable 81 milliGRAM(s) Oral daily  atorvastatin 40 milliGRAM(s) Oral at bedtime  budesonide  80 MICROgram(s)/formoterol 4.5 MICROgram(s) Inhaler 2 Puff(s) Inhalation two times a day  buMETAnide Infusion 2 mG/Hr (10 mL/Hr) IV Continuous <Continuous>  ciprofloxacin     Tablet 250 milliGRAM(s) Oral daily  dextrose 40% Gel 15 Gram(s) Oral once  dextrose 50% Injectable 25 Gram(s) IV Push once  dextrose 50% Injectable 12.5 Gram(s) IV Push once  dextrose 50% Injectable 25 Gram(s) IV Push once  ferrous    sulfate 325 milliGRAM(s) Oral daily  glucagon  Injectable 1 milliGRAM(s) IntraMuscular once  heparin   Injectable 5000 Unit(s) SubCutaneous every 8 hours  insulin glargine Injectable (LANTUS) 20 Unit(s) SubCutaneous at bedtime  insulin lispro (ADMELOG) corrective regimen sliding scale   SubCutaneous three times a day before meals  insulin lispro Injectable (ADMELOG) 8 Unit(s) SubCutaneous three times a day before meals  metoprolol succinate ER 12.5 milliGRAM(s) Oral daily  polyethylene glycol 3350 17 Gram(s) Oral daily  spironolactone 25 milliGRAM(s) Oral two times a day  tiotropium 18 MICROgram(s) Capsule 1 Capsule(s) Inhalation daily    MEDICATIONS  (PRN):  artificial  tears Solution 1 Drop(s) Both EYES every 12 hours PRN Dry Eyes      Allergies:     Family History:     Social History:     ====================  Vital Signs Last 24 Hrs  T(C): 36.4 (09 Aug 2021 22:57), Max: 36.4 (09 Aug 2021 05:00)  T(F): 97.6 (09 Aug 2021 22:57), Max: 97.6 (09 Aug 2021 22:57)  HR: 84 (09 Aug 2021 22:57) (69 - 84)  BP: 117/54 (09 Aug 2021 22:57) (98/59 - 128/52)  BP(mean): 75 (09 Aug 2021 22:57) (75 - 78)  RR: 20 (09 Aug 2021 22:57) (18 - 20)  SpO2: 98% (09 Aug 2021 22:57) (93% - 98%)    Adult Advanced Hemodynamics Last 24 Hrs  CVP(mm Hg): --  CVP(cm H2O): --  CO: --  CI: --  PA: --  PA(mean): --  PCWP: --  SVR: --  SVRI: --  PVR: --  PVRI: --    Physical Exam:   General: NAD  HEENT: PERRL, EOMI, normal sclera and conjunctiva, no oral lesions  Neck: Supple, no JVD  Lungs: CTA bilaterally  Heart: RRR, normal S1S2, no murmurs/rubs/gallops  Abdomen: Soft, ND/NT  Extremities: 2+ peripheral pulses, no cyanosis/clubbing/edema, full ROM  Skin: Warm, well-perfused  Neuro: A&O x3, no focal deficits      ====================  Labs & Imaging:   CBC Full  -  ( 09 Aug 2021 06:07 )  WBC Count : 13.86 K/uL  RBC Count : 3.42 M/uL  Hemoglobin : 8.5 g/dL  Hematocrit : 29.0 %  Platelet Count - Automated : 431 K/uL  Mean Cell Volume : 84.8 fl  Mean Cell Hemoglobin : 24.9 pg  Mean Cell Hemoglobin Concentration : 29.3 gm/dL  Auto Neutrophil # : x  Auto Lymphocyte # : x  Auto Monocyte # : x  Auto Eosinophil # : x  Auto Basophil # : x  Auto Neutrophil % : x  Auto Lymphocyte % : x  Auto Monocyte % : x  Auto Eosinophil % : x  Auto Basophil % : x    08-09    x   |  x   |  x   ----------------------------<  x   4.2   |  x   |  x     Ca    10.6<H>      09 Aug 2021 06:07        ABG - ( 09 Aug 2021 15:49 )  pH, Arterial: 7.44  pH, Blood: x     /  pCO2: 67    /  pO2: 53    / HCO3: 44    / Base Excess: 17.8  /  SaO2: 85          ====================  Assessment & Plan:   87F PMH CAD w/ prior PCI, DM2, Afib (not on AC's, dc'd pradaxa 1mo ago as pt w/u for anemia), Metastatic bronchoalveolar cancer, Colon Ca (about 25y ago), HF on 80mg lasix qd, recently started on Entresto as an outpatient, presented to the ED with abnormal labs done on 7/7 showing JACQUE with hyperk to >6 mod hemolyzed. Patient was admitted to MICU for urgent HD, now improved volume status, renal function improving off dialysis. Patient found to have severe AS s/p TAVR 7/22. Course was complicated by persist volume overload despite diuresis and hyponatremia. Patient placed on BIPAP and transferred to CICU for possible CVVHD.  ====================  Neuro  - no acute issues, following all commands    Resp  #Acute hypercarbic respiratory failure  -likely secondary to volume overload  -started on BIPAP overnight 10/5 40%  -will get ABG this AM and de-escalate as appropriate    CV  #HFpEF  -RHC 7/14 RA 16, PA 58/23/37, PCWP 28 (V 48), Sat 67 CO/CI 6.1/3.6  TTE 7/26/21 - EF 60-65%, TAVR, severe LA enlargement, severe RA enlargement, small pericardial effusion, IVC enlarged (3.6 cm), mod RV dilatation   8/5/2021: RA 17 PA 60/25/37 W 27 CO/CI 5.9/3.5  -additionally, RHC with elevated filling pressures   -patient persistently volume overloaded (b/l B-lines on POCUS)  -c/w metoprolol succinate 12.5 qd  -currently on bumex gtt @2 + spirinolacton 25 BID + acetazolamide 500 BID, despite this, patient still overloaded  -will attempt to maximize diuresis. called nephrology overnight for possible CVVHD for volume removal (and for uremia). awaiting recs    #Afib  -metoprolol 12.5 qd   -not on AC due to GIB    #AS s/p TAVR  -tolerated well  -structural cardiology following    #GI  -npo while on bipap    #ID  -was on cipro for UTI, but has been treated for >10d, so dc'd on 8/10  -no other acute issues    Renal  #JACQUE  -Cr elevation, with volume overload, and now with uremia  -patient making good urine on bumex gtt 2mg and other diuretics  -given persistent volume overload and uremia, will ask nephrology about diuresis  -place morris (only on primafit prior to this), strict Is/Os, renally dose all meds   -hyponatremia resolving with diuresis    Heme  #Anemia  -likely multifactorial from sepsis vs CKD  -keep active type and screen    Endo  #T2DM  -c/w lantus 20 and admelog 8    #DVT PPx.  -heparin subq    #Ethics  -patient is DNR/DNI, MOLST to be signed and placed in chart when son arrives

## 2021-08-10 NOTE — PROCEDURE NOTE - NSPOSTPRCRAD_GEN_A_CORE
post-procedure radiography performed
no pneumothorax
no pneumothorax
RIJ/central line located in the/post-procedure radiography performed

## 2021-08-10 NOTE — PROCEDURE NOTE - ADDITIONAL PROCEDURE DETAILS
Right Distal Cephalic Vein 20Gauge 8cm Extended Dwell Peripheral Catheter in appropiate position for IV medications and fluid. Please flush 10ml 09% NaCl one per shift and after medication administration. Please change dressing q 7days, or sooner if visibly soiled. Catheter may stay in place x 28days, until 9/6/21.
DANIELLA Rosenthal

## 2021-08-10 NOTE — PROGRESS NOTE ADULT - SUBJECTIVE AND OBJECTIVE BOX
CHIEF COMPLAINT: f/up sob, fluid OL-PH WHO class 2, CHF, abnormal CT-c/w CA (RML)-some sob on exertion but not really exerting, poor appetite    Interval Events: struct heart, nephrology and CHF group-s/p RHC-bumex drip in place.      REVIEW OF SYSTEMS:  Constitutional: No fevers or chills. No weight loss. No fatigue or generalized malaise.  Eyes: No itching or discharge from the eyes  ENT: No ear pain. No ear discharge. No nasal congestion. No post nasal drip. No epistaxis. No throat pain. No sore throat. No difficulty swallowing.   CV: No chest pain. No palpitations. No lightheadedness or dizziness.   Resp: No dyspnea at rest. No dyspnea on exertion. No orthopnea. No wheezing. No cough. No stridor. No sputum production. No chest pain with respiration.  GI: No nausea. No vomiting. No diarrhea.  MSK: No joint pain or pain in any extremities  Integumentary: No skin lesions. No pedal edema.  Neurological: No gross motor weakness. No sensory changes.  [ ] All other systems negative  [ ] Unable to assess ROS because ________    OBJECTIVE:  ICU Vital Signs Last 24 Hrs  T(C): 36.4 (10 Aug 2021 04:56), Max: 36.4 (09 Aug 2021 12:44)  T(F): 97.6 (10 Aug 2021 04:56), Max: 97.6 (09 Aug 2021 22:57)  HR: 80 (10 Aug 2021 04:56) (69 - 84)  BP: 114/68 (10 Aug 2021 04:56) (98/59 - 128/52)  BP(mean): 75 (09 Aug 2021 22:57) (75 - 78)  ABP: --  ABP(mean): --  RR: 20 (10 Aug 2021 04:56) (18 - 20)  SpO2: 99% (10 Aug 2021 04:56) (96% - 100%)        08-08 @ 07:01  -  08-09 @ 07:00  --------------------------------------------------------  IN: 780 mL / OUT: 1725 mL / NET: -945 mL    08-09 @ 07:01  -  08-10 @ 05:37  --------------------------------------------------------  IN: 500 mL / OUT: 300 mL / NET: 200 mL      CAPILLARY BLOOD GLUCOSE      POCT Blood Glucose.: 213 mg/dL (09 Aug 2021 23:07)      PHYSICAL EXAM:  General: Awake, alert, oriented X 3.   HEENT: Atraumatic, normocephalic.                 Mallampatti Grade                 No nasal congestion.                No tonsillar or pharyngeal exudates.  Lymph Nodes: No palpable lymphadenopathy  Neck: No JVD. No carotid bruit.   Respiratory: Normal chest expansion                         Normal percussion                         Normal and equal air entry                         No wheeze, rhonchi or rales.  Cardiovascular: S1 S2 normal. No murmurs, rubs or gallops.   Abdomen: Soft, non-tender, non-distended. No organomegaly. Normoactive bowel sounds.  Extremities: Warm to touch. Peripheral pulse palpable. No pedal edema.   Skin: No rashes or skin lesions  Neurological: Motor and sensory examination equal and normal in all four extremities.  Psychiatry: Appropriate mood and affect.    HOSPITAL MEDICATIONS:  MEDICATIONS  (STANDING):  acetaZOLAMIDE    Tablet 500 milliGRAM(s) Oral two times a day  aspirin  chewable 81 milliGRAM(s) Oral daily  atorvastatin 40 milliGRAM(s) Oral at bedtime  budesonide  80 MICROgram(s)/formoterol 4.5 MICROgram(s) Inhaler 2 Puff(s) Inhalation two times a day  buMETAnide Infusion 2 mG/Hr (10 mL/Hr) IV Continuous <Continuous>  ciprofloxacin     Tablet 250 milliGRAM(s) Oral daily  dextrose 40% Gel 15 Gram(s) Oral once  dextrose 50% Injectable 25 Gram(s) IV Push once  dextrose 50% Injectable 12.5 Gram(s) IV Push once  dextrose 50% Injectable 25 Gram(s) IV Push once  ferrous    sulfate 325 milliGRAM(s) Oral daily  glucagon  Injectable 1 milliGRAM(s) IntraMuscular once  heparin   Injectable 5000 Unit(s) SubCutaneous every 8 hours  insulin glargine Injectable (LANTUS) 20 Unit(s) SubCutaneous at bedtime  insulin lispro (ADMELOG) corrective regimen sliding scale   SubCutaneous three times a day before meals  insulin lispro Injectable (ADMELOG) 8 Unit(s) SubCutaneous three times a day before meals  metoprolol succinate ER 12.5 milliGRAM(s) Oral daily  polyethylene glycol 3350 17 Gram(s) Oral daily  spironolactone 25 milliGRAM(s) Oral two times a day  tiotropium 18 MICROgram(s) Capsule 1 Capsule(s) Inhalation daily    MEDICATIONS  (PRN):  artificial  tears Solution 1 Drop(s) Both EYES every 12 hours PRN Dry Eyes      LABS:                        8.5    13.86 )-----------( 431      ( 09 Aug 2021 06:07 )             29.0     08-09    x   |  x   |  x   ----------------------------<  x   4.2   |  x   |  x     Ca    10.6<H>      09 Aug 2021 06:07          Arterial Blood Gas:  08-09 @ 15:49  7.44/67/53/44/85/17.8  ABG lactate: --        MICROBIOLOGY:     RADIOLOGY:  [ ] Reviewed and interpreted by me    Point of Care Ultrasound Findings:    PFT:    EKG: CHIEF COMPLAINT: f/up sob, fluid OL-PH WHO class 2, CHF, abnormal CT-c/w CA (RML)-more sob-used bipap o/n    Interval Events: struct heart, nephrology and CHF group-s/p RHC--moving to CICU for CVVHD due to progressive fluid OL      REVIEW OF SYSTEMS:  Constitutional: No fevers or chills. No weight loss. + fatigue or generalized malaise.  Eyes: No itching or discharge from the eyes  ENT: No ear pain. No ear discharge. No nasal congestion. No post nasal drip. No epistaxis. No throat pain. No sore throat. No difficulty swallowing.   CV: No chest pain. No palpitations. No lightheadedness or dizziness.   Resp: No dyspnea at rest. + dyspnea on exertion. No orthopnea. No wheezing. No cough. No stridor. No sputum production. No chest pain with respiration.  GI: No nausea. No vomiting. No diarrhea.  MSK: No joint pain or pain in any extremities  Integumentary: No skin lesions. No pedal edema.  Neurological: + gross motor weakness. No sensory changes.  [+ ] All other systems negative  [ ] Unable to assess ROS because ________    OBJECTIVE:  ICU Vital Signs Last 24 Hrs  T(C): 36.4 (10 Aug 2021 04:56), Max: 36.4 (09 Aug 2021 12:44)  T(F): 97.6 (10 Aug 2021 04:56), Max: 97.6 (09 Aug 2021 22:57)  HR: 80 (10 Aug 2021 04:56) (69 - 84)  BP: 114/68 (10 Aug 2021 04:56) (98/59 - 128/52)  BP(mean): 75 (09 Aug 2021 22:57) (75 - 78)  ABP: --  ABP(mean): --  RR: 20 (10 Aug 2021 04:56) (18 - 20)  SpO2: 99% (10 Aug 2021 04:56) (96% - 100%)        08-08 @ 07:01  -  08-09 @ 07:00  --------------------------------------------------------  IN: 780 mL / OUT: 1725 mL / NET: -945 mL    08-09 @ 07:01  -  08-10 @ 05:37  --------------------------------------------------------  IN: 500 mL / OUT: 300 mL / NET: 200 mL      CAPILLARY BLOOD GLUCOSE      POCT Blood Glucose.: 213 mg/dL (09 Aug 2021 23:07)      PHYSICAL EXAM: on bipap  General: Awake, alert, oriented X 3.   HEENT: Atraumatic, normocephalic.                 Mallampatti Grade 3                No nasal congestion.                No tonsillar or pharyngeal exudates.  Lymph Nodes: No palpable lymphadenopathy  Neck: No JVD. No carotid bruit.   Respiratory: abnormal chest expansion                         Normal percussion                         Normal and equal air entry                         No wheeze, rhonchi but bibasilar rales.  Cardiovascular: S1 S2 normal. No murmurs, rubs or gallops.   Abdomen: Soft, non-tender, non-distended. No organomegaly. Normoactive bowel sounds.  Extremities: Warm to touch. Peripheral pulse palpable. No pedal edema.   Skin: No rashes or skin lesions  Neurological: Motor and sensory examination equal and normal in all four extremities.  Psychiatry: Appropriate mood and affect.    HOSPITAL MEDICATIONS:  MEDICATIONS  (STANDING):  acetaZOLAMIDE    Tablet 500 milliGRAM(s) Oral two times a day  aspirin  chewable 81 milliGRAM(s) Oral daily  atorvastatin 40 milliGRAM(s) Oral at bedtime  budesonide  80 MICROgram(s)/formoterol 4.5 MICROgram(s) Inhaler 2 Puff(s) Inhalation two times a day  buMETAnide Infusion 2 mG/Hr (10 mL/Hr) IV Continuous <Continuous>  ciprofloxacin     Tablet 250 milliGRAM(s) Oral daily  dextrose 40% Gel 15 Gram(s) Oral once  dextrose 50% Injectable 25 Gram(s) IV Push once  dextrose 50% Injectable 12.5 Gram(s) IV Push once  dextrose 50% Injectable 25 Gram(s) IV Push once  ferrous    sulfate 325 milliGRAM(s) Oral daily  glucagon  Injectable 1 milliGRAM(s) IntraMuscular once  heparin   Injectable 5000 Unit(s) SubCutaneous every 8 hours  insulin glargine Injectable (LANTUS) 20 Unit(s) SubCutaneous at bedtime  insulin lispro (ADMELOG) corrective regimen sliding scale   SubCutaneous three times a day before meals  insulin lispro Injectable (ADMELOG) 8 Unit(s) SubCutaneous three times a day before meals  metoprolol succinate ER 12.5 milliGRAM(s) Oral daily  polyethylene glycol 3350 17 Gram(s) Oral daily  spironolactone 25 milliGRAM(s) Oral two times a day  tiotropium 18 MICROgram(s) Capsule 1 Capsule(s) Inhalation daily    MEDICATIONS  (PRN):  artificial  tears Solution 1 Drop(s) Both EYES every 12 hours PRN Dry Eyes      LABS:                        8.5    13.86 )-----------( 431      ( 09 Aug 2021 06:07 )             29.0     08-09    x   |  x   |  x   ----------------------------<  x   4.2   |  x   |  x     Ca    10.6<H>      09 Aug 2021 06:07          Arterial Blood Gas:  08-09 @ 15:49  7.44/67/53/44/85/17.8  ABG lactate: --        MICROBIOLOGY:     RADIOLOGY:  [ ] Reviewed and interpreted by me    Point of Care Ultrasound Findings:    PFT:    EKG:

## 2021-08-10 NOTE — PROGRESS NOTE ADULT - SUBJECTIVE AND OBJECTIVE BOX
ANITHA JHAVERI  MRN-[id number]  Patient is a 87y old  Female who presents with a chief complaint of urgent HD (10 Aug 2021 15:52)    HPI:  87F PMH CAD w/stents, DM2, Afib (not on AC's, dc'd pradaxa 1mo ago as pt w/u for anemia), Colon Ca (about 25y ago),  AS, HF on 80mg lasix qd, recently started on Entresto, presents to the ED with abnormal labs done on 7/7 showing JACQUE with hyperk to >6 mod hemolyzed. Pt states that she hasn't urinated in 2 days, also states she's had nonbloody loose stools over the past week. Otherwise she states SOB is at baseline. Denies any fevers/chills, cough, chest pain, palpitations, lightheadedness, abd pain, n/v, leg swelling worse than baseline.    Home medications: Metformin, Lasix 80qd, Metolazone 25 mg qd, Glipizide, atorvastatin 40 mg qhs, Zolpidem 5 mg qhs, Entresto 24 mg, Metoprolol 25 mg and Januvia     On presentation patient with BUN/Cr 79/6.7 mg/dl. K: 6.6 (non hemolyzed). Previous Cr was at 1.26 6/7/21. Also noted with bicarb: 11 pH 7.2 - lactate 7. patient on Metformin at home and reports taking all of her medications.     Nephrology consulted for JACQUE / hyperkalemia and acidosis. Temporized in ED with Bicarb and calcium. Per tox will need Q1 FSG for sulfonylurea and JACQUE. Will be admitted to MICU for HD.  (08 Jul 2021 11:43)      Hospital Course:  8/10 transferred to CCU for further management     24 HOUR EVENTS:    REVIEW OF SYSTEMS:   Constitutional: No weakness, fevers, or chills  Eyes/ENT: No visual changes  Respiratory: No cough, wheezing, hemoptysis  Cardiovascular: No chest pain, no palpitations  Gastrointestinal: No abdominal pain. No nausea, vomiting, hematemesis.   Genitourinary: No dysuria  Neurological: No numbness, no weakness  Skin: No itching, rashes    ICU Vital Signs Last 24 Hrs  T(C): 36.8 (10 Aug 2021 19:00), Max: 36.8 (10 Aug 2021 19:00)  T(F): 98.2 (10 Aug 2021 19:00), Max: 98.2 (10 Aug 2021 19:00)  HR: 88 (10 Aug 2021 20:00) (73 - 90)  BP: 113/41 (10 Aug 2021 20:00) (78/44 - 128/61)  BP(mean): 67 (10 Aug 2021 20:00) (56 - 95)  ABP: --  ABP(mean): --  RR: 18 (10 Aug 2021 20:00) (17 - 50)  SpO2: 98% (10 Aug 2021 20:00) (91% - 100%)      CVP(mm Hg): 5 (08-10-21 @ 20:00) (5 - 11)  PA: 44/15 (08-10-21 @ 20:00) (20/9 - 53/20)  PA(mean): 24 (08-10-21 @ 20:00) (13 - 32)      I&O's Summary    09 Aug 2021 07:01  -  10 Aug 2021 07:00  --------------------------------------------------------  IN: 530 mL / OUT: 700 mL / NET: -170 mL    10 Aug 2021 07:01  -  10 Aug 2021 20:37  --------------------------------------------------------  IN: 360 mL / OUT: 880 mL / NET: -520 mL      CAPILLARY BLOOD GLUCOSE  POCT Blood Glucose.: 172 mg/dL (10 Aug 2021 17:22)      PHYSICAL EXAM:   General: No acute distress  Eyes: EOMI, PERRLA, conjunctiva and sclera clear  Chest/Lung: CTAB, no wheezes, rales, or rhonchi  Heart: Regular rate, regular rhythm. Normal S1/S2. No murmurs, rubs, or gallops.  Abdomen: Soft, nontender, nondistended. Normal bowel sounds.  Extremities: 2+ peripheral pulses B/L. No clubbing, cyanosis, or edema.  Neurology: A&O x3, no focal deficits  Skin: No rashes or lesions  ============================I/O===========================   I&O's Detail    09 Aug 2021 07:01  -  10 Aug 2021 07:00  --------------------------------------------------------  IN:    Bumetanide: 100 mL    Bumetanide: 20 mL    Oral Fluid: 410 mL  Total IN: 530 mL    OUT:    Voided (mL): 700 mL  Total OUT: 700 mL    Total NET: -170 mL      10 Aug 2021 07:01  -  10 Aug 2021 20:37  --------------------------------------------------------  IN:    Bumetanide: 160 mL    IV PiggyBack: 200 mL  Total IN: 360 mL    OUT:    Indwelling Catheter - Urethral (mL): 670 mL    Voided (mL): 210 mL  Total OUT: 880 mL    Total NET: -520 mL        ============================ LABS =========================                        8.4    13.10 )-----------( 402      ( 10 Aug 2021 13:37 )             28.2     08-10    138  |  84<L>  |  110<H>  ----------------------------<  171<H>  3.0<L>   |  38<H>  |  1.44<H>    Ca    10.8<H>      10 Aug 2021 06:37  Phos  5.0     08-10  Mg     3.2     08-10    TPro  7.8  /  Alb  3.2<L>  /  TBili  0.9  /  DBili  x   /  AST  20  /  ALT  10  /  AlkPhos  271<H>  08-10                LIVER FUNCTIONS - ( 10 Aug 2021 06:37 )  Alb: 3.2 g/dL / Pro: 7.8 g/dL / ALK PHOS: 271 U/L / ALT: 10 U/L / AST: 20 U/L / GGT: x             ABG - ( 10 Aug 2021 06:32 )  pH, Arterial: 7.46  pH, Blood: x     /  pCO2: 63    /  pO2: 98    / HCO3: 44    / Base Excess: 17.9  /  SaO2: 99                Lactate, Blood: 1.2 mmol/L (08-10-21 @ 13:37)  Blood Gas Arterial, Lactate: 1.5 mmol/L (08-10-21 @ 06:32)  Blood Gas Arterial, Lactate: 2.3 mmol/L (08-09-21 @ 15:49)      ======================Micro/Rad/Cardio=================  Telemetry: Reviewed   EKG: Reviewed  CXR: Reviewed  Culture: Reviewed   Echo: Reviewed  Cath: Reviewed  ======================================================  PAST MEDICAL & SURGICAL HISTORY:    ====================ASSESSMENT ==============  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  HFpEF  Afib   Acute hypercarbic respiratory failure  JACQUE   DM type 2     Plan:  ====================== NEUROLOGY=====================  No acute issues   - Continue to monitor neuro status     ==================== RESPIRATORY======================  Acute hypercarbic respiratory failure  -likely secondary to volume overload  - Supplemental O2 via 50%/50L HFO2   - will get ABG this AM and de-escalate as appropriate  - continue bronchodilators     Mechanical Ventilation:    budesonide  80 MICROgram(s)/formoterol 4.5 MICROgram(s) Inhaler 2 Puff(s) Inhalation two times a day  tiotropium 18 MICROgram(s) Capsule 1 Capsule(s) Inhalation daily    ====================CARDIOVASCULAR==================  HFpEF  -RHC 7/14 RA 16, PA 58/23/37, PCWP 28 (V 48), Sat 67 CO/CI 6.1/3.6  TTE 7/26/21 - EF 60-65%, TAVR, severe LA enlargement, severe RA enlargement, small pericardial effusion, IVC enlarged (3.6 cm), mod RV dilatation   8/5/2021: RA 17 PA 60/25/37 W 27 CO/CI 5.9/3.5  -additionally, RHC with elevated filling pressures   -patient persistently volume overloaded (b/l B-lines on POCUS) - now on CRRT   - c/w asa     Afib  -not on AC due to GIB    AS s/p TAVR  -tolerated well  -structural cardiology following    aspirin  chewable 81 milliGRAM(s) Oral daily    ===================HEMATOLOGIC/ONC ===================  Anemia  -likely multifactorial from sepsis vs CKD  -keep active type and screen  - VTE prophylaxis with SQ heparin     heparin   Injectable 5000 Unit(s) SubCutaneous every 8 hours    ===================== RENAL =========================  JACQUE  -Cr elevation, with volume overload, and now with uremia  -given persistent volume overload and uremia, continue CRRT   - Continue to monitor I/Os, BUN/Creatinine, and urine output  - Replete lytes PRN. Keep K> 4 and Mg >2     ==================== GASTROINTESTINAL===================  Tolerating consistent carb diet   - Bowel regimen with Miralax.     ferrous    sulfate 325 milliGRAM(s) Oral daily  polyethylene glycol 3350 17 Gram(s) Oral daily    =======================    ENDOCRINE  =====================  Hx of DM2   - glucose control with admelog sliding scale and lantus     dextrose 40% Gel 15 Gram(s) Oral once  dextrose 50% Injectable 25 Gram(s) IV Push once  dextrose 50% Injectable 12.5 Gram(s) IV Push once  dextrose 50% Injectable 25 Gram(s) IV Push once  glucagon  Injectable 1 milliGRAM(s) IntraMuscular once  insulin glargine Injectable (LANTUS) 24 Unit(s) SubCutaneous at bedtime  insulin lispro (ADMELOG) corrective regimen sliding scale   SubCutaneous three times a day before meals  insulin lispro Injectable (ADMELOG) 8 Unit(s) SubCutaneous three times a day before meals    ========================INFECTIOUS DISEASE================  Afebrile, WBC 13.28->13.86->13.10   - Continue trending WBC and monitoring fever curve     Patient requires continuous monitoring with bedside rhythm monitoring, pulse ox monitoring, and intermittent blood gas analysis. Care plan discussed with ICU care team. Patient remained critical and at risk for life threatening decompensation.  Patient seen, examined and plan discussed with CCU team during rounds.     I have personally provided 35 minutes of critical care time excluding time spent on separate procedures.    By signing my name below, I, Michelle Lin, attest that this documentation has been prepared under the direction and in the presence of QUITA Merlos   Electronically signed: Andrea Alarcon, 08-10-21 @ 20:37    I, Phylicia Gonzalez, personally performed the services described in this documentation. all medical record entries made by the liloibrichard were at my direction and in my presence. I have reviewed the chart and agree that the record reflects my personal performance and is accurate and complete  Electronically signed: QUITA Merlos        ANITHA JHAVERI  MRN-[id number]  Patient is a 87y old  Female who presents with a chief complaint of urgent HD (10 Aug 2021 15:52)    HPI:  87F PMH CAD w/stents, DM2, Afib (not on AC's, dc'd pradaxa 1mo ago as pt w/u for anemia), Colon Ca (about 25y ago),  AS, HF on 80mg lasix qd, recently started on Entresto, presents to the ED with abnormal labs done on 7/7 showing JACQUE with hyperk to >6 mod hemolyzed. Pt states that she hasn't urinated in 2 days, also states she's had nonbloody loose stools over the past week. Otherwise she states SOB is at baseline. Denies any fevers/chills, cough, chest pain, palpitations, lightheadedness, abd pain, n/v, leg swelling worse than baseline.    Home medications: Metformin, Lasix 80qd, Metolazone 25 mg qd, Glipizide, atorvastatin 40 mg qhs, Zolpidem 5 mg qhs, Entresto 24 mg, Metoprolol 25 mg and Januvia     On presentation patient with BUN/Cr 79/6.7 mg/dl. K: 6.6 (non hemolyzed). Previous Cr was at 1.26 6/7/21. Also noted with bicarb: 11 pH 7.2 - lactate 7. patient on Metformin at home and reports taking all of her medications.     Nephrology consulted for JACQUE / hyperkalemia and acidosis. Temporized in ED with Bicarb and calcium. Per tox will need Q1 FSG for sulfonylurea and JACQUE. Will be admitted to MICU for HD.  (08 Jul 2021 11:43)      24 HOUR EVENTS:  - started on CRRT for uremia    REVIEW OF SYSTEMS:   Cardiac: No chest pain  Respiratory: No SOB    ICU Vital Signs Last 24 Hrs  T(C): 36.8 (10 Aug 2021 19:00), Max: 36.8 (10 Aug 2021 19:00)  T(F): 98.2 (10 Aug 2021 19:00), Max: 98.2 (10 Aug 2021 19:00)  HR: 88 (10 Aug 2021 20:00) (73 - 90)  BP: 113/41 (10 Aug 2021 20:00) (78/44 - 128/61)  BP(mean): 67 (10 Aug 2021 20:00) (56 - 95)  RR: 18 (10 Aug 2021 20:00) (17 - 50)  SpO2: 98% (10 Aug 2021 20:00) (91% - 100%)      CVP(mm Hg): 5 (08-10-21 @ 20:00) (5 - 11)  PA: 44/15 (08-10-21 @ 20:00) (20/9 - 53/20)  PA(mean): 24 (08-10-21 @ 20:00) (13 - 32)      I&O's Summary    09 Aug 2021 07:01  -  10 Aug 2021 07:00  --------------------------------------------------------  IN: 530 mL / OUT: 700 mL / NET: -170 mL    10 Aug 2021 07:01  -  10 Aug 2021 20:37  --------------------------------------------------------  IN: 360 mL / OUT: 880 mL / NET: -520 mL      CAPILLARY BLOOD GLUCOSE  POCT Blood Glucose.: 172 mg/dL (10 Aug 2021 17:22)      PHYSICAL EXAM:   General: No acute distress  Eyes: EOMI, PERRLA, conjunctiva and sclera clear  Chest/Lung: CTAB, no wheezes, rales, or rhonchi  Heart: Regular rate, regular rhythm. Normal S1/S2. No murmurs, rubs, or gallops.  Abdomen: Soft, nontender, nondistended. Normal bowel sounds.  Extremities: 2+ peripheral pulses B/L. No clubbing, cyanosis, or edema.  Neurology: Alert, oriented   ============================I/O===========================   I&O's Detail    09 Aug 2021 07:01  -  10 Aug 2021 07:00  --------------------------------------------------------  IN:    Bumetanide: 100 mL    Bumetanide: 20 mL    Oral Fluid: 410 mL  Total IN: 530 mL    OUT:    Voided (mL): 700 mL  Total OUT: 700 mL    Total NET: -170 mL      10 Aug 2021 07:01  -  10 Aug 2021 20:37  --------------------------------------------------------  IN:    Bumetanide: 160 mL    IV PiggyBack: 200 mL  Total IN: 360 mL    OUT:    Indwelling Catheter - Urethral (mL): 670 mL    Voided (mL): 210 mL  Total OUT: 880 mL    Total NET: -520 mL        ============================ LABS =========================                        8.4    13.10 )-----------( 402      ( 10 Aug 2021 13:37 )             28.2     08-10    138  |  84<L>  |  110<H>  ----------------------------<  171<H>  3.0<L>   |  38<H>  |  1.44<H>    Ca    10.8<H>      10 Aug 2021 06:37  Phos  5.0     08-10  Mg     3.2     08-10    TPro  7.8  /  Alb  3.2<L>  /  TBili  0.9  /  DBili  x   /  AST  20  /  ALT  10  /  AlkPhos  271<H>  08-10                LIVER FUNCTIONS - ( 10 Aug 2021 06:37 )  Alb: 3.2 g/dL / Pro: 7.8 g/dL / ALK PHOS: 271 U/L / ALT: 10 U/L / AST: 20 U/L / GGT: x             ABG - ( 10 Aug 2021 06:32 )  pH, Arterial: 7.46  pH, Blood: x     /  pCO2: 63    /  pO2: 98    / HCO3: 44    / Base Excess: 17.9  /  SaO2: 99                Lactate, Blood: 1.2 mmol/L (08-10-21 @ 13:37)  Blood Gas Arterial, Lactate: 1.5 mmol/L (08-10-21 @ 06:32)  Blood Gas Arterial, Lactate: 2.3 mmol/L (08-09-21 @ 15:49)      ======================Micro/Rad/Cardio=================  Telemetry: Reviewed   EKG: Reviewed  CXR: Reviewed  Culture: Reviewed   Echo: Reviewed  Cath: Reviewed  ======================================================  PAST MEDICAL & SURGICAL HISTORY:    ====================ASSESSMENT ==============  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  HFpEF  Afib   Acute hypercarbic respiratory failure  JACQUE   DM type 2     Plan:  ====================== NEUROLOGY=====================  No acute issues   - Continue to monitor neuro status     ==================== RESPIRATORY======================  Acute hypercarbic respiratory failure  -likely secondary to volume overload  - Supplemental O2 via 50%/50L HFO2   - will get ABG this AM and de-escalate as appropriate  - continue bronchodilators     Mechanical Ventilation:    budesonide  80 MICROgram(s)/formoterol 4.5 MICROgram(s) Inhaler 2 Puff(s) Inhalation two times a day  tiotropium 18 MICROgram(s) Capsule 1 Capsule(s) Inhalation daily    ====================CARDIOVASCULAR==================  HFpEF  -RHC 7/14 RA 16, PA 58/23/37, PCWP 28 (V 48), Sat 67 CO/CI 6.1/3.6  TTE 7/26/21 - EF 60-65%, TAVR, severe LA enlargement, severe RA enlargement, small pericardial effusion, IVC enlarged (3.6 cm), mod RV dilatation   8/5/2021: RA 17 PA 60/25/37 W 27 CO/CI 5.9/3.5  -additionally, RHC with elevated filling pressures   -patient persistently volume overloaded (b/l B-lines on POCUS) - now on CRRT   - c/w asa     Afib  -not on AC due to GIB    AS s/p TAVR  -tolerated well  -structural cardiology following    aspirin  chewable 81 milliGRAM(s) Oral daily    ===================HEMATOLOGIC/ONC ===================  Anemia  -likely multifactorial from sepsis vs CKD  -keep active type and screen  - VTE prophylaxis with SQ heparin     heparin   Injectable 5000 Unit(s) SubCutaneous every 8 hours    ===================== RENAL =========================  JACQUE  -Cr elevation, with volume overload, and now with uremia  -given persistent volume overload and uremia, continue CRRT   - Continue to monitor I/Os, BUN/Creatinine, and urine output  - Replete lytes PRN. Keep K> 4 and Mg >2     ==================== GASTROINTESTINAL===================  Tolerating consistent carb diet   - Bowel regimen with Miralax.     ferrous    sulfate 325 milliGRAM(s) Oral daily  polyethylene glycol 3350 17 Gram(s) Oral daily    =======================    ENDOCRINE  =====================  Hx of DM2   - glucose control with admelog sliding scale and lantus     dextrose 40% Gel 15 Gram(s) Oral once  dextrose 50% Injectable 25 Gram(s) IV Push once  dextrose 50% Injectable 12.5 Gram(s) IV Push once  dextrose 50% Injectable 25 Gram(s) IV Push once  glucagon  Injectable 1 milliGRAM(s) IntraMuscular once  insulin glargine Injectable (LANTUS) 24 Unit(s) SubCutaneous at bedtime  insulin lispro (ADMELOG) corrective regimen sliding scale   SubCutaneous three times a day before meals  insulin lispro Injectable (ADMELOG) 8 Unit(s) SubCutaneous three times a day before meals    ========================INFECTIOUS DISEASE================  Afebrile, WBC 13.28->13.86->13.10   - Continue trending WBC and monitoring fever curve     Patient requires continuous monitoring with bedside rhythm monitoring, pulse ox monitoring, and intermittent blood gas analysis. Care plan discussed with ICU care team. Patient remained critical and at risk for life threatening decompensation.  Patient seen, examined and plan discussed with CCU team during rounds.     I have personally provided 35 minutes of critical care time excluding time spent on separate procedures.    By signing my name below, I, Michelle Lin, attest that this documentation has been prepared under the direction and in the presence of QUITA Merlos   Electronically signed: Dung Alarcon, 08-10-21 @ 20:37    I, Phylicia Gonzalez, personally performed the services described in this documentation. all medical record entries made by the dung were at my direction and in my presence. I have reviewed the chart and agree that the record reflects my personal performance and is accurate and complete  Electronically signed: QUITA Merlos        ANITHA JHAVERI  MRN-[id number]  Patient is a 87y old  Female who presents with a chief complaint of urgent HD (10 Aug 2021 15:52)    HPI:  87F PMH CAD w/stents, DM2, Afib (not on AC's, dc'd pradaxa 1mo ago as pt w/u for anemia), Colon Ca (about 25y ago),  AS, HF on 80mg lasix qd, recently started on Entresto, presents to the ED with abnormal labs done on 7/7 showing JACQUE with hyperk to >6 mod hemolyzed. Pt states that she hasn't urinated in 2 days, also states she's had nonbloody loose stools over the past week. Otherwise she states SOB is at baseline. Denies any fevers/chills, cough, chest pain, palpitations, lightheadedness, abd pain, n/v, leg swelling worse than baseline.    Home medications: Metformin, Lasix 80qd, Metolazone 25 mg qd, Glipizide, atorvastatin 40 mg qhs, Zolpidem 5 mg qhs, Entresto 24 mg, Metoprolol 25 mg and Januvia     On presentation patient with BUN/Cr 79/6.7 mg/dl. K: 6.6 (non hemolyzed). Previous Cr was at 1.26 6/7/21. Also noted with bicarb: 11 pH 7.2 - lactate 7. patient on Metformin at home and reports taking all of her medications.     Nephrology consulted for JACQUE / hyperkalemia and acidosis. Temporized in ED with Bicarb and calcium. Per tox will need Q1 FSG for sulfonylurea and JACQUE. Will be admitted to MICU for HD.  (08 Jul 2021 11:43)      24 HOUR EVENTS:  - uremic, started on CRRT  - swan placed, RA 7, PA 42/18, PCWP 12. Diuresis stopped.  - MvO2 66%/CO 6.9/CI 4.4//CVP 5/Lactate 1.2    REVIEW OF SYSTEMS:   Cardiac: No chest pain  Respiratory: No SOB    ICU Vital Signs Last 24 Hrs  T(C): 36.8 (10 Aug 2021 19:00), Max: 36.8 (10 Aug 2021 19:00)  T(F): 98.2 (10 Aug 2021 19:00), Max: 98.2 (10 Aug 2021 19:00)  HR: 88 (10 Aug 2021 20:00) (73 - 90)  BP: 113/41 (10 Aug 2021 20:00) (78/44 - 128/61)  BP(mean): 67 (10 Aug 2021 20:00) (56 - 95)  RR: 18 (10 Aug 2021 20:00) (17 - 50)  SpO2: 98% (10 Aug 2021 20:00) (91% - 100%)      CVP(mm Hg): 5 (08-10-21 @ 20:00) (5 - 11)  PA: 44/15 (08-10-21 @ 20:00) (20/9 - 53/20)  PA(mean): 24 (08-10-21 @ 20:00) (13 - 32)      I&O's Summary    09 Aug 2021 07:01  -  10 Aug 2021 07:00  --------------------------------------------------------  IN: 530 mL / OUT: 700 mL / NET: -170 mL    10 Aug 2021 07:01  -  10 Aug 2021 20:37  --------------------------------------------------------  IN: 360 mL / OUT: 880 mL / NET: -520 mL      CAPILLARY BLOOD GLUCOSE  POCT Blood Glucose.: 172 mg/dL (10 Aug 2021 17:22)      PHYSICAL EXAM:   General: No acute distress  Eyes: EOMI, PERRLA, conjunctiva and sclera clear  Chest/Lung: clear b/l, diminished at bases  Heart: Regular rate, regular rhythm. Normal S1/S2  Abdomen: Soft, nontender, nondistended  Extremities: 2+ peripheral pulses B/L. diffuse anasarca  Neurology: Alert, oriented to self only. Non-focal    ============================I/O===========================   I&O's Detail    09 Aug 2021 07:01  -  10 Aug 2021 07:00  --------------------------------------------------------  IN:    Bumetanide: 100 mL    Bumetanide: 20 mL    Oral Fluid: 410 mL  Total IN: 530 mL    OUT:    Voided (mL): 700 mL  Total OUT: 700 mL    Total NET: -170 mL      10 Aug 2021 07:01  -  10 Aug 2021 20:37  --------------------------------------------------------  IN:    Bumetanide: 160 mL    IV PiggyBack: 200 mL  Total IN: 360 mL    OUT:    Indwelling Catheter - Urethral (mL): 670 mL    Voided (mL): 210 mL  Total OUT: 880 mL    Total NET: -520 mL        ============================ LABS =========================                        8.4    13.10 )-----------( 402      ( 10 Aug 2021 13:37 )             28.2     08-10    138  |  84<L>  |  110<H>  ----------------------------<  171<H>  3.0<L>   |  38<H>  |  1.44<H>    Ca    10.8<H>      10 Aug 2021 06:37  Phos  5.0     08-10  Mg     3.2     08-10    TPro  7.8  /  Alb  3.2<L>  /  TBili  0.9  /  DBili  x   /  AST  20  /  ALT  10  /  AlkPhos  271<H>  08-10    LIVER FUNCTIONS - ( 10 Aug 2021 06:37 )  Alb: 3.2 g/dL / Pro: 7.8 g/dL / ALK PHOS: 271 U/L / ALT: 10 U/L / AST: 20 U/L / GGT: x           ABG - ( 10 Aug 2021 06:32 )  pH, Arterial: 7.46  pH, Blood: x     /  pCO2: 63    /  pO2: 98    / HCO3: 44    / Base Excess: 17.9  /  SaO2: 99        Lactate, Blood: 1.2 mmol/L (08-10-21 @ 13:37)  Blood Gas Arterial, Lactate: 1.5 mmol/L (08-10-21 @ 06:32)  Blood Gas Arterial, Lactate: 2.3 mmol/L (08-09-21 @ 15:49)      ======================Micro/Rad/Cardio=================  Telemetry: Reviewed   EKG: Reviewed  CXR: Reviewed  Culture: Reviewed   Echo: Reviewed  Cath: Reviewed  ======================================================  PAST MEDICAL & SURGICAL HISTORY:    ====================ASSESSMENT ==============  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  HFpEF  Afib   Acute hypercarbic respiratory failure  JACQUE   DM type 2     Plan:  ====================== NEUROLOGY=====================  No acute issues   - Continue to monitor neuro status     ==================== RESPIRATORY======================  Acute hypercarbic/hypoxic respiratory failure, atelectasis r lung base  - adequately diuresed, low filling pressures on RHC, diuresis d/c  - cont w/ volara device to facilitate secretion mobilization  - Supplemental O2 via 50%/50L HFO2 , wean as tolerated  - continue bronchodilators     ====================CARDIOVASCULAR==================  HFpEF  -RHC 7/14 w/ elevated filling pressures has been aggressively diuresed this admission  - swan placement at bedside today w/ normal filling pressures, PA sat 72% diuresis d/c  - trend MvO2 and hemodynamics by Bol[place]ar, lactate and cmp  - 8/10 TTE: EF 70%, no segmental WMAs, mod-severe pericardial effusion w/ no RV diastolic collapse  -  cont toprol 12.5  - cont asa, lipitor    Afib  - not on AC due to h/o GIB    AS s/p TAVR  - structural cardiology following    ===================HEMATOLOGIC/ONC ===================  Anemia  - likely multifactorial, CKD  - keep active type and screen  - iron supplementation  - VTE prophylaxis with SQ heparin     ===================== RENAL =========================  JACQUE, metabolic alkalosis, uremia  - contraction alkalosis from diuresis, now euvolemic, bumex and diamox d/c  - , cont CRRT for clearance, keep net even  - Continue to monitor I/Os, BUN/Creatinine, and urine output  - Replete lytes PRN. Keep K> 4 and Mg >2   - appreciate nephro recs    ==================== GASTROINTESTINAL===================  Tolerating consistent carb diet   - Bowel regimen with Miralax.     =======================    ENDOCRINE  =====================  Hx of DM2   - glucose control with admelog sliding scale, lantus 24U, and admelog premeal 8U  - hold premeal if not eating full meals  - adjust regimen as necessary    ========================INFECTIOUS DISEASE================  Afebrile, WBC 13.28->13.86->13.10   - Continue trending WBC and monitoring fever curve     Patient requires continuous monitoring with bedside rhythm monitoring, pulse ox monitoring, and intermittent blood gas analysis. Care plan discussed with ICU care team. Patient remained critical and at risk for life threatening decompensation.  Patient seen, examined and plan discussed with CCU team during rounds.     I have personally provided 35 minutes of critical care time excluding time spent on separate procedures.    By signing my name below, I, Michelle Lin, attest that this documentation has been prepared under the direction and in the presence of QUITA Merlos   Electronically signed: Andrea Alarcon, 08-10-21 @ 20:37    I, Phylicia Gonzalez, personally performed the services described in this documentation. all medical record entries made by the scribe were at my direction and in my presence. I have reviewed the chart and agree that the record reflects my personal performance and is accurate and complete  Electronically signed: QUITA Merlos

## 2021-08-10 NOTE — PROGRESS NOTE ADULT - SUBJECTIVE AND OBJECTIVE BOX
Jacque Riley MD  Cardiology Fellow  [phone]  All Cardiology service information can be found 24/7 on [email or web address], password: [email or web address]    Patient seen and examined at bedside.    Overnight Events: Patient admitted to CICU for respiratory distress overnight. Patient is currently getting [place] urmila catheter.     Review Of Systems:     Current Meds:  artificial  tears Solution 1 Drop(s) Both EYES every 12 hours PRN  aspirin  chewable 81 milliGRAM(s) Oral daily  atorvastatin 40 milliGRAM(s) Oral at bedtime  budesonide  80 MICROgram(s)/formoterol 4.5 MICROgram(s) Inhaler 2 Puff(s) Inhalation two times a day  buMETAnide Infusion 4 mG/Hr IV Continuous <Continuous>  chlorhexidine 2% Cloths 1 Application(s) Topical <User Schedule>  dextrose 40% Gel 15 Gram(s) Oral once  dextrose 50% Injectable 25 Gram(s) IV Push once  dextrose 50% Injectable 12.5 Gram(s) IV Push once  dextrose 50% Injectable 25 Gram(s) IV Push once  ferrous    sulfate 325 milliGRAM(s) Oral daily  glucagon  Injectable 1 milliGRAM(s) IntraMuscular once  heparin   Injectable 5000 Unit(s) SubCutaneous every 8 hours  insulin glargine Injectable (LANTUS) 24 Unit(s) SubCutaneous at bedtime  insulin lispro (ADMELOG) corrective regimen sliding scale   SubCutaneous three times a day before meals  insulin lispro Injectable (ADMELOG) 8 Unit(s) SubCutaneous three times a day before meals  polyethylene glycol 3350 17 Gram(s) Oral daily  tiotropium 18 MICROgram(s) Capsule 1 Capsule(s) Inhalation daily      Vitals:  ICU Vital Signs Last 24 Hrs  T(C): 36.7 (10 Aug 2021 07:00), Max: 36.7 (10 Aug 2021 07:00)  T(F): 98.1 (10 Aug 2021 07:00), Max: 98.1 (10 Aug 2021 07:00)  HR: 86 (10 Aug 2021 12:00) (69 - 86)  BP: 128/61 (10 Aug 2021 12:00) (78/44 - 128/61)  BP(mean): 95 (10 Aug 2021 12:00) (61 - 95)  RR: 50 (10 Aug 2021 12:00) (18 - 50)  SpO2: 100% (10 Aug 2021 12:00) (96% - 100%)      09 Aug 2021 07:01  -  10 Aug 2021 07:00  --------------------------------------------------------  IN: 530 mL / OUT: 700 mL / NET: -170 mL    10 Aug 2021 07:01  -  10 Aug 2021 13:14  --------------------------------------------------------  IN: 180 mL / OUT: 460 mL / NET: -280 mL        Physical Exam:                            8.5    13.86 )-----------( 431      ( 09 Aug 2021 06:07 )             29.0     08-10    138  |  84<L>  |  110<H>  ----------------------------<  171<H>  3.0<L>   |  38<H>  |  1.44<H>    Ca    10.8<H>      10 Aug 2021 06:37  Phos  5.0     08-10  Mg     3.2     08-10    TPro  7.8  /  Alb  3.2<L>  /  TBili  0.9  /  DBili  x   /  AST  20  /  ALT  10  /  AlkPhos  271<H>  08-10        Interpretation of Telemetry: v paced at 80s, with PVS and 6 beats of NSVT    Jacque Riley MD  Cardiology Fellow  [phone]  All Cardiology service information can be found 24/7 on [email or web address], password: [email or web address]    Overnight Events: Patient admitted to CICU for respiratory distress overnight. Patient is currently getting [place] urmila catheter.     Review Of Systems:     Current Meds:  artificial  tears Solution 1 Drop(s) Both EYES every 12 hours PRN  aspirin  chewable 81 milliGRAM(s) Oral daily  atorvastatin 40 milliGRAM(s) Oral at bedtime  budesonide  80 MICROgram(s)/formoterol 4.5 MICROgram(s) Inhaler 2 Puff(s) Inhalation two times a day  buMETAnide Infusion 4 mG/Hr IV Continuous <Continuous>  chlorhexidine 2% Cloths 1 Application(s) Topical <User Schedule>  dextrose 40% Gel 15 Gram(s) Oral once  dextrose 50% Injectable 25 Gram(s) IV Push once  dextrose 50% Injectable 12.5 Gram(s) IV Push once  dextrose 50% Injectable 25 Gram(s) IV Push once  ferrous    sulfate 325 milliGRAM(s) Oral daily  glucagon  Injectable 1 milliGRAM(s) IntraMuscular once  heparin   Injectable 5000 Unit(s) SubCutaneous every 8 hours  insulin glargine Injectable (LANTUS) 24 Unit(s) SubCutaneous at bedtime  insulin lispro (ADMELOG) corrective regimen sliding scale   SubCutaneous three times a day before meals  insulin lispro Injectable (ADMELOG) 8 Unit(s) SubCutaneous three times a day before meals  polyethylene glycol 3350 17 Gram(s) Oral daily  tiotropium 18 MICROgram(s) Capsule 1 Capsule(s) Inhalation daily      Vitals:  ICU Vital Signs Last 24 Hrs  T(C): 36.7 (10 Aug 2021 07:00), Max: 36.7 (10 Aug 2021 07:00)  T(F): 98.1 (10 Aug 2021 07:00), Max: 98.1 (10 Aug 2021 07:00)  HR: 86 (10 Aug 2021 12:00) (69 - 86)  BP: 128/61 (10 Aug 2021 12:00) (78/44 - 128/61)  BP(mean): 95 (10 Aug 2021 12:00) (61 - 95)  RR: 50 (10 Aug 2021 12:00) (18 - 50)  SpO2: 100% (10 Aug 2021 12:00) (96% - 100%)      09 Aug 2021 07:01  -  10 Aug 2021 07:00  --------------------------------------------------------  IN: 530 mL / OUT: 700 mL / NET: -170 mL    10 Aug 2021 07:01  -  10 Aug 2021 13:14  --------------------------------------------------------  IN: 180 mL / OUT: 460 mL / NET: -280 mL        Physical Exam:  Gen: well appearing in NAD  HEENT: EOMI, MMM  Cardio: S1 S2 no murmurs, rubs or gallops  Chest: decreased breath sounds at the bases  Abd: soft nontender to palpation, bS+  Extremities: no edema                          8.5    13.86 )-----------( 431      ( 09 Aug 2021 06:07 )             29.0     08-10    138  |  84<L>  |  110<H>  ----------------------------<  171<H>  3.0<L>   |  38<H>  |  1.44<H>    Ca    10.8<H>      10 Aug 2021 06:37  Phos  5.0     08-10  Mg     3.2     08-10    TPro  7.8  /  Alb  3.2<L>  /  TBili  0.9  /  DBili  x   /  AST  20  /  ALT  10  /  AlkPhos  271<H>  08-10        Interpretation of Telemetry: v paced at 80s, with PVS and 6 beats of NSVT

## 2021-08-10 NOTE — PROGRESS NOTE ADULT - PROBLEM SELECTOR PLAN 6
adjust all medications per egfr            If you have any questions, please feel free to contact me  Calista Chandra  Nephrology Fellow  Pager [place]: [phone]/ [facility]: [phone]    (After 5 pm or on weekends please page the on-call fellow, can check [email or web address] for schedule. Login is bismark garcia, schedule under [facility] medicine, psych, derm)

## 2021-08-10 NOTE — PROGRESS NOTE ADULT - SUBJECTIVE AND OBJECTIVE BOX
HPI:  Patient seen and examined at bedside in CICU.  She is on BiPAP.    Review Of Systems:  uanble to assess while on BiPAP     Medications:  artificial  tears Solution 1 Drop(s) Both EYES every 12 hours PRN  aspirin  chewable 81 milliGRAM(s) Oral daily  atorvastatin 40 milliGRAM(s) Oral at bedtime  budesonide  80 MICROgram(s)/formoterol 4.5 MICROgram(s) Inhaler 2 Puff(s) Inhalation two times a day  buMETAnide Infusion 4 mG/Hr IV Continuous <Continuous>  chlorhexidine 2% Cloths 1 Application(s) Topical <User Schedule>  dextrose 40% Gel 15 Gram(s) Oral once  dextrose 50% Injectable 25 Gram(s) IV Push once  dextrose 50% Injectable 12.5 Gram(s) IV Push once  dextrose 50% Injectable 25 Gram(s) IV Push once  ferrous    sulfate 325 milliGRAM(s) Oral daily  glucagon  Injectable 1 milliGRAM(s) IntraMuscular once  heparin   Injectable 5000 Unit(s) SubCutaneous every 8 hours  insulin glargine Injectable (LANTUS) 24 Unit(s) SubCutaneous at bedtime  insulin lispro (ADMELOG) corrective regimen sliding scale   SubCutaneous three times a day before meals  insulin lispro Injectable (ADMELOG) 8 Unit(s) SubCutaneous three times a day before meals  polyethylene glycol 3350 17 Gram(s) Oral daily  potassium chloride  10 mEq/100 mL IVPB 10 milliEquivalent(s) IV Intermittent every 1 hour  tiotropium 18 MICROgram(s) Capsule 1 Capsule(s) Inhalation daily    PAST MEDICAL & SURGICAL HISTORY:    Vitals:  T(C): 36.7 (08-10-21 @ 07:00), Max: 36.7 (08-10-21 @ 07:00)  HR: 84 (08-10-21 @ 10:00) (69 - 86)  BP: 117/70 (08-10-21 @ 10:00) (78/44 - 128/52)  BP(mean): 90 (08-10-21 @ 10:00) (61 - 90)  RR: 24 (08-10-21 @ 10:00) (18 - 36)  SpO2: 100% (08-10-21 @ 10:00) (96% - 100%)  Wt(kg): --  Daily     Daily   I&O's Summary    09 Aug 2021 07:01  -  10 Aug 2021 07:00  --------------------------------------------------------  IN: 530 mL / OUT: 700 mL / NET: -170 mL    10 Aug 2021 07:01  -  10 Aug 2021 10:39  --------------------------------------------------------  IN: 60 mL / OUT: 460 mL / NET: -400 mL        Physical Exam:  Appearance: Normal, well groomed, NAD  Eyes: PERRLA, EOMI, pink conjunctiva, no scleral icterus   HENT: BiPAP  Cardiovascular: RRR, S1, S2, NO systolic murmur at base  Procedural Access Site: Clean, dry, intact, without hematoma  Respiratory: Clear to auscultation bilaterally  Gastrointestinal: Soft, non-tender, non-distended, BS+  Musculoskeletal: No clubbing or joint deformity   Neurologic: No focal weakness  Lymphatic: No lymphadenopathy  Psychiatry: AAOx3 with appropriate mood and affect  Skin: No rashes, ecchymoses, or cyanosis                          8.5    13.86 )-----------( 431      ( 09 Aug 2021 06:07 )             29.0     08-10    138  |  84<L>  |  110<H>  ----------------------------<  171<H>  3.0<L>   |  38<H>  |  1.44<H>    Ca    10.8<H>      10 Aug 2021 06:37  Phos  5.0     08-10  Mg     3.2     08-10    TPro  7.8  /  Alb  3.2<L>  /  TBili  0.9  /  DBili  x   /  AST  20  /  ALT  10  /  AlkPhos  271<H>  08-10            Echo:  < from: Limited Transthoracic Echo (08.03.21 @ 18:28) >  ------------------------------------------------------------------------  Dimensions:    Normal Values:  LA:     5.8    2.0 - 4.0 cm  Ao:     2.8    2.0 - 3.8 cm  SEPTUM: 0.8    0.6 - 1.2 cm  PWT:    0.8    0.6 - 1.1 cm  LVIDd:  4.3    3.0 - 5.6 cm  LVIDs:  1.9    1.8 - 4.0 cm  Derived variables:  LVMI: 62 g/m2  RWT: 0.37  Fractional short: 56 %  EF (Visual Estimate): >70 %  EF (Teicholtz): 87 %  EF (Lorenzo Rule): 72 %  ------------------------------------------------------------------------  Observations:  Mitral Valve: Mitral annular calcification, otherwise  normal mitral valve. Mild-moderate mitral regurgitation.  Aortic Valve/Aorta: Transcatheter aortic valve replacement.  Peak left ventricular outflow tract gradient equals 3 mm  Hg.  Aortic Root: 2.8 cm.  LVOT diameter: 1.8 cm.  Left Atrium: Severely dilated left atrium.  LA volume index  = 76 cc/m2.  Left Ventricle: Hyperdynamic left ventricular systolic  function. Normal left ventricular internal dimensions and  wall thicknesses. Increased E/e'  is consistent with  elevated left ventricular filling pressure.  Right Heart: Severe right atrial enlargement. Right  ventricular enlargement with decreased right ventricular  systolic function. Tricuspid valve not well visualized.  Moderate-severe tricuspid regurgitation. Pulmonic valve not  well visualized.  Pericardium/Pleura: Moderate circumferential pericardial  effusion measuring 1.3 cm at its greatest dimention  posteriorly adjacent to the LV.   No evidence of right  atrial or right ventricular collapse. No evidence of  tamponade physiology.  Hemodynamic: Estimated right ventricular systolic pressure  equals 74 mm Hg, assuming right atrial pressure [id number] -  15 mm Hg, consistent with severe pulmonary hypertension.  Diastology is suggestive of elevated left atrial pressure.  ------------------------------------------------------------------------  Conclusions:  1. Mitral annular calcification, otherwise normal mitral  valve. Mild-moderate mitral regurgitation.  2. Transcatheter aortic valve replacement.  3. Severely dilated left atrium.  LA volume index = 76  cc/m2.  4. Hyperdynamic left ventricular systolic function.  5. Increased E/e'  is consistent with elevated left  ventricular filling pressure.  6. Right ventricular enlargement with decreased right  ventricular systolic function.  7. Tricuspid valve not well visualized. Moderate-severe  tricuspid regurgitation.  8. Estimated pulmonary artery systolic pressure equals 74  mm Hg, assuming right atrial pressure equals 10 - 15 mm Hg,  consistent with severe pulmonary pressures.  9. Moderate circumferential pericardial effusion measuring  1.3 cm at its greatest dimention posteriorly adjacent to  the LV.   No evidence of right atrial or right ventricular  collapse. No evidence of tamponade physiology.  *** Compared with echocardiogram of 7/26/2021, pericardial  effusion is slightly larger.  ------------------------------------------------------------------------  Confirmed on  8/4/2021 - 14:08:19 by Maco Castellano M.D.  -----------------------    < end of copied text >        Cath:  < from: Cardiac Cath Lab (08.04.21 @ 17:18) >  DIAGNOSTIC IMPRESSIONS: Elevated right atrial pressure (mRA 17mmHg with a V  wave of 19mmHg)  Moderate post-capillary pulmonary hypertension (sPAP 60mmHg, dPAP 25mmHg,  mPAP 37mmHg)  PCWP = 27mmHg with a V wave of 32mmHg  PAsat = 58% // RAsat = 94%  [place] // CI = 5.92l/min // 3.50l/min/m2  DIAGNOSTIC RECOMMENDATIONS: Keep right leg straight for 4 hours following  removal of sheath  Continue aggressive medical management  Findings discussed with Dr. Sherman  INTERVENTIONAL IMPRESSIONS: Elevated right atrial pressure (mRA 17mmHg with  a V wave of 19mmHg)  Moderate post-capillary pulmonary hypertension (sPAP 60mmHg, dPAP 25mmHg,  mPAP 37mmHg)  PCWP = 27mmHg with a V wave of 32mmHg  PAsat = 58% // RAsat = 94%  [place] // CI = 5.92l/min // 3.50l/min/m2  INTERVENTIONAL RECOMMENDATIONS: Keep right leg straight for 4 hours  following removal of sheath  Continue aggressive medical management  Findings discussed with Dr. Sherman  Prepared and signed by  Alejandro Johns MD  Signed 08/04/2021 20:22:38    < end of copied text >

## 2021-08-10 NOTE — PROGRESS NOTE ADULT - SUBJECTIVE AND OBJECTIVE BOX
Subjective  No acute events reported overnight.  The patient is markedly somnolent in nature.  Decreased appetite.  Sitting up in the bed.  Very drowsy.  Denies any chest pain/tightness discomfort, fevers, chills, sweats, his incision, dizzy or palpitation.    Review of systems  Hard to assess due to the patient very drowsy clinical status.    Physical examination  No apparent distress, alert and oriented 3, elevated JVD with hepatojugular reflex  Irregular irregular with 1 out of 6 holosystolic murmur at left lower sternal border  Decreased breath sounds bilaterally with no wheezing or crackles  Positive bowel sound, soft, obese, nontender, no masses guarding  1+ bilateral pitting edema   No clubbing or cyanosis  Moving all extremities spontaneously  1+ DP/PT pulses    Assessment/plan  Severe aortic valve stenosis/severe tricuspid valve regurgitation  --Patient status post TAVR procedure.   --The patient has been aggressively diuresed and there is concern that she is hypovolemic/dry with elevated BUN contributing to her clinical presentation.  The patient appears uremic and is markedly weakened.  Although her markedly weakened state is likely multifactorial in nature she would likely benefit from discontinuation of diuretics and equilibration.  --Additionally, the patient may benefit from ultrafiltration (no fluid removal) during which time clearance will be performed.  --Concerned that overall the patient has not made significant improvement despite aggressive medical management.  --Aim for potassium to greater than 4 and magnesium greater than 2.  Closely monitor the patient's electrolytes.  --Continue Toprol 12.5 mg daily.  --The patient is currently on spironolactone.  --Daily PT.  Out of bed to chair.  --Heparin drip not reinitiated due to concern for recent GI bleed after discussing with cardiac surgical team/Dr. Pruitt.  Continue to closely monitor CBC and assess for signs or symptoms of bleeding.  Recommend heparin DVT prophylaxis.  --Continue telemetry monitoring.  .  Detailed conversation was had with the patient's son reviewing the patient's clinical situation and management.  .  Findings and plan were discussed with cardiology/Dr. Hodges, heart failure/Dr. De Santiago, cardiac surgery/Dr. Magaña and call made out to nephrology team  .  Addendum  --Concerned that the patient is dry on clinical examination and her electrolyte imbalance/uremia is contributing to change in mental status/weakness.  ABG was performed.  Recommend that the patient be started on BiPAP and transferred urgently to the CICU for further assessment/management at which time ultrafiltration be initiated along with consideration for placement of [place] catheter to assess volume status.    Findings and plan were discussed with cardiology/Dr. Hodges, cardiac surgery/Dr. Magaña and CICU/Dr. Grant.

## 2021-08-10 NOTE — PROGRESS NOTE ADULT - ASSESSMENT
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  HFpEF  Afib   Acute hypercarbic respiratory failure requiring non-invasive mechanical ventilation   JACQUE   DM type 2     Plan:  ====================== NEUROLOGY=====================  Encephalopathy possibly Metabolic or Uremic in etiology, ?Delirium   - Continue to monitor neuro status   - Will discuss potential CRRT/ Hemodialysis for clearance    ==================== RESPIRATORY======================  Acute hypercarbic/hypoxic respiratory failure, atelectasis r lung base, with left small to moderate pleural effusion  -  low filling pressures on RHC, diuresis d/c  - Will initiate oscillation and lung expansion therapy via [place]ara device to facilitate secretion mobilization  - Will initiate High Flow Nasal Canula  50%/50L HFO2 , to promote potential PEEP for lung reexpansion Wean FiO2 to maintain SpO2 > 92%  - continue bronchodilators     ====================CARDIOVASCULAR==================  HFpEF  - Will plan to perform bedside RHC with PAC to assess left sided and right sided filling pressures to assess need for continued diuresis vs ultrafiltration     Moderate Pericardial Effusion, ? secondary to instrumentation vs Uremic  - Will repeat TTE to assess size of pericardial effusion vs tamponade physiology  - Will be able to assess for equalization of diastolic pressures as sigh of impending tamponade during PAC insertion  - In the setting of severe pulmonary hypertension and RV systolic dysfunction, would not favor pericardiocentesis unless clinical signs of cardiac tamponade, due to concerns for cardiovascular collapse     Afib, with adequate rate control  - not on AC due to h/o GIB    AS s/p TAVR  - structural cardiology following    ===================HEMATOLOGIC/ONC ===================  Anemia  - likely multifactorial, CKD  - keep active type and screen  - iron supplementation  - VTE prophylaxis with SQ heparin     ===================== RENAL =========================  Acute Kidney injury, AKIN I, Non-Oliguric likely secondary to cardiorenal, pre renal azotemia or ATN, Metabolic Alkalosis likely secondary to contraction in the setting of aggressive loop diuretic regimen, ?Symptomatic Uremia in the setting of Encephalopathy and potential pericardial process   - Will discontinue Diamox at this point  - Will discontinue potential CRRT for clearance with Nephrology, with the potential for ultrafiltration pending RHC    ==================== GASTROINTESTINAL===================  No active issues  - Maintain NPO while on non-invasive mechanical ventilation    - Bowel regimen with Miralax.     =======================    ENDOCRINE  =====================  DM2   - glucose control with admelog sliding scale, lantus 24U, and admelog premeal 8U  - hold premeal if not eating full meals  - adjust regimen as necessary    ========================INFECTIOUS DISEASE================  Afebrile, WBC 13.28->13.86->13.10   - Continue trending WBC and monitoring fever curve     Jim Robles PA-C  CICU PA

## 2021-08-10 NOTE — PROGRESS NOTE ADULT - ATTENDING COMMENTS
#hypervolemia/elevated RHF pressures/acute on chronic heart failure with preserved ejection fraction.     overnight put on bipap for dyspnea; chest xray does not seem worse   -plan for [place] and will determine RRT depending on #s  - on bumex drip, continue, increase dose   cont diuresis  #gabrielle-bun/cr increased- monitor trend  #hyponatremia- hypervolemic- improving with diuresis  #met alkalosis- +resp acidosis- monitor abg

## 2021-08-10 NOTE — PROGRESS NOTE ADULT - PROBLEM SELECTOR PLAN 1
continues to be dyspneic on Bipap in CICU  -on bumex drip at 4  -CXR with possible pericardial effusion. Get Official TTE  -[place] in place will get pressures  -pulm seeing patient for lung mass, planning for possible Bx, unclear if she has metastatic lung ca  -I/O, daily weights continues to be dyspneic on Bipap in CICU  -given low CVP and wedge would d/c bumex drip  -CXR with possible pericardial effusion. Get official TTE  -pulm seeing patient for lung mass, planning for possible Bx, unclear if she has metastatic lung ca  -I/O, daily weights  -EF is normal on POCUS  -rest of care per primary CICU and cardiology/structural team

## 2021-08-10 NOTE — PROGRESS NOTE ADULT - SUBJECTIVE AND OBJECTIVE BOX
ANITHA JHAVERI  MRN-[id number]  Patient is a 87y old  Female who presents with a chief complaint of urgent HD (10 Aug 2021 05:36)    HPI:  MICU H&P    CHIEF COMPLAINT: urgent HD, hyperkalemia    HPI / INTERVAL HISTORY:  87F PMH CAD w/stents, DM2, Afib (not on AC's, dc'd pradaxa 1mo ago as pt w/u for anemia), Colon Ca (about 25y ago),  AS, HF on 80mg lasix qd, recently started on Entresto, presents to the ED with abnormal labs done on 7/7 showing JACQUE with hyperk to >6 mod hemolyzed. Pt states that she hasn't urinated in 2 days, also states she's had nonbloody loose stools over the past week. Otherwise she states SOB is at baseline. Denies any fevers/chills, cough, chest pain, palpitations, lightheadedness, abd pain, n/v, leg swelling worse than baseline.    Home medications: Metformin, Lasix 80qd, Metolazone 25 mg qd, Glipizide, atorvastatin 40 mg qhs, Zolpidem 5 mg qhs, Entresto 24 mg, Metoprolol 25 mg and Januvia     On presentation patient with BUN/Cr 79/6.7 mg/dl. K: 6.6 (non hemolyzed). Previous Cr was at 1.26 6/7/21. Also noted with bicarb: 11 pH 7.2 - lactate 7. patient on Metformin at home and reports taking all of her medications.     Nephrology consulted for JACQUE / hyperkalemia and acidosis. Temporized in ED with Bicarb and calcium. Per tox will need Q1 FSG for sulfonylurea and JACQUE. Will be admitted to MICU for HD.     PAST MEDICAL & SURGICAL HISTORY:    FAMILY HISTORY:  No sig FH in first degree relatives     SOCIAL HISTORY:  Denies smoking drinking or illicit drug use    Allergies    No Known Allergies    Intolerances    REVIEW OF SYSTEMS:  +Fatigue, weakness, oliguria  Negative except per HPI    OBJECTIVE:  ICU Vital Signs Last 24 Hrs  T(C): 36.4 (08 Jul 2021 07:36), Max: 36.4 (08 Jul 2021 07:36)  T(F): 97.5 (08 Jul 2021 07:36), Max: 97.5 (08 Jul 2021 07:36)  HR: 79 (08 Jul 2021 10:30) (71 - 83)  BP: 128/64 (08 Jul 2021 10:30) (112/67 - 128/64)  BP(mean): 71 (08 Jul 2021 10:30) (71 - 77)    RR: 18 (08 Jul 2021 10:30) (18 - 20)  SpO2: 100% (08 Jul 2021 10:30) (95% - 100%)  CAPILLARY BLOOD GLUCOSE    POCT Blood Glucose.: 148 mg/dL (08 Jul 2021 11:19)    PHYSICAL EXAM:  Gen: NAD  	HEENT: MMM  	Pulm: CTA B/L  	CV: S1S2  	Abd: Soft, +BS   	Ext: No LE edema B/L  	Neuro: Awake  	Skin: Warm and dry             : no tenderness to palpation     MEDICATIONS  (PRN):    LABS:                        7.8    7.31  )-----------( 196      ( 08 Jul 2021 08:47 )             25.8     Hgb Trend: 7.8<--  07-08    124<L>  |  84<L>  |  79<H>  ----------------------------<  32<LL>  6.6<HH>   |  11<L>  |  6.76<H>    Ca    9.6      08 Jul 2021 08:48  Phos  6.4     07-08  Mg     2.0     07-08    TPro  7.2  /  Alb  3.9  /  TBili  0.4  /  DBili  x   /  AST  25  /  ALT  17  /  AlkPhos  205<H>  07-08    Creatinine Trend: 6.76<--    Venous Blood Gas:  07-08 @ 08:47  7.24/34/42/14/64  VBG Lactate: 7.0 (08 Jul 2021 11:43)      24 HOUR EVENTS:    REVIEW OF SYSTEMS:    CONSTITUTIONAL: No weakness, fevers or chills  EYES/ENT: No visual changes;  No vertigo or throat pain   NECK: No pain or stiffness  RESPIRATORY: No cough, wheezing, hemoptysis; No shortness of breath  CARDIOVASCULAR: No chest pain or palpitations  GASTROINTESTINAL: No abdominal or epigastric pain. No nausea, vomiting, or hematemesis; No diarrhea or constipation. No melena or hematochezia.  GENITOURINARY: No dysuria, frequency or hematuria  NEUROLOGICAL: No numbness or weakness  SKIN: No itching, rashes      ICU Vital Signs Last 24 Hrs  T(C): 36.6 (10 Aug 2021 06:00), Max: 36.6 (10 Aug 2021 06:00)  T(F): 97.9 (10 Aug 2021 06:00), Max: 97.9 (10 Aug 2021 06:00)  HR: 76 (10 Aug 2021 06:30) (69 - 84)  BP: 112/60 (10 Aug 2021 06:30) (98/59 - 128/52)  BP(mean): 82 (10 Aug 2021 06:30) (73 - 82)  ABP: --  ABP(mean): --  RR: 35 (10 Aug 2021 06:30) (18 - 35)  SpO2: 99% (10 Aug 2021 06:30) (96% - 100%)      CVP(mm Hg): --  CO: --  CI: --  PA: --  PA(mean): --  PA(direct): --  PCWP: --  LA: --  RA: --  SVR: --  SVRI: --  PVR: --  PVRI: --  I&O's Summary    09 Aug 2021 07:01  -  10 Aug 2021 07:00  --------------------------------------------------------  IN: 510 mL / OUT: 700 mL / NET: -190 mL        CAPILLARY BLOOD GLUCOSE    CAPILLARY BLOOD GLUCOSE      POCT Blood Glucose.: 213 mg/dL (09 Aug 2021 23:07)      PHYSICAL EXAM:  GENERAL: No acute distress, well-developed  HEAD:  Atraumatic, Normocephalic  EYES: EOMI, PERRLA, conjunctiva and sclera clear  NECK: Supple, no lymphadenopathy, no JVD  CHEST/LUNG: CTAB; No wheezes, rales, or rhonchi  HEART: Regular rate and rhythm. Normal S1/S2. No murmurs, rubs, or gallops  ABDOMEN: Soft, non-tender, non-distended; normal bowel sounds, no organomegaly  EXTREMITIES:  2+ peripheral pulses b/l, No clubbing, cyanosis, or edema  NEUROLOGY: A&O x 3, no focal deficits  SKIN: No rashes or lesions    ============================I/O===========================   I&O's Detail    09 Aug 2021 07:01  -  10 Aug 2021 07:00  --------------------------------------------------------  IN:    Bumetanide: 100 mL    Oral Fluid: 410 mL  Total IN: 510 mL    OUT:    Voided (mL): 700 mL  Total OUT: 700 mL    Total NET: -190 mL        ============================ LABS =========================                        8.5    13.86 )-----------( 431      ( 09 Aug 2021 06:07 )             29.0     08-10    138  |  84<L>  |  110<H>  ----------------------------<  171<H>  3.0<L>   |  38<H>  |  1.44<H>    Ca    10.8<H>      10 Aug 2021 06:37  Phos  5.0     08-10  Mg     3.2     08-10    TPro  7.8  /  Alb  3.2<L>  /  TBili  0.9  /  DBili  x   /  AST  20  /  ALT  10  /  AlkPhos  271<H>  08-10                LIVER FUNCTIONS - ( 10 Aug 2021 06:37 )  Alb: 3.2 g/dL / Pro: 7.8 g/dL / ALK PHOS: 271 U/L / ALT: 10 U/L / AST: 20 U/L / GGT: x             ABG - ( 10 Aug 2021 06:32 )  pH, Arterial: 7.46  pH, Blood: x     /  pCO2: 63    /  pO2: 98    / HCO3: 44    / Base Excess: 17.9  /  SaO2: 99                Blood Gas Arterial, Lactate: 1.5 mmol/L (08-10-21 @ 06:32)  Blood Gas Arterial, Lactate: 2.3 mmol/L (08-09-21 @ 15:49)      ======================Micro/Rad/Cardio=================  Telemetry: Reviewed   EKG: Reviewed  CXR: Reviewed  Culture: Reviewed   Echo: Limited Transthoracic Echo:   Patient name: ANITHA JHAVERI  YOB: 1934   Age: 87 (F)   MR#: [id number]  Study Date: 8/3/2021  Location: [address] [place], [place] [id number]: Tina Dickens [facility]  Study quality: Technically good  Referring Physician: Fredrick Pruitt MD  Blood Pressure: 116/57 mmHg  Height: 152 cm  Weight: 72 kg  BSA: 1.7 m2  Heart Rate: 78 mmHg  ------------------------------------------------------------------------  PROCEDURE: Limited transthoracic echocardiogram with 2-D.  M-Mode and spectral and color flow Doppler.  INDICATION: Cardiomyopathy, unspecified (I42.9)  ------------------------------------------------------------------------  Dimensions:    Normal Values:  LA:     5.8    2.0 - 4.0 cm  Ao:     2.8    2.0 - 3.8 cm  SEPTUM: 0.8    0.6 - 1.2 cm  PWT:    0.8    0.6 - 1.1 cm  LVIDd:  4.3    3.0 - 5.6 cm  LVIDs:  1.9    1.8 - 4.0 cm  Derived variables:  LVMI: 62 g/m2  RWT: 0.37  Fractional short: 56 %  EF (Visual Estimate): >70 %  EF (Teicholtz): 87 %  EF (Lorenzo Rule): 72 %  ------------------------------------------------------------------------  Observations:  Mitral Valve: Mitral annular calcification, otherwise  normal mitral valve. Mild-moderate mitral regurgitation.  Aortic Valve/Aorta: Transcatheter aortic valve replacement.  Peak left ventricular outflow tract gradient equals 3 mm  Hg.  Aortic Root: 2.8 cm.  LVOT diameter: 1.8 cm.  Left Atrium: Severely dilated left atrium.  LA volume index  = 76 cc/m2.  Left Ventricle: Hyperdynamic left ventricular systolic  function. Normal left ventricular internal dimensions and  wall thicknesses. Increased E/e'  is consistent with  elevated left ventricular filling pressure.  Right Heart: Severe right atrial enlargement. Right  ventricular enlargement with decreased right ventricular  systolic function. Tricuspid valve not well visualized.  Moderate-severe tricuspid regurgitation. Pulmonic valve not  well visualized.  Pericardium/Pleura: Moderate circumferential pericardial  effusion measuring 1.3 cm at its greatest dimention  posteriorly adjacent to the LV.   No evidence of right  atrial or right ventricular collapse. No evidence of  tamponade physiology.  Hemodynamic: Estimated right ventricular systolic pressure  equals 74 mm Hg, assuming right atrial pressure [id number] -  15 mm Hg, consistent with severe pulmonary hypertension.  Diastology is suggestive of elevated left atrial pressure.  ------------------------------------------------------------------------  Conclusions:  1. Mitral annular calcification, otherwise normal mitral  valve. Mild-moderate mitral regurgitation.  2. Transcatheter aortic valve replacement.  3. Severely dilated left atrium.  LA volume index = 76  cc/m2.  4. Hyperdynamic left ventricular systolic function.  5. Increased E/e'  is consistent with elevated left  ventricular filling pressure.  6. Right ventricular enlargement with decreased right  ventricular systolic function.  7. Tricuspid valve not well visualized. Moderate-severe  tricuspid regurgitation.  8. Estimated pulmonary artery systolic pressure equals 74  mm Hg, assuming right atrial pressure equals 10 - 15 mm Hg,  consistent with severe pulmonary pressures.  9. Moderate circumferential pericardial effusion measuring  1.3 cm at its greatest dimention posteriorly adjacent to  the LV.   No evidence of right atrial or right ventricular  collapse. No evidence of tamponade physiology.  *** Compared with echocardiogram of 7/26/2021, perdicardial  effusion is slightly larger.  ------------------------------------------------------------------------   ANITHA JHAVERI  MRN-[id number]  Patient is a 87y old  Female who presents with a chief complaint of urgent HD (10 Aug 2021 05:36)    HPI:  MICU H&P    CHIEF COMPLAINT: urgent HD, hyperkalemia    HPI / INTERVAL HISTORY:  87F PMH CAD w/stents, DM2, Afib (not on AC's, dc'd pradaxa 1mo ago as pt w/u for anemia), Colon Ca (about 25y ago),  AS, HF on 80mg lasix qd, recently started on Entresto, presents to the ED with abnormal labs done on 7/7 showing JACQUE with hyperk to >6 mod hemolyzed. Pt states that she hasn't urinated in 2 days, also states she's had nonbloody loose stools over the past week. Otherwise she states SOB is at baseline. Denies any fevers/chills, cough, chest pain, palpitations, lightheadedness, abd pain, n/v, leg swelling worse than baseline.    Home medications: Metformin, Lasix 80qd, Metolazone 25 mg qd, Glipizide, atorvastatin 40 mg qhs, Zolpidem 5 mg qhs, Entresto 24 mg, Metoprolol 25 mg and Januvia     On presentation patient with BUN/Cr 79/6.7 mg/dl. K: 6.6 (non hemolyzed). Previous Cr was at 1.26 6/7/21. Also noted with bicarb: 11 pH 7.2 - lactate 7. patient on Metformin at home and reports taking all of her medications.     Nephrology consulted for JACQUE / hyperkalemia and acidosis. Temporized in ED with Bicarb and calcium. Per tox will need Q1 FSG for sulfonylurea and JACQUE. Will be admitted to MICU for HD.     Patient s/p TAVR 7/30, with progressively worsening clinical status, complicated by hypervolemia, anasarca and now acute hypoxemic respiratory failure, requiring non-invasive mechanical ventilation.     PAST MEDICAL & SURGICAL HISTORY:    FAMILY HISTORY:  No sig FH in first degree relatives     SOCIAL HISTORY:  Denies smoking drinking or illicit drug use    Allergies    No Known Allergies    REVIEW OF SYSTEMS:    Unable to obtain due to altered mental status.       ICU Vital Signs Last 24 Hrs  T(C): 36.6 (10 Aug 2021 06:00), Max: 36.6 (10 Aug 2021 06:00)  T(F): 97.9 (10 Aug 2021 06:00), Max: 97.9 (10 Aug 2021 06:00)  HR: 76 (10 Aug 2021 06:30) (69 - 84)  BP: 112/60 (10 Aug 2021 06:30) (98/59 - 128/52)  BP(mean): 82 (10 Aug 2021 06:30) (73 - 82)  ABP: --  ABP(mean): --  RR: 35 (10 Aug 2021 06:30) (18 - 35)  SpO2: 99% (10 Aug 2021 06:30) (96% - 100%)    I&O's Summary    09 Aug 2021 07:01  -  10 Aug 2021 07:00  --------------------------------------------------------  IN: 510 mL / OUT: 700 mL / NET: -190 mL        CAPILLARY BLOOD GLUCOSE    CAPILLARY BLOOD GLUCOSE      POCT Blood Glucose.: 213 mg/dL (09 Aug 2021 23:07)      PHYSICAL EXAM:  GENERAL: Ill appearing  HEAD:  Atraumatic, Normocephalic  EYES: Conjunctiva and sclera clear  CHEST/LUNG: Decreased breath sounds at bases, + rales at bases  HEART: Regular rate and rhythm.  ABDOMEN: Soft, non-tender, non-distended; + Pitting edema on anterior and lateral abdominal wall   EXTREMITIES:  Palpable DP bilaterally. +3 Pitting edema bilateral LE, including the anterior and lateral thighs and UE b/l   NEUROLOGY: A&O x 1, no focal deficits  SKIN: No rashes or lesions    ============================I/O===========================   I&O's Detail    09 Aug 2021 07:01  -  10 Aug 2021 07:00  --------------------------------------------------------  IN:    Bumetanide: 100 mL    Oral Fluid: 410 mL  Total IN: 510 mL    OUT:    Voided (mL): 700 mL  Total OUT: 700 mL    Total NET: -190 mL        ============================ LABS =========================                        8.5    13.86 )-----------( 431      ( 09 Aug 2021 06:07 )             29.0     08-10    138  |  84<L>  |  110<H>  ----------------------------<  171<H>  3.0<L>   |  38<H>  |  1.44<H>    Ca    10.8<H>      10 Aug 2021 06:37  Phos  5.0     08-10  Mg     3.2     08-10    TPro  7.8  /  Alb  3.2<L>  /  TBili  0.9  /  DBili  x   /  AST  20  /  ALT  10  /  AlkPhos  271<H>  08-10                LIVER FUNCTIONS - ( 10 Aug 2021 06:37 )  Alb: 3.2 g/dL / Pro: 7.8 g/dL / ALK PHOS: 271 U/L / ALT: 10 U/L / AST: 20 U/L / GGT: x             ABG - ( 10 Aug 2021 06:32 )  pH, Arterial: 7.46  pH, Blood: x     /  pCO2: 63    /  pO2: 98    / HCO3: 44    / Base Excess: 17.9  /  SaO2: 99                Blood Gas Arterial, Lactate: 1.5 mmol/L (08-10-21 @ 06:32)  Blood Gas Arterial, Lactate: 2.3 mmol/L (08-09-21 @ 15:49)      ======================Micro/Rad/Cardio=================  Telemetry: Reviewed   EKG: Reviewed  CXR: Reviewed  Culture: Reviewed   Echo: Limited Transthoracic Echo:   Patient name: ANITHA JHAVERI  YOB: 1934   Age: 87 (F)   MR#: [id number]  Study Date: 8/3/2021  Location: [address] [place], [place] [id number]: Tina Dickens [facility]  Study quality: Technically good  Referring Physician: Fredrick Pruitt MD  Blood Pressure: 116/57 mmHg  Height: 152 cm  Weight: 72 kg  BSA: 1.7 m2  Heart Rate: 78 mmHg  ------------------------------------------------------------------------  PROCEDURE: Limited transthoracic echocardiogram with 2-D.  M-Mode and spectral and color flow Doppler.  INDICATION: Cardiomyopathy, unspecified (I42.9)  ------------------------------------------------------------------------  Dimensions:    Normal Values:  LA:     5.8    2.0 - 4.0 cm  Ao:     2.8    2.0 - 3.8 cm  SEPTUM: 0.8    0.6 - 1.2 cm  PWT:    0.8    0.6 - 1.1 cm  LVIDd:  4.3    3.0 - 5.6 cm  LVIDs:  1.9    1.8 - 4.0 cm  Derived variables:  LVMI: 62 g/m2  RWT: 0.37  Fractional short: 56 %  EF (Visual Estimate): >70 %  EF (Teicholtz): 87 %  EF (Lorenzo Rule): 72 %  ------------------------------------------------------------------------  Observations:  Mitral Valve: Mitral annular calcification, otherwise  normal mitral valve. Mild-moderate mitral regurgitation.  Aortic Valve/Aorta: Transcatheter aortic valve replacement.  Peak left ventricular outflow tract gradient equals 3 mm  Hg.  Aortic Root: 2.8 cm.  LVOT diameter: 1.8 cm.  Left Atrium: Severely dilated left atrium.  LA volume index  = 76 cc/m2.  Left Ventricle: Hyperdynamic left ventricular systolic  function. Normal left ventricular internal dimensions and  wall thicknesses. Increased E/e'  is consistent with  elevated left ventricular filling pressure.  Right Heart: Severe right atrial enlargement. Right  ventricular enlargement with decreased right ventricular  systolic function. Tricuspid valve not well visualized.  Moderate-severe tricuspid regurgitation. Pulmonic valve not  well visualized.  Pericardium/Pleura: Moderate circumferential pericardial  effusion measuring 1.3 cm at its greatest dimention  posteriorly adjacent to the LV.   No evidence of right  atrial or right ventricular collapse. No evidence of  tamponade physiology.  Hemodynamic: Estimated right ventricular systolic pressure  equals 74 mm Hg, assuming right atrial pressure [id number] -  15 mm Hg, consistent with severe pulmonary hypertension.  Diastology is suggestive of elevated left atrial pressure.  ------------------------------------------------------------------------  Conclusions:  1. Mitral annular calcification, otherwise normal mitral  valve. Mild-moderate mitral regurgitation.  2. Transcatheter aortic valve replacement.  3. Severely dilated left atrium.  LA volume index = 76  cc/m2.  4. Hyperdynamic left ventricular systolic function.  5. Increased E/e'  is consistent with elevated left  ventricular filling pressure.  6. Right ventricular enlargement with decreased right  ventricular systolic function.  7. Tricuspid valve not well visualized. Moderate-severe  tricuspid regurgitation.  8. Estimated pulmonary artery systolic pressure equals 74  mm Hg, assuming right atrial pressure equals 10 - 15 mm Hg,  consistent with severe pulmonary pressures.  9. Moderate circumferential pericardial effusion measuring  1.3 cm at its greatest dimention posteriorly adjacent to  the LV.   No evidence of right atrial or right ventricular  collapse. No evidence of tamponade physiology.  *** Compared with echocardiogram of 7/26/2021, perdicardial  effusion is slightly larger.  ------------------------------------------------------------------------

## 2021-08-10 NOTE — CHART NOTE - NSCHARTNOTEFT_GEN_A_CORE
Thoroughly reviewed risks and benefits of RRT/HD with patient's son Reji. Patient's son agrees to dialytic therapy if needed. All questions answered. Will plan on iniating CVVHDF today 8/10/21

## 2021-08-10 NOTE — PROGRESS NOTE ADULT - ATTENDING COMMENTS
Asked by Dr. Johns to reconsult given good diuretic response, but persistent GARCIA. Patient is 88 yo F admitted for a prolonged hospital course s/p TAVR and diuresis that was escalated last week with good result, improvement in BUN from 150 to 100, but now patient is more lethargic and has persistent SOB at rest. Patient was moved to CICU last night for hypercarbic respiratory failure responsive to BiPap. She is DNR with known multiple lung nodules suspicious for cancer, but not yet with a tissue diagnosis.    Her son is at the bedside and the patient had a [place] placed via left IJ and a VasCath placed in RIJ anticipating renal replacement therapy for the elevated BUN. By history, no evidence of GIB or steroid exposure to explain the disproportionately high BUN to SCr. Concern is that the uremia is causing her altered mental status.    Glencoe data reveals normal filling pressures now with RAP 6, PCW 14, and PA 72%.  TTE has been ordered and is pending at the time of my eval. Diuretics have been appropriately stopped.  I would also stop the diamox.    From a HF perspective, she is now optimized and there is no further intervention recommended.  Plan is to try RRT to see if this helps with her mental status.   HF will sign off at this time as she is followed by Aleksandra Hodges and Andreas along with CICU team. Please call if additional questions for our service.

## 2021-08-10 NOTE — PROGRESS NOTE ADULT - ASSESSMENT
87 year-old woman with known history of colon ca (remote), now with lung mass that is concerning for neoplasm, also with atrial fibrillation, previously on AC, recently stopped due to anemia, now status post TAVR.  Procedure was well tolerated, but now grossly volume overloaded with dyspnea at rest.  RHC last week consistent with volume overload, HFpEF.  Continue loop diuretics. Keep O > I, K > 4.0, Mag > 2.0.    BiPAP for hypoxia and hypercarbia.    Discussed with CICU team on rounds - plan for [place] today.  Discussed with nephrology - will plan CVVH today. Steward to monitor urine output.    ASA, statin, and beta-blocker as tolerated.  Avoid nephrotoxic agents.    Case discussed with Alejandro Johns MD and Joyce Key MD.

## 2021-08-10 NOTE — PROGRESS NOTE ADULT - SUBJECTIVE AND OBJECTIVE BOX
Subjective  Patient was started on BiPAP.  Early this morning the patient was transferred to the CICU.  Right IJ central line was inserted for dialysis.  The patient is sitting up in the bed on high flow oxygen.  She is more conversant compared to the prior day but continues to be fatigued.  She is tachypneic.  Denies any chest pain/tightness discomfort, fevers, chills or sweats.    Review of systems  Unable to obtain due to the patient's somnolence    Physical examination  No apparent distress, alert and oriented 3, elevated JVD with hepatojugular reflex  Irregular irregular with 1 out of 6 holosystolic murmur at left lower sternal border  Decreased breath sounds bilaterally with no wheezing or crackles  Positive bowel sound, soft, obese, nontender, no masses guarding  1+ bilateral pitting edema   No clubbing or cyanosis  Moving all extremities spontaneously  1+ DP/PT pulses    Assessment/plan  Severe aortic valve stenosis/severe tricuspid valve regurgitation  --Patient status post TAVR procedure.   --Appreciate assistance from patient's medical team cardiology/nephrology/heart failure/pulmonary/endocrinology.  --The patient has been aggressively diuresed and there is concern that she is hypovolemic/dry with elevated BUN contributing to her clinical presentation.  The patient is uremic and is markedly weakened.  Although her markedly weakened state is likely multifactorial in nature she would likely benefit from discontinuation of diuretics to allow for equilibration.  --The patient is currently getting a Valdez-Toya catheter inserted.  --Additionally, the patient may benefit from ultrafiltration (no fluid removal) during which time clearance will be performed.  --Would continue to keep the patient on BiPAP.  --Continues to be tachypneic/short of breath although she is likely at her dry asked volume state.  Other etiologies the patient's shortness of breath/tachypnea need to be evaluated.  Has not been on therapeutic anticoagulation therapy.  Consider starting the patient on a heparin drip with close monitoring of PTT and assess for signs of central bleeding.  Once the patient clinically stabilizes a CT scan of the head may be performed for further assessment.  --Aim for potassium to greater than 4 and magnesium greater than 2.  Closely monitor the patient's electrolytes.  --Continue Toprol 12.5 mg daily.  --Continue aspirin 81 mg daily.  --Continue atorvastatin 40 mg daily.  --Spironolactone has been discontinued  --Daily PT.  Out of bed to chair.  --Patient is on heparin for DVT prophylaxis  --Continue telemetry monitoring.    Findings and plan were discussed with the patient team including cardiology/Dr. Hodges, CICU/Dr. Grant, heart failure/Dr. De Santiago and cardiac surgery/Dr. Magaña.          T(C): 36.7 (08-10-21 @ 07:00), Max: 36.7 (08-10-21 @ 07:00)  HR: 86 (08-10-21 @ 15:21) (73 - 88)  BP: 111/46 (08-10-21 @ 14:00) (78/44 - 128/61)  RR: 23 (08-10-21 @ 14:00) (18 - 50)  SpO2: 95% (08-10-21 @ 15:21) (95% - 100%)  Wt(kg): --  08-09 @ 07:01  -  08-10 @ 07:00  --------------------------------------------------------  IN: 530 mL / OUT: 700 mL / NET: -170 mL    08-10 @ 07:01  -  08-10 @ 15:52  --------------------------------------------------------  IN: 340 mL / OUT: 770 mL / NET: -430 mL      MEDICATIONS  (STANDING):  aspirin  chewable 81 milliGRAM(s) Oral daily  budesonide  80 MICROgram(s)/formoterol 4.5 MICROgram(s) Inhaler 2 Puff(s) Inhalation two times a day  chlorhexidine 2% Cloths 1 Application(s) Topical <User Schedule>  CRRT Treatment    <Continuous>  dextrose 40% Gel 15 Gram(s) Oral once  dextrose 50% Injectable 25 Gram(s) IV Push once  dextrose 50% Injectable 12.5 Gram(s) IV Push once  dextrose 50% Injectable 25 Gram(s) IV Push once  ferrous    sulfate 325 milliGRAM(s) Oral daily  glucagon  Injectable 1 milliGRAM(s) IntraMuscular once  heparin   Injectable 5000 Unit(s) SubCutaneous every 8 hours  insulin glargine Injectable (LANTUS) 24 Unit(s) SubCutaneous at bedtime  insulin lispro (ADMELOG) corrective regimen sliding scale   SubCutaneous three times a day before meals  insulin lispro Injectable (ADMELOG) 8 Unit(s) SubCutaneous three times a day before meals  polyethylene glycol 3350 17 Gram(s) Oral daily  PrismaSATE Dialysate BGK 4 / 2.5 5000 milliLiter(s) (1000 mL/Hr) CRRT <Continuous>  PrismaSOL Filtration BGK 4 / 2.5 5000 milliLiter(s) (800 mL/Hr) CRRT <Continuous>  PrismaSOL Filtration BGK 4 / 2.5 5000 milliLiter(s) (200 mL/Hr) CRRT <Continuous>  tiotropium 18 MICROgram(s) Capsule 1 Capsule(s) Inhalation daily    MEDICATIONS  (PRN):  artificial  tears Solution 1 Drop(s) Both EYES every 12 hours PRN Dry Eyes    Home Medications:  atorvastatin 40 mg oral tablet: 1 tab(s) orally once a day at bedtime (08 Jul 2021 17:00)  Elemental Iron: 45 milligram(s) orally (08 Jul 2021 17:00)  Entresto 24 mg-26 mg oral tablet: 1 tab(s) orally 2 times a day (08 Jul 2021 17:00)  furosemide 80 mg oral tablet: 1 tab(s) orally once a day (08 Jul 2021 17:00)  glipiZIDE 10 mg oral tablet: 1 tab(s) orally once a day (08 Jul 2021 17:00)  Januvia 100 mg oral tablet: 1 tab(s) orally once a day (08 Jul 2021 17:00)  lysine 500 mg oral tablet: 2 tab(s) orally once a day (08 Jul 2021 17:00)  metFORMIN 1000 mg oral tablet: 1 tab(s) orally 2 times a day (08 Jul 2021 17:00)  metOLazone: 25 milligram(s) orally once a day (08 Jul 2021 17:00)  metoprolol succinate 25 mg oral tablet, extended release: 1 tab(s) orally once a day   Monday, Wednesday, Friday with dinner (08 Jul 2021 17:00)  Vitamin C 500 mg oral tablet: 1 tab(s) orally once a day (08 Jul 2021 17:00)  Vitamin D2 2000 intl units (50 mcg) oral capsule:  (08 Jul 2021 17:00)  zolpidem 5 mg oral tablet: 1 tab(s) orally once a day (at bedtime) (08 Jul 2021 17:00)                        8.4    13.10 )-----------( 402      ( 10 Aug 2021 13:37 )             28.2   08-10    138  |  84<L>  |  110<H>  ----------------------------<  171<H>  3.0<L>   |  38<H>  |  1.44<H>    Ca    10.8<H>      10 Aug 2021 06:37  Phos  5.0     08-10  Mg     3.2     08-10    TPro  7.8  /  Alb  3.2<L>  /  TBili  0.9  /  DBili  x   /  AST  20  /  ALT  10  /  AlkPhos  271<H>  08-10

## 2021-08-10 NOTE — PROGRESS NOTE ADULT - ASSESSMENT
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  She is status post acute dialysis, MICU stay and recent TAVR. Cath results (pre TAVR) this admission note elevated PCWP and elevated PA pressure Mean 37, with evidence of right heart failure, secondary to pulmonary hypertension, which is mostly secondary to left heart disease.  As valve has been repaired this may improve.  Patient denies any acute change in her breathing,  States she has had difficulty for months and that it is related to her heart.  Lung masses may be contributing--DDX is primary lung CA vs less likely metastatic colon CA. vs other. Admits to asthma-years ago.    REC:    Continue diuresis as her heart failure team-consider repeat echo and ? RHC                            SEE BELOW:  asthma-symbicort 160, spiriva, incentive spirometry  abnormal CT-lung mass RML--move to consider CT NEEDLE bx.  Continue to decrease oxygen as tolerated.  Will likely require oxygen at rehab.  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  She is status post acute dialysis, MICU stay and recent TAVR. Cath results (pre TAVR) this admission note elevated PCWP and elevated PA pressure Mean 37, with evidence of right heart failure, secondary to pulmonary hypertension, which is mostly secondary to left heart disease.  As valve has been repaired this may improve.  Patient denies any acute change in her breathing,  States she has had difficulty for months and that it is related to her heart.  Lung masses may be contributing--DDX is primary lung CA vs less likely metastatic colon CA. vs other. Admits to asthma-years ago.    REC:    Continue diuresis as her heart failure team-consider repeat echo and ? RHC                            SEE BELOW:  asthma-symbicort 160, spiriva, incentive spirometry  abnormal CT-lung mass RML--move to consider CT NEEDLE bx.  Continue to decrease oxygen as tolerated.  Will likely require oxygen at rehab.  *******************************  8/2-no significant changes   8/3-struct heart contemplating RHC here  8/4-moving to RHC for additional info to direct care  8/5-RHC-elev pcwp-27 , Mean 35+--c/w left sided PHtn  8/9-bumex drip in place; no signif ambulation-nephrology involved  8/10-fluid OL--moving to CICU for CVVHD

## 2021-08-10 NOTE — PROGRESS NOTE ADULT - SUBJECTIVE AND OBJECTIVE BOX
[facility] Division of Kidney Diseases & Hypertension  FOLLOW UP NOTE  [phone]--------------------------------------------------------------------------------  Chief Complaint:Acute renal failure        24 hour events/subjective: Pt seen and examined in CCU. Patient continued to be tachypneic overnight and started on BIPAP 8/9. Patient was brought to CICU for closer hemodynamic and respiratory monitoring. RIJ non tunneled cath placed by CCU. Pt currently with increased WOB.             PAST HISTORY  --------------------------------------------------------------------------------  No significant changes to PMH, PSH, FHx, SHx, unless otherwise noted    ALLERGIES & MEDICATIONS  --------------------------------------------------------------------------------  Allergies    No Known Allergies    Intolerances      Standing Inpatient Medications  aspirin  chewable 81 milliGRAM(s) Oral daily  atorvastatin 40 milliGRAM(s) Oral at bedtime  budesonide  80 MICROgram(s)/formoterol 4.5 MICROgram(s) Inhaler 2 Puff(s) Inhalation two times a day  buMETAnide Infusion 4 mG/Hr IV Continuous <Continuous>  chlorhexidine 2% Cloths 1 Application(s) Topical <User Schedule>  dextrose 40% Gel 15 Gram(s) Oral once  dextrose 50% Injectable 25 Gram(s) IV Push once  dextrose 50% Injectable 12.5 Gram(s) IV Push once  dextrose 50% Injectable 25 Gram(s) IV Push once  ferrous    sulfate 325 milliGRAM(s) Oral daily  glucagon  Injectable 1 milliGRAM(s) IntraMuscular once  heparin   Injectable 5000 Unit(s) SubCutaneous every 8 hours  insulin glargine Injectable (LANTUS) 24 Unit(s) SubCutaneous at bedtime  insulin lispro (ADMELOG) corrective regimen sliding scale   SubCutaneous three times a day before meals  insulin lispro Injectable (ADMELOG) 8 Unit(s) SubCutaneous three times a day before meals  polyethylene glycol 3350 17 Gram(s) Oral daily  potassium chloride  10 mEq/100 mL IVPB 10 milliEquivalent(s) IV Intermittent every 1 hour  tiotropium 18 MICROgram(s) Capsule 1 Capsule(s) Inhalation daily    PRN Inpatient Medications  artificial  tears Solution 1 Drop(s) Both EYES every 12 hours PRN      REVIEW OF SYSTEMS  --------------------------------------------------------------------------------  Gen: No  fevers/chills  Respiratory: + dyspnea, no cough  CV: No chest pain  GI: No abdominal pain, diarrhea,  nausea, vomiting  : No increased frequency, dysuria, hematuria  MSK:  no edema  Neuro: No dizziness/lightheadedness      All other systems were reviewed and are negative, except as noted.    VITALS/PHYSICAL EXAM  --------------------------------------------------------------------------------  T(C): 36.7 (08-10-21 @ 07:00), Max: 36.7 (08-10-21 @ 07:00)  HR: 84 (08-10-21 @ 10:00) (69 - 86)  BP: 117/70 (08-10-21 @ 10:00) (78/44 - 128/52)  RR: 24 (08-10-21 @ 10:00) (18 - 36)  SpO2: 100% (08-10-21 @ 10:00) (96% - 100%)  Wt(kg): --    Weight (kg): 62.3 (08-10-21 @ 06:00)      08-09-21 @ 07:01  -  08-10-21 @ 07:00  --------------------------------------------------------  IN: 530 mL / OUT: 700 mL / NET: -170 mL    08-10-21 @ 07:01  -  08-10-21 @ 10:27  --------------------------------------------------------  IN: 60 mL / OUT: 460 mL / NET: -400 mL      Physical Exam:  	Gen: frail, increased WOB, on BiPAP  	HEENT: , no jvp  	Pulm: CTA B/L  	CV: RRR, S1S2; no rub  	Back: no sacral edema  	Abd: +BS, soft,   	: No suprapubic tenderness  	Ext: no edema  	Neuro: awake  	Psych: alert  	Skin: Warm,  	Vascular access: RIJ non tunnled cath        LABS/STUDIES  --------------------------------------------------------------------------------              8.5    13.86 >-----------<  431      [08-09-21 @ 06:07]              29.0     138  |  84  |  110  ----------------------------<  171      [08-10-21 @ 06:37]  3.0   |  38  |  1.44        Ca     10.8     [08-10-21 @ 06:37]      Mg     3.2     [08-10-21 @ 06:37]      Phos  5.0     [08-10-21 @ 06:37]    TPro  7.8  /  Alb  3.2  /  TBili  0.9  /  DBili  x   /  AST  20  /  ALT  10  /  AlkPhos  271  [08-10-21 @ 06:37]          Creatinine Trend:  SCr 1.44 [08-10 @ 06:37]  SCr 1.23 [08-09 @ 06:07]  SCr 1.18 [08-08 @ 09:43]  SCr 1.05 [08-07 @ 05:55]  SCr 1.00 [08-06 @ 05:19]      Urine Creatinine 46      [08-03-21 @ 16:36]  Urine Protein 4      [08-04-21 @ 00:45]  Urine Sodium 9      [08-03-21 @ 16:36]  Urine Urea Nitrogen 794      [08-04-21 @ 00:45]  Urine Osmolality 393      [08-03-21 @ 16:36]

## 2021-08-11 NOTE — PROCEDURE NOTE - NSPROCNAME_GEN_A_CORE
Arterial Puncture/Cannulation
Central Line Insertion
Central Line Insertion
Midline Insertion
Central Line Insertion
Central Line Insertion

## 2021-08-11 NOTE — PROCEDURE NOTE - NSPROCDETAILS_GEN_ALL_CORE
guidewire recovered/lumen(s) aspirated and flushed/sterile dressing applied/sterile technique, catheter placed/ultrasound guidance with use of sterile gel and probe cove
location identified, draped/prepped, sterile technique used/sterile dressing applied/sterile technique, catheter placed/ultrasound guidance
location identified, draped/prepped, sterile technique used, needle inserted/introduced/positive blood return obtained via catheter/connected to a pressurized flush line/sutured in place/hemostasis with direct pressure, dressing applied/Seldinger technique/all materials/supplies accounted for at end of procedure
guidewire recovered/lumen(s) aspirated and flushed/sterile dressing applied/sterile technique, catheter placed/ultrasound guidance with use of sterile gel and probe cove

## 2021-08-11 NOTE — DIETITIAN NUTRITION RISK NOTIFICATION - MALNUTRITION EVALUATION AS DEMONSTRATED BY (ADULTS > 20 YEARS OF AGE)
Weight loss.../Inadequate energy intake...
Weight loss.../Inadequate energy intake.../Fluid accumulation...

## 2021-08-11 NOTE — PROCEDURE NOTE - NSICDXPROCEDURE_GEN_ALL_CORE_FT
PROCEDURES:  Insertion, arterial line, percutaneous 11-Aug-2021 07:19:42  Phylicia Gonzalez  
PROCEDURES:  Insertion, catheter, venous, hemodialysis 10-Aug-2021 09:25:02  Phylicia Gonzalez

## 2021-08-11 NOTE — PROGRESS NOTE ADULT - PROBLEM SELECTOR PLAN 1
Suggest to continue holding standing-dose Admelog if patient is not eating.  Will continue current insulin regimen for now. Will continue monitoring FS and FU.  Patient on several oral meds at home including high-dose sulfonylurea and Metformin, she will require adjustments to her DM regimen given older age and JACQUE. Will continue monitoring and FU DC recommendations.

## 2021-08-11 NOTE — PROGRESS NOTE ADULT - SUBJECTIVE AND OBJECTIVE BOX
Chief complaint  Patient is a 87y old  Female who presents with a chief complaint of urgent HD (11 Aug 2021 09:15)   Review of systems  Patient in cicu, no hypoglycemic episodes.    Labs and Fingersticks  CAPILLARY BLOOD GLUCOSE      POCT Blood Glucose.: 146 mg/dL (11 Aug 2021 12:09)  POCT Blood Glucose.: 130 mg/dL (10 Aug 2021 21:17)  POCT Blood Glucose.: 172 mg/dL (10 Aug 2021 17:22)      Anion Gap, Serum: 12 (08-11 @ 07:55)  Anion Gap, Serum: 10 (08-11 @ 01:40)  Anion Gap, Serum: 16 (08-10 @ 06:37)      Calcium, Total Serum: 9.0 (08-11 @ 07:55)  Calcium, Total Serum: 9.3 (08-11 @ 01:40)  Calcium, Total Serum: 10.8 *H* (08-10 @ 06:37)  Albumin, Serum: 3.1 *L* (08-11 @ 01:40)  Albumin, Serum: 3.2 *L* (08-10 @ 06:37)    Alanine Aminotransferase (ALT/SGPT): 8 *L* (08-11 @ 01:40)  Alanine Aminotransferase (ALT/SGPT): 10 (08-10 @ 06:37)  Alkaline Phosphatase, Serum: 226 *H* (08-11 @ 01:40)  Alkaline Phosphatase, Serum: 271 *H* (08-10 @ 06:37)  Aspartate Aminotransferase (AST/SGOT): 21 (08-11 @ 01:40)  Aspartate Aminotransferase (AST/SGOT): 20 (08-10 @ 06:37)        08-11    142  |  102  |  55<H>  ----------------------------<  124<H>  4.2   |  28  |  1.17    Ca    9.0      11 Aug 2021 07:55  Phos  2.3     08-11  Mg     2.7     08-11    TPro  7.1  /  Alb  3.1<L>  /  TBili  1.0  /  DBili  x   /  AST  21  /  ALT  8<L>  /  AlkPhos  226<H>  08-11                        7.8    27.71 )-----------( 208      ( 11 Aug 2021 07:55 )             26.1     Medications  MEDICATIONS  (STANDING):  aspirin  chewable 81 milliGRAM(s) Oral daily  budesonide  80 MICROgram(s)/formoterol 4.5 MICROgram(s) Inhaler 2 Puff(s) Inhalation two times a day  cefepime   IVPB 1000 milliGRAM(s) IV Intermittent every 12 hours  chlorhexidine 2% Cloths 1 Application(s) Topical <User Schedule>  CRRT Treatment    <Continuous>  dextrose 40% Gel 15 Gram(s) Oral once  dextrose 50% Injectable 25 Gram(s) IV Push once  dextrose 50% Injectable 12.5 Gram(s) IV Push once  dextrose 50% Injectable 25 Gram(s) IV Push once  ferrous    sulfate 325 milliGRAM(s) Oral daily  glucagon  Injectable 1 milliGRAM(s) IntraMuscular once  heparin   Injectable 5000 Unit(s) SubCutaneous every 8 hours  insulin glargine Injectable (LANTUS) 24 Unit(s) SubCutaneous at bedtime  insulin lispro (ADMELOG) corrective regimen sliding scale   SubCutaneous three times a day before meals  insulin lispro Injectable (ADMELOG) 8 Unit(s) SubCutaneous three times a day before meals  norepinephrine Infusion 0.05 MICROgram(s)/kG/Min (5.84 mL/Hr) IV Continuous <Continuous>  polyethylene glycol 3350 17 Gram(s) Oral daily  PrismaSATE Dialysate BGK 4 / 2.5 5000 milliLiter(s) (1000 mL/Hr) CRRT <Continuous>  PrismaSOL Filtration BGK 4 / 2.5 5000 milliLiter(s) (800 mL/Hr) CRRT <Continuous>  PrismaSOL Filtration BGK 4 / 2.5 5000 milliLiter(s) (200 mL/Hr) CRRT <Continuous>  tiotropium 18 MICROgram(s) Capsule 1 Capsule(s) Inhalation daily  vancomycin  IVPB          Physical Exam  General: Patient comfortable in bed  Vital Signs Last 12 Hrs  T(F): 98.1 (08-11-21 @ 12:00), Max: 98.6 (08-11-21 @ 05:00)  HR: 84 (08-11-21 @ 13:00) (82 - 88)  BP: 127/62 (08-11-21 @ 06:00) (87/37 - 127/62)  BP(mean): 79 (08-11-21 @ 06:00) (51 - 79)  RR: 27 (08-11-21 @ 13:00) (22 - 58)  SpO2: 97% (08-11-21 @ 13:00) (93% - 98%)  Neck: No palpable thyroid nodules.  CVS: S1S2, No murmurs  Respiratory: No wheezing, no crepitations  GI: Abdomen soft, bowel sounds positive  Musculoskeletal:  edema lower extremities.   Skin: No skin rashes, no ecchymosis    Diagnostics    Free Thyroxine, Serum: AM Sched. Collection: 09-Aug-2021 06:00 (08-08 @ 12:19)  Thyroid Stimulating Hormone, Serum: AM Sched. Collection: 09-Aug-2021 06:00 (08-08 @ 12:19)            Chief complaint  Patient is a 87y old  Female who presents with a chief complaint of urgent HD (11 Aug 2021 09:15)   Review of systems  Patient in cicu, no hypoglycemic episodes.    Labs and Fingersticks  CAPILLARY BLOOD GLUCOSE      POCT Blood Glucose.: 146 mg/dL (11 Aug 2021 12:09)  POCT Blood Glucose.: 130 mg/dL (10 Aug 2021 21:17)  POCT Blood Glucose.: 172 mg/dL (10 Aug 2021 17:22)      Anion Gap, Serum: 12 (08-11 @ 07:55)  Anion Gap, Serum: 10 (08-11 @ 01:40)  Anion Gap, Serum: 16 (08-10 @ 06:37)      Calcium, Total Serum: 9.0 (08-11 @ 07:55)  Calcium, Total Serum: 9.3 (08-11 @ 01:40)  Calcium, Total Serum: 10.8 *H* (08-10 @ 06:37)  Albumin, Serum: 3.1 *L* (08-11 @ 01:40)  Albumin, Serum: 3.2 *L* (08-10 @ 06:37)    Alanine Aminotransferase (ALT/SGPT): 8 *L* (08-11 @ 01:40)  Alanine Aminotransferase (ALT/SGPT): 10 (08-10 @ 06:37)  Alkaline Phosphatase, Serum: 226 *H* (08-11 @ 01:40)  Alkaline Phosphatase, Serum: 271 *H* (08-10 @ 06:37)  Aspartate Aminotransferase (AST/SGOT): 21 (08-11 @ 01:40)  Aspartate Aminotransferase (AST/SGOT): 20 (08-10 @ 06:37)        08-11    142  |  102  |  55<H>  ----------------------------<  124<H>  4.2   |  28  |  1.17    Ca    9.0      11 Aug 2021 07:55  Phos  2.3     08-11  Mg     2.7     08-11    TPro  7.1  /  Alb  3.1<L>  /  TBili  1.0  /  DBili  x   /  AST  21  /  ALT  8<L>  /  AlkPhos  226<H>  08-11                        7.8    27.71 )-----------( 208      ( 11 Aug 2021 07:55 )             26.1     Medications  MEDICATIONS  (STANDING):  aspirin  chewable 81 milliGRAM(s) Oral daily  budesonide  80 MICROgram(s)/formoterol 4.5 MICROgram(s) Inhaler 2 Puff(s) Inhalation two times a day  cefepime   IVPB 1000 milliGRAM(s) IV Intermittent every 12 hours  chlorhexidine 2% Cloths 1 Application(s) Topical <User Schedule>  CRRT Treatment    <Continuous>  dextrose 40% Gel 15 Gram(s) Oral once  dextrose 50% Injectable 25 Gram(s) IV Push once  dextrose 50% Injectable 12.5 Gram(s) IV Push once  dextrose 50% Injectable 25 Gram(s) IV Push once  ferrous    sulfate 325 milliGRAM(s) Oral daily  glucagon  Injectable 1 milliGRAM(s) IntraMuscular once  heparin   Injectable 5000 Unit(s) SubCutaneous every 8 hours  insulin glargine Injectable (LANTUS) 24 Unit(s) SubCutaneous at bedtime  insulin lispro (ADMELOG) corrective regimen sliding scale   SubCutaneous three times a day before meals  insulin lispro Injectable (ADMELOG) 8 Unit(s) SubCutaneous three times a day before meals  norepinephrine Infusion 0.05 MICROgram(s)/kG/Min (5.84 mL/Hr) IV Continuous <Continuous>  polyethylene glycol 3350 17 Gram(s) Oral daily  PrismaSATE Dialysate BGK 4 / 2.5 5000 milliLiter(s) (1000 mL/Hr) CRRT <Continuous>  PrismaSOL Filtration BGK 4 / 2.5 5000 milliLiter(s) (800 mL/Hr) CRRT <Continuous>  PrismaSOL Filtration BGK 4 / 2.5 5000 milliLiter(s) (200 mL/Hr) CRRT <Continuous>  tiotropium 18 MICROgram(s) Capsule 1 Capsule(s) Inhalation daily  vancomycin  IVPB          Physical Exam  General: Patient comfortable in bed  Vital Signs Last 12 Hrs  T(F): 98.1 (08-11-21 @ 12:00), Max: 98.6 (08-11-21 @ 05:00)  HR: 84 (08-11-21 @ 13:00) (82 - 88)  BP: 127/62 (08-11-21 @ 06:00) (87/37 - 127/62)  BP(mean): 79 (08-11-21 @ 06:00) (51 - 79)  RR: 27 (08-11-21 @ 13:00) (22 - 58)  SpO2: 97% (08-11-21 @ 13:00) (93% - 98%)  Neck: No palpable thyroid nodules.  CVS: S1S2, No murmurs  Respiratory: No wheezing, no crepitations  GI: Abdomen soft, bowel sounds positive  Musculoskeletal:  edema lower extremities.   Skin: No skin rashes, no ecchymosis    Diagnostics    Free Thyroxine, Serum: AM Sched. Collection: 09-Aug-2021 06:00 (08-08 @ 12:19)  Thyroid Stimulating Hormone, Serum: AM Sched. Collection: 09-Aug-2021 06:00 (08-08 @ 12:19)

## 2021-08-11 NOTE — PROGRESS NOTE ADULT - PROBLEM SELECTOR PLAN 2
prerenal azotemia , cardiac  Cr 1.44 & . BUN disproportionately elevated to Cr likely from pre renal azotemia from aggressive diuresis & poor clearence due to JACQUE. Pt also with metabolic alkalosis (contraction from diuresis). Not on steroids. No UGI bleed suspected. Due to worsening uremia pt was started on CVVHDF without UF.  CRRT filter clotted this am at 7am. Currently on levo with SBP in 110's & CVP 4. Today BUN 70's with repeat 55. Will hold off on further CRRT today to avoid DDS.     metabolic alkalosis-  likely metabolic alkalosis from diuresis. ABG with primary metab alkalosis. HCO3 improved to 28 off diuresis. May hold acetazolamide. f/u BMP

## 2021-08-11 NOTE — PROGRESS NOTE ADULT - ATTENDING COMMENTS
Patient with critical AS admitted with JACQUE requiring urgent HD, now status post TAVR.  Post TAVR course complicated by hypervolemia requiring high volume diuresis that resulted in uremia.  Now status post CVVH that started later in the day yesterday with interval improvement in BUN.  Seen to have marked acute leukocytosis. Will start empiric broad spectrum antibiotics. Follow-up BCx, UCx.  Hemodynamic instability requiring low dose pressor support to maintain perfusion pressure.    Ongoing goals of care conversation.  Case discussed at bedside with Alejandro Johns MD.

## 2021-08-11 NOTE — PROGRESS NOTE ADULT - ASSESSMENT
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  She is status post acute dialysis, MICU stay and recent TAVR. Cath results (pre TAVR) this admission note elevated PCWP and elevated PA pressure Mean 37, with evidence of right heart failure, secondary to pulmonary hypertension, which is mostly secondary to left heart disease.  As valve has been repaired this may improve.  Patient denies any acute change in her breathing,  States she has had difficulty for months and that it is related to her heart.  Lung masses may be contributing--DDX is primary lung CA vs less likely metastatic colon CA. vs other. Admits to asthma-years ago.    REC:    Continue diuresis as her heart failure team-consider repeat echo and ? RHC                            SEE BELOW:  asthma-symbicort 160, spiriva, incentive spirometry  abnormal CT-lung mass RML--move to consider CT NEEDLE bx.  Continue to decrease oxygen as tolerated.  Will likely require oxygen at rehab.  *******************************  8/2-no significant changes   8/3-struct heart contemplating RHC here  8/4-moving to RHC for additional info to direct care  8/5-RHC-elev pcwp-27 , Mean 35+--c/w left sided PHtn  8/9-bumex drip in place; no signif ambulation-nephrology involved  8/10-fluid OL--moving to CICU for CVVHD  8/11-CCU-CRT and hiflo in place

## 2021-08-11 NOTE — PROGRESS NOTE ADULT - SUBJECTIVE AND OBJECTIVE BOX
[facility] Division of Kidney Diseases & Hypertension  FOLLOW UP NOTE  [phone]--------------------------------------------------------------------------------  Chief Complaint:Acute renal failure        24 hour events/subjective: Pt seen and examined in CCU. Patient  currently on Hiflo. CRRT filter clotted this am. Currently on levo with SBP in 110's & CVP 4. Pt awake but unable to respond to questions    PAST HISTORY  --------------------------------------------------------------------------------  No significant changes to PMH, PSH, FHx, SHx, unless otherwise noted    ALLERGIES & MEDICATIONS  --------------------------------------------------------------------------------  Allergies    No Known Allergies    Intolerances      Standing Inpatient Medications  aspirin  chewable 81 milliGRAM(s) Oral daily  budesonide  80 MICROgram(s)/formoterol 4.5 MICROgram(s) Inhaler 2 Puff(s) Inhalation two times a day  chlorhexidine 2% Cloths 1 Application(s) Topical <User Schedule>  CRRT Treatment    <Continuous>  dextrose 40% Gel 15 Gram(s) Oral once  dextrose 50% Injectable 25 Gram(s) IV Push once  dextrose 50% Injectable 12.5 Gram(s) IV Push once  dextrose 50% Injectable 25 Gram(s) IV Push once  ferrous    sulfate 325 milliGRAM(s) Oral daily  glucagon  Injectable 1 milliGRAM(s) IntraMuscular once  heparin   Injectable 5000 Unit(s) SubCutaneous every 8 hours  insulin glargine Injectable (LANTUS) 24 Unit(s) SubCutaneous at bedtime  insulin lispro (ADMELOG) corrective regimen sliding scale   SubCutaneous three times a day before meals  insulin lispro Injectable (ADMELOG) 8 Unit(s) SubCutaneous three times a day before meals  norepinephrine Infusion 0.05 MICROgram(s)/kG/Min IV Continuous <Continuous>  polyethylene glycol 3350 17 Gram(s) Oral daily  PrismaSATE Dialysate BGK 4 / 2.5 5000 milliLiter(s) CRRT <Continuous>  PrismaSOL Filtration BGK 4 / 2.5 5000 milliLiter(s) CRRT <Continuous>  PrismaSOL Filtration BGK 4 / 2.5 5000 milliLiter(s) CRRT <Continuous>  sodium chloride 0.9% Bolus 250 milliLiter(s) IV Bolus once  tiotropium 18 MICROgram(s) Capsule 1 Capsule(s) Inhalation daily    PRN Inpatient Medications  artificial  tears Solution 1 Drop(s) Both EYES every 12 hours PRN      REVIEW OF SYSTEMS  --------------------------------------------------------------------------------  unobtainable    VITALS/PHYSICAL EXAM  --------------------------------------------------------------------------------  T(C): 36.8 (08-11-21 @ 07:00), Max: 37 (08-11-21 @ 05:00)  HR: 86 (08-11-21 @ 09:00) (76 - 90)  BP: 127/62 (08-11-21 @ 06:00) (87/37 - 128/61)  RR: 43 (08-11-21 @ 09:00) (17 - 67)  SpO2: 94% (08-11-21 @ 09:00) (91% - 100%)  Wt(kg): --    Weight (kg): 62.3 (08-10-21 @ 06:00)      08-10-21 @ 07:01  -  08-11-21 @ 07:00  --------------------------------------------------------  IN: 364.7 mL / OUT: 930 mL / NET: -565.3 mL      Physical Exam:  	Gen: frail, on hiflo  	HEENT: , no jvp  	Pulm: CTA B/L  	CV: RRR, S1S2; no rub  	Back: no sacral edema  	Abd: +BS, soft,   	: No suprapubic tenderness  	Ext: no edema  	Neuro: awake  	Psych: alert  	Skin: Warm,  	Vascular access: RIJ non tunnled cath      LABS/STUDIES  --------------------------------------------------------------------------------              7.8    27.71 >-----------<  208      [08-11-21 @ 07:55]              26.1     142  |  102  |  55  ----------------------------<  124      [08-11-21 @ 07:55]  4.2   |  28  |  1.17        Ca     9.0     [08-11-21 @ 07:55]      Mg     2.7     [08-11-21 @ 01:40]      Phos  2.3     [08-11-21 @ 01:40]    TPro  7.1  /  Alb  3.1  /  TBili  1.0  /  DBili  x   /  AST  21  /  ALT  8   /  AlkPhos  226  [08-11-21 @ 01:40]          Creatinine Trend:  SCr 1.17 [08-11 @ 07:55]  SCr 1.18 [08-11 @ 01:40]  SCr 1.44 [08-10 @ 06:37]  SCr 1.23 [08-09 @ 06:07]  SCr 1.18 [08-08 @ 09:43]

## 2021-08-11 NOTE — PROGRESS NOTE ADULT - NUTRITIONAL ASSESSMENT
This patient has been assessed with a concern for Malnutrition and has been determined to have a diagnosis/diagnoses of Severe protein-calorie malnutrition.    This patient is being managed with:   Diet Consistent Carbohydrate/No Snacks-  For patients receiving Renal Replacement - No Protein Restr No Conc K No Conc Phos Low Sodium (RENAL)  Supplement Feeding Modality:  Oral  Glucerna Shake Cans or Servings Per Day:  1       Frequency:  Three Times a day  Entered: Aug 10 2021  8:28AM

## 2021-08-11 NOTE — PROGRESS NOTE ADULT - SUBJECTIVE AND OBJECTIVE BOX
CHIEF COMPLAINT: f/up sob, fluid OL-PH WHO class 2, CHF, abnormal CT-c/w CA (RML)-more ryi-[id number] sleep    Interval Events: struct heart, nephrology and CHF group-s/p RHC--moved to CICU for CRT due to progressive fluid OL-on hiflo    REVIEW OF SYSTEMS:  Constitutional: No fevers or chills. No weight loss. + fatigue or generalized malaise.  Eyes: No itching or discharge from the eyes  ENT: No ear pain. No ear discharge. No nasal congestion. No post nasal drip. No epistaxis. No throat pain. No sore throat. No difficulty swallowing.   CV: No chest pain. No palpitations. No lightheadedness or dizziness.   Resp: No dyspnea at rest. + dyspnea on exertion. No orthopnea. No wheezing. No cough. No stridor. No sputum production. No chest pain with respiration.  GI: No nausea. No vomiting. No diarrhea.  MSK: No joint pain or pain in any extremities  Integumentary: No skin lesions. No pedal edema.  Neurological: + gross motor weakness. No sensory changes.  [+ ] All other systems negative  [ ] Unable to assess ROS because ________    OBJECTIVE:  ICU Vital Signs Last 24 Hrs  T(C): 36.9 (11 Aug 2021 00:00), Max: 36.9 (11 Aug 2021 00:00)  T(F): 98.4 (11 Aug 2021 00:00), Max: 98.4 (11 Aug 2021 00:00)  HR: 88 (11 Aug 2021 03:03) (75 - 90)  BP: 91/40 (11 Aug 2021 03:03) (78/44 - 128/61)  BP(mean): 51 (11 Aug 2021 03:03) (51 - 660)  ABP: --  ABP(mean): --  RR: 49 (11 Aug 2021 03:03) (17 - 67)  SpO2: 97% (11 Aug 2021 03:03) (91% - 100%)        08-09 @ 07:01  -  08-10 @ 07:00  --------------------------------------------------------  IN: 530 mL / OUT: 700 mL / NET: -170 mL    08-10 @ 07:01  -  08-11 @ 05:01  --------------------------------------------------------  IN: 360 mL / OUT: 930 mL / NET: -570 mL      CAPILLARY BLOOD GLUCOSE      POCT Blood Glucose.: 130 mg/dL (10 Aug 2021 21:17)      PHYSICAL EXAM: NAD on [facility]  General: Awake, alert, oriented X 3.   HEENT: Atraumatic, normocephalic.                 Mallampatti Grade 2                No nasal congestion.                No tonsillar or pharyngeal exudates.  Lymph Nodes: No palpable lymphadenopathy  Neck: No JVD. No carotid bruit.   Respiratory: abnormal chest expansion                         Normal percussion                         Normal and equal air entry                         No wheeze, rhonchi but bibasilar rales.  Cardiovascular: S1 S2 normal. + murmurs, rubs or gallops.   Abdomen: Soft, non-tender, non-distended. No organomegaly. Normoactive bowel sounds.  Extremities: Warm to touch. Peripheral pulse palpable. + pedal edema.   Skin: No rashes or skin lesions  Neurological: Motor and sensory examination equal and normal in all four extremities.  Psychiatry: Appropriate mood and affect.    HOSPITAL MEDICATIONS:  MEDICATIONS  (STANDING):  aspirin  chewable 81 milliGRAM(s) Oral daily  budesonide  80 MICROgram(s)/formoterol 4.5 MICROgram(s) Inhaler 2 Puff(s) Inhalation two times a day  chlorhexidine 2% Cloths 1 Application(s) Topical <User Schedule>  CRRT Treatment    <Continuous>  dextrose 40% Gel 15 Gram(s) Oral once  dextrose 50% Injectable 25 Gram(s) IV Push once  dextrose 50% Injectable 12.5 Gram(s) IV Push once  dextrose 50% Injectable 25 Gram(s) IV Push once  ferrous    sulfate 325 milliGRAM(s) Oral daily  glucagon  Injectable 1 milliGRAM(s) IntraMuscular once  heparin   Injectable 5000 Unit(s) SubCutaneous every 8 hours  insulin glargine Injectable (LANTUS) 24 Unit(s) SubCutaneous at bedtime  insulin lispro (ADMELOG) corrective regimen sliding scale   SubCutaneous three times a day before meals  insulin lispro Injectable (ADMELOG) 8 Unit(s) SubCutaneous three times a day before meals  polyethylene glycol 3350 17 Gram(s) Oral daily  PrismaSATE Dialysate BGK 4 / 2.5 5000 milliLiter(s) (1000 mL/Hr) CRRT <Continuous>  PrismaSOL Filtration BGK 4 / 2.5 5000 milliLiter(s) (800 mL/Hr) CRRT <Continuous>  PrismaSOL Filtration BGK 4 / 2.5 5000 milliLiter(s) (200 mL/Hr) CRRT <Continuous>  tiotropium 18 MICROgram(s) Capsule 1 Capsule(s) Inhalation daily    MEDICATIONS  (PRN):  artificial  tears Solution 1 Drop(s) Both EYES every 12 hours PRN Dry Eyes      LABS:                        7.6    25.48 )-----------( 283      ( 11 Aug 2021 01:40 )             26.3     08-11    140  |  99  |  70<H>  ----------------------------<  124<H>  3.8   |  31  |  1.18    Ca    9.3      11 Aug 2021 01:40  Phos  2.3     08-11  Mg     2.7     08-11    TPro  7.1  /  Alb  3.1<L>  /  TBili  1.0  /  DBili  x   /  AST  21  /  ALT  8<L>  /  AlkPhos  226<H>  08-11        Arterial Blood Gas:  08-10 @ 06:32  7.46/63/98/44/99/17.9  ABG lactate: --  Arterial Blood Gas:  08-09 @ 15:49  7.44/67/53/44/85/17.8  ABG lactate: --        MICROBIOLOGY:     RADIOLOGY:  [ ] Reviewed and interpreted by me    Point of Care Ultrasound Findings:    PFT:    EKG:

## 2021-08-11 NOTE — CHART NOTE - NSCHARTNOTEFT_GEN_A_CORE
Nutrition Follow Up Note  Patient seen for: malnutrition follow up. Chart reviewed and events noted.     Pt is a 86 y/o F Hx of CAD w/stents, DM2, Afib (not on AC), Colon Ca (about 25y ago), severe AS, HF , recently started on Entresto, admitted for JACQUE and Metformin-associated lactic acidosis, admitted to MICU s/p urgent HD session but now JACQUE resolving.  Underwent TAVR. 8/3 Repeat echo today and plan for right heart cath tomorrow. Patient was brought to CICU for possible CVVHD and for closer hemodynamic and respiratory monitoring on 8/10. Patient currently on Hiflo. CRRT filter clotted this am .     Source: [] Patient       [x] Medical Record        [x] RN        [] Family at bedside       [] Other:    -If unable to interview patient: [] Trach/Vent/BiPAP  [x] Disoriented/confused/inappropriate to interview    Diet Order:   Diet, Consistent Carbohydrate/No Snacks:   For patients receiving Renal Replacement - No Protein Restr, No Conc K, No Conc Phos, Low Sodium (RENAL)  Supplement Feeding Modality:  Oral  Glucerna Shake Cans or Servings Per Day:  1       Frequency:  Three Times a day (08-10-21 @ 08:28)    - Is current order appropriate/adequate? [] Yes  [x]  No: Given pt was started on CRRT Renal indices not indicated     - PO intake :   [] >75%  Adequate    [x] 50-75%  Fair       [] <50%  Poor    - Nutrition-related concerns:      - Per RN pt with very poor PO intake, eats some food when son present at bedside. Pt with <10% intake at breakfast; not taking nutrition supplement. Per RN flowsheet, pt with poor PO intake prior to transfer to CICU after last RD assessment. Per conversation with RN, enteral nutrition not within pt's goals of care at this time.        - Elevated blood glucose noted, on sliding scale of insulin and Lantus as well as consistent carbohydrate diet.       - Continues on ferrous sulfate       - Pt ordered for CRRT, not running during RD visit, noted as clotting this morning .       - Ordered for Renal diet, however pt with low Phos.         GI: Denies recent N/V, constipation, or diarrhea. Last BM 8/10.   Bowel Regimen? [] Yes   [x] No    Weights:   Daily Weight in k.7 (), 72 (), 67 (), 81.5 (), 75 (), 85 ()  Dosing wt 62.3 kG  Per initial assessment pt with wt of 162 lbs (21) vs current wt 138.2 lbs (), pt with ~24 lb wt loss x1 month (15% - likely true wt loss + fluid shifts).   RD will continue to trend as new wts available/able.     Nutritionally Pertinent MEDICATIONS  (STANDING):  dextrose 40% Gel  dextrose 50% Injectable  dextrose 50% Injectable  dextrose 50% Injectable  ferrous    sulfate  glucagon  Injectable  insulin glargine Injectable (LANTUS)  insulin lispro (ADMELOG) corrective regimen sliding scale  insulin lispro Injectable (ADMELOG)  norepinephrine Infusion  polyethylene glycol 3350  sodium chloride 0.9% Bolus    Pertinent Labs:  @ 07:55: Na 142, BUN 55<H>, Cr 1.17, <H>, K+ 4.2   @ 01:40: Na 140, BUN 70<H>, Cr 1.18, <H>, K+ 3.8, Phos 2.3<L>, Mg 2.7<H>, Alk Phos 226<H>, ALT/SGPT 8<L>, AST/SGOT 21  -Low Phos noted, K WNL, Mg high     A1C with Estimated Average Glucose Result: 6.7 % (21 @ 09:06)    Finger Sticks:  POCT Blood Glucose.: 130 mg/dL (08-10 @ 21:17)  POCT Blood Glucose.: 172 mg/dL (08-10 @ 17:22)  POCT Blood Glucose.: 122 mg/dL (08-10 @ 14:10)    Skin per nursing documentation: No pressure injuries noted.  Edema per nursing documentation: +1 L leg +2 R leg    Estimated Needs:   [x] no change since previous assessment  Based on IBW 45.3 kG (100 lbs)  Estimated Energy Needs: (30-35 kcals/kG) 5298-4110 kcals  Estimated Protein Needs: (1.2-1.4 gm/kG) 54-63 gm  Defer fluid needs to team  -If pt to resume CRRT, consider adjusting needs     Previous Nutrition Diagnosis: Malnutrition; moderate acute   Nutrition Diagnosis is: [] ongoing  [] resolved [] not applicable [x] Advanced Diagnosis     Nutrition Care Plan:  [x] In Progress  [] Achieved  [] Not applicable    New Nutrition Diagnosis: Severe acute malnutrition related to altered mental state with lack of appetite (suspected) with inability to meet increased nutrient needs as evidenced by PO intake <=50% nutrient needs >=5days and 15% wt loss x1 month.    Nutrition Interventions:     Education Provided   [] Yes:  [x] No: Pt disoriented and confused and not appropriate for nutrition education at this time.     Recommendations:      1) Liberalize diet to Consistent Carbohydrate with evening snack, low Na. RD remains available for diet changes as needed/able.   -Renal not presently indicated as pt ordered for CRRT not HD with low Phos.   -Trend K, Phos, and Mg and replete as needed/able for refeeding risk.   2) Change to Glucerna x1 daily for pt to sip on as able - used to accept x1 daily.   3) RD to honor food preferences obtained when pt was alert and oriented as able.   -Continue to trend goals of care if EN to become indicated.   4) Consider Nephro-Ashley and thiamine if medically feasible to optimize nutrient intake.   5) Malnutrition alert placed.     Monitoring and Evaluation:   Continue to monitor nutritional intake, tolerance to diet prescription, weights, labs, skin integrity    RD remains available upon request and will follow up per protocol  Lizzeth Lawrence, MS, RD, CDN Pager #[phone]

## 2021-08-11 NOTE — PROGRESS NOTE ADULT - ASSESSMENT
87 year olf F w/  87 year old F w/ CAD w/ stents, DM2, AF, colon CA , new lung CA , severe AS who presented with acute decompensated HF, new JACQUE requiring CRRT, and findings of lung CA    Neuro  - Pt pain free, Family wishes for palliation and nothing agressive  - De-line  - Palliative consult placed    Resp  - Continue on 50% HF    CV  - CVP 13 , no UO, on Levo 0.04 w plethoric IVC  - Given Bumex 2 mg IVP    GI  - No KFT  per fam wishes  - On diet with poor appetite      - Steward in place    Renal  - CRRT held as BUN down to the 50s  - Will reassess in AM    ID  - WBC increased to 28, start Zosyn and Vanco      Heme  - On heparin SQ    Endo  - Glucose well controlled.

## 2021-08-11 NOTE — PROGRESS NOTE ADULT - SUBJECTIVE AND OBJECTIVE BOX
Events:    Review Of Systems:  Constitutional: denies fever, chills, Fatigue   HEENT: denies Blurred vision, Eye Pain, Headache   Respiratory: denies Cough, Wheezing , Shortness of breath  Cardiovascular: denies Chest Pain, Palpitations,  GARCIA   Gastrointestinal: denies Abdominal Pain, Diarrhea, Constipation   Genitourinary: denies Nocturia, Dysuria, Incontinence  Extremities: denies Swelling, Joint Pain  Neurologic: denies Focal deficit, Paresthesias, Syncope  Lymphatic: denies Swelling, Lymphadenopathy   Skin: denies Rash, Ecchymoses, Wounds   Psychiatry: denies Depression, Suicidal/Homicidal Ideation, anxiety  [X ] 10 point review of systems is otherwise negative except as mentioned above         Medications:  artificial  tears Solution 1 Drop(s) Both EYES every 12 hours PRN  aspirin  chewable 81 milliGRAM(s) Oral daily  budesonide  80 MICROgram(s)/formoterol 4.5 MICROgram(s) Inhaler 2 Puff(s) Inhalation two times a day  chlorhexidine 2% Cloths 1 Application(s) Topical <User Schedule>  CRRT Treatment    <Continuous>  dextrose 40% Gel 15 Gram(s) Oral once  dextrose 50% Injectable 25 Gram(s) IV Push once  dextrose 50% Injectable 12.5 Gram(s) IV Push once  dextrose 50% Injectable 25 Gram(s) IV Push once  ferrous    sulfate 325 milliGRAM(s) Oral daily  glucagon  Injectable 1 milliGRAM(s) IntraMuscular once  heparin   Injectable 5000 Unit(s) SubCutaneous every 8 hours  insulin glargine Injectable (LANTUS) 24 Unit(s) SubCutaneous at bedtime  insulin lispro (ADMELOG) corrective regimen sliding scale   SubCutaneous three times a day before meals  insulin lispro Injectable (ADMELOG) 8 Unit(s) SubCutaneous three times a day before meals  norepinephrine Infusion 0.05 MICROgram(s)/kG/Min IV Continuous <Continuous>  polyethylene glycol 3350 17 Gram(s) Oral daily  PrismaSATE Dialysate BGK 4 / 2.5 5000 milliLiter(s) CRRT <Continuous>  PrismaSOL Filtration BGK 4 / 2.5 5000 milliLiter(s) CRRT <Continuous>  PrismaSOL Filtration BGK 4 / 2.5 5000 milliLiter(s) CRRT <Continuous>  tiotropium 18 MICROgram(s) Capsule 1 Capsule(s) Inhalation daily    PMH/PSH/FH/SH: [ ] Unchanged  Vitals:  T(C): 37 (08-11-21 @ 05:00), Max: 37 (21 @ 05:00)  HR: 88 (21 @ 06:00) (76 - 90)  BP: 127/62 (21 @ 06:00) (78/44 - 128/61)  BP(mean): 79 (21 @ 06:00) (51 - 660)  RR: 22 (21 @ 06:00) (17 - 67)  SpO2: 97% (21 @ 06:00) (91% - 100%)  Wt(kg): --  Daily     Daily Weight in k.7 (11 Aug 2021 05:00)  I&O's Summary    10 Aug 2021 07:01  -  11 Aug 2021 07:00  --------------------------------------------------------  IN: 364.7 mL / OUT: 930 mL / NET: -565.3 mL        Physical Exam:  Appearance: [ ] Normal [ ] NAD  Eyes: [ ] PERRL [ ] EOMI  HENT: [ ] Normal oral muscosa [ ]NC/AT  Cardiovascular: [ ] S1 [ ] S2 [ ] RRR [ ] No m/r/g [ ]No edema [ ] JVP  Procedural Access Site: [ ] No hematoma [ ] Non-tender to palpation [ ] 2+ pulse [ ] No bruit [ ] No Ecchymosis  Respiratory: [ ] Clear to auscultation bilaterally  Gastrointestinal: [ ] Soft [ ] Non-tender [ ] Non-distended [ ] BS+  Musculoskeletal: [ ] No clubbing [ ] No joint deformity   Neurologic: [ ] Non-focal  Lymphatic: [ ] No lymphadenopathy  Psychiatry: [ ] AAOx3 [ ] Mood & affect appropriate  Skin: [ ] No rashes [ ] No ecchymoses [ ] No cyanosis        140  |  99  |  70<H>  ----------------------------<  124<H>  3.8   |  31  |  1.18    Ca    9.3      11 Aug 2021 01:40  Phos  2.3       Mg     2.7         TPro  7.1  /  Alb  3.1<L>  /  TBili  1.0  /  DBili  x   /  AST  21  /  ALT  8<L>  /  AlkPhos  226<H>                    ECG:    Echo:    Stress Testing:     Cath:    Imaging:    Interpretation of Telemetry:   HPI:  MICU H&P    CHIEF COMPLAINT: urgent HD, hyperkalemia    HPI / INTERVAL HISTORY:  87F PMH CAD w/stents, DM2, Afib (not on AC's, dc'd pradaxa 1mo ago as pt w/u for anemia), Colon Ca (about 25y ago),  AS, HF on 80mg lasix qd, recently started on Entresto, presents to the ED with abnormal labs done on  showing JACQUE with hyperk to >6 mod hemolyzed. Pt states that she hasn't urinated in 2 days, also states she's had nonbloody loose stools over the past week. Otherwise she states SOB is at baseline. Denies any fevers/chills, cough, chest pain, palpitations, lightheadedness, abd pain, n/v, leg swelling worse than baseline.    Home medications: Metformin, Lasix 80qd, Metolazone 25 mg qd, Glipizide, atorvastatin 40 mg qhs, Zolpidem 5 mg qhs, Entresto 24 mg, Metoprolol 25 mg and Januvia     On presentation patient with BUN/Cr 79/6.7 mg/dl. K: 6.6 (non hemolyzed). Previous Cr was at 1.26 21. Also noted with bicarb: 11 pH 7.2 - lactate 7. patient on Metformin at home and reports taking all of her medications.     Nephrology consulted for JACQUE / hyperkalemia and acidosis. Temporized in ED with Bicarb and calcium. Per tox will need Q1 FSG for sulfonylurea and JACQUE. Will be admitted to MICU for HD. She was eventually transferred to CICU for PA-C due to elevated filling pressures. She was on CRRT.     Events: Today CRRT was stopped as BUN was nearing the 50s. Initially was given 250 cc bolus however in the PM her CVP herminio to 13 with plethoric IVC on repeat TTE and pt given Bumex 2 mg IVP. Discussed with family her plan of care and they requested Palliative consult as they do not want anything aggressive.     Review Of Systems:  Constitutional: denies fever, chills, Fatigue   HEENT: denies Blurred vision, Eye Pain, Headache   Respiratory: denies Cough, Wheezing , Shortness of breath  Cardiovascular: denies Chest Pain, Palpitations,  GARCIA   Gastrointestinal: denies Abdominal Pain, Diarrhea, Constipation   Genitourinary: denies Nocturia, Dysuria, Incontinence  Extremities: denies Swelling, Joint Pain  Neurologic: denies Focal deficit, Paresthesias, Syncope  Lymphatic: denies Swelling, Lymphadenopathy   Skin: denies Rash, Ecchymoses, Wounds   Psychiatry: denies Depression, Suicidal/Homicidal Ideation, anxiety  [X ] 10 point review of systems is otherwise negative except as mentioned above         Medications:  artificial  tears Solution 1 Drop(s) Both EYES every 12 hours PRN  aspirin  chewable 81 milliGRAM(s) Oral daily  budesonide  80 MICROgram(s)/formoterol 4.5 MICROgram(s) Inhaler 2 Puff(s) Inhalation two times a day  chlorhexidine 2% Cloths 1 Application(s) Topical <User Schedule>  CRRT Treatment    <Continuous>  dextrose 40% Gel 15 Gram(s) Oral once  dextrose 50% Injectable 25 Gram(s) IV Push once  dextrose 50% Injectable 12.5 Gram(s) IV Push once  dextrose 50% Injectable 25 Gram(s) IV Push once  ferrous    sulfate 325 milliGRAM(s) Oral daily  glucagon  Injectable 1 milliGRAM(s) IntraMuscular once  heparin   Injectable 5000 Unit(s) SubCutaneous every 8 hours  insulin glargine Injectable (LANTUS) 24 Unit(s) SubCutaneous at bedtime  insulin lispro (ADMELOG) corrective regimen sliding scale   SubCutaneous three times a day before meals  insulin lispro Injectable (ADMELOG) 8 Unit(s) SubCutaneous three times a day before meals  norepinephrine Infusion 0.05 MICROgram(s)/kG/Min IV Continuous <Continuous>  polyethylene glycol 3350 17 Gram(s) Oral daily  PrismaSATE Dialysate BGK 4 / 2.5 5000 milliLiter(s) CRRT <Continuous>  PrismaSOL Filtration BGK 4 / 2.5 5000 milliLiter(s) CRRT <Continuous>  PrismaSOL Filtration BGK 4 / 2.5 5000 milliLiter(s) CRRT <Continuous>  tiotropium 18 MICROgram(s) Capsule 1 Capsule(s) Inhalation daily    Vitals:  T(C): 37 (21 @ 05:00), Max: 37 (21 @ 05:00)  HR: 88 (21 @ 06:00) (76 - 90)  BP: 127/62 (21 @ 06:00) (78/44 - 128/61)  BP(mean): 79 (21 @ 06:00) (51 - 660)  RR: 22 (21 @ 06:00) (17 - 67)  SpO2: 97% (21 @ 06:00) (91% - 100%)    Daily     Daily Weight in k.7 (11 Aug 2021 05:00)  I&O's Summary    10 Aug 2021 07:01  -  11 Aug 2021 07:00  --------------------------------------------------------  IN: 364.7 mL / OUT: 930 mL / NET: -565.3 mL    Physical Exam:  Appearance:weak and frail appearing  Eyes: [ X] PERRL [X ] EOMI  HENT: [X ] Normal oral muscosa [ X]NC/AT  Cardiovascular: [ X] S1 [X ] S2 [X ] RRR No edema No JVD  Procedural Access Site: RIJ TLC C/D?I without bleeding, induration, or hematoma  Respiratory: [ X] Clear to auscultation bilaterally, diminsihed at the abses  Gastrointestinal: [X ] Soft [ X] Non-tender [X ] Non-distended   Musculoskeletal: [X ] No clubbing [X ] No joint deformity   Neurologic: [X ] Non-focal  Lymphatic: [X ] No lymphadenopathy  Psychiatry: [X ] AAOx3 [ X] Mood & affect appropriate  Skin: [X ] No rashes [X ] No ecchymoses [X ] No cyanosis        140  |  99  |  70<H>  ----------------------------<  124<H>  3.8   |  31  |  1.18    Ca    9.3      11 Aug 2021 01:40  Phos  2.3       Mg     2.7         TPro  7.1  /  Alb  3.1<L>  /  TBili  1.0  /  DBili  x   /  AST  21  /  ALT  8<L>  /  AlkPhos  226<H>      ECG: < from: 12 Lead ECG (08.10.21 @ 06:04) >    Diagnosis Line ATRIAL FIBRILLATION with PVCs   ST & T WAVE ABNORMALITY, CONSIDER INFERIOR ISCHEMIA    Echo: < from: Transthoracic Echocardiogram (21 @ 09:10) >  1. Hyperdynamic left ventricular systolic function.  2. Right ventricular enlargement with mildly decreased  right ventricular systolic function. A catheter is seen in  the right heart.  3. Thickened pericardium. Moderate circumferential  pericardial effusion. The effusion measures approximately  1.0 cm inferior to the RV and 1.4 cm inferolateral to the  LV in its largest dimension. The IVC is dilated and  plethoric consistent with estimated RA pressureabout 15  mmHg. No other echocardiographic evidence of pericardial  tamponade.  4. Left pleural effusion.  *** Compared with echocardiogram of 8/10/2021, the  pericardial effusion appears smaller inferior to the right  ventricle on today's study.    Cath: < from: Cardiac Cath Lab (21 @ 17:18) >  INTERVENTIONAL IMPRESSIONS: Elevated right atrial pressure (mRA 17mmHg with  a V wave of 19mmHg)  Moderate post-capillary pulmonary hypertension (sPAP 60mmHg, dPAP 25mmHg,  mPAP 37mmHg)  PCWP = 27mmHg with a V wave of 32mmHg  PAsat = 58% // RAsat = 94%  [place] CO // CI = 5.92l/min // 3.50l/min/m2  INTERVENTIONAL RECOMMENDATIONS: Keep right leg straight for 4 hours  following removal of sheath  Continue aggressive medical management    Imaging: < from: Xray Chest 1 View- PORTABLE-Urgent (Xray Chest 1 View- PORTABLE-Urgent .) (08.10.21 @ 12:53) >  IMPRESSION:    Ellsworth-Toya catheter in the right pulmonary artery.    Left atelectasis/pleural effusion with linear atelectasis at the right lung base.    Interpretation of Telemetry: Af 80s

## 2021-08-11 NOTE — PROCEDURE NOTE - NSINDICATIONS_GEN_A_CORE
venous access
critical patient/monitoring purposes
critical illness/dialysis/CRRT
critical illness/hemodynamic monitoring
critical illness/hemodynamic monitoring
dialysis/CRRT

## 2021-08-11 NOTE — PROCEDURE NOTE - NSPOSTCAREGUIDE_GEN_A_CORE
Care for catheter as per unit/ICU protocols
Care for catheter as per unit/ICU protocols
Verbal/written post procedure instructions were given to patient/caregiver
Care for catheter as per unit/ICU protocols
Care for catheter as per unit/ICU protocols

## 2021-08-11 NOTE — PROGRESS NOTE ADULT - ASSESSMENT
Assessment  DMT2: 87y Female with DM T2 with hyperglycemia admitted with JACQUE, A1C 6.7%, was on oral hypoglycemic agents at home, now on basal bolus insulin, bolus doses being held 2/2 patient refusing/not eating, blood sugars are stable and trending within acceptable range, no hypoglycemic episodes, poor appetite, RD eval reviewed.  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  Hypothyroidism: Patient has no history thyroid disease, was not on any thyroid supplements, TSH elevated July 2021, repeat TFTs pending.  JACQUE: labs, chart reviewed.  Overweight: No strict exercise routines, neither on low calorie diet.       Kenton Manley MD  Cell: [phone]  Office: [phone]

## 2021-08-11 NOTE — PROGRESS NOTE ADULT - PROBLEM SELECTOR PLAN 1
hypervolemic  RHC filling pressures and pul wedge elevated  start bumex drip 8/5  given bumex IV and metolazone 8/5  stopped  urena 8/5   Now improved sodium with diuresis.

## 2021-08-11 NOTE — PROGRESS NOTE ADULT - PROBLEM SELECTOR PLAN 6
adjust all medications per egfr            If you have any questions, please feel free to contact me  Calista Chandra  Nephrology Fellow  Pager [place]: [phone]/ [facility]: [phone]    (After 5 pm or on weekends please page the on-call fellow, can check [email or web address] for schedule. Login is bismark garcia, schedule under [facility] medicine, psych, derm)

## 2021-08-11 NOTE — DIETITIAN NUTRITION RISK NOTIFICATION - TREATMENT: THE FOLLOWING DIET HAS BEEN RECOMMENDED
Diet, Consistent Carbohydrate/No Snacks:   For patients receiving Renal Replacement - No Protein Restr, No Conc K, No Conc Phos, Low Sodium (RENAL)  Supplement Feeding Modality:  Oral  Glucerna Shake Cans or Servings Per Day:  1       Frequency:  Three Times a day (08-10-21 @ 08:28) [Active]      
Diet, Consistent Carbohydrate w/Evening Snack:   DASH/TLC {Sodium & Cholesterol Restricted} (DASH) (07-15-21 @ 17:47) [Active]

## 2021-08-11 NOTE — PROGRESS NOTE ADULT - ATTENDING COMMENTS
#hypervolemia/elevated RHF pressures/acute on chronic heart failure with preserved ejection fraction.     deveoped dyspnea and transferred to CCU 8/10 s/p swan wedge 11  #JACQUE-nonoliguric although decreasing UO daily;  s/p CVVHDF for clearance; BUN down 110 to 50s, no major improvement in mentation; plan to hold off CRRT for now  # hypovemia- hold off diuresis  #hyponatremia-  stable  #met alkalosis- +resp acidosis- stable bicarb now;   monitor off CRRT  call if situation changes    d/w CCU and Dr Hodges in detail

## 2021-08-11 NOTE — PROGRESS NOTE ADULT - NUTRITIONAL ASSESSMENT
This patient has been assessed with a concern for Malnutrition and has been determined to have a diagnosis/diagnoses of Moderate protein-calorie malnutrition.    This patient is being managed with:   Diet Consistent Carbohydrate/No Snacks-  For patients receiving Renal Replacement - No Protein Restr No Conc K No Conc Phos Low Sodium (RENAL)  Supplement Feeding Modality:  Oral  Glucerna Shake Cans or Servings Per Day:  1       Frequency:  Three Times a day  Entered: Aug 10 2021  8:28AM

## 2021-08-11 NOTE — PROGRESS NOTE ADULT - SUBJECTIVE AND OBJECTIVE BOX
ANITHA JHAVERI  MRN-[id number]  Patient is a 87y old  Female who presents with a chief complaint of urgent HD (11 Aug 2021 14:49)    HPI:  MICU H&P    CHIEF COMPLAINT: urgent HD, hyperkalemia    HPI / INTERVAL HISTORY:  87F PMH CAD w/stents, DM2, Afib (not on AC's, dc'd pradaxa 1mo ago as pt w/u for anemia), Colon Ca (about 25y ago),  AS, HF on 80mg lasix qd, recently started on Entresto, presents to the ED with abnormal labs done on  showing JACQUE with hyperk to >6 mod hemolyzed. Pt states that she hasn't urinated in 2 days, also states she's had nonbloody loose stools over the past week. Otherwise she states SOB is at baseline. Denies any fevers/chills, cough, chest pain, palpitations, lightheadedness, abd pain, n/v, leg swelling worse than baseline.    Home medications: Metformin, Lasix 80qd, Metolazone 25 mg qd, Glipizide, atorvastatin 40 mg qhs, Zolpidem 5 mg qhs, Entresto 24 mg, Metoprolol 25 mg and Januvia     On presentation patient with BUN/Cr 79/6.7 mg/dl. K: 6.6 (non hemolyzed). Previous Cr was at 1.26 21. Also noted with bicarb: 11 pH 7.2 - lactate 7. patient on Metformin at home and reports taking all of her medications.     Nephrology consulted for JACQUE / hyperkalemia and acidosis. Temporized in ED with Bicarb and calcium. Per tox will need Q1 FSG for sulfonylurea and JACQUE. Will be admitted to MICU for HD.     Today:  Spoke to Reji, Son, HCP. States that he and his family would like to go the palliative route with their mother. They would like to d/c all lines with no further bloodwork and want to only focus on comfort care. States that they would like to discuss trying to bring mother home for home hospice. States would like no further treatment, including blood products and IVF. Would not want an NGT to be placed. At this time he would like to cap pressors, would think about withdrawing pressors in AM after discussing with family.     Physical Exam:  Vital Signs Last 24 Hrs  T(C): 36.6 (11 Aug 2021 19:00), Max: 37 (11 Aug 2021 05:00)  T(F): 97.9 (11 Aug 2021 19:00), Max: 98.6 (11 Aug 2021 05:00)  HR: 88 (11 Aug 2021 21:01) (80 - 90)  BP: 127/62 (11 Aug 2021 06:00) (87/37 - 127/62)  BP(mean): 79 (11 Aug 2021 06:00) (51 - 79)  RR: 25 (11 Aug 2021 21:) (20 - 67)  SpO2: 100% (11 Aug 2021 21:01) (93% - 100%)    PHYSICAL EXAM:  Constitutional: Patient laying in bed, NAD  Head: Atraumatic, normocephalic  ENMT: Moist mucous membrane.  Neck: Supple, No JVD  Respiratory: CTA B/L. No wheezes, rales or rhonchi   Cardiovascular: S1/S2. No murmurs, rubs, or gallops.  Gastrointestinal: Nondistended, BSx4, soft, nontender.  Extremities: Warm, no edema, nontender  Vascular: 2+ DP/PT pulses B/L   Neurological: unable to assess- patient sleeping during exam and family did not want to wake her up at this time.   Skin: No rashes on exposed skin     ============================I/O===========================   I&O's Detail    10 Aug 2021 07:01  -  11 Aug 2021 07:00  --------------------------------------------------------  IN:    Bumetanide: 160 mL    IV PiggyBack: 200 mL    Norepinephrine: 4.7 mL  Total IN: 364.7 mL    OUT:    Indwelling Catheter - Urethral (mL): 720 mL    Other (mL): 0 mL    Voided (mL): 210 mL  Total OUT: 930 mL    Total NET: -565.3 mL      11 Aug 2021 07:01  -  11 Aug 2021 21:19  --------------------------------------------------------  IN:    IV PiggyBack: 300 mL    Norepinephrine: 23.9 mL    Sodium Chloride 0.9% Bolus: 250 mL  Total IN: 573.9 mL    OUT:    Indwelling Catheter - Urethral (mL): 30 mL  Total OUT: 30 mL    Total NET: 543.9 mL        ============================ LABS =========================                        7.8    27.71 )-----------( 208      ( 11 Aug 2021 07:55 )             26.1         135  |  97  |  65<H>  ----------------------------<  399<H>  4.3   |  23  |  1.51<H>    Ca    8.8      11 Aug 2021 15:18  Phos  2.3       Mg     2.7         TPro  7.1  /  Alb  3.1<L>  /  TBili  1.0  /  DBili  x   /  AST  21  /  ALT  8<L>  /  AlkPhos  226<H>      LIVER FUNCTIONS - ( 11 Aug 2021 01:40 )  Alb: 3.1 g/dL / Pro: 7.1 g/dL / ALK PHOS: 226 U/L / ALT: 8 U/L / AST: 21 U/L / GGT: x             ABG - ( 11 Aug 2021 06:46 )  pH, Arterial: 7.39  pH, Blood: x     /  pCO2: 51    /  pO2: 126   / HCO3: 31    / Base Excess: 5.7   /  SaO2: 99                Urinalysis Basic - ( 11 Aug 2021 11:31 )    Color: Dark Orange / Appearance: Slightly Turbid / S.026 / pH: x  Gluc: x / Ketone: Negative  / Bili: Small / Urobili: 3 mg/dL   Blood: x / Protein: 100 mg/dL / Nitrite: Negative   Leuk Esterase: Large / RBC: 3 /hpf / WBC 0 /HPF   Sq Epi: x / Non Sq Epi: 0 /hpf / Bacteria: Negative      ======================Micro/Rad/Cardio=================  Culture: Reviewed   CXR: Reviewed  Echo:Reviewed  ======================================================  PAST MEDICAL & SURGICAL HISTORY:    ====================ASSESSMENT ==============  AS s/p TAVR on   HFpEF  Afib   Acute hypercarbic respiratory failure  JACQUE   DM type 2     Plan:  ====================== NEUROLOGY=====================  No acute issues   - Continue to monitor neuro status     ==================== RESPIRATORY======================  Acute hypercarbic/hypoxic respiratory failure, atelectasis r lung base  - cont w/ volara device to facilitate secretion mobilization  - Supplemental O2 via 50%/50L HFO2 , wean as tolerated  - continue bronchodilators     ====================CARDIOVASCULAR==================  HFpEF  -RHC  w/ elevated filling pressures has been aggressively diuresed this admission  - swan placed w/ normal filling pressures, PA sat 72% diuresis d/c  - As per family request would like no further bloodwork- would like to only focus on comfort care of patient. Will d/c all lines.   - 8/10 TTE: EF 70%, no segmental WMAs, mod-severe pericardial effusion w/ no RV diastolic collapse  -  cont toprol 12.5  - cont asa, lipitor    Afib  - not on AC due to h/o GIB    AS s/p TAVR  - structural cardiology following    ===================HEMATOLOGIC/ONC ===================  Anemia  - likely multifactorial, CKD  - iron supplementation  - VTE prophylaxis with SQ heparin     ===================== RENAL =========================  JACQUE, metabolic alkalosis, uremia  - contraction alkalosis from diuresis, now euvolemic, bumex and diamox d/c  - CRRT paused today. As per son would like to d/c dialysis   - Continue to monitor I/Os, BUN/Creatinine, and urine output  - Replete lytes PRN. Keep K> 4 and Mg >2   - appreciate nephro recs    ==================== GASTROINTESTINAL===================  Tolerating consistent carb diet   - Bowel regimen with Miralax.     =======================    ENDOCRINE  =====================  Hx of DM2   - glucose control with admelog sliding scale, lantus 24U, and admelog premeal 8U  - hold premeal if not eating full meals  - adjust regimen as necessary    ========================INFECTIOUS DISEASE================  Afebrile, WBC 13.28->13.86->13.10   - Continue trending WBC and monitoring fever curve       ========================GOC================  - Spoke to Reji, Son, HCP. States that he and his family would like to go the palliative route with their mother. They would like to d/c all lines with no further bloodwork and want to only focus on comfort care. States that they would like to discuss trying to bring mother home for home hospice. States would like no further treatment, including blood products and IVF. Would not want an NGT to be placed. At this time he would like to cap pressors, would think about withdrawing pressors in AM after discussing with family.   - DNR/DNI  - Capped Pressors     Patient requires continuous monitoring with bedside rhythm monitoring, arterial line, pulse oximetry, ventilator monitoring and intermittent blood gas analysis.  Care plan discussed with ICU care team.  Patient remained critical; required more than usual post op care; I have spent 35 minutes providing non-routine post op care, revaluated multiple times during the day.

## 2021-08-11 NOTE — PROGRESS NOTE ADULT - TIME BILLING
as above: deteriorating clinical status--off BUMEX drip/acetazolamide now-s/p RHC c/w WHO class 2--moved to CICU for CRT  multifactorial dyspnea-CHF/fluid OL-WHO class 2 PAH (cardiac related), asthma (remote), ? RML tumor, debility, anemia--O2 NC sat above 90%  CHF-as per CHF team-Andreas et al; s/p  RHC/echo repeated---WHO class 2-aggressive rx of left heart Dz; CRT in place, TTE repeated  Renal-HD (hyponatremia)--nephrology input critical here-moved to CICU for care  asthma-symbicort 160 2 bid, spiriva 1 q day, incentive spirometry; bipap to continue 12/5 as needed and O/N  abnormal CT c/w CA-? primary lung vs metastatic dz (non smoker)--consider CTNA while her for dx and ? rx (RT etc.), eventual pet/ct  DVT prophylaxis-on A/C  GI-prophylaxis                           PT           respiratory failure--GOC conference to be had-DNR  ***********HOSPICE/palliative evaln to be considered    Barrington Ponce MD-Pulmonary   [phone]

## 2021-08-12 NOTE — PROGRESS NOTE ADULT - ASSESSMENT
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  She is status post acute dialysis, MICU stay and recent TAVR. Cath results (pre TAVR) this admission note elevated PCWP and elevated PA pressure Mean 37, with evidence of right heart failure, secondary to pulmonary hypertension, which is mostly secondary to left heart disease.  As valve has been repaired this may improve.  Patient denies any acute change in her breathing,  States she has had difficulty for months and that it is related to her heart.  Lung masses may be contributing--DDX is primary lung CA vs less likely metastatic colon CA. vs other. Admits to asthma-years ago.    REC:    Continue diuresis as her heart failure team-consider repeat echo and ? RHC                            SEE BELOW:  asthma-symbicort 160, spiriva, incentive spirometry  abnormal CT-lung mass RML--move to consider CT NEEDLE bx.  Continue to decrease oxygen as tolerated.  Will likely require oxygen at rehab.  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  She is status post acute dialysis, MICU stay and recent TAVR. Cath results (pre TAVR) this admission note elevated PCWP and elevated PA pressure Mean 37, with evidence of right heart failure, secondary to pulmonary hypertension, which is mostly secondary to left heart disease.  As valve has been repaired this may improve.  Patient denies any acute change in her breathing,  States she has had difficulty for months and that it is related to her heart.  Lung masses may be contributing--DDX is primary lung CA vs less likely metastatic colon CA. vs other. Admits to asthma-years ago.    REC:    Continue diuresis as her heart failure team-consider repeat echo and ? RHC                            SEE BELOW:  asthma-symbicort 160, spiriva, incentive spirometry  abnormal CT-lung mass RML--move to consider CT NEEDLE bx.  Continue to decrease oxygen as tolerated.  Will likely require oxygen at rehab.  *******************************  8/2-no significant changes   8/3-struct heart contemplating RHC here  8/4-moving to RHC for additional info to direct care  8/5-RHC-elev pcwp-27 , Mean 35+--c/w left sided PHtn  8/9-bumex drip in place; no signif ambulation-nephrology involved  8/10-fluid OL--moving to CICU for CVVHD  8/11-CCU-CRT and hiflo in place  8/12-withdraw of most measures-on hiflo taper, capped pressors, no CRT; await palliative care

## 2021-08-12 NOTE — CONSULT NOTE ADULT - PROVIDER SPECIALTY LIST ADULT
Nephrology
Cardiology
Nephrology
Pulmonology
Palliative Care
Heart Failure
Toxicology
Endocrinology
Structural Heart

## 2021-08-12 NOTE — CONSULT NOTE ADULT - ATTENDING COMMENTS
MD Hassan:  patient seen and evaluated with the fellow.  I was present for key portions of the history & physical, and I agree with the impression & plan. Elderly F, with metabolc derangements that may be c/w with WARNER.    Metabolic derangements include JACQUE, hyperkalemia, metabolic acidosis.  PHysical exam c/w fluid overload.    Impression:  difficult to determine if cause is due to WARNER vs new renal failure.  Either way, patient meets multiple criteria for emergency hemodialysis, and we agree with STAT nephrology consult.  Plan:  agree with emergent HD.  Hold metformin.
The signs/symptoms that lead to her current hospitalization that started following TAVR CTA and development of diarrhea was reviewed.  The patients PMH/PSH/FH/SH was gone over.  Agree with physical examination as noted above.    --Patient with critical aortic valve stenosis with development with JACQUE in setting of contrast/critical AS/ARB and metformin.  Need urgent dialysis.  Currently on pressors.  Plan to allow patient to equilibrate.  If patient becomes unstable consideration for possible BAV for stability and to be used as a bridge for TAVR.  --Would recommend once patient is clinically doing better during her acute hospitalization to proceed with catheterization.  Indications and details along with benefits and risks of procedure were gone over.  Risks include but are not limited to infection, bleeding, arrhythmia, TIA/stroke, vascular injury, worsening renal insuffiencey, need for urgent surgery and death.  If nephrology feels that increased ability to preserve kidney function would recommend that patient be dialyzed follow contrast administration at time of her catheterization.  --Patient is felt to have bronchoalveolar carcinoma.  Followed closely by pulmonologist who felt that she has greater than a 1 year prognosis from her underlying malignancy.  It has been recommended that the patient proceed with her workup/TAVR intervention.  --Appreciate medical management of MICU team    All questions and concerns of the patient were addressed.    Laboratory studies and imaging studies were personally reviewed.      Findings and plan discussed with MICU team/Dr. Smith, cardiology/Carroll, cardiac surgery/Dr. Rogers and structural heart team.
JACQUE, Hyperkalemia, severe high anion gap metabolic acidosis sec to lactic acidosis (metformin use+ jacque) in the background of diarrhea.   Hemodialysis for correction of hyperkalemia/ acidosis and removal of metformin   no fluid removal   cautious administration of fluids to see fluid responsiveness     josselyn fleming  nephrology attending   Cell# [phone]   [facility]- [phone]
Briefly, 87F h/o CAD w/ prior PCI, DM2, Afib (not on AC's d/t GIB), Metastatic bronchoalveolar cancer, Colon Ca (about 25y ago), HFpEF presented to the ED on 7/8 with abnormal labs done on 7/7 showing JACQUE with hyperk to >6 mod hemolyzed as well as BUN/Cr 79/6.7. Of note had been put on Entresto as outpatient. Required HD, now improved volume status, renal function improving off dialysis. Patient found to have severe AS s/p TAVR 7/22, well tolerated without complications however ongoing respiratory distress. IMproving with bumex over past 2 days. On exam, NAD, JVP approx 12-14 with HJR, irreg irreg, improved wheeze b/l, nontender abdom, 1+ pitting edema b/l. Labs reviewed.   - adjust diuretics as above  - standing weights daily   - change toprol qOD to 12.5 mg daily; monitor for any worsening wheezing or SOB
Palliative consulted for GOC in patient with lengthy hospital course complicated by heart failure, renal dysfunction and respiratory failure.  Met with emmanuel Clement at bedside with emmanuel Mendoza via phone along with fellow and SW. They are interested in transitioning focus of care.  Home hospice currently not feasible with high flow oxygen, family interested in deescalating supports and understand life expectancy can be limited. Dilaudid PRN, ativan PRN and glycopyrrolate PRN all ordered for symptom management. emotional support provided. Awaiting bed in PCU, currently full.   [phone]

## 2021-08-12 NOTE — PROGRESS NOTE ADULT - SUBJECTIVE AND OBJECTIVE BOX
[facility] Division of Kidney Diseases & Hypertension  FOLLOW UP NOTE  [phone]--------------------------------------------------------------------------------  Chief Complaint:Acute renal failure        24 hour events/subjective: Patient seen & examined. Labs & vitals reviewed. Events noted. Remains on a pressor (NE)      PAST HISTORY  --------------------------------------------------------------------------------  No significant changes to PMH, PSH, FHx, SHx, unless otherwise noted    ALLERGIES & MEDICATIONS  --------------------------------------------------------------------------------  Allergies    No Known Allergies    Intolerances      Standing Inpatient Medications  aspirin  chewable 81 milliGRAM(s) Oral daily  budesonide  80 MICROgram(s)/formoterol 4.5 MICROgram(s) Inhaler 2 Puff(s) Inhalation two times a day  cefepime   IVPB 1000 milliGRAM(s) IV Intermittent every 12 hours  chlorhexidine 2% Cloths 1 Application(s) Topical <User Schedule>  dextrose 40% Gel 15 Gram(s) Oral once  dextrose 50% Injectable 25 Gram(s) IV Push once  dextrose 50% Injectable 12.5 Gram(s) IV Push once  dextrose 50% Injectable 25 Gram(s) IV Push once  ferrous    sulfate 325 milliGRAM(s) Oral daily  glucagon  Injectable 1 milliGRAM(s) IntraMuscular once  heparin   Injectable 5000 Unit(s) SubCutaneous every 8 hours  insulin glargine Injectable (LANTUS) 24 Unit(s) SubCutaneous at bedtime  insulin lispro (ADMELOG) corrective regimen sliding scale   SubCutaneous three times a day before meals  insulin lispro (ADMELOG) corrective regimen sliding scale   SubCutaneous at bedtime  insulin lispro Injectable (ADMELOG) 8 Unit(s) SubCutaneous three times a day before meals  polyethylene glycol 3350 17 Gram(s) Oral daily  tiotropium 18 MICROgram(s) Capsule 1 Capsule(s) Inhalation daily  vancomycin  IVPB      vancomycin  IVPB 1000 milliGRAM(s) IV Intermittent every 24 hours    PRN Inpatient Medications  artificial  tears Solution 1 Drop(s) Both EYES every 12 hours PRN      REVIEW OF SYSTEMS  --------------------------------------------------------------------------------  Unobtainable    VITALS/PHYSICAL EXAM  --------------------------------------------------------------------------------  T(C): 36.5 (08-12-21 @ 08:00), Max: 36.7 (08-11-21 @ 12:00)  HR: 92 (08-12-21 @ 10:00) (80 - 92)  BP: 95/49 (08-12-21 @ 10:00) (95/49 - 114/54)  RR: 25 (08-12-21 @ 10:00) (16 - 54)  SpO2: 97% (08-12-21 @ 10:00) (93% - 100%)  Wt(kg): --        08-11-21 @ 07:01  -  08-12-21 @ 07:00  --------------------------------------------------------  IN: 587.8 mL / OUT: 170 mL / NET: 417.8 mL    08-12-21 @ 07:01  -  08-12-21 @ 10:19  --------------------------------------------------------  IN: 0 mL / OUT: 30 mL / NET: -30 mL      Physical Exam:  	Gen: NAD, on Hiflo 50%  	HEENT: supple neck  	Pulm: CTA B/L  	CV: RRR, S1S2  	Abd: +BS, soft  	: No suprapubic tenderness          Extremities: no bilateral LE edema          Neuro: Awake  	Skin: Warm, without rashes  	Vascular access:    LABS/STUDIES  --------------------------------------------------------------------------------              7.8    27.71 >-----------<  208      [08-11-21 @ 07:55]              26.1     135  |  97  |  65  ----------------------------<  399      [08-11-21 @ 15:18]  4.3   |  23  |  1.51        Ca     8.8     [08-11-21 @ 15:18]      Mg     2.7     [08-11-21 @ 01:40]      Phos  2.3     [08-11-21 @ 01:40]    TPro  7.1  /  Alb  3.1  /  TBili  1.0  /  DBili  x   /  AST  21  /  ALT  8   /  AlkPhos  226  [08-11-21 @ 01:40]          Creatinine Trend:  SCr 1.51 [08-11 @ 15:18]  SCr 1.17 [08-11 @ 07:55]  SCr 1.18 [08-11 @ 01:40]  SCr 1.44 [08-10 @ 06:37]  SCr 1.23 [08-09 @ 06:07]    Urinalysis - [08-11-21 @ 11:31]      Color Dark Orange / Appearance Slightly Turbid / SG 1.026 / pH 5.5      Gluc Trace / Ketone Negative  / Bili Small / Urobili 3 mg/dL       Blood Large / Protein 100 mg/dL / Leuk Est Large / Nitrite Negative      RBC 3 / WBC 0 / Hyaline 0 / Gran  / Sq Epi  / Non Sq Epi 0 / Bacteria Negative      TSH 3.53      [08-11-21 @ 20:45]

## 2021-08-12 NOTE — PROGRESS NOTE ADULT - PROBLEM SELECTOR PLAN 6
adjust all medications per egfr    Family considering palliative/ hospice care. Will hold off on any further dialysis or work up            If you have any questions, please feel free to contact me  Calista Chandra  Nephrology Fellow  Pager NS: [phone]/ [facility]: [phone]    (After 5 pm or on weekends please page the on-call fellow, can check [email or web address] for schedule. Login is bismark garcia, schedule under [facility] medicine, psych, derm)

## 2021-08-12 NOTE — PROGRESS NOTE ADULT - ATTENDING COMMENTS
events noted  tramaine pt's son in person at approx 12:15  family has decided on hospice  they understand poor prognosis  they do not want dialysis or CRRT  palliative follow up  will sign off and reconsult as needed

## 2021-08-12 NOTE — PROGRESS NOTE ADULT - ASSESSMENT
Assessment  DMT2: 87y Female with DM T2 with hyperglycemia admitted with JACQUE, A1C 6.7%, was on oral hypoglycemic agents at home, was on basal bolus insulin, insulin DC'd by primary team, patient is now for comfort measures only, blood sugars fluctuating, no hypoglycemic episodes.  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  Hypothyroidism: Patient has no history thyroid disease, was not on any thyroid supplements, TSH elevated July 2021, repeat TFTs euthyroid sick syndrome.  JACQUE: labs, chart reviewed.  Overweight: No strict exercise routines, neither on low calorie diet.       Kenton Manley MD  Cell: [phone]  Office: [phone]       Assessment  DMT2: 87y Female with DM T2 with hyperglycemia admitted with JACQUE, A1C 6.7%, was on oral hypoglycemic agents at home, was on basal bolus insulin, insulin DC'd by primary team, patient is now for comfort measures only, blood sugars fluctuating,  no hypoglycemic episodes.  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  Hypothyroidism: Patient has no history thyroid disease, was not on any thyroid supplements, TSH elevated July 2021, repeat TFTs euthyroid sick syndrome.  JACQUE: labs, chart reviewed.  Overweight: No strict exercise routines, neither on low calorie diet.       Kenton Manley MD  Cell: [phone]  Office: [phone]

## 2021-08-12 NOTE — CONSULT NOTE ADULT - CONSULT REASON
GOC
Critical aortic stenosis
shortness of breath
hypoglycemia on sulfonylurea, lactic acidosis on metformin
Second opinion re CKD and hyponatremia
Aortic Stenosis
JACQUE + hyperkalemia
High Blood Sugars/DMT2
HF

## 2021-08-12 NOTE — CONSULT NOTE ADULT - SUBJECTIVE AND OBJECTIVE BOX
HPI:  MICU H&P    CHIEF COMPLAINT: urgent HD, hyperkalemia    HPI / INTERVAL HISTORY:  87F PMH CAD w/stents, DM2, Afib (not on AC's, dc'd pradaxa 1mo ago as pt w/u for anemia), Colon Ca (about 25y ago),  AS, HF on 80mg lasix qd, recently started on Entresto, presents to the ED with abnormal labs done on  showing JACQUE with hyperk to >6 mod hemolyzed. Pt states that she hasn't urinated in 2 days, also states she's had nonbloody loose stools over the past week. Otherwise she states SOB is at baseline. Denies any fevers/chills, cough, chest pain, palpitations, lightheadedness, abd pain, n/v, leg swelling worse than baseline.    Home medications: Metformin, Lasix 80qd, Metolazone 25 mg qd, Glipizide, atorvastatin 40 mg qhs, Zolpidem 5 mg qhs, Entresto 24 mg, Metoprolol 25 mg and Januvia     On presentation patient with BUN/Cr 79/6.7 mg/dl. K: 6.6 (non hemolyzed). Previous Cr was at 1.26 21. Also noted with bicarb: 11 pH 7.2 - lactate 7. patient on Metformin at home and reports taking all of her medications.     Nephrology consulted for JACQUE / hyperkalemia and acidosis. Temporized in ED with Bicarb and calcium. Per tox will need Q1 FSG for sulfonylurea and JACQUE. Will be admitted to MICU for HD.     PAST MEDICAL & SURGICAL HISTORY:    FAMILY HISTORY:  No sig FH in first degree relatives     SOCIAL HISTORY:  Denies smoking drinking or illicit drug use    Allergies    No Known Allergies    Intolerances    REVIEW OF SYSTEMS:  +Fatigue, weakness, oliguria  Negative except per HPI    OBJECTIVE:  ICU Vital Signs Last 24 Hrs  T(C): 36.4 (2021 07:36), Max: 36.4 (2021 07:36)  T(F): 97.5 (2021 07:36), Max: 97.5 (2021 07:36)  HR: 79 (2021 10:30) (71 - 83)  BP: 128/64 (2021 10:30) (112/67 - 128/64)  BP(mean): 71 (2021 10:30) (71 - 77)    RR: 18 (2021 10:30) (18 - 20)  SpO2: 100% (2021 10:30) (95% - 100%)  CAPILLARY BLOOD GLUCOSE    POCT Blood Glucose.: 148 mg/dL (2021 11:19)    PHYSICAL EXAM:  Gen: NAD  	HEENT: MMM  	Pulm: CTA B/L  	CV: S1S2  	Abd: Soft, +BS   	Ext: No LE edema B/L  	Neuro: Awake  	Skin: Warm and dry             : no tenderness to palpation     MEDICATIONS  (PRN):    LABS:                        7.8    7.31  )-----------( 196      ( 2021 08:47 )             25.8     Hgb Trend: 7.8<--  07-08    124<L>  |  84<L>  |  79<H>  ----------------------------<  32<LL>  6.6<HH>   |  11<L>  |  6.76<H>    Ca    9.6      2021 08:48  Phos  6.4     07-08  Mg     2.0     07-08    TPro  7.2  /  Alb  3.9  /  TBili  0.4  /  DBili  x   /  AST  25  /  ALT  17  /  AlkPhos  205<H>  07-08    Creatinine Trend: 6.76<--    Venous Blood Gas:   @ 08:47  7.24/34/42/14/64  VBG Lactate: 7.0 (2021 11:43)    PERTINENT PM/SXH:       FAMILY HISTORY:    ITEMS NOT CHECKED ARE NOT PRESENT    SOCIAL HISTORY:   Significant other/partner[ ]  Children[x ]  Amish/Spirituality:  Substance hx:  [ ]   Tobacco hx:  [ ]   Alcohol hx: [ ]   Home Opioid hx:  [ ] I-Stop Reference No:  Living Situation: [x ]Home  [ ]Long term care  [ ]Rehab [ ]Other    ADVANCE DIRECTIVES:    DNR  MOLST  [x ]  Living Will  [ ]   DECISION MAKER(s):  [x ] Health Care Proxy(s)  [ ] Surrogate(s)  [ ] Guardian           Name(s): Reji Thomson Phone Number(s):    BASELINE (I)ADL(s) (prior to admission):  [place]: [ ]Total  [ ] Moderate [ ]Dependent    Allergies    No Known Allergies    Intolerances    MEDICATIONS  (STANDING):  aspirin  chewable 81 milliGRAM(s) Oral daily  budesonide  80 MICROgram(s)/formoterol 4.5 MICROgram(s) Inhaler 2 Puff(s) Inhalation two times a day  chlorhexidine 2% Cloths 1 Application(s) Topical <User Schedule>  dextrose 40% Gel 15 Gram(s) Oral once  dextrose 50% Injectable 25 Gram(s) IV Push once  dextrose 50% Injectable 12.5 Gram(s) IV Push once  dextrose 50% Injectable 25 Gram(s) IV Push once  ferrous    sulfate 325 milliGRAM(s) Oral daily  glucagon  Injectable 1 milliGRAM(s) IntraMuscular once  heparin   Injectable 5000 Unit(s) SubCutaneous every 8 hours  polyethylene glycol 3350 17 Gram(s) Oral daily  tiotropium 18 MICROgram(s) Capsule 1 Capsule(s) Inhalation daily    MEDICATIONS  (PRN):  artificial  tears Solution 1 Drop(s) Both EYES every 12 hours PRN Dry Eyes  glycopyrrolate Injectable 0.4 milliGRAM(s) IV Push every 4 hours PRN secretions  HYDROmorphone  Injectable 0.2 milliGRAM(s) IV Push every 1 hour PRN dyspnea  HYDROmorphone  Injectable 0.2 milliGRAM(s) IV Push every 1 hour PRN mild, moderate, or severe pain  LORazepam   Injectable 0.2 milliGRAM(s) IV Push every 1 hour PRN anxiety, agitation, or intractable dyspnea    PRESENT SYMPTOMS: [x ]  Unable to be obtained due to mental status  Source if other than patient:  [ ]Family   [ ]Team     Pain: [ ]yes [ ]no  QOL impact -   Location -                    Aggravating factors -  Quality -  Radiation -  Timing-  Severity (0-10 scale):  Minimal acceptable level (0-10 scale):     CPOT:    https://www.sccm.org/getattachment/[email or web address]/Critical-Care-Pain-Observation-Tool-(CPOT)      PAIN AD Score:  0    [email or web address]/[email or web address] (press ctrl +  left click to view)    Dyspnea:                           [ ]Mild [ ]Moderate [ ]Severe  Anxiety:                             [ ]Mild [ ]Moderate [ ]Severe  Fatigue:                             [ ]Mild [ ]Moderate [ ]Severe  Nausea:                             [ ]Mild [ ]Moderate [ ]Severe  Loss of appetite:              [ ]Mild [ ]Moderate [ ]Severe  Constipation:                    [ ]Mild [ ]Moderate [ ]Severe    Other Symptoms:  [ ]All other review of systems negative     Palliative Performance Status Version 2:    10     %    [email or web address]  PHYSICAL EXAM: DUE TO COVID-19 INFECTION  physical exam findings from the clinician caring for this patient directly are used.   Vital Signs Last 24 Hrs  T(C): 36.4 (12 Aug 2021 11:00), Max: 36.7 (11 Aug 2021 16:00)  T(F): 97.5 (12 Aug 2021 11:00), Max: 98.1 (11 Aug 2021 16:00)  HR: 87 (12 Aug 2021 11:00) (80 - 92)  BP: 104/51 (12 Aug 2021 11:00) (95/49 - 114/54)  BP(mean): 73 (12 Aug 2021 11:00) (67 - 81)  RR: 25 (12 Aug 2021 11:00) (16 - 54)  SpO2: 98% (12 Aug 2021 11:00) (93% - 100%) I&O's Summary    11 Aug 2021 07:01  -  12 Aug 2021 07:00  --------------------------------------------------------  IN: 587.8 mL / OUT: 170 mL / NET: 417.8 mL    12 Aug 2021 07:01  -  12 Aug 2021 14:58  --------------------------------------------------------  IN: 0 mL / OUT: 70 mL / NET: -70 mL      GENERAL:  [ ]Alert  [ ]Oriented x   [x ]Lethargic  [ ]Cachexia  [ ]Unarousable  [ ]Verbal  [x ]Non-Verbal  Behavioral:   [ ] Anxiety  [ ] Delirium [ ] Agitation [ ] Other  HEENT:  [x ]Normal   [ ]Dry mouth   [ ]ET Tube/Trach  [ ]Oral lesions  PULMONARY:   [x ]Clear [ ]Tachypnea  [ ]Audible excessive secretions   [ ]Rhonchi        [ ]Right [ ]Left [ ]Bilateral  [ ]Crackles        [ ]Right [ ]Left [ ]Bilateral  [ ]Wheezing     [ ]Right [ ]Left [ ]Bilateral  [ ]Diminished breath sounds [ ]right [ ]left [ ]bilateral  CARDIOVASCULAR:    [x ]Regular [ ]Irregular [ ]Tachy  [ ]Catarino [ ]Murmur [ ]Other  GASTROINTESTINAL:  [x ]Soft  [ ]Distended   [ ]+BS  [x ]Non tender [ ]Tender  [ ]PEG [ ]OGT/ NGT  Last BM:   GENITOURINARY:  [ ]Normal [x ] Incontinent   [ ]Oliguria/Anuria   [x ][place]ward  MUSCULOSKELETAL:   [ ]Normal   [x ]Weakness  [ ]Bed/Wheelchair bound [x ]Edema  NEUROLOGIC:   [ ]No focal deficits  [ x]Cognitive impairment  [ ]Dysphagia [ ]Dysarthria [ ]Paresis [ ]Other   SKIN:   [ ]Normal    [ ]Rash  [ ]Pressure ulcer(s)       Present on admission [ ]y [ ]n    CRITICAL CARE:  [x ] Shock Present  [ ]Septic [ ]Cardiogenic [ ]Neurologic [ ]Hypovolemic  [ ]  Vasopressors [ ]  Inotropes   [x ]Respiratory failure present [ ]Mechanical ventilation [ ]Non-invasive ventilatory support [x ]High flow     [x ]Acute  [ ]Chronic [ ]Hypoxic  [ ]Hypercarbic [ ]Other  [x ]Other organ failure  - renal dysfunction    LABS:                        7.8    27.71 )-----------( 208      ( 11 Aug 2021 07:55 )             26.1   08-11    135  |  97  |  65<H>  ----------------------------<  399<H>  4.3   |  23  |  1.51<H>    Ca    8.8      11 Aug 2021 15:18  Phos  2.3     08-11  Mg     2.7     08-11    TPro  7.1  /  Alb  3.1<L>  /  TBili  1.0  /  DBili  x   /  AST  21  /  ALT  8<L>  /  AlkPhos  226<H>        Urinalysis Basic - ( 11 Aug 2021 11:31 )    Color: Dark Orange / Appearance: Slightly Turbid / S.026 / pH: x  Gluc: x / Ketone: Negative  / Bili: Small / Urobili: 3 mg/dL   Blood: x / Protein: 100 mg/dL / Nitrite: Negative   Leuk Esterase: Large / RBC: 3 /hpf / WBC 0 /HPF   Sq Epi: x / Non Sq Epi: 0 /hpf / Bacteria: Negative          RADIOLOGY & ADDITIONAL STUDIES:   < from: CT Abdomen and Pelvis No Cont (21 @ 09:52) >  LOWER CHEST: Cardiomegaly. Mitral annular calcifications. Hypodense blood pool with respect to the interventricular septum, consistent with anemia.  Coronary arterial and aortic calcifications. Redemonstrated 4.0 x 2.9 cm right middle lobe mass (2-5); a 3.2 x 5.1 cm left lower lobe mass extending into the left inferior hilum. Additional left lung subpleural nodules measuring up to 1.2 cm (2-13). These are overall unchanged since 2021 exam.    LIVER: Subcentimeter hypodensity in gallbladder fossa region, to small to characterize.  BILE DUCTS: Normal caliber.  GALLBLADDER: Within normal limits.  SPLEEN: Within normal limits.  PANCREAS: Similar-appearing 1.5 cm pancreatic body and 1.0 cm pancreatic tail cystic structures are again noted, indeterminate.  ADRENALS: Within normal limits.  KIDNEYS/URETERS: Persistent nephrograms bilaterally.  Bilateral cysts measuring up to 5.6 cm. No hydronephrosis.    BLADDER: Collapsed with excreted contrast within the underdistended bladder.  REPRODUCTIVE ORGANS: Uterus and adnexa are within normal limits.    BOWEL: No bowel obstruction. Appendix is normal. Rectal anastomosis. Colonic diverticulosis without diverticulitis.  PERITONEUM: Trace ascites.  VESSELS: Atherosclerotic changes.  RETROPERITONEUM/LYMPH NODES: No enlarged lymph nodes measuring greater than 10 mm at short axis.  ABDOMINAL WALL: Fat containing ventral hernias.  Mild subcutaneous edema.  BONES: Degenerative changes. Grade 1 anterolisthesis of L4 and L5.    IMPRESSION:    Bilateral lung masses and pulmonary nodules as described above, suspicious for neoplasm, unchanged since 2021 exam.    Persistent nephrograms bilaterally, suspicious for ATN.    Pancreatic body and tail cystic structures, indeterminate. A nonemergent abdominal MRI may be considered for further evaluation.    < end of copied text >      PROTEIN CALORIE MALNUTRITION PRESENT: [ ]mild [ ]moderate [ ]severe [ ]underweight [ ]morbid obesity  https://www.andeal.org/vault/2440/web/files/ONC/Table_Clinical%20Characteristics%20to%20Document%20Malnutrition-White%20JV%20et%20al%2020.pdf    Height (cm): 152.4 (21 @ 11:15)  Weight (kg): 62.3 (08-10-21 @ 06:00)  BMI (kg/m2): 26.8 (08-10-21 @ 06:00)    [x ]PPSV2 < or = to 30% [ ]significant weight loss  [x ]poor nutritional intake  [ ]anasarca     [ ]Artificial Nutrition      REFERRALS:   [ ]Chaplaincy  [ ]Hospice  [ ]Child Life  [x ]Social Work  [ ]Case management [ ]Holistic Therapy     Goals of Care Document:  HPI:  87F PMH CAD w/stents, DM2, Afib (not on AC's, dc'd pradaxa 1mo ago as pt w/u for anemia), Colon Ca (about 25y ago),  AS, HF on 80mg lasix qd, recently started on Entresto, presents to the ED with abnormal labs done on  showing JACQUE with hyperk to >6 mod hemolyzed. Pt states that she hasn't urinated in 2 days, also states she's had nonbloody loose stools over the past week. Otherwise she states SOB is at baseline. Denies any fevers/chills, cough, chest pain, palpitations, lightheadedness, abd pain, n/v, leg swelling worse than baseline.    Home medications: Metformin, Lasix 80qd, Metolazone 25 mg qd, Glipizide, atorvastatin 40 mg qhs, Zolpidem 5 mg qhs, Entresto 24 mg, Metoprolol 25 mg and Januvia     On presentation patient with BUN/Cr 79/6.7 mg/dl. K: 6.6 (non hemolyzed). Previous Cr was at 1.26 21. Also noted with bicarb: 11 pH 7.2 - lactate 7. patient on Metformin at home and reports taking all of her medications.     Nephrology consulted for JACQUE / hyperkalemia and acidosis. Temporized in ED with Bicarb and calcium. Per tox will need Q1 FSG for sulfonylurea and JACQUE. Will be admitted to MICU for HD.     PERTINENT PM/SXH:   DM2  CAD  AS  CHF  Afib  Colon Ca    FAMILY HISTORY:     ITEMS NOT CHECKED ARE NOT PRESENT    SOCIAL HISTORY:   Significant other/partner[ ]  Children[x ]  Anabaptist/Spirituality:  Substance hx:  [ ]   Tobacco hx:  [ ]   Alcohol hx: [ ]   Home Opioid hx:  [ ] I-Stop Reference No:  Living Situation: [x ]Home  [ ]Long term care  [ ]Rehab [ ]Other    ADVANCE DIRECTIVES:    DNR  MOLST  [x ]  Living Will  [ ]   DECISION MAKER(s):  [x ] Health Care Proxy(s)  [ ] Surrogate(s)  [ ] Guardian           Name(s): Reji Thomson Phone Number(s):    BASELINE (I)ADL(s) (prior to admission):  [place]: [ ]Total  [ ] Moderate [ ]Dependent    Allergies    No Known Allergies    Intolerances    MEDICATIONS  (STANDING):  aspirin  chewable 81 milliGRAM(s) Oral daily  budesonide  80 MICROgram(s)/formoterol 4.5 MICROgram(s) Inhaler 2 Puff(s) Inhalation two times a day  chlorhexidine 2% Cloths 1 Application(s) Topical <User Schedule>  dextrose 40% Gel 15 Gram(s) Oral once  dextrose 50% Injectable 25 Gram(s) IV Push once  dextrose 50% Injectable 12.5 Gram(s) IV Push once  dextrose 50% Injectable 25 Gram(s) IV Push once  ferrous    sulfate 325 milliGRAM(s) Oral daily  glucagon  Injectable 1 milliGRAM(s) IntraMuscular once  heparin   Injectable 5000 Unit(s) SubCutaneous every 8 hours  polyethylene glycol 3350 17 Gram(s) Oral daily  tiotropium 18 MICROgram(s) Capsule 1 Capsule(s) Inhalation daily    MEDICATIONS  (PRN):  artificial  tears Solution 1 Drop(s) Both EYES every 12 hours PRN Dry Eyes  glycopyrrolate Injectable 0.4 milliGRAM(s) IV Push every 4 hours PRN secretions  HYDROmorphone  Injectable 0.2 milliGRAM(s) IV Push every 1 hour PRN dyspnea  HYDROmorphone  Injectable 0.2 milliGRAM(s) IV Push every 1 hour PRN mild, moderate, or severe pain  LORazepam   Injectable 0.2 milliGRAM(s) IV Push every 1 hour PRN anxiety, agitation, or intractable dyspnea    PRESENT SYMPTOMS: [x ]  Unable to be obtained due to mental status  Source if other than patient:  [ ]Family   [ ]Team     Pain: [ ]yes [ ]no  QOL impact -   Location -                    Aggravating factors -  Quality -  Radiation -  Timing-  Severity (0-10 scale):  Minimal acceptable level (0-10 scale):     CPOT:    https://www.sccm.org/getattachment/[email or web address]/Critical-Care-Pain-Observation-Tool-(CPOT)      PAIN AD Score:  0    [email or web address]/cog/painad.pdf (press ctrl +  left click to view)    Dyspnea:                           [ ]Mild [ ]Moderate [ ]Severe  Anxiety:                             [ ]Mild [ ]Moderate [ ]Severe  Fatigue:                             [ ]Mild [ ]Moderate [ ]Severe  Nausea:                             [ ]Mild [ ]Moderate [ ]Severe  Loss of appetite:              [ ]Mild [ ]Moderate [ ]Severe  Constipation:                    [ ]Mild [ ]Moderate [ ]Severe    Other Symptoms:  [ ]All other review of systems negative     Palliative Performance Status Version 2:    10     %    [email or web address]_performance_[email or web address]_[email or web address]  PHYSICAL EXAM: DUE TO COVID-19 INFECTION  physical exam findings from the clinician caring for this patient directly are used.   Vital Signs Last 24 Hrs  T(C): 36.4 (12 Aug 2021 11:00), Max: 36.7 (11 Aug 2021 16:00)  T(F): 97.5 (12 Aug 2021 11:00), Max: 98.1 (11 Aug 2021 16:00)  HR: 87 (12 Aug 2021 11:00) (80 - 92)  BP: 104/51 (12 Aug 2021 11:00) (95/49 - 114/54)  BP(mean): 73 (12 Aug 2021 11:) (67 - 81)  RR: 25 (12 Aug 2021 11:00) (16 - 54)  SpO2: 98% (12 Aug 2021 11:00) (93% - 100%) I&O's Summary    11 Aug 2021 07:  -  12 Aug 2021 07:00  --------------------------------------------------------  IN: 587.8 mL / OUT: 170 mL / NET: 417.8 mL    12 Aug 2021 07:01  -  12 Aug 2021 14:58  --------------------------------------------------------  IN: 0 mL / OUT: 70 mL / NET: -70 mL      GENERAL:  [ ]Alert  [ ]Oriented x   [x ]Lethargic  [ ]Cachexia  [ ]Unarousable  [ ]Verbal  [x ]Non-Verbal  Behavioral:   [ ] Anxiety  [ ] Delirium [ ] Agitation [ ] Other  HEENT:  [x ]Normal   [ ]Dry mouth   [ ]ET Tube/Trach  [ ]Oral lesions  PULMONARY:   [x ]Clear [ ]Tachypnea  [ ]Audible excessive secretions   [ ]Rhonchi        [ ]Right [ ]Left [ ]Bilateral  [ ]Crackles        [ ]Right [ ]Left [ ]Bilateral  [ ]Wheezing     [ ]Right [ ]Left [ ]Bilateral  [ ]Diminished breath sounds [ ]right [ ]left [ ]bilateral  CARDIOVASCULAR:    [x ]Regular [ ]Irregular [ ]Tachy  [ ]Catarino [ ]Murmur [ ]Other  GASTROINTESTINAL:  [x ]Soft  [ ]Distended   [ ]+BS  [x ]Non tender [ ]Tender  [ ]PEG [ ]OGT/ NGT  Last BM:   GENITOURINARY:  [ ]Normal [x ] Incontinent   [ ]Oliguria/Anuria   [x ][place]ward  MUSCULOSKELETAL:   [ ]Normal   [x ]Weakness  [ ]Bed/Wheelchair bound [x ]Edema  NEUROLOGIC:   [ ]No focal deficits  [ x]Cognitive impairment  [ ]Dysphagia [ ]Dysarthria [ ]Paresis [ ]Other   SKIN:   [ ]Normal    [ ]Rash  [ ]Pressure ulcer(s)       Present on admission [ ]y [ ]n    CRITICAL CARE:  [x ] Shock Present  [ ]Septic [ ]Cardiogenic [ ]Neurologic [ ]Hypovolemic  [ ]  Vasopressors [ ]  Inotropes   [x ]Respiratory failure present [ ]Mechanical ventilation [ ]Non-invasive ventilatory support [x ]High flow     [x ]Acute  [ ]Chronic [ ]Hypoxic  [ ]Hypercarbic [ ]Other  [x ]Other organ failure  - renal dysfunction    LABS:                        7.8    27.71 )-----------( 208      ( 11 Aug 2021 07:55 )             26.1       135  |  97  |  65<H>  ----------------------------<  399<H>  4.3   |  23  |  1.51<H>    Ca    8.8      11 Aug 2021 15:18  Phos  2.3       Mg     2.7         TPro  7.1  /  Alb  3.1<L>  /  TBili  1.0  /  DBili  x   /  AST  21  /  ALT  8<L>  /  AlkPhos  226<H>        Urinalysis Basic - ( 11 Aug 2021 11:31 )    Color: Dark Orange / Appearance: Slightly Turbid / S.026 / pH: x  Gluc: x / Ketone: Negative  / Bili: Small / Urobili: 3 mg/dL   Blood: x / Protein: 100 mg/dL / Nitrite: Negative   Leuk Esterase: Large / RBC: 3 /hpf / WBC 0 /HPF   Sq Epi: x / Non Sq Epi: 0 /hpf / Bacteria: Negative          RADIOLOGY & ADDITIONAL STUDIES:   < from: CT Abdomen and Pelvis No Cont (21 @ 09:52) >  LOWER CHEST: Cardiomegaly. Mitral annular calcifications. Hypodense blood pool with respect to the interventricular septum, consistent with anemia.  Coronary arterial and aortic calcifications. Redemonstrated 4.0 x 2.9 cm right middle lobe mass (2-5); a 3.2 x 5.1 cm left lower lobe mass extending into the left inferior hilum. Additional left lung subpleural nodules measuring up to 1.2 cm (2-13). These are overall unchanged since 2021 exam.    LIVER: Subcentimeter hypodensity in gallbladder fossa region, to small to characterize.  BILE DUCTS: Normal caliber.  GALLBLADDER: Within normal limits.  SPLEEN: Within normal limits.  PANCREAS: Similar-appearing 1.5 cm pancreatic body and 1.0 cm pancreatic tail cystic structures are again noted, indeterminate.  ADRENALS: Within normal limits.  KIDNEYS/URETERS: Persistent nephrograms bilaterally.  Bilateral cysts measuring up to 5.6 cm. No hydronephrosis.    BLADDER: Collapsed with excreted contrast within the underdistended bladder.  REPRODUCTIVE ORGANS: Uterus and adnexa are within normal limits.    BOWEL: No bowel obstruction. Appendix is normal. Rectal anastomosis. Colonic diverticulosis without diverticulitis.  PERITONEUM: Trace ascites.  VESSELS: Atherosclerotic changes.  RETROPERITONEUM/LYMPH NODES: No enlarged lymph nodes measuring greater than 10 mm at short axis.  ABDOMINAL WALL: Fat containing ventral hernias.  Mild subcutaneous edema.  BONES: Degenerative changes. Grade 1 anterolisthesis of L4 and L5.    IMPRESSION:    Bilateral lung masses and pulmonary nodules as described above, suspicious for neoplasm, unchanged since 2021 exam.    Persistent nephrograms bilaterally, suspicious for ATN.    Pancreatic body and tail cystic structures, indeterminate. A nonemergent abdominal MRI may be considered for further evaluation.    < end of copied text >      PROTEIN CALORIE MALNUTRITION PRESENT: [ ]mild [ ]moderate [ ]severe [ ]underweight [ ]morbid obesity  https://www.andeal.org/vault/2440/web/files/ONC/Table_Clinical%20Characteristics%20to%20Document%20Malnutrition-White%20JV%20et%20al%2020.pdf    Height (cm): 152.4 (21 @ 11:15)  Weight (kg): 62.3 (08-10-21 @ 06:00)  BMI (kg/m2): 26.8 (08-10-21 @ 06:00)    [x ]PPSV2 < or = to 30% [ ]significant weight loss  [x ]poor nutritional intake  [ ]anasarca     [ ]Artificial Nutrition      REFERRALS:   [ ]Chaplaincy  [ ]Hospice  [ ]Child Life  [x ]Social Work  [ ]Case management [ ]Holistic Therapy     Goals of Care Document:  HPI:  87F PMH CAD w/stents, DM2, Afib (not on AC's, dc'd pradaxa 1mo ago as pt w/u for anemia), Colon Ca (about 25y ago),  AS, HF on 80mg lasix qd, recently started on Entresto, presents to the ED with abnormal labs done on  showing JACQUE with hyperk to >6 mod hemolyzed. Pt states that she hasn't urinated in 2 days, also states she's had nonbloody loose stools over the past week. Otherwise she states SOB is at baseline. Denies any fevers/chills, cough, chest pain, palpitations, lightheadedness, abd pain, n/v, leg swelling worse than baseline.    Home medications: Metformin, Lasix 80qd, Metolazone 25 mg qd, Glipizide, atorvastatin 40 mg qhs, Zolpidem 5 mg qhs, Entresto 24 mg, Metoprolol 25 mg and Januvia     On presentation patient with BUN/Cr 79/6.7 mg/dl. K: 6.6 (non hemolyzed). Previous Cr was at 1.26 21. Also noted with bicarb: 11 pH 7.2 - lactate 7. patient on Metformin at home and reports taking all of her medications.     Nephrology consulted for JACQUE / hyperkalemia and acidosis. Temporized in ED with Bicarb and calcium. Per tox will need Q1 FSG for sulfonylurea and JACQUE. Will be admitted to MICU for HD.     PERTINENT PM/SXH:   DM2  CAD  AS  CHF  Afib  Colon Ca    FAMILY HISTORY:     ITEMS NOT CHECKED ARE NOT PRESENT    SOCIAL HISTORY:   Significant other/partner[ ]  Children[x ]  Druze/Spirituality:  Substance hx:  [ ]   Tobacco hx:  [ ]   Alcohol hx: [ ]   Home Opioid hx:  [ ] I-Stop Reference No:  Living Situation: [x ]Home  [ ]Long term care  [ ]Rehab [ ]Other    ADVANCE DIRECTIVES:    DNR  MOLST  [x ]  Living Will  [ ]   DECISION MAKER(s):  [x ] Health Care Proxy(s)  [ ] Surrogate(s)  [ ] Guardian           Name(s): Reji Thomson Phone Number(s):[facility]    BASELINE (I)ADL(s) (prior to admission):  [place]: [ ]Total  [x ] Moderate [ ]Dependent    Allergies    No Known Allergies    Intolerances    MEDICATIONS  (STANDING):  aspirin  chewable 81 milliGRAM(s) Oral daily  budesonide  80 MICROgram(s)/formoterol 4.5 MICROgram(s) Inhaler 2 Puff(s) Inhalation two times a day  chlorhexidine 2% Cloths 1 Application(s) Topical <User Schedule>  dextrose 40% Gel 15 Gram(s) Oral once  dextrose 50% Injectable 25 Gram(s) IV Push once  dextrose 50% Injectable 12.5 Gram(s) IV Push once  dextrose 50% Injectable 25 Gram(s) IV Push once  ferrous    sulfate 325 milliGRAM(s) Oral daily  glucagon  Injectable 1 milliGRAM(s) IntraMuscular once  heparin   Injectable 5000 Unit(s) SubCutaneous every 8 hours  polyethylene glycol 3350 17 Gram(s) Oral daily  tiotropium 18 MICROgram(s) Capsule 1 Capsule(s) Inhalation daily    MEDICATIONS  (PRN):  artificial  tears Solution 1 Drop(s) Both EYES every 12 hours PRN Dry Eyes  glycopyrrolate Injectable 0.4 milliGRAM(s) IV Push every 4 hours PRN secretions  HYDROmorphone  Injectable 0.2 milliGRAM(s) IV Push every 1 hour PRN dyspnea  HYDROmorphone  Injectable 0.2 milliGRAM(s) IV Push every 1 hour PRN mild, moderate, or severe pain  LORazepam   Injectable 0.2 milliGRAM(s) IV Push every 1 hour PRN anxiety, agitation, or intractable dyspnea    PRESENT SYMPTOMS: [x ]  Unable to be obtained due to mental status  Source if other than patient:  [ ]Family   [ ]Team     Pain: [ ]yes [ ]no  QOL impact -   Location -                    Aggravating factors -  Quality -  Radiation -  Timing-  Severity (0-10 scale):  Minimal acceptable level (0-10 scale):     CPOT:    https://www.[facility].org/getattachment/[email or web address]/Critical-Care-Pain-Observation-Tool-(CPOT)      PAIN AD Score:  0    [email or web address] (press ctrl +  left click to view)    Dyspnea:                           [ ]Mild [ ]Moderate [ ]Severe  Anxiety:                             [ ]Mild [ ]Moderate [ ]Severe  Fatigue:                             [ ]Mild [ ]Moderate [ ]Severe  Nausea:                             [ ]Mild [ ]Moderate [ ]Severe  Loss of appetite:              [ ]Mild [ ]Moderate [ ]Severe  Constipation:                    [ ]Mild [ ]Moderate [ ]Severe    Other Symptoms:  [ ]All other review of systems negative     Palliative Performance Status Version 2:    10     %    [email or web address]  PHYSICAL EXAM: DUE TO COVID-19 INFECTION  physical exam findings from the clinician caring for this patient directly are used.   Vital Signs Last 24 Hrs  T(C): 36.4 (12 Aug 2021 11:00), Max: 36.7 (11 Aug 2021 16:00)  T(F): 97.5 (12 Aug 2021 11:00), Max: 98.1 (11 Aug 2021 16:00)  HR: 87 (12 Aug 2021 11:00) (80 - 92)  BP: 104/51 (12 Aug 2021 11:00) (95/49 - 114/54)  BP(mean): 73 (12 Aug 2021 11:) (67 - 81)  RR: 25 (12 Aug 2021 11:00) (16 - 54)  SpO2: 98% (12 Aug 2021 11:00) (93% - 100%) I&O's Summary    11 Aug 2021 07:  -  12 Aug 2021 07:00  --------------------------------------------------------  IN: 587.8 mL / OUT: 170 mL / NET: 417.8 mL    12 Aug 2021 07:01  -  12 Aug 2021 14:58  --------------------------------------------------------  IN: 0 mL / OUT: 70 mL / NET: -70 mL      GENERAL:  [ ]Alert  [ ]Oriented x   [x ]Lethargic  [ ]Cachexia  [ ]Unarousable  [ ]Verbal  [x ]Non-Verbal  Behavioral:   [ ] Anxiety  [ ] Delirium [ ] Agitation [ ] Other  HEENT:  [x ]Normal   [ ]Dry mouth   [ ]ET Tube/Trach  [ ]Oral lesions  PULMONARY:   [x ]Clear [ ]Tachypnea  [ ]Audible excessive secretions   [ ]Rhonchi        [ ]Right [ ]Left [ ]Bilateral  [ ]Crackles        [ ]Right [ ]Left [ ]Bilateral  [ ]Wheezing     [ ]Right [ ]Left [ ]Bilateral  [ ]Diminished breath sounds [ ]right [ ]left [ ]bilateral  CARDIOVASCULAR:    [x ]Regular [ ]Irregular [ ]Tachy  [ ]Catarino [ ]Murmur [ ]Other  GASTROINTESTINAL:  [x ]Soft  [ ]Distended   [ ]+BS  [x ]Non tender [ ]Tender  [ ]PEG [ ]OGT/ NGT  Last BM:   GENITOURINARY:  [ ]Normal [x ] Incontinent   [ ]Oliguria/Anuria   [x ]Steward  MUSCULOSKELETAL:   [ ]Normal   [x ]Weakness  [ ]Bed/Wheelchair bound [x ]Edema  NEUROLOGIC:   [ ]No focal deficits  [ x]Cognitive impairment  [ ]Dysphagia [ ]Dysarthria [ ]Paresis [ ]Other   SKIN:   [ ]Normal    [ ]Rash  [ ]Pressure ulcer(s)       Present on admission [ ]y [ ]n    CRITICAL CARE:  [x ] Shock Present  [ ]Septic [ ]Cardiogenic [ ]Neurologic [ ]Hypovolemic  [ ]  Vasopressors [ ]  Inotropes   [x ]Respiratory failure present [ ]Mechanical ventilation [ ]Non-invasive ventilatory support [x ]High flow     [x ]Acute  [ ]Chronic [ ]Hypoxic  [ ]Hypercarbic [ ]Other  [x ]Other organ failure  - renal dysfunction    LABS:                        7.8    27.71 )-----------( 208      ( 11 Aug 2021 07:55 )             26.1       135  |  97  |  65<H>  ----------------------------<  399<H>  4.3   |  23  |  1.51<H>    Ca    8.8      11 Aug 2021 15:18  Phos  2.3       Mg     2.7         TPro  7.1  /  Alb  3.1<L>  /  TBili  1.0  /  DBili  x   /  AST  21  /  ALT  8<L>  /  AlkPhos  226<H>        Urinalysis Basic - ( 11 Aug 2021 11:31 )    Color: Dark Orange / Appearance: Slightly Turbid / S.026 / pH: x  Gluc: x / Ketone: Negative  / Bili: Small / Urobili: 3 mg/dL   Blood: x / Protein: 100 mg/dL / Nitrite: Negative   Leuk Esterase: Large / RBC: 3 /hpf / WBC 0 /HPF   Sq Epi: x / Non Sq Epi: 0 /hpf / Bacteria: Negative          RADIOLOGY & ADDITIONAL STUDIES:   < from: CT Abdomen and Pelvis No Cont (21 @ 09:52) >  LOWER CHEST: Cardiomegaly. Mitral annular calcifications. Hypodense blood pool with respect to the interventricular septum, consistent with anemia.  Coronary arterial and aortic calcifications. Redemonstrated 4.0 x 2.9 cm right middle lobe mass (2-5); a 3.2 x 5.1 cm left lower lobe mass extending into the left inferior hilum. Additional left lung subpleural nodules measuring up to 1.2 cm (2-13). These are overall unchanged since 2021 exam.    LIVER: Subcentimeter hypodensity in gallbladder fossa region, to small to characterize.  BILE DUCTS: Normal caliber.  GALLBLADDER: Within normal limits.  SPLEEN: Within normal limits.  PANCREAS: Similar-appearing 1.5 cm pancreatic body and 1.0 cm pancreatic tail cystic structures are again noted, indeterminate.  ADRENALS: Within normal limits.  KIDNEYS/URETERS: Persistent nephrograms bilaterally.  Bilateral cysts measuring up to 5.6 cm. No hydronephrosis.    BLADDER: Collapsed with excreted contrast within the underdistended bladder.  REPRODUCTIVE ORGANS: Uterus and adnexa are within normal limits.    BOWEL: No bowel obstruction. Appendix is normal. Rectal anastomosis. Colonic diverticulosis without diverticulitis.  PERITONEUM: Trace ascites.  VESSELS: Atherosclerotic changes.  RETROPERITONEUM/LYMPH NODES: No enlarged lymph nodes measuring greater than 10 mm at short axis.  ABDOMINAL WALL: Fat containing ventral hernias.  Mild subcutaneous edema.  BONES: Degenerative changes. Grade 1 anterolisthesis of L4 and L5.    IMPRESSION:    Bilateral lung masses and pulmonary nodules as described above, suspicious for neoplasm, unchanged since 2021 exam.    Persistent nephrograms bilaterally, suspicious for ATN.    Pancreatic body and tail cystic structures, indeterminate. A nonemergent abdominal MRI may be considered for further evaluation.    < end of copied text >      PROTEIN CALORIE MALNUTRITION PRESENT: [ ]mild [ ]moderate [ ]severe [ ]underweight [ ]morbid obesity  https://www.andeal.org/vault/2440/web/files/ONC/Table_Clinical%20Characteristics%20to%20Document%20Malnutrition-White%20JV%20et%20al%2020.pdf    Height (cm): 152.4 (21 @ 11:15)  Weight (kg): 62.3 (08-10-21 @ 06:00)  BMI (kg/m2): 26.8 (08-10-21 @ 06:00)    [x ]PPSV2 < or = to 30% [ ]significant weight loss  [x ]poor nutritional intake  [ ]anasarca     [ ]Artificial Nutrition      REFERRALS:   [ ]Chaplaincy  [ ]Hospice  [ ]Child Life  [x ]Social Work  [ ]Case management [ ]Holistic Therapy     Goals of Care Document:

## 2021-08-12 NOTE — PROGRESS NOTE ADULT - PROBLEM SELECTOR PLAN 1
Patient for comfort measures.  Insulin and FS testing have been DC'd.  Suggest to continue management per primary team.  We will be signing off on the care of this patient. Please do not hesitate to contact us with any further questions or concerns. Thank you.

## 2021-08-12 NOTE — PROGRESS NOTE ADULT - SUBJECTIVE AND OBJECTIVE BOX
CHIEF COMPLAINT: f/up sob, fluid OL-PH WHO class 2, CHF, abnormal CT-c/w CA (RML)-more [id number] sleep    Interval Events: struct heart, nephrology and CHF group-s/p RHC--moved to CICU for CRT due to progressive fluid OL-on hiflo      REVIEW OF SYSTEMS:  Constitutional: No fevers or chills. No weight loss. No fatigue or generalized malaise.  Eyes: No itching or discharge from the eyes  ENT: No ear pain. No ear discharge. No nasal congestion. No post nasal drip. No epistaxis. No throat pain. No sore throat. No difficulty swallowing.   CV: No chest pain. No palpitations. No lightheadedness or dizziness.   Resp: No dyspnea at rest. No dyspnea on exertion. No orthopnea. No wheezing. No cough. No stridor. No sputum production. No chest pain with respiration.  GI: No nausea. No vomiting. No diarrhea.  MSK: No joint pain or pain in any extremities  Integumentary: No skin lesions. No pedal edema.  Neurological: No gross motor weakness. No sensory changes.  [ ] All other systems negative  [ ] Unable to assess ROS because ________    OBJECTIVE:  ICU Vital Signs Last 24 Hrs  T(C): 36.2 (12 Aug 2021 02:00), Max: 36.8 (11 Aug 2021 07:00)  T(F): 97.2 (12 Aug 2021 02:00), Max: 98.2 (11 Aug 2021 07:00)  HR: 86 (12 Aug 2021 05:00) (80 - 89)  BP: 100/52 (12 Aug 2021 05:00) (100/52 - 127/62)  BP(mean): 74 (12 Aug 2021 05:00) (74 - 81)  ABP: 96/32 (11 Aug 2021 21:00) (96/32 - 146/64)  ABP(mean): 56 (11 Aug 2021 21:00) (56 - 96)  RR: 16 (12 Aug 2021 05:00) (16 - 58)  SpO2: 98% (12 Aug 2021 05:00) (93% - 100%)        -10 @ 07:01  -  - @ 07:00  --------------------------------------------------------  IN: 364.7 mL / OUT: 930 mL / NET: -565.3 mL     @ 07:01   @ 05:36  --------------------------------------------------------  IN: 587.8 mL / OUT: 150 mL / NET: 437.8 mL      CAPILLARY BLOOD GLUCOSE      POCT Blood Glucose.: 125 mg/dL (11 Aug 2021 21:27)      PHYSICAL EXAM:  General: Awake, alert, oriented X 3.   HEENT: Atraumatic, normocephalic.                 Mallampatti Grade                 No nasal congestion.                No tonsillar or pharyngeal exudates.  Lymph Nodes: No palpable lymphadenopathy  Neck: No JVD. No carotid bruit.   Respiratory: Normal chest expansion                         Normal percussion                         Normal and equal air entry                         No wheeze, rhonchi or rales.  Cardiovascular: S1 S2 normal. No murmurs, rubs or gallops.   Abdomen: Soft, non-tender, non-distended. No organomegaly. Normoactive bowel sounds.  Extremities: Warm to touch. Peripheral pulse palpable. No pedal edema.   Skin: No rashes or skin lesions  Neurological: Motor and sensory examination equal and normal in all four extremities.  Psychiatry: Appropriate mood and affect.    HOSPITAL MEDICATIONS:  MEDICATIONS  (STANDING):  aspirin  chewable 81 milliGRAM(s) Oral daily  budesonide  80 MICROgram(s)/formoterol 4.5 MICROgram(s) Inhaler 2 Puff(s) Inhalation two times a day  cefepime   IVPB 1000 milliGRAM(s) IV Intermittent every 12 hours  chlorhexidine 2% Cloths 1 Application(s) Topical <User Schedule>  dextrose 40% Gel 15 Gram(s) Oral once  dextrose 50% Injectable 25 Gram(s) IV Push once  dextrose 50% Injectable 12.5 Gram(s) IV Push once  dextrose 50% Injectable 25 Gram(s) IV Push once  ferrous    sulfate 325 milliGRAM(s) Oral daily  glucagon  Injectable 1 milliGRAM(s) IntraMuscular once  heparin   Injectable 5000 Unit(s) SubCutaneous every 8 hours  insulin glargine Injectable (LANTUS) 24 Unit(s) SubCutaneous at bedtime  insulin lispro (ADMELOG) corrective regimen sliding scale   SubCutaneous three times a day before meals  insulin lispro (ADMELOG) corrective regimen sliding scale   SubCutaneous at bedtime  insulin lispro Injectable (ADMELOG) 8 Unit(s) SubCutaneous three times a day before meals  norepinephrine Infusion 0.05 MICROgram(s)/kG/Min (5.84 mL/Hr) IV Continuous <Continuous>  polyethylene glycol 3350 17 Gram(s) Oral daily  tiotropium 18 MICROgram(s) Capsule 1 Capsule(s) Inhalation daily  vancomycin  IVPB      vancomycin  IVPB 1000 milliGRAM(s) IV Intermittent every 24 hours    MEDICATIONS  (PRN):  artificial  tears Solution 1 Drop(s) Both EYES every 12 hours PRN Dry Eyes      LABS:                        7.8    27.71 )-----------( 208      ( 11 Aug 2021 07:55 )             26.1         135  |  97  |  65<H>  ----------------------------<  399<H>  4.3   |  23  |  1.51<H>    Ca    8.8      11 Aug 2021 15:18  Phos  2.3       Mg     2.7         TPro  7.1  /  Alb  3.1<L>  /  TBili  1.0  /  DBili  x   /  AST  21  /  ALT  8<L>  /  AlkPhos  226<H>        Urinalysis Basic - ( 11 Aug 2021 11:31 )    Color: Dark Orange / Appearance: Slightly Turbid / S.026 / pH: x  Gluc: x / Ketone: Negative  / Bili: Small / Urobili: 3 mg/dL   Blood: x / Protein: 100 mg/dL / Nitrite: Negative   Leuk Esterase: Large / RBC: 3 /hpf / WBC 0 /HPF   Sq Epi: x / Non Sq Epi: 0 /hpf / Bacteria: Negative      Arterial Blood Gas:   @ 06:46  7.39/51/126/31/99/5.7  ABG lactate: --  Arterial Blood Gas:   @ 05:32  7.42/50/61/31/92/6.4  ABG lactate: --  Arterial Blood Gas:  08-10 @ 06:32  7.46/63/98/44/99/17.9  ABG lactate: --        MICROBIOLOGY:     RADIOLOGY:  [ ] Reviewed and interpreted by me    Point of Care Ultrasound Findings:    PFT:    EKG: CHIEF COMPLAINT: f/up sob, fluid OL-PH WHO class 2, CHF, abnormal CT-c/w CA (RML)-more [id number] sleep    Interval Events: struct heart, nephrology and CHF group-s/p RHC--moved to CICU for CRT due to progressive fluid OL then 8icu-on hiflo; off CRT now, capped pressors      REVIEW OF SYSTEMS:  Constitutional: No fevers or chills. No weight loss. N+fatigue or generalized malaise.  Eyes: No itching or discharge from the eyes  ENT: No ear pain. No ear discharge. No nasal congestion. No post nasal drip. No epistaxis. No throat pain. No sore throat. No difficulty swallowing.   CV: No chest pain. No palpitations. No lightheadedness or dizziness.   Resp: No dyspnea at rest. + dyspnea on exertion. No orthopnea. No wheezing. No cough. No stridor. No sputum production. No chest pain with respiration.  GI: No nausea. No vomiting. No diarrhea.  MSK: No joint pain or pain in any extremities  Integumentary: No skin lesions. No pedal edema.  Neurological: + gross motor weakness. No sensory changes.  [+ ] All other systems negative  [ ] Unable to assess ROS because ________    OBJECTIVE:  ICU Vital Signs Last 24 Hrs  T(C): 36.2 (12 Aug 2021 02:00), Max: 36.8 (11 Aug 2021 07:00)  T(F): 97.2 (12 Aug 2021 02:00), Max: 98.2 (11 Aug 2021 07:00)  HR: 86 (12 Aug 2021 05:00) (80 - 89)  BP: 100/52 (12 Aug 2021 05:00) (100/52 - 127/62)  BP(mean): 74 (12 Aug 2021 05:00) (74 - 81)  ABP: 96/32 (11 Aug 2021 21:00) (96/32 - 146/64)  ABP(mean): 56 (11 Aug 2021 21:00) (56 - 96)  RR: 16 (12 Aug 2021 05:00) (16 - 58)  SpO2: 98% (12 Aug 2021 05:00) (93% - 100%)        -10 @ 07: @ 07:00  --------------------------------------------------------  IN: 364.7 mL / OUT: 930 mL / NET: -565.3 mL     @ 07: @ 05:36  --------------------------------------------------------  IN: 587.8 mL / OUT: 150 mL / NET: 437.8 mL      CAPILLARY BLOOD GLUCOSE      POCT Blood Glucose.: 125 mg/dL (11 Aug 2021 21:27)      PHYSICAL EXAM: in bed on [facility]  General: Awake, alert, oriented X 3.   HEENT: Atraumatic, normocephalic.                 Mallampatti Grade 2                 No nasal congestion.                No tonsillar or pharyngeal exudates.  Lymph Nodes: No palpable lymphadenopathy  Neck: No JVD. No carotid bruit.   Respiratory: Normal chest expansion                         Normal percussion                         Normal and equal air entry                         No wheeze, rhonchi but bibasilar rales.  Cardiovascular: S1 S2 normal. No murmurs, rubs or gallops.   Abdomen: Soft, non-tender, non-distended. No organomegaly. Normoactive bowel sounds.  Extremities: Warm to touch. Peripheral pulse palpable. + pedal edema.   Skin: No rashes or skin lesions  Neurological: Motor and sensory examination equal and normal in all four extremities.  Psychiatry: Appropriate mood and affect.    HOSPITAL MEDICATIONS:  MEDICATIONS  (STANDING):  aspirin  chewable 81 milliGRAM(s) Oral daily  budesonide  80 MICROgram(s)/formoterol 4.5 MICROgram(s) Inhaler 2 Puff(s) Inhalation two times a day  cefepime   IVPB 1000 milliGRAM(s) IV Intermittent every 12 hours  chlorhexidine 2% Cloths 1 Application(s) Topical <User Schedule>  dextrose 40% Gel 15 Gram(s) Oral once  dextrose 50% Injectable 25 Gram(s) IV Push once  dextrose 50% Injectable 12.5 Gram(s) IV Push once  dextrose 50% Injectable 25 Gram(s) IV Push once  ferrous    sulfate 325 milliGRAM(s) Oral daily  glucagon  Injectable 1 milliGRAM(s) IntraMuscular once  heparin   Injectable 5000 Unit(s) SubCutaneous every 8 hours  insulin glargine Injectable (LANTUS) 24 Unit(s) SubCutaneous at bedtime  insulin lispro (ADMELOG) corrective regimen sliding scale   SubCutaneous three times a day before meals  insulin lispro (ADMELOG) corrective regimen sliding scale   SubCutaneous at bedtime  insulin lispro Injectable (ADMELOG) 8 Unit(s) SubCutaneous three times a day before meals  norepinephrine Infusion 0.05 MICROgram(s)/kG/Min (5.84 mL/Hr) IV Continuous <Continuous>  polyethylene glycol 3350 17 Gram(s) Oral daily  tiotropium 18 MICROgram(s) Capsule 1 Capsule(s) Inhalation daily  vancomycin  IVPB      vancomycin  IVPB 1000 milliGRAM(s) IV Intermittent every 24 hours    MEDICATIONS  (PRN):  artificial  tears Solution 1 Drop(s) Both EYES every 12 hours PRN Dry Eyes      LABS:                        7.8    27.71 )-----------( 208      ( 11 Aug 2021 07:55 )             26.1         135  |  97  |  65<H>  ----------------------------<  399<H>  4.3   |  23  |  1.51<H>    Ca    8.8      11 Aug 2021 15:18  Phos  2.3       Mg     2.7         TPro  7.1  /  Alb  3.1<L>  /  TBili  1.0  /  DBili  x   /  AST  21  /  ALT  8<L>  /  AlkPhos  226<H>        Urinalysis Basic - ( 11 Aug 2021 11:31 )    Color: Dark Orange / Appearance: Slightly Turbid / S.026 / pH: x  Gluc: x / Ketone: Negative  / Bili: Small / Urobili: 3 mg/dL   Blood: x / Protein: 100 mg/dL / Nitrite: Negative   Leuk Esterase: Large / RBC: 3 /hpf / WBC 0 /HPF   Sq Epi: x / Non Sq Epi: 0 /hpf / Bacteria: Negative      Arterial Blood Gas:   @ 06:46  7.39/51/126/31/99/5.7  ABG lactate: --  Arterial Blood Gas:   @ 05:32  7.42/50/61/31/92/6.4  ABG lactate: --  Arterial Blood Gas:  08-10 @ 06:32  7.46/63/98/44/99/17.9  ABG lactate: --        MICROBIOLOGY:     RADIOLOGY:  [ ] Reviewed and interpreted by me    Point of Care Ultrasound Findings:    PFT:    EKG:

## 2021-08-12 NOTE — PROGRESS NOTE ADULT - ASSESSMENT
Patient with critical AS admitted with JACQUE requiring urgent HD, now status post TAVR.  Post TAVR course complicated by hypervolemia requiring high volume diuresis that resulted in uremia.  Now status post CVVH.    Patient is failing to thrive.    Ongoing goals of care conversation.    Emphasis on comfort measures at this time.  Palliative Care consult requested.

## 2021-08-12 NOTE — CONSULT NOTE ADULT - PROBLEM SELECTOR PROBLEM 1
Severe aortic stenosis
Acute hypoxemic respiratory failure
Fluid overload
Type 2 diabetes mellitus without complication, unspecified whether long term insulin use
JACQUE (acute kidney injury)

## 2021-08-12 NOTE — CONSULT NOTE ADULT - PROBLEM SELECTOR RECOMMENDATION 9
In setting of pulmonary edema, pHTN, history of asthma  - on supplemental oxygen via nasal cannula   - DNI  - dilaudid 0.2mg IV q1 PRN for dyspnea  - ativan 0.5mg IV q1 PRN for anxiety, agitation, or intractable dyspnea In setting of pulmonary edema, pHTN, history of asthma  - on supplemental oxygen via nasal cannula   - DNI  - dilaudid 0.2mg IV q1 PRN for pain  - dilaudid 0.2mg IV q1 PRN for dyspnea  - ativan 0.5mg IV q1 PRN for anxiety, agitation, or intractable dyspnea  - robinul 0.4mg IV q4 PRN for secretions

## 2021-08-12 NOTE — PROGRESS NOTE ADULT - SUBJECTIVE AND OBJECTIVE BOX
Chief complaint  Patient is a 87y old  Female who presents with a chief complaint of urgent HD (12 Aug 2021 12:05)   Review of systems  Events noted, patient for comfort measures only, no hypoglycemic episodes.    Labs and Fingersticks  CAPILLARY BLOOD GLUCOSE      POCT Blood Glucose.: 125 mg/dL (11 Aug 2021 21:27)  POCT Blood Glucose.: 396 mg/dL (11 Aug 2021 17:13)      Medications  MEDICATIONS  (STANDING):  aspirin  chewable 81 milliGRAM(s) Oral daily  budesonide  80 MICROgram(s)/formoterol 4.5 MICROgram(s) Inhaler 2 Puff(s) Inhalation two times a day  chlorhexidine 2% Cloths 1 Application(s) Topical <User Schedule>  dextrose 40% Gel 15 Gram(s) Oral once  dextrose 50% Injectable 25 Gram(s) IV Push once  dextrose 50% Injectable 12.5 Gram(s) IV Push once  dextrose 50% Injectable 25 Gram(s) IV Push once  ferrous    sulfate 325 milliGRAM(s) Oral daily  glucagon  Injectable 1 milliGRAM(s) IntraMuscular once  heparin   Injectable 5000 Unit(s) SubCutaneous every 8 hours  polyethylene glycol 3350 17 Gram(s) Oral daily  tiotropium 18 MICROgram(s) Capsule 1 Capsule(s) Inhalation daily      Physical Exam  Vital Signs Last 12 Hrs  T(F): 97.5 (08-12-21 @ 11:00), Max: 97.7 (08-12-21 @ 08:00)  HR: 87 (08-12-21 @ 11:00) (82 - 92)  BP: 104/51 (08-12-21 @ 11:00) (95/49 - 109/52)  BP(mean): 73 (08-12-21 @ 11:00) (67 - 76)  RR: 25 (08-12-21 @ 11:00) (16 - 43)  SpO2: 98% (08-12-21 @ 11:00) (97% - 100%)     Chief complaint  Patient is a 87y old  Female who presents with a chief complaint of urgent HD (12 Aug 2021 12:05)   Review of systems  Events noted, patient for comfort measures only, no hypoglycemic episodes.    Labs and Fingersticks  CAPILLARY BLOOD GLUCOSE      POCT Blood Glucose.: 125 mg/dL (11 Aug 2021 21:27)  POCT Blood Glucose.: 396 mg/dL (11 Aug 2021 17:13)      Medications  MEDICATIONS  (STANDING):  aspirin  chewable 81 milliGRAM(s) Oral daily  budesonide  80 MICROgram(s)/formoterol 4.5 MICROgram(s) Inhaler 2 Puff(s) Inhalation two times a day  chlorhexidine 2% Cloths 1 Application(s) Topical <User Schedule>  dextrose 40% Gel 15 Gram(s) Oral once  dextrose 50% Injectable 25 Gram(s) IV Push once  dextrose 50% Injectable 12.5 Gram(s) IV Push once  dextrose 50% Injectable 25 Gram(s) IV Push once  ferrous    sulfate 325 milliGRAM(s) Oral daily  glucagon  Injectable 1 milliGRAM(s) IntraMuscular once  heparin   Injectable 5000 Unit(s) SubCutaneous every 8 hours  polyethylene glycol 3350 17 Gram(s) Oral daily  tiotropium 18 MICROgram(s) Capsule 1 Capsule(s) Inhalation daily      Physical Exam  Vital Signs Last 12 Hrs  T(F): 97.5 (08-12-21 @ 11:00), Max: 97.7 (08-12-21 @ 08:00)  HR: 87 (08-12-21 @ 11:00) (82 - 92)  BP: 104/51 (08-12-21 @ 11:00) (95/49 - 109/52)  BP(mean): 73 (08-12-21 @ 11:00) (67 - 76)  RR: 25 (08-12-21 @ 11:00) (16 - 43)  SpO2: 98% (08-12-21 @ 11:00) (97% - 100%)

## 2021-08-12 NOTE — PROGRESS NOTE ADULT - SUBJECTIVE AND OBJECTIVE BOX
HPI:  MICU H&P    CHIEF COMPLAINT: urgent HD, hyperkalemia    HPI / INTERVAL HISTORY:  87F PMH CAD w/stents, DM2, Afib (not on AC's, dc'd pradaxa 1mo ago as pt w/u for anemia), Colon Ca (about 25y ago),  AS, HF on 80mg lasix qd, recently started on Entresto, presents to the ED with abnormal labs done on  showing JACQUE with hyperk to >6 mod hemolyzed. Pt states that she hasn't urinated in 2 days, also states she's had nonbloody loose stools over the past week. Otherwise she states SOB is at baseline. Denies any fevers/chills, cough, chest pain, palpitations, lightheadedness, abd pain, n/v, leg swelling worse than baseline.    Home medications: Metformin, Lasix 80qd, Metolazone 25 mg qd, Glipizide, atorvastatin 40 mg qhs, Zolpidem 5 mg qhs, Entresto 24 mg, Metoprolol 25 mg and Januvia     On presentation patient with BUN/Cr 79/6.7 mg/dl. K: 6.6 (non hemolyzed). Previous Cr was at 1.26 21. Also noted with bicarb: 11 pH 7.2 - lactate 7. patient on Metformin at home and reports taking all of her medications.     Nephrology consulted for JACQUE / hyperkalemia and acidosis. Temporized in ED with Bicarb and calcium. Per tox will need Q1 FSG for sulfonylurea and JACQUE. Will be admitted to MICU for HD.     PAST MEDICAL & SURGICAL HISTORY:    FAMILY HISTORY:  No sig FH in first degree relatives     SOCIAL HISTORY:  Denies smoking drinking or illicit drug use    Allergies    No Known Allergies    Intolerances    REVIEW OF SYSTEMS:  +Fatigue, weakness, oliguria  Negative except per HPI    OBJECTIVE:  ICU Vital Signs Last 24 Hrs  T(C): 36.4 (2021 07:36), Max: 36.4 (2021 07:36)  T(F): 97.5 (2021 07:36), Max: 97.5 (2021 07:36)  HR: 79 (2021 10:30) (71 - 83)  BP: 128/64 (2021 10:30) (112/67 - 128/64)  BP(mean): 71 (2021 10:30) (71 - 77)    RR: 18 (2021 10:30) (18 - 20)  SpO2: 100% (2021 10:30) (95% - 100%)  CAPILLARY BLOOD GLUCOSE    POCT Blood Glucose.: 148 mg/dL (2021 11:19)    PHYSICAL EXAM:  Gen: NAD  	HEENT: MMM  	Pulm: CTA B/L  	CV: S1S2  	Abd: Soft, +BS   	Ext: No LE edema B/L  	Neuro: Awake  	Skin: Warm and dry             : no tenderness to palpation     MEDICATIONS  (PRN):    LABS:                        7.8    7.31  )-----------( 196      ( 2021 08:47 )             25.8     Hgb Trend: 7.8<--  07-08    124<L>  |  84<L>  |  79<H>  ----------------------------<  32<LL>  6.6<HH>   |  11<L>  |  6.76<H>    Ca    9.6      2021 08:48  Phos  6.4     07-08  Mg     2.0     -    TPro  7.2  /  Alb  3.9  /  TBili  0.4  /  DBili  x   /  AST  25  /  ALT  17  /  AlkPhos  205<H>  07-    Creatinine Trend: 6.76<--    Venous Blood Gas:   @ 08:47  7.24/34/42/14/64  VBG Lactate: 7.0 (2021 11:43)    Review Of Systems:           Respiratory: No shortness of breath, cough, or wheezing  Cardiovascular: No chest pain or palpitations  10 point review of systems is otherwise negative except as mentioned above        Medications:  artificial  tears Solution 1 Drop(s) Both EYES every 12 hours PRN  aspirin  chewable 81 milliGRAM(s) Oral daily  budesonide  80 MICROgram(s)/formoterol 4.5 MICROgram(s) Inhaler 2 Puff(s) Inhalation two times a day  chlorhexidine 2% Cloths 1 Application(s) Topical <User Schedule>  dextrose 40% Gel 15 Gram(s) Oral once  dextrose 50% Injectable 25 Gram(s) IV Push once  dextrose 50% Injectable 12.5 Gram(s) IV Push once  dextrose 50% Injectable 25 Gram(s) IV Push once  ferrous    sulfate 325 milliGRAM(s) Oral daily  glucagon  Injectable 1 milliGRAM(s) IntraMuscular once  glycopyrrolate Injectable 0.4 milliGRAM(s) IV Push every 4 hours PRN  heparin   Injectable 5000 Unit(s) SubCutaneous every 8 hours  HYDROmorphone  Injectable 0.2 milliGRAM(s) IV Push every 1 hour PRN  HYDROmorphone  Injectable 0.2 milliGRAM(s) IV Push every 1 hour PRN  LORazepam   Injectable 0.5 milliGRAM(s) IV Push every 1 hour PRN  polyethylene glycol 3350 17 Gram(s) Oral daily  tiotropium 18 MICROgram(s) Capsule 1 Capsule(s) Inhalation daily    PAST MEDICAL & SURGICAL HISTORY:    Vitals:  T(C): 36.5 (21 @ 15:00), Max: 36.5 (21 @ 08:00)  HR: 91 (21 @ 19:00) (82 - 93)  BP: 104/49 (21 @ 19:00) (95/49 - 114/54)  BP(mean): 71 (21 @ 19:00) (67 - 81)  RR: 24 (21 @ 19:00) (14 - 43)  SpO2: 97% (21 @ 19:00) (93% - 100%)  Wt(kg): --  Daily     Daily Weight in k.2 (12 Aug 2021 00:34)  I&O's Summary    11 Aug 2021 07:  -  12 Aug 2021 07:00  --------------------------------------------------------  IN: 587.8 mL / OUT: 170 mL / NET: 417.8 mL    12 Aug 2021 07:01  -  12 Aug 2021 19:32  --------------------------------------------------------  IN: 0 mL / OUT: 165 mL / NET: -165 mL        Physical Exam:  Appearance: No acute distress; well appearing  Eyes: PERRL, EOMI, pink conjunctiva  HENT: Normal oral mucosa  Cardiovascular: RRR, S1, S2, no murmurs, rubs, or gallops; no edema; no JVD  Respiratory: Clear to auscultation bilaterally  Gastrointestinal: soft, non-tender, non-distended with normal bowel sounds  Musculoskeletal: No clubbing; no joint deformity   Neurologic: Non-focal  Lymphatic: No lymphadenopathy  Psychiatry: AAOx3, mood & affect appropriate  Skin: No rashes, ecchymoses, or cyanosis                          7.8    27.71 )-----------( 208      ( 11 Aug 2021 07:55 )             26.1         135  |  97  |  65<H>  ----------------------------<  399<H>  4.3   |  23  |  1.51<H>    Ca    8.8      11 Aug 2021 15:18  Phos  2.3       Mg     2.7         TPro  7.1  /  Alb  3.1<L>  /  TBili  1.0  /  DBili  x   /  AST  21  /  ALT  8<L>  /  AlkPhos  226<H>                    ECG:    Echo:    Stress Testing:     Cath:    Imaging:    Interpretation of Telemetry:

## 2021-08-12 NOTE — CHART NOTE - NSCHARTNOTEFT_GEN_A_CORE
CCU Transfer Note    Transfer from: CCU    Transfer to: (  ) Medicine    (  ) Telemetry    (  ) RCU                               (  ) Palliative    (  ) Stroke Unit    (  ) MICU    (  ) __________________    Accepting Physician:    Signout given to:     HPI / CCU COURSE:  87F PMH CAD w/stents, DM2, Afib (not on AC's, dc'd pradaxa 1mo ago as pt w/u for anemia), Colon Ca (about 25y ago),  AS, HF on 80mg lasix qd, recently started on Entresto, presents to the ED with abnormal labs done on 7/7 showing JACQUE with hyperk to >6 mod hemolyzed. Pt states that she hasn't urinated in 2 days, also states she's had nonbloody loose stools over the past week. Otherwise she states SOB is at baseline. Denies any fevers/chills, cough, chest pain, palpitations, lightheadedness, abd pain, n/v, leg swelling worse than baseline.    Home medications: Metformin, Lasix 80qd, Metolazone 25 mg qd, Glipizide, atorvastatin 40 mg qhs, Zolpidem 5 mg qhs, Entresto 24 mg, Metoprolol 25 mg and Januvia     On presentation patient with BUN/Cr 79/6.7 mg/dl. K: 6.6 (non hemolyzed). Previous Cr was at 1.26 6/7/21. Also noted with bicarb: 11 pH 7.2 - lactate 7. patient on Metformin at home and reports taking all of her medications.     Nephrology consulted for JACQUE / hyperkalemia and acidosis. Temporized in ED with Bicarb and calcium. Per tox will need Q1 FSG for sulfonylurea and JACQUE. Will be admitted to MICU for HD. She was eventually transferred to CICU for PA-C due to elevated filling pressures. She was on CRRT.  CRRT was stopped as BUN was nearing the 50s. Initially was given 250 cc bolus however in the PM her CVP herminio to 13 with plethoric IVC on repeat TTE and pt given Bumex 2 mg IVP. Discussed with family her plan of care and they requested Palliative consult as they do not want anything aggressive. After discussion with palliative care, patient was transitioned to comfort measures only.      Vital Signs Last 24 Hrs  T(C): 36.5 (12 Aug 2021 15:00), Max: 36.5 (12 Aug 2021 08:00)  T(F): 97.7 (12 Aug 2021 15:00), Max: 97.7 (12 Aug 2021 08:00)  HR: 91 (12 Aug 2021 19:00) (82 - 93)  BP: 104/49 (12 Aug 2021 19:00) (95/49 - 114/54)  BP(mean): 71 (12 Aug 2021 19:00) (67 - 81)  RR: 24 (12 Aug 2021 19:00) (14 - 43)  SpO2: 97% (12 Aug 2021 19:00) (93% - 100%)    I&O's Summary    11 Aug 2021 07:01  -  12 Aug 2021 07:00  --------------------------------------------------------  IN: 587.8 mL / OUT: 170 mL / NET: 417.8 mL    12 Aug 2021 07:01  -  12 Aug 2021 19:31  --------------------------------------------------------  IN: 0 mL / OUT: 165 mL / NET: -165 mL        Physical Exam:   Appearance:weak and frail appearing  Eyes: [ X] PERRL [X ] EOMI  HENT: [X ] Normal oral muscosa [ X]NC/AT  Cardiovascular: [ X] S1 [X ] S2 [X ] RRR No edema No JVD  Procedural Access Site: RIJ TLC C/D?I without bleeding, induration, or hematoma  Respiratory: [ X] Clear to auscultation bilaterally, diminsihed at the abses  Gastrointestinal: [X ] Soft [ X] Non-tender [X ] Non-distended   Musculoskeletal: [X ] No clubbing [X ] No joint deformity   Neurologic: [X ] Non-focal  Lymphatic: [X ] No lymphadenopathy  Psychiatry: [X ] AAOx3 [ X] Mood & affect appropriate  Skin: [X ] No rashes [X ] No ecchymoses [X ] No cyanosis      LABS:                               7.8    27.71 )-----------( 208      ( 11 Aug 2021 07:55 )             26.1       08-11    135  |  97  |  65<H>  ----------------------------<  399<H>  4.3   |  23  |  1.51<H>    Ca    8.8      11 Aug 2021 15:18  Phos  2.3     08-11  Mg     2.7     08-11    TPro  7.1  /  Alb  3.1<L>  /  TBili  1.0  /  DBili  x   /  AST  21  /  ALT  8<L>  /  AlkPhos  226<H>  08-11          ABG - ( 11 Aug 2021 06:46 )  pH, Arterial: 7.39  pH, Blood: x     /  pCO2: 51    /  pO2: 126   / HCO3: 31    / Base Excess: 5.7   /  SaO2: 99                    ECG: < from: 12 Lead ECG (08.10.21 @ 06:04) >    Diagnosis Line ATRIAL FIBRILLATION with PVCs   ST & T WAVE ABNORMALITY, CONSIDER INFERIOR ISCHEMIA    Echo: < from: Transthoracic Echocardiogram (08.11.21 @ 09:10) >  1. Hyperdynamic left ventricular systolic function.  2. Right ventricular enlargement with mildly decreased  right ventricular systolic function. A catheter is seen in  the right heart.  3. Thickened pericardium. Moderate circumferential  pericardial effusion. The effusion measures approximately  1.0 cm inferior to the RV and 1.4 cm inferolateral to the  LV in its largest dimension. The IVC is dilated and  plethoric consistent with estimated RA pressureabout 15  mmHg. No other echocardiographic evidence of pericardial  tamponade.  4. Left pleural effusion.  *** Compared with echocardiogram of 8/10/2021, the  pericardial effusion appears smaller inferior to the right  ventricle on today's study.    Cath: < from: Cardiac Cath Lab (08.04.21 @ 17:18) >  INTERVENTIONAL IMPRESSIONS: Elevated right atrial pressure (mRA 17mmHg with  a V wave of 19mmHg)  Moderate post-capillary pulmonary hypertension (sPAP 60mmHg, dPAP 25mmHg,  mPAP 37mmHg)  PCWP = 27mmHg with a V wave of 32mmHg  PAsat = 58% // RAsat = 94%  [place] CO // CI = 5.92l/min // 3.50l/min/m2  INTERVENTIONAL RECOMMENDATIONS: Keep right leg straight for 4 hours  following removal of sheath  Continue aggressive medical management    Imaging: < from: Xray Chest 1 View- PORTABLE-Urgent (Xray Chest 1 View- PORTABLE-Urgent .) (08.10.21 @ 12:53) >  IMPRESSION:    [place]-Toya catheter in the right pulmonary artery.    Left atelectasis/pleural effusion with linear atelectasis at the right lung base.    Interpretation of Telemetry: Af 80s    ASSESSMENT & PLAN:   · Assessment    87 year old F w/ CAD w/ stents, DM2, AF, colon CA , new lung CA , severe AS who presented with acute decompensated HF, new JACQUE requiring CRRT, and findings of lung CA    Neuro  - Pt pain free, Family wishes for palliation and nothing aggressive  - De-line  - Palliative meeting and plan for full comfort measures and PCU when bed becomes available.     Resp  - Continue on 50% HF<--- not eligible for home hospice due to high O2 requirements.    CV  - Continue to monitor off levophed. . Continue ASA. Hold diuretics for today as she does not appear uncomfortable    GI  - No KFT per fam wishes  - On diet with poor appetite      - Steward in place    Renal  - Hold CRRT as family wishes are comfort    ID  - WBC increased to 28,on Vanc Zosyn. Will discontinue Vanco .     Heme  - On heparin SQ    Endo  - Glucose well controlled.        FOR FOLLOW UP:  [ ] f/u for PCU transfer  [ ] comfort measures only- no blood draws  [ ] CCU Transfer Note    Transfer from: CCU    Transfer to: (X  ) Medicine    (  ) Telemetry    (  ) RCU                               (  ) Palliative    (  ) Stroke Unit    (  ) MICU    (  ) __________________    Accepting Physician: Dr. Diaz    Signout given to:     HPI / CCU COURSE:  87F PMH CAD w/stents, DM2, Afib (not on AC's, dc'd pradaxa 1mo ago as pt w/u for anemia), Colon Ca (about 25y ago),  AS, HF on 80mg lasix qd, recently started on Entresto, presents to the ED with abnormal labs done on 7/7 showing JACQUE with hyperk to >6 mod hemolyzed. Pt states that she hasn't urinated in 2 days, also states she's had nonbloody loose stools over the past week. Otherwise she states SOB is at baseline. Denies any fevers/chills, cough, chest pain, palpitations, lightheadedness, abd pain, n/v, leg swelling worse than baseline.    Home medications: Metformin, Lasix 80qd, Metolazone 25 mg qd, Glipizide, atorvastatin 40 mg qhs, Zolpidem 5 mg qhs, Entresto 24 mg, Metoprolol 25 mg and Januvia     On presentation patient with BUN/Cr 79/6.7 mg/dl. K: 6.6 (non hemolyzed). Previous Cr was at 1.26 6/7/21. Also noted with bicarb: 11 pH 7.2 - lactate 7. patient on Metformin at home and reports taking all of her medications.     Nephrology consulted for JACQUE / hyperkalemia and acidosis. Temporized in ED with Bicarb and calcium. Per tox will need Q1 FSG for sulfonylurea and JACQUE. Will be admitted to MICU for HD. She was eventually transferred to CICU for PA-C due to elevated filling pressures. She was on CRRT.  CRRT was stopped as BUN was nearing the 50s. Initially was given 250 cc bolus however in the PM her CVP herminio to 13 with plethoric IVC on repeat TTE and pt given Bumex 2 mg IVP. Discussed with family her plan of care and they requested Palliative consult as they do not want anything aggressive. After discussion with palliative care, patient was transitioned to comfort measures only.      Vital Signs Last 24 Hrs  T(C): 36.5 (12 Aug 2021 15:00), Max: 36.5 (12 Aug 2021 08:00)  T(F): 97.7 (12 Aug 2021 15:00), Max: 97.7 (12 Aug 2021 08:00)  HR: 91 (12 Aug 2021 19:00) (82 - 93)  BP: 104/49 (12 Aug 2021 19:00) (95/49 - 114/54)  BP(mean): 71 (12 Aug 2021 19:00) (67 - 81)  RR: 24 (12 Aug 2021 19:00) (14 - 43)  SpO2: 97% (12 Aug 2021 19:00) (93% - 100%)    I&O's Summary    11 Aug 2021 07:01  -  12 Aug 2021 07:00  --------------------------------------------------------  IN: 587.8 mL / OUT: 170 mL / NET: 417.8 mL    12 Aug 2021 07:01  -  12 Aug 2021 19:31  --------------------------------------------------------  IN: 0 mL / OUT: 165 mL / NET: -165 mL        Physical Exam:   Appearance:weak and frail appearing  Eyes: [ X] PERRL [X ] EOMI  HENT: [X ] Normal oral muscosa [ X]NC/AT  Cardiovascular: [ X] S1 [X ] S2 [X ] RRR No edema No JVD  Procedural Access Site: RIJ TLC C/D?I without bleeding, induration, or hematoma  Respiratory: [ X] Clear to auscultation bilaterally, diminsihed at the abses  Gastrointestinal: [X ] Soft [ X] Non-tender [X ] Non-distended   Musculoskeletal: [X ] No clubbing [X ] No joint deformity   Neurologic: [X ] Non-focal  Lymphatic: [X ] No lymphadenopathy  Psychiatry: [X ] AAOx3 [ X] Mood & affect appropriate  Skin: [X ] No rashes [X ] No ecchymoses [X ] No cyanosis      LABS:                               7.8    27.71 )-----------( 208      ( 11 Aug 2021 07:55 )             26.1       08-11    135  |  97  |  65<H>  ----------------------------<  399<H>  4.3   |  23  |  1.51<H>    Ca    8.8      11 Aug 2021 15:18  Phos  2.3     08-11  Mg     2.7     08-11    TPro  7.1  /  Alb  3.1<L>  /  TBili  1.0  /  DBili  x   /  AST  21  /  ALT  8<L>  /  AlkPhos  226<H>  08-11          ABG - ( 11 Aug 2021 06:46 )  pH, Arterial: 7.39  pH, Blood: x     /  pCO2: 51    /  pO2: 126   / HCO3: 31    / Base Excess: 5.7   /  SaO2: 99                    ECG: < from: 12 Lead ECG (08.10.21 @ 06:04) >    Diagnosis Line ATRIAL FIBRILLATION with PVCs   ST & T WAVE ABNORMALITY, CONSIDER INFERIOR ISCHEMIA    Echo: < from: Transthoracic Echocardiogram (08.11.21 @ 09:10) >  1. Hyperdynamic left ventricular systolic function.  2. Right ventricular enlargement with mildly decreased  right ventricular systolic function. A catheter is seen in  the right heart.  3. Thickened pericardium. Moderate circumferential  pericardial effusion. The effusion measures approximately  1.0 cm inferior to the RV and 1.4 cm inferolateral to the  LV in its largest dimension. The IVC is dilated and  plethoric consistent with estimated RA pressureabout 15  mmHg. No other echocardiographic evidence of pericardial  tamponade.  4. Left pleural effusion.  *** Compared with echocardiogram of 8/10/2021, the  pericardial effusion appears smaller inferior to the right  ventricle on today's study.    Cath: < from: Cardiac Cath Lab (08.04.21 @ 17:18) >  INTERVENTIONAL IMPRESSIONS: Elevated right atrial pressure (mRA 17mmHg with  a V wave of 19mmHg)  Moderate post-capillary pulmonary hypertension (sPAP 60mmHg, dPAP 25mmHg,  mPAP 37mmHg)  PCWP = 27mmHg with a V wave of 32mmHg  PAsat = 58% // RAsat = 94%  [place] CO // CI = 5.92l/min // 3.50l/min/m2  INTERVENTIONAL RECOMMENDATIONS: Keep right leg straight for 4 hours  following removal of sheath  Continue aggressive medical management    Imaging: < from: Xray Chest 1 View- PORTABLE-Urgent (Xray Chest 1 View- PORTABLE-Urgent .) (08.10.21 @ 12:53) >  IMPRESSION:    [place]-Toya catheter in the right pulmonary artery.    Left atelectasis/pleural effusion with linear atelectasis at the right lung base.    Interpretation of Telemetry: Af 80s    ASSESSMENT & PLAN:   · Assessment    87 year old F w/ CAD w/ stents, DM2, AF, colon CA , new lung CA , severe AS who presented with acute decompensated HF, new JACQUE requiring CRRT, and findings of lung CA    Neuro  - Pt pain free, Family wishes for palliation and nothing aggressive  - De-lined  - Palliative meeting and plan for full comfort measures and PCU when bed becomes available.   - C/w with dilaudid and ativan PRN for comfort     Resp  -High flow NC weaned to 3LNC. Patient appears comfortable with SpO2 >96%. Rediscuss with Palliative in AM in regards to home hospice (was not eligible due to the high flow NC/high O2 requirements)     CV  - Continue to monitor off levophed. . Continue ASA. Hold diuretics for today as she does not appear uncomfortable    GI  - No KFT per fam wishes  - On diet with poor appetite      - Steward in place    Renal  - Hold CRRT as family wishes are comfort    ID  - WBC increased to 28,on Vanc Zosyn. BX discontinued as per family request     Heme  - On heparin SQ    Endo  - Glucose well controlled.        FOR FOLLOW UP:  [ ] f/u for PCU transfer  [ ] comfort measures only- no blood draws  [ ] DNR/DNI

## 2021-08-12 NOTE — PROGRESS NOTE ADULT - SUBJECTIVE AND OBJECTIVE BOX
Events:    Review Of Systems:  Constitutional: denies fever, chills, Fatigue   HEENT: denies Blurred vision, Eye Pain, Headache   Respiratory: denies Cough, Wheezing , Shortness of breath  Cardiovascular: denies Chest Pain, Palpitations,  GARCIA   Gastrointestinal: denies Abdominal Pain, Diarrhea, Constipation   Genitourinary: denies Nocturia, Dysuria, Incontinence  Extremities: denies Swelling, Joint Pain  Neurologic: denies Focal deficit, Paresthesias, Syncope  Lymphatic: denies Swelling, Lymphadenopathy   Skin: denies Rash, Ecchymoses, Wounds   Psychiatry: denies Depression, Suicidal/Homicidal Ideation, anxiety  [X ] 10 point review of systems is otherwise negative except as mentioned above         Medications:  artificial  tears Solution 1 Drop(s) Both EYES every 12 hours PRN  aspirin  chewable 81 milliGRAM(s) Oral daily  budesonide  80 MICROgram(s)/formoterol 4.5 MICROgram(s) Inhaler 2 Puff(s) Inhalation two times a day  cefepime   IVPB 1000 milliGRAM(s) IV Intermittent every 12 hours  chlorhexidine 2% Cloths 1 Application(s) Topical <User Schedule>  dextrose 40% Gel 15 Gram(s) Oral once  dextrose 50% Injectable 25 Gram(s) IV Push once  dextrose 50% Injectable 12.5 Gram(s) IV Push once  dextrose 50% Injectable 25 Gram(s) IV Push once  ferrous    sulfate 325 milliGRAM(s) Oral daily  glucagon  Injectable 1 milliGRAM(s) IntraMuscular once  heparin   Injectable 5000 Unit(s) SubCutaneous every 8 hours  polyethylene glycol 3350 17 Gram(s) Oral daily  tiotropium 18 MICROgram(s) Capsule 1 Capsule(s) Inhalation daily  vancomycin  IVPB      vancomycin  IVPB 1000 milliGRAM(s) IV Intermittent every 24 hours    PMH/PSH/FH/SH: [ ] Unchanged  Vitals:  T(C): 36.4 (21 @ 11:00), Max: 36.7 (21 @ 16:00)  HR: 87 (21 @ 11:00) (80 - 92)  BP: 104/51 (21 @ 11:00) (95/49 - 114/54)  BP(mean): 73 (21 @ 11:00) (67 - 81)  RR: 25 (21 @ 11:00) (16 - 54)  SpO2: 98% (21 @ 11:00) (93% - 100%)  Wt(kg): --  Daily     Daily Weight in k.2 (12 Aug 2021 00:34)  I&O's Summary    11 Aug 2021 07:01  -  12 Aug 2021 07:00  --------------------------------------------------------  IN: 587.8 mL / OUT: 170 mL / NET: 417.8 mL    12 Aug 2021 07:01  -  12 Aug 2021 12:06  --------------------------------------------------------  IN: 0 mL / OUT: 70 mL / NET: -70 mL        Physical Exam:  Appearance: [ ] Normal [ ] NAD  Eyes: [ ] PERRL [ ] EOMI  HENT: [ ] Normal oral muscosa [ ]NC/AT  Cardiovascular: [ ] S1 [ ] S2 [ ] RRR [ ] No m/r/g [ ]No edema [ ] JVP  Procedural Access Site: [ ] No hematoma [ ] Non-tender to palpation [ ] 2+ pulse [ ] No bruit [ ] No Ecchymosis  Respiratory: [ ] Clear to auscultation bilaterally  Gastrointestinal: [ ] Soft [ ] Non-tender [ ] Non-distended [ ] BS+  Musculoskeletal: [ ] No clubbing [ ] No joint deformity   Neurologic: [ ] Non-focal  Lymphatic: [ ] No lymphadenopathy  Psychiatry: [ ] AAOx3 [ ] Mood & affect appropriate  Skin: [ ] No rashes [ ] No ecchymoses [ ] No cyanosis        135  |  97  |  65<H>  ----------------------------<  399<H>  4.3   |  23  |  1.51<H>    Ca    8.8      11 Aug 2021 15:18  Phos  2.3       Mg     2.7         TPro  7.1  /  Alb  3.1<L>  /  TBili  1.0  /  DBili  x   /  AST  21  /  ALT  8<L>  /  AlkPhos  226<H>                    ECG:    Echo:    Stress Testing:     Cath:    Imaging:    Interpretation of Telemetry:   HPI / INTERVAL HISTORY:  87F PMH CAD w/stents, DM2, Afib (not on AC's, dc'd pradaxa 1mo ago as pt w/u for anemia), Colon Ca (about 25y ago),  AS, HF on 80mg lasix qd, recently started on Entresto, presents to the ED with abnormal labs done on  showing JACQUE with hyperk to >6 mod hemolyzed. Pt states that she hasn't urinated in 2 days, also states she's had nonbloody loose stools over the past week. Otherwise she states SOB is at baseline. Denies any fevers/chills, cough, chest pain, palpitations, lightheadedness, abd pain, n/v, leg swelling worse than baseline.    Home medications: Metformin, Lasix 80qd, Metolazone 25 mg qd, Glipizide, atorvastatin 40 mg qhs, Zolpidem 5 mg qhs, Entresto 24 mg, Metoprolol 25 mg and Januvia     On presentation patient with BUN/Cr 79/6.7 mg/dl. K: 6.6 (non hemolyzed). Previous Cr was at 1.26 21. Also noted with bicarb: 11 pH 7.2 - lactate 7. patient on Metformin at home and reports taking all of her medications.     Nephrology consulted for JACQUE / hyperkalemia and acidosis. Temporized in ED with Bicarb and calcium. Per tox will need Q1 FSG for sulfonylurea and JACQUE. Will be admitted to MICU for HD. She was eventually transferred to CICU for PA-C due to elevated filling pressures. She was on CRRT.  CRRT was stopped as BUN was nearing the 50s. Initially was given 250 cc bolus however in the PM her CVP herminio to 13 with plethoric IVC on repeat TTE and pt given Bumex 2 mg IVP. Discussed with family her plan of care and they requested Palliative consult as they do not want anything aggressive.     Events: Palliative meeting was held today with the family and they are requesting comfort measures only.     Review Of Systems:  Constitutional: denies fever, chills, Fatigue   HEENT: denies Blurred vision, Eye Pain, Headache   Respiratory: denies Cough, Wheezing , Shortness of breath  Cardiovascular: denies Chest Pain, Palpitations,  GARCIA   Gastrointestinal: denies Abdominal Pain, Diarrhea, Constipation   Genitourinary: denies Nocturia, Dysuria, Incontinence  Extremities: denies Swelling, Joint Pain  Neurologic: denies Focal deficit, Paresthesias, Syncope  Lymphatic: denies Swelling, Lymphadenopathy   Skin: denies Rash, Ecchymoses, Wounds   Psychiatry: denies Depression, Suicidal/Homicidal Ideation, anxiety  [X ] 10 point review of systems is otherwise negative except as mentioned above         Medications:  artificial  tears Solution 1 Drop(s) Both EYES every 12 hours PRN  aspirin  chewable 81 milliGRAM(s) Oral daily  budesonide  80 MICROgram(s)/formoterol 4.5 MICROgram(s) Inhaler 2 Puff(s) Inhalation two times a day  cefepime   IVPB 1000 milliGRAM(s) IV Intermittent every 12 hours  chlorhexidine 2% Cloths 1 Application(s) Topical <User Schedule>  dextrose 40% Gel 15 Gram(s) Oral once  dextrose 50% Injectable 25 Gram(s) IV Push once  dextrose 50% Injectable 12.5 Gram(s) IV Push once  dextrose 50% Injectable 25 Gram(s) IV Push once  ferrous    sulfate 325 milliGRAM(s) Oral daily  glucagon  Injectable 1 milliGRAM(s) IntraMuscular once  heparin   Injectable 5000 Unit(s) SubCutaneous every 8 hours  polyethylene glycol 3350 17 Gram(s) Oral daily  tiotropium 18 MICROgram(s) Capsule 1 Capsule(s) Inhalation daily  vancomycin  IVPB      vancomycin  IVPB 1000 milliGRAM(s) IV Intermittent every 24 hours    Vitals:  T(C): 36.4 (21 @ 11:00), Max: 36.7 (21 @ 16:00)  HR: 87 (21 @ 11:00) (80 - 92)  BP: 104/51 (21 @ 11:00) (95/49 - 114/54)  BP(mean): 73 (21 @ 11:00) (67 - 81)  RR: 25 (21 @ 11:00) (16 - 54)  SpO2: 98% (21 @ 11:00) (93% - 100%)    Daily     Daily Weight in k.2 (12 Aug 2021 00:34)  I&O's Summary    11 Aug 2021 07:01  -  12 Aug 2021 07:00  --------------------------------------------------------  IN: 587.8 mL / OUT: 170 mL / NET: 417.8 mL    12 Aug 2021 07:01  -  12 Aug 2021 12:06  --------------------------------------------------------  IN: 0 mL / OUT: 70 mL / NET: -70 mL    Physical Exam:  Appearance:weak and frail appearing  Eyes: [ X] PERRL [X ] EOMI  HENT: [X ] Normal oral muscosa [ X]NC/AT  Cardiovascular: [ X] S1 [X ] S2 [X ] RRR No edema No JVD  Procedural Access Site: RIJ TLC C/D?I without bleeding, induration, or hematoma  Respiratory: [ X] Clear to auscultation bilaterally, diminsihed at the abses  Gastrointestinal: [X ] Soft [ X] Non-tender [X ] Non-distended   Musculoskeletal: [X ] No clubbing [X ] No joint deformity   Neurologic: [X ] Non-focal  Lymphatic: [X ] No lymphadenopathy  Psychiatry: [X ] AAOx3 [ X] Mood & affect appropriate  Skin: [X ] No rashes [X ] No ecchymoses [X ] No cyanosis        135  |  97  |  65<H>  ----------------------------<  399<H>  4.3   |  23  |  1.51<H>    Ca    8.8      11 Aug 2021 15:18  Phos  2.3       Mg     2.7         TPro  7.1  /  Alb  3.1<L>  /  TBili  1.0  /  DBili  x   /  AST  21  /  ALT  8<L>  /  AlkPhos  226<H>  08    ECG: < from: 12 Lead ECG (08.10.21 @ 06:04) >    Diagnosis Line ATRIAL FIBRILLATION with PVCs   ST & T WAVE ABNORMALITY, CONSIDER INFERIOR ISCHEMIA    Echo: < from: Transthoracic Echocardiogram (21 @ 09:10) >  1. Hyperdynamic left ventricular systolic function.  2. Right ventricular enlargement with mildly decreased  right ventricular systolic function. A catheter is seen in  the right heart.  3. Thickened pericardium. Moderate circumferential  pericardial effusion. The effusion measures approximately  1.0 cm inferior to the RV and 1.4 cm inferolateral to the  LV in its largest dimension. The IVC is dilated and  plethoric consistent with estimated RA pressureabout 15  mmHg. No other echocardiographic evidence of pericardial  tamponade.  4. Left pleural effusion.  *** Compared with echocardiogram of 8/10/2021, the  pericardial effusion appears smaller inferior to the right  ventricle on today's study.    Cath: < from: Cardiac Cath Lab (21 @ 17:18) >  INTERVENTIONAL IMPRESSIONS: Elevated right atrial pressure (mRA 17mmHg with  a V wave of 19mmHg)  Moderate post-capillary pulmonary hypertension (sPAP 60mmHg, dPAP 25mmHg,  mPAP 37mmHg)  PCWP = 27mmHg with a V wave of 32mmHg  PAsat = 58% // RAsat = 94%  [place] CO // CI = 5.92l/min // 3.50l/min/m2  INTERVENTIONAL RECOMMENDATIONS: Keep right leg straight for 4 hours  following removal of sheath  Continue aggressive medical management    Imaging: < from: Xray Chest 1 View- PORTABLE-Urgent (Xray Chest 1 View- PORTABLE-Urgent .) (08.10.21 @ 12:53) >  IMPRESSION:    Traver-Toya catheter in the right pulmonary artery.    Left atelectasis/pleural effusion with linear atelectasis at the right lung base.    Interpretation of Telemetry: Af 80s

## 2021-08-12 NOTE — PROGRESS NOTE ADULT - ASSESSMENT
87 year old F w/ CAD w/ stents, DM2, AF, colon CA , new lung CA , severe AS who presented with acute decompensated HF, new JACQUE requiring CRRT, and findings of lung CA    Neuro  - Pt pain free, Family wishes for palliation and nothing agressive  - De-line  - Palliative meeting and plan for full comfort measures and PCU when bed becomes available.     Resp  - Continue on 50% HF<--- not eligible for home hospice due to high O2 requirements.    CV  - Continue to monitor off levophed. . Continue ASA.     GI  - No KFT  per fam wishes  - On diet with poor appetite      - Stewadr in place    Renal  - CRRT held as BUN down to the 50s  - Will reassess in AM    ID  - WBC increased to 28, start Zosyn and Vanco      Heme  - On heparin SQ    Endo  - Glucose well controlled.    87 year old F w/ CAD w/ stents, DM2, AF, colon CA , new lung CA , severe AS who presented with acute decompensated HF, new JACQUE requiring CRRT, and findings of lung CA    Neuro  - Pt pain free, Family wishes for palliation and nothing agressive  - De-line  - Palliative meeting and plan for full comfort measures and PCU when bed becomes available.     Resp  - Continue on 50% HF<--- not eligible for home hospice due to high O2 requirements.    CV  - Continue to monitor off levophed. . Continue ASA. Hold diuretics for today as she does not appear uncomofrtable    GI  - No KFT  per fam wishes  - On diet with poor appetite      - Steward in place    Renal  - Hold CRRT as family wishes are comfort    ID  - WBC increased to 28,on Vanc Zosyn. Will discontinue Vanco .     Heme  - On heparin SQ    Endo  - Glucose well controlled.

## 2021-08-12 NOTE — CONSULT NOTE ADULT - PROBLEM SELECTOR RECOMMENDATION 6
Met with patient's HCP/son Reji, and other son Urbano at bedside. Family confirmed goals of comfort measures only. Their goal was for home hospice. Discussed discharge home would unfortunately not be feasible with patient's likely prognosis and the current level of care that would be required for symptom management. Family in agreement with continued inpatient symptom management and eventual transfer to PCU. Will transfer to PCU when bed available. Provided emotional support. All questions answered. Met with patient's HCP/son Reji, and other son Urbano at bedside. Family confirmed goals of comfort measures only. Their goal was for home hospice. Discussed discharge home would unfortunately not be feasible with patient's likely prognosis and the current level of care that would be required for symptom management, especially in the setting of high flow nasal cannula and potential for hypoxia if transitioned to regular NC. Family in agreement with continued inpatient symptom management and eventual transfer to PCU. Will transfer to PCU when bed available. Provided emotional support. All questions answered.

## 2021-08-12 NOTE — PROGRESS NOTE ADULT - PROBLEM SELECTOR PLAN 2
prerenal azotemia , cardiac  Cr 1.44 & . BUN disproportionately elevated to Cr likely from pre renal azotemia from aggressive diuresis & poor clearence due to JACQUE. Pt also with metabolic alkalosis (contraction from diuresis). Not on steroids. No UGI bleed suspected. Due to worsening uremia pt was started on CVVHDF without UF on 8/10.  Family considering palliative/ hospice care. Will hold off on any further dialysis

## 2021-08-12 NOTE — CONSULT NOTE ADULT - CONSULT REQUESTED DATE/TIME
01-Aug-2021 12:28
08-Jul-2021 10:34
09-Jul-2021 10:55
28-Jul-2021
12-Aug-2021 14:58
07-Aug-2021 10:28
08-Jul-2021 11:27
09-Jul-2021 10:00
30-Jul-2021 15:59

## 2021-08-12 NOTE — PROGRESS NOTE ADULT - TIME BILLING
as above: deteriorating clinical status--off BUMEX drip/acetazolamide now-s/p RHC c/w WHO class 2--moved to CICU for CRT  multifactorial dyspnea-CHF/fluid OL-WHO class 2 PAH (cardiac related), asthma (remote), ? RML tumor, debility, anemia--O2 NC sat above 90%  CHF-as per CHF team-Andreas et al; s/p  RHC/echo repeated---WHO class 2-aggressive rx of left heart Dz; CRT in place, TTE repeated  Renal-HD (hyponatremia)--nephrology input critical here-moved to CICU for care  asthma-symbicort 160 2 bid, spiriva 1 q day, incentive spirometry; bipap to continue 12/5 as needed and O/N  abnormal CT c/w CA-? primary lung vs metastatic dz (non smoker)--consider CTNA while her for dx and ? rx (RT etc.), eventual pet/ct  DVT prophylaxis-on A/C  GI-prophylaxis                           PT           respiratory failure--[facility] conference to be had-DNR  ***********HOSPICE/palliative evaln to be considered    Barrington Ponce MD-Pulmonary   [phone] as above: deteriorating clinical status--withdraw of most measures-on hiflo taper, capped pressors, no CRT; await palliative care  multifactorial dyspnea-CHF/fluid OL-WHO class 2 PAH (cardiac related), asthma (remote), ? RML tumor, debility, anemia--O2 NC sat above 90%  CHF-as per CHF team-Andreas et al; s/p  RHC/echo repeated---WHO class 2-aggressive rx of left heart Dz; CRT in place, TTE repeated  Renal-HD (hyponatremia)--nephrology input critical here-moved to CICU/8icu for care  asthma-symbicort 160 2 bid, spiriva 1 q day, incentive spirometry; hiflo taper of O2 NC  abnormal CT c/w CA-? primary lung vs metastatic dz (non smoker)  DVT prophylaxis-on A/C  GI-prophylaxis                           PT           respiratory failure--[facility] conference to be had-DNR  ***********HOSPICE/palliative evaln to be considered    Barrington Ponce MD-Pulmonary   [phone]

## 2021-08-12 NOTE — CONSULT NOTE ADULT - PROBLEM SELECTOR RECOMMENDATION 2
Creatinine is coming down, but still elevated. Continue to monitor creatinine. HD per Renal team. Monitor off of diuretics at this time.
patient with K 6.6 non  hemolyzed   s/p medical management.   will schedule for urgent  HD
On medications,  no chest pain, stable, monitored and followed up by primary cardiothoracic team/cardiology team
- severe AS s/p TAVR, appears to be functioning properly   - AC / antiplatelets per structural team
s/p course of IV diuresis per cicu  - comfort care  - mgmt of dyspnea as above

## 2021-08-12 NOTE — CONSULT NOTE ADULT - PROBLEM SELECTOR PROBLEM 2
Hyperkalemia
S/P TAVR (transcatheter aortic valve replacement)
S/P TAVR (transcatheter aortic valve replacement)
JACQUE (acute kidney injury)
Acute on chronic heart failure with preserved ejection fraction

## 2021-08-12 NOTE — CONSULT NOTE ADULT - ASSESSMENT
87F w/ DM2, CAD w/ stents, Afib, history of colon CA, here with new lung CA, critical AS s/p TAVR, ADHF, and JACQUE requiring CVVH and IV pressors. Family decided on comfort care. Palliative consulted for assistance in symptom management. 87F w/ DM2, CAD w/ stents, afib, history of colon CA, now new lung mass, here with critical AS s/p TAVR, ADHF, AHRF, and JACQUE requiring CVVH and IV pressors. Family decided on comfort care. Palliative consulted for assistance in symptom management.

## 2021-08-12 NOTE — CONSULT NOTE ADULT - PROBLEM SELECTOR RECOMMENDATION 4
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
as above
PPSV of 10%, requires full support

## 2021-08-12 NOTE — CONSULT NOTE ADULT - PROBLEM SELECTOR RECOMMENDATION 5
- likely in setting of contrast from C and addition of entresto in setting of newly diagnosed severe AS   - Cr now normalized  - continue to trend Cr, avoid nephrotoxins
DNR/DNI, comfort measures only

## 2021-08-13 NOTE — PROGRESS NOTE ADULT - SUBJECTIVE AND OBJECTIVE BOX
GAP TEAM PALLIATIVE CARE UNIT PROGRESS NOTE:      [  ] Patient on hospice program.    INDICATION FOR PALLIATIVE CARE UNIT SERVICES:    INTERVAL HPI/OVERNIGHT EVENTS:  - transferred to PCU, comfort measures only   - prn - lorazepam 0.5mg IV x3 and dilaudid 0.2mg IV x1 over last 24 hrs  - last bm - unknown as no stool count recorded     DNR on chart: Yes  Yes      Allergies    No Known Allergies    Intolerances    MEDICATIONS  (STANDING):  aspirin  chewable 81 milliGRAM(s) Oral daily  budesonide  80 MICROgram(s)/formoterol 4.5 MICROgram(s) Inhaler 2 Puff(s) Inhalation two times a day  chlorhexidine 2% Cloths 1 Application(s) Topical <User Schedule>  dextrose 40% Gel 15 Gram(s) Oral once  dextrose 50% Injectable 25 Gram(s) IV Push once  dextrose 50% Injectable 12.5 Gram(s) IV Push once  dextrose 50% Injectable 25 Gram(s) IV Push once  ferrous    sulfate 325 milliGRAM(s) Oral daily  glucagon  Injectable 1 milliGRAM(s) IntraMuscular once  heparin   Injectable 5000 Unit(s) SubCutaneous every 8 hours  polyethylene glycol 3350 17 Gram(s) Oral daily  tiotropium 18 MICROgram(s) Capsule 1 Capsule(s) Inhalation daily    MEDICATIONS  (PRN):  artificial  tears Solution 1 Drop(s) Both EYES every 12 hours PRN Dry Eyes  glycopyrrolate Injectable 0.4 milliGRAM(s) IV Push every 4 hours PRN secretions  HYDROmorphone  Injectable 0.2 milliGRAM(s) IV Push every 1 hour PRN dyspnea  HYDROmorphone  Injectable 0.2 milliGRAM(s) IV Push every 1 hour PRN mild, moderate, or severe pain  LORazepam   Injectable 0.5 milliGRAM(s) IV Push every 1 hour PRN anxiety, agitation, or intractable dyspnea    ITEMS UNCHECKED ARE NOT PRESENT    PRESENT SYMPTOMS: [X ]Unable to obtain due to poor mentation   Source if other than patient:  [ ]Family   [ ]Team     Pain: [ ] yes [ ] no  QOL impact -   Location -                    Aggravating factors -  Quality -  Radiation -  Timing-  Severity (0-10 scale):  Minimal acceptable level (0-10 scale):     Dyspnea:                           [ ]Mild [ ]Moderate [ ]Severe  Anxiety:                             [ ]Mild [ ]Moderate [ ]Severe  Fatigue:                             [ ]Mild [ ]Moderate [ ]Severe  Nausea:                             [ ]Mild [ ]Moderate [ ]Severe  Loss of appetite:              [ ]Mild [ ]Moderate [ ]Severe  Constipation:                    [ ]Mild [ ]Moderate [ ]Severe    PAINAD Score:    http://geriatrictoolkit.[place].[email or web address]/cog/painad.pdf (Ctrl +  left click to view)  		  Other Symptoms:  [ ]All other review of systems negative     Palliative Performance Status Version 2:   10%         http://[email or web address]/files/news/palliative_performance_scale_[email or web address].pdf  PHYSICAL EXAM:  Vital Signs Last 24 Hrs  T(C): 36.6 (13 Aug 2021 07:57), Max: 37.2 (12 Aug 2021 22:17)  T(F): 97.9 (13 Aug 2021 07:57), Max: 98.9 (12 Aug 2021 22:17)  HR: 84 (13 Aug 2021 07:57) (84 - 93)  BP: 112/64 (13 Aug 2021 07:57) (100/50 - 113/54)  BP(mean): 78 (12 Aug 2021 21:00) (71 - 78)  RR: 18 (13 Aug 2021 07:57) (14 - 38)  SpO2: 96% (13 Aug 2021 07:57) (96% - 100%) I&O's Summary    12 Aug 2021 07:01  -  13 Aug 2021 07:00  --------------------------------------------------------  IN: 0 mL / OUT: 765 mL / NET: -765 mL    GENERAL:  [ ]Alert  [ ]Oriented x   [ ]Lethargic  [ ]Cachexia  [ ]Unarousable  [ ]Verbal  [ ]Non-Verbal  Behavioral:   [ ] Anxiety  [ ] Delirium [ ] Agitation [ ] Other  HEENT:  [ ]Normal   [ ]Dry mouth   [ ]ET Tube/Trach  [ ]Oral lesions  PULMONARY:   [ ]Clear [ ]Tachypnea  [ ]Audible excessive secretions   [ ]Rhonchi        [ ]Right [ ]Left [ ]Bilateral  [ ]Crackles        [ ]Right [ ]Left [ ]Bilateral  [ ]Wheezing     [ ]Right [ ]Left [ ]Bilateral  [ ]Diminished BS [ ]Right [ ]Left [ ]Bilateral    CARDIOVASCULAR:    [ ]Regular [ ]Irregular [ ]Tachy  [ ]Catarino [ ]Murmur [ ]Other  GASTROINTESTINAL:  [ ]Soft  [ ]Distended   [ ]+BS  [ ]Non tender [ ]Tender  [ ]PEG [ ]OGT/ NGT   Last BM:    GENITOURINARY:  [ ]Normal [ ] Incontinent   [ ]Oliguria/Anuria   [ ]Steward  MUSCULOSKELETAL:   [ ]Normal   [ ]Weakness  [ ]Bed/Wheelchair bound [ ]Edema  NEUROLOGIC:   [ ]No focal deficits  [ ] Cognitive impairment  [ ] Dysphagia [ ]Dysarthria [ ] Paresis [ ]Other   SKIN:   [ ]Normal  [ ]Rash     [ ]Pressure ulcer(s)  [ ]y [ ]n  Present on admission      CRITICAL CARE:  [ ] Shock Present  [ ]Septic [ ]Cardiogenic [ ]Neurologic [ ]Hypovolemic  [ ]  Vasopressors [ ]  Inotropes   [ ] Respiratory failure present [ ] Mechanical Ventilation [ ] Non-invasive ventilatory support [ ] High-Flow  [ ] Acute  [ ] Chronic [ ] Hypoxic  [ ] Hypercarbic [ ] Other  [ ] Other organ failure - renal failure    LABS:      135  |  97  |  65<H>  ----------------------------<  399<H>  4.3   |  23  |  1.51<H>    Ca    8.8      11 Aug 2021 15:18        Urinalysis Basic - ( 11 Aug 2021 11:31 )    Color: Dark Orange / Appearance: Slightly Turbid / S.026 / pH: x  Gluc: x / Ketone: Negative  / Bili: Small / Urobili: 3 mg/dL   Blood: x / Protein: 100 mg/dL / Nitrite: Negative   Leuk Esterase: Large / RBC: 3 /hpf / WBC 0 /HPF   Sq Epi: x / Non Sq Epi: 0 /hpf / Bacteria: Negative      RADIOLOGY & ADDITIONAL STUDIES:    PROTEIN CALORIE MALNUTRITION: [ ] mild [ ] moderate [ ] severe  [ ] underweight [ ] morbid obesity    https://www.andeal.org/vault/1303/web/files/ONC/Table_Clinical%20Characteristics%20to%20Document%20Malnutrition-White%20JV%20et%20al%2020.pdf    Height (cm): 152.4 (21 @ 11:15)  Weight (kg): 62.3 (08-10-21 @ 06:00)  BMI (kg/m2): 26.8 (08-10-21 @ 06:00)    [ X] PPSV2 < or = 30% [ ] significant weight loss [ ] poor nutritional intake [ ] anasarca   Artificial Nutrition [ ]     REFERRALS:   [ ]Chaplaincy  [X ] Hospice  [ ]Child Life  [ X]Social Work  [ ]Case management [ ]Holistic Therapy [ ] Physical Therapy [ ] Dietary   Goals of Care Document:  GAP TEAM PALLIATIVE CARE UNIT PROGRESS NOTE:      [  ] Patient on hospice program.    INDICATION FOR PALLIATIVE CARE UNIT SERVICES:    INTERVAL HPI/OVERNIGHT EVENTS:  - transferred to PCU, comfort measures only   - prn - lorazepam 0.5mg IV x3 and dilaudid 0.2mg IV x1 over last 24 hrs  - last bm - unknown as no stool count recorded   - sons updated at bedside   - L IJ catheter removed.   - social work discussing options with family    DNR on chart: Yes  Yes      Allergies    No Known Allergies    Intolerances    MEDICATIONS  (STANDING):  aspirin  chewable 81 milliGRAM(s) Oral daily  budesonide  80 MICROgram(s)/formoterol 4.5 MICROgram(s) Inhaler 2 Puff(s) Inhalation two times a day  chlorhexidine 2% Cloths 1 Application(s) Topical <User Schedule>  dextrose 40% Gel 15 Gram(s) Oral once  dextrose 50% Injectable 25 Gram(s) IV Push once  dextrose 50% Injectable 12.5 Gram(s) IV Push once  dextrose 50% Injectable 25 Gram(s) IV Push once  ferrous    sulfate 325 milliGRAM(s) Oral daily  glucagon  Injectable 1 milliGRAM(s) IntraMuscular once  heparin   Injectable 5000 Unit(s) SubCutaneous every 8 hours  polyethylene glycol 3350 17 Gram(s) Oral daily  tiotropium 18 MICROgram(s) Capsule 1 Capsule(s) Inhalation daily    MEDICATIONS  (PRN):  artificial  tears Solution 1 Drop(s) Both EYES every 12 hours PRN Dry Eyes  glycopyrrolate Injectable 0.4 milliGRAM(s) IV Push every 4 hours PRN secretions  HYDROmorphone  Injectable 0.2 milliGRAM(s) IV Push every 1 hour PRN dyspnea  HYDROmorphone  Injectable 0.2 milliGRAM(s) IV Push every 1 hour PRN mild, moderate, or severe pain  LORazepam   Injectable 0.5 milliGRAM(s) IV Push every 1 hour PRN anxiety, agitation, or intractable dyspnea    ITEMS UNCHECKED ARE NOT PRESENT    PRESENT SYMPTOMS: [X ]Unable to obtain due to poor mentation   Source if other than patient:  [ ]Family   [ ]Team     Pain: [ ] yes [ ] no  QOL impact -   Location -                    Aggravating factors -  Quality -  Radiation -  Timing-  Severity (0-10 scale):  Minimal acceptable level (0-10 scale):     Dyspnea:                           [ ]Mild [ ]Moderate [ ]Severe  Anxiety:                             [ ]Mild [ ]Moderate [ ]Severe  Fatigue:                             [ ]Mild [ ]Moderate [ ]Severe  Nausea:                             [ ]Mild [ ]Moderate [ ]Severe  Loss of appetite:              [ ]Mild [ ]Moderate [ ]Severe  Constipation:                    [ ]Mild [ ]Moderate [ ]Severe    PAINAD Score:    http://geriatrictoolkit.[email or web address]/cog/painad.pdf (Ctrl +  left click to view)  		  Other Symptoms:  [ ]All other review of systems negative     Palliative Performance Status Version 2:   10%         [email or web address]/files/[email or web address]_performance_scale_[email or web address].pdf  PHYSICAL EXAM:  Vital Signs Last 24 Hrs  T(C): 36.6 (13 Aug 2021 07:57), Max: 37.2 (12 Aug 2021 22:17)  T(F): 97.9 (13 Aug 2021 07:57), Max: 98.9 (12 Aug 2021 22:17)  HR: 84 (13 Aug 2021 07:57) (84 - 93)  BP: 112/64 (13 Aug 2021 07:57) (100/50 - 113/54)  BP(mean): 78 (12 Aug 2021 21:00) (71 - 78)  RR: 18 (13 Aug 2021 07:57) (14 - 38)  SpO2: 96% (13 Aug 2021 07:57) (96% - 100%) I&O's Summary    12 Aug 2021 07:01  -  13 Aug 2021 07:00  --------------------------------------------------------  IN: 0 mL / OUT: 765 mL / NET: -765 mL    GENERAL:  [ ]Alert  [ ]Oriented x   [ ]Lethargic  [ ]Cachexia  [ ]Unarousable  [ ]Verbal  [ ]Non-Verbal  Behavioral:   [ ] Anxiety  [ ] Delirium [ ] Agitation [ ] Other  HEENT:  [ ]Normal   [ ]Dry mouth   [ ]ET Tube/Trach  [ ]Oral lesions  PULMONARY:   [ ]Clear [ ]Tachypnea  [ ]Audible excessive secretions   [ ]Rhonchi        [ ]Right [ ]Left [ ]Bilateral  [ ]Crackles        [ ]Right [ ]Left [ ]Bilateral  [ ]Wheezing     [ ]Right [ ]Left [ ]Bilateral  [ ]Diminished BS [ ]Right [ ]Left [ ]Bilateral    CARDIOVASCULAR:    [ ]Regular [ ]Irregular [ ]Tachy  [ ]Catarino [ ]Murmur [ ]Other  GASTROINTESTINAL:  [ ]Soft  [ ]Distended   [ ]+BS  [ ]Non tender [ ]Tender  [ ]PEG [ ]OGT/ NGT   Last BM:    GENITOURINARY:  [ ]Normal [ ] Incontinent   [ ]Oliguria/Anuria   [ ]Steward  MUSCULOSKELETAL:   [ ]Normal   [ ]Weakness  [ ]Bed/Wheelchair bound [ ]Edema  NEUROLOGIC:   [ ]No focal deficits  [ ] Cognitive impairment  [ ] Dysphagia [ ]Dysarthria [ ] Paresis [ ]Other   SKIN:   [ ]Normal  [ ]Rash     [ ]Pressure ulcer(s)  [ ]y [ ]n  Present on admission      CRITICAL CARE:  [ ] Shock Present  [ ]Septic [ ]Cardiogenic [ ]Neurologic [ ]Hypovolemic  [ ]  Vasopressors [ ]  Inotropes   [ ] Respiratory failure present [ ] Mechanical Ventilation [ ] Non-invasive ventilatory support [ ] High-Flow  [ ] Acute  [ ] Chronic [ ] Hypoxic  [ ] Hypercarbic [ ] Other  [ ] Other organ failure - renal failure    LABS:      135  |  97  |  65<H>  ----------------------------<  399<H>  4.3   |  23  |  1.51<H>    Ca    8.8      11 Aug 2021 15:18        Urinalysis Basic - ( 11 Aug 2021 11:31 )    Color: Dark Orange / Appearance: Slightly Turbid / S.026 / pH: x  Gluc: x / Ketone: Negative  / Bili: Small / Urobili: 3 mg/dL   Blood: x / Protein: 100 mg/dL / Nitrite: Negative   Leuk Esterase: Large / RBC: 3 /hpf / WBC 0 /HPF   Sq Epi: x / Non Sq Epi: 0 /hpf / Bacteria: Negative      RADIOLOGY & ADDITIONAL STUDIES:    PROTEIN CALORIE MALNUTRITION: [ ] mild [ ] moderate [ ] severe  [ ] underweight [ ] morbid obesity    https://www.andeal.org/vault/6640/web/files/ONC/Table_Clinical%20Characteristics%20to%20Document%20Malnutrition-White%20JV%20et%20al%2020.pdf    Height (cm): 152.4 (21 @ 11:15)  Weight (kg): 62.3 (08-10-21 @ 06:00)  BMI (kg/m2): 26.8 (08-10-21 @ 06:00)    [ X] PPSV2 < or = 30% [ ] significant weight loss [ ] poor nutritional intake [ ] anasarca   Artificial Nutrition [ ]     REFERRALS:   [ ]Chaplaincy  [X ] Hospice  [ ]Child Life  [ X]Social Work  [ ]Case management [ ]Holistic Therapy [ ] Physical Therapy [ ] Dietary   Goals of Care Document:  GAP TEAM PALLIATIVE CARE UNIT PROGRESS NOTE:      [  ] Patient on hospice program.    INDICATION FOR PALLIATIVE CARE UNIT SERVICES:    INTERVAL HPI/OVERNIGHT EVENTS:  - transferred to PCU, comfort measures only   - prn - lorazepam 0.5mg IV x3 and dilaudid 0.2mg IV x1 over last 24 hrs  - last bm - unknown as no stool count recorded   - sons updated at bedside   - L IJ catheter removed.   - social work discussing options with family    DNR on chart: Yes  Yes      Allergies    No Known Allergies    Intolerances    MEDICATIONS  (STANDING):  aspirin  chewable 81 milliGRAM(s) Oral daily  budesonide  80 MICROgram(s)/formoterol 4.5 MICROgram(s) Inhaler 2 Puff(s) Inhalation two times a day  chlorhexidine 2% Cloths 1 Application(s) Topical <User Schedule>  dextrose 40% Gel 15 Gram(s) Oral once  dextrose 50% Injectable 25 Gram(s) IV Push once  dextrose 50% Injectable 12.5 Gram(s) IV Push once  dextrose 50% Injectable 25 Gram(s) IV Push once  ferrous    sulfate 325 milliGRAM(s) Oral daily  glucagon  Injectable 1 milliGRAM(s) IntraMuscular once  heparin   Injectable 5000 Unit(s) SubCutaneous every 8 hours  polyethylene glycol 3350 17 Gram(s) Oral daily  tiotropium 18 MICROgram(s) Capsule 1 Capsule(s) Inhalation daily    MEDICATIONS  (PRN):  artificial  tears Solution 1 Drop(s) Both EYES every 12 hours PRN Dry Eyes  glycopyrrolate Injectable 0.4 milliGRAM(s) IV Push every 4 hours PRN secretions  HYDROmorphone  Injectable 0.2 milliGRAM(s) IV Push every 1 hour PRN dyspnea  HYDROmorphone  Injectable 0.2 milliGRAM(s) IV Push every 1 hour PRN mild, moderate, or severe pain  LORazepam   Injectable 0.5 milliGRAM(s) IV Push every 1 hour PRN anxiety, agitation, or intractable dyspnea    ITEMS UNCHECKED ARE NOT PRESENT    PRESENT SYMPTOMS: [X ]Unable to obtain due to poor mentation   Source if other than patient:  [ ]Family   [ X]Team     Pain: [ X] yes [ ] no - refer to PAINAD score  QOL impact -   Location -                    Aggravating factors -  Quality -  Radiation -  Timing-  Severity (0-10 scale):  Minimal acceptable level (0-10 scale):     Dyspnea:                           [ ]Mild [ ]Moderate [ ]Severe  Anxiety:                             [ ]Mild [ ]Moderate [ ]Severe  Fatigue:                             [ ]Mild [ ]Moderate [ ]Severe  Nausea:                             [ ]Mild [ ]Moderate [ ]Severe  Loss of appetite:              [ ]Mild [ ]Moderate [ ]Severe  Constipation:                    [ ]Mild [ ]Moderate [ ]Severe    PAINAD Score: 7    [email or web address]/painad.pdf (Ctrl +  left click to view)  		  Other Symptoms:  [X]All other review of systems negative     Palliative Performance Status Version 2:   10%         [email or web address]://[email or web address]/[email or web address]_performance_[email or web address]_[email or web address]  PHYSICAL EXAM:  Vital Signs Last 24 Hrs  T(C): 36.6 (13 Aug 2021 07:57), Max: 37.2 (12 Aug 2021 22:17)  T(F): 97.9 (13 Aug 2021 07:57), Max: 98.9 (12 Aug 2021 22:17)  HR: 84 (13 Aug 2021 07:57) (84 - 93)  BP: 112/64 (13 Aug 2021 07:57) (100/50 - 113/54)  BP(mean): 78 (12 Aug 2021 21:00) (71 - 78)  RR: 18 (13 Aug 2021 07:57) (14 - 38)  SpO2: 96% (13 Aug 2021 07:57) (96% - 100%) I&O's Summary    12 Aug 2021 07:01  -  13 Aug 2021 07:00  --------------------------------------------------------  IN: 0 mL / OUT: 765 mL / NET: -765 mL    GENERAL: L IJ catheter in place.   [ ]Alert  [ ]Oriented x   [ ]Lethargic  [ ]Cachexia  [X ]Unarousable  [ ]Verbal  [ X]Non-Verbal  Behavioral:   [ ] Anxiety  [ ] Delirium [ ] Agitation [ ] Other  HEENT:  [ ]Normal   [X ]Dry mouth   [ ]ET Tube/Trach  [ ]Oral lesions  PULMONARY:   [ X]Clear [ ]Tachypnea  [ ]Audible excessive secretions   [ ]Rhonchi        [ ]Right [ ]Left [ ]Bilateral  [ ]Crackles        [ ]Right [ ]Left [ ]Bilateral  [ ]Wheezing     [ ]Right [ ]Left [ ]Bilateral  [ ]Diminished BS [ ]Right [ ]Left [ ]Bilateral    CARDIOVASCULAR:    [ X]Regular [ ]Irregular [ ]Tachy  [ ]Catarino [ ]Murmur [ ]Other  GASTROINTESTINAL:  [X ]Soft  [ ]Distended   [ ]+BS  [ ]Non tender [ ]Tender  [ ]PEG [ ]OGT/ NGT   Last BM:    GENITOURINARY:  [ ]Normal [ ] Incontinent   [ ]Oliguria/Anuria   [ X]Steward  MUSCULOSKELETAL:   [ ]Normal   [X ]Weakness  [X ]Bed/Wheelchair bound [ ]Edema  NEUROLOGIC:   [ ]No focal deficits  [ ] Cognitive impairment  [ ] Dysphagia [ ]Dysarthria [ ] Paresis [ ]Other   SKIN:   [ ]Normal  [ ]Rash     [ ]Pressure ulcer(s)  [ ]y [ ]n  Present on admission      CRITICAL CARE:  [ ] Shock Present  [ ]Septic [ ]Cardiogenic [ ]Neurologic [ ]Hypovolemic  [ ]  Vasopressors [ ]  Inotropes   [ ] Respiratory failure present [ ] Mechanical Ventilation [ ] Non-invasive ventilatory support [ ] High-Flow  [ ] Acute  [ ] Chronic [ ] Hypoxic  [ ] Hypercarbic [ ] Other  [ ] Other organ failure - renal failure    LABS:      135  |  97  |  65<H>  ----------------------------<  399<H>  4.3   |  23  |  1.51<H>    Ca    8.8      11 Aug 2021 15:18        Urinalysis Basic - ( 11 Aug 2021 11:31 )    Color: Dark Orange / Appearance: Slightly Turbid / S.026 / pH: x  Gluc: x / Ketone: Negative  / Bili: Small / Urobili: 3 mg/dL   Blood: x / Protein: 100 mg/dL / Nitrite: Negative   Leuk Esterase: Large / RBC: 3 /hpf / WBC 0 /HPF   Sq Epi: x / Non Sq Epi: 0 /hpf / Bacteria: Negative      RADIOLOGY & ADDITIONAL STUDIES: no new studies    PROTEIN CALORIE MALNUTRITION: [ ] mild [ ] moderate [X ] severe  [ ] underweight [ ] morbid obesity    https://www.andeal.org/vault/7103/web/files/ONC/Table_Clinical%20Characteristics%20to%20Document%20Malnutrition-White%20JV%20et%20al%2020.pdf    Height (cm): 152.4 (21 @ 11:15)  Weight (kg): 62.3 (08-10-21 @ 06:00)  BMI (kg/m2): 26.8 (08-10-21 @ 06:00)    [ X] PPSV2 < or = 30% [ ] significant weight loss [ ] poor nutritional intake [ ] anasarca   Artificial Nutrition [ ]     REFERRALS:   [ ]Chaplaincy  [X ] Hospice  [ ]Child Life  [ X]Social Work  [ ]Case management [ ]Holistic Therapy [ ] Physical Therapy [ ] Dietary   Goals of Care Document:  GAP TEAM PALLIATIVE CARE UNIT PROGRESS NOTE:      [  ] Patient on hospice program.    INDICATION FOR PALLIATIVE CARE UNIT SERVICES:    INTERVAL HPI/OVERNIGHT EVENTS:  - transferred to PCU, comfort measures only   - prn - lorazepam 0.5mg IV x3 and dilaudid 0.2mg IV x1 over last 24 hrs  - last bm - unknown as no stool count recorded   - sons updated at bedside   - L IJ catheter removed.   - social work discussing options with family    DNR on chart: Yes  Yes      Allergies    No Known Allergies    Intolerances    MEDICATIONS  (STANDING):  aspirin  chewable 81 milliGRAM(s) Oral daily  budesonide  80 MICROgram(s)/formoterol 4.5 MICROgram(s) Inhaler 2 Puff(s) Inhalation two times a day  chlorhexidine 2% Cloths 1 Application(s) Topical <User Schedule>  dextrose 40% Gel 15 Gram(s) Oral once  dextrose 50% Injectable 25 Gram(s) IV Push once  dextrose 50% Injectable 12.5 Gram(s) IV Push once  dextrose 50% Injectable 25 Gram(s) IV Push once  ferrous    sulfate 325 milliGRAM(s) Oral daily  glucagon  Injectable 1 milliGRAM(s) IntraMuscular once  heparin   Injectable 5000 Unit(s) SubCutaneous every 8 hours  polyethylene glycol 3350 17 Gram(s) Oral daily  tiotropium 18 MICROgram(s) Capsule 1 Capsule(s) Inhalation daily    MEDICATIONS  (PRN):  artificial  tears Solution 1 Drop(s) Both EYES every 12 hours PRN Dry Eyes  glycopyrrolate Injectable 0.4 milliGRAM(s) IV Push every 4 hours PRN secretions  HYDROmorphone  Injectable 0.2 milliGRAM(s) IV Push every 1 hour PRN dyspnea  HYDROmorphone  Injectable 0.2 milliGRAM(s) IV Push every 1 hour PRN mild, moderate, or severe pain  LORazepam   Injectable 0.5 milliGRAM(s) IV Push every 1 hour PRN anxiety, agitation, or intractable dyspnea    ITEMS UNCHECKED ARE NOT PRESENT    PRESENT SYMPTOMS: [X ]Unable to obtain due to poor mentation   Source if other than patient:  [ ]Family   [ X]Team     Pain: [ X] yes [ ] no - refer to PAINAD score  QOL impact -   Location -                    Aggravating factors -  Quality -  Radiation -  Timing-  Severity (0-10 scale):  Minimal acceptable level (0-10 scale):     Dyspnea:                           [ ]Mild [ ]Moderate [ ]Severe  Anxiety:                             [ ]Mild [ ]Moderate [ ]Severe  Fatigue:                             [ ]Mild [ ]Moderate [ ]Severe  Nausea:                             [ ]Mild [ ]Moderate [ ]Severe  Loss of appetite:              [ ]Mild [ ]Moderate [ ]Severe  Constipation:                    [ ]Mild [ ]Moderate [ ]Severe    PAINAD Score: 7    [email or web address]/painad.pdf (Ctrl +  left click to view)  		  Other Symptoms:  [X]All other review of systems negative     Palliative Performance Status Version 2:   10%         [email or web address]/files/[email or web address]_performance_scale_[email or web address]  PHYSICAL EXAM:  Vital Signs Last 24 Hrs  T(C): 36.6 (13 Aug 2021 07:57), Max: 37.2 (12 Aug 2021 22:17)  T(F): 97.9 (13 Aug 2021 07:57), Max: 98.9 (12 Aug 2021 22:17)  HR: 84 (13 Aug 2021 07:57) (84 - 93)  BP: 112/64 (13 Aug 2021 07:57) (100/50 - 113/54)  BP(mean): 78 (12 Aug 2021 21:00) (71 - 78)  RR: 18 (13 Aug 2021 07:57) (14 - 38)  SpO2: 96% (13 Aug 2021 07:57) (96% - 100%) I&O's Summary    12 Aug 2021 07:01  -  13 Aug 2021 07:00  --------------------------------------------------------  IN: 0 mL / OUT: 765 mL / NET: -765 mL    GENERAL: L IJ catheter in place.   [ ]Alert  [ ]Oriented x   [ ]Lethargic  [ ]Cachexia  [X ]Unarousable  [ ]Verbal  [ X]Non-Verbal  Behavioral:   [ ] Anxiety  [ ] Delirium [ ] Agitation [x ] Other calm  HEENT:  [ ]Normal   [X ]Dry mouth   [ ]ET Tube/Trach  [ ]Oral lesions  PULMONARY:   [ X]Clear [ ]Tachypnea  [ ]Audible excessive secretions   [ ]Rhonchi        [ ]Right [ ]Left [ ]Bilateral  [ ]Crackles        [ ]Right [ ]Left [ ]Bilateral  [ ]Wheezing     [ ]Right [ ]Left [ ]Bilateral  [ ]Diminished BS [ ]Right [ ]Left [ ]Bilateral    CARDIOVASCULAR:    [ X]Regular [ ]Irregular [ ]Tachy  [ ]Catarino [ ]Murmur [ ]Other  GASTROINTESTINAL:  [X ]Soft  [ ]Distended   [ ]+BS  [ ]Non tender [ ]Tender  [ ]PEG [ ]OGT/ NGT   Last BM:    GENITOURINARY:  [ ]Normal [ ] Incontinent   [ ]Oliguria/Anuria   [ X]Steward  MUSCULOSKELETAL:   [ ]Normal   [X ]Weakness  [X ]Bed/Wheelchair bound [ ]Edema  NEUROLOGIC:   [ ]No focal deficits  [ ] Cognitive impairment  [ ] Dysphagia [ ]Dysarthria [ ] Paresis [ ]Other   SKIN:   [ ]Normal  [ ]Rash     [ ]Pressure ulcer(s)  [ ]y [ ]n  Present on admission      CRITICAL CARE:  [ ] Shock Present  [ ]Septic [ ]Cardiogenic [ ]Neurologic [ ]Hypovolemic  [ ]  Vasopressors [ ]  Inotropes   [ ] Respiratory failure present [ ] Mechanical Ventilation [ ] Non-invasive ventilatory support [ ] High-Flow  [ ] Acute  [ ] Chronic [ ] Hypoxic  [ ] Hypercarbic [ ] Other  [ ] Other organ failure - renal failure    LABS:      135  |  97  |  65<H>  ----------------------------<  399<H>  4.3   |  23  |  1.51<H>    Ca    8.8      11 Aug 2021 15:18        Urinalysis Basic - ( 11 Aug 2021 11:31 )    Color: Dark Orange / Appearance: Slightly Turbid / S.026 / pH: x  Gluc: x / Ketone: Negative  / Bili: Small / Urobili: 3 mg/dL   Blood: x / Protein: 100 mg/dL / Nitrite: Negative   Leuk Esterase: Large / RBC: 3 /hpf / WBC 0 /HPF   Sq Epi: x / Non Sq Epi: 0 /hpf / Bacteria: Negative      RADIOLOGY & ADDITIONAL STUDIES: no new studies    PROTEIN CALORIE MALNUTRITION: [ ] mild [ ] moderate [X ] severe  [ ] underweight [ ] morbid obesity    https://www.andeal.org/vault/2517/web/files/ONC/Table_Clinical%20Characteristics%20to%20Document%20Malnutrition-White%20JV%20et%20al%871478.pdf    Height (cm): 152.4 (21 @ 11:15)  Weight (kg): 62.3 (08-10-21 @ 06:00)  BMI (kg/m2): 26.8 (08-10-21 @ 06:00)    [ X] PPSV2 < or = 30% [ ] significant weight loss [ ] poor nutritional intake [ ] anasarca   Artificial Nutrition [ ]     REFERRALS:   [ ]Chaplaincy  [X ] Hospice  [ ]Child Life  [ X]Social Work  [ ]Case management [ ]Holistic Therapy [ ] Physical Therapy [ ] Dietary   Goals of Care Document:

## 2021-08-13 NOTE — CHART NOTE - NSCHARTNOTESELECT_GEN_ALL_CORE
CCU Transfer Note/Transfer Note
CICU Accept Note/Event Note
CTS/Event Note
DANIELLA Rosenthal removal/Event Note
Dialysis
Event Note
Event Note
MICU ultrasound/Event Note
Nutrition Services
Event Note
MAR Accept Note
MICU Transfer Note/Transfer Note
Nephrology
Nephrology/Event Note
Nutrition Services

## 2021-08-13 NOTE — PROGRESS NOTE ADULT - PROBLEM SELECTOR PLAN 1
- dilaudid 0.2mg IV q1 PRN for pain x1 over 24 hrs  - dilaudid 0.2mg IV q1 PRN for dyspnea  - robinul 0.4mg IV q4 PRN for secretions. - dilaudid 0.2mg IV q1 PRN for pain x1 over 24 hrs  - dilaudid 0.2mg IV q1 PRN for dyspnea  - robinul 0.4mg IV q4 PRN for secretions  - bowel regimen

## 2021-08-13 NOTE — CHART NOTE - NSCHARTNOTEFT_GEN_A_CORE
Nutrition Note - Malnutrition Follow Up     Pt is an 86 yo F with PMH: DM2, CAD with stents, A.Fib, hx of colon CA. Now with new lung mass. Here with critical AS s/p TAVR, ADHF, ADRF and JACQUE requiring CVVH and IV pressors. Family now decided on comfort care and  transferred to PCU.    Pt seen for initial nutrition evaluation 8/11 and was diagnosed with protein calorie malnutrition. Seen today by RD for nutrition follow up.     Diet: Consistent Carbohydrate/No Snacks, Renal Diet, Glucerna Shakes 3x daily.     Per discussion with PCA, pt did not eat thus far today. Previously noted with very poor intake while on medical floor as per review of RD note 8/11. Pt lethargic at this time, with confusion and garbled speech. Unable to respond appropriately or acknowledge presence of RD at bedside.     Pertinent Meds: Heparin, Aspirin, Robinul, Dilaudid, Ativan, Symbicort, Spiriva, Miralax  Pertinent Labs: No new labs since 8/11.    Skin: surgical incision B/L groin puncture sites from TAVR  Edema: 1+ generalized  GI: Last BM (8/12): x 1 (smear); (8/11): x 3.     [x] no change in estimated energy needs    Nutrition Diagnosis: severe acute malnutrition  Nutrition Diagnosis: remains appropriate, ongoing with EN feeds    Nutrition Recommendations:   1. Recommend liberalize diet (d/c consistent carbohydrate and renal) to optimize potential PO intake. Defer consistency to medical team, SLP.   2. Decrease Glucerna to twice daily.   3. RD to honor food preferences as able; staff to provide menu/feeding assistance PRN.   4. Consider multivitamin to aid in prevention of micronutrient deficiencies if within nutritional/medical GOC. Nutrition Note - Malnutrition Follow Up     Pt is an 86 yo F with PMH: DM2, CAD with stents, A.Fib, hx of colon CA. Now with new lung mass. Here with critical AS s/p TAVR, ADHF, ADRF and JACQUE requiring CVVH and IV pressors. Family now decided on comfort care and  transferred to PCU.    Pt seen for initial nutrition evaluation 8/11 and was diagnosed with protein calorie malnutrition. Seen today by RD for nutrition follow up.     Diet: Consistent Carbohydrate/No Snacks, Renal Diet, Glucerna Shakes 3x daily.     Per discussion with PCA, pt did not eat thus far today. Previously noted with very poor intake while on medical floor as per review of RD note 8/11. Pt lethargic at this time, with confusion and garbled speech. Unable to respond appropriately or acknowledge presence of RD at bedside.     Pertinent Meds: Heparin, Aspirin, Robinul, Dilaudid, Ativan, Symbicort, Spiriva, Miralax  Pertinent Labs: No new labs since 8/11.    Skin: surgical incision B/L groin puncture sites from TAVR  Edema: 1+ generalized  GI: Last BM (8/12): x 1 (smear); (8/11): x 3.     [x] no change in estimated energy needs    Nutrition Diagnosis: severe acute malnutrition  Nutrition Diagnosis: remains appropriate, ongoing with EN feeds    Nutrition Recommendations:   1. Recommend liberalize diet (d/c consistent carbohydrate and renal) to optimize potential PO intake. Defer consistency to medical team, SLP.   2. Decrease Glucerna to twice daily.   3. RD to honor food preferences as able; staff to provide menu/feeding assistance PRN.   4. Consider multivitamin to aid in prevention of micronutrient deficiencies if within nutritional/medical GOC.    Alison Kleiner, RD, [facility] Pager # [phone]

## 2021-08-13 NOTE — PROVIDER CONTACT NOTE (MEDICATION) - SITUATION
Pt requiring 4th dose of PRN Ativan, per PRN order contact if more than 3 doses required within 24h period

## 2021-08-13 NOTE — PROGRESS NOTE ADULT - ASSESSMENT
87F w/ DM2, CAD w/ stents, afib, history of colon CA, now new lung mass, here with critical AS s/p TAVR, ADHF, AHRF, and JACQUE requiring CVVH and IV pressors. Family decided on comfort care. Transferred to PCU.

## 2021-08-13 NOTE — PROGRESS NOTE ADULT - SUBJECTIVE AND OBJECTIVE BOX
CHIEF COMPLAINT: f/up sob, fluid OL-PH WHO class 2, CHF, abnormal CT-c/w CA (RML)-more rqi-baopofdce-surq sleep    Interval Events: palliative unit    REVIEW OF SYSTEMS:  Constitutional: No fevers or chills. No weight loss. No fatigue or generalized malaise.  Eyes: No itching or discharge from the eyes  ENT: No ear pain. No ear discharge. No nasal congestion. No post nasal drip. No epistaxis. No throat pain. No sore throat. No difficulty swallowing.   CV: No chest pain. No palpitations. No lightheadedness or dizziness.   Resp: No dyspnea at rest. No dyspnea on exertion. No orthopnea. No wheezing. No cough. No stridor. No sputum production. No chest pain with respiration.  GI: No nausea. No vomiting. No diarrhea.  MSK: No joint pain or pain in any extremities  Integumentary: No skin lesions. No pedal edema.  Neurological: No gross motor weakness. No sensory changes.  [ ] All other systems negative  [ ] Unable to assess ROS because ________    OBJECTIVE:  ICU Vital Signs Last 24 Hrs  T(C): 37.2 (12 Aug 2021 22:17), Max: 37.2 (12 Aug 2021 22:17)  T(F): 98.9 (12 Aug 2021 22:17), Max: 98.9 (12 Aug 2021 22:17)  HR: 92 (12 Aug 2021 22:17) (86 - 93)  BP: 109/55 (12 Aug 2021 22:17) (95/49 - 113/54)  BP(mean): 78 (12 Aug 2021 21:00) (67 - 78)  ABP: --  ABP(mean): --  RR: 20 (12 Aug 2021 22:17) (14 - 43)  SpO2: 99% (12 Aug 2021 22:17) (96% - 100%)         @ 07:01  -   @ 07:00  --------------------------------------------------------  IN: 587.8 mL / OUT: 170 mL / NET: 417.8 mL     @ 07:01  -   @ 05:21  --------------------------------------------------------  IN: 0 mL / OUT: 165 mL / NET: -165 mL      CAPILLARY BLOOD GLUCOSE      POCT Blood Glucose.: 125 mg/dL (11 Aug 2021 21:27)      PHYSICAL EXAM:  General: semisedated on O2   HEENT: Atraumatic, normocephalic.                 Mallampatti Grade 3                No nasal congestion.                No tonsillar or pharyngeal exudates.  Lymph Nodes: No palpable lymphadenopathy  Neck: No JVD. No carotid bruit.   Respiratory: abnormal chest expansion                         Normal percussion                         Normal and equal air entry                         No wheeze, rhonchi or rales.  Cardiovascular: S1 S2 normal. No murmurs, rubs or gallops.   Abdomen: Soft, non-tender, non-distended. No organomegaly. Normoactive bowel sounds.  Extremities: Warm to touch. Peripheral pulse palpable. + pedal edema.   Skin: No rashes or skin lesions  Neurological: Motor and sensory examination equal and normal in all four extremities.  Psychiatry: unable    HOSPITAL MEDICATIONS:  MEDICATIONS  (STANDING):  aspirin  chewable 81 milliGRAM(s) Oral daily  budesonide  80 MICROgram(s)/formoterol 4.5 MICROgram(s) Inhaler 2 Puff(s) Inhalation two times a day  chlorhexidine 2% Cloths 1 Application(s) Topical <User Schedule>  dextrose 40% Gel 15 Gram(s) Oral once  dextrose 50% Injectable 25 Gram(s) IV Push once  dextrose 50% Injectable 12.5 Gram(s) IV Push once  dextrose 50% Injectable 25 Gram(s) IV Push once  ferrous    sulfate 325 milliGRAM(s) Oral daily  glucagon  Injectable 1 milliGRAM(s) IntraMuscular once  heparin   Injectable 5000 Unit(s) SubCutaneous every 8 hours  polyethylene glycol 3350 17 Gram(s) Oral daily  tiotropium 18 MICROgram(s) Capsule 1 Capsule(s) Inhalation daily    MEDICATIONS  (PRN):  artificial  tears Solution 1 Drop(s) Both EYES every 12 hours PRN Dry Eyes  glycopyrrolate Injectable 0.4 milliGRAM(s) IV Push every 4 hours PRN secretions  HYDROmorphone  Injectable 0.2 milliGRAM(s) IV Push every 1 hour PRN dyspnea  HYDROmorphone  Injectable 0.2 milliGRAM(s) IV Push every 1 hour PRN mild, moderate, or severe pain  LORazepam   Injectable 0.5 milliGRAM(s) IV Push every 1 hour PRN anxiety, agitation, or intractable dyspnea      LABS:                        7.8    27.71 )-----------( 208      ( 11 Aug 2021 07:55 )             26.1         135  |  97  |  65<H>  ----------------------------<  399<H>  4.3   |  23  |  1.51<H>    Ca    8.8      11 Aug 2021 15:18        Urinalysis Basic - ( 11 Aug 2021 11:31 )    Color: Dark Orange / Appearance: Slightly Turbid / S.026 / pH: x  Gluc: x / Ketone: Negative  / Bili: Small / Urobili: 3 mg/dL   Blood: x / Protein: 100 mg/dL / Nitrite: Negative   Leuk Esterase: Large / RBC: 3 /hpf / WBC 0 /HPF   Sq Epi: x / Non Sq Epi: 0 /hpf / Bacteria: Negative      Arterial Blood Gas:   @ 06:46  7.39/51/126/31/99/5.7  ABG lactate: --  Arterial Blood Gas:   @ 05:32  7.42/50/61/31/92/6.4  ABG lactate: --        MICROBIOLOGY:     RADIOLOGY:  [ ] Reviewed and interpreted by me    Point of Care Ultrasound Findings:    PFT:    EKG: CHIEF COMPLAINT: f/up sob, fluid OL-PH WHO class 2, CHF, abnormal CT-c/w CA (RML)-poorly arousable-decent night as per Nursing    Interval Events: palliative unit    REVIEW OF SYSTEMS:  Constitutional: No fevers or chills. No weight loss. No fatigue or generalized malaise.  Eyes: No itching or discharge from the eyes  ENT: No ear pain. No ear discharge. No nasal congestion. No post nasal drip. No epistaxis. No throat pain. No sore throat. No difficulty swallowing.   CV: No chest pain. No palpitations. No lightheadedness or dizziness.   Resp: No dyspnea at rest. No dyspnea on exertion. No orthopnea. No wheezing. No cough. No stridor. No sputum production. No chest pain with respiration.  GI: No nausea. No vomiting. No diarrhea.  MSK: No joint pain or pain in any extremities  Integumentary: No skin lesions. No pedal edema.  Neurological: No gross motor weakness. No sensory changes.  [ ] All other systems negative  [+ ] Unable to assess ROS because _poorly arrousable_______    OBJECTIVE:  ICU Vital Signs Last 24 Hrs  T(C): 37.2 (12 Aug 2021 22:17), Max: 37.2 (12 Aug 2021 22:17)  T(F): 98.9 (12 Aug 2021 22:17), Max: 98.9 (12 Aug 2021 22:17)  HR: 92 (12 Aug 2021 22:17) (86 - 93)  BP: 109/55 (12 Aug 2021 22:17) (95/49 - 113/54)  BP(mean): 78 (12 Aug 2021 21:00) (67 - 78)  ABP: --  ABP(mean): --  RR: 20 (12 Aug 2021 22:17) (14 - 43)  SpO2: 99% (12 Aug 2021 22:17) (96% - 100%)         @ 07:01  -   @ 07:00  --------------------------------------------------------  IN: 587.8 mL / OUT: 170 mL / NET: 417.8 mL     @ 07:  -   @ 05:21  --------------------------------------------------------  IN: 0 mL / OUT: 165 mL / NET: -165 mL      CAPILLARY BLOOD GLUCOSE      POCT Blood Glucose.: 125 mg/dL (11 Aug 2021 21:27)      PHYSICAL EXAM:  General: semisedated on O2   HEENT: Atraumatic, normocephalic.                 Mallampatti Grade 3                No nasal congestion.                No tonsillar or pharyngeal exudates.  Lymph Nodes: No palpable lymphadenopathy  Neck: No JVD. No carotid bruit.   Respiratory: abnormal chest expansion                         Normal percussion                         Normal and equal air entry                         No wheeze, rhonchi or rales.  Cardiovascular: S1 S2 normal. No murmurs, rubs or gallops.   Abdomen: Soft, non-tender, non-distended. No organomegaly. Normoactive bowel sounds.  Extremities: Warm to touch. Peripheral pulse palpable. + pedal edema.   Skin: No rashes or skin lesions  Neurological: Motor and sensory examination equal and normal in all four extremities.  Psychiatry: unable    HOSPITAL MEDICATIONS:  MEDICATIONS  (STANDING):  aspirin  chewable 81 milliGRAM(s) Oral daily  budesonide  80 MICROgram(s)/formoterol 4.5 MICROgram(s) Inhaler 2 Puff(s) Inhalation two times a day  chlorhexidine 2% Cloths 1 Application(s) Topical <User Schedule>  dextrose 40% Gel 15 Gram(s) Oral once  dextrose 50% Injectable 25 Gram(s) IV Push once  dextrose 50% Injectable 12.5 Gram(s) IV Push once  dextrose 50% Injectable 25 Gram(s) IV Push once  ferrous    sulfate 325 milliGRAM(s) Oral daily  glucagon  Injectable 1 milliGRAM(s) IntraMuscular once  heparin   Injectable 5000 Unit(s) SubCutaneous every 8 hours  polyethylene glycol 3350 17 Gram(s) Oral daily  tiotropium 18 MICROgram(s) Capsule 1 Capsule(s) Inhalation daily    MEDICATIONS  (PRN):  artificial  tears Solution 1 Drop(s) Both EYES every 12 hours PRN Dry Eyes  glycopyrrolate Injectable 0.4 milliGRAM(s) IV Push every 4 hours PRN secretions  HYDROmorphone  Injectable 0.2 milliGRAM(s) IV Push every 1 hour PRN dyspnea  HYDROmorphone  Injectable 0.2 milliGRAM(s) IV Push every 1 hour PRN mild, moderate, or severe pain  LORazepam   Injectable 0.5 milliGRAM(s) IV Push every 1 hour PRN anxiety, agitation, or intractable dyspnea      LABS:                        7.8    27.71 )-----------( 208      ( 11 Aug 2021 07:55 )             26.1         135  |  97  |  65<H>  ----------------------------<  399<H>  4.3   |  23  |  1.51<H>    Ca    8.8      11 Aug 2021 15:18        Urinalysis Basic - ( 11 Aug 2021 11:31 )    Color: Dark Orange / Appearance: Slightly Turbid / S.026 / pH: x  Gluc: x / Ketone: Negative  / Bili: Small / Urobili: 3 mg/dL   Blood: x / Protein: 100 mg/dL / Nitrite: Negative   Leuk Esterase: Large / RBC: 3 /hpf / WBC 0 /HPF   Sq Epi: x / Non Sq Epi: 0 /hpf / Bacteria: Negative      Arterial Blood Gas:   @ 06:46  7.39/51/126/31/99/5.7  ABG lactate: --  Arterial Blood Gas:   @ 05:32  7.42/50/61/31/92/6.4  ABG lactate: --        MICROBIOLOGY:     RADIOLOGY:  [ ] Reviewed and interpreted by me    Point of Care Ultrasound Findings:    PFT:    EKG:

## 2021-08-13 NOTE — PROGRESS NOTE ADULT - ASSESSMENT
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  She is status post acute dialysis, MICU stay and recent TAVR. Cath results (pre TAVR) this admission note elevated PCWP and elevated PA pressure Mean 37, with evidence of right heart failure, secondary to pulmonary hypertension, which is mostly secondary to left heart disease.  As valve has been repaired this may improve.  Patient denies any acute change in her breathing,  States she has had difficulty for months and that it is related to her heart.  Lung masses may be contributing--DDX is primary lung CA vs less likely metastatic colon CA. vs other. Admits to asthma-years ago.    REC:    Continue diuresis as her heart failure team-consider repeat echo and ? RHC                            SEE BELOW:  asthma-symbicort 160, spiriva, incentive spirometry  abnormal CT-lung mass RML--move to consider CT NEEDLE bx.  Continue to decrease oxygen as tolerated.  Will likely require oxygen at rehab.  *******************************  8/2-no significant changes   8/3-struct heart contemplating RHC here  8/4-moving to [facility] for additional info to direct care  8/5-RHC-elev pcwp-27 , Mean 35+--c/w left sided PHtn  8/9-bumex drip in place; no signif ambulation-nephrology involved  8/10-fluid OL--moving to CICU for CVVHD  8/11-CCU-CRT and hiflo in place  8/12-withdraw of most measures-on hiflo taper, capped pressors, no CRT; await palliative care  8/13-PCU transfer

## 2021-08-13 NOTE — PROGRESS NOTE ADULT - NUTRITIONAL ASSESSMENT
This patient has been assessed with a concern for Malnutrition and has been determined to have a diagnosis/diagnoses of Severe protein-calorie malnutrition.    This patient is being managed with:   Diet Consistent Carbohydrate/No Snacks-  For patients receiving Renal Replacement - No Protein Restr No Conc K No Conc Phos Low Sodium (RENAL)  Supplement Feeding Modality:  Oral  Glucerna Shake Cans or Servings Per Day:  1       Frequency:  Three Times a day  Entered: Aug 10 2021  8:28AM     This patient has been assessed with a concern for Malnutrition and has been determined to have a diagnosis/diagnoses of Severe protein-calorie malnutrition.    This patient is being managed with:   Diet Consistent Carbohydrate/No Snacks-  For patients receiving Renal Replacement - No Protein Restr No Conc K No Conc Phos Low Sodium (RENAL)  Supplement Feeding Modality:  Oral  Glucerna Shake Cans or Servings Per Day:  1       Frequency:  Three Times a day  Entered: Aug 10 2021  8:28AM

## 2021-08-13 NOTE — PROGRESS NOTE ADULT - ATTENDING COMMENTS
Agree with above, continue with current pain management, focus on symptom-directed care    ______________  Pako Vega MD   of Geriatric and Palliative Medicine  [facility]     Please page the following number for clinical matters between the hours of 9AM and 5PM   from Monday through Friday : [phone]    After 5PM and on weekends, please page: [phone]. The Geriatric and Palliative Medicine consult service has 24/7 coverage for medical recommendations, including for symptom management needs.

## 2021-08-13 NOTE — PROGRESS NOTE ADULT - PROBLEM SELECTOR PLAN 7
DNR, DNI, comfort measures only, continue care in PCU.   Goal is home hospice. Home discharge unlikely to be feasible given lvl of care that would be required for sx management, family in agreement with inpatient symptom management. DNR, DNI, comfort measures only, continue care in PCU.   Goal is home hospice. Home discharge unlikely to be feasible given lvl of care that would be required for sx management, family in agreement with inpatient symptom management. Per discussion with family, will discontinue all PO meds and inhalers.

## 2021-08-13 NOTE — PROGRESS NOTE ADULT - PROBLEM SELECTOR PLAN 3
In setting of pulmonary edema, pHTN, history of asthma  - on supplemental oxygen via nasal cannula In setting of pulmonary edema, pHTN, history of asthma  - on supplemental oxygen via nasal cannula  - dilaudid 0.2mg IV q1 PRN for dyspnea  - bowel regimen

## 2021-08-13 NOTE — PROGRESS NOTE ADULT - TIME BILLING
as above: deteriorating clinical status--palliative care transfer  multifactorial dyspnea-CHF/fluid OL-WHO class 2 PAH (cardiac related), asthma (remote), ? RML tumor, debility, anemia--O2 NC sat above 90%  CHF-as per CHF team-Andreas et al; s/p  RHC/echo repeated---WHO class 2-aggressive rx of left heart Dz; CRT in place, TTE repeated  Renal-HD (hyponatremia)--nephrology input critical here-moved to CICU/8icu for care  asthma-symbicort 160 2 bid, spiriva 1 q day, incentive spirometry; hiflo taper of O2 NC  abnormal CT c/w CA-? primary lung vs metastatic dz (non smoker)  DVT prophylaxis-on A/C  GI-prophylaxis                           PT           respiratory failure--DNR--palliative care.  ***********HOSPICE/palliative evaln to be considered    Barrington Ponce MD-Pulmonary   [phone] as above: deteriorating clinical status--palliative care transfer  multifactorial dyspnea-CHF/fluid OL-WHO class 2 PAH (cardiac related), asthma (remote), ? RML tumor, debility, anemia--O2 NC sat above 90%  CHF-as per CHF team-Andreas et al; s/p  RHC/echo repeated---WHO class 2-aggressive rx of left heart Dz; CRT in place, TTE repeated  Renal-HD (hyponatremia)--nephrology input critical here-moved to CICU/8icu for care  asthma-symbicort 160 2 bid, spiriva 1 q day, incentive spirometry; hiflo taper of O2 NC  abnormal CT c/w CA-? primary lung vs metastatic dz (non smoker)  DVT prophylaxis-on A/C  GI-prophylaxis                           PT           respiratory failure--DNR--palliative care    Barrington Ponce MD-Pulmonary   [phone]

## 2021-08-14 NOTE — PROGRESS NOTE ADULT - PROBLEM SELECTOR PLAN 2
- ativan 0.5mg IV q1 PRN for anxiety, agitation, or intractable dyspnea - x3 over last 24 hrs - ativan 0.5mg IV q1 PRN for anxiety, agitation, or intractable dyspnea - x2 over last 24 hrs  - ativan increased to 1 mg q1h PRN and received  x3 over last 24 hours

## 2021-08-14 NOTE — PROGRESS NOTE ADULT - SUBJECTIVE AND OBJECTIVE BOX
GAP TEAM PALLIATIVE CARE UNIT PROGRESS NOTE:      [  ] Patient on hospice program.    INDICATION FOR PALLIATIVE CARE UNIT SERVICES: symptom management in setting of suspected metastatic colon ca    INTERVAL HPI/OVERNIGHT EVENTS: no acute event. Required dilaudid 0.2 mg and lorazepam 4 mg in past 24 hours    DNR on chart:   Allergies    No Known Allergies    Intolerances    MEDICATIONS  (STANDING):    MEDICATIONS  (PRN):  acetaminophen  Suppository .. 650 milliGRAM(s) Rectal every 6 hours PRN Temp greater or equal to 38C (100.4F)  artificial  tears Solution 1 Drop(s) Both EYES every 12 hours PRN Dry Eyes  bisacodyl Suppository 10 milliGRAM(s) Rectal daily PRN Constipation  glycopyrrolate Injectable 0.4 milliGRAM(s) IV Push every 4 hours PRN secretions  HYDROmorphone  Injectable 0.2 milliGRAM(s) IV Push every 1 hour PRN dyspnea  HYDROmorphone  Injectable 0.2 milliGRAM(s) IV Push every 1 hour PRN mild, moderate, or severe pain  LORazepam   Injectable 1 milliGRAM(s) IV Push every 1 hour PRN agitation, anxiety, or severe SOB    ITEMS UNCHECKED ARE NOT PRESENT    PRESENT SYMPTOMS: [x ]Unable to obtain due to poor mentation   Source if other than patient:  [ ]Family   [ ]Team     Pain: [ ] yes [ ] no  QOL impact -   Location -                    Aggravating factors -  Quality -  Radiation -  Timing-  Severity (0-10 scale):  Minimal acceptable level (0-10 scale):     Dyspnea:                           [ ]Mild [ ]Moderate [ ]Severe  Anxiety:                             [ ]Mild [ ]Moderate [ ]Severe  Fatigue:                             [ ]Mild [ ]Moderate [ ]Severe  Nausea:                             [ ]Mild [ ]Moderate [ ]Severe  Loss of appetite:              [ ]Mild [ ]Moderate [ ]Severe  Constipation:                    [ ]Mild [ ]Moderate [ ]Severe    PAINAD Score:  PAIN ASSESSMENT SCALE IN ADVANCED DEMENTIA (PAINAD)			  	                         0	                           1	                                              2	                                        Score  -----------------------------------------------------------------------------------------------------------------------------------------------------------------  Breathing                * Normal            * Occasional labored breathing.           * Noisy labored breathing.  independent                                       Short period of hyperventilation.          Long period of hyperventilation.             [0  ]  of vocalization         	                                                                              Cheyne-Kang respirations.  ------------------------------------------------------------------------------------------------------------------------------------------------------------------  Negative                  * None               * Occasional moan or groan.               * Repeated troubled calling out.  vocalization		                       Low-level speech with a negative         Loud moaning or groaning.                  [ 0 ]                                                             or disapproving quality. 	   	        Crying.  ------------------------------------------------------------------------------------------------------------------------------------------------------------------  Facial expression     * Smiling or          * Sad.                                                 * Facial grimacing.                                  inexpressive         Frightened.                                                                                                   [ 0 ]                                                             Frown.   -------------------------------------------------------------------------------------------------------------------------------------------------------------------  Body language	       * Relaxed            * Tense.   	                                           * Rigid.  Fists clenched.                                                              Distressed pacing.                                Knees pulled up.  Pulling                    [0  ]                                                             Fidgeting.                                            or pushing away.  Striking out.	  --------------------------------------------------------------------------------------------------------------------------------------------------------------------  Consolability	       * No need to      * Distracted or reassured 	                   * Unable to console, distract                                    console              by voice or touch.                                 or reassure.                                       [ 0 ]  --------------------------------------------------------------------------------------------------------------------------------------------------------------------	  			                                                                                                                           Total	      [ 0 ]  [email or web address] (Ctrl +  left click to view)  		  Other Symptoms:  [ ]All other review of systems negative     Palliative Performance Status Version 2:     10   %         [email or web address]  PHYSICAL EXAM:  Vital Signs Last 24 Hrs  T(C): 36.7 (14 Aug 2021 07:57), Max: 36.7 (14 Aug 2021 07:57)  T(F): 98.1 (14 Aug 2021 07:57), Max: 98.1 (14 Aug 2021 07:57)  HR: 89 (14 Aug 2021 07:57) (85 - 89)  BP: 105/51 (14 Aug 2021 07:57) (101/54 - 105/51)  BP(mean): --  RR: 20 (14 Aug 2021 07:57) (19 - 20)  SpO2: 96% (14 Aug 2021 07:57) (96% - 100%) I&O's Summary    13 Aug 2021 07:01  -  14 Aug 2021 07:00  --------------------------------------------------------  IN: 0 mL / OUT: 750 mL / NET: -750 mL    GENERAL:  [ ]Alert  [ ]Oriented x   [ x]Lethargic  [ ]Cachexia  [ ]Unarousable  [ ]Verbal  [ ]Non-Verbal  Behavioral:   [ ] Anxiety  [ ] Delirium [ ] Agitation [ ] Other  HEENT:  [ ]Normal   [ ]Dry mouth   [ ]ET Tube/Trach  [ ]Oral lesions  PULMONARY:   [x ]Clear [ ]Tachypnea  [ ]Audible excessive secretions   [ ]Rhonchi        [ ]Right [ ]Left [ ]Bilateral  [ ]Crackles        [ ]Right [ ]Left [ ]Bilateral  [ ]Wheezing     [ ]Right [ ]Left [ ]Bilateral  [ ]Diminished BS [ ]Right [ ]Left [ ]Bilateral    CARDIOVASCULAR:    [ ]Regular [ ]Irregular [ ]Tachy  [ ]Catarino [ ]Murmur [ ]Other  GASTROINTESTINAL:  [x ]Soft  [ ]Distended   [ ]+BS  [ ]Non tender [ ]Tender  [ ]PEG [ ]OGT/ NGT   Last BM:     GENITOURINARY:  [ ]Normal [ x] Incontinent   [ ]Oliguria/Anuria   [ ]Steward  MUSCULOSKELETAL:   [ ]Normal   [ ]Weakness  [x ]Bed/Wheelchair bound [ ]Edema  NEUROLOGIC:   [ ]No focal deficits  [ ] Cognitive impairment  [ x] Dysphagia [ ]Dysarthria [ ] Paresis [ ]Other   SKIN:   [ ]Normal  [ ]Rash     [ ]Pressure ulcer(s)  [ ]y [ ]n  Present on admission      CRITICAL CARE:  [ ] Shock Present  [ ]Septic [ ]Cardiogenic [ ]Neurologic [ ]Hypovolemic  [ ]  Vasopressors [ ]  Inotropes   [ ] Respiratory failure present [ ] Mechanical Ventilation [ ] Non-invasive ventilatory support [ ] High-Flow  [ ] Acute  [ ] Chronic [ ] Hypoxic  [ ] Hypercarbic [ ] Other  [ ] Other organ failure     LABS:    no new labs        RADIOLOGY & ADDITIONAL STUDIES:  no new imaging studies    PROTEIN CALORIE MALNUTRITION: [ ] mild [x] moderate [ ] severe  [ ] underweight [ ] morbid obesity    https://www.andeal.org/vault/2440/web/files/ONC/Table_Clinical%20Characteristics%20to%20Document%20Malnutrition-White%20JV%20et%20al%810408.pdf    Height (cm): 152.4 (07-22-21 @ 11:15)  Weight (kg): 62.3 (08-10-21 @ 06:00)  BMI (kg/m2): 26.8 (08-10-21 @ 06:00)    [ ] PPSV2 < or = 30% [ ] significant weight loss [x ] poor nutritional intake [ ] anasarca   Artificial Nutrition [ ]     REFERRALS:   [ ]Chaplaincy  [ ] Hospice  [ ]Child Life  [ ]Social Work  [ ]Case management [ ]Holistic Therapy [ ] Physical Therapy [ ] Dietary   Goals of Care Document:    GAP TEAM PALLIATIVE CARE UNIT PROGRESS NOTE:      [  ] Patient on hospice program.    INDICATION FOR PALLIATIVE CARE UNIT SERVICES: symptom management in setting of suspected metastatic colon ca    INTERVAL HPI/OVERNIGHT EVENTS: no acute event. Required dilaudid 0.2 mg and lorazepam 4 mg in past 24 hours    DNR on chart:   Allergies    No Known Allergies    Intolerances    MEDICATIONS  (STANDING):    MEDICATIONS  (PRN):  acetaminophen  Suppository .. 650 milliGRAM(s) Rectal every 6 hours PRN Temp greater or equal to 38C (100.4F)  artificial  tears Solution 1 Drop(s) Both EYES every 12 hours PRN Dry Eyes  bisacodyl Suppository 10 milliGRAM(s) Rectal daily PRN Constipation  glycopyrrolate Injectable 0.4 milliGRAM(s) IV Push every 4 hours PRN secretions  HYDROmorphone  Injectable 0.2 milliGRAM(s) IV Push every 1 hour PRN dyspnea  HYDROmorphone  Injectable 0.2 milliGRAM(s) IV Push every 1 hour PRN mild, moderate, or severe pain  LORazepam   Injectable 1 milliGRAM(s) IV Push every 1 hour PRN agitation, anxiety, or severe SOB    ITEMS UNCHECKED ARE NOT PRESENT    PRESENT SYMPTOMS: [x ]Unable to obtain due to poor mentation   Source if other than patient:  [ ]Family   [ ]Team     Pain: [ ] yes [ ] no  QOL impact -   Location -                    Aggravating factors -  Quality -  Radiation -  Timing-  Severity (0-10 scale):  Minimal acceptable level (0-10 scale):     Dyspnea:                           [ ]Mild [ ]Moderate [ ]Severe  Anxiety:                             [ ]Mild [ ]Moderate [ ]Severe  Fatigue:                             [ ]Mild [ ]Moderate [ ]Severe  Nausea:                             [ ]Mild [ ]Moderate [ ]Severe  Loss of appetite:              [ ]Mild [ ]Moderate [ ]Severe  Constipation:                    [ ]Mild [ ]Moderate [ ]Severe    PAINAD Score: 0  PAIN ASSESSMENT SCALE IN ADVANCED DEMENTIA (PAINAD)			  	                         0	                           1	                                              2	                                        Score  -----------------------------------------------------------------------------------------------------------------------------------------------------------------  Breathing                * Normal            * Occasional labored breathing.           * Noisy labored breathing.  independent                                       Short period of hyperventilation.          Long period of hyperventilation.             [0  ]  of vocalization         	                                                                              Cheyne-Kang respirations.  ------------------------------------------------------------------------------------------------------------------------------------------------------------------  Negative                  * None               * Occasional moan or groan.               * Repeated troubled calling out.  vocalization		                       Low-level speech with a negative         Loud moaning or groaning.                  [ 0 ]                                                             or disapproving quality. 	   	        Crying.  ------------------------------------------------------------------------------------------------------------------------------------------------------------------  Facial expression     * Smiling or          * Sad.                                                 * Facial grimacing.                                  inexpressive         Frightened.                                                                                                   [ 0 ]                                                             Frown.   -------------------------------------------------------------------------------------------------------------------------------------------------------------------  Body language	       * Relaxed            * Tense.   	                                           * Rigid.  Fists clenched.                                                              Distressed pacing.                                Knees pulled up.  Pulling                    [0  ]                                                             Fidgeting.                                            or pushing away.  Striking out.	  --------------------------------------------------------------------------------------------------------------------------------------------------------------------  Consolability	       * No need to      * Distracted or reassured 	                   * Unable to console, distract                                    console              by voice or touch.                                 or reassure.                                       [ 0 ]  --------------------------------------------------------------------------------------------------------------------------------------------------------------------	  			                                                                                                                           Total	      [ 0 ]  [email or web address] (Ctrl +  left click to view)  		  Other Symptoms:  [ ]All other review of systems negative     Palliative Performance Status Version 2:     10   %         [email or web address]  PHYSICAL EXAM:  Vital Signs Last 24 Hrs  T(C): 36.7 (14 Aug 2021 07:57), Max: 36.7 (14 Aug 2021 07:57)  T(F): 98.1 (14 Aug 2021 07:57), Max: 98.1 (14 Aug 2021 07:57)  HR: 89 (14 Aug 2021 07:57) (85 - 89)  BP: 105/51 (14 Aug 2021 07:57) (101/54 - 105/51)  BP(mean): --  RR: 20 (14 Aug 2021 07:57) (19 - 20)  SpO2: 96% (14 Aug 2021 07:57) (96% - 100%) I&O's Summary    13 Aug 2021 07:01  -  14 Aug 2021 07:00  --------------------------------------------------------  IN: 0 mL / OUT: 750 mL / NET: -750 mL    GENERAL:  [ ]Alert  [ ]Oriented x   [ x]Lethargic  [ ]Cachexia  [ ]Unarousable  [ ]Verbal  [ ]Non-Verbal  Behavioral:   [ ] Anxiety  [ ] Delirium [ ] Agitation [x ] Other calm  HEENT:  [ x]Normal   [ ]Dry mouth   [ ]ET Tube/Trach  [ ]Oral lesions  PULMONARY:   [x ]Clear [ ]Tachypnea  [ ]Audible excessive secretions   [ ]Rhonchi        [ ]Right [ ]Left [ ]Bilateral  [ ]Crackles        [ ]Right [ ]Left [ ]Bilateral  [ ]Wheezing     [ ]Right [ ]Left [ ]Bilateral  [ ]Diminished BS [ ]Right [ ]Left [ ]Bilateral    CARDIOVASCULAR:    [ ]Regular [ ]Irregular [ ]Tachy  [ ]Catarino [ ]Murmur [ ]Other  GASTROINTESTINAL:  [x ]Soft  [ ]Distended   [ ]+BS  [ ]Non tender [ ]Tender  [ ]PEG [ ]OGT/ NGT   Last BM:     GENITOURINARY:  [ ]Normal [ x] Incontinent   [ ]Oliguria/Anuria   [ ]Steward  MUSCULOSKELETAL:   [ ]Normal   [ ]Weakness  [x ]Bed/Wheelchair bound [ ]Edema  NEUROLOGIC:   [ ]No focal deficits  [ ] Cognitive impairment  [ x] Dysphagia [ ]Dysarthria [ ] Paresis [ ]Other   SKIN:   [ ]Normal  [ ]Rash     [ ]Pressure ulcer(s)  [ ]y [ ]n  Present on admission      CRITICAL CARE:  [ ] Shock Present  [ ]Septic [ ]Cardiogenic [ ]Neurologic [ ]Hypovolemic  [ ]  Vasopressors [ ]  Inotropes   [ ] Respiratory failure present [ ] Mechanical Ventilation [ ] Non-invasive ventilatory support [ ] High-Flow  [ ] Acute  [ ] Chronic [ ] Hypoxic  [ ] Hypercarbic [ ] Other  [ ] Other organ failure     LABS:    no new labs        RADIOLOGY & ADDITIONAL STUDIES:  no new imaging studies    PROTEIN CALORIE MALNUTRITION: [ ] mild [x] moderate [ ] severe  [ ] underweight [ ] morbid obesity    https://www.andeal.org/vault/2440/web/files/ONC/Table_Clinical%20Characteristics%20to%20Document%20Malnutrition-White%20JV%20et%20al%202012.pdf    Height (cm): 152.4 (07-22-21 @ 11:15)  Weight (kg): 62.3 (08-10-21 @ 06:00)  BMI (kg/m2): 26.8 (08-10-21 @ 06:00)    [ ] PPSV2 < or = 30% [ ] significant weight loss [x ] poor nutritional intake [ ] anasarca   Artificial Nutrition [ ]     REFERRALS:   [ ]Chaplaincy  [ ] Hospice  [ ]Child Life  [ ]Social Work  [ ]Case management [ ]Holistic Therapy [ ] Physical Therapy [ ] Dietary   Goals of Care Document:

## 2021-08-14 NOTE — PROGRESS NOTE ADULT - PROBLEM SELECTOR PLAN 3
In setting of pulmonary edema, pHTN, history of asthma  - on supplemental oxygen via nasal cannula  - dilaudid 0.2mg IV q1 PRN for dyspnea  - bowel regimen

## 2021-08-14 NOTE — PROGRESS NOTE ADULT - PROBLEM SELECTOR PLAN 7
DNR, DNI, comfort measures only, continue care in PCU.   Goal is home hospice. Home discharge unlikely to be feasible given lvl of care that would be required for sx management, family in agreement with inpatient symptom management. Per discussion with family, will discontinue all PO meds and inhalers.

## 2021-08-14 NOTE — PROGRESS NOTE ADULT - PROBLEM SELECTOR PLAN 1
- dilaudid 0.2mg IV q1 PRN for pain x1 over 24 hrs  - dilaudid 0.2mg IV q1 PRN for dyspnea   - robinul 0.4mg IV q4 PRN for secretions  - bowel regimen

## 2021-08-14 NOTE — PROGRESS NOTE ADULT - ATTENDING COMMENTS
Agree with above, ativan increased overnight, c/w symptom-directed care    ______________  Pako Vega MD   of Geriatric and Palliative Medicine  [facility]     Please page the following number for clinical matters between the hours of 9AM and 5PM   from Monday through Friday : [phone]    After 5PM and on weekends, please page: [phone]. The Geriatric and Palliative Medicine consult service has 24/7 coverage for medical recommendations, including for symptom management needs.

## 2021-08-15 NOTE — PROGRESS NOTE ADULT - SUBJECTIVE AND OBJECTIVE BOX
GAP TEAM PALLIATIVE CARE UNIT PROGRESS NOTE:      [  ] Patient on hospice program.    INDICATION FOR PALLIATIVE CARE UNIT SERVICES: symptom management in setting of acute decompensated heart failure    INTERVAL HPI/OVERNIGHT EVENTS:  no acute events    DNR on chart:   Allergies    No Known Allergies    Intolerances    MEDICATIONS  (STANDING):    MEDICATIONS  (PRN):  acetaminophen  Suppository .. 650 milliGRAM(s) Rectal every 6 hours PRN Temp greater or equal to 38C (100.4F)  artificial  tears Solution 1 Drop(s) Both EYES every 12 hours PRN Dry Eyes  bisacodyl Suppository 10 milliGRAM(s) Rectal daily PRN Constipation  glycopyrrolate Injectable 0.4 milliGRAM(s) IV Push every 4 hours PRN secretions  HYDROmorphone  Injectable 0.2 milliGRAM(s) IV Push every 1 hour PRN dyspnea  HYDROmorphone  Injectable 0.2 milliGRAM(s) IV Push every 1 hour PRN mild, moderate, or severe pain  LORazepam   Injectable 1 milliGRAM(s) IV Push every 1 hour PRN agitation, anxiety, or severe SOB    ITEMS UNCHECKED ARE NOT PRESENT    PRESENT SYMPTOMS: [x ]Unable to obtain due to poor mentation   Source if other than patient:  [ ]Family   [ ]Team     Pain: [ ] yes [ ] no  QOL impact -   Location -                    Aggravating factors -  Quality -  Radiation -  Timing-  Severity (0-10 scale):  Minimal acceptable level (0-10 scale):     Dyspnea:                           [ ]Mild [ ]Moderate [ ]Severe  Anxiety:                             [ ]Mild [ ]Moderate [ ]Severe  Fatigue:                             [ ]Mild [ ]Moderate [ ]Severe  Nausea:                             [ ]Mild [ ]Moderate [ ]Severe  Loss of appetite:              [ ]Mild [ ]Moderate [ ]Severe  Constipation:                    [ ]Mild [ ]Moderate [ ]Severe    PAINAD Score:  PAIN ASSESSMENT SCALE IN ADVANCED DEMENTIA (PAINAD)			  	                         0	                           1	                                              2	                                        Score  -----------------------------------------------------------------------------------------------------------------------------------------------------------------  Breathing                * Normal            * Occasional labored breathing.           * Noisy labored breathing.  independent                                       Short period of hyperventilation.          Long period of hyperventilation.             [0  ]  of vocalization         	                                                                              Cheyne-Kang respirations.  ------------------------------------------------------------------------------------------------------------------------------------------------------------------  Negative                  * None               * Occasional moan or groan.               * Repeated troubled calling out.  vocalization		                       Low-level speech with a negative         Loud moaning or groaning.                  [ 0 ]                                                             or disapproving quality. 	   	        Crying.  ------------------------------------------------------------------------------------------------------------------------------------------------------------------  Facial expression     * Smiling or          * Sad.                                                 * Facial grimacing.                                  inexpressive         Frightened.                                                                                                   [0  ]                                                             Frown.   -------------------------------------------------------------------------------------------------------------------------------------------------------------------  Body language	       * Relaxed            * Tense.   	                                           * Rigid.  Fists clenched.                                                              Distressed pacing.                                Knees pulled up.  Pulling                    [ 0 ]                                                             Fidgeting.                                            or pushing away.  Striking out.	  --------------------------------------------------------------------------------------------------------------------------------------------------------------------  Consolability	       * No need to      * Distracted or reassured 	                   * Unable to console, distract                                    console              by voice or touch.                                 or reassure.                                       [0  ]  --------------------------------------------------------------------------------------------------------------------------------------------------------------------	  			                                                                                                                           Total	      [0  ]  [email or web address] (Ctrl +  left click to view)  		  Other Symptoms:  [ ]All other review of systems negative     Palliative Performance Status Version 2:       10  %         http://npcrc.org/files/news/palliative_performance_scale_ppsv2.pdf  PHYSICAL EXAM:  Vital Signs Last 24 Hrs  T(C): 36.4 (15 Aug 2021 07:43), Max: 36.4 (15 Aug 2021 07:43)  T(F): 97.5 (15 Aug 2021 07:43), Max: 97.5 (15 Aug 2021 07:43)  HR: 87 (15 Aug 2021 07:43) (87 - 87)  BP: 97/43 (15 Aug 2021 07:43) (97/43 - 97/43)  BP(mean): --  RR: 20 (15 Aug 2021 07:43) (20 - 20)  SpO2: 99% (15 Aug 2021 07:43) (99% - 99%) I&O's Summary    14 Aug 2021 07:01  -  15 Aug 2021 07:00  --------------------------------------------------------  IN: 0 mL / OUT: 250 mL / NET: -250 mL    GENERAL:  [ ]Alert  [ ]Oriented x   [x ]Lethargic  [ ]Cachexia  [ ]Unarousable  [ ]Verbal  [x ]Non-Verbal  Behavioral:   [ ] Anxiety  [ ] Delirium [ ] Agitation [ ] Other  HEENT:  [x ]Normal   [ ]Dry mouth   [ ]ET Tube/Trach  [ ]Oral lesions  PULMONARY:   [x ]Clear [ ]Tachypnea  [ ]Audible excessive secretions   [ ]Rhonchi        [ ]Right [ ]Left [ ]Bilateral  [ ]Crackles        [ ]Right [ ]Left [ ]Bilateral  [ ]Wheezing     [ ]Right [ ]Left [ ]Bilateral  [ ]Diminished BS [ ]Right [ ]Left [ ]Bilateral    CARDIOVASCULAR:    [ x]Regular [ ]Irregular [ ]Tachy  [ ]Catarino [ ]Murmur [ ]Other  GASTROINTESTINAL:  [x ]Soft  [ ]Distended   [ ]+BS  [ ]Non tender [ ]Tender  [ ]PEG [ ]OGT/ NGT   Last BM:     GENITOURINARY:  [ ]Normal [x ] Incontinent   [ ]Oliguria/Anuria   [ ]Steward  MUSCULOSKELETAL:   [ ]Normal   [ ]Weakness  [x ]Bed/Wheelchair bound [ ]Edema  NEUROLOGIC:   [ ]No focal deficits  [ ] Cognitive impairment  [x ] Dysphagia [ ]Dysarthria [ ] Paresis [ ]Other   SKIN:   [ ]Normal  [ ]Rash     [ ]Pressure ulcer(s)  [ ]y [ ]n  Present on admission      CRITICAL CARE:  [ ] Shock Present  [ ]Septic [ ]Cardiogenic [ ]Neurologic [ ]Hypovolemic  [ ]  Vasopressors [ ]  Inotropes   [ ] Respiratory failure present [ ] Mechanical Ventilation [ ] Non-invasive ventilatory support [ ] High-Flow  [ ] Acute  [ ] Chronic [ ] Hypoxic  [ ] Hypercarbic [ ] Other  [ ] Other organ failure     LABS:      no new labs      RADIOLOGY & ADDITIONAL STUDIES:    no new labs    PROTEIN CALORIE MALNUTRITION: [ ] mild [x ] moderate [ ] severe  [ ] underweight [ ] morbid obesity    https://www.andeal.org/vault/2440/web/files/ONC/Table_Clinical%20Characteristics%20to%20Document%20Malnutrition-White%20JV%20et%20al%202012.pdf    Height (cm): 152.4 (07-22-21 @ 11:15)  Weight (kg): 62.3 (08-10-21 @ 06:00)  BMI (kg/m2): 26.8 (08-10-21 @ 06:00)    [ ] PPSV2 < or = 30% [ ] significant weight loss [x ] poor nutritional intake [ ] anasarca   Artificial Nutrition [ ]     REFERRALS:   [ ]Chaplaincy  [ ] Hospice  [ ]Child Life  [ ]Social Work  [ ]Case management [ ]Holistic Therapy [ ] Physical Therapy [ ] Dietary   Goals of Care Document:    GAP TEAM PALLIATIVE CARE UNIT PROGRESS NOTE:      [  ] Patient on hospice program.    INDICATION FOR PALLIATIVE CARE UNIT SERVICES: symptom management in setting of acute decompensated heart failure    INTERVAL HPI/OVERNIGHT EVENTS:  no acute events    DNR on chart:   Allergies    No Known Allergies    Intolerances    MEDICATIONS  (STANDING):    MEDICATIONS  (PRN):  acetaminophen  Suppository .. 650 milliGRAM(s) Rectal every 6 hours PRN Temp greater or equal to 38C (100.4F)  artificial  tears Solution 1 Drop(s) Both EYES every 12 hours PRN Dry Eyes  bisacodyl Suppository 10 milliGRAM(s) Rectal daily PRN Constipation  glycopyrrolate Injectable 0.4 milliGRAM(s) IV Push every 4 hours PRN secretions  HYDROmorphone  Injectable 0.2 milliGRAM(s) IV Push every 1 hour PRN dyspnea  HYDROmorphone  Injectable 0.2 milliGRAM(s) IV Push every 1 hour PRN mild, moderate, or severe pain  LORazepam   Injectable 1 milliGRAM(s) IV Push every 1 hour PRN agitation, anxiety, or severe SOB    ITEMS UNCHECKED ARE NOT PRESENT    PRESENT SYMPTOMS: [x ]Unable to obtain due to poor mentation   Source if other than patient:  [ ]Family   [ ]Team     Pain: [ ] yes [ ] no  QOL impact -   Location -                    Aggravating factors -  Quality -  Radiation -  Timing-  Severity (0-10 scale):  Minimal acceptable level (0-10 scale):     Dyspnea:                           [ ]Mild [ ]Moderate [ ]Severe  Anxiety:                             [ ]Mild [ ]Moderate [ ]Severe  Fatigue:                             [ ]Mild [ ]Moderate [ ]Severe  Nausea:                             [ ]Mild [ ]Moderate [ ]Severe  Loss of appetite:              [ ]Mild [ ]Moderate [ ]Severe  Constipation:                    [ ]Mild [ ]Moderate [ ]Severe    PAINAD Score:  PAIN ASSESSMENT SCALE IN ADVANCED DEMENTIA (PAINAD)			  	                         0	                           1	                                              2	                                        Score  -----------------------------------------------------------------------------------------------------------------------------------------------------------------  Breathing                * Normal            * Occasional labored breathing.           * Noisy labored breathing.  independent                                       Short period of hyperventilation.          Long period of hyperventilation.             [0  ]  of vocalization         	                                                                              Cheyne-Kang respirations.  ------------------------------------------------------------------------------------------------------------------------------------------------------------------  Negative                  * None               * Occasional moan or groan.               * Repeated troubled calling out.  vocalization		                       Low-level speech with a negative         Loud moaning or groaning.                  [ 0 ]                                                             or disapproving quality. 	   	        Crying.  ------------------------------------------------------------------------------------------------------------------------------------------------------------------  Facial expression     * Smiling or          * Sad.                                                 * Facial grimacing.                                  inexpressive         Frightened.                                                                                                   [0  ]                                                             Frown.   -------------------------------------------------------------------------------------------------------------------------------------------------------------------  Body language	       * Relaxed            * Tense.   	                                           * Rigid.  Fists clenched.                                                              Distressed pacing.                                Knees pulled up.  Pulling                    [ 0 ]                                                             Fidgeting.                                            or pushing away.  Striking out.	  --------------------------------------------------------------------------------------------------------------------------------------------------------------------  Consolability	       * No need to      * Distracted or reassured 	                   * Unable to console, distract                                    console              by voice or touch.                                 or reassure.                                       [0  ]  --------------------------------------------------------------------------------------------------------------------------------------------------------------------	  			                                                                                                                           Total	      [0  ]  [email or web address] (Ctrl +  left click to view)  		  Other Symptoms:  [ ]All other review of systems negative     Palliative Performance Status Version 2:       10  %         http://npcrc.org/files/news/palliative_performance_scale_ppsv2.pdf  PHYSICAL EXAM:  Vital Signs Last 24 Hrs  T(C): 36.4 (15 Aug 2021 07:43), Max: 36.4 (15 Aug 2021 07:43)  T(F): 97.5 (15 Aug 2021 07:43), Max: 97.5 (15 Aug 2021 07:43)  HR: 87 (15 Aug 2021 07:43) (87 - 87)  BP: 97/43 (15 Aug 2021 07:43) (97/43 - 97/43)  BP(mean): --  RR: 20 (15 Aug 2021 07:43) (20 - 20)  SpO2: 99% (15 Aug 2021 07:43) (99% - 99%) I&O's Summary    14 Aug 2021 07:01  -  15 Aug 2021 07:00  --------------------------------------------------------  IN: 0 mL / OUT: 250 mL / NET: -250 mL    GENERAL:  [ ]Alert  [ ]Oriented x   [x ]Lethargic  [ ]Cachexia  [ ]Unarousable  [ ]Verbal  [x ]Non-Verbal  Behavioral:   [ ] Anxiety  [ ] Delirium [ ] Agitation [x ] Other calm  HEENT:  [x ]Normal   [ ]Dry mouth   [ ]ET Tube/Trach  [ ]Oral lesions  PULMONARY:   [x ]Clear [ ]Tachypnea  [ ]Audible excessive secretions   [ ]Rhonchi        [ ]Right [ ]Left [ ]Bilateral  [ ]Crackles        [ ]Right [ ]Left [ ]Bilateral  [ ]Wheezing     [ ]Right [ ]Left [ ]Bilateral  [ ]Diminished BS [ ]Right [ ]Left [ ]Bilateral    CARDIOVASCULAR:    [ x]Regular [ ]Irregular [ ]Tachy  [ ]Catarino [ ]Murmur [ ]Other  GASTROINTESTINAL:  [x ]Soft  [ ]Distended   [ ]+BS  [ ]Non tender [ ]Tender  [ ]PEG [ ]OGT/ NGT   Last BM:     GENITOURINARY:  [ ]Normal [x ] Incontinent   [ ]Oliguria/Anuria   [ ]Steward  MUSCULOSKELETAL:   [ ]Normal   [ ]Weakness  [x ]Bed/Wheelchair bound [ ]Edema  NEUROLOGIC:   [ ]No focal deficits  [ ] Cognitive impairment  [x ] Dysphagia [ ]Dysarthria [ ] Paresis [ ]Other   SKIN:   [ ]Normal  [ ]Rash     [ ]Pressure ulcer(s)  [ ]y [ ]n  Present on admission      CRITICAL CARE:  [ ] Shock Present  [ ]Septic [ ]Cardiogenic [ ]Neurologic [ ]Hypovolemic  [ ]  Vasopressors [ ]  Inotropes   [ ] Respiratory failure present [ ] Mechanical Ventilation [ ] Non-invasive ventilatory support [ ] High-Flow  [ ] Acute  [ ] Chronic [ ] Hypoxic  [ ] Hypercarbic [ ] Other  [ ] Other organ failure     LABS:      no new labs      RADIOLOGY & ADDITIONAL STUDIES:    no new labs    PROTEIN CALORIE MALNUTRITION: [ ] mild [x ] moderate [ ] severe  [ ] underweight [ ] morbid obesity    https://www.andeal.org/vault/2440/web/files/ONC/Table_Clinical%20Characteristics%20to%20Document%20Malnutrition-White%20JV%20et%20al%202012.pdf    Height (cm): 152.4 (07-22-21 @ 11:15)  Weight (kg): 62.3 (08-10-21 @ 06:00)  BMI (kg/m2): 26.8 (08-10-21 @ 06:00)    [ ] PPSV2 < or = 30% [ ] significant weight loss [x ] poor nutritional intake [ ] anasarca   Artificial Nutrition [ ]     REFERRALS:   [ ]Chaplaincy  [ ] Hospice  [ ]Child Life  [ ]Social Work  [ ]Case management [ ]Holistic Therapy [ ] Physical Therapy [ ] Dietary   Goals of Care Document:

## 2021-08-15 NOTE — PROGRESS NOTE ADULT - PROBLEM SELECTOR PLAN 1
- dilaudid 0.2mg IV q1 PRN for pain x1 over 24 hrs  - dilaudid 0.2mg IV q1 PRN for dyspnea received x3 over 24 hours  - robinul 0.4mg IV q4 PRN for secretions  - bowel regimen

## 2021-08-15 NOTE — PROGRESS NOTE ADULT - PROBLEM SELECTOR PROBLEM 6
Medication monitoring encounter
Medication monitoring encounter
Need for prophylactic measure
Need for prophylactic measure
Medication monitoring encounter
Need for prophylactic measure
Need for prophylactic measure
Functional quadriplegia
Need for prophylactic measure
Functional quadriplegia
Medication monitoring encounter
Need for prophylactic measure
Functional quadriplegia
Medication monitoring encounter
Medication monitoring encounter
Need for prophylactic measure
Medication monitoring encounter
Need for prophylactic measure
Medication monitoring encounter
Medication monitoring encounter
Need for prophylactic measure

## 2021-08-15 NOTE — PROGRESS NOTE ADULT - PROBLEM SELECTOR PLAN 7
DNR, DNI, comfort measures only, continue care in PCU.   Goal is home hospice. Home discharge unlikely to be feasible given lvl of care that would be required for sx management, family in agreement with inpatient symptom management. Per discussion with family, all PO meds and inhalers discontinued

## 2021-08-15 NOTE — PROGRESS NOTE ADULT - PROVIDER SPECIALTY LIST ADULT
Cardiology
Endocrinology
Heart Failure
Internal Medicine
MICU
PHONG
PHONG
Pulmonology
Structural Heart
CT Surgery
Cardiology
Endocrinology
Nephrology
Pulmonology
Structural Heart
CCU
CT Surgery
CT Surgery
Cardiology
MICU
Nephrology
Pulmonology
Structural Heart
CT Surgery
CT Surgery
Cardiology
Endocrinology
Hospitalist
Internal Medicine
Nephrology
Palliative Care
Structural Heart
CT Surgery
CT Surgery
Cardiology
Cardiology
MICU
Nephrology
PHONG
PHONG
Pulmonology
CT Surgery
Cardiology
Cardiology
Nephrology
Pulmonology
CT Surgery
Nephrology
Pulmonology
Pulmonology
CT Surgery
CT Surgery
Endocrinology
Heart Failure
Nephrology
Pulmonology
CT Surgery
CT Surgery
Heart Failure
Hospitalist
Nephrology
Nephrology
CT Surgery
Nephrology
CT Surgery
Endocrinology
Hospitalist
Heart Failure
Hospitalist
Palliative Care
Heart Failure
Hospitalist
Nephrology
CT Surgery
Hospitalist
Hospitalist
Internal Medicine
Nephrology
Heart Failure
Palliative Care
Heart Failure
Nephrology
Nephrology
Hospitalist

## 2021-08-15 NOTE — PROGRESS NOTE ADULT - REASON FOR ADMISSION
sp   TAVR
sp  TAVR
urgent HD
AS
sp  TAVR
urgent HD
urgent HD s/p TAVR
urgent HD
sp  TAVR
urgent HD
sp   TAVR
urgent HD

## 2021-08-15 NOTE — PROGRESS NOTE ADULT - ATTENDING SUPERVISION STATEMENT
ACP
Resident
ACP
Resident
Resident
Fellow
Resident
Fellow
Resident
Fellow
Resident

## 2021-08-15 NOTE — PROGRESS NOTE ADULT - ATTENDING COMMENTS
Agree with above, c/w symptom-directed care, appears comfortale    ______________  Pako Veag MD   of Geriatric and Palliative Medicine  [facility]     Please page the following number for clinical matters between the hours of 9AM and 5PM   from Monday through Friday : [phone]    After 5PM and on weekends, please page: [phone]. The Geriatric and Palliative Medicine consult service has 24/7 coverage for medical recommendations, including for symptom management needs.

## 2021-08-16 NOTE — PROVIDER CONTACT NOTE (OTHER) - SITUATION
Patient without spontaneous respirations or pulse
Pt had 8 beats of WCT on tele.   Pt. then had 33 beats WCT on tele one minute later.

## 2021-08-16 NOTE — DISCHARGE NOTE FOR THE EXPIRED PATIENT - HOSPITAL COURSE
87F w/ DM2, CAD w/ stents, afib, history of colon CA, now new lung mass, presented with critical AS s/p TAVR, ADHF, AHRF requiring HFNC, and JACQUE requiring CVVH and IV pressors but with continued decline. Palliative consulted for GOC. Patient transferred to the PCU for symptom management. The patient  on 21at 06:35AM

## 2021-09-28 ENCOUNTER — APPOINTMENT (OUTPATIENT)
Dept: GASTROENTEROLOGY | Facility: CLINIC | Age: 86
End: 2021-09-28

## 2021-12-04 NOTE — ASU PREOP CHECKLIST - NEEDLE GAUGE
normal... 20 Well appearing, awake, alert, oriented to person, place, time/situation and in no apparent distress.

## 2022-04-19 NOTE — DATA REVIEWED
Pt returned call and spoke with Ophelia Bardales who offered pt a VV appt today at 1:40pm but pt stated she could not wait and is going to ER. [FreeTextEntry1] : Echo: 6/24/21: ALEXA 0.5, mGr 49, pGr 74, AoV 4.3, EF 55-60%, Mild MR, Severe TR

## 2022-06-23 NOTE — PATIENT PROFILE ADULT - 
ADDITIONAL INFORMATION
Azelaic Acid Counseling: Patient counseled that medicine may cause skin irritation and to avoid applying near the eyes.  In the event of skin irritation, the patient was advised to reduce the amount of the drug applied or use it less frequently.   The patient verbalized understanding of the proper use and possible adverse effects of azelaic acid.  All of the patient's questions and concerns were addressed. Isotretinoin Pregnancy And Lactation Text: This medication is Pregnancy Category X and is considered extremely dangerous during pregnancy. It is unknown if it is excreted in breast milk. Topical Clindamycin Pregnancy And Lactation Text: This medication is Pregnancy Category B and is considered safe during pregnancy. It is unknown if it is excreted in breast milk. Tetracycline Counseling: Patient counseled regarding possible photosensitivity and increased risk for sunburn.  Patient instructed to avoid sunlight, if possible.  When exposed to sunlight, patients should wear protective clothing, sunglasses, and sunscreen.  The patient was instructed to call the office immediately if the following severe adverse effects occur:  hearing changes, easy bruising/bleeding, severe headache, or vision changes.  The patient verbalized understanding of the proper use and possible adverse effects of tetracycline.  All of the patient's questions and concerns were addressed. Patient understands to avoid pregnancy while on therapy due to potential birth defects. Doxycycline Pregnancy And Lactation Text: This medication is Pregnancy Category D and not consider safe during pregnancy. It is also excreted in breast milk but is considered safe for shorter treatment courses. Azithromycin Counseling:  I discussed with the patient the risks of azithromycin including but not limited to GI upset, allergic reaction, drug rash, diarrhea, and yeast infections. Topical Retinoid Pregnancy And Lactation Text: This medication is Pregnancy Category C. It is unknown if this medication is excreted in breast milk. Sarecycline Counseling: Patient advised regarding possible photosensitivity and discoloration of the teeth, skin, lips, tongue and gums.  Patient instructed to avoid sunlight, if possible.  When exposed to sunlight, patients should wear protective clothing, sunglasses, and sunscreen.  The patient was instructed to call the office immediately if the following severe adverse effects occur:  hearing changes, easy bruising/bleeding, severe headache, or vision changes.  The patient verbalized understanding of the proper use and possible adverse effects of sarecycline.  All of the patient's questions and concerns were addressed. Azelaic Acid Pregnancy And Lactation Text: This medication is considered safe during pregnancy and breast feeding. High Dose Vitamin A Counseling: Side effects reviewed, pt to contact office should one occur. Birth Control Pills Pregnancy And Lactation Text: This medication should be avoided if pregnant and for the first 30 days post-partum. Topical Sulfur Applications Counseling: Topical Sulfur Counseling: Patient counseled that this medication may cause skin irritation or allergic reactions.  In the event of skin irritation, the patient was advised to reduce the amount of the drug applied or use it less frequently.   The patient verbalized understanding of the proper use and possible adverse effects of topical sulfur application.  All of the patient's questions and concerns were addressed. Tetracycline Pregnancy And Lactation Text: This medication is Pregnancy Category D and not consider safe during pregnancy. It is also excreted in breast milk. Tazorac Counseling:  Patient advised that medication is irritating and drying.  Patient may need to apply sparingly and wash off after an hour before eventually leaving it on overnight.  The patient verbalized understanding of the proper use and possible adverse effects of tazorac.  All of the patient's questions and concerns were addressed. Azithromycin Pregnancy And Lactation Text: This medication is considered safe during pregnancy and is also secreted in breast milk. Erythromycin Counseling:  I discussed with the patient the risks of erythromycin including but not limited to GI upset, allergic reaction, drug rash, diarrhea, increase in liver enzymes, and yeast infections. Benzoyl Peroxide Counseling: Patient counseled that medicine may cause skin irritation and bleach clothing.  In the event of skin irritation, the patient was advised to reduce the amount of the drug applied or use it less frequently.   The patient verbalized understanding of the proper use and possible adverse effects of benzoyl peroxide.  All of the patient's questions and concerns were addressed. Dapsone Counseling: I discussed with the patient the risks of dapsone including but not limited to hemolytic anemia, agranulocytosis, rashes, methemoglobinemia, kidney failure, peripheral neuropathy, headaches, GI upset, and liver toxicity.  Patients who start dapsone require monitoring including baseline LFTs and weekly CBCs for the first month, then every month thereafter.  The patient verbalized understanding of the proper use and possible adverse effects of dapsone.  All of the patient's questions and concerns were addressed. Topical Sulfur Applications Pregnancy And Lactation Text: This medication is Pregnancy Category C and has an unknown safety profile during pregnancy. It is unknown if this topical medication is excreted in breast milk. Include Pregnancy/Lactation Warning?: No Aklief counseling:  Patient advised to apply a pea-sized amount only at bedtime and wait 30 minutes after washing their face before applying.  If too drying, patient may add a non-comedogenic moisturizer.  The most commonly reported side effects including irritation, redness, scaling, dryness, stinging, burning, itching, and increased risk of sunburn.  The patient verbalized understanding of the proper use and possible adverse effects of retinoids.  All of the patient's questions and concerns were addressed. High Dose Vitamin A Pregnancy And Lactation Text: High dose vitamin A therapy is contraindicated during pregnancy and breast feeding. Detail Level: Detailed Bactrim Counseling:  I discussed with the patient the risks of sulfa antibiotics including but not limited to GI upset, allergic reaction, drug rash, diarrhea, dizziness, photosensitivity, and yeast infections.  Rarely, more serious reactions can occur including but not limited to aplastic anemia, agranulocytosis, methemoglobinemia, blood dyscrasias, liver or kidney failure, lung infiltrates or desquamative/blistering drug rashes. Tazorac Pregnancy And Lactation Text: This medication is not safe during pregnancy. It is unknown if this medication is excreted in breast milk. Erythromycin Pregnancy And Lactation Text: This medication is Pregnancy Category B and is considered safe during pregnancy. It is also excreted in breast milk. Benzoyl Peroxide Pregnancy And Lactation Text: This medication is Pregnancy Category C. It is unknown if benzoyl peroxide is excreted in breast milk. Spironolactone Counseling: Patient advised regarding risks of diarrhea, abdominal pain, hyperkalemia, birth defects (for female patients), liver toxicity and renal toxicity. The patient may need blood work to monitor liver and kidney function and potassium levels while on therapy. The patient verbalized understanding of the proper use and possible adverse effects of spironolactone.  All of the patient's questions and concerns were addressed. Winlevi Counseling:  I discussed with the patient the risks of topical clascoterone including but not limited to erythema, scaling, itching, and stinging. Patient voiced their understanding. Dapsone Pregnancy And Lactation Text: This medication is Pregnancy Category C and is not considered safe during pregnancy or breast feeding. Minocycline Counseling: Patient advised regarding possible photosensitivity and discoloration of the teeth, skin, lips, tongue and gums.  Patient instructed to avoid sunlight, if possible.  When exposed to sunlight, patients should wear protective clothing, sunglasses, and sunscreen.  The patient was instructed to call the office immediately if the following severe adverse effects occur:  hearing changes, easy bruising/bleeding, severe headache, or vision changes.  The patient verbalized understanding of the proper use and possible adverse effects of minocycline.  All of the patient's questions and concerns were addressed. Bactrim Pregnancy And Lactation Text: This medication is Pregnancy Category D and is known to cause fetal risk.  It is also excreted in breast milk. Topical Clindamycin Counseling: Patient counseled that this medication may cause skin irritation or allergic reactions.  In the event of skin irritation, the patient was advised to reduce the amount of the drug applied or use it less frequently.   The patient verbalized understanding of the proper use and possible adverse effects of clindamycin.  All of the patient's questions and concerns were addressed. Aklief Pregnancy And Lactation Text: It is unknown if this medication is safe to use during pregnancy.  It is unknown if this medication is excreted in breast milk.  Breastfeeding women should use the topical cream on the smallest area of the skin for the shortest time needed while breastfeeding.  Do not apply to nipple and areola. Isotretinoin Counseling: Patient should get monthly blood tests, not donate blood, not drive at night if vision affected, not share medication, and not undergo elective surgery for 6 months after tx completed. Side effects reviewed, pt to contact office should one occur. Spironolactone Pregnancy And Lactation Text: This medication can cause feminization of the male fetus and should be avoided during pregnancy. The active metabolite is also found in breast milk. Doxycycline Counseling:  Patient counseled regarding possible photosensitivity and increased risk for sunburn.  Patient instructed to avoid sunlight, if possible.  When exposed to sunlight, patients should wear protective clothing, sunglasses, and sunscreen.  The patient was instructed to call the office immediately if the following severe adverse effects occur:  hearing changes, easy bruising/bleeding, severe headache, or vision changes.  The patient verbalized understanding of the proper use and possible adverse effects of doxycycline.  All of the patient's questions and concerns were addressed. Winlevi Pregnancy And Lactation Text: This medication is considered safe during pregnancy and breastfeeding. Topical Retinoid counseling:  Patient advised to apply a pea-sized amount only at bedtime and wait 30 minutes after washing their face before applying.  If too drying, patient may add a non-comedogenic moisturizer. The patient verbalized understanding of the proper use and possible adverse effects of retinoids.  All of the patient's questions and concerns were addressed. Birth Control Pills Counseling: Birth Control Pill Counseling: I discussed with the patient the potential side effects of OCPs including but not limited to increased risk of stroke, heart attack, thrombophlebitis, deep venous thrombosis, hepatic adenomas, breast changes, GI upset, headaches, and depression.  The patient verbalized understanding of the proper use and possible adverse effects of OCPs. All of the patient's questions and concerns were addressed. 2nd dose in March, unsure of date

## 2022-07-13 NOTE — PROGRESS NOTE ADULT - PROBLEM SELECTOR PLAN 1
Pt. with hyponatremia to 127 in setting of volume overload.   Last ECHO 7/26 Small pericardial effusion, There is no significant inflow variation measured across the mitral valve, but it is significant across the tricuspid valve. The IVC is enlarged (3.36cm)  and not collapsable No evidence of right atrial or right ventricular collapse.    s/p Tolvaptan 15mg x1 yesterday 7/28  continue with bumex 4mg bid  Remains volume overloaded on exam     strict I/Os  monitor kidney function and urine output   Monitor serum sodium Q12 hours.   Do not correct serum sodium >6-8 meq/day. Pt. with hyponatremia to 127 in setting of volume overload.   Last ECHO 7/26 Small pericardial effusion, There is no significant inflow variation measured across the mitral valve, but it is significant across the tricuspid valve. The IVC is enlarged (3.36cm)  and not collapsable No evidence of right atrial or right ventricular collapse.    s/p Tolvaptan 15mg x1 on 7/28 : with no change in sodium   Received bumex TID yesterday : Net negative over 1 liter  Now with improvement n sodium . Renal function is stable  Can decrease  bumex  down to 4mg bid  strict I/Os  monitor kidney function and urine output   Monitor serum sodium Q12 hours.   Do not correct serum sodium >6-8 meq/day. Yes - the patient is able to be screened

## 2022-10-19 NOTE — PROGRESS NOTE ADULT - PROBLEM SELECTOR PLAN 5
[facility]    Yolie French Patient Status:  Observation   Age/Gender 80year old female MRN [id number]   [facility] 3SW-A Attending Tabitha Nicholson MD   Hosp Day # 0 PCP Leverne Fleischer, MD       Anesthesia Post-op Note        Procedure Summary     Date: 10/19/22 Room / Location: [facility] MRI    Anesthesia Start: 1082 Anesthesia Stop: 3908    Procedure: MRI THIGH, LEFT (CPT=73718) Diagnosis: (hamsting tear?)    Scheduled Providers: Hali Chaves MD Anesthesiologist: Hali Chaves MD    Anesthesia Type: MAC ASA Status: 3          Anesthesia Type: MAC    Vitals Value Taken Time   /81 10/19/22 0916   Temp 98.3 10/19/22 0916   Pulse 78 10/19/22 0916   Resp 13 10/19/22 0916   SpO2 98 10/19/22 0916       Patient Location: PACU    Anesthesia Type: MAC    Airway Patency: patent    Postop Pain Control: adequate    Mental Status: preanesthetic baseline    Nausea/Vomiting: none    Cardiopulmonary/Hydration status: stable euvolemic    Complications: no apparent anesthesia related complications    Postop vital signs: stable    Dental Exam: Unchanged from Preop    Patient to be discharged from PACU when criteria met. Overweight/Obesity: Patient counseled for weight loss, exercise, low calorie diet.

## 2022-12-11 NOTE — PHYSICAL THERAPY INITIAL EVALUATION ADULT - PRECAUTIONS/LIMITATIONS, REHAB EVAL
Focus Note    Paged by RN: Pt c/o painful salivation and throat soreness. Increased redness of left eye.     Assessment/Plan:  Considered Benadryl for secretions but avoided in setting of bowel obstruction/ potential constipation  - Supportive cares   - Monitor ; RN to page if symptoms worsen     Luis Enrique Contreras MD  Family Medicine, PGY-2     no known precautions/limitations

## 2023-05-29 NOTE — CONSULT NOTE ADULT - PROBLEM SELECTOR PROBLEM 3
JACQUE (acute kidney injury)
Pulmonary nodule less than 6 cm determined by computed tomography of lung
Permanent atrial fibrillation
Metabolic acidosis
JACQUE (acute kidney injury)
Disoriented

## 2024-07-08 NOTE — PROGRESS NOTE ADULT - PROBLEM SELECTOR PROBLEM 4
[facility]   Mental Health & Addiction Services     Progress Note - Initial Visit    Patient  Name:  Stephanie Bhatia Date: 24           Service Type: Individual     Visit Start Time: 10:00 am  Visit End Time: 10:49 AM    Visit #: 1    Attendees: Client attended alone    Service Modality:  Video Visit:      Provider verified identity through the following two step process.  Patient provided:  Patient photo and Patient     Telemedicine Visit: The patient's condition can be safely assessed and treated via synchronous audio and visual telemedicine encounter.      Reason for Telemedicine Visit: Patient convenience (e.g. access to timely appointments / distance to available provider)    Originating Site (Patient Location): Patient's home    Distant Site (Provider Location): [facility] [place]    Consent:  The patient/guardian has verbally consented to: the potential risks and benefits of telemedicine (video visit) versus in person care; bill my insurance or make self-payment for services provided; and responsibility for payment of non-covered services.     Patient would like the video invitation sent by:  Send to e-mail at: [email or web address]    Mode of Communication:  Video Conference via [facility]    Distant Location (Provider):  On-site    As the provider I attest to compliance with applicable laws and regulations related to telemedicine.       DATA:   Interactive Complexity: No   Crisis: No     Presenting Concerns/  Current Stressors:   Today, patient and therapist started the diagnostic assessment, but were unable to complete due to time constraints. ? patient shared regarding her experiences with depression, prior history of alcohol abuse, family of origin dynamics, history of  dementia within her family, and past relationship/marriage is.  Therapist assessed for risk and safety - no concerns at this time. ? patient did share an understanding  of people choosing to end her life due to pain they are experiencing.  Patient and therapist will continue with the DA during next session.       ASSESSMENT:  Mental Status Assessment:  Appearance:   Appropriate   Eye Contact:   Good   Psychomotor Behavior: Normal   Attitude:   Cooperative  Interested  Orientation:   All  Speech   Rate / Production: Normal/ Responsive   Volume:  Normal   Mood:    Normal  Affect:    Appropriate   Thought Content:  Clear   Thought Form:  Coherent  Logical   Insight:    Good     Assessments completed prior to this visit:  The following assessments were completed by patient for this visit:  PHQ9:       2/20/2018     8:30 AM 8/12/2019     8:02 AM 9/23/2020     8:41 AM 11/28/2022     8:22 AM 10/9/2023     8:39 AM 7/8/2024     8:19 AM 7/8/2024     9:23 AM   PHQ-9 SCORE   PHQ-9 Total Score MyChart       13 (Moderate depression)   PHQ-9 Total Score 7 10 9 16 10 12 13    13     GAD7:       7/23/2018     8:12 AM 8/12/2019     8:02 AM 11/28/2022     8:22 AM 10/9/2023     8:39 AM 7/4/2024     1:18 PM   NATALI-7 SCORE   Total Score 6 (mild anxiety)    7 (mild anxiety)   Total Score 6 4 4 4 7     CAGE-AID:       7/8/2024     9:44 AM   CAGE-AID Total Score   Total Score 0    0   Total Score MyChart 0 (A total score of 2 or greater is considered clinically significant)     PROMIS 10-Global Health (only subscores and total score):       7/8/2024     9:43 AM   PROMIS-10 Scores Only   Global Mental Health Score 10    10   Global Physical Health Score 11    11   PROMIS TOTAL - SUBSCORES 21    21         Safety Issues and Plan for Safety and Risk Management:   [place] Suicide Severity Rating Scale (Lifetime/Recent)      7/8/2024    12:21 PM   [place] Suicide Severity Rating (Lifetime/Recent)   Q1 Wish to be Dead (Lifetime) N   Q2 Non-Specific Active Suicidal Thoughts (Lifetime) N   Actual Attempt (Lifetime) N   Has subject engaged in non-suicidal self-injurious behavior? (Lifetime) N   Interrupted  Attempts (Lifetime) N   Aborted or Self-Interrupted Attempt (Lifetime) N   Preparatory Acts or Behavior (Lifetime) N   Calculated C-SSRS Risk Score (Lifetime/Recent) No Risk Indicated     Patient denies current fears or concerns for personal safety.  Patient denies current or recent suicidal ideation or behaviors.  Patient denies current or recent homicidal ideation or behaviors.  Patient denies current or recent self injurious behavior or ideation.  Patient denies other safety concerns.  Recommended that patient call 911 or go to the local ED should there be a change in any of these risk factors.  Patient reports there are no firearms in the house.     Diagnostic Criteria:  Adjustment Disorder  A. The development of emotional or behavioral symptoms in response to an identifiable stressor(s) occurring within 3 months of the onset of the stressor(s)  B. These symptoms or behaviors are clinically significant, as evidenced by one or both of the following:       - Marked distress that is out of proportion to the severity/intensity of the stressor (with consideration for external context & culture)       - Significant impairment in social, occupational, or other important areas of functioning  C. The stress-related disturbance does not meet criteria for another disorder & is not not an exacerbation of another mental disorder  D. The symptoms do not represent normal bereavement  E. 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 Provider will continue Diagnostic Assessment.  Patient was given the following to do until next session: Identify goals/areas of focus    2. Provider recommended the following referrals: None at this time.      3.  Suicide Risk and Safety Concerns were assessed for Stephanie Bhatia.    Patient meets the following risk assessment and triage: Patient denied any current/recent/lifetime history of suicidal ideation and/or behaviors.  No safety plan indicated at this time.       Regina Teixeira, [facility]  July 8, 2024        Answers submitted by the patient for this visit:  Patient Health Questionnaire (Submitted on 7/8/2024)  If you checked off any problems, how difficult have these problems made it for you to do your work, take care of things at home, or get along with other people?: Very difficult  PHQ9 TOTAL SCORE: 13     JACQUE (acute kidney injury)

## 2025-07-15 NOTE — PROGRESS NOTE ADULT - PROBLEM SELECTOR PLAN 3
Pt informed of lab results and recommendations.  Pt verbalized understanding.     Dr. Ramos following  Maintain negative fluid balance  daily bmp  na 133 today